# Patient Record
Sex: MALE | Race: WHITE | NOT HISPANIC OR LATINO | URBAN - METROPOLITAN AREA
[De-identification: names, ages, dates, MRNs, and addresses within clinical notes are randomized per-mention and may not be internally consistent; named-entity substitution may affect disease eponyms.]

---

## 2016-04-04 RX ORDER — TAMSULOSIN HYDROCHLORIDE 0.4 MG/1
2 CAPSULE ORAL
Qty: 0 | Refills: 0 | DISCHARGE
Start: 2016-04-04 | End: 2016-05-04

## 2016-04-04 RX ORDER — TAMSULOSIN HYDROCHLORIDE 0.4 MG/1
1 CAPSULE ORAL
Qty: 0 | Refills: 0 | DISCHARGE
Start: 2016-04-04 | End: 2016-05-04

## 2017-06-07 ENCOUNTER — EMERGENCY (EMERGENCY)
Facility: HOSPITAL | Age: 67
LOS: 1 days | Discharge: HOME | End: 2017-06-07
Attending: EMERGENCY MEDICINE | Admitting: EMERGENCY MEDICINE
Payer: MEDICARE

## 2017-06-07 VITALS
TEMPERATURE: 98 F | RESPIRATION RATE: 18 BRPM | SYSTOLIC BLOOD PRESSURE: 153 MMHG | OXYGEN SATURATION: 99 % | HEART RATE: 59 BPM | DIASTOLIC BLOOD PRESSURE: 56 MMHG

## 2017-06-07 VITALS
RESPIRATION RATE: 16 BRPM | WEIGHT: 158.95 LBS | HEART RATE: 75 BPM | DIASTOLIC BLOOD PRESSURE: 73 MMHG | TEMPERATURE: 98 F | OXYGEN SATURATION: 100 % | HEIGHT: 65 IN | SYSTOLIC BLOOD PRESSURE: 165 MMHG

## 2017-06-07 LAB
ALBUMIN SERPL ELPH-MCNC: 3.6 G/DL — SIGNIFICANT CHANGE UP (ref 3.3–5)
ALP SERPL-CCNC: 105 U/L — SIGNIFICANT CHANGE UP (ref 40–120)
ALT FLD-CCNC: 30 U/L — SIGNIFICANT CHANGE UP (ref 4–41)
APTT BLD: 38.6 SEC — HIGH (ref 27.5–37.4)
AST SERPL-CCNC: 33 U/L — SIGNIFICANT CHANGE UP (ref 4–40)
BASE EXCESS BLDV CALC-SCNC: 3.3 MMOL/L — SIGNIFICANT CHANGE UP
BASOPHILS # BLD AUTO: 0.05 K/UL — SIGNIFICANT CHANGE UP (ref 0–0.2)
BASOPHILS NFR BLD AUTO: 1.9 % — SIGNIFICANT CHANGE UP (ref 0–2)
BILIRUB SERPL-MCNC: 0.9 MG/DL — SIGNIFICANT CHANGE UP (ref 0.2–1.2)
BLOOD GAS VENOUS - CREATININE: 1.49 MG/DL — HIGH (ref 0.5–1.3)
BUN SERPL-MCNC: 38 MG/DL — HIGH (ref 7–23)
CALCIUM SERPL-MCNC: 9.1 MG/DL — SIGNIFICANT CHANGE UP (ref 8.4–10.5)
CHLORIDE BLDV-SCNC: 101 MMOL/L — SIGNIFICANT CHANGE UP (ref 96–108)
CHLORIDE SERPL-SCNC: 102 MMOL/L — SIGNIFICANT CHANGE UP (ref 98–107)
CO2 SERPL-SCNC: 26 MMOL/L — SIGNIFICANT CHANGE UP (ref 22–31)
CREAT SERPL-MCNC: 1.53 MG/DL — HIGH (ref 0.5–1.3)
EOSINOPHIL # BLD AUTO: 0.25 K/UL — SIGNIFICANT CHANGE UP (ref 0–0.5)
EOSINOPHIL NFR BLD AUTO: 9.5 % — HIGH (ref 0–6)
GAS PNL BLDV: 137 MMOL/L — SIGNIFICANT CHANGE UP (ref 136–146)
GLUCOSE BLDV-MCNC: 120 — HIGH (ref 70–99)
GLUCOSE SERPL-MCNC: 123 MG/DL — HIGH (ref 70–99)
HCO3 BLDV-SCNC: 26 MMOL/L — SIGNIFICANT CHANGE UP (ref 20–27)
HCT VFR BLD CALC: 29.6 % — LOW (ref 39–50)
HCT VFR BLDV CALC: 31.4 % — LOW (ref 39–51)
HGB BLD-MCNC: 9.7 G/DL — LOW (ref 13–17)
HGB BLDV-MCNC: 10.1 G/DL — LOW (ref 13–17)
HYPOCHROMIA BLD QL: SLIGHT — SIGNIFICANT CHANGE UP
IMM GRANULOCYTES NFR BLD AUTO: 0 % — SIGNIFICANT CHANGE UP (ref 0–1.5)
INR BLD: 1.22 — HIGH (ref 0.88–1.17)
LACTATE BLDV-MCNC: 0.4 MMOL/L — LOW (ref 0.5–2)
LYMPHOCYTES # BLD AUTO: 0.69 K/UL — LOW (ref 1–3.3)
LYMPHOCYTES # BLD AUTO: 26.3 % — SIGNIFICANT CHANGE UP (ref 13–44)
MANUAL SMEAR VERIFICATION: SIGNIFICANT CHANGE UP
MCHC RBC-ENTMCNC: 29.8 PG — SIGNIFICANT CHANGE UP (ref 27–34)
MCHC RBC-ENTMCNC: 32.8 % — SIGNIFICANT CHANGE UP (ref 32–36)
MCV RBC AUTO: 91.1 FL — SIGNIFICANT CHANGE UP (ref 80–100)
MONOCYTES # BLD AUTO: 0.32 K/UL — SIGNIFICANT CHANGE UP (ref 0–0.9)
MONOCYTES NFR BLD AUTO: 12.2 % — SIGNIFICANT CHANGE UP (ref 2–14)
NEUTROPHILS # BLD AUTO: 1.31 K/UL — LOW (ref 1.8–7.4)
NEUTROPHILS NFR BLD AUTO: 50.1 % — SIGNIFICANT CHANGE UP (ref 43–77)
PCO2 BLDV: 52 MMHG — HIGH (ref 41–51)
PH BLDV: 7.36 PH — SIGNIFICANT CHANGE UP (ref 7.32–7.43)
PLATELET # BLD AUTO: 121 K/UL — LOW (ref 150–400)
PLATELET COUNT - ESTIMATE: SIGNIFICANT CHANGE UP
PMV BLD: 9.6 FL — SIGNIFICANT CHANGE UP (ref 7–13)
PO2 BLDV: 28 MMHG — LOW (ref 35–40)
POTASSIUM BLDV-SCNC: 4 MMOL/L — SIGNIFICANT CHANGE UP (ref 3.4–4.5)
POTASSIUM SERPL-MCNC: 4.3 MMOL/L — SIGNIFICANT CHANGE UP (ref 3.5–5.3)
POTASSIUM SERPL-SCNC: 4.3 MMOL/L — SIGNIFICANT CHANGE UP (ref 3.5–5.3)
PROT SERPL-MCNC: 6 G/DL — SIGNIFICANT CHANGE UP (ref 6–8.3)
PROTHROM AB SERPL-ACNC: 13.7 SEC — HIGH (ref 9.8–13.1)
RBC # BLD: 3.25 M/UL — LOW (ref 4.2–5.8)
RBC # FLD: 13.1 % — SIGNIFICANT CHANGE UP (ref 10.3–14.5)
SAO2 % BLDV: 44.5 % — LOW (ref 60–85)
SODIUM SERPL-SCNC: 140 MMOL/L — SIGNIFICANT CHANGE UP (ref 135–145)
TROPONIN T SERPL-MCNC: < 0.06 NG/ML — SIGNIFICANT CHANGE UP (ref 0–0.06)
WBC # BLD: 2.62 K/UL — LOW (ref 3.8–10.5)
WBC # FLD AUTO: 2.62 K/UL — LOW (ref 3.8–10.5)

## 2017-06-07 PROCEDURE — 72125 CT NECK SPINE W/O DYE: CPT | Mod: 26

## 2017-06-07 PROCEDURE — 99284 EMERGENCY DEPT VISIT MOD MDM: CPT

## 2017-06-07 PROCEDURE — 70450 CT HEAD/BRAIN W/O DYE: CPT | Mod: 26

## 2017-06-07 RX ORDER — ACETAMINOPHEN 500 MG
650 TABLET ORAL ONCE
Qty: 0 | Refills: 0 | Status: COMPLETED | OUTPATIENT
Start: 2017-06-07 | End: 2017-06-07

## 2017-06-07 RX ADMIN — Medication 650 MILLIGRAM(S): at 15:06

## 2017-06-07 NOTE — ED PROVIDER NOTE - SHIFT CHANGE DETAILS
I have signed over this patient to the above attending physician. Pertinent history, physical exam findings and workup thus far in the ED have been discussed. The pending tests and plan, including CTs were signed over.  All questions from the above attending physician have been answered.
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
## 2017-06-07 NOTE — ED PROVIDER NOTE - PMH
ASHD (arteriosclerotic heart disease)    CAD (coronary artery disease)    CRI (chronic renal insufficiency)    Diabetes mellitus type II    Hyperlipidemia    Hypertension    Pacemaker

## 2017-06-07 NOTE — ED PROVIDER NOTE - OBJECTIVE STATEMENT
68 y/o male pmh htn, dm, afib on eloquis c/o fall w/ head trauma x today. Pt admits to being at his day program and finishing lunch. Pt admits to feeling dizzy and fell backwards. Pt states that he stood up after eating lunch and began walking when he felt a "spinning sensation" and fell. Pt admits to head trauma, posteriorly, denies LOC. Pt admits to feeling dizzy frequently while standing up. Denies chest pain, sob, abd pain, n/v/d, numbness, tingling, weakness, syncope, fever or chills

## 2017-06-07 NOTE — ED ADULT NURSE NOTE - OBJECTIVE STATEMENT
Patient received to room 23 awake, alert, oriented x3, able to make needs known verbally.  Patient s/p fall today, felt dizzy then lowered to floor when trying to sit in chair.  Patient has aide from  with him.  Patient says he hit his head, small area of redness noted to occipital space, no open skin.  Patient ambulatory, hemodynamically stable.  Patient complains that he has headaches intermittently, when they come they are at level 10 severity, currently they have subsided.  Patient does not complain of dizziness/nausea/vomiting/palpitations.  Cardiac monitor shows paced rhythm with occasional PVC's.  Call bell within reach, safety maintained, will continue to monitor.

## 2017-06-07 NOTE — ED ADULT TRIAGE NOTE - CHIEF COMPLAINT QUOTE
Pt on eliquis s/p fall denies LOC. Pt states he felt dizzy then fell backward hitting his head. Pt denies chest pain, SOB. Resp even and unlabored

## 2017-08-07 ENCOUNTER — EMERGENCY (EMERGENCY)
Facility: HOSPITAL | Age: 67
LOS: 1 days | Discharge: HOME | End: 2017-08-07
Admitting: EMERGENCY MEDICINE
Payer: MEDICARE

## 2017-08-07 VITALS
SYSTOLIC BLOOD PRESSURE: 135 MMHG | OXYGEN SATURATION: 98 % | RESPIRATION RATE: 18 BRPM | HEART RATE: 77 BPM | TEMPERATURE: 98 F | DIASTOLIC BLOOD PRESSURE: 81 MMHG

## 2017-08-07 PROCEDURE — 99283 EMERGENCY DEPT VISIT LOW MDM: CPT

## 2017-08-07 RX ORDER — ACETAMINOPHEN 500 MG
650 TABLET ORAL ONCE
Qty: 0 | Refills: 0 | Status: COMPLETED | OUTPATIENT
Start: 2017-08-07 | End: 2017-08-07

## 2017-08-07 RX ADMIN — Medication 650 MILLIGRAM(S): at 12:52
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
## 2017-08-07 NOTE — ED ADULT NURSE NOTE - OBJECTIVE STATEMENT
Pt seen and treated by provider, no apparent acute distress observed. Medicated per orders. Safety maintained in intake area.
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
## 2017-12-14 ENCOUNTER — INPATIENT (INPATIENT)
Facility: HOSPITAL | Age: 67
LOS: 4 days | Discharge: HOME | End: 2017-12-19
Attending: SPECIALIST | Admitting: SPECIALIST
Payer: MEDICARE

## 2017-12-14 VITALS
RESPIRATION RATE: 18 BRPM | HEART RATE: 85 BPM | OXYGEN SATURATION: 100 % | DIASTOLIC BLOOD PRESSURE: 54 MMHG | TEMPERATURE: 98 F | SYSTOLIC BLOOD PRESSURE: 132 MMHG

## 2017-12-14 DIAGNOSIS — R31.9 HEMATURIA, UNSPECIFIED: ICD-10-CM

## 2017-12-14 LAB
ALBUMIN SERPL ELPH-MCNC: 3.7 G/DL — SIGNIFICANT CHANGE UP (ref 3.3–5)
ALP SERPL-CCNC: 146 U/L — HIGH (ref 40–120)
ALT FLD-CCNC: 29 U/L — SIGNIFICANT CHANGE UP (ref 4–41)
APPEARANCE UR: SIGNIFICANT CHANGE UP
APTT BLD: 38.7 SEC — HIGH (ref 27.5–37.4)
AST SERPL-CCNC: 26 U/L — SIGNIFICANT CHANGE UP (ref 4–40)
BASOPHILS # BLD AUTO: 0.05 K/UL — SIGNIFICANT CHANGE UP (ref 0–0.2)
BASOPHILS NFR BLD AUTO: 1 % — SIGNIFICANT CHANGE UP (ref 0–2)
BILIRUB SERPL-MCNC: 1 MG/DL — SIGNIFICANT CHANGE UP (ref 0.2–1.2)
BILIRUB UR-MCNC: NEGATIVE — SIGNIFICANT CHANGE UP
BLOOD UR QL VISUAL: HIGH
BUN SERPL-MCNC: 38 MG/DL — HIGH (ref 7–23)
CALCIUM SERPL-MCNC: 8.8 MG/DL — SIGNIFICANT CHANGE UP (ref 8.4–10.5)
CHLORIDE SERPL-SCNC: 102 MMOL/L — SIGNIFICANT CHANGE UP (ref 98–107)
CO2 SERPL-SCNC: 24 MMOL/L — SIGNIFICANT CHANGE UP (ref 22–31)
COLOR SPEC: HIGH
CREAT SERPL-MCNC: 2.09 MG/DL — HIGH (ref 0.5–1.3)
EOSINOPHIL # BLD AUTO: 0.13 K/UL — SIGNIFICANT CHANGE UP (ref 0–0.5)
EOSINOPHIL NFR BLD AUTO: 2.6 % — SIGNIFICANT CHANGE UP (ref 0–6)
GLUCOSE BLDC GLUCOMTR-MCNC: 123 MG/DL — HIGH (ref 70–99)
GLUCOSE SERPL-MCNC: 154 MG/DL — HIGH (ref 70–99)
GLUCOSE UR-MCNC: 50 — HIGH
HCT VFR BLD CALC: 24.5 % — LOW (ref 39–50)
HGB BLD-MCNC: 8.2 G/DL — LOW (ref 13–17)
IMM GRANULOCYTES # BLD AUTO: 0.01 # — SIGNIFICANT CHANGE UP
IMM GRANULOCYTES NFR BLD AUTO: 0.2 % — SIGNIFICANT CHANGE UP (ref 0–1.5)
INR BLD: 1.35 — HIGH (ref 0.88–1.17)
KETONES UR-MCNC: SIGNIFICANT CHANGE UP
LEUKOCYTE ESTERASE UR-ACNC: HIGH
LYMPHOCYTES # BLD AUTO: 0.38 K/UL — LOW (ref 1–3.3)
LYMPHOCYTES # BLD AUTO: 7.6 % — LOW (ref 13–44)
MCHC RBC-ENTMCNC: 31.1 PG — SIGNIFICANT CHANGE UP (ref 27–34)
MCHC RBC-ENTMCNC: 33.5 % — SIGNIFICANT CHANGE UP (ref 32–36)
MCV RBC AUTO: 92.8 FL — SIGNIFICANT CHANGE UP (ref 80–100)
MONOCYTES # BLD AUTO: 0.33 K/UL — SIGNIFICANT CHANGE UP (ref 0–0.9)
MONOCYTES NFR BLD AUTO: 6.6 % — SIGNIFICANT CHANGE UP (ref 2–14)
NEUTROPHILS # BLD AUTO: 4.12 K/UL — SIGNIFICANT CHANGE UP (ref 1.8–7.4)
NEUTROPHILS NFR BLD AUTO: 82 % — HIGH (ref 43–77)
NITRITE UR-MCNC: NEGATIVE — SIGNIFICANT CHANGE UP
NRBC # FLD: 0 — SIGNIFICANT CHANGE UP
PH UR: 7.5 — SIGNIFICANT CHANGE UP (ref 4.6–8)
PLATELET # BLD AUTO: 184 K/UL — SIGNIFICANT CHANGE UP (ref 150–400)
PMV BLD: 10.2 FL — SIGNIFICANT CHANGE UP (ref 7–13)
POTASSIUM SERPL-MCNC: 4.5 MMOL/L — SIGNIFICANT CHANGE UP (ref 3.5–5.3)
POTASSIUM SERPL-SCNC: 4.5 MMOL/L — SIGNIFICANT CHANGE UP (ref 3.5–5.3)
PROT SERPL-MCNC: 6.3 G/DL — SIGNIFICANT CHANGE UP (ref 6–8.3)
PROT UR-MCNC: 500 MG/DL — HIGH
PROTHROM AB SERPL-ACNC: 15.1 SEC — HIGH (ref 9.8–13.1)
RBC # BLD: 2.64 M/UL — LOW (ref 4.2–5.8)
RBC # FLD: 13 % — SIGNIFICANT CHANGE UP (ref 10.3–14.5)
RBC CASTS # UR COMP ASSIST: >50 — HIGH (ref 0–?)
SODIUM SERPL-SCNC: 139 MMOL/L — SIGNIFICANT CHANGE UP (ref 135–145)
SP GR SPEC: 1.02 — SIGNIFICANT CHANGE UP (ref 1–1.04)
UROBILINOGEN FLD QL: NORMAL MG/DL — SIGNIFICANT CHANGE UP
WBC # BLD: 5.02 K/UL — SIGNIFICANT CHANGE UP (ref 3.8–10.5)
WBC # FLD AUTO: 5.02 K/UL — SIGNIFICANT CHANGE UP (ref 3.8–10.5)
WBC UR QL: HIGH (ref 0–?)

## 2017-12-14 PROCEDURE — 71020: CPT | Mod: 26

## 2017-12-14 PROCEDURE — 99223 1ST HOSP IP/OBS HIGH 75: CPT

## 2017-12-14 RX ORDER — OXYCODONE AND ACETAMINOPHEN 5; 325 MG/1; MG/1
2 TABLET ORAL EVERY 6 HOURS
Qty: 0 | Refills: 0 | Status: DISCONTINUED | OUTPATIENT
Start: 2017-12-14 | End: 2017-12-19

## 2017-12-14 RX ORDER — ACETAMINOPHEN 500 MG
650 TABLET ORAL EVERY 6 HOURS
Qty: 0 | Refills: 0 | Status: DISCONTINUED | OUTPATIENT
Start: 2017-12-14 | End: 2017-12-19

## 2017-12-14 RX ORDER — INSULIN LISPRO 100/ML
VIAL (ML) SUBCUTANEOUS AT BEDTIME
Qty: 0 | Refills: 0 | Status: DISCONTINUED | OUTPATIENT
Start: 2017-12-14 | End: 2017-12-19

## 2017-12-14 RX ORDER — CARVEDILOL PHOSPHATE 80 MG/1
3.12 CAPSULE, EXTENDED RELEASE ORAL EVERY 12 HOURS
Qty: 0 | Refills: 0 | Status: DISCONTINUED | OUTPATIENT
Start: 2017-12-14 | End: 2017-12-19

## 2017-12-14 RX ORDER — TAMSULOSIN HYDROCHLORIDE 0.4 MG/1
0.4 CAPSULE ORAL AT BEDTIME
Qty: 0 | Refills: 0 | Status: DISCONTINUED | OUTPATIENT
Start: 2017-12-14 | End: 2017-12-15

## 2017-12-14 RX ORDER — ATORVASTATIN CALCIUM 80 MG/1
20 TABLET, FILM COATED ORAL AT BEDTIME
Qty: 0 | Refills: 0 | Status: DISCONTINUED | OUTPATIENT
Start: 2017-12-14 | End: 2017-12-19

## 2017-12-14 RX ORDER — FINASTERIDE 5 MG/1
5 TABLET, FILM COATED ORAL DAILY
Qty: 0 | Refills: 0 | Status: DISCONTINUED | OUTPATIENT
Start: 2017-12-14 | End: 2017-12-19

## 2017-12-14 RX ORDER — DOCUSATE SODIUM 100 MG
100 CAPSULE ORAL THREE TIMES A DAY
Qty: 0 | Refills: 0 | Status: DISCONTINUED | OUTPATIENT
Start: 2017-12-14 | End: 2017-12-18

## 2017-12-14 RX ORDER — SODIUM BICARBONATE 1 MEQ/ML
650 SYRINGE (ML) INTRAVENOUS ONCE
Qty: 0 | Refills: 0 | Status: COMPLETED | OUTPATIENT
Start: 2017-12-14 | End: 2017-12-14

## 2017-12-14 RX ORDER — OXYCODONE AND ACETAMINOPHEN 5; 325 MG/1; MG/1
1 TABLET ORAL EVERY 4 HOURS
Qty: 0 | Refills: 0 | Status: DISCONTINUED | OUTPATIENT
Start: 2017-12-14 | End: 2017-12-19

## 2017-12-14 RX ORDER — HEPARIN SODIUM 5000 [USP'U]/ML
5000 INJECTION INTRAVENOUS; SUBCUTANEOUS EVERY 8 HOURS
Qty: 0 | Refills: 0 | Status: DISCONTINUED | OUTPATIENT
Start: 2017-12-14 | End: 2017-12-19

## 2017-12-14 RX ORDER — ASPIRIN/CALCIUM CARB/MAGNESIUM 324 MG
81 TABLET ORAL DAILY
Qty: 0 | Refills: 0 | Status: DISCONTINUED | OUTPATIENT
Start: 2017-12-14 | End: 2017-12-19

## 2017-12-14 RX ORDER — ATROPA BELLADONNA AND OPIUM 16.2; 6 MG/1; MG/1
1 SUPPOSITORY RECTAL ONCE
Qty: 0 | Refills: 0 | Status: DISCONTINUED | OUTPATIENT
Start: 2017-12-14 | End: 2017-12-14

## 2017-12-14 RX ORDER — INFLUENZA VIRUS VACCINE 15; 15; 15; 15 UG/.5ML; UG/.5ML; UG/.5ML; UG/.5ML
0.5 SUSPENSION INTRAMUSCULAR ONCE
Qty: 0 | Refills: 0 | Status: COMPLETED | OUTPATIENT
Start: 2017-12-14 | End: 2017-12-14

## 2017-12-14 RX ORDER — SENNA PLUS 8.6 MG/1
2 TABLET ORAL AT BEDTIME
Qty: 0 | Refills: 0 | Status: DISCONTINUED | OUTPATIENT
Start: 2017-12-14 | End: 2017-12-18

## 2017-12-14 RX ORDER — SODIUM CHLORIDE 9 MG/ML
1000 INJECTION, SOLUTION INTRAVENOUS
Qty: 0 | Refills: 0 | Status: DISCONTINUED | OUTPATIENT
Start: 2017-12-14 | End: 2017-12-16

## 2017-12-14 RX ORDER — FUROSEMIDE 40 MG
40 TABLET ORAL DAILY
Qty: 0 | Refills: 0 | Status: DISCONTINUED | OUTPATIENT
Start: 2017-12-14 | End: 2017-12-15

## 2017-12-14 RX ORDER — BUDESONIDE AND FORMOTEROL FUMARATE DIHYDRATE 160; 4.5 UG/1; UG/1
2 AEROSOL RESPIRATORY (INHALATION)
Qty: 0 | Refills: 0 | Status: DISCONTINUED | OUTPATIENT
Start: 2017-12-14 | End: 2017-12-19

## 2017-12-14 RX ORDER — INSULIN LISPRO 100/ML
VIAL (ML) SUBCUTANEOUS
Qty: 0 | Refills: 0 | Status: DISCONTINUED | OUTPATIENT
Start: 2017-12-14 | End: 2017-12-19

## 2017-12-14 RX ADMIN — ATROPA BELLADONNA AND OPIUM 1 SUPPOSITORY(S): 16.2; 6 SUPPOSITORY RECTAL at 21:29

## 2017-12-14 RX ADMIN — FINASTERIDE 5 MILLIGRAM(S): 5 TABLET, FILM COATED ORAL at 19:18

## 2017-12-14 RX ADMIN — CARVEDILOL PHOSPHATE 3.12 MILLIGRAM(S): 80 CAPSULE, EXTENDED RELEASE ORAL at 19:18

## 2017-12-14 RX ADMIN — SODIUM CHLORIDE 100 MILLILITER(S): 9 INJECTION, SOLUTION INTRAVENOUS at 19:19

## 2017-12-14 RX ADMIN — ATROPA BELLADONNA AND OPIUM 1 SUPPOSITORY(S): 16.2; 6 SUPPOSITORY RECTAL at 20:25

## 2017-12-14 RX ADMIN — TAMSULOSIN HYDROCHLORIDE 0.4 MILLIGRAM(S): 0.4 CAPSULE ORAL at 21:58

## 2017-12-14 RX ADMIN — BUDESONIDE AND FORMOTEROL FUMARATE DIHYDRATE 2 PUFF(S): 160; 4.5 AEROSOL RESPIRATORY (INHALATION) at 22:53

## 2017-12-14 RX ADMIN — Medication 650 MILLIGRAM(S): at 21:58

## 2017-12-14 RX ADMIN — HEPARIN SODIUM 5000 UNIT(S): 5000 INJECTION INTRAVENOUS; SUBCUTANEOUS at 21:58

## 2017-12-14 RX ADMIN — Medication 100 MILLIGRAM(S): at 21:58

## 2017-12-14 RX ADMIN — Medication 81 MILLIGRAM(S): at 19:19

## 2017-12-14 RX ADMIN — Medication 650 MILLIGRAM(S): at 19:18

## 2017-12-14 RX ADMIN — ATORVASTATIN CALCIUM 20 MILLIGRAM(S): 80 TABLET, FILM COATED ORAL at 21:58

## 2017-12-14 NOTE — ED PROVIDER NOTE - OBJECTIVE STATEMENT
68yo M pmhx MR, HTN, CAD, afib, HLD, BPH brought in by aid from day program for pelvic pain. Aid also states she noticed pt. was bleeding a significant amount from penis. Pt. explains he had TURP procedure 1 month ago at [facility], also reports he has not been able to urinate since last night. Denies fever chills cp sob n/v/d.

## 2017-12-14 NOTE — ED ADULT NURSE NOTE - OBJECTIVE STATEMENT
Note done by night time break coverage RN at 23:24. Patient Aox2, and amb w/ walker. Admitted for hematuria. Has nonblanachable redness to sacral area and gluteal crevice. Has CBI going. Report given to floor, taken w. transport to floor. patient denies pain at this time. Has 14FR 3way blevins in.

## 2017-12-14 NOTE — ED ADULT NURSE REASSESSMENT NOTE - NS ED NURSE REASSESS COMMENT FT1
Report received from TERRY Morales at change of shift. Pt A&Ox2, periods of confusion towards situation. Pt co headache: medicated as ordered. Pt remains on CBI, catheter draining well with red urine. PT cleaned turned and positioned for comfort. PT observed to have non lary able redness to sacral area and gluteal crevice. Pt awaiting bed assignment, will continue to monitor closely.

## 2017-12-14 NOTE — ED PROVIDER NOTE - PROGRESS NOTE DETAILS
brother Mr. Hobbs aware pt is here and on the way, and aware pt to get admitted as group home does not accept [facility].  Placing call to pmd Fredi Soto [phone]

## 2017-12-14 NOTE — H&P ADULT - ASSESSMENT
67M Hx MR, HTN, CAD, Afib on Eliquis p/w hematuria and clot retention  -- c/w CBI, will check color and wean CBI as tolerated  -- f/u UCx  -- manually irrigate PRN  -- 2g Ancef x1  -- encourage fluid intake  -- f/u medicine for comgmt: as per Hospitalist on call, will see pt tomorrow AM  -- AM CBC, BMP  -- d/w Dr. Amor

## 2017-12-14 NOTE — H&P ADULT - ATTENDING COMMENTS
Patient seen and examined, as above. 66 yo gentleman with HT, A. Fib, CAD on Eliquis 1 month s/p TURP presents with hematuria and clot retention. Creat 2, Hct 24. Bladder irrigated and placed on CBI. Admit for management of hematuria. Hold Eliquis. Check serial labs. IM consult for management of med co-morbidities. Will follow closely.

## 2017-12-14 NOTE — H&P ADULT - HISTORY OF PRESENT ILLNESS
67M Hx MR, HTN, CAD, Afib on Eliquis p/w hematuria and clot retention. Pt states he started bleeding yesterday night and was unable to void today. Pt was transported from his day program after they noted hematuria. Of note, pt had TURP 1 month prior w/Dr Chaves at [facility]. Denies fevers, chill, n/v.    In ED, 18 Fr blevins was placed, which drained >1L. Pt was persistently hematuric, so the 2 way blevins was exchanged for a 24 3 way blevins. Pt was irrigated w/return of minimal clot. CBI was started, blevins output was pink on fast gtt

## 2017-12-14 NOTE — ED PROVIDER NOTE - ATTENDING CONTRIBUTION TO CARE
I performed a face to face evaluation of this patient and performed a full history and physical examination on the patient.  I agree with the resident's history, physical examination, and plan of the patient. I performed a face to face evaluation of this patient and performed a full history and physical examination on the patient.  I agree with the resident's history, physical examination, and plan of the patient. Pt with hematuria, with recent TURP with outside urologist, c/o hematuria, lower abd pain, abd soft, slight distended possibly bladder, no mass, rebound, back no cvat, heart and lung wnl- labs, ua,  consult

## 2017-12-14 NOTE — H&P ADULT - NSHPPHYSICALEXAM_GEN_ALL_CORE
Vital signs  T(C): 36.9 (12-14-17 @ 10:43), Max: 36.9 (12-14-17 @ 10:43)  HR: 86 (12-14-17 @ 11:55)  BP: 135/62 (12-14-17 @ 11:55)  SpO2: 100% (12-14-17 @ 11:55)  Wt(kg): --    Output  I&O's Summary    UOP    Gen:  [X] NAD [] toxic    Pulm:  [X] no resp distress [] no substernal retractions  	  CV:  [X] no JVD  [] RRR    GI:  [X] Soft [X] ND (+) suprapubic tenderness    :  Glans Circumcised [x]Y  []N, []lesions:  Meatus Discharge []Y  [x] N,  Blood []Y [x] N

## 2017-12-14 NOTE — CONSULT NOTE ADULT - SUBJECTIVE AND OBJECTIVE BOX
HPI  67M Hx MR, HTN, CAD, Afib on Eliquis p/w hematuria and clot retention. Pt states he started bleeding yesterday night and was unable to void today. Pt was transported from his day program after they noted hematuria. Of note, pt had TURP 1 month prior w/Dr Chaves at [facility]. Denies fevers, chill, n/v.    In ED, 18 Fr blevins was placed, which drained >1L. Pt was persistently hematuric, so the 2 way blevins was exchanged for a 24 3 way blevins. Pt was irrigated w/return of minimal clot. CBI was started, blevins output was pink on fast gtt    PAST MEDICAL & SURGICAL HISTORY:  Pacemaker  Hyperlipidemia  Hypertension  Diabetes mellitus type II  CRI (chronic renal insufficiency)  CAD (coronary artery disease)  ASHD (arteriosclerotic heart disease)  Coronary stent  Hx of CABG      MEDICATIONS  (STANDING):    MEDICATIONS  (PRN):      FAMILY HISTORY:  Family history of chronic ischemic heart disease (Father): father  of MI at age 59      Allergies    Levaquin (Anaphylaxis; Flushing; Short breath)    Intolerances        SOCIAL HISTORY:    REVIEW OF SYSTEMS: Otherwise negative as stated in HPI    Physical Exam  Vital signs  T(C): 36.9 (17 @ 10:43), Max: 36.9 (17 @ 10:43)  HR: 86 (17 @ 11:55)  BP: 135/62 (17 @ 11:55)  SpO2: 100% (17 @ 11:55)  Wt(kg): --    Output  I&O's Summary    UOP    Gen:  [X] NAD [] toxic    Pulm:  [X] no resp distress [] no substernal retractions  	  CV:  [X] no JVD  [] RRR    GI:  [X] Soft [X] ND (+) suprapubic tenderness    :  Glans Circumcised [x]Y  []N, []lesions:  Meatus Discharge []Y  [x] N,  Blood []Y [x] N  Testes  Descended []Y  []N,    Tender []Y  []N,   Epididymis Tender  []Y []N,    Cremasteric []Y  []N                        	  LABS:       @ 11:40    WBC 5.02  / Hct 24.5  / SCr 2.09         139  |  102  |  38<H>  ----------------------------<  154<H>  4.5   |  24  |  2.09<H>    Ca    8.8      14 Dec 2017 11:40    TPro  6.3  /  Alb  3.7  /  TBili  1.0  /  DBili  x   /  AST  26  /  ALT  29  /  AlkPhos  146<H>  12-14    PT/INR - ( 14 Dec 2017 11:40 )   PT: 15.1 SEC;   INR: 1.35          PTT - ( 14 Dec 2017 11:40 )  PTT:38.7 SEC      Urine Cx: P

## 2017-12-14 NOTE — CONSULT NOTE ADULT - ASSESSMENT
67M Hx MR, HTN, CAD, Afib on Eliquis p/w hematuria and clot retention  -- c/w CBI, will check color and wean CBI as tolerated  -- f/u UCx  -- manually irrigate PRN  -- 2g Ancef x1  -- encourage fluid intake  -- to be d/w Dr. Amor 67M Hx MR, HTN, CAD, Afib on Eliquis p/w hematuria and clot retention  -- c/w CBI, will check color and wean CBI as tolerated  -- f/u UCx  -- manually irrigate PRN  -- 2g Ancef x1  -- encourage fluid intake  -- d/w Dr. Amor

## 2017-12-14 NOTE — ED PROVIDER NOTE - MEDICAL DECISION MAKING DETAILS
68yo m s/p Turp p/w cc urinary retention and blood at urethral meatus - will placed blevins, irrigate, send basic labs, ua and contact pts. urologist

## 2017-12-14 NOTE — H&P ADULT - FAMILY HISTORY
Father  Still living? Unknown  Family history of chronic ischemic heart disease, Age at diagnosis: Age Unknown

## 2017-12-14 NOTE — H&P ADULT - NSHPLABSRESULTS_GEN_ALL_CORE
LABS:      12-14 @ 11:40    WBC 5.02  / Hct 24.5  / SCr 2.09     12-14    139  |  102  |  38<H>  ----------------------------<  154<H>  4.5   |  24  |  2.09<H>    Ca    8.8      14 Dec 2017 11:40    TPro  6.3  /  Alb  3.7  /  TBili  1.0  /  DBili  x   /  AST  26  /  ALT  29  /  AlkPhos  146<H>  12-14    PT/INR - ( 14 Dec 2017 11:40 )   PT: 15.1 SEC;   INR: 1.35          PTT - ( 14 Dec 2017 11:40 )  PTT:38.7 SEC      Urine Cx: P

## 2017-12-15 DIAGNOSIS — E11.9 TYPE 2 DIABETES MELLITUS WITHOUT COMPLICATIONS: ICD-10-CM

## 2017-12-15 DIAGNOSIS — E78.5 HYPERLIPIDEMIA, UNSPECIFIED: ICD-10-CM

## 2017-12-15 DIAGNOSIS — N17.0 ACUTE KIDNEY FAILURE WITH TUBULAR NECROSIS: ICD-10-CM

## 2017-12-15 DIAGNOSIS — D50.0 IRON DEFICIENCY ANEMIA SECONDARY TO BLOOD LOSS (CHRONIC): ICD-10-CM

## 2017-12-15 DIAGNOSIS — I25.10 ATHEROSCLEROTIC HEART DISEASE OF NATIVE CORONARY ARTERY WITHOUT ANGINA PECTORIS: ICD-10-CM

## 2017-12-15 DIAGNOSIS — E03.9 HYPOTHYROIDISM, UNSPECIFIED: ICD-10-CM

## 2017-12-15 DIAGNOSIS — R31.9 HEMATURIA, UNSPECIFIED: ICD-10-CM

## 2017-12-15 DIAGNOSIS — I10 ESSENTIAL (PRIMARY) HYPERTENSION: ICD-10-CM

## 2017-12-15 DIAGNOSIS — J45.909 UNSPECIFIED ASTHMA, UNCOMPLICATED: ICD-10-CM

## 2017-12-15 DIAGNOSIS — Z29.9 ENCOUNTER FOR PROPHYLACTIC MEASURES, UNSPECIFIED: ICD-10-CM

## 2017-12-15 DIAGNOSIS — I48.91 UNSPECIFIED ATRIAL FIBRILLATION: ICD-10-CM

## 2017-12-15 LAB
BLD GP AB SCN SERPL QL: NEGATIVE — SIGNIFICANT CHANGE UP
BUN SERPL-MCNC: 37 MG/DL — HIGH (ref 7–23)
CALCIUM SERPL-MCNC: 8.3 MG/DL — LOW (ref 8.4–10.5)
CHLORIDE SERPL-SCNC: 104 MMOL/L — SIGNIFICANT CHANGE UP (ref 98–107)
CO2 SERPL-SCNC: 25 MMOL/L — SIGNIFICANT CHANGE UP (ref 22–31)
CREAT SERPL-MCNC: 1.96 MG/DL — HIGH (ref 0.5–1.3)
GLUCOSE BLDC GLUCOMTR-MCNC: 143 MG/DL — HIGH (ref 70–99)
GLUCOSE BLDC GLUCOMTR-MCNC: 217 MG/DL — HIGH (ref 70–99)
GLUCOSE BLDC GLUCOMTR-MCNC: 220 MG/DL — HIGH (ref 70–99)
GLUCOSE BLDC GLUCOMTR-MCNC: 86 MG/DL — SIGNIFICANT CHANGE UP (ref 70–99)
GLUCOSE SERPL-MCNC: 191 MG/DL — HIGH (ref 70–99)
HCT VFR BLD CALC: 20.2 % — CRITICAL LOW (ref 39–50)
HCT VFR BLD CALC: 20.4 % — CRITICAL LOW (ref 39–50)
HCT VFR BLD CALC: 26.1 % — LOW (ref 39–50)
HGB BLD-MCNC: 6.9 G/DL — CRITICAL LOW (ref 13–17)
HGB BLD-MCNC: 7 G/DL — CRITICAL LOW (ref 13–17)
HGB BLD-MCNC: 8.9 G/DL — LOW (ref 13–17)
MCHC RBC-ENTMCNC: 30.9 PG — SIGNIFICANT CHANGE UP (ref 27–34)
MCHC RBC-ENTMCNC: 31.7 PG — SIGNIFICANT CHANGE UP (ref 27–34)
MCHC RBC-ENTMCNC: 32.1 PG — SIGNIFICANT CHANGE UP (ref 27–34)
MCHC RBC-ENTMCNC: 34.1 % — SIGNIFICANT CHANGE UP (ref 32–36)
MCHC RBC-ENTMCNC: 34.2 % — SIGNIFICANT CHANGE UP (ref 32–36)
MCHC RBC-ENTMCNC: 34.3 % — SIGNIFICANT CHANGE UP (ref 32–36)
MCV RBC AUTO: 90.6 FL — SIGNIFICANT CHANGE UP (ref 80–100)
MCV RBC AUTO: 92.7 FL — SIGNIFICANT CHANGE UP (ref 80–100)
MCV RBC AUTO: 93.6 FL — SIGNIFICANT CHANGE UP (ref 80–100)
NRBC # FLD: 0 — SIGNIFICANT CHANGE UP
PLATELET # BLD AUTO: 134 K/UL — LOW (ref 150–400)
PLATELET # BLD AUTO: 137 K/UL — LOW (ref 150–400)
PLATELET # BLD AUTO: 157 K/UL — SIGNIFICANT CHANGE UP (ref 150–400)
PMV BLD: 10 FL — SIGNIFICANT CHANGE UP (ref 7–13)
PMV BLD: 10.2 FL — SIGNIFICANT CHANGE UP (ref 7–13)
PMV BLD: 10.5 FL — SIGNIFICANT CHANGE UP (ref 7–13)
POTASSIUM SERPL-MCNC: 4.1 MMOL/L — SIGNIFICANT CHANGE UP (ref 3.5–5.3)
POTASSIUM SERPL-SCNC: 4.1 MMOL/L — SIGNIFICANT CHANGE UP (ref 3.5–5.3)
RBC # BLD: 2.18 M/UL — LOW (ref 4.2–5.8)
RBC # BLD: 2.18 M/UL — LOW (ref 4.2–5.8)
RBC # BLD: 2.88 M/UL — LOW (ref 4.2–5.8)
RBC # FLD: 13 % — SIGNIFICANT CHANGE UP (ref 10.3–14.5)
RBC # FLD: 13.1 % — SIGNIFICANT CHANGE UP (ref 10.3–14.5)
RBC # FLD: 14.3 % — SIGNIFICANT CHANGE UP (ref 10.3–14.5)
RH IG SCN BLD-IMP: POSITIVE — SIGNIFICANT CHANGE UP
RH IG SCN BLD-IMP: POSITIVE — SIGNIFICANT CHANGE UP
SODIUM SERPL-SCNC: 140 MMOL/L — SIGNIFICANT CHANGE UP (ref 135–145)
WBC # BLD: 4.5 K/UL — SIGNIFICANT CHANGE UP (ref 3.8–10.5)
WBC # BLD: 4.7 K/UL — SIGNIFICANT CHANGE UP (ref 3.8–10.5)
WBC # BLD: 5.98 K/UL — SIGNIFICANT CHANGE UP (ref 3.8–10.5)
WBC # FLD AUTO: 4.5 K/UL — SIGNIFICANT CHANGE UP (ref 3.8–10.5)
WBC # FLD AUTO: 4.7 K/UL — SIGNIFICANT CHANGE UP (ref 3.8–10.5)
WBC # FLD AUTO: 5.98 K/UL — SIGNIFICANT CHANGE UP (ref 3.8–10.5)

## 2017-12-15 PROCEDURE — 99223 1ST HOSP IP/OBS HIGH 75: CPT

## 2017-12-15 PROCEDURE — 99232 SBSQ HOSP IP/OBS MODERATE 35: CPT

## 2017-12-15 PROCEDURE — 74176 CT ABD & PELVIS W/O CONTRAST: CPT | Mod: 26

## 2017-12-15 PROCEDURE — 93010 ELECTROCARDIOGRAM REPORT: CPT

## 2017-12-15 RX ORDER — FUROSEMIDE 40 MG
20 TABLET ORAL
Qty: 0 | Refills: 0 | Status: DISCONTINUED | OUTPATIENT
Start: 2017-12-15 | End: 2017-12-19

## 2017-12-15 RX ORDER — CHOLECALCIFEROL (VITAMIN D3) 125 MCG
1000 CAPSULE ORAL DAILY
Qty: 0 | Refills: 0 | Status: DISCONTINUED | OUTPATIENT
Start: 2017-12-15 | End: 2017-12-19

## 2017-12-15 RX ORDER — FOLIC ACID 0.8 MG
1 TABLET ORAL DAILY
Qty: 0 | Refills: 0 | Status: DISCONTINUED | OUTPATIENT
Start: 2017-12-15 | End: 2017-12-19

## 2017-12-15 RX ORDER — TAMSULOSIN HYDROCHLORIDE 0.4 MG/1
0.8 CAPSULE ORAL AT BEDTIME
Qty: 0 | Refills: 0 | Status: DISCONTINUED | OUTPATIENT
Start: 2017-12-15 | End: 2017-12-19

## 2017-12-15 RX ORDER — PANTOPRAZOLE SODIUM 20 MG/1
40 TABLET, DELAYED RELEASE ORAL
Qty: 0 | Refills: 0 | Status: DISCONTINUED | OUTPATIENT
Start: 2017-12-15 | End: 2017-12-19

## 2017-12-15 RX ORDER — SODIUM BICARBONATE 1 MEQ/ML
1300 SYRINGE (ML) INTRAVENOUS
Qty: 0 | Refills: 0 | Status: DISCONTINUED | OUTPATIENT
Start: 2017-12-15 | End: 2017-12-19

## 2017-12-15 RX ORDER — FERROUS SULFATE 325(65) MG
325 TABLET ORAL DAILY
Qty: 0 | Refills: 0 | Status: DISCONTINUED | OUTPATIENT
Start: 2017-12-15 | End: 2017-12-19

## 2017-12-15 RX ORDER — TIMOLOL 0.5 %
1 DROPS OPHTHALMIC (EYE)
Qty: 0 | Refills: 0 | Status: DISCONTINUED | OUTPATIENT
Start: 2017-12-15 | End: 2017-12-19

## 2017-12-15 RX ORDER — OXYBUTYNIN CHLORIDE 5 MG
5 TABLET ORAL THREE TIMES A DAY
Qty: 0 | Refills: 0 | Status: DISCONTINUED | OUTPATIENT
Start: 2017-12-15 | End: 2017-12-19

## 2017-12-15 RX ORDER — LEVOTHYROXINE SODIUM 125 MCG
25 TABLET ORAL DAILY
Qty: 0 | Refills: 0 | Status: DISCONTINUED | OUTPATIENT
Start: 2017-12-15 | End: 2017-12-19

## 2017-12-15 RX ORDER — DORZOLAMIDE HYDROCHLORIDE 20 MG/ML
1 SOLUTION/ DROPS OPHTHALMIC
Qty: 0 | Refills: 0 | Status: DISCONTINUED | OUTPATIENT
Start: 2017-12-15 | End: 2017-12-19

## 2017-12-15 RX ADMIN — BUDESONIDE AND FORMOTEROL FUMARATE DIHYDRATE 2 PUFF(S): 160; 4.5 AEROSOL RESPIRATORY (INHALATION) at 10:43

## 2017-12-15 RX ADMIN — OXYCODONE AND ACETAMINOPHEN 2 TABLET(S): 5; 325 TABLET ORAL at 09:54

## 2017-12-15 RX ADMIN — Medication 1 DROP(S): at 17:58

## 2017-12-15 RX ADMIN — Medication 25 MICROGRAM(S): at 14:16

## 2017-12-15 RX ADMIN — Medication 1300 MILLIGRAM(S): at 17:56

## 2017-12-15 RX ADMIN — Medication 100 MILLIGRAM(S): at 22:09

## 2017-12-15 RX ADMIN — Medication 100 MILLIGRAM(S): at 14:16

## 2017-12-15 RX ADMIN — Medication 40 MILLIGRAM(S): at 06:35

## 2017-12-15 RX ADMIN — HEPARIN SODIUM 5000 UNIT(S): 5000 INJECTION INTRAVENOUS; SUBCUTANEOUS at 06:35

## 2017-12-15 RX ADMIN — Medication 1000 UNIT(S): at 14:16

## 2017-12-15 RX ADMIN — PANTOPRAZOLE SODIUM 40 MILLIGRAM(S): 20 TABLET, DELAYED RELEASE ORAL at 14:17

## 2017-12-15 RX ADMIN — TAMSULOSIN HYDROCHLORIDE 0.8 MILLIGRAM(S): 0.4 CAPSULE ORAL at 22:10

## 2017-12-15 RX ADMIN — FINASTERIDE 5 MILLIGRAM(S): 5 TABLET, FILM COATED ORAL at 11:55

## 2017-12-15 RX ADMIN — Medication 81 MILLIGRAM(S): at 11:55

## 2017-12-15 RX ADMIN — HEPARIN SODIUM 5000 UNIT(S): 5000 INJECTION INTRAVENOUS; SUBCUTANEOUS at 22:11

## 2017-12-15 RX ADMIN — CARVEDILOL PHOSPHATE 3.12 MILLIGRAM(S): 80 CAPSULE, EXTENDED RELEASE ORAL at 06:35

## 2017-12-15 RX ADMIN — Medication 325 MILLIGRAM(S): at 11:57

## 2017-12-15 RX ADMIN — Medication 1 MILLIGRAM(S): at 11:57

## 2017-12-15 RX ADMIN — DORZOLAMIDE HYDROCHLORIDE 1 DROP(S): 20 SOLUTION/ DROPS OPHTHALMIC at 17:56

## 2017-12-15 RX ADMIN — Medication 100 MILLIGRAM(S): at 06:35

## 2017-12-15 RX ADMIN — BUDESONIDE AND FORMOTEROL FUMARATE DIHYDRATE 2 PUFF(S): 160; 4.5 AEROSOL RESPIRATORY (INHALATION) at 22:10

## 2017-12-15 RX ADMIN — ATORVASTATIN CALCIUM 20 MILLIGRAM(S): 80 TABLET, FILM COATED ORAL at 22:09

## 2017-12-15 RX ADMIN — CARVEDILOL PHOSPHATE 3.12 MILLIGRAM(S): 80 CAPSULE, EXTENDED RELEASE ORAL at 17:56

## 2017-12-15 RX ADMIN — OXYCODONE AND ACETAMINOPHEN 2 TABLET(S): 5; 325 TABLET ORAL at 10:50

## 2017-12-15 RX ADMIN — Medication 1 DROP(S): at 17:56

## 2017-12-15 RX ADMIN — Medication 4: at 08:54

## 2017-12-15 RX ADMIN — Medication 1 TABLET(S): at 14:17

## 2017-12-15 RX ADMIN — HEPARIN SODIUM 5000 UNIT(S): 5000 INJECTION INTRAVENOUS; SUBCUTANEOUS at 14:16

## 2017-12-15 RX ADMIN — Medication 20 MILLIGRAM(S): at 11:55

## 2017-12-15 NOTE — CONSULT NOTE ADULT - SUBJECTIVE AND OBJECTIVE BOX
HPI:  67M Hx MR, HTN, CAD s/p CABG x3 in 2013 and coronary stent on ASA, PPM, Afib on Eliquis, asthma, CKD3, BPH s/p recent TURP 1 month ago by Dr. Chaves at [facility], p/w hematuria and clot retention. Pt states he started bleeding yesterday night and was unable to void today. Pt was transported from his day program after they noted hematuria. Patient denies fevers, chill, nausea/vomiting, chest pain/sob/cough.    In ED, blevins was placed and bladder irrigated with return of minimal clot. CBI was started.     PAST MEDICAL & SURGICAL HISTORY:  Pacemaker  Hyperlipidemia  Hypertension  Diabetes mellitus type II  CRI (chronic renal insufficiency)  CAD (coronary artery disease)  ASHD (arteriosclerotic heart disease)  Coronary stent  Hx of CABG      Review of Systems:   CONSTITUTIONAL: No fever, weight loss, or fatigue  EYES: No eye pain, visual disturbances, or discharge  ENMT:  No difficulty hearing, tinnitus, vertigo; No sinus or throat pain  NECK: No pain or stiffness  BREASTS: No pain, masses, or nipple discharge  RESPIRATORY: No cough, wheezing, chills or hemoptysis; No shortness of breath  CARDIOVASCULAR: No chest pain, palpitations, dizziness, or leg swelling  GASTROINTESTINAL: No abdominal or epigastric pain. No nausea, vomiting, or hematemesis; No diarrhea or constipation. No melena or hematochezia.  GENITOURINARY: No dysuria, frequency, hematuria, or incontinence  NEUROLOGICAL: No headaches, memory loss, loss of strength, numbness, or tremors  SKIN: No itching, burning, rashes, or lesions   LYMPH NODES: No enlarged glands  ENDOCRINE: No heat or cold intolerance; No hair loss  MUSCULOSKELETAL: No joint pain or swelling; No muscle, back, or extremity pain  PSYCHIATRIC: No depression, anxiety, mood swings, or difficulty sleeping  HEME/LYMPH: No easy bruising, or bleeding gums  ALLERY AND IMMUNOLOGIC: No hives or eczema    Allergies    Levaquin (Anaphylaxis; Flushing; Short breath)    Intolerances    Social History: Former smoker smokes cigar/pipes, 4-5 cigars/day, denies etoh abuse    FAMILY HISTORY:  Family history of chronic ischemic heart disease (Father): father  of MI at age 59    Home Medications:  Artificial Tears ophthalmic ointment: 1 application to each affected eye once a day (at bedtime) (31 Mar 2016 19:44)  Artificial Tears ophthalmic solution: 1 drop(s) to each affected eye 2 times a day (15 Dec 2017 10:15)  aspirin 81 mg oral delayed release capsule: 1 tab(s) orally once a day (31 Mar 2016 19:44)  atorvastatin 20 mg oral tablet: 1 tab(s) orally once a day (at bedtime) (31 Mar 2016 19:44)  carvedilol 3.125 mg oral tablet: 1 tab(s) orally 2 times a day (31 Mar 2016 19:44)  [facility] oral capsule: 2 cap(s) orally once a day (31 Mar 2016 19:44)  difluprednate 0.05% ophthalmic emulsion: 1 drop(s) to left eye 2 times a day (2016 19:01)  Eliquis 2.5 mg oral tablet: 1 tab(s) orally 2 times a day (31 Mar 2016 19:44)  ferrous sulfate 325 mg (65 mg elemental iron) oral delayed release tablet: 1 tab(s) orally 3 times a day (31 Mar 2016 19:44)  finasteride 5 mg oral tablet: 1 tab(s) orally once a day (31 Mar 2016 19:44)  folic acid 1 mg oral tablet: 1 tab(s) orally once a day (31 Mar 2016 19:44)  sitaGLIPtin-metFORMIN 50 mg-1000 mg oral tablet: 1 tab(s) orally once a day (2016 19:09)  sodium bicarbonate 650 mg oral tablet: 2 tab(s) orally 2 times a day (15 Dec 2017 11:04)  sodium polystyrene sulfonate 15 g/60 mL oral and rectal suspension: 60 milliliter(s) orally every 7 days  (2016 19:08)  Symbicort 80 mcg-4.5 mcg/inh inhalation aerosol: 1 puff(s) inhaled 2 times a day (31 Mar 2016 19:44)  tamsulosin 0.4 mg oral capsule: 2 cap(s) orally once a day (at bedtime) (15 Dec 2017 11:05)  Vitamin D3 1000 intl units oral capsule: 1 cap(s) orally once a day (31 Mar 2016 19:44)      MEDICATIONS  (STANDING):  aspirin enteric coated 81 milliGRAM(s) Oral daily  atorvastatin 20 milliGRAM(s) Oral at bedtime  buDESOnide  80 MICROgram(s)/formoterol 4.5 MICROgram(s) Inhaler 2 Puff(s) Inhalation two times a day  carvedilol 3.125 milliGRAM(s) Oral every 12 hours  docusate sodium 100 milliGRAM(s) Oral three times a day  finasteride 5 milliGRAM(s) Oral daily  furosemide    Tablet 40 milliGRAM(s) Oral daily  heparin  Injectable 5000 Unit(s) SubCutaneous every 8 hours  influenza   Vaccine 0.5 milliLiter(s) IntraMuscular once  insulin lispro (HumaLOG) corrective regimen sliding scale   SubCutaneous three times a day before meals  insulin lispro (HumaLOG) corrective regimen sliding scale   SubCutaneous at bedtime  lactated ringers. 1000 milliLiter(s) (100 mL/Hr) IV Continuous <Continuous>  tamsulosin 0.4 milliGRAM(s) Oral at bedtime    MEDICATIONS  (PRN):  acetaminophen   Tablet. 650 milliGRAM(s) Oral every 6 hours PRN Mild Pain (1 - 3)  oxybutynin 5 milliGRAM(s) Oral three times a day PRN Bladder spasms  oxyCODONE    5 mG/acetaminophen 325 mG 1 Tablet(s) Oral every 4 hours PRN Mild Pain  oxyCODONE    5 mG/acetaminophen 325 mG 2 Tablet(s) Oral every 6 hours PRN Moderate Pain  senna 2 Tablet(s) Oral at bedtime PRN Constipation      Vital Signs Last 24 Hrs  T(C): 36.8 (15 Dec 2017 10:07), Max: 37.1 (15 Dec 2017 00:05)  T(F): 98.2 (15 Dec 2017 10:07), Max: 98.7 (15 Dec 2017 00:05)  HR: 78 (15 Dec 2017 10:07) (78 - 99)  BP: 118/55 (15 Dec 2017 10:07) (113/51 - 141/76)  BP(mean): --  RR: 17 (15 Dec 2017 10:07) (16 - 18)  SpO2: 98% (15 Dec 2017 10:07) (98% - 100%)  CAPILLARY BLOOD GLUCOSE      POCT Blood Glucose.: 220 mg/dL (15 Dec 2017 08:20)  POCT Blood Glucose.: 123 mg/dL (14 Dec 2017 23:51)    I&O's Summary    14 Dec 2017 07:01  -  15 Dec 2017 07:00  --------------------------------------------------------  IN: 0 mL / OUT: 69392 mL / NET: -20236 mL        PHYSICAL EXAM:  GENERAL: NAD, well-developed  HEAD:  Atraumatic, Normocephalic  EYES: EOMI, PERRLA, conjunctiva and sclera clear  NECK: Supple, No JVD  CHEST/LUNG: Clear to auscultation bilaterally; No wheeze  HEART: Regular rate and rhythm; No murmurs, rubs, or gallops  ABDOMEN: Soft, suprapubic tenderness, Nondistended; Bowel sounds present  : blevins catheter with hematuria on CBI  EXTREMITIES:  2+ Peripheral Pulses, No clubbing, cyanosis, or edema  PSYCH: AAOx3  NEUROLOGY: non-focal  SKIN: No rashes or lesions    LABS:                        7.0    4.50  )-----------( 157      ( 15 Dec 2017 08:56 )             20.4     12-15    140  |  104  |  37<H>  ----------------------------<  191<H>  4.1   |  25  |  1.96<H>    Ca    8.3<L>      15 Dec 2017 07:07    TPro  6.3  /  Alb  3.7  /  TBili  1.0  /  DBili  x   /  AST  26  /  ALT  29  /  AlkPhos  146<H>  12-14    PT/INR - ( 14 Dec 2017 11:40 )   PT: 15.1 SEC;   INR: 1.35          PTT - ( 14 Dec 2017 11:40 )  PTT:38.7 SEC      Urinalysis Basic - ( 14 Dec 2017 15:15 )    Color: RED / Appearance: TURBID / S.021 / pH: 7.5  Gluc: 50 / Ketone: TRACE  / Bili: NEGATIVE / Urobili: NORMAL mg/dL   Blood: LARGE / Protein: 500 mg/dL / Nitrite: NEGATIVE   Leuk Esterase: SMALL / RBC: >50 / WBC 5-10   Sq Epi: x / Non Sq Epi: x / Bacteria: x      Microbiology     RADIOLOGY & ADDITIONAL TESTS:    Imaging Personally Reviewed:  < from: Xray Chest 2 Views PA/Lat (12.14.17 @ 15:45) >  IMPRESSION:  Fine left basilar stranding opacities again noted compatible with   scarring. Clear remaining visualized lungs. No pleural effusions or   pneumothorax.     Stable fragmented sternal wire configuration, mediastinal surgical clips,   left chest wall dual-lead pacemaker, and cardiac and mediastinal   silhouettes.    Trachea midline.    Generalized mild osteopenia and mild spinal bilateral shoulder   degenerative changes again noted.    Consultant(s) Notes Reviewed:      Care Discussed with Consultants/Other Providers: GARRETT Deal, WALI/eliquis on hold in the setting of gross hematuria/clots HPI:  67M Hx MR, HTN, CAD s/p CABG x3 in 2013 and coronary stent on ASA, PPM, Afib on Eliquis, asthma, DM-2, CKD3, BPH s/p recent TURP 1 month ago by Dr. Chaves at [facility], p/w hematuria and clot retention. Pt states he started bleeding yesterday night and was unable to void today. Pt was transported from his day program after they noted hematuria. Patient denies fevers, chill, nausea/vomiting, chest pain/sob/cough.    In ED, blevins was placed and bladder irrigated with return of minimal clot. CBI was started.     PAST MEDICAL & SURGICAL HISTORY:  Pacemaker  Hyperlipidemia  Hypertension  Diabetes mellitus type II  CRI (chronic renal insufficiency)  CAD (coronary artery disease)  ASHD (arteriosclerotic heart disease)  Coronary stent  Hx of CABG      Review of Systems:   CONSTITUTIONAL: No fever, weight loss, or fatigue  EYES: No eye pain, visual disturbances, or discharge  ENMT:  No difficulty hearing, tinnitus, vertigo; No sinus or throat pain  NECK: No pain or stiffness  BREASTS: No pain, masses, or nipple discharge  RESPIRATORY: No cough, wheezing, chills or hemoptysis; No shortness of breath  CARDIOVASCULAR: No chest pain, palpitations, dizziness, or leg swelling  GASTROINTESTINAL: No abdominal or epigastric pain. No nausea, vomiting, or hematemesis; No diarrhea or constipation. No melena or hematochezia.  GENITOURINARY: No dysuria, frequency, hematuria, or incontinence  NEUROLOGICAL: No headaches, memory loss, loss of strength, numbness, or tremors  SKIN: No itching, burning, rashes, or lesions   LYMPH NODES: No enlarged glands  ENDOCRINE: No heat or cold intolerance; No hair loss  MUSCULOSKELETAL: No joint pain or swelling; No muscle, back, or extremity pain  PSYCHIATRIC: No depression, anxiety, mood swings, or difficulty sleeping  HEME/LYMPH: No easy bruising, or bleeding gums  ALLERY AND IMMUNOLOGIC: No hives or eczema    Allergies    Levaquin (Anaphylaxis; Flushing; Short breath)    Intolerances    Social History: Former smoker smokes cigar/pipes, 4-5 cigars/day, denies etoh abuse    FAMILY HISTORY:  Family history of chronic ischemic heart disease (Father): father  of MI at age 59    Home Medications:  Artificial Tears ophthalmic ointment: 1 application to each affected eye once a day (at bedtime) (31 Mar 2016 19:44)  Artificial Tears ophthalmic solution: 1 drop(s) to each affected eye 2 times a day (15 Dec 2017 10:15)  aspirin 81 mg oral delayed release capsule: 1 tab(s) orally once a day (31 Mar 2016 19:44)  atorvastatin 20 mg oral tablet: 1 tab(s) orally once a day (at bedtime) (31 Mar 2016 19:44)  carvedilol 3.125 mg oral tablet: 1 tab(s) orally 2 times a day (31 Mar 2016 19:44)  [facility] oral capsule: 2 cap(s) orally once a day (31 Mar 2016 19:44)  difluprednate 0.05% ophthalmic emulsion: 1 drop(s) to left eye 2 times a day (2016 19:01)  Eliquis 2.5 mg oral tablet: 1 tab(s) orally 2 times a day (31 Mar 2016 19:44)  ferrous sulfate 325 mg (65 mg elemental iron) oral delayed release tablet: 1 tab(s) orally 3 times a day (31 Mar 2016 19:44)  finasteride 5 mg oral tablet: 1 tab(s) orally once a day (31 Mar 2016 19:44)  folic acid 1 mg oral tablet: 1 tab(s) orally once a day (31 Mar 2016 19:44)  sitaGLIPtin-metFORMIN 50 mg-1000 mg oral tablet: 1 tab(s) orally once a day (2016 19:09)  sodium bicarbonate 650 mg oral tablet: 2 tab(s) orally 2 times a day (15 Dec 2017 11:04)  sodium polystyrene sulfonate 15 g/60 mL oral and rectal suspension: 60 milliliter(s) orally every 7 days  (2016 19:08)  Symbicort 80 mcg-4.5 mcg/inh inhalation aerosol: 1 puff(s) inhaled 2 times a day (31 Mar 2016 19:44)  tamsulosin 0.4 mg oral capsule: 2 cap(s) orally once a day (at bedtime) (15 Dec 2017 11:05)  Vitamin D3 1000 intl units oral capsule: 1 cap(s) orally once a day (31 Mar 2016 19:44)      MEDICATIONS  (STANDING):  aspirin enteric coated 81 milliGRAM(s) Oral daily  atorvastatin 20 milliGRAM(s) Oral at bedtime  buDESOnide  80 MICROgram(s)/formoterol 4.5 MICROgram(s) Inhaler 2 Puff(s) Inhalation two times a day  carvedilol 3.125 milliGRAM(s) Oral every 12 hours  docusate sodium 100 milliGRAM(s) Oral three times a day  finasteride 5 milliGRAM(s) Oral daily  furosemide    Tablet 40 milliGRAM(s) Oral daily  heparin  Injectable 5000 Unit(s) SubCutaneous every 8 hours  influenza   Vaccine 0.5 milliLiter(s) IntraMuscular once  insulin lispro (HumaLOG) corrective regimen sliding scale   SubCutaneous three times a day before meals  insulin lispro (HumaLOG) corrective regimen sliding scale   SubCutaneous at bedtime  lactated ringers. 1000 milliLiter(s) (100 mL/Hr) IV Continuous <Continuous>  tamsulosin 0.4 milliGRAM(s) Oral at bedtime    MEDICATIONS  (PRN):  acetaminophen   Tablet. 650 milliGRAM(s) Oral every 6 hours PRN Mild Pain (1 - 3)  oxybutynin 5 milliGRAM(s) Oral three times a day PRN Bladder spasms  oxyCODONE    5 mG/acetaminophen 325 mG 1 Tablet(s) Oral every 4 hours PRN Mild Pain  oxyCODONE    5 mG/acetaminophen 325 mG 2 Tablet(s) Oral every 6 hours PRN Moderate Pain  senna 2 Tablet(s) Oral at bedtime PRN Constipation      Vital Signs Last 24 Hrs  T(C): 36.8 (15 Dec 2017 10:07), Max: 37.1 (15 Dec 2017 00:05)  T(F): 98.2 (15 Dec 2017 10:07), Max: 98.7 (15 Dec 2017 00:05)  HR: 78 (15 Dec 2017 10:07) (78 - 99)  BP: 118/55 (15 Dec 2017 10:07) (113/51 - 141/76)  BP(mean): --  RR: 17 (15 Dec 2017 10:07) (16 - 18)  SpO2: 98% (15 Dec 2017 10:07) (98% - 100%)  CAPILLARY BLOOD GLUCOSE      POCT Blood Glucose.: 220 mg/dL (15 Dec 2017 08:20)  POCT Blood Glucose.: 123 mg/dL (14 Dec 2017 23:51)    I&O's Summary    14 Dec 2017 07:01  -  15 Dec 2017 07:00  --------------------------------------------------------  IN: 0 mL / OUT: 40433 mL / NET: -01756 mL        PHYSICAL EXAM:  GENERAL: NAD, well-developed  HEAD:  Atraumatic, Normocephalic  EYES: EOMI, PERRLA, conjunctiva and sclera clear  NECK: Supple, No JVD  CHEST/LUNG: Clear to auscultation bilaterally; No wheeze  HEART: Regular rate and rhythm; No murmurs, rubs, or gallops  ABDOMEN: Soft, suprapubic tenderness, Nondistended; Bowel sounds present  : blevins catheter with hematuria on CBI  EXTREMITIES:  2+ Peripheral Pulses, No clubbing, cyanosis, or edema  PSYCH: AAOx3  NEUROLOGY: non-focal  SKIN: No rashes or lesions    LABS:                        7.0    4.50  )-----------( 157      ( 15 Dec 2017 08:56 )             20.4     12-15    140  |  104  |  37<H>  ----------------------------<  191<H>  4.1   |  25  |  1.96<H>    Ca    8.3<L>      15 Dec 2017 07:07    TPro  6.3  /  Alb  3.7  /  TBili  1.0  /  DBili  x   /  AST  26  /  ALT  29  /  AlkPhos  146<H>  12-14    PT/INR - ( 14 Dec 2017 11:40 )   PT: 15.1 SEC;   INR: 1.35          PTT - ( 14 Dec 2017 11:40 )  PTT:38.7 SEC      Urinalysis Basic - ( 14 Dec 2017 15:15 )    Color: RED / Appearance: TURBID / S.021 / pH: 7.5  Gluc: 50 / Ketone: TRACE  / Bili: NEGATIVE / Urobili: NORMAL mg/dL   Blood: LARGE / Protein: 500 mg/dL / Nitrite: NEGATIVE   Leuk Esterase: SMALL / RBC: >50 / WBC 5-10   Sq Epi: x / Non Sq Epi: x / Bacteria: x      Microbiology     RADIOLOGY & ADDITIONAL TESTS:    Imaging Personally Reviewed:  < from: Xray Chest 2 Views PA/Lat (17 @ 15:45) >  IMPRESSION:  Fine left basilar stranding opacities again noted compatible with   scarring. Clear remaining visualized lungs. No pleural effusions or   pneumothorax.     Stable fragmented sternal wire configuration, mediastinal surgical clips,   left chest wall dual-lead pacemaker, and cardiac and mediastinal   silhouettes.    Trachea midline.    Generalized mild osteopenia and mild spinal bilateral shoulder   degenerative changes again noted.    Consultant(s) Notes Reviewed:      Care Discussed with Consultants/Other Providers: GARRETT Deal, WALI/eliquis on hold in the setting of gross hematuria/clots HPI:  67M Hx MR, HTN, CAD s/p CABG x3 in 2013 and coronary stent on ASA, PPM, Afib on Eliquis, asthma, DM-2, CKD3, BPH s/p recent TURP 1 month ago by Dr. Chaves at [facility], p/w hematuria and clot retention. Pt states he started bleeding yesterday night and was unable to void today. Pt was transported from his day program after they noted hematuria. Patient denies fevers, chill, nausea/vomiting, chest pain/sob/cough.    In ED, blevins was placed and bladder irrigated with return of minimal clot. CBI was started.     PAST MEDICAL & SURGICAL HISTORY:  Pacemaker  Hyperlipidemia  Hypertension  Diabetes mellitus type II  CRI (chronic renal insufficiency)  CAD (coronary artery disease)  ASHD (arteriosclerotic heart disease)  Coronary stent  Hx of CABG      Review of Systems:   CONSTITUTIONAL: No fever, weight loss, or fatigue  EYES: No eye pain, visual disturbances, or discharge  ENMT:  No difficulty hearing, tinnitus, vertigo; No sinus or throat pain  NECK: No pain or stiffness  BREASTS: No pain, masses, or nipple discharge  RESPIRATORY: No cough, wheezing, chills or hemoptysis; No shortness of breath  CARDIOVASCULAR: No chest pain, palpitations, dizziness, or leg swelling  GASTROINTESTINAL: No abdominal or epigastric pain. No nausea, vomiting, or hematemesis; No diarrhea or constipation. No melena or hematochezia.  GENITOURINARY: No dysuria, frequency, hematuria, or incontinence  NEUROLOGICAL: No headaches, memory loss, loss of strength, numbness, or tremors  SKIN: No itching, burning, rashes, or lesions   LYMPH NODES: No enlarged glands  ENDOCRINE: No heat or cold intolerance; No hair loss  MUSCULOSKELETAL: No joint pain or swelling; No muscle, back, or extremity pain  PSYCHIATRIC: No depression, anxiety, mood swings, or difficulty sleeping  HEME/LYMPH: No easy bruising, or bleeding gums  ALLERY AND IMMUNOLOGIC: No hives or eczema    Allergies    Levaquin (Anaphylaxis; Flushing; Short breath)    Intolerances    Social History: Former smoker smokes cigar/pipes, 4-5 cigars/day, denies etoh abuse    FAMILY HISTORY:  Family history of chronic ischemic heart disease (Father): father  of MI at age 59    Home Medications:  Artificial Tears ophthalmic ointment: 1 application to each affected eye once a day (at bedtime) (31 Mar 2016 19:44)  Artificial Tears ophthalmic solution: 1 drop(s) to each affected eye 2 times a day (15 Dec 2017 10:15)  aspirin 81 mg oral delayed release capsule: 1 tab(s) orally once a day (31 Mar 2016 19:44)  atorvastatin 20 mg oral tablet: 1 tab(s) orally once a day (at bedtime) (31 Mar 2016 19:44)  carvedilol 3.125 mg oral tablet: 1 tab(s) orally 2 times a day (31 Mar 2016 19:44)  [facility] oral capsule: 2 cap(s) orally once a day (31 Mar 2016 19:44)  difluprednate 0.05% ophthalmic emulsion: 1 drop(s) to left eye 2 times a day (2016 19:01)  Eliquis 2.5 mg oral tablet: 1 tab(s) orally 2 times a day (31 Mar 2016 19:44)  ferrous sulfate 325 mg (65 mg elemental iron) oral delayed release tablet: 1 tab(s) orally 3 times a day (31 Mar 2016 19:44)  finasteride 5 mg oral tablet: 1 tab(s) orally once a day (31 Mar 2016 19:44)  folic acid 1 mg oral tablet: 1 tab(s) orally once a day (31 Mar 2016 19:44)  sitaGLIPtin-metFORMIN 50 mg-1000 mg oral tablet: 1 tab(s) orally once a day (2016 19:09)  sodium bicarbonate 650 mg oral tablet: 2 tab(s) orally 2 times a day (15 Dec 2017 11:04)  sodium polystyrene sulfonate 15 g/60 mL oral and rectal suspension: 60 milliliter(s) orally every 7 days  (2016 19:08)  Symbicort 80 mcg-4.5 mcg/inh inhalation aerosol: 1 puff(s) inhaled 2 times a day (31 Mar 2016 19:44)  tamsulosin 0.4 mg oral capsule: 2 cap(s) orally once a day (at bedtime) (15 Dec 2017 11:05)  Vitamin D3 1000 intl units oral capsule: 1 cap(s) orally once a day (31 Mar 2016 19:44)      MEDICATIONS  (STANDING):  aspirin enteric coated 81 milliGRAM(s) Oral daily  atorvastatin 20 milliGRAM(s) Oral at bedtime  buDESOnide  80 MICROgram(s)/formoterol 4.5 MICROgram(s) Inhaler 2 Puff(s) Inhalation two times a day  carvedilol 3.125 milliGRAM(s) Oral every 12 hours  docusate sodium 100 milliGRAM(s) Oral three times a day  finasteride 5 milliGRAM(s) Oral daily  furosemide    Tablet 40 milliGRAM(s) Oral daily  heparin  Injectable 5000 Unit(s) SubCutaneous every 8 hours  influenza   Vaccine 0.5 milliLiter(s) IntraMuscular once  insulin lispro (HumaLOG) corrective regimen sliding scale   SubCutaneous three times a day before meals  insulin lispro (HumaLOG) corrective regimen sliding scale   SubCutaneous at bedtime  lactated ringers. 1000 milliLiter(s) (100 mL/Hr) IV Continuous <Continuous>  tamsulosin 0.4 milliGRAM(s) Oral at bedtime    MEDICATIONS  (PRN):  acetaminophen   Tablet. 650 milliGRAM(s) Oral every 6 hours PRN Mild Pain (1 - 3)  oxybutynin 5 milliGRAM(s) Oral three times a day PRN Bladder spasms  oxyCODONE    5 mG/acetaminophen 325 mG 1 Tablet(s) Oral every 4 hours PRN Mild Pain  oxyCODONE    5 mG/acetaminophen 325 mG 2 Tablet(s) Oral every 6 hours PRN Moderate Pain  senna 2 Tablet(s) Oral at bedtime PRN Constipation      Vital Signs Last 24 Hrs  T(C): 36.8 (15 Dec 2017 10:07), Max: 37.1 (15 Dec 2017 00:05)  T(F): 98.2 (15 Dec 2017 10:07), Max: 98.7 (15 Dec 2017 00:05)  HR: 78 (15 Dec 2017 10:07) (78 - 99)  BP: 118/55 (15 Dec 2017 10:07) (113/51 - 141/76)  BP(mean): --  RR: 17 (15 Dec 2017 10:07) (16 - 18)  SpO2: 98% (15 Dec 2017 10:07) (98% - 100%)  CAPILLARY BLOOD GLUCOSE      POCT Blood Glucose.: 220 mg/dL (15 Dec 2017 08:20)  POCT Blood Glucose.: 123 mg/dL (14 Dec 2017 23:51)    I&O's Summary    14 Dec 2017 07:01  -  15 Dec 2017 07:00  --------------------------------------------------------  IN: 0 mL / OUT: 15942 mL / NET: -00780 mL        PHYSICAL EXAM:  GENERAL: NAD, well-developed  HEAD:  Atraumatic, Normocephalic  EYES: EOMI, PERRLA, conjunctiva and sclera clear  NECK: Supple, No JVD  CHEST/LUNG: Clear to auscultation bilaterally; No wheeze  HEART: Regular rate and rhythm; No murmurs, rubs, or gallops  ABDOMEN: Soft, suprapubic tenderness, Nondistended; Bowel sounds present  : blevins catheter with hematuria on CBI  EXTREMITIES:  2+ Peripheral Pulses, No clubbing, cyanosis, or edema  PSYCH: AAOx3  NEUROLOGY: non-focal  SKIN: No rashes or lesions    LABS:                        7.0    4.50  )-----------( 157      ( 15 Dec 2017 08:56 )             20.4     12-15    140  |  104  |  37<H>  ----------------------------<  191<H>  4.1   |  25  |  1.96<H>    Ca    8.3<L>      15 Dec 2017 07:07    TPro  6.3  /  Alb  3.7  /  TBili  1.0  /  DBili  x   /  AST  26  /  ALT  29  /  AlkPhos  146<H>  12-14    PT/INR - ( 14 Dec 2017 11:40 )   PT: 15.1 SEC;   INR: 1.35          PTT - ( 14 Dec 2017 11:40 )  PTT:38.7 SEC      Urinalysis Basic - ( 14 Dec 2017 15:15 )    Color: RED / Appearance: TURBID / S.021 / pH: 7.5  Gluc: 50 / Ketone: TRACE  / Bili: NEGATIVE / Urobili: NORMAL mg/dL   Blood: LARGE / Protein: 500 mg/dL / Nitrite: NEGATIVE   Leuk Esterase: SMALL / RBC: >50 / WBC 5-10   Sq Epi: x / Non Sq Epi: x / Bacteria: x      Microbiology     RADIOLOGY & ADDITIONAL TESTS:    Imaging Personally Reviewed:  < from: Xray Chest 2 Views PA/Lat (17 @ 15:45) >  IMPRESSION:  Fine left basilar stranding opacities again noted compatible with   scarring. Clear remaining visualized lungs. No pleural effusions or   pneumothorax.     Stable fragmented sternal wire configuration, mediastinal surgical clips,   left chest wall dual-lead pacemaker, and cardiac and mediastinal   silhouettes.    Trachea midline.    Generalized mild osteopenia and mild spinal bilateral shoulder   degenerative changes again noted.    Consultant(s) Notes Reviewed:      Care Discussed with Consultants/Other Providers: GARRETT Deal, WALI/eliquis on hold in the setting of gross hematuria/clots, preop EKG

## 2017-12-15 NOTE — CONSULT NOTE ADULT - PROBLEM SELECTOR RECOMMENDATION 2
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

## 2017-12-15 NOTE — CONSULT NOTE ADULT - PROBLEM SELECTOR RECOMMENDATION 9
c/w statin -recent TURP 1 month ago for BPH  -c/w blevins catheter, wean CBI as per primary team  -c/w flomax  -eliquis on hold  -management per  stable on symbicort, pt is a former smoker (cigar, pipes) -recent TURP 1 month ago for BPH  -c/w blevins catheter, wean CBI as per primary team  -c/w flomax  -eliquis on hold  -CT abd/pelvis, management per   -possible cysto if bleeding persists

## 2017-12-15 NOTE — CONSULT NOTE ADULT - ASSESSMENT
67M Hx MR, HTN, CAD s/p CABG x3 in 2013 and coronary stent on ASA, PPM, Afib on Eliquis, CKD3, asthma, hypothyroidism, BPH s/p recent TURP 1 month ago by Dr. Chaves at [facility], p/w hematuria and clot retention, blevins placed and started on CBI.  ASA/eliquis on hold.

## 2017-12-15 NOTE — PROGRESS NOTE ADULT - SUBJECTIVE AND OBJECTIVE BOX
Overnight events:  None, CBI remained clear, did not require manual irrigation    Subjective:  Pt c/o intermittent bladder spasms, but feels better    Objective:    Vital signs  T(C): , Max: 37.1 (12-15-17 @ 00:05)  HR: 82 (12-15-17 @ 06:33)  BP: 114/50 (12-15-17 @ 06:33)  SpO2: 100% (12-15-17 @ 06:33)  Wt(kg): --    Output   CBI clear on full CBI    Gen: NAD  Abd: soft, nontender, no suprapubic tenderness or fullness  : blevins secured    Labs: p      Urine Cx: p

## 2017-12-15 NOTE — PROGRESS NOTE ADULT - PROBLEM SELECTOR PLAN 1
Wean CBI, monitor color  F/U AM labs  Hold ASA/coumadin  Hospitalist consult  DVT prophy, IS  OOB with assistance

## 2017-12-15 NOTE — CONSULT NOTE ADULT - PROBLEM SELECTOR RECOMMENDATION 8
stable on symbicort, pt is a former smoker (cigar, pipes) hold janumet while inpt  c/w humalog ISS, check A1c, monitor FS (220, 123)

## 2017-12-15 NOTE — CONSULT NOTE ADULT - PROBLEM SELECTOR RECOMMENDATION 5
-RICARDO in the setting of urinary retention due to hematuria/clots, acute blood loss anemia/hematuria, baseline creat ~1.5, admitted with creat 2.09 ml/dl  -blood transfusion to Hgb>8  -avoid nephrotoxins, bladder irrigation, mIVF, blevins cath, dose meds as per renal fxn  -c/w NaHCO3 for metabolic acidosis due to CKD

## 2017-12-16 LAB
BACTERIA UR CULT: SIGNIFICANT CHANGE UP
BUN SERPL-MCNC: 33 MG/DL — HIGH (ref 7–23)
CALCIUM SERPL-MCNC: 8 MG/DL — LOW (ref 8.4–10.5)
CHLORIDE SERPL-SCNC: 103 MMOL/L — SIGNIFICANT CHANGE UP (ref 98–107)
CO2 SERPL-SCNC: 27 MMOL/L — SIGNIFICANT CHANGE UP (ref 22–31)
CREAT SERPL-MCNC: 1.96 MG/DL — HIGH (ref 0.5–1.3)
GLUCOSE BLDC GLUCOMTR-MCNC: 107 MG/DL — HIGH (ref 70–99)
GLUCOSE BLDC GLUCOMTR-MCNC: 111 MG/DL — HIGH (ref 70–99)
GLUCOSE BLDC GLUCOMTR-MCNC: 124 MG/DL — HIGH (ref 70–99)
GLUCOSE BLDC GLUCOMTR-MCNC: 182 MG/DL — HIGH (ref 70–99)
GLUCOSE BLDC GLUCOMTR-MCNC: 78 MG/DL — SIGNIFICANT CHANGE UP (ref 70–99)
GLUCOSE SERPL-MCNC: 121 MG/DL — HIGH (ref 70–99)
HCT VFR BLD CALC: 23.4 % — LOW (ref 39–50)
HGB BLD-MCNC: 8.1 G/DL — LOW (ref 13–17)
MCHC RBC-ENTMCNC: 31.5 PG — SIGNIFICANT CHANGE UP (ref 27–34)
MCHC RBC-ENTMCNC: 34.6 % — SIGNIFICANT CHANGE UP (ref 32–36)
MCV RBC AUTO: 91.1 FL — SIGNIFICANT CHANGE UP (ref 80–100)
NRBC # FLD: 0 — SIGNIFICANT CHANGE UP
PLATELET # BLD AUTO: 125 K/UL — LOW (ref 150–400)
PMV BLD: 9.9 FL — SIGNIFICANT CHANGE UP (ref 7–13)
POTASSIUM SERPL-MCNC: 3.9 MMOL/L — SIGNIFICANT CHANGE UP (ref 3.5–5.3)
POTASSIUM SERPL-SCNC: 3.9 MMOL/L — SIGNIFICANT CHANGE UP (ref 3.5–5.3)
RBC # BLD: 2.57 M/UL — LOW (ref 4.2–5.8)
RBC # FLD: 14.3 % — SIGNIFICANT CHANGE UP (ref 10.3–14.5)
SODIUM SERPL-SCNC: 138 MMOL/L — SIGNIFICANT CHANGE UP (ref 135–145)
SPECIMEN SOURCE: SIGNIFICANT CHANGE UP
WBC # BLD: 4.45 K/UL — SIGNIFICANT CHANGE UP (ref 3.8–10.5)
WBC # FLD AUTO: 4.45 K/UL — SIGNIFICANT CHANGE UP (ref 3.8–10.5)

## 2017-12-16 PROCEDURE — 99233 SBSQ HOSP IP/OBS HIGH 50: CPT

## 2017-12-16 RX ORDER — POLYETHYLENE GLYCOL 3350 17 G/17G
17 POWDER, FOR SOLUTION ORAL DAILY
Qty: 0 | Refills: 0 | Status: DISCONTINUED | OUTPATIENT
Start: 2017-12-16 | End: 2017-12-18

## 2017-12-16 RX ADMIN — OXYCODONE AND ACETAMINOPHEN 1 TABLET(S): 5; 325 TABLET ORAL at 19:31

## 2017-12-16 RX ADMIN — Medication 1300 MILLIGRAM(S): at 05:58

## 2017-12-16 RX ADMIN — HEPARIN SODIUM 5000 UNIT(S): 5000 INJECTION INTRAVENOUS; SUBCUTANEOUS at 15:28

## 2017-12-16 RX ADMIN — DORZOLAMIDE HYDROCHLORIDE 1 DROP(S): 20 SOLUTION/ DROPS OPHTHALMIC at 06:04

## 2017-12-16 RX ADMIN — OXYCODONE AND ACETAMINOPHEN 2 TABLET(S): 5; 325 TABLET ORAL at 10:40

## 2017-12-16 RX ADMIN — Medication 100 MILLIGRAM(S): at 05:58

## 2017-12-16 RX ADMIN — Medication 1 DROP(S): at 18:30

## 2017-12-16 RX ADMIN — OXYCODONE AND ACETAMINOPHEN 1 TABLET(S): 5; 325 TABLET ORAL at 16:00

## 2017-12-16 RX ADMIN — Medication 1300 MILLIGRAM(S): at 18:30

## 2017-12-16 RX ADMIN — OXYCODONE AND ACETAMINOPHEN 1 TABLET(S): 5; 325 TABLET ORAL at 15:29

## 2017-12-16 RX ADMIN — SODIUM CHLORIDE 100 MILLILITER(S): 9 INJECTION, SOLUTION INTRAVENOUS at 10:07

## 2017-12-16 RX ADMIN — HEPARIN SODIUM 5000 UNIT(S): 5000 INJECTION INTRAVENOUS; SUBCUTANEOUS at 22:01

## 2017-12-16 RX ADMIN — Medication 1 DROP(S): at 18:29

## 2017-12-16 RX ADMIN — HEPARIN SODIUM 5000 UNIT(S): 5000 INJECTION INTRAVENOUS; SUBCUTANEOUS at 05:58

## 2017-12-16 RX ADMIN — ATORVASTATIN CALCIUM 20 MILLIGRAM(S): 80 TABLET, FILM COATED ORAL at 22:02

## 2017-12-16 RX ADMIN — BUDESONIDE AND FORMOTEROL FUMARATE DIHYDRATE 2 PUFF(S): 160; 4.5 AEROSOL RESPIRATORY (INHALATION) at 09:30

## 2017-12-16 RX ADMIN — Medication 1 DROP(S): at 06:04

## 2017-12-16 RX ADMIN — Medication 1 TABLET(S): at 13:00

## 2017-12-16 RX ADMIN — Medication 5 MILLIGRAM(S): at 10:02

## 2017-12-16 RX ADMIN — Medication: at 12:00

## 2017-12-16 RX ADMIN — Medication 1000 UNIT(S): at 11:51

## 2017-12-16 RX ADMIN — POLYETHYLENE GLYCOL 3350 17 GRAM(S): 17 POWDER, FOR SOLUTION ORAL at 15:34

## 2017-12-16 RX ADMIN — OXYCODONE AND ACETAMINOPHEN 2 TABLET(S): 5; 325 TABLET ORAL at 10:03

## 2017-12-16 RX ADMIN — Medication 325 MILLIGRAM(S): at 11:51

## 2017-12-16 RX ADMIN — PANTOPRAZOLE SODIUM 40 MILLIGRAM(S): 20 TABLET, DELAYED RELEASE ORAL at 05:59

## 2017-12-16 RX ADMIN — Medication 25 MICROGRAM(S): at 05:58

## 2017-12-16 RX ADMIN — Medication 81 MILLIGRAM(S): at 11:51

## 2017-12-16 RX ADMIN — FINASTERIDE 5 MILLIGRAM(S): 5 TABLET, FILM COATED ORAL at 22:01

## 2017-12-16 RX ADMIN — DORZOLAMIDE HYDROCHLORIDE 1 DROP(S): 20 SOLUTION/ DROPS OPHTHALMIC at 18:30

## 2017-12-16 RX ADMIN — Medication 1 MILLIGRAM(S): at 11:51

## 2017-12-16 RX ADMIN — Medication 100 MILLIGRAM(S): at 22:02

## 2017-12-16 RX ADMIN — Medication 100 MILLIGRAM(S): at 15:29

## 2017-12-16 RX ADMIN — TAMSULOSIN HYDROCHLORIDE 0.8 MILLIGRAM(S): 0.4 CAPSULE ORAL at 22:00

## 2017-12-16 RX ADMIN — CARVEDILOL PHOSPHATE 3.12 MILLIGRAM(S): 80 CAPSULE, EXTENDED RELEASE ORAL at 05:58

## 2017-12-16 NOTE — PROGRESS NOTE ADULT - PROBLEM SELECTOR PLAN 3
-rate controlled in the 80s currently  - does not want to start eliquis -rate controlled   -Eliquis on hold per   -c/w ASA daily

## 2017-12-16 NOTE — PROGRESS NOTE ADULT - SUBJECTIVE AND OBJECTIVE BOX
CHIEF COMPLAINT: f/u hematuria    SUBJECTIVE / OVERNIGHT EVENTS: Patient seen and examined. No acute events overnight. Pain well controlled and patient without any complaints. Now off CBI - clear. TOV today.    MEDICATIONS  (STANDING):  artificial  tears Solution 1 Drop(s) Both EYES two times a day  aspirin enteric coated 81 milliGRAM(s) Oral daily  atorvastatin 20 milliGRAM(s) Oral at bedtime  buDESOnide  80 MICROgram(s)/formoterol 4.5 MICROgram(s) Inhaler 2 Puff(s) Inhalation two times a day  carvedilol 3.125 milliGRAM(s) Oral every 12 hours  cholecalciferol 1000 Unit(s) Oral daily  docusate sodium 100 milliGRAM(s) Oral three times a day  dorzolamide 2% Ophthalmic Solution 1 Drop(s) Left EYE two times a day  ferrous    sulfate 325 milliGRAM(s) Oral daily  finasteride 5 milliGRAM(s) Oral daily  folic acid 1 milliGRAM(s) Oral daily  furosemide    Tablet 20 milliGRAM(s) Oral every week  heparin  Injectable 5000 Unit(s) SubCutaneous every 8 hours  influenza   Vaccine 0.5 milliLiter(s) IntraMuscular once  insulin lispro (HumaLOG) corrective regimen sliding scale   SubCutaneous three times a day before meals  insulin lispro (HumaLOG) corrective regimen sliding scale   SubCutaneous at bedtime  lactated ringers. 1000 milliLiter(s) (100 mL/Hr) IV Continuous <Continuous>  levothyroxine 25 MICROGram(s) Oral daily  pantoprazole    Tablet 40 milliGRAM(s) Oral before breakfast  sodium bicarbonate 1300 milliGRAM(s) Oral two times a day  tamsulosin 0.8 milliGRAM(s) Oral at bedtime  timolol 0.5% Solution 1 Drop(s) Left EYE two times a day  vitamin B complex with vitamin C 1 Tablet(s) Oral daily    MEDICATIONS  (PRN):  acetaminophen   Tablet. 650 milliGRAM(s) Oral every 6 hours PRN Mild Pain (1 - 3)  oxybutynin 5 milliGRAM(s) Oral three times a day PRN Bladder spasms  oxyCODONE    5 mG/acetaminophen 325 mG 1 Tablet(s) Oral every 4 hours PRN Mild Pain  oxyCODONE    5 mG/acetaminophen 325 mG 2 Tablet(s) Oral every 6 hours PRN Moderate Pain  senna 2 Tablet(s) Oral at bedtime PRN Constipation    VITALS:  T(F): 98.5 (17 @ 10:07), Max: 99.1 (12-15-17 @ 14:19)  HR: 74 (17 @ 10:07) (71 - 84)  BP: 144/66 (17 @ 10:07) (124/61 - 155/70)  RR: 16 (17 @ 10:07) (16 - 18)  SpO2: 99% (17 @ 10:07)    PHYSICAL EXAM:  GENERAL: NAD, well-developed  HEAD:  Atraumatic, Normocephalic  EYES: EOMI, PERRLA, conjunctiva and sclera clear  NECK: Supple, No JVD  CHEST/LUNG: Clear to auscultation bilaterally; No wheeze  HEART: Regular rate and rhythm; No murmurs, rubs, or gallops  ABDOMEN: Soft, suprapubic tenderness, Nondistended; Bowel sounds present  : felicity dc'ed;   EXTREMITIES:  2+ Peripheral Pulses, No clubbing, cyanosis, or edema  PSYCH: AAOx3  NEUROLOGY: non-focal  SKIN: No rashes or lesions    LABS:              8.1                  138  | 27   | 33           4.45  >-----------< 125     ------------------------< 121                   23.4                 3.9  | 103  | 1.96                                         Ca 8.0   Mg x     Ph x           TPro  6.3  /  Alb  3.7      TBili  1.0  /  DBili  x         AST  26  /  ALT  29            AlkPhos  146      INR: 1.35<H>;    PT: 15.1 SEC<H>;    PTT: 38.7 SEC<H>    Urinalysis Basic - ( 14 Dec 2017 15:15 )  Color: RED / Appearance: TURBID / S.021 / pH: 7.5  Gluc: 50 / Ketone: TRACE  / Bili: NEGATIVE / Urobili: NORMAL mg/dL   Blood: LARGE / Protein: 500 mg/dL / Nitrite: NEGATIVE   Leuk Esterase: SMALL / RBC: >50 / WBC 5-10   Sq Epi: x / Non Sq Epi: x / Bacteria: x    MICROBIOLOGY:  Urine Culture  NO GROWTH AT 24 [id number] @ 16:01    RADIOLOGY & ADDITIONAL TESTS:  Imaging Personally Reviewed: [x] Yes  Prelim report  < from: CT Abdomen and Pelvis No Cont (15.17 @ 20:31) >  INTERPRETATION:  Hemorrhagewithin the bladder.    No hydronephrosis.    < end of copied text >    [ ] Consultant(s) Notes Reviewed:  [x] Care Discussed with Consultants/Other Providers: Urology PA - discussed management of anemia

## 2017-12-16 NOTE — PHYSICAL THERAPY INITIAL EVALUATION ADULT - PERTINENT HX OF CURRENT PROBLEM, REHAB EVAL
67M Hx MR, HTN, CAD, Afib on Eliquis p/w hematuria and clot retention. Patient states he started bleeding and unable to void. Patient transported from his day program after they noted hematuria. Of note, pt had TURP 1 month prior w/Dr Chaves at [facility].

## 2017-12-16 NOTE — PROGRESS NOTE ADULT - ASSESSMENT
67M Hx MR, HTN, CAD s/p CABG x3 in 2013 and coronary stent on ASA, PPM, Afib on Eliquis, CKD3, asthma, hypothyroidism, BPH s/p recent TURP 1 month ago by Dr. Chaves at [facility], p/w hematuria and clot retention, blevins placed and started on CBI.  ASA/eliquis on hold. 67M Hx MR, HTN, CAD s/p CABG x3 in 2013 and coronary stent on ASA, PPM, Afib on Eliquis, CKD3, asthma, hypothyroidism, BPH s/p recent TURP 1 month ago by Dr. Chaves at [facility], p/w hematuria and clot retention, blevins placed and started on CBI.  Eliquis on hold.

## 2017-12-16 NOTE — PROGRESS NOTE ADULT - SUBJECTIVE AND OBJECTIVE BOX
Overnight events:      Subjective:      Objective:    Vital signs  T(C): , Max: 37.3 (12-15-17 @ 14:19)  HR: 74 (12-16-17 @ 10:07)  BP: 144/66 (12-16-17 @ 10:07)  SpO2: 99% (12-16-17 @ 10:07)  Wt(kg): --    Output     12-15 @ 07:01  -  12-16 @ 07:00  --------------------------------------------------------  IN: 0 mL / OUT: 7000 mL / NET: -7000 mL        Gen  Abd      Labs                        8.1    4.45  )-----------( 125      ( 16 Dec 2017 06:21 )             23.4     16 Dec 2017 06:21    138    |  103    |  33     ----------------------------<  121    3.9     |  27     |  1.96     Ca    8.0        16 Dec 2017 06:21        Urine Cx:   Blood Cx:     Imaging Overnight events:  None, irrigated by RN x one, pt received 2uPRBC yesterday for acute blood loss anemia    Subjective:  Pt offers no complaints    Objective:    Vital signs  T(C): , Max: 37.3 (12-15-17 @ 14:19)  HR: 74 (12-16-17 @ 10:07)  BP: 144/66 (12-16-17 @ 10:07)  SpO2: 99% (12-16-17 @ 10:07)  Wt(kg): --    Output   CBI: clear        Gen: NAD  Abd: soft, nontender, no suprapubic tenderness or fullness  : blevins secured    Labs                        8.1    4.45  )-----------( 125      ( 16 Dec 2017 06:21 )             23.4     16 Dec 2017 06:21    138    |  103    |  33     ----------------------------<  121    3.9     |  27     |  1.96     Ca    8.0        16 Dec 2017 06:21        Urine Cx:   Culture - Urine (12.14.17 @ 16:01)    Culture - Urine:   NO GROWTH AT 24 HOURS    Specimen Source: URINE MIDSTREAM    < from: CT Abdomen and Pelvis No Cont (12.15.17 @ 20:31) >  ******PRELIMINARY REPORT******            INTERPRETATION:  Hemorrhagewithin the bladder.    No hydronephrosis.        Blood Cx:     Imaging

## 2017-12-16 NOTE — PROGRESS NOTE ADULT - PROBLEM SELECTOR PLAN 1
-recent TURP 1 month ago for BPH  -CBI clear  -TOV today  -c/w flomax  -eliquis on hold as  does not want to start a/c  -possible cysto if bleeding persists; currently stopped. Will monitor Hgb closely.

## 2017-12-16 NOTE — PROGRESS NOTE ADULT - PROBLEM SELECTOR PLAN 1
Clamp CBI, monitor color  AM labs reviewed  Hold ASA/eliquis  Hospitalist consult appreciated  DVT prophy, IS  OOB with assistance/PT consult  TOV Sunday if remains urine color remains acceptable off CBI

## 2017-12-16 NOTE — PHYSICAL THERAPY INITIAL EVALUATION ADULT - PLANNED THERAPY INTERVENTIONS, PT EVAL
transfer training/bed mobility training/strengthening/gait training/Patient left sitting in chair in NAD; call marin in reach; TERRY Mccarthy aware

## 2017-12-16 NOTE — PROGRESS NOTE ADULT - ASSESSMENT
68 yo M s/p TURP 4 weeks ago at [facility], admitted with gross hematuria requiring irrigation and CBI, transfused 2uPRBC yesterday for acute blood loss anemia, CBI clear, CT with some residual clot, irrigated this am with removal of minimal clot to light pink tinged

## 2017-12-17 DIAGNOSIS — R50.9 FEVER, UNSPECIFIED: ICD-10-CM

## 2017-12-17 LAB
B PERT DNA SPEC QL NAA+PROBE: SIGNIFICANT CHANGE UP
C DIFF TOX GENS STL QL NAA+PROBE: SIGNIFICANT CHANGE UP
C PNEUM DNA SPEC QL NAA+PROBE: NOT DETECTED — SIGNIFICANT CHANGE UP
FLUAV H1 2009 PAND RNA SPEC QL NAA+PROBE: NOT DETECTED — SIGNIFICANT CHANGE UP
FLUAV H1 RNA SPEC QL NAA+PROBE: NOT DETECTED — SIGNIFICANT CHANGE UP
FLUAV H3 RNA SPEC QL NAA+PROBE: NOT DETECTED — SIGNIFICANT CHANGE UP
FLUAV SUBTYP SPEC NAA+PROBE: SIGNIFICANT CHANGE UP
FLUBV RNA SPEC QL NAA+PROBE: NOT DETECTED — SIGNIFICANT CHANGE UP
GLUCOSE BLDC GLUCOMTR-MCNC: 104 MG/DL — HIGH (ref 70–99)
GLUCOSE BLDC GLUCOMTR-MCNC: 128 MG/DL — HIGH (ref 70–99)
GLUCOSE BLDC GLUCOMTR-MCNC: 133 MG/DL — HIGH (ref 70–99)
GLUCOSE BLDC GLUCOMTR-MCNC: 156 MG/DL — HIGH (ref 70–99)
HADV DNA SPEC QL NAA+PROBE: NOT DETECTED — SIGNIFICANT CHANGE UP
HCOV 229E RNA SPEC QL NAA+PROBE: NOT DETECTED — SIGNIFICANT CHANGE UP
HCOV HKU1 RNA SPEC QL NAA+PROBE: NOT DETECTED — SIGNIFICANT CHANGE UP
HCOV NL63 RNA SPEC QL NAA+PROBE: NOT DETECTED — SIGNIFICANT CHANGE UP
HCOV OC43 RNA SPEC QL NAA+PROBE: NOT DETECTED — SIGNIFICANT CHANGE UP
HCT VFR BLD CALC: 23 % — LOW (ref 39–50)
HGB BLD-MCNC: 7.8 G/DL — LOW (ref 13–17)
HMPV RNA SPEC QL NAA+PROBE: NOT DETECTED — SIGNIFICANT CHANGE UP
HPIV1 RNA SPEC QL NAA+PROBE: NOT DETECTED — SIGNIFICANT CHANGE UP
HPIV2 RNA SPEC QL NAA+PROBE: NOT DETECTED — SIGNIFICANT CHANGE UP
HPIV3 RNA SPEC QL NAA+PROBE: NOT DETECTED — SIGNIFICANT CHANGE UP
HPIV4 RNA SPEC QL NAA+PROBE: NOT DETECTED — SIGNIFICANT CHANGE UP
M PNEUMO DNA SPEC QL NAA+PROBE: NOT DETECTED — SIGNIFICANT CHANGE UP
MCHC RBC-ENTMCNC: 30.7 PG — SIGNIFICANT CHANGE UP (ref 27–34)
MCHC RBC-ENTMCNC: 33.9 % — SIGNIFICANT CHANGE UP (ref 32–36)
MCV RBC AUTO: 90.6 FL — SIGNIFICANT CHANGE UP (ref 80–100)
NRBC # FLD: 0 — SIGNIFICANT CHANGE UP
PLATELET # BLD AUTO: 114 K/UL — LOW (ref 150–400)
PMV BLD: 10.2 FL — SIGNIFICANT CHANGE UP (ref 7–13)
RBC # BLD: 2.54 M/UL — LOW (ref 4.2–5.8)
RBC # FLD: 13.9 % — SIGNIFICANT CHANGE UP (ref 10.3–14.5)
RSV RNA SPEC QL NAA+PROBE: NOT DETECTED — SIGNIFICANT CHANGE UP
RV+EV RNA SPEC QL NAA+PROBE: NOT DETECTED — SIGNIFICANT CHANGE UP
WBC # BLD: 5.88 K/UL — SIGNIFICANT CHANGE UP (ref 3.8–10.5)
WBC # FLD AUTO: 5.88 K/UL — SIGNIFICANT CHANGE UP (ref 3.8–10.5)

## 2017-12-17 PROCEDURE — 71010: CPT | Mod: 26

## 2017-12-17 PROCEDURE — 99233 SBSQ HOSP IP/OBS HIGH 50: CPT

## 2017-12-17 RX ORDER — SODIUM CHLORIDE 9 MG/ML
1000 INJECTION INTRAMUSCULAR; INTRAVENOUS; SUBCUTANEOUS
Qty: 0 | Refills: 0 | Status: DISCONTINUED | OUTPATIENT
Start: 2017-12-17 | End: 2017-12-19

## 2017-12-17 RX ORDER — ACETAMINOPHEN 500 MG
650 TABLET ORAL ONCE
Qty: 0 | Refills: 0 | Status: COMPLETED | OUTPATIENT
Start: 2017-12-17 | End: 2017-12-17

## 2017-12-17 RX ORDER — ASCORBIC ACID 60 MG
500 TABLET,CHEWABLE ORAL DAILY
Qty: 0 | Refills: 0 | Status: DISCONTINUED | OUTPATIENT
Start: 2017-12-17 | End: 2017-12-19

## 2017-12-17 RX ORDER — CEFTRIAXONE 500 MG/1
2 INJECTION, POWDER, FOR SOLUTION INTRAMUSCULAR; INTRAVENOUS EVERY 24 HOURS
Qty: 0 | Refills: 0 | Status: DISCONTINUED | OUTPATIENT
Start: 2017-12-17 | End: 2017-12-18

## 2017-12-17 RX ORDER — LACTOBACILLUS ACIDOPHILUS 100MM CELL
1 CAPSULE ORAL DAILY
Qty: 0 | Refills: 0 | Status: DISCONTINUED | OUTPATIENT
Start: 2017-12-17 | End: 2017-12-19

## 2017-12-17 RX ORDER — LACTOBACILLUS ACIDOPH-L.BULGARICUS 1 MILLION CELL CHEWABLE TABLET 1MM CELL
1 TABLET,CHEWABLE ORAL DAILY
Qty: 0 | Refills: 0 | Status: DISCONTINUED | OUTPATIENT
Start: 2017-12-17 | End: 2017-12-17

## 2017-12-17 RX ADMIN — Medication 325 MILLIGRAM(S): at 11:44

## 2017-12-17 RX ADMIN — Medication 1 DROP(S): at 17:17

## 2017-12-17 RX ADMIN — Medication 650 MILLIGRAM(S): at 00:46

## 2017-12-17 RX ADMIN — Medication 1 TABLET(S): at 11:44

## 2017-12-17 RX ADMIN — Medication 81 MILLIGRAM(S): at 11:44

## 2017-12-17 RX ADMIN — OXYCODONE AND ACETAMINOPHEN 1 TABLET(S): 5; 325 TABLET ORAL at 08:01

## 2017-12-17 RX ADMIN — CARVEDILOL PHOSPHATE 3.12 MILLIGRAM(S): 80 CAPSULE, EXTENDED RELEASE ORAL at 06:41

## 2017-12-17 RX ADMIN — Medication 1 DROP(S): at 06:18

## 2017-12-17 RX ADMIN — HEPARIN SODIUM 5000 UNIT(S): 5000 INJECTION INTRAVENOUS; SUBCUTANEOUS at 21:09

## 2017-12-17 RX ADMIN — Medication 1000 UNIT(S): at 11:45

## 2017-12-17 RX ADMIN — FINASTERIDE 5 MILLIGRAM(S): 5 TABLET, FILM COATED ORAL at 21:09

## 2017-12-17 RX ADMIN — HEPARIN SODIUM 5000 UNIT(S): 5000 INJECTION INTRAVENOUS; SUBCUTANEOUS at 06:41

## 2017-12-17 RX ADMIN — CEFTRIAXONE 100 GRAM(S): 500 INJECTION, POWDER, FOR SOLUTION INTRAMUSCULAR; INTRAVENOUS at 06:41

## 2017-12-17 RX ADMIN — TAMSULOSIN HYDROCHLORIDE 0.8 MILLIGRAM(S): 0.4 CAPSULE ORAL at 21:09

## 2017-12-17 RX ADMIN — Medication 1 TABLET(S): at 17:17

## 2017-12-17 RX ADMIN — Medication 500 MILLIGRAM(S): at 11:44

## 2017-12-17 RX ADMIN — Medication 1300 MILLIGRAM(S): at 06:42

## 2017-12-17 RX ADMIN — Medication 1300 MILLIGRAM(S): at 18:23

## 2017-12-17 RX ADMIN — Medication 2: at 13:00

## 2017-12-17 RX ADMIN — Medication 1 MILLIGRAM(S): at 11:44

## 2017-12-17 RX ADMIN — CARVEDILOL PHOSPHATE 3.12 MILLIGRAM(S): 80 CAPSULE, EXTENDED RELEASE ORAL at 17:21

## 2017-12-17 RX ADMIN — DORZOLAMIDE HYDROCHLORIDE 1 DROP(S): 20 SOLUTION/ DROPS OPHTHALMIC at 06:18

## 2017-12-17 RX ADMIN — HEPARIN SODIUM 5000 UNIT(S): 5000 INJECTION INTRAVENOUS; SUBCUTANEOUS at 13:03

## 2017-12-17 RX ADMIN — BUDESONIDE AND FORMOTEROL FUMARATE DIHYDRATE 2 PUFF(S): 160; 4.5 AEROSOL RESPIRATORY (INHALATION) at 09:23

## 2017-12-17 RX ADMIN — BUDESONIDE AND FORMOTEROL FUMARATE DIHYDRATE 2 PUFF(S): 160; 4.5 AEROSOL RESPIRATORY (INHALATION) at 21:19

## 2017-12-17 RX ADMIN — Medication 100 MILLIGRAM(S): at 06:41

## 2017-12-17 RX ADMIN — BUDESONIDE AND FORMOTEROL FUMARATE DIHYDRATE 2 PUFF(S): 160; 4.5 AEROSOL RESPIRATORY (INHALATION) at 00:28

## 2017-12-17 RX ADMIN — PANTOPRAZOLE SODIUM 40 MILLIGRAM(S): 20 TABLET, DELAYED RELEASE ORAL at 06:41

## 2017-12-17 RX ADMIN — SODIUM CHLORIDE 75 MILLILITER(S): 9 INJECTION INTRAMUSCULAR; INTRAVENOUS; SUBCUTANEOUS at 21:09

## 2017-12-17 RX ADMIN — DORZOLAMIDE HYDROCHLORIDE 1 DROP(S): 20 SOLUTION/ DROPS OPHTHALMIC at 17:17

## 2017-12-17 RX ADMIN — ATORVASTATIN CALCIUM 20 MILLIGRAM(S): 80 TABLET, FILM COATED ORAL at 21:09

## 2017-12-17 RX ADMIN — Medication 25 MICROGRAM(S): at 06:41

## 2017-12-17 NOTE — PROGRESS NOTE ADULT - PROBLEM SELECTOR PLAN 1
TOV today, PVR  Hold ASA/eliquis  Hospitalist consult appreciated  DVT prophy, IS  OOB with assistance/PT consult  iron, senna, colace, vit C  f/u RVP

## 2017-12-17 NOTE — PROGRESS NOTE ADULT - PROBLEM SELECTOR PLAN 3
-H/o CABG x3 in 2013 and coronary stent  -c/w ASA, statin, coreg 3.125 mg bid  -pt had echo (4/1/16) normal LVEF 60%, mild concentric LVH; nuclear stress test (7/12/16) normal LVEF 57%, fixed defect in prox to mid inferior wall c/w old infarct, no clear ischemia.

## 2017-12-17 NOTE — PROGRESS NOTE ADULT - PROBLEM SELECTOR PLAN 1
-will f/u BCx, CXR and RVP panel  -continue with ceftriaxone prophylaxis for now  -will probiotic given pt c/o some diarrhea this morning  -send a Cdiff if continues to have diarrhea today

## 2017-12-17 NOTE — PROGRESS NOTE ADULT - ASSESSMENT
67M Hx MR, HTN, CAD s/p CABG x3 in 2013 and coronary stent on ASA, PPM, Afib on Eliquis, CKD3, asthma, hypothyroidism, BPH s/p recent TURP 1 month ago by Dr. Chaves at [facility], p/w hematuria and clot retention, blevins placed and started on CBI.  Eliquis on hold course c/b fever.

## 2017-12-17 NOTE — PROGRESS NOTE ADULT - ASSESSMENT
66 yo M s/p TURP 4 weeks ago at OSH, admitted with gross hematuria requiring irrigation and CBI, transfused 2uPRBC yesterday for acute blood loss anemia, CBI clear, CT with some residual clot, irrigated this am with removal of minimal clot to light pink tinged

## 2017-12-17 NOTE — PROGRESS NOTE ADULT - SUBJECTIVE AND OBJECTIVE BOX
Subjective  CBI clamped yesterday. Pt had temperature to100.9 yesterday. Pt otherwise comfortable this AM.    Objective    Vital signs  T(F): , Max: 100.9 (12-17-17 @ 00:34)  HR: 78 (12-17-17 @ 06:39)  BP: 133/65 (12-17-17 @ 06:39)  SpO2: 99% (12-17-17 @ 06:39)  Wt(kg): --    Output     12-16 @ 07:01  -  12-17 @ 07:00  --------------------------------------------------------  IN: 0 mL / OUT: 1850 mL / NET: -1850 mL    Gen: NAD  Abd: soft, NT, ND  : blevins output clear yellow    Labs      12-17 @ 01:12    WBC 5.88  / Hct 23.0  / SCr --       12-16 @ 06:21    WBC 4.45  / Hct 23.4  / SCr 1.96

## 2017-12-17 NOTE — PROGRESS NOTE ADULT - SUBJECTIVE AND OBJECTIVE BOX
CHIEF COMPLAINT: f/u hematuria    SUBJECTIVE / OVERNIGHT EVENTS: Patient seen and examined. Pain well controlled and patient without any complaints. Overnight, patient spiked a temp to 100.9 - otherwise comfortable this am.     MEDICATIONS  (STANDING):  artificial  tears Solution 1 Drop(s) Both EYES two times a day  ascorbic acid 500 milliGRAM(s) Oral daily  aspirin enteric coated 81 milliGRAM(s) Oral daily  atorvastatin 20 milliGRAM(s) Oral at bedtime  buDESOnide  80 MICROgram(s)/formoterol 4.5 MICROgram(s) Inhaler 2 Puff(s) Inhalation two times a day  carvedilol 3.125 milliGRAM(s) Oral every 12 hours  cefTRIAXone   IVPB 2 Gram(s) IV Intermittent every 24 hours  cholecalciferol 1000 Unit(s) Oral daily  docusate sodium 100 milliGRAM(s) Oral three times a day  dorzolamide 2% Ophthalmic Solution 1 Drop(s) Left EYE two times a day  ferrous    sulfate 325 milliGRAM(s) Oral daily  finasteride 5 milliGRAM(s) Oral daily  folic acid 1 milliGRAM(s) Oral daily  furosemide    Tablet 20 milliGRAM(s) Oral every week  heparin  Injectable 5000 Unit(s) SubCutaneous every 8 hours  influenza   Vaccine 0.5 milliLiter(s) IntraMuscular once  insulin lispro (HumaLOG) corrective regimen sliding scale   SubCutaneous three times a day before meals  insulin lispro (HumaLOG) corrective regimen sliding scale   SubCutaneous at bedtime  lactobacillus acidophilus and bulgaricus Chewable 1 Tablet(s) Chew daily  levothyroxine 25 MICROGram(s) Oral daily  pantoprazole    Tablet 40 milliGRAM(s) Oral before breakfast  polyethylene glycol 3350 17 Gram(s) Oral daily  sodium bicarbonate 1300 milliGRAM(s) Oral two times a day  tamsulosin 0.8 milliGRAM(s) Oral at bedtime  timolol 0.5% Solution 1 Drop(s) Left EYE two times a day  vitamin B complex with vitamin C 1 Tablet(s) Oral daily    MEDICATIONS  (PRN):  acetaminophen   Tablet. 650 milliGRAM(s) Oral every 6 hours PRN Mild Pain (1 - 3)  oxybutynin 5 milliGRAM(s) Oral three times a day PRN Bladder spasms  oxyCODONE    5 mG/acetaminophen 325 mG 1 Tablet(s) Oral every 4 hours PRN Mild Pain  oxyCODONE    5 mG/acetaminophen 325 mG 2 Tablet(s) Oral every 6 hours PRN Moderate Pain  senna 2 Tablet(s) Oral at bedtime PRN Constipation    VITALS:  T(F): 98.1 (12-17-17 @ 09:20), Max: 100.9 (12-17-17 @ 00:34)  HR: 88 (12-17-17 @ 09:20) (72 - 93)  BP: 92/62 (12-17-17 @ 09:20) (92/62 - 153/85)  RR: 16 (12-17-17 @ 09:20) (16 - 18)  SpO2: 100% (12-17-17 @ 09:20)    PHYSICAL EXAM:  GENERAL: NAD, well-developed  CHEST/LUNG: Clear to auscultation bilaterally; No wheeze  HEART: Regular rate and rhythm; No murmurs, rubs, or gallops  ABDOMEN: Soft, suprapubic tenderness, Nondistended; Bowel sounds present  : blevins output clear yellow  EXTREMITIES:  2+ Peripheral Pulses, No clubbing, cyanosis, or edema    LABS:              7.8                  x    | x    | x            5.88  >-----------< 114     ------------------------< x                     23.0                 x    | x    | x                                            Ca x     Mg x     Ph x        MICROBIOLOGY:  Urine Culture  NO GROWTH AT 24 [id number] @ 16:01    RADIOLOGY & ADDITIONAL TESTS:  Imaging Personally Reviewed: [ ] Yes    [ ] Consultant(s) Notes Reviewed:  [x] Care Discussed with Consultants/Other Providers: Urology PA - discussed management of fever

## 2017-12-18 LAB
BASOPHILS # BLD AUTO: 0.01 K/UL — SIGNIFICANT CHANGE UP (ref 0–0.2)
BASOPHILS NFR BLD AUTO: 0.3 % — SIGNIFICANT CHANGE UP (ref 0–2)
BUN SERPL-MCNC: 33 MG/DL — HIGH (ref 7–23)
CALCIUM SERPL-MCNC: 8.4 MG/DL — SIGNIFICANT CHANGE UP (ref 8.4–10.5)
CHLORIDE SERPL-SCNC: 105 MMOL/L — SIGNIFICANT CHANGE UP (ref 98–107)
CO2 SERPL-SCNC: 22 MMOL/L — SIGNIFICANT CHANGE UP (ref 22–31)
CREAT SERPL-MCNC: 2.01 MG/DL — HIGH (ref 0.5–1.3)
EOSINOPHIL # BLD AUTO: 0.13 K/UL — SIGNIFICANT CHANGE UP (ref 0–0.5)
EOSINOPHIL NFR BLD AUTO: 3.6 % — SIGNIFICANT CHANGE UP (ref 0–6)
GLUCOSE BLDC GLUCOMTR-MCNC: 119 MG/DL — HIGH (ref 70–99)
GLUCOSE BLDC GLUCOMTR-MCNC: 122 MG/DL — HIGH (ref 70–99)
GLUCOSE BLDC GLUCOMTR-MCNC: 129 MG/DL — HIGH (ref 70–99)
GLUCOSE BLDC GLUCOMTR-MCNC: 143 MG/DL — HIGH (ref 70–99)
GLUCOSE SERPL-MCNC: 120 MG/DL — HIGH (ref 70–99)
HBA1C BLD-MCNC: 6.4 % — HIGH (ref 4–5.6)
HCT VFR BLD CALC: 24.9 % — LOW (ref 39–50)
HCT VFR BLD CALC: 24.9 % — LOW (ref 39–50)
HGB BLD-MCNC: 8.4 G/DL — LOW (ref 13–17)
HGB BLD-MCNC: 8.4 G/DL — LOW (ref 13–17)
IMM GRANULOCYTES # BLD AUTO: 0.02 # — SIGNIFICANT CHANGE UP
IMM GRANULOCYTES NFR BLD AUTO: 0.6 % — SIGNIFICANT CHANGE UP (ref 0–1.5)
LYMPHOCYTES # BLD AUTO: 0.53 K/UL — LOW (ref 1–3.3)
LYMPHOCYTES # BLD AUTO: 14.6 % — SIGNIFICANT CHANGE UP (ref 13–44)
MCHC RBC-ENTMCNC: 31.5 PG — SIGNIFICANT CHANGE UP (ref 27–34)
MCHC RBC-ENTMCNC: 31.5 PG — SIGNIFICANT CHANGE UP (ref 27–34)
MCHC RBC-ENTMCNC: 33.7 % — SIGNIFICANT CHANGE UP (ref 32–36)
MCHC RBC-ENTMCNC: 33.7 % — SIGNIFICANT CHANGE UP (ref 32–36)
MCV RBC AUTO: 93.3 FL — SIGNIFICANT CHANGE UP (ref 80–100)
MCV RBC AUTO: 93.3 FL — SIGNIFICANT CHANGE UP (ref 80–100)
MONOCYTES # BLD AUTO: 0.4 K/UL — SIGNIFICANT CHANGE UP (ref 0–0.9)
MONOCYTES NFR BLD AUTO: 11 % — SIGNIFICANT CHANGE UP (ref 2–14)
NEUTROPHILS # BLD AUTO: 2.53 K/UL — SIGNIFICANT CHANGE UP (ref 1.8–7.4)
NEUTROPHILS NFR BLD AUTO: 69.9 % — SIGNIFICANT CHANGE UP (ref 43–77)
NRBC # FLD: 0 — SIGNIFICANT CHANGE UP
NRBC # FLD: 0 — SIGNIFICANT CHANGE UP
PLATELET # BLD AUTO: 145 K/UL — LOW (ref 150–400)
PLATELET # BLD AUTO: 145 K/UL — LOW (ref 150–400)
PMV BLD: 10.4 FL — SIGNIFICANT CHANGE UP (ref 7–13)
PMV BLD: 10.4 FL — SIGNIFICANT CHANGE UP (ref 7–13)
POTASSIUM SERPL-MCNC: 4.4 MMOL/L — SIGNIFICANT CHANGE UP (ref 3.5–5.3)
POTASSIUM SERPL-SCNC: 4.4 MMOL/L — SIGNIFICANT CHANGE UP (ref 3.5–5.3)
RBC # BLD: 2.67 M/UL — LOW (ref 4.2–5.8)
RBC # BLD: 2.67 M/UL — LOW (ref 4.2–5.8)
RBC # FLD: 13.5 % — SIGNIFICANT CHANGE UP (ref 10.3–14.5)
RBC # FLD: 13.5 % — SIGNIFICANT CHANGE UP (ref 10.3–14.5)
SODIUM SERPL-SCNC: 136 MMOL/L — SIGNIFICANT CHANGE UP (ref 135–145)
SPECIMEN SOURCE: SIGNIFICANT CHANGE UP
WBC # BLD: 3.65 K/UL — LOW (ref 3.8–10.5)
WBC # BLD: 3.65 K/UL — LOW (ref 3.8–10.5)
WBC # FLD AUTO: 3.65 K/UL — LOW (ref 3.8–10.5)
WBC # FLD AUTO: 3.65 K/UL — LOW (ref 3.8–10.5)

## 2017-12-18 PROCEDURE — 99233 SBSQ HOSP IP/OBS HIGH 50: CPT

## 2017-12-18 PROCEDURE — 99232 SBSQ HOSP IP/OBS MODERATE 35: CPT

## 2017-12-18 RX ORDER — LOPERAMIDE HCL 2 MG
2 TABLET ORAL
Qty: 0 | Refills: 0 | Status: DISCONTINUED | OUTPATIENT
Start: 2017-12-18 | End: 2017-12-19

## 2017-12-18 RX ADMIN — FINASTERIDE 5 MILLIGRAM(S): 5 TABLET, FILM COATED ORAL at 21:12

## 2017-12-18 RX ADMIN — Medication 1 MILLIGRAM(S): at 12:13

## 2017-12-18 RX ADMIN — Medication 2 MILLIGRAM(S): at 23:59

## 2017-12-18 RX ADMIN — Medication 1 DROP(S): at 05:10

## 2017-12-18 RX ADMIN — CARVEDILOL PHOSPHATE 3.12 MILLIGRAM(S): 80 CAPSULE, EXTENDED RELEASE ORAL at 05:10

## 2017-12-18 RX ADMIN — DORZOLAMIDE HYDROCHLORIDE 1 DROP(S): 20 SOLUTION/ DROPS OPHTHALMIC at 05:10

## 2017-12-18 RX ADMIN — Medication 1 DROP(S): at 18:31

## 2017-12-18 RX ADMIN — Medication 1 TABLET(S): at 12:13

## 2017-12-18 RX ADMIN — PANTOPRAZOLE SODIUM 40 MILLIGRAM(S): 20 TABLET, DELAYED RELEASE ORAL at 07:54

## 2017-12-18 RX ADMIN — DORZOLAMIDE HYDROCHLORIDE 1 DROP(S): 20 SOLUTION/ DROPS OPHTHALMIC at 18:31

## 2017-12-18 RX ADMIN — HEPARIN SODIUM 5000 UNIT(S): 5000 INJECTION INTRAVENOUS; SUBCUTANEOUS at 14:20

## 2017-12-18 RX ADMIN — Medication 1 TABLET(S): at 18:31

## 2017-12-18 RX ADMIN — Medication 81 MILLIGRAM(S): at 12:13

## 2017-12-18 RX ADMIN — HEPARIN SODIUM 5000 UNIT(S): 5000 INJECTION INTRAVENOUS; SUBCUTANEOUS at 05:10

## 2017-12-18 RX ADMIN — Medication 1300 MILLIGRAM(S): at 18:31

## 2017-12-18 RX ADMIN — Medication 500 MILLIGRAM(S): at 12:13

## 2017-12-18 RX ADMIN — Medication 1000 UNIT(S): at 12:13

## 2017-12-18 RX ADMIN — TAMSULOSIN HYDROCHLORIDE 0.8 MILLIGRAM(S): 0.4 CAPSULE ORAL at 21:12

## 2017-12-18 RX ADMIN — Medication 2 MILLIGRAM(S): at 12:13

## 2017-12-18 RX ADMIN — HEPARIN SODIUM 5000 UNIT(S): 5000 INJECTION INTRAVENOUS; SUBCUTANEOUS at 21:12

## 2017-12-18 RX ADMIN — CEFTRIAXONE 100 GRAM(S): 500 INJECTION, POWDER, FOR SOLUTION INTRAMUSCULAR; INTRAVENOUS at 07:54

## 2017-12-18 RX ADMIN — Medication 650 MILLIGRAM(S): at 21:13

## 2017-12-18 RX ADMIN — Medication 325 MILLIGRAM(S): at 12:13

## 2017-12-18 RX ADMIN — ATORVASTATIN CALCIUM 20 MILLIGRAM(S): 80 TABLET, FILM COATED ORAL at 21:12

## 2017-12-18 RX ADMIN — SODIUM CHLORIDE 75 MILLILITER(S): 9 INJECTION INTRAMUSCULAR; INTRAVENOUS; SUBCUTANEOUS at 21:13

## 2017-12-18 RX ADMIN — BUDESONIDE AND FORMOTEROL FUMARATE DIHYDRATE 2 PUFF(S): 160; 4.5 AEROSOL RESPIRATORY (INHALATION) at 09:11

## 2017-12-18 RX ADMIN — Medication 1300 MILLIGRAM(S): at 05:10

## 2017-12-18 RX ADMIN — Medication 2 MILLIGRAM(S): at 18:31

## 2017-12-18 RX ADMIN — Medication 650 MILLIGRAM(S): at 22:10

## 2017-12-18 RX ADMIN — Medication 25 MICROGRAM(S): at 05:10

## 2017-12-18 RX ADMIN — Medication 2 MILLIGRAM(S): at 09:11

## 2017-12-18 NOTE — PROGRESS NOTE ADULT - SUBJECTIVE AND OBJECTIVE BOX
CHIEF COMPLAINT: Patient is a 67y old  male who presents with a chief complaint of hematuria (14 Dec 2017 17:47)      SUBJECTIVE / OVERNIGHT EVENTS:  pt feels ok, c/o diarrhea 6x this morning, denies abdominal pain/fever    MEDICATIONS  (STANDING):  artificial  tears Solution 1 Drop(s) Both EYES two times a day  ascorbic acid 500 milliGRAM(s) Oral daily  aspirin enteric coated 81 milliGRAM(s) Oral daily  atorvastatin 20 milliGRAM(s) Oral at bedtime  buDESOnide  80 MICROgram(s)/formoterol 4.5 MICROgram(s) Inhaler 2 Puff(s) Inhalation two times a day  carvedilol 3.125 milliGRAM(s) Oral every 12 hours  cholecalciferol 1000 Unit(s) Oral daily  dorzolamide 2% Ophthalmic Solution 1 Drop(s) Left EYE two times a day  ferrous    sulfate 325 milliGRAM(s) Oral daily  finasteride 5 milliGRAM(s) Oral daily  folic acid 1 milliGRAM(s) Oral daily  furosemide    Tablet 20 milliGRAM(s) Oral every week  heparin  Injectable 5000 Unit(s) SubCutaneous every 8 hours  influenza   Vaccine 0.5 milliLiter(s) IntraMuscular once  insulin lispro (HumaLOG) corrective regimen sliding scale   SubCutaneous three times a day before meals  insulin lispro (HumaLOG) corrective regimen sliding scale   SubCutaneous at bedtime  lactobacillus acidophilus 1 Tablet(s) Oral daily  levothyroxine 25 MICROGram(s) Oral daily  loperamide 2 milliGRAM(s) Oral four times a day  pantoprazole    Tablet 40 milliGRAM(s) Oral before breakfast  sodium bicarbonate 1300 milliGRAM(s) Oral two times a day  sodium chloride 0.9%. 1000 milliLiter(s) (75 mL/Hr) IV Continuous <Continuous>  tamsulosin 0.8 milliGRAM(s) Oral at bedtime  timolol 0.5% Solution 1 Drop(s) Left EYE two times a day  vitamin B complex with vitamin C 1 Tablet(s) Oral daily    MEDICATIONS  (PRN):  acetaminophen   Tablet. 650 milliGRAM(s) Oral every 6 hours PRN Mild Pain (1 - 3)  oxybutynin 5 milliGRAM(s) Oral three times a day PRN Bladder spasms  oxyCODONE    5 mG/acetaminophen 325 mG 1 Tablet(s) Oral every 4 hours PRN Mild Pain  oxyCODONE    5 mG/acetaminophen 325 mG 2 Tablet(s) Oral every 6 hours PRN Moderate Pain      VITALS:  T(F): 98.3 (12-18-17 @ 10:02), Max: 99.4 (12-17-17 @ 21:04)  HR: 80 (12-18-17 @ 10:02) (65 - 83)  BP: 142/65 (12-18-17 @ 10:02) (126/48 - 160/69)  RR: 16 (12-18-17 @ 10:02) (16 - 18)  SpO2: 100% (12-18-17 @ 10:02)      CAPILLARY BLOOD GLUCOSE    Output     I&O's Summary  T(F): 98.3 (12-18-17 @ 10:02), Max: 99.4 (12-17-17 @ 21:04)  HR: 80 (12-18-17 @ 10:02) (65 - 83)  BP: 142/65 (12-18-17 @ 10:02) (126/48 - 160/69)  RR: 16 (12-18-17 @ 10:02) (16 - 18)  SpO2: 100% (12-18-17 @ 10:02)    PHYSICAL EXAM:  GENERAL: NAD, well-developed  HEAD:  Atraumatic, Normocephalic  EYES: EOMI, PERRLA, conjunctiva and sclera clear  NECK: Supple, No JVD  CHEST/LUNG: Clear to auscultation bilaterally; No wheeze  HEART: Regular rate and rhythm; No murmurs, rubs, or gallops  ABDOMEN: Soft,  nontender, Nondistended; Bowel sounds present  EXTREMITIES:  2+ Peripheral Pulses, No clubbing, cyanosis, or edema  PSYCH: AAOx3  NEUROLOGY: non-focal  SKIN: No rashes or lesions    LABS:              8.4                  136  | 22   | 33           3.65  >-----------< 145     ------------------------< 120                   24.9                 4.4  | 105  | 2.01                                         Ca 8.4   Mg x     Ph x        MICROBIOLOGY:    Culture - Urine (collected 17 Dec 2017 02:14)  Source: URINE CATHETER  Preliminary Report (18 Dec 2017 10:24):    GNR^Gram Neg Rods    COLONY COUNT: 10,000-49,000 CFU/mL    Culture - Blood (collected 17 Dec 2017 01:51)  Source: BLOOD PERIPHERAL  Preliminary Report (18 Dec 2017 01:51):    NO ORGANISMS ISOLATED    NO ORGANISMS ISOLATED AT 24 HOURS    Culture - Blood (collected 17 Dec 2017 01:51)  Source: BLOOD VENOUS  Preliminary Report (18 Dec 2017 01:51):    NO ORGANISMS ISOLATED    NO ORGANISMS ISOLATED AT 24 HOURS          RADIOLOGY & ADDITIONAL TESTS:    Imaging Personally Reviewed:  < from: Xray Chest 1 View AP- PORTABLE-Urgent (12.17.17 @ 11:03) >    IMPRESSION:   Small bilateral pleural effusions.    [ ] Consultant(s) Notes Reviewed:  [ x] Care Discussed with Consultants/Other Providers: GARRETT Oscar, pt has diarrhea, c.diff neg, d/c ceftriaxone an miralax, imodium prn

## 2017-12-18 NOTE — PROGRESS NOTE ADULT - ASSESSMENT
67M Hx MR, HTN, CAD s/p CABG x3 in 2013 and coronary stent on ASA, PPM, Afib on Eliquis, CKD3, asthma, hypothyroidism, BPH s/p recent TURP 1 month ago by Dr. Chaves at [facility], p/w hematuria and clot retention, blevins placed and started on CBI.  Eliquis on hold course c/b fever.

## 2017-12-18 NOTE — PROGRESS NOTE ADULT - ASSESSMENT
68 yo M s/p TURP 4 weeks ago at OSH, admitted with gross hematuria requiring irrigation and CBI, Smart out and voiding clear

## 2017-12-18 NOTE — PROGRESS NOTE ADULT - SUBJECTIVE AND OBJECTIVE BOX
Afeb 140/70  65  99%RA    Pt still has diarrhea overnite;  C. Diff neg    Abd- soft NT ND   Voiding clear    Recieved 2UPC over weekend

## 2017-12-18 NOTE — PROGRESS NOTE ADULT - PROBLEM SELECTOR PLAN 1
-RVP negative, CXR no infiltrate  -urine cx (12/17) 10k-49k GNR, s/p 2 days of ceftriaxone  -d/c ceftriaxone and miralax in the setting of diarrhea  -stool negative for c.diff, imodium prn  -d/c planning if diarrhea improves per primary team

## 2017-12-19 ENCOUNTER — TRANSCRIPTION ENCOUNTER (OUTPATIENT)
Age: 67
End: 2017-12-19

## 2017-12-19 VITALS
RESPIRATION RATE: 16 BRPM | OXYGEN SATURATION: 99 % | SYSTOLIC BLOOD PRESSURE: 110 MMHG | DIASTOLIC BLOOD PRESSURE: 50 MMHG | HEART RATE: 81 BPM | TEMPERATURE: 98 F

## 2017-12-19 LAB
-  AMIKACIN: SIGNIFICANT CHANGE UP
-  AMPICILLIN/SULBACTAM: SIGNIFICANT CHANGE UP
-  AMPICILLIN: SIGNIFICANT CHANGE UP
-  AZTREONAM: SIGNIFICANT CHANGE UP
-  CEFAZOLIN: SIGNIFICANT CHANGE UP
-  CEFEPIME: SIGNIFICANT CHANGE UP
-  CEFOXITIN: SIGNIFICANT CHANGE UP
-  CEFTAZIDIME: SIGNIFICANT CHANGE UP
-  CEFTRIAXONE: SIGNIFICANT CHANGE UP
-  CIPROFLOXACIN: SIGNIFICANT CHANGE UP
-  ERTAPENEM: SIGNIFICANT CHANGE UP
-  GENTAMICIN: SIGNIFICANT CHANGE UP
-  IMIPENEM: SIGNIFICANT CHANGE UP
-  LEVOFLOXACIN: SIGNIFICANT CHANGE UP
-  MEROPENEM: SIGNIFICANT CHANGE UP
-  NITROFURANTOIN: SIGNIFICANT CHANGE UP
-  PIPERACILLIN/TAZOBACTAM: SIGNIFICANT CHANGE UP
-  TIGECYCLINE: SIGNIFICANT CHANGE UP
-  TOBRAMYCIN: SIGNIFICANT CHANGE UP
-  TRIMETHOPRIM/SULFAMETHOXAZOLE: SIGNIFICANT CHANGE UP
BACTERIA UR CULT: SIGNIFICANT CHANGE UP
GLUCOSE BLDC GLUCOMTR-MCNC: 102 MG/DL — HIGH (ref 70–99)
GLUCOSE BLDC GLUCOMTR-MCNC: 97 MG/DL — SIGNIFICANT CHANGE UP (ref 70–99)
METHOD TYPE: SIGNIFICANT CHANGE UP
ORGANISM # SPEC MICROSCOPIC CNT: SIGNIFICANT CHANGE UP
ORGANISM # SPEC MICROSCOPIC CNT: SIGNIFICANT CHANGE UP

## 2017-12-19 PROCEDURE — 99232 SBSQ HOSP IP/OBS MODERATE 35: CPT

## 2017-12-19 RX ORDER — APIXABAN 2.5 MG/1
2.5 TABLET, FILM COATED ORAL EVERY 12 HOURS
Qty: 0 | Refills: 0 | Status: DISCONTINUED | OUTPATIENT
Start: 2017-12-19 | End: 2017-12-19

## 2017-12-19 RX ADMIN — Medication 1 TABLET(S): at 11:52

## 2017-12-19 RX ADMIN — Medication 2 MILLIGRAM(S): at 11:52

## 2017-12-19 RX ADMIN — Medication 1 MILLIGRAM(S): at 11:52

## 2017-12-19 RX ADMIN — PANTOPRAZOLE SODIUM 40 MILLIGRAM(S): 20 TABLET, DELAYED RELEASE ORAL at 08:04

## 2017-12-19 RX ADMIN — Medication 1000 UNIT(S): at 11:52

## 2017-12-19 RX ADMIN — HEPARIN SODIUM 5000 UNIT(S): 5000 INJECTION INTRAVENOUS; SUBCUTANEOUS at 05:22

## 2017-12-19 RX ADMIN — Medication 1 DROP(S): at 05:24

## 2017-12-19 RX ADMIN — Medication 2 MILLIGRAM(S): at 05:22

## 2017-12-19 RX ADMIN — Medication 500 MILLIGRAM(S): at 11:52

## 2017-12-19 RX ADMIN — Medication 325 MILLIGRAM(S): at 11:52

## 2017-12-19 RX ADMIN — Medication 1 TABLET(S): at 05:22

## 2017-12-19 RX ADMIN — Medication 1300 MILLIGRAM(S): at 05:22

## 2017-12-19 RX ADMIN — CARVEDILOL PHOSPHATE 3.12 MILLIGRAM(S): 80 CAPSULE, EXTENDED RELEASE ORAL at 05:22

## 2017-12-19 RX ADMIN — BUDESONIDE AND FORMOTEROL FUMARATE DIHYDRATE 2 PUFF(S): 160; 4.5 AEROSOL RESPIRATORY (INHALATION) at 10:08

## 2017-12-19 RX ADMIN — DORZOLAMIDE HYDROCHLORIDE 1 DROP(S): 20 SOLUTION/ DROPS OPHTHALMIC at 05:23

## 2017-12-19 RX ADMIN — Medication 25 MICROGRAM(S): at 05:22

## 2017-12-19 RX ADMIN — Medication 81 MILLIGRAM(S): at 11:52

## 2017-12-19 RX ADMIN — Medication 1 DROP(S): at 05:23

## 2017-12-19 NOTE — DISCHARGE NOTE ADULT - MEDICATION SUMMARY - MEDICATIONS TO TAKE
I will START or STAY ON the medications listed below when I get home from the hospital:    finasteride 5 mg oral tablet  -- 1 tab(s) by mouth once a day  -- Indication: For Home med    aspirin 81 mg oral delayed release capsule  -- 1 tab(s) by mouth once a day  -- Indication: For same    sodium bicarbonate 650 mg oral tablet  -- 2 tab(s) by mouth 2 times a day  -- Indication: For same    tamsulosin 0.4 mg oral capsule  -- 2 cap(s) by mouth once a day (at bedtime)  -- Indication: For same    Eliquis 2.5 mg oral tablet  -- 1 tab(s) by mouth 2 times a day  -- Indication: For same    sitaGLIPtin-metFORMIN 50 mg-1000 mg oral tablet  -- 1 tab(s) by mouth once a day  -- Indication: For same    atorvastatin 20 mg oral tablet  -- 1 tab(s) by mouth once a day (at bedtime)  -- Indication: For same    carvedilol 3.125 mg oral tablet  -- 1 tab(s) by mouth 2 times a day  -- Indication: For same    Symbicort 80 mcg-4.5 mcg/inh inhalation aerosol  -- 1 puff(s) inhaled 2 times a day  -- Indication: For same    sodium polystyrene sulfonate 15 g/60 mL oral and rectal suspension  -- 60 milliliter(s) by mouth every 7 days wednesdays  -- Indication: For same    furosemide 20 mg oral tablet  -- 1 tab(s) by mouth once a week, next dose 12/18  -- Indication: For same    ferrous sulfate 325 mg (65 mg elemental iron) oral delayed release tablet  -- 1 tab(s) by mouth once a day  -- Indication: For same    Timoptic Ocudose 0.5% ophthalmic solution  -- 1 drop(s) to left eye 2 times a day  -- Indication: For same    Artificial Tears ophthalmic solution  -- 1 drop(s) to each affected eye 2 times a day  -- Indication: For same    dorzolamide 2% ophthalmic solution  -- 1 drop(s) to left eye 2 times a day  -- Indication: For same    [facility]e Digestive Health oral capsule  -- 2 cap(s) by mouth once a day  -- Indication: For same    omeprazole 20 mg oral delayed release capsule  -- 1 cap(s) by mouth once a day  -- Indication: For same    sulfamethoxazole-trimethoprim 400 mg-80 mg oral tablet  -- 1 tab(s) by mouth 2 times a day  -- Indication: For Antibiotic 3 days    levothyroxine 25 mcg (0.025 mg) oral tablet  -- 1 tab(s) by mouth once a day  -- Indication: For Home med    Vitamin B-100 oral tablet  -- 1 tab(s) by mouth once a day  -- Indication: For Home med    folic acid 1 mg oral tablet  -- 1 tab(s) by mouth once a day  -- Indication: For Home med    Vitamin D3 1000 intl units oral capsule  -- 1 cap(s) by mouth once a day  -- Indication: For same

## 2017-12-19 NOTE — DISCHARGE NOTE ADULT - CARE PROVIDERS DIRECT ADDRESSES
[email or web address]

## 2017-12-19 NOTE — DISCHARGE NOTE ADULT - CARE PLAN
Principal Discharge DX:	Hematuria  Goal:	clear urine  Instructions for follow-up, activity and diet:	as above; f/u with your urologist

## 2017-12-19 NOTE — DISCHARGE NOTE ADULT - HOSPITAL COURSE
Pt had TURP at OSH 4 weeks ago and admitted here with hematuria; CBI started; urine cleared up quickly; recieved blood over weekend; had some diarrhea (not C. diff) now resolved; urine with a few GNR so bactrim started; asymptomatic; home today

## 2017-12-19 NOTE — PROGRESS NOTE ADULT - ASSESSMENT
67M Hx MR, HTN, CAD s/p CABG x3 in 2013 and coronary stent on ASA, PPM, Afib on Eliquis, CKD3, asthma, hypothyroidism, BPH s/p recent TURP 1 month ago by Dr. Chaves at [facility], p/w hematuria and clot retention, resolved s/p CBI, resumed on eliquis

## 2017-12-19 NOTE — PROGRESS NOTE ADULT - PROBLEM SELECTOR PLAN 7
controlled on coreg and once a week lasix, monitor BP, avoid hypotension.

## 2017-12-19 NOTE — PROGRESS NOTE ADULT - PROBLEM SELECTOR PLAN 8
resume janumet on discharge  c/w humalog ISS, A1c 6.4%, monitor FS (102, 143, 129)
hold janumet while inpt  c/w humalog ISS, A1c 6.4%, monitor FS (119, 122)
hold janumet while inpt  c/w humalog ISS, check A1c, monitor FS (220, 123).
hold janumet while inpt  c/w humalog ISS, check A1c, monitor FS (220, 123).

## 2017-12-19 NOTE — DISCHARGE NOTE ADULT - CONDITIONS AT DISCHARGE
+void, clear yellow urine, afebrile, tolerated po and fluids, patient and family agree and understood discharge planning

## 2017-12-19 NOTE — DISCHARGE NOTE ADULT - INSTRUCTIONS
as tolerated Call MD for any c/o difficulty urinating, bloody urine, fever, and a return appointment.  Took over the counter stool softener to prevent constipation that can be a side effect from taking pain medications

## 2017-12-19 NOTE — PROGRESS NOTE ADULT - PROBLEM SELECTOR PLAN 1
-RVP negative, CXR no infiltrate  -urine cx (12/17) 10k-49k GNR, s/p 2 days of ceftriaxone  -d/c plan back to group home on bactrim per primary team  -stool negative for c.diff, imodium prn  -d/c planning if diarrhea improves per primary team -RVP negative, CXR no infiltrate  -urine cx (12/17) 10k-49k GNR, s/p 2 days of ceftriaxone  -d/c plan back to group home on bactrim per primary team  -diarrhea resolved, stool negative for c.diff, imodium prn

## 2017-12-19 NOTE — PROGRESS NOTE ADULT - SUBJECTIVE AND OBJECTIVE BOX
CHIEF COMPLAINT: Patient is a 67y old  male who presents with a chief complaint of hematuria (19 Dec 2017 09:48)      SUBJECTIVE / OVERNIGHT EVENTS:  pt reports diarrhea resolved, denies chest pain or sob, hematuria resolved, resumed on eliquis    MEDICATIONS  (STANDING):  apixaban 2.5 milliGRAM(s) Oral every 12 hours  artificial  tears Solution 1 Drop(s) Both EYES two times a day  ascorbic acid 500 milliGRAM(s) Oral daily  aspirin enteric coated 81 milliGRAM(s) Oral daily  atorvastatin 20 milliGRAM(s) Oral at bedtime  buDESOnide  80 MICROgram(s)/formoterol 4.5 MICROgram(s) Inhaler 2 Puff(s) Inhalation two times a day  carvedilol 3.125 milliGRAM(s) Oral every 12 hours  cholecalciferol 1000 Unit(s) Oral daily  dorzolamide 2% Ophthalmic Solution 1 Drop(s) Left EYE two times a day  ferrous    sulfate 325 milliGRAM(s) Oral daily  finasteride 5 milliGRAM(s) Oral daily  folic acid 1 milliGRAM(s) Oral daily  furosemide    Tablet 20 milliGRAM(s) Oral every week  insulin lispro (HumaLOG) corrective regimen sliding scale   SubCutaneous three times a day before meals  insulin lispro (HumaLOG) corrective regimen sliding scale   SubCutaneous at bedtime  lactobacillus acidophilus 1 Tablet(s) Oral daily  levothyroxine 25 MICROGram(s) Oral daily  loperamide 2 milliGRAM(s) Oral four times a day  pantoprazole    Tablet 40 milliGRAM(s) Oral before breakfast  sodium bicarbonate 1300 milliGRAM(s) Oral two times a day  sodium chloride 0.9%. 1000 milliLiter(s) (75 mL/Hr) IV Continuous <Continuous>  tamsulosin 0.8 milliGRAM(s) Oral at bedtime  timolol 0.5% Solution 1 Drop(s) Left EYE two times a day  trimethoprim   80 mG/sulfamethoxazole 400 mG 1 Tablet(s) Oral two times a day  vitamin B complex with vitamin C 1 Tablet(s) Oral daily    MEDICATIONS  (PRN):  acetaminophen   Tablet. 650 milliGRAM(s) Oral every 6 hours PRN Mild Pain (1 - 3)  oxybutynin 5 milliGRAM(s) Oral three times a day PRN Bladder spasms  oxyCODONE    5 mG/acetaminophen 325 mG 1 Tablet(s) Oral every 4 hours PRN Mild Pain  oxyCODONE    5 mG/acetaminophen 325 mG 2 Tablet(s) Oral every 6 hours PRN Moderate Pain      VITALS:  T(F): 98.1 (12-19-17 @ 10:35), Max: 99.2 (12-18-17 @ 17:31)  HR: 81 (12-19-17 @ 10:35) (70 - 83)  BP: 110/50 (12-19-17 @ 10:35) (109/59 - 136/81)  RR: 16 (12-19-17 @ 10:35) (16 - 17)  SpO2: 99% (12-19-17 @ 10:35)      CAPILLARY BLOOD GLUCOSE    Output     I&O's Summary  T(F): 98.1 (12-19-17 @ 10:35), Max: 99.2 (12-18-17 @ 17:31)  HR: 81 (12-19-17 @ 10:35) (70 - 83)  BP: 110/50 (12-19-17 @ 10:35) (109/59 - 136/81)  RR: 16 (12-19-17 @ 10:35) (16 - 17)  SpO2: 99% (12-19-17 @ 10:35)    PHYSICAL EXAM:  GENERAL: NAD, well-developed  HEAD:  Atraumatic, Normocephalic  EYES: EOMI, PERRLA, conjunctiva and sclera clear  NECK: Supple, No JVD  CHEST/LUNG: Clear to auscultation bilaterally; No wheeze  HEART: Regular rate and rhythm; No murmurs, rubs, or gallops  ABDOMEN: Soft,  nontender, Nondistended; Bowel sounds present  EXTREMITIES:  2+ Peripheral Pulses, No clubbing, cyanosis, or edema  PSYCH: AAOx3  NEUROLOGY: non-focal  SKIN: No rashes or lesions    LABS:              8.4                  136  | 22   | 33           3.65  >-----------< 145     ------------------------< 120                   24.9                 4.4  | 105  | 2.01                                         Ca 8.4   Mg x     Ph x          MICROBIOLOGY:    Culture - Urine (collected 17 Dec 2017 02:14)  Source: URINE CATHETER  Preliminary Report (18 Dec 2017 10:24):    GNR^Gram Neg Rods    COLONY COUNT: 10,000-49,000 CFU/mL    Culture - Blood (collected 17 Dec 2017 01:51)  Source: BLOOD PERIPHERAL  Preliminary Report (19 Dec 2017 01:51):    NO ORGANISMS ISOLATED    NO ORGANISMS ISOLATED AT 48 HRS.    Culture - Blood (collected 17 Dec 2017 01:51)  Source: BLOOD VENOUS  Preliminary Report (19 Dec 2017 01:51):    NO ORGANISMS ISOLATED    NO ORGANISMS ISOLATED AT 48 HRS.          RADIOLOGY & ADDITIONAL TESTS:    Imaging Personally Reviewed:  < from: Xray Chest 1 View AP- PORTABLE-Urgent (12.17.17 @ 11:03) >  IMPRESSION:   Small bilateral pleural effusions.    [ ] Consultant(s) Notes Reviewed:  [ x] Care Discussed with Consultants/Other Providers: GARRETT Oscar, diarrhea resolved, resumed on eliquis, d/c plan back to group home

## 2017-12-19 NOTE — PROGRESS NOTE ADULT - PROBLEM SELECTOR PLAN 5
-Monitor CBC, transfuse prn, keep Hgb>8 with cardiac disease
-Monitor CBC, transfuse prn, keep Hgb>8 with cardiac disease
-RICARDO in the setting of urinary retention due to hematuria/clots, acute blood loss anemia/hematuria, baseline creat ~1.5, admitted with creat 2.09 ml/dl now at 1.96  -blood transfusion to Hgb>8  -avoid nephrotoxins, bladder irrigation, IVF, blevins cath, dose meds as per renal fxn  -c/w NaHCO3 for metabolic acidosis due to CKD.
-Monitor CBC, transfuse prn, keep Hgb>8 with cardiac disease  likely secondary to post-op changes; pt hemodynamically stable; will monitor

## 2017-12-19 NOTE — DISCHARGE NOTE ADULT - PATIENT PORTAL LINK FT
“You can access the [facility] Patient Portal, offered by [facility], by registering with the following website: [email or web address]”

## 2017-12-19 NOTE — PROGRESS NOTE ADULT - PROBLEM SELECTOR PLAN 10
heparin sc for DVT prophylaxis.  check PT consult
heparin sc for DVT prophylaxis.
heparin sc for DVT prophylaxis.
heparin sc for DVT prophylaxis.  check PT consult

## 2017-12-19 NOTE — DISCHARGE NOTE ADULT - CARE PROVIDER_API CALL
Zara Amor), Urology  [address] [place] [address]  Phone: [phone]  Fax: [phone]

## 2017-12-19 NOTE — PROGRESS NOTE ADULT - PROBLEM SELECTOR PROBLEM 5
Anemia due to blood loss
Acute renal failure with acute tubular necrosis superimposed on stage 3 chronic kidney disease
Anemia due to blood loss
Anemia due to blood loss

## 2017-12-19 NOTE — PROGRESS NOTE ADULT - PROBLEM SELECTOR PROBLEM 3
CAD (coronary artery disease)
Atrial fibrillation
CAD (coronary artery disease)
CAD (coronary artery disease)

## 2017-12-19 NOTE — PROGRESS NOTE ADULT - PROBLEM SELECTOR PROBLEM 6
Acute renal failure with acute tubular necrosis superimposed on stage 3 chronic kidney disease
Acute renal failure with acute tubular necrosis superimposed on stage 3 chronic kidney disease
Hypothyroidism
Acute renal failure with acute tubular necrosis superimposed on stage 3 chronic kidney disease

## 2017-12-19 NOTE — PROGRESS NOTE ADULT - PROBLEM SELECTOR PLAN 4
-rate controlled   -resumed on eliquis 2.5 mg bid
-Monitor CBC, transfuse prn, keep Hgb>8 with cardiac disease  likely secondary to post-op changes; pt hemodynamically stable; will monitor
-rate controlled   -Eliquis on hold, resume when cleared by   -c/w ASA daily
-rate controlled   -Eliquis on hold per   -c/w ASA daily

## 2017-12-19 NOTE — PROGRESS NOTE ADULT - PROBLEM SELECTOR PLAN 9
stable on symbicort, pt is a former smoker (cigar, pipes).

## 2017-12-19 NOTE — PROGRESS NOTE ADULT - PROBLEM SELECTOR PLAN 2
-recent TURP 1 month ago for BPH  -resolved, off CBI, passed TOV  -c/w flomax  -resumed on eliquis
-H/o CABG x3 in 2013 and coronary stent  -c/w ASA, statin, coreg 3.125 mg bid  -pt had echo (4/1/16) normal LVEF 60%, mild concentric LVH; nuclear stress test (7/12/16) normal LVEF 57%, fixed defect in prox to mid inferior wall c/w old infarct, no clear ischemia.
-recent TURP 1 month ago for BPH  -resolved, off CBI  -c/w flomax  -eliquis on hold per 
-recent TURP 1 month ago for BPH  -CBI clamped  -TOV today  -c/w flomax  -eliquis on hold per   -possible cysto if bleeding persists; currently stopped. Will monitor Hgb closely.

## 2017-12-19 NOTE — DISCHARGE NOTE ADULT - COMMUNITY RESOURCES
[facility]  [address].  YANIRA Smiley  T:

## 2017-12-20 PROBLEM — Z00.00 ENCOUNTER FOR PREVENTIVE HEALTH EXAMINATION: Status: ACTIVE | Noted: 2017-12-20

## 2017-12-22 LAB
BACTERIA BLD CULT: SIGNIFICANT CHANGE UP
BACTERIA BLD CULT: SIGNIFICANT CHANGE UP

## 2017-12-28 ENCOUNTER — INPATIENT (INPATIENT)
Facility: HOSPITAL | Age: 67
LOS: 1 days | Discharge: HOME | End: 2017-12-30
Attending: HOSPITALIST | Admitting: HOSPITALIST
Payer: MEDICARE

## 2017-12-28 VITALS
OXYGEN SATURATION: 99 % | RESPIRATION RATE: 18 BRPM | HEART RATE: 76 BPM | TEMPERATURE: 98 F | SYSTOLIC BLOOD PRESSURE: 178 MMHG | DIASTOLIC BLOOD PRESSURE: 90 MMHG

## 2017-12-28 DIAGNOSIS — R33.9 RETENTION OF URINE, UNSPECIFIED: ICD-10-CM

## 2017-12-28 LAB
ALBUMIN SERPL ELPH-MCNC: 3.8 G/DL — SIGNIFICANT CHANGE UP (ref 3.3–5)
ALP SERPL-CCNC: 151 U/L — HIGH (ref 40–120)
ALT FLD-CCNC: 26 U/L — SIGNIFICANT CHANGE UP (ref 4–41)
APPEARANCE UR: SIGNIFICANT CHANGE UP
AST SERPL-CCNC: 30 U/L — SIGNIFICANT CHANGE UP (ref 4–40)
BACTERIA # UR AUTO: SIGNIFICANT CHANGE UP
BASOPHILS # BLD AUTO: 0.05 K/UL — SIGNIFICANT CHANGE UP (ref 0–0.2)
BASOPHILS NFR BLD AUTO: 0.9 % — SIGNIFICANT CHANGE UP (ref 0–2)
BILIRUB SERPL-MCNC: 0.9 MG/DL — SIGNIFICANT CHANGE UP (ref 0.2–1.2)
BILIRUB UR-MCNC: NEGATIVE — SIGNIFICANT CHANGE UP
BLOOD UR QL VISUAL: HIGH
BUN SERPL-MCNC: 28 MG/DL — HIGH (ref 7–23)
CALCIUM SERPL-MCNC: 8.8 MG/DL — SIGNIFICANT CHANGE UP (ref 8.4–10.5)
CHLORIDE SERPL-SCNC: 102 MMOL/L — SIGNIFICANT CHANGE UP (ref 98–107)
CO2 SERPL-SCNC: 26 MMOL/L — SIGNIFICANT CHANGE UP (ref 22–31)
COLOR SPEC: HIGH
CREAT SERPL-MCNC: 1.69 MG/DL — HIGH (ref 0.5–1.3)
EOSINOPHIL # BLD AUTO: 0.25 K/UL — SIGNIFICANT CHANGE UP (ref 0–0.5)
EOSINOPHIL NFR BLD AUTO: 4.5 % — SIGNIFICANT CHANGE UP (ref 0–6)
GLUCOSE SERPL-MCNC: 94 MG/DL — SIGNIFICANT CHANGE UP (ref 70–99)
GLUCOSE UR-MCNC: NEGATIVE — SIGNIFICANT CHANGE UP
HCT VFR BLD CALC: 26.5 % — LOW (ref 39–50)
HGB BLD-MCNC: 8.7 G/DL — LOW (ref 13–17)
IMM GRANULOCYTES # BLD AUTO: 0.02 # — SIGNIFICANT CHANGE UP
IMM GRANULOCYTES NFR BLD AUTO: 0.4 % — SIGNIFICANT CHANGE UP (ref 0–1.5)
KETONES UR-MCNC: NEGATIVE — SIGNIFICANT CHANGE UP
LEUKOCYTE ESTERASE UR-ACNC: HIGH
LYMPHOCYTES # BLD AUTO: 0.53 K/UL — LOW (ref 1–3.3)
LYMPHOCYTES # BLD AUTO: 9.5 % — LOW (ref 13–44)
MCHC RBC-ENTMCNC: 30.4 PG — SIGNIFICANT CHANGE UP (ref 27–34)
MCHC RBC-ENTMCNC: 32.8 % — SIGNIFICANT CHANGE UP (ref 32–36)
MCV RBC AUTO: 92.7 FL — SIGNIFICANT CHANGE UP (ref 80–100)
MONOCYTES # BLD AUTO: 0.41 K/UL — SIGNIFICANT CHANGE UP (ref 0–0.9)
MONOCYTES NFR BLD AUTO: 7.3 % — SIGNIFICANT CHANGE UP (ref 2–14)
NEUTROPHILS # BLD AUTO: 4.34 K/UL — SIGNIFICANT CHANGE UP (ref 1.8–7.4)
NEUTROPHILS NFR BLD AUTO: 77.4 % — HIGH (ref 43–77)
NITRITE UR-MCNC: NEGATIVE — SIGNIFICANT CHANGE UP
NON-SQ EPI CELLS # UR AUTO: 1 — SIGNIFICANT CHANGE UP
NRBC # FLD: 0 — SIGNIFICANT CHANGE UP
PH UR: 6.5 — SIGNIFICANT CHANGE UP (ref 4.6–8)
PLATELET # BLD AUTO: 230 K/UL — SIGNIFICANT CHANGE UP (ref 150–400)
PMV BLD: 9.6 FL — SIGNIFICANT CHANGE UP (ref 7–13)
POTASSIUM SERPL-MCNC: 4.1 MMOL/L — SIGNIFICANT CHANGE UP (ref 3.5–5.3)
POTASSIUM SERPL-SCNC: 4.1 MMOL/L — SIGNIFICANT CHANGE UP (ref 3.5–5.3)
PROT SERPL-MCNC: 6.6 G/DL — SIGNIFICANT CHANGE UP (ref 6–8.3)
PROT UR-MCNC: 300 MG/DL — SIGNIFICANT CHANGE UP
RBC # BLD: 2.86 M/UL — LOW (ref 4.2–5.8)
RBC # FLD: 13.4 % — SIGNIFICANT CHANGE UP (ref 10.3–14.5)
RBC CASTS # UR COMP ASSIST: >50 — HIGH (ref 0–?)
SODIUM SERPL-SCNC: 140 MMOL/L — SIGNIFICANT CHANGE UP (ref 135–145)
SP GR SPEC: 1.01 — SIGNIFICANT CHANGE UP (ref 1–1.04)
SQUAMOUS # UR AUTO: SIGNIFICANT CHANGE UP
UROBILINOGEN FLD QL: NORMAL MG/DL — SIGNIFICANT CHANGE UP
WBC # BLD: 5.6 K/UL — SIGNIFICANT CHANGE UP (ref 3.8–10.5)
WBC # FLD AUTO: 5.6 K/UL — SIGNIFICANT CHANGE UP (ref 3.8–10.5)
WBC UR QL: >50 — HIGH (ref 0–?)

## 2017-12-28 RX ORDER — ALBUTEROL 90 UG/1
1 AEROSOL, METERED ORAL ONCE
Qty: 0 | Refills: 0 | Status: COMPLETED | OUTPATIENT
Start: 2017-12-28 | End: 2017-12-28

## 2017-12-28 RX ORDER — CEPHALEXIN 500 MG
500 CAPSULE ORAL ONCE
Qty: 0 | Refills: 0 | Status: COMPLETED | OUTPATIENT
Start: 2017-12-28 | End: 2017-12-28

## 2017-12-28 RX ORDER — ACETAMINOPHEN 500 MG
650 TABLET ORAL ONCE
Qty: 0 | Refills: 0 | Status: COMPLETED | OUTPATIENT
Start: 2017-12-28 | End: 2017-12-28

## 2017-12-28 RX ADMIN — Medication 650 MILLIGRAM(S): at 23:16

## 2017-12-28 RX ADMIN — Medication 500 MILLIGRAM(S): at 21:32

## 2017-12-28 RX ADMIN — ALBUTEROL 1 PUFF(S): 90 AEROSOL, METERED ORAL at 23:16

## 2017-12-28 NOTE — ED PROVIDER NOTE - MEDICAL DECISION MAKING DETAILS
68 yo male with hematuria; rule out retention vs uti; will obtain labs urine bladder scan uro consult --> re eval

## 2017-12-28 NOTE — ED ADULT TRIAGE NOTE - CHIEF COMPLAINT QUOTE
Patient brought to ER from [facility] for c/o blood in urine. Pt had prostate surgery about 3 to 4 weeks ago. Last time he was here for same symptoms and he needed a transfusion.

## 2017-12-28 NOTE — ED PROCEDURE NOTE - PROCEDURE ADDITIONAL DETAILS
Focused ED ultrasound of the bladder for volume measurement 11051    Indication: suspected urinary retention  Findings:  Bladder volume: 560 cc post  void  Impression: urinary retention    Procedure note and images placed in patient's chart

## 2017-12-28 NOTE — CONSULT NOTE ADULT - SUBJECTIVE AND OBJECTIVE BOX
HPI  68yo M Hx MR, HTN, CAD, s/p Pacemaker, Afib on Eliquis and ASA is from a group home who presents with gross hematuria x 1 day. Gross Hematuria associated with lower abdominal pain. Reports he is urinating in small amounts and not completely emptying his bladder. He was recently hospitalized from - for similar presentation, Smart catheter was placed for Clot retention, requiring CBI. He received 1 U PRBC’s at that time, gross hematuria subsequently resolved without any surgical intervention and discharged home. Of note, he had a cysto, TURP 1 ½ months ago with Dr. Chaves at [facility]. Passing flatus and last BM was earlier today. However, he also c/o lower extremity swelling x 3-4 days. Denies fever, chills, N/V, or back pain.  In ED: vitals Tm/c: 97.9, BP: 167/77, HR: 87, 02: 100%ra. 14-f Smart catheter was placed- draining approx.  500 ml of light pinkish- translucent urine. Hbg/Hct 8.7/26.5, stable from previous H/H on discharge of 8.4/24.9.      PAST MEDICAL & SURGICAL HISTORY:  Pacemaker  Hyperlipidemia  Hypertension  Diabetes mellitus type II  CRI (chronic renal insufficiency)  CAD (coronary artery disease)  ASHD (arteriosclerotic heart disease)  Coronary stent  Hx of CABG      MEDICATIONS  (STANDING):  Home Medications:  Artificial Tears ophthalmic solution: 1 drop(s) to each affected eye 2 times a day (15 Dec 2017 10:15)  aspirin 81 mg oral delayed release capsule: 1 tab(s) orally once a day (31 Mar 2016 19:44)  atorvastatin 20 mg oral tablet: 1 tab(s) orally once a day (at bedtime) (31 Mar 2016 19:44)  carvedilol 3.125 mg oral tablet: 1 tab(s) orally 2 times a day (31 Mar 2016 19:44)  [facility] oral capsule: 2 cap(s) orally once a day (31 Mar 2016 19:44)  dorzolamide 2% ophthalmic solution: 1 drop(s) to left eye 2 times a day (15 Dec 2017 11:16)  Eliquis 2.5 mg oral tablet: 1 tab(s) orally 2 times a day (31 Mar 2016 19:44)  ferrous sulfate 325 mg (65 mg elemental iron) oral delayed release tablet: 1 tab(s) orally once a day (15 Dec 2017 11:11)  finasteride 5 mg oral tablet: 1 tab(s) orally once a day (31 Mar 2016 19:44)  folic acid 1 mg oral tablet: 1 tab(s) orally once a day (31 Mar 2016 19:44)  furosemide 20 mg oral tablet: 1 tab(s) orally once a week, next dose  (15 Dec 2017 11:10)  levothyroxine 25 mcg (0.025 mg) oral tablet: 1 tab(s) orally once a day (15 Dec 2017 11:20)  omeprazole 20 mg oral delayed release capsule: 1 cap(s) orally once a day (15 Dec 2017 11:20)  sitaGLIPtin-metFORMIN 50 mg-1000 mg oral tablet: 1 tab(s) orally once a day (2016 19:09)  sodium bicarbonate 650 mg oral tablet: 2 tab(s) orally 2 times a day (15 Dec 2017 11:04)  sodium polystyrene sulfonate 15 g/60 mL oral and rectal suspension: 60 milliliter(s) orally every 7 days  (2016 19:08)  Symbicort 80 mcg-4.5 mcg/inh inhalation aerosol: 1 puff(s) inhaled 2 times a day (31 Mar 2016 19:44)  tamsulosin 0.4 mg oral capsule: 2 cap(s) orally once a day (at bedtime) (15 Dec 2017 11:05)  Timoptic Ocudose 0.5% ophthalmic solution: 1 drop(s) to left eye 2 times a day (15 Dec 2017 11:13)  Vitamin B-100 oral tablet: 1 tab(s) orally once a day (15 Dec 2017 11:14)  Vitamin D3 1000 intl units oral capsule: 1 cap(s) orally once a day (31 Mar 2016 19:44)      MEDICATIONS  (PRN):      FAMILY HISTORY:  Family history of chronic ischemic heart disease (Father): father  of MI at age 59      Allergies  Levaquin (Anaphylaxis; Flushing; Short breath)    Intolerances        SOCIAL HISTORY:    REVIEW OF SYSTEMS: Otherwise negative as stated in HPI    Physical Exam  Vital signs  T(C): 37.1 (17 @ 23:02), Max: 37.1 (17 @ 23:02)  HR: 77 (17 @ 23:02)  BP: 158/84 (17 @ 23:02)  SpO2: 100% (17 @ 23:02)  Wt(kg): --    Output  UOP: 500cc    Gen:  [x] NAD [] toxic    Pulm:  [x] no resp distress [] no substernal retractions  	  CV:  [] no JVD  [x] RRR    GI:  [x] Soft [x] ND [x] NT    : indwelling 14-f Smart catheter in place- draining light pinkish to clear urine  Glans Circumcised [x]Y  []N, []lesions:  Meatus Discharge []Y  [x] N,  Blood []Y [x] N  Testes  Descended [x]Y  []N,    Tender []Y  [x]N,   Epididymis Tender  []Y [x]N,    Cremasteric [x]Y  []N                        	  MSK:  Edema [x]Y []N    LABS:                        8.7    5.60  )-----------( 230      ( 28 Dec 2017 20:30 )             26.5         140  |  102  |  28<H>  ----------------------------<  94  4.1   |  26  |  1.69<H>    Ca    8.8      28 Dec 2017 20:30    TPro  6.6  /  Alb  3.8  /  TBili  0.9  /  DBili  x   /  AST  30  /  ALT  26  /  AlkPhos  151<H>  12-28      Urinalysis Basic - ( 28 Dec 2017 19:55 )    Color: RED / Appearance: HAZY / S.008 / pH: 6.5  Gluc: NEGATIVE / Ketone: NEGATIVE  / Bili: NEGATIVE / Urobili: NORMAL mg/dL   Blood: LARGE / Protein: 300 mg/dL / Nitrite: NEGATIVE   Leuk Esterase: LARGE / RBC: >50 / WBC >50   Sq Epi: OCC / Non Sq Epi: x / Bacteria: MOD        Urine Cx: Juni Chavarria

## 2017-12-28 NOTE — ED PROVIDER NOTE - NS ED ROS FT
Constitutional: no fever  Eyes: no conjunctivitis  Ears: no ear pain   Nose: no nasal congestion, Mouth/Throat: no throat pain, Neck: no stiffness  Cardiovascular: no chest pain  Chest: no cough  Gastrointestinal: + abdominal pain, no vomiting and diarrhea  MSK: no joint pain  : no dysuria, +hematuria  Skin: no rash  Neuro: no LOC

## 2017-12-28 NOTE — CONSULT NOTE ADULT - ASSESSMENT
68yo M Hx MR, HTN, CAD, s/p Pacemaker, Afib on Eliquis and ASA with prior hx of TURP from OSH, presents with one episode of gross hematuria, now resolved. Hemodynamically stable.

## 2017-12-28 NOTE — ED PROVIDER NOTE - ATTENDING CONTRIBUTION TO CARE
67m, cad, htn, dlp, afib on apixaban, MR from a grop home. was recently admitted for hematuria and uti. returns for hematuria and occasional dysuria and suprapubic pain. no f/c, n/v. no a/p otherwise. exam, vs wnl, nad. hs and lungs normal, abdo s nt nd, no suprapubic tenderness or flank tenderness. PVR>500 cc on bedside us. can urinate, is not lamberto hemturia but red colored. will place blevins, ua+ so will give abx, uro consult. per aid here from group home, cannot go to the group home with blevins so may need admission.

## 2017-12-28 NOTE — ED PROVIDER NOTE - PROGRESS NOTE DETAILS
patient was complaining of shortness of breath; much improved with albuterol treatment and tylenol got call from urology, still awaiting call back from attending in regards to admission -alma delia

## 2017-12-28 NOTE — ED ADULT NURSE NOTE - CHIEF COMPLAINT QUOTE
Patient brought to ER from [facility] for c/o blood in urine. Pt had prostate surgery about 3 to 4 weeks ago. Last time he was here for same symptoms and he needed a transfusion.

## 2017-12-28 NOTE — ED ADULT NURSE NOTE - OBJECTIVE STATEMENT
67y M A&OX4 c/o hematuria. pt noticed today that when he urinated that he had blood in his urine and came in. Pt denies pain and retention. pt states that this is the first time this has happened. pt had prostate surgery 4 weeks ago. pt noted with hematuria and Ua and urine culture sent. left 20g Iv inserted and labs collected and sent . pt is breathing unlabored and and no ectopic beats present . abdomen soft non distended at this time . pt denies any pain during palpation. pt skin shows a non blanchable pressure ulcer stage 1 at sacral area.

## 2017-12-28 NOTE — CONSULT NOTE ADULT - PROBLEM SELECTOR RECOMMENDATION 9
- Keep indwelling Smart catheter in place  - cont Flomax 0.4mg and Finasteride 5mg daily  - can d/c home with Smart to leg bag  - No acute  intervention (not requiring CBI)  - patient to f/u with private Urologist   - Discussed with Dr. Mars, Urology Attending - Keep indwelling Smart catheter in place  - F/u Urine Cx, treat UTI  - cont Flomax 0.4mg and Finasteride 5mg daily  - can d/c home with Smart to leg bag  - No acute  intervention (not requiring CBI)  - patient to f/u with private Urologist   - Discussed with Dr. Mars, Urology Attending

## 2017-12-28 NOTE — ED PROVIDER NOTE - OBJECTIVE STATEMENT
66 yo male with HTN HLD afib on elloquis pacemaker presenting with 1 day hx of hematuria associated with suprapubic achy abdominal pain. 5/10 in severity with no radiation.  did not take anything for his symptoms.  does not want anything for pain right now.  worse when lying down.  had turp about one month ago and had recent hospital stay for hematuria requiring cbi.

## 2017-12-29 DIAGNOSIS — I25.10 ATHEROSCLEROTIC HEART DISEASE OF NATIVE CORONARY ARTERY WITHOUT ANGINA PECTORIS: ICD-10-CM

## 2017-12-29 DIAGNOSIS — Z29.9 ENCOUNTER FOR PROPHYLACTIC MEASURES, UNSPECIFIED: ICD-10-CM

## 2017-12-29 DIAGNOSIS — Z95.0 PRESENCE OF CARDIAC PACEMAKER: Chronic | ICD-10-CM

## 2017-12-29 DIAGNOSIS — E11.65 TYPE 2 DIABETES MELLITUS WITH HYPERGLYCEMIA: ICD-10-CM

## 2017-12-29 DIAGNOSIS — E78.5 HYPERLIPIDEMIA, UNSPECIFIED: ICD-10-CM

## 2017-12-29 DIAGNOSIS — I10 ESSENTIAL (PRIMARY) HYPERTENSION: ICD-10-CM

## 2017-12-29 DIAGNOSIS — I48.91 UNSPECIFIED ATRIAL FIBRILLATION: ICD-10-CM

## 2017-12-29 DIAGNOSIS — E03.9 HYPOTHYROIDISM, UNSPECIFIED: ICD-10-CM

## 2017-12-29 DIAGNOSIS — R31.9 HEMATURIA, UNSPECIFIED: ICD-10-CM

## 2017-12-29 DIAGNOSIS — N30.01 ACUTE CYSTITIS WITH HEMATURIA: ICD-10-CM

## 2017-12-29 DIAGNOSIS — N18.3 CHRONIC KIDNEY DISEASE, STAGE 3 (MODERATE): ICD-10-CM

## 2017-12-29 DIAGNOSIS — J45.909 UNSPECIFIED ASTHMA, UNCOMPLICATED: ICD-10-CM

## 2017-12-29 DIAGNOSIS — H40.9 UNSPECIFIED GLAUCOMA: ICD-10-CM

## 2017-12-29 LAB
CK MB BLD-MCNC: 3.89 NG/ML — SIGNIFICANT CHANGE UP (ref 1–6.6)
CK SERPL-CCNC: 133 U/L — SIGNIFICANT CHANGE UP (ref 30–200)
TROPONIN T SERPL-MCNC: < 0.06 NG/ML — SIGNIFICANT CHANGE UP (ref 0–0.06)

## 2017-12-29 PROCEDURE — 99221 1ST HOSP IP/OBS SF/LOW 40: CPT

## 2017-12-29 PROCEDURE — 12345: CPT | Mod: GC,NC

## 2017-12-29 PROCEDURE — 99223 1ST HOSP IP/OBS HIGH 75: CPT

## 2017-12-29 PROCEDURE — 93010 ELECTROCARDIOGRAM REPORT: CPT

## 2017-12-29 RX ORDER — ATORVASTATIN CALCIUM 80 MG/1
20 TABLET, FILM COATED ORAL AT BEDTIME
Qty: 0 | Refills: 0 | Status: DISCONTINUED | OUTPATIENT
Start: 2017-12-29 | End: 2017-12-30

## 2017-12-29 RX ORDER — TIMOLOL 0.5 %
1 DROPS OPHTHALMIC (EYE)
Qty: 0 | Refills: 0 | Status: DISCONTINUED | OUTPATIENT
Start: 2017-12-29 | End: 2017-12-30

## 2017-12-29 RX ORDER — CHOLECALCIFEROL (VITAMIN D3) 125 MCG
1000 CAPSULE ORAL DAILY
Qty: 0 | Refills: 0 | Status: DISCONTINUED | OUTPATIENT
Start: 2017-12-29 | End: 2017-12-30

## 2017-12-29 RX ORDER — TAMSULOSIN HYDROCHLORIDE 0.4 MG/1
0.8 CAPSULE ORAL AT BEDTIME
Qty: 0 | Refills: 0 | Status: DISCONTINUED | OUTPATIENT
Start: 2017-12-29 | End: 2017-12-30

## 2017-12-29 RX ORDER — OMEPRAZOLE 10 MG/1
1 CAPSULE, DELAYED RELEASE ORAL
Qty: 0 | Refills: 0 | COMMUNITY

## 2017-12-29 RX ORDER — FUROSEMIDE 40 MG
20 TABLET ORAL ONCE
Qty: 0 | Refills: 0 | Status: COMPLETED | OUTPATIENT
Start: 2017-12-29 | End: 2017-12-29

## 2017-12-29 RX ORDER — ACETAMINOPHEN 500 MG
650 TABLET ORAL EVERY 6 HOURS
Qty: 0 | Refills: 0 | Status: DISCONTINUED | OUTPATIENT
Start: 2017-12-29 | End: 2017-12-30

## 2017-12-29 RX ORDER — CARVEDILOL PHOSPHATE 80 MG/1
3.12 CAPSULE, EXTENDED RELEASE ORAL EVERY 12 HOURS
Qty: 0 | Refills: 0 | Status: DISCONTINUED | OUTPATIENT
Start: 2017-12-29 | End: 2017-12-30

## 2017-12-29 RX ORDER — LEVOTHYROXINE SODIUM 125 MCG
25 TABLET ORAL DAILY
Qty: 0 | Refills: 0 | Status: DISCONTINUED | OUTPATIENT
Start: 2017-12-29 | End: 2017-12-30

## 2017-12-29 RX ORDER — SODIUM BICARBONATE 1 MEQ/ML
1300 SYRINGE (ML) INTRAVENOUS
Qty: 0 | Refills: 0 | Status: DISCONTINUED | OUTPATIENT
Start: 2017-12-29 | End: 2017-12-30

## 2017-12-29 RX ORDER — CEFTRIAXONE 500 MG/1
1 INJECTION, POWDER, FOR SOLUTION INTRAMUSCULAR; INTRAVENOUS ONCE
Qty: 0 | Refills: 0 | Status: COMPLETED | OUTPATIENT
Start: 2017-12-29 | End: 2017-12-29

## 2017-12-29 RX ORDER — LORATADINE 10 MG/1
10 TABLET ORAL DAILY
Qty: 0 | Refills: 0 | Status: DISCONTINUED | OUTPATIENT
Start: 2017-12-29 | End: 2017-12-30

## 2017-12-29 RX ORDER — DORZOLAMIDE HYDROCHLORIDE 20 MG/ML
1 SOLUTION/ DROPS OPHTHALMIC
Qty: 0 | Refills: 0 | Status: DISCONTINUED | OUTPATIENT
Start: 2017-12-29 | End: 2017-12-30

## 2017-12-29 RX ORDER — FOLIC ACID 0.8 MG
1 TABLET ORAL DAILY
Qty: 0 | Refills: 0 | Status: DISCONTINUED | OUTPATIENT
Start: 2017-12-29 | End: 2017-12-30

## 2017-12-29 RX ORDER — BUDESONIDE AND FORMOTEROL FUMARATE DIHYDRATE 160; 4.5 UG/1; UG/1
2 AEROSOL RESPIRATORY (INHALATION)
Qty: 0 | Refills: 0 | Status: DISCONTINUED | OUTPATIENT
Start: 2017-12-29 | End: 2017-12-30

## 2017-12-29 RX ORDER — MORPHINE SULFATE 50 MG/1
2 CAPSULE, EXTENDED RELEASE ORAL ONCE
Qty: 0 | Refills: 0 | Status: DISCONTINUED | OUTPATIENT
Start: 2017-12-29 | End: 2017-12-29

## 2017-12-29 RX ORDER — IPRATROPIUM/ALBUTEROL SULFATE 18-103MCG
3 AEROSOL WITH ADAPTER (GRAM) INHALATION EVERY 6 HOURS
Qty: 0 | Refills: 0 | Status: DISCONTINUED | OUTPATIENT
Start: 2017-12-29 | End: 2017-12-30

## 2017-12-29 RX ORDER — FERROUS SULFATE 325(65) MG
325 TABLET ORAL DAILY
Qty: 0 | Refills: 0 | Status: DISCONTINUED | OUTPATIENT
Start: 2017-12-29 | End: 2017-12-30

## 2017-12-29 RX ORDER — ERGOCALCIFEROL 1.25 MG/1
1 CAPSULE ORAL
Qty: 0 | Refills: 0 | COMMUNITY

## 2017-12-29 RX ORDER — CEFTRIAXONE 500 MG/1
1 INJECTION, POWDER, FOR SOLUTION INTRAMUSCULAR; INTRAVENOUS EVERY 24 HOURS
Qty: 0 | Refills: 0 | Status: DISCONTINUED | OUTPATIENT
Start: 2017-12-30 | End: 2017-12-30

## 2017-12-29 RX ORDER — ASPIRIN/CALCIUM CARB/MAGNESIUM 324 MG
81 TABLET ORAL DAILY
Qty: 0 | Refills: 0 | Status: DISCONTINUED | OUTPATIENT
Start: 2017-12-29 | End: 2017-12-30

## 2017-12-29 RX ORDER — CEFTRIAXONE 500 MG/1
INJECTION, POWDER, FOR SOLUTION INTRAMUSCULAR; INTRAVENOUS
Qty: 0 | Refills: 0 | Status: DISCONTINUED | OUTPATIENT
Start: 2017-12-29 | End: 2017-12-30

## 2017-12-29 RX ORDER — FUROSEMIDE 40 MG
1 TABLET ORAL
Qty: 0 | Refills: 0 | COMMUNITY

## 2017-12-29 RX ORDER — DOCUSATE SODIUM 100 MG
100 CAPSULE ORAL THREE TIMES A DAY
Qty: 0 | Refills: 0 | Status: DISCONTINUED | OUTPATIENT
Start: 2017-12-29 | End: 2017-12-30

## 2017-12-29 RX ORDER — APIXABAN 2.5 MG/1
2.5 TABLET, FILM COATED ORAL EVERY 12 HOURS
Qty: 0 | Refills: 0 | Status: DISCONTINUED | OUTPATIENT
Start: 2017-12-29 | End: 2017-12-30

## 2017-12-29 RX ORDER — FINASTERIDE 5 MG/1
5 TABLET, FILM COATED ORAL DAILY
Qty: 0 | Refills: 0 | Status: DISCONTINUED | OUTPATIENT
Start: 2017-12-29 | End: 2017-12-30

## 2017-12-29 RX ORDER — CARVEDILOL PHOSPHATE 80 MG/1
3.12 CAPSULE, EXTENDED RELEASE ORAL ONCE
Qty: 0 | Refills: 0 | Status: COMPLETED | OUTPATIENT
Start: 2017-12-29 | End: 2017-12-29

## 2017-12-29 RX ADMIN — ATORVASTATIN CALCIUM 20 MILLIGRAM(S): 80 TABLET, FILM COATED ORAL at 21:44

## 2017-12-29 RX ADMIN — CARVEDILOL PHOSPHATE 3.12 MILLIGRAM(S): 80 CAPSULE, EXTENDED RELEASE ORAL at 10:29

## 2017-12-29 RX ADMIN — Medication 100 MILLIGRAM(S): at 14:06

## 2017-12-29 RX ADMIN — Medication 650 MILLIGRAM(S): at 22:14

## 2017-12-29 RX ADMIN — Medication 25 MICROGRAM(S): at 11:31

## 2017-12-29 RX ADMIN — LORATADINE 10 MILLIGRAM(S): 10 TABLET ORAL at 11:32

## 2017-12-29 RX ADMIN — Medication 325 MILLIGRAM(S): at 11:31

## 2017-12-29 RX ADMIN — CEFTRIAXONE 100 GRAM(S): 500 INJECTION, POWDER, FOR SOLUTION INTRAMUSCULAR; INTRAVENOUS at 05:03

## 2017-12-29 RX ADMIN — BUDESONIDE AND FORMOTEROL FUMARATE DIHYDRATE 2 PUFF(S): 160; 4.5 AEROSOL RESPIRATORY (INHALATION) at 21:45

## 2017-12-29 RX ADMIN — APIXABAN 2.5 MILLIGRAM(S): 2.5 TABLET, FILM COATED ORAL at 19:07

## 2017-12-29 RX ADMIN — Medication 1300 MILLIGRAM(S): at 19:07

## 2017-12-29 RX ADMIN — Medication 650 MILLIGRAM(S): at 14:25

## 2017-12-29 RX ADMIN — FINASTERIDE 5 MILLIGRAM(S): 5 TABLET, FILM COATED ORAL at 11:32

## 2017-12-29 RX ADMIN — Medication 1 DROP(S): at 19:12

## 2017-12-29 RX ADMIN — Medication 3 MILLILITER(S): at 05:26

## 2017-12-29 RX ADMIN — CARVEDILOL PHOSPHATE 3.12 MILLIGRAM(S): 80 CAPSULE, EXTENDED RELEASE ORAL at 19:12

## 2017-12-29 RX ADMIN — Medication 650 MILLIGRAM(S): at 05:03

## 2017-12-29 RX ADMIN — Medication 650 MILLIGRAM(S): at 21:44

## 2017-12-29 RX ADMIN — Medication 100 MILLIGRAM(S): at 21:44

## 2017-12-29 RX ADMIN — Medication 81 MILLIGRAM(S): at 11:31

## 2017-12-29 RX ADMIN — Medication 20 MILLIGRAM(S): at 09:36

## 2017-12-29 RX ADMIN — TAMSULOSIN HYDROCHLORIDE 0.8 MILLIGRAM(S): 0.4 CAPSULE ORAL at 21:44

## 2017-12-29 RX ADMIN — Medication 650 MILLIGRAM(S): at 05:45

## 2017-12-29 RX ADMIN — MORPHINE SULFATE 2 MILLIGRAM(S): 50 CAPSULE, EXTENDED RELEASE ORAL at 23:39

## 2017-12-29 RX ADMIN — Medication 1 TABLET(S): at 21:45

## 2017-12-29 RX ADMIN — Medication 650 MILLIGRAM(S): at 14:55

## 2017-12-29 RX ADMIN — BUDESONIDE AND FORMOTEROL FUMARATE DIHYDRATE 2 PUFF(S): 160; 4.5 AEROSOL RESPIRATORY (INHALATION) at 10:32

## 2017-12-29 RX ADMIN — Medication 1000 UNIT(S): at 11:31

## 2017-12-29 RX ADMIN — Medication 1 MILLIGRAM(S): at 11:31

## 2017-12-29 RX ADMIN — DORZOLAMIDE HYDROCHLORIDE 1 DROP(S): 20 SOLUTION/ DROPS OPHTHALMIC at 19:06

## 2017-12-29 RX ADMIN — Medication 650 MILLIGRAM(S): at 00:00

## 2017-12-29 RX ADMIN — Medication 1 DROP(S): at 19:06

## 2017-12-29 NOTE — H&P ADULT - PROBLEM SELECTOR PLAN 9
on eliquis c/w eye drops last A1c 6.4%, hold sitagliptin/metformin while inpatient  FS QAC/HS with HISS last A1c 6.4%, not on meds at home, will place on cc diet  FS QAC/HS with HISS

## 2017-12-29 NOTE — CHART NOTE - NSCHARTNOTEFT_GEN_A_CORE
Notified by nurse @ 10pm that patient is complaining of chest pain. Assessed patient at bedside. Patient reports mid-sternal chest pain, started about 1 hour ago, described it at pressure-like and tight, 10/10, non-radiating, associated with mild SOB, but denied palpitation or diaphoresis. He reports having intermittent pain in the past 2 days, usually lasted for minutes to hours. VS T 97.8, /78, HR 68, RR 18 SpO2 99% in RA. On physical exam, patient appear in mild discomfort due to the pain. No Notified by nurse @ 10pm that patient is complaining of chest pain. Assessed patient at bedside. Patient reports mid-sternal chest pain, started about 1 hour ago, described it at pressure-like and tight, 10/10, non-radiating, associated with mild SOB, but denied palpitation or diaphoresis. He reports having intermittent pain in the past 2 days, usually lasted for minutes to hours. VS T 97.8, /78, HR 68, RR 18 SpO2 99% in RA. On physical exam, patient appear in mild discomfort due to the pain. Normal S1, S2, irregularly irregular, no murmurs. Lungs are CTABL, no crackles/wheezing. No JVD or LE edema.   Stat EKG was obtained which showed atrial fibrillation, without acute ST/T wave changes. No changed compared to previous EKG.  Given patient's extensive cardiac history (CAD s/p CABG x3 in 2013 and coronary stent on ASA, PPM, Afib on Eliquis), will get STAT cardiac enzyme. Notified by nurse @ 10pm that patient is complaining of chest pain. Assessed patient at bedside. Patient reports mid-sternal chest pain, started about 1 hour ago, described it at pressure-like and tight, 10/10, non-radiating, associated with mild SOB and exacerbated by taking deep breath, but denied palpitation or diaphoresis. He reports having intermittent pain in the past 2 days, usually lasted for minutes to hours. VS T 97.8, /78, HR 68, RR 18 SpO2 99% in RA. On physical exam, patient appear in mild discomfort due to the pain. Normal S1, S2, irregularly irregular, no murmurs. Lungs are CTABL, no crackles/wheezing. Reproducible pain on mid chest. No JVD or LE edema.   Stat EKG was obtained which showed atrial fibrillation, without acute ST/T wave changes. No changed compared to previous EKG.    Given patient's extensive cardiac history (CAD s/p CABG x3 in 2013 and coronary stent on ASA, PPM, Afib on Eliquis), will get STAT cardiac enzyme. Notified by nurse @ 10pm that patient is complaining of chest pain. Assessed patient at bedside. Patient reports mid-sternal chest pain, started about 1 hour ago, described it at pressure-like and tight, 10/10, non-radiating, associated with mild SOB and exacerbated by taking deep breath, but denied palpitation or diaphoresis. He reports having intermittent pain in the past 2 days, usually lasted for minutes to hours. VS T 97.8, /78, HR 68, RR 18 SpO2 99% in RA. On physical exam, patient appear in mild discomfort due to the pain. Normal S1, S2, irregularly irregular, no murmurs. Lungs are CTABL, no crackles/wheezing. Reproducible pain on mid chest. No JVD or LE edema.   Stat EKG was obtained which showed atrial fibrillation, without acute ST/T wave changes. No changed compared to previous EKG.    Given patient's extensive cardiac history (CAD s/p CABG x3 in 2013 and coronary stent on ASA, PPM, Afib on Eliquis), will get STAT cardiac enzyme.    Cardiac enzyme was negative. However, patient is still complaining of severe pain. Will give IV morphine 2mg for now and send repeat CE with AM lab.   Will continue to monitor. Notified by nurse @ 10pm that patient is complaining of chest pain. Assessed patient at bedside. Patient reports mid-sternal chest pain, started about 1 hour ago, described it at pressure-like and tight, 10/10, non-radiating, associated with mild SOB and exacerbated by taking deep breath, but denied palpitation or diaphoresis. He reports having intermittent pain in the past 2 days, usually lasted for minutes to hours. VS T 97.8, /78, HR 68, RR 18 SpO2 99% in RA. On physical exam, patient appear in mild discomfort due to the pain. Normal S1, S2, irregularly irregular, no murmurs. Lungs are CTABL, no crackles/wheezing. Reproducible pain on mid chest. No JVD. 2+ pitting edema bilaterally, worse on right. Mild tenderness to palpation on right LE.  Stat EKG was obtained which showed atrial fibrillation, without acute ST/T wave changes. No changed compared to previous EKG.    Given patient's extensive cardiac history (CAD s/p CABG x3 in 2013 and coronary stent on ASA, PPM, Afib on Eliquis), will get STAT cardiac enzyme.  Low suspicion for PE (Well's score of     Cardiac enzyme was negative. However, patient is still complaining of severe pain. Will give IV morphine 2mg for now and send repeat CE with AM lab.   Will continue to monitor. Notified by nurse @ 10pm that patient is complaining of chest pain. Assessed patient at bedside. Patient reports mid-sternal chest pain, started about 1 hour ago, described it at pressure-like and tight, 10/10, non-radiating, associated with mild SOB and exacerbated by taking deep breath, but denied palpitation or diaphoresis. He reports having intermittent pain in the past 2 days, usually lasted for minutes to hours. VS T 97.8, /78, HR 68, RR 18 SpO2 99% in RA. On physical exam, patient appear in mild discomfort due to the pain. Normal S1, S2, irregularly irregular, no murmurs. Lungs are CTABL, no crackles/wheezing. Reproducible pain on mid chest. No JVD. 2+ pitting edema bilaterally, worse on right. Mild tenderness to palpation on right LE.  Stat EKG was obtained which showed atrial fibrillation, without acute ST/T wave changes. No changed compared to previous EKG.    Given patient's extensive cardiac history (CAD s/p CABG x3 in 2013 and coronary stent on ASA, PPM, Afib on Eliquis), will get STAT cardiac enzyme.  Patient with Well's score of 3 (suspicion for DVT given right LE edema and tender to palpation on right side). Recommend order right lower extremity doppler.    Cardiac enzyme was negative. However, patient is still complaining of severe pain. Will give IV morphine 2mg for now and send repeat CE and D-dimer with AM lab.   Will continue to monitor. Notified by nurse @ 10pm that patient is complaining of chest pain. Assessed patient at bedside. Patient reports mid-sternal chest pain, started about 1 hour ago, described it at pressure-like and tight, 10/10, non-radiating, associated with mild SOB and exacerbated by taking deep breath, but denied palpitation or diaphoresis. He reports having intermittent pain in the past 2 days, usually lasted for minutes to hours. VS T 97.8, /78, HR 68, RR 18 SpO2 99% in RA. On physical exam, patient appear in mild discomfort due to the pain. Normal S1, S2, irregularly irregular, no murmurs. Lungs are CTABL, no crackles/wheezing. Reproducible pain on mid chest. No JVD. 2+ pitting edema bilaterally, worse on right. Mild tenderness to palpation on right LE.  Stat EKG was obtained which showed atrial fibrillation, without acute ST/T wave changes. No changed compared to previous EKG.    Given patient's extensive cardiac history (CAD s/p CABG x3 in 2013 and coronary stent on ASA, PPM, Afib on Eliquis), will get STAT cardiac enzyme.    Cardiac enzyme was negative. However, patient is still complaining of severe pain. Will give IV morphine 2mg for now and send repeat CE and D-dimer (to rule out PE) with AM lab.   Will continue to monitor. Notified by nurse @ 10pm that patient is complaining of chest pain. Assessed patient at bedside. Patient reports mid-sternal chest pain, started about 1 hour ago, described it at pressure-like and tight, 10/10, non-radiating, associated with mild SOB and exacerbated by taking deep breath, but denied palpitation or diaphoresis. He reports having intermittent pain in the past 2 days, usually lasted for minutes to hours. VS T 97.8, /78, HR 68, RR 18 SpO2 99% in RA. On physical exam, patient appear in mild discomfort due to the pain. Normal S1, S2, irregularly irregular, no murmurs. Lungs are CTABL, no crackles/wheezing. Reproducible pain on mid chest. No JVD. 2+ pitting edema bilaterally, worse on right. Mild tenderness to palpation on right LE.  Stat EKG was obtained which showed atrial fibrillation, without acute ST/T wave changes. No changed compared to previous EKG.    Given patient's extensive cardiac history (CAD s/p CABG x3 in 2013 and coronary stent on ASA, PPM, Afib on Eliquis), will get STAT cardiac enzyme.    Cardiac enzyme was negative. However, patient is still complaining of severe pain. Will give IV morphine 2mg for now and send repeat CE with AM lab.   Will continue to monitor.

## 2017-12-29 NOTE — H&P ADULT - PROBLEM SELECTOR PLAN 8
last A1c 6.4%, hold sitagliptin/metformin while inpatient  FS QAC/HS with HISS stable CKD, renally dose meds, avoid nephrotoxins   c/w sodium bicarb

## 2017-12-29 NOTE — PROGRESS NOTE ADULT - SUBJECTIVE AND OBJECTIVE BOX
Patient is a 67y old  Male who presents with a chief complaint of hematuria (29 Dec 2017 04:48)      SUBJECTIVE / OVERNIGHT EVENTS:  Patient admitted for hematuria, urinary retention early this AM.  Briefly, patient is a 67 yoM who presents from a group home ([facility]) with complaints of hematuria.  Approximately 8 weeks ago patient had a TURP via an outpatient private urologist.  Course complicated by several episodes of urinary retention and hematuria with clots discovered.  Most recent admission was  -  for hematuria, UTI, and urinary clots requiring CBI.  Of note, patient is on xarelto for atrial fibrillation.    Yesterday patient had an episode of hematuria for which he was brought to the hospital.  He did not have any other urinary symptoms, specifically denying dysuria, purulent discharge, or oligouria.  The group home closely monitors his urine output, and per aide at bedside, he had no decrease in urinary amount.  His only other symptom was bilateral ankle swelling, which has occurred in the past when he has had hematuria.    This AM patient has no complaints.  The bedside aide from the facility reports that his ankle swelling is significantly improved.  The urine draining from the blevins catheter is clear.      MEDICATIONS  (STANDING):  apixaban 2.5 milliGRAM(s) Oral every 12 hours  artificial tears (preservative free) Ophthalmic Solution 1 Drop(s) Both EYES two times a day  aspirin enteric coated 81 milliGRAM(s) Oral daily  atorvastatin 20 milliGRAM(s) Oral at bedtime  buDESOnide  80 MICROgram(s)/formoterol 4.5 MICROgram(s) Inhaler 2 Puff(s) Inhalation two times a day  carvedilol 3.125 milliGRAM(s) Oral every 12 hours  cefTRIAXone   IVPB      cholecalciferol 1000 Unit(s) Oral daily  docusate sodium 100 milliGRAM(s) Oral three times a day  dorzolamide 2% Ophthalmic Solution 1 Drop(s) Left EYE two times a day  ferrous    sulfate 325 milliGRAM(s) Oral daily  finasteride 5 milliGRAM(s) Oral daily  folic acid 1 milliGRAM(s) Oral daily  furosemide    Tablet 20 milliGRAM(s) Oral once  levothyroxine 25 MICROGram(s) Oral daily  loratadine 10 milliGRAM(s) Oral daily  sodium bicarbonate 1300 milliGRAM(s) Oral two times a day  tamsulosin 0.8 milliGRAM(s) Oral at bedtime  timolol 0.5% Solution 1 Drop(s) Left EYE two times a day  vitamin B complex with vitamin C 1 Tablet(s) Oral daily      MEDICATIONS  (PRN):  acetaminophen   Tablet. 650 milliGRAM(s) Oral every 6 hours PRN Mild Pain (1 - 3)  ALBUTerol/ipratropium for Nebulization 3 milliLiter(s) Nebulizer every 6 hours PRN Shortness of Breath and/or Wheezing      Vital Signs Last 24 Hrs  T(C): 36.7 (29 Dec 2017 05:12), Max: 37.1 (28 Dec 2017 23:02)  T(F): 98 (29 Dec 2017 05:12), Max: 98.7 (28 Dec 2017 23:02)  HR: 63 (29 Dec 2017 05:27) (63 - 87)  BP: 126/71 (29 Dec 2017 05:12) (126/71 - 178/90)  BP(mean): --  RR: 18 (29 Dec 2017 05:12) (17 - 18)  SpO2: 99% (29 Dec 2017 05:27) (97% - 100%)      I&O's Summary    28 Dec 2017 07:01  -  29 Dec 2017 07:00  --------------------------------------------------------  IN: 0 mL / OUT: 2400 mL / NET: -2400 mL      PHYSICAL EXAM:  GENERAL:  Well-appearing, sleeping comfortably  EYES:  EOMI, PERRLA  ENMT:  Adentulous, MMM  NECK:  ROM intact  CHEST/LUNG:  Clear to auscultation bilaterally  HEART:  Irregularly irregular rhythm, no appreciated murmurs  ABDOMEN:  +BS, NT/D  EXTREMITIES:  1-3 mm pitting edema bilateral LE extending to midcalf  PSYCH:  A&Ox3, answers questions appropriately  NEUROLOGY:  No focal deficits noted  SKIN:  Intact to gross observation      LABS:                        8.7    5.60  )-----------( 230      ( 28 Dec 2017 20:30 )             26.5     12-28    140  |  102  |  28<H>  ----------------------------<  94  4.1   |  26  |  1.69<H>    Ca    8.8      28 Dec 2017 20:30    TPro  6.6  /  Alb  3.8  /  TBili  0.9  /  DBili  x   /  AST  30  /  ALT  26  /  AlkPhos  151<H>  12-28    LIVER FUNCTIONS - ( 28 Dec 2017 20:30 )  Alb: 3.8 g/dL / Pro: 6.6 g/dL / ALK PHOS: 151 u/L / ALT: 26 u/L / AST: 30 u/L / GGT: x           Urinalysis Basic - ( 28 Dec 2017 19:55 )    Color: RED / Appearance: HAZY / S.008 / pH: 6.5  Gluc: NEGATIVE / Ketone: NEGATIVE  / Bili: NEGATIVE / Urobili: NORMAL mg/dL   Blood: LARGE / Protein: 300 mg/dL / Nitrite: NEGATIVE   Leuk Esterase: LARGE / RBC: >50 / WBC >50   Sq Epi: OCC / Non Sq Epi: x / Bacteria: MOD      RADIOLOGY & ADDITIONAL TESTS:  Imaging Personally Reviewed: YES    Consultant(s) Notes Reviewed: YES    Care Discussed with Consultants/Other Providers: YES      Dillon Bae MD PGY-1  Department of Medicine  [phone]

## 2017-12-29 NOTE — H&P ADULT - PROBLEM SELECTOR PLAN 2
secondary to BPH s/p TURP  blevins in place, will need SW consult in AM in regards to placement  c/w tamsulosin, finasteride change abx to ceftriaxone while inpatient (prior UCx resistant to first and second gen cephalosporin)  f/u UCx change abx to ceftriaxone while inpatient (prior UCx resistant to first and second gen cephalosporin, can change back to PO on discarge)  f/u UCx

## 2017-12-29 NOTE — H&P ADULT - ATTENDING COMMENTS
Problem #11 Asthma  -c/w symbicort, albuterol PRN Problem #11 Asthma  -c/w symbicort, albuterol PRN    Problem #12 Normocytic anemia  -H/H at baseline, likely secondary to recurrent hematuria  -monitor/trend

## 2017-12-29 NOTE — PROGRESS NOTE ADULT - PROBLEM SELECTOR PLAN 2
- In AFib on exam this AM  - Adequately rate controlled  - Continue with apixaban 2.5 q12, carvedilol 3.125 q12

## 2017-12-29 NOTE — H&P ADULT - HISTORY OF PRESENT ILLNESS
67 -year-old male with MR, HTN, CAD s/p CABG x3 in 2013 and coronary stent on ASA, PPM, Afib on Eliquis, CKD3, asthma, hypothyroidism, BPH s/p TURP, p/w hematuria     In the ED VS:  98.7  73-87  158-178/75-90  17-18  %RA, received cephalexin 500mg PO x1, acetaminophen 650mg PO x1, albuterol x1 inhalation 67 -year-old male with MR, HTN, CAD s/p CABG x3 in 2013 and coronary stent on ASA, PPM, Afib on Eliquis, CKD3, asthma, hypothyroidism, BPH s/p TURP, presenting from [facility] ([facility]) with hematuria .    In the ED VS:  98.7  73-87  158-178/75-90  17-18  %RA, received cephalexin 500mg PO x1, acetaminophen 650mg PO x1, albuterol x1 inhalation 67 -year-old male with MR, HTN, CAD s/p CABG x3 in 2013 and coronary stent on ASA, PPM, Afib on Eliquis, CKD3, asthma, hypothyroidism, BPH s/p TURP, presenting from [facility] [place] ([facility]) with hematuria.  Blevins catheter was placed in the ED, draining bloody urine, seen by urology.  Patient was not able to return to the group home with a blevins catheter in place therefore was admitted to medicine.  Patient denies fevers, chills, chest pain, palpitations, nausea, vomiting, diarrhea, constipation, dysuria, malodorous urine, or increased frequency of urination.  He reports mild lower abdominal pain, non-radiating.      In the ED VS:  98.7  73-87  158-178/75-90  17-18  %RA, received cephalexin 500mg PO x1, acetaminophen 650mg PO x1, albuterol x1 inhalation 67 -year-old male with MR, HTN, CAD s/p CABG x3 in 2013, PPM, and coronary stent on ASA, PPM, Afib on Eliquis, CKD3, asthma, hypothyroidism, BPH s/p TURP, presenting from group home ([facility]) with hematuria.  Blevins catheter was placed in the ED, draining bloody urine, seen by urology.  Patient was not able to return to the group home with a blevins catheter in place therefore was admitted to medicine.  Patient denies fevers, chills, chest pain, palpitations, nausea, vomiting, diarrhea, constipation, dysuria, malodorous urine, or increased frequency of urination.  He reports mild lower abdominal pain, non-radiating.      In the ED VS:  98.7  73-87  158-178/75-90  17-18  %RA, received cephalexin 500mg PO x1, acetaminophen 650mg PO x1, albuterol x1 inhalation 67 -year-old male with MR, HTN, CAD s/p CABG x3 in 2013, PPM, and coronary stent on ASA, PPM, Afib on Eliquis, CKD3, asthma, hypothyroidism, BPH s/p TURP, presenting from group home ([facility]) with hematuria.  Blevins catheter was placed in the ED for PVR>500, draining bloody urine, seen by urology.  Patient was not able to return to the group home with a blevins catheter in place therefore was admitted to medicine.  Patient denies fevers, chills, chest pain, palpitations, nausea, vomiting, diarrhea, constipation, dysuria, malodorous urine, or increased frequency of urination.  He reports mild lower abdominal pain, non-radiating.      In the ED VS:  98.7  73-87  158-178/75-90  17-18  %RA, received cephalexin 500mg PO x1, acetaminophen 650mg PO x1, albuterol x1 inhalation

## 2017-12-29 NOTE — H&P ADULT - PROBLEM SELECTOR PLAN 3
c/w statin secondary to BPH s/p TURP  blevins in place, will need SW consult in AM in regards to placement  c/w tamsulosin, finasteride secondary to BPH s/p TURP  PVR>500 per ED note, blevins placed in ED  blevins in place, will need SW consult in AM in regards to placement  c/w tamsulosin, finasteride

## 2017-12-29 NOTE — PROGRESS NOTE ADULT - ASSESSMENT
· Assessment		  67 -year-old male with MR, HTN, CAD s/p CABG x3 in 2013 and coronary stent on ASA, PPM, Afib on Eliquis, CKD3, asthma, hypothyroidism, BPH, who has had a recent TURP (~8 weeks ago).  Patient has had multiple episodes of hematuria, urinary retention 2/2 obstruction 2/2 clots, who was re-admitted following hematuria and found to have a UTI.

## 2017-12-29 NOTE — H&P ADULT - NSHPSOCIALHISTORY_GEN_ALL_CORE
Lives at group home [facility]  Eats regular diet  No alcohol, tobacco, illicit drug use

## 2017-12-29 NOTE — H&P ADULT - NSHPREVIEWOFSYSTEMS_GEN_ALL_CORE
REVIEW OF SYSTEMS:    CONSTITUTIONAL: No weakness, fevers or chills  EYES/ENT: No visual changes;  No dysphagia  NECK: No pain or stiffness  RESPIRATORY: No cough, wheezing, hemoptysis; No shortness of breath  CARDIOVASCULAR: No chest pain or palpitations; No lower extremity edema  GASTROINTESTINAL:  Minimal abdominal pain, no epigastric pain. No nausea, vomiting, or hematemesis; No diarrhea or constipation. No melena or hematochezia.  GENITOURINARY: No dysuria, frequency, malodor; (+) hematuria  NEUROLOGICAL: No numbness or weakness  SKIN: No itching, burning, rashes, or lesions   All other review of systems is negative unless indicated above.

## 2017-12-29 NOTE — H&P ADULT - ASSESSMENT
67 -year-old male with MR, HTN, CAD s/p CABG x3 in 2013 and coronary stent on ASA, PPM, Afib on Eliquis, CKD3, asthma, hypothyroidism, BPH s/p TURP, p/w hematuria 67 -year-old male with MR, HTN, CAD s/p CABG x3 in 2013 and coronary stent on ASA, PPM, Afib on Eliquis, CKD3, asthma, hypothyroidism, BPH s/p TURP, p/w hematuria fd to have a UTI

## 2017-12-29 NOTE — H&P ADULT - PROBLEM SELECTOR PROBLEM 9
Need for prophylactic measure Glaucoma Type 2 diabetes mellitus with hyperglycemia, without long-term current use of insulin

## 2017-12-29 NOTE — H&P ADULT - NSHPPHYSICALEXAM_GEN_ALL_CORE
PHYSICAL EXAM:  GENERAL: NAD, well-developed, well-nourished  HEAD:  Atraumatic, Normocephalic  EYES: EOMI, PERRLA, conjunctiva and sclera clear  NECK: Supple, No JVD  CHEST/LUNG: Clear to auscultation bilaterally; No wheezes, rales or rhonchi  HEART: Regular rate and rhythm; No murmurs, rubs, or gallops, (+)S1, S2  ABDOMEN: Soft, Nondistended; Normal Bowel sounds; mild TTP periumbilically to deep palpation  : blevins catheter draining clear yellow urine (per aide at bedside was pink earlier)  EXTREMITIES:  2+ Peripheral Pulses, No clubbing, cyanosis; bilateral 2+ pitting edema  PSYCH: normal mood and affect  NEUROLOGY: AAOx3, non-focal  SKIN: No rashes or lesions PHYSICAL EXAM:  GENERAL: NAD, well-developed, well-nourished  HEAD:  Atraumatic, Normocephalic  EYES: EOMI, PERRLA, conjunctiva and sclera clear  ENT: edentulous   NECK: Supple, No JVD  CHEST/LUNG: Clear to auscultation bilaterally; No wheezes, rales or rhonchi  HEART: Regular rate and rhythm; No murmurs, rubs, or gallops, (+)S1, S2  ABDOMEN: Soft, Nondistended; Normal Bowel sounds; mild TTP periumbilically to deep palpation  : blevins catheter draining clear yellow urine (per aide at bedside was pink earlier)  EXTREMITIES:  2+ Peripheral Pulses, No clubbing, cyanosis; bilateral 2+ pitting edema  PSYCH: normal mood and affect  NEUROLOGY: AAOx3, non-focal  SKIN: small amount of erythema on anterior shin RLE, linear excoriations on RLE as well

## 2017-12-29 NOTE — H&P ADULT - NSHPLABSRESULTS_GEN_ALL_CORE
140  |  102  |  28<H>  ----------------------------<  94  4.1   |  26  |  1.69<H>    Ca    8.8      28 Dec 2017 20:30    TPro  6.6  /  Alb  3.8  /  TBili  0.9  /  DBili  x   /  AST  30  /  ALT  26  /  AlkPhos  151<H>                          8.7    5.60  )-----------( 230      ( 28 Dec 2017 20:30 )             26.5     LIVER FUNCTIONS - ( 28 Dec 2017 20:30 )  Alb: 3.8 g/dL / Pro: 6.6 g/dL / ALK PHOS: 151 u/L / ALT: 26 u/L / AST: 30 u/L / GGT: x           Urinalysis Basic - ( 28 Dec 2017 19:55 )  Color: RED / Appearance: HAZY / S.008 / pH: 6.5  Gluc: NEGATIVE / Ketone: NEGATIVE  / Bili: NEGATIVE / Urobili: NORMAL mg/dL   Blood: LARGE / Protein: 300 mg/dL / Nitrite: NEGATIVE   Leuk Esterase: LARGE / RBC: >50 / WBC >50   Sq Epi: OCC / Non Sq Epi: x / Bacteria: MOD     140  |  102  |  28<H>  ----------------------------<  94  4.1   |  26  |  1.69<H>    Ca    8.8      28 Dec 2017 20:30    TPro  6.6  /  Alb  3.8  /  TBili  0.9  /  DBili  x   /  AST  30  /  ALT  26  /  AlkPhos  151<H>                          8.7    5.60  )-----------( 230      ( 28 Dec 2017 20:30 )             26.5     LIVER FUNCTIONS - ( 28 Dec 2017 20:30 )  Alb: 3.8 g/dL / Pro: 6.6 g/dL / ALK PHOS: 151 u/L / ALT: 26 u/L / AST: 30 u/L / GGT: x           Urinalysis Basic - ( 28 Dec 2017 19:55 )  Color: RED / Appearance: HAZY / S.008 / pH: 6.5  Gluc: NEGATIVE / Ketone: NEGATIVE  / Bili: NEGATIVE / Urobili: NORMAL mg/dL   Blood: LARGE / Protein: 300 mg/dL / Nitrite: NEGATIVE   Leuk Esterase: LARGE / RBC: >50 / WBC >50   Sq Epi: OCC / Non Sq Epi: x / Bacteria: MOD    EKG personally reviewed - 76bpm afib/flutter, QTc 500ms

## 2017-12-29 NOTE — H&P ADULT - PROBLEM SELECTOR PROBLEM 8
Type 2 diabetes mellitus with hyperglycemia, without long-term current use of insulin CKD (chronic kidney disease) stage 3, GFR 30-59 ml/min

## 2017-12-29 NOTE — H&P ADULT - PROBLEM SELECTOR PLAN 1
appreciate urology note  outpatient urology f/u appreciate urology note  currently resolved, clear yellow urine in blevins  consider TOV this AM given unable to return to group home with blevins, would benefit from speaking with outpatient urologist to ensure close outpatient f/u

## 2017-12-29 NOTE — H&P ADULT - PROBLEM SELECTOR PLAN 7
stable CKD, renally dose meds, avoid nephrotoxins   c/w sodium bicarb c/w aspirin, carvedilol, atorvastatin c/w aspirin, carvedilol, atorvastatin, will give lasix 20mg PO x1 now for LE edema and due for lasix on Fridays

## 2017-12-29 NOTE — PROGRESS NOTE ADULT - PROBLEM SELECTOR PLAN 8
- On apixaban for Afib, can continue  - Dispo:  Pending discussion with private urologist regarding blevins catheter need.  Per aide from group home at bedside, patient cannot return to group home with blevins catheter in place.    Dillon Bae PGY-1  Department of Medicine  [phone]

## 2017-12-29 NOTE — H&P ADULT - PROBLEM SELECTOR PLAN 5
c/w carvedilol c/w levothyroxine   check TSH with AM labs c/w levothyroxine   check TSH with next blood draw

## 2017-12-29 NOTE — PROGRESS NOTE ADULT - PROBLEM SELECTOR PLAN 1
- Hematuria likely 2/2 recent TURP  - Urine is now clear; blevins catheter in place; total drained since placement is 2.4L  - Urology saw patient in ER last night, appreciate recommendations:      - Continue finasteride, tamsulosin      - No indication for CBI at this point  - Will f/u urinary cultures to optimize ABx management; currently on ceftriaxone 1g q24  - Will discuss removing blevins with urology as patient cannot return to his group home with a blevins in place - Hematuria likely 2/2 recent TURP  - Urine is now clear; blevins catheter in place; total drained since placement is 2.4L  - Urology saw patient in ER last night, appreciate recommendations:      - Continue finasteride, tamsulosin      - No indication for CBI at this point  - Will f/u urinary cultures to optimize ABx management; currently on ceftriaxone 1g q24  - Will attempt TOV with post-void residual via bladder scan this afternoon  - Have attempted to contact Dr. Chaves (urologist who performed TURP), unsuccessful x3.  According to house urology PA, patient's HCP has contact information, so will attempt to get contact information once HCP is here this afternoon.

## 2017-12-30 ENCOUNTER — TRANSCRIPTION ENCOUNTER (OUTPATIENT)
Age: 67
End: 2017-12-30

## 2017-12-30 VITALS
DIASTOLIC BLOOD PRESSURE: 84 MMHG | HEART RATE: 85 BPM | OXYGEN SATURATION: 100 % | SYSTOLIC BLOOD PRESSURE: 172 MMHG | RESPIRATION RATE: 18 BRPM

## 2017-12-30 DIAGNOSIS — R07.9 CHEST PAIN, UNSPECIFIED: ICD-10-CM

## 2017-12-30 LAB
BACTERIA UR CULT: SIGNIFICANT CHANGE UP
BASOPHILS # BLD AUTO: 0.06 K/UL — SIGNIFICANT CHANGE UP (ref 0–0.2)
BASOPHILS NFR BLD AUTO: 1.6 % — SIGNIFICANT CHANGE UP (ref 0–2)
BUN SERPL-MCNC: 25 MG/DL — HIGH (ref 7–23)
CALCIUM SERPL-MCNC: 8.6 MG/DL — SIGNIFICANT CHANGE UP (ref 8.4–10.5)
CHLORIDE SERPL-SCNC: 104 MMOL/L — SIGNIFICANT CHANGE UP (ref 98–107)
CK MB BLD-MCNC: 3.45 NG/ML — SIGNIFICANT CHANGE UP (ref 1–6.6)
CK SERPL-CCNC: 106 U/L — SIGNIFICANT CHANGE UP (ref 30–200)
CO2 SERPL-SCNC: 27 MMOL/L — SIGNIFICANT CHANGE UP (ref 22–31)
CREAT SERPL-MCNC: 1.54 MG/DL — HIGH (ref 0.5–1.3)
EOSINOPHIL # BLD AUTO: 0.26 K/UL — SIGNIFICANT CHANGE UP (ref 0–0.5)
EOSINOPHIL NFR BLD AUTO: 6.9 % — HIGH (ref 0–6)
GLUCOSE SERPL-MCNC: 71 MG/DL — SIGNIFICANT CHANGE UP (ref 70–99)
HCT VFR BLD CALC: 26.2 % — LOW (ref 39–50)
HGB BLD-MCNC: 8.7 G/DL — LOW (ref 13–17)
IMM GRANULOCYTES # BLD AUTO: 0.01 # — SIGNIFICANT CHANGE UP
IMM GRANULOCYTES NFR BLD AUTO: 0.3 % — SIGNIFICANT CHANGE UP (ref 0–1.5)
LYMPHOCYTES # BLD AUTO: 0.52 K/UL — LOW (ref 1–3.3)
LYMPHOCYTES # BLD AUTO: 13.9 % — SIGNIFICANT CHANGE UP (ref 13–44)
MAGNESIUM SERPL-MCNC: 1.9 MG/DL — SIGNIFICANT CHANGE UP (ref 1.6–2.6)
MCHC RBC-ENTMCNC: 31.3 PG — SIGNIFICANT CHANGE UP (ref 27–34)
MCHC RBC-ENTMCNC: 33.2 % — SIGNIFICANT CHANGE UP (ref 32–36)
MCV RBC AUTO: 94.2 FL — SIGNIFICANT CHANGE UP (ref 80–100)
MONOCYTES # BLD AUTO: 0.39 K/UL — SIGNIFICANT CHANGE UP (ref 0–0.9)
MONOCYTES NFR BLD AUTO: 10.4 % — SIGNIFICANT CHANGE UP (ref 2–14)
NEUTROPHILS # BLD AUTO: 2.51 K/UL — SIGNIFICANT CHANGE UP (ref 1.8–7.4)
NEUTROPHILS NFR BLD AUTO: 66.9 % — SIGNIFICANT CHANGE UP (ref 43–77)
NRBC # FLD: 0 — SIGNIFICANT CHANGE UP
PHOSPHATE SERPL-MCNC: 3.6 MG/DL — SIGNIFICANT CHANGE UP (ref 2.5–4.5)
PLATELET # BLD AUTO: 215 K/UL — SIGNIFICANT CHANGE UP (ref 150–400)
PMV BLD: 9.9 FL — SIGNIFICANT CHANGE UP (ref 7–13)
POTASSIUM SERPL-MCNC: 4.1 MMOL/L — SIGNIFICANT CHANGE UP (ref 3.5–5.3)
POTASSIUM SERPL-SCNC: 4.1 MMOL/L — SIGNIFICANT CHANGE UP (ref 3.5–5.3)
RBC # BLD: 2.78 M/UL — LOW (ref 4.2–5.8)
RBC # FLD: 13.6 % — SIGNIFICANT CHANGE UP (ref 10.3–14.5)
SODIUM SERPL-SCNC: 141 MMOL/L — SIGNIFICANT CHANGE UP (ref 135–145)
SPECIMEN SOURCE: SIGNIFICANT CHANGE UP
TROPONIN T SERPL-MCNC: < 0.06 NG/ML — SIGNIFICANT CHANGE UP (ref 0–0.06)
WBC # BLD: 3.75 K/UL — LOW (ref 3.8–10.5)
WBC # FLD AUTO: 3.75 K/UL — LOW (ref 3.8–10.5)

## 2017-12-30 PROCEDURE — 99239 HOSP IP/OBS DSCHRG MGMT >30: CPT

## 2017-12-30 RX ORDER — ACETAMINOPHEN 500 MG
1 TABLET ORAL
Qty: 0 | Refills: 0 | COMMUNITY

## 2017-12-30 RX ORDER — PANTOPRAZOLE SODIUM 20 MG/1
1 TABLET, DELAYED RELEASE ORAL
Qty: 0 | Refills: 0 | COMMUNITY

## 2017-12-30 RX ORDER — CARVEDILOL PHOSPHATE 80 MG/1
1 CAPSULE, EXTENDED RELEASE ORAL
Qty: 60 | Refills: 0
Start: 2017-12-30 | End: 2018-01-28

## 2017-12-30 RX ORDER — CARVEDILOL PHOSPHATE 80 MG/1
1 CAPSULE, EXTENDED RELEASE ORAL
Qty: 60 | Refills: 0 | OUTPATIENT
Start: 2017-12-30 | End: 2018-01-28

## 2017-12-30 RX ORDER — CARVEDILOL PHOSPHATE 80 MG/1
6.25 CAPSULE, EXTENDED RELEASE ORAL EVERY 12 HOURS
Qty: 0 | Refills: 0 | Status: DISCONTINUED | OUTPATIENT
Start: 2017-12-30 | End: 2017-12-30

## 2017-12-30 RX ADMIN — CEFTRIAXONE 100 GRAM(S): 500 INJECTION, POWDER, FOR SOLUTION INTRAMUSCULAR; INTRAVENOUS at 08:43

## 2017-12-30 RX ADMIN — APIXABAN 2.5 MILLIGRAM(S): 2.5 TABLET, FILM COATED ORAL at 06:01

## 2017-12-30 RX ADMIN — DORZOLAMIDE HYDROCHLORIDE 1 DROP(S): 20 SOLUTION/ DROPS OPHTHALMIC at 06:06

## 2017-12-30 RX ADMIN — Medication 1300 MILLIGRAM(S): at 17:12

## 2017-12-30 RX ADMIN — CARVEDILOL PHOSPHATE 3.12 MILLIGRAM(S): 80 CAPSULE, EXTENDED RELEASE ORAL at 06:01

## 2017-12-30 RX ADMIN — Medication 81 MILLIGRAM(S): at 11:05

## 2017-12-30 RX ADMIN — Medication 1 DROP(S): at 06:04

## 2017-12-30 RX ADMIN — Medication 100 MILLIGRAM(S): at 14:14

## 2017-12-30 RX ADMIN — Medication 1 DROP(S): at 17:15

## 2017-12-30 RX ADMIN — Medication 1300 MILLIGRAM(S): at 06:01

## 2017-12-30 RX ADMIN — LORATADINE 10 MILLIGRAM(S): 10 TABLET ORAL at 11:04

## 2017-12-30 RX ADMIN — Medication 25 MICROGRAM(S): at 06:01

## 2017-12-30 RX ADMIN — Medication 1 MILLIGRAM(S): at 11:06

## 2017-12-30 RX ADMIN — BUDESONIDE AND FORMOTEROL FUMARATE DIHYDRATE 2 PUFF(S): 160; 4.5 AEROSOL RESPIRATORY (INHALATION) at 11:03

## 2017-12-30 RX ADMIN — FINASTERIDE 5 MILLIGRAM(S): 5 TABLET, FILM COATED ORAL at 11:06

## 2017-12-30 RX ADMIN — APIXABAN 2.5 MILLIGRAM(S): 2.5 TABLET, FILM COATED ORAL at 17:12

## 2017-12-30 RX ADMIN — DORZOLAMIDE HYDROCHLORIDE 1 DROP(S): 20 SOLUTION/ DROPS OPHTHALMIC at 17:13

## 2017-12-30 RX ADMIN — Medication 325 MILLIGRAM(S): at 11:04

## 2017-12-30 RX ADMIN — Medication 1000 UNIT(S): at 11:05

## 2017-12-30 RX ADMIN — Medication 100 MILLIGRAM(S): at 06:01

## 2017-12-30 RX ADMIN — CARVEDILOL PHOSPHATE 6.25 MILLIGRAM(S): 80 CAPSULE, EXTENDED RELEASE ORAL at 17:16

## 2017-12-30 RX ADMIN — Medication 1 DROP(S): at 17:14

## 2017-12-30 RX ADMIN — Medication 1 TABLET(S): at 11:05

## 2017-12-30 RX ADMIN — MORPHINE SULFATE 2 MILLIGRAM(S): 50 CAPSULE, EXTENDED RELEASE ORAL at 00:09

## 2017-12-30 RX ADMIN — Medication 1 DROP(S): at 06:06

## 2017-12-30 NOTE — DISCHARGE NOTE ADULT - MEDICATION SUMMARY - MEDICATIONS TO STOP TAKING
I will STOP taking the medications listed below when I get home from the hospital:    sulfamethoxazole-trimethoprim 400 mg-80 mg oral tablet  -- 1 tab(s) by mouth 2 times a day    Protonix 40 mg oral delayed release tablet  -- 1 tab(s) by mouth once a day

## 2017-12-30 NOTE — DISCHARGE NOTE ADULT - HOSPITAL COURSE
67 -year-old male with MR, HTN, CAD s/p CABG x3 in 2013, PPM, and coronary stent on ASA, PPM, Afib on Eliquis, CKD3, asthma, hypothyroidism, BPH s/p TURP, presenting from group home ([facility]) with hematuria.  Blevins catheter was placed in the ED for PVR>500, draining bloody urine, seen by urology.  Patient was not able to return to the group home with a blevins catheter in place therefore was admitted to medicine.  Patient denies fevers, chills, chest pain, palpitations, nausea, vomiting, diarrhea, constipation, dysuria, malodorous urine, or increased frequency of urination.  He reports mild lower abdominal pain, non-radiating.      In the ED VS:  98.7  73-87  158-178/75-90  17-18  %RA, received cephalexin 500mg PO x1, acetaminophen 650mg PO x1, albuterol x1 inhalation    Hospital Course:    Pt was admitted to the medicine service. Urology was consulted and pt had a Blevins placed in the ED. Urine culture was sent and pt was started on Ceftriaxone empirically. Urine culture returned negative. Blevins was d/konrad on 12/29 and pt passed TOV successfully without significant urinary retention on post-void bladder scan. Pt's BP was slightly elevated and his Coreg dose was increased from 3.125mg to 6.25mg. Pt will be discharged back home to his group home.

## 2017-12-30 NOTE — PROGRESS NOTE ADULT - SUBJECTIVE AND OBJECTIVE BOX
Maggy Denton  PGY-2 Internal Medicine  [facility]: [id number]  NS: [phone]    Patient is a 67y old  Male who presents with a chief complaint of hematuria (29 Dec 2017 04:48)      INTERVAL HPI/OVERNIGHT EVENTS:    MEDICATIONS (STANDING):  apixaban 2.5 milliGRAM(s) Oral every 12 hours  artificial tears (preservative free) Ophthalmic Solution 1 Drop(s) Both EYES two times a day  aspirin enteric coated 81 milliGRAM(s) Oral daily  atorvastatin 20 milliGRAM(s) Oral at bedtime  buDESOnide  80 MICROgram(s)/formoterol 4.5 MICROgram(s) Inhaler 2 Puff(s) Inhalation two times a day  carvedilol 3.125 milliGRAM(s) Oral every 12 hours  cefTRIAXone   IVPB      cefTRIAXone   IVPB 1 Gram(s) IV Intermittent every 24 hours  cholecalciferol 1000 Unit(s) Oral daily  docusate sodium 100 milliGRAM(s) Oral three times a day  dorzolamide 2% Ophthalmic Solution 1 Drop(s) Left EYE two times a day  ferrous    sulfate 325 milliGRAM(s) Oral daily  finasteride 5 milliGRAM(s) Oral daily  folic acid 1 milliGRAM(s) Oral daily  levothyroxine 25 MICROGram(s) Oral daily  loratadine 10 milliGRAM(s) Oral daily  sodium bicarbonate 1300 milliGRAM(s) Oral two times a day  tamsulosin 0.8 milliGRAM(s) Oral at bedtime  timolol 0.5% Solution 1 Drop(s) Left EYE two times a day  vitamin B complex with vitamin C 1 Tablet(s) Oral daily    MEDICATIONS  (PRN):  acetaminophen   Tablet. 650 milliGRAM(s) Oral every 6 hours PRN  ALBUTerol/ipratropium for Nebulization 3 milliLiter(s) Nebulizer every 6 hours PRN      REVIEW OF SYSTEMS:  CONSTITUTIONAL: No fever, weight loss, or fatigue  EYES: No eye pain, visual disturbances, or discharge  ENMT:  No difficulty hearing, tinnitus, vertigo; No sinus or throat pain  NECK: No pain or stiffness  RESPIRATORY: No cough, wheezing, chills or hemoptysis; No shortness of breath  CARDIOVASCULAR: No chest pain, palpitations, dizziness, or leg swelling  GASTROINTESTINAL: No abdominal or epigastric pain. No nausea, vomiting, or hematemesis; No diarrhea or constipation. No melena or hematochezia.  GENITOURINARY: No dysuria, frequency, hematuria, or incontinence  NEUROLOGICAL: No headaches, memory loss, loss of strength, numbness, or tremors  SKIN: No itching, burning, rashes, or lesions   MUSCULOSKELETAL: No joint pain or swelling; No muscle, back, or extremity pain    T(F): 98.2 (17 @ 05:57), Max: 98.5 (17 @ 21:51)  HR: 74 (17 @ 05:57) (60 - 81)  BP: 149/81 (17 @ 05:57) (134/70 - 181/78)  RR: 18 (17 @ 05:57) (17 - 18)  SpO2: 100% (17 @ 05:57) (99% - 100%)  Wt(kg): --  CAPILLARY BLOOD GLUCOSE      POCT Blood Glucose.: 103 mg/dL (29 Dec 2017 22:21)  POCT Blood Glucose.: 104 mg/dL (29 Dec 2017 18:08)  POCT Blood Glucose.: 174 mg/dL (29 Dec 2017 12:19)  POCT Blood Glucose.: 94 mg/dL (29 Dec 2017 08:51)    I&O's Summary    29 Dec 2017 07:01  -  30 Dec 2017 07:00  --------------------------------------------------------  IN: 0 mL / OUT: 1475 mL / NET: -1475 mL        PHYSICAL EXAM:  GENERAL: NAD, well-groomed, well-developed  HEAD:  Atraumatic, Normocephalic  EYES: EOMI, PERRLA, conjunctiva and sclera clear  ENMT: No tonsillar erythema, exudates, or enlargement; Moist mucous membranes  NECK: Supple, No JVD, Normal thyroid  NERVOUS SYSTEM:  Alert & Oriented X3, Good concentration; Motor Strength 5/5 B/L upper and lower extremities  CHEST/LUNG: Clear to percussion bilaterally; No rales, rhonchi, wheezing, or rubs  HEART: Regular rate and rhythm; No murmurs, rubs, or gallops  ABDOMEN: Soft, Nontender, Nondistended; Bowel sounds present  EXTREMITIES:  2+ Peripheral Pulses, No clubbing, cyanosis, or edema  LYMPH: No lymphadenopathy noted  SKIN: No rashes or lesions    LABS:                        8.7    5.60  )-----------( 230      ( 28 Dec 2017 20:30 )             26.5         140  |  102  |  28<H>  ----------------------------<  94  4.1   |  26  |  1.69<H>    Ca    8.8      28 Dec 2017 20:30    TPro  6.6  /  Alb  3.8  /  TBili  0.9  /  DBili  x   /  AST  30  /  ALT  26  /  AlkPhos  151<H>        Urinalysis Basic - ( 28 Dec 2017 19:55 )    Color: RED / Appearance: HAZY / S.008 / pH: 6.5  Gluc: NEGATIVE / Ketone: NEGATIVE  / Bili: NEGATIVE / Urobili: NORMAL mg/dL   Blood: LARGE / Protein: 300 mg/dL / Nitrite: NEGATIVE   Leuk Esterase: LARGE / RBC: >50 / WBC >50   Sq Epi: OCC / Non Sq Epi: x / Bacteria: MOD          RADIOLOGY & ADDITIONAL TESTS:    Maggy Denton  Internal Medicine PGY-2  Pager #[phone] Maggy Denton  PGY-2 Internal Medicine  [facility]: [id number]  NS: [phone]    Patient is a 67y old  Male who presents with a chief complaint of hematuria (29 Dec 2017 04:48)      INTERVAL HPI/OVERNIGHT EVENTS: Pt had episode of left-sided chest pain last night that was relieved with morphine 2mg IV x1. Pt states that he feels good this morning and that his chest pain has not recurred since. Denies SOB. He passed his TOV yesterday and states that his urine has been "clear" afterwards.    MEDICATIONS (STANDING):  apixaban 2.5 milliGRAM(s) Oral every 12 hours  artificial tears (preservative free) Ophthalmic Solution 1 Drop(s) Both EYES two times a day  aspirin enteric coated 81 milliGRAM(s) Oral daily  atorvastatin 20 milliGRAM(s) Oral at bedtime  buDESOnide  80 MICROgram(s)/formoterol 4.5 MICROgram(s) Inhaler 2 Puff(s) Inhalation two times a day  carvedilol 3.125 milliGRAM(s) Oral every 12 hours  cefTRIAXone   IVPB      cefTRIAXone   IVPB 1 Gram(s) IV Intermittent every 24 hours  cholecalciferol 1000 Unit(s) Oral daily  docusate sodium 100 milliGRAM(s) Oral three times a day  dorzolamide 2% Ophthalmic Solution 1 Drop(s) Left EYE two times a day  ferrous    sulfate 325 milliGRAM(s) Oral daily  finasteride 5 milliGRAM(s) Oral daily  folic acid 1 milliGRAM(s) Oral daily  levothyroxine 25 MICROGram(s) Oral daily  loratadine 10 milliGRAM(s) Oral daily  sodium bicarbonate 1300 milliGRAM(s) Oral two times a day  tamsulosin 0.8 milliGRAM(s) Oral at bedtime  timolol 0.5% Solution 1 Drop(s) Left EYE two times a day  vitamin B complex with vitamin C 1 Tablet(s) Oral daily    MEDICATIONS  (PRN):  acetaminophen   Tablet. 650 milliGRAM(s) Oral every 6 hours PRN  ALBUTerol/ipratropium for Nebulization 3 milliLiter(s) Nebulizer every 6 hours PRN      REVIEW OF SYSTEMS:  CONSTITUTIONAL: No fever, weight loss, or fatigue  EYES: No eye pain, visual disturbances, or discharge  ENMT:  No difficulty hearing, tinnitus, vertigo; No sinus or throat pain  NECK: No pain or stiffness  RESPIRATORY: No cough, wheezing, chills or hemoptysis; No shortness of breath  CARDIOVASCULAR: Chest pain  GASTROINTESTINAL: No abdominal or epigastric pain. No nausea, vomiting, or hematemesis; No diarrhea or constipation. No melena or hematochezia.  GENITOURINARY: No dysuria, frequency, hematuria, or incontinence  NEUROLOGICAL: No headaches, memory loss, loss of strength, numbness, or tremors  SKIN: No itching, burning, rashes, or lesions   MUSCULOSKELETAL: No joint pain or swelling; No muscle, back, or extremity pain    T(F): 98.2 (17 @ 05:57), Max: 98.5 (17 @ 21:51)  HR: 74 (17 @ 05:57) (60 - 81)  BP: 149/81 (17 @ 05:57) (134/70 - 181/78)  RR: 18 (17 @ 05:57) (17 - 18)  SpO2: 100% (17 @ 05:57) (99% - 100%)  Wt(kg): --  CAPILLARY BLOOD GLUCOSE      POCT Blood Glucose.: 103 mg/dL (29 Dec 2017 22:21)  POCT Blood Glucose.: 104 mg/dL (29 Dec 2017 18:08)  POCT Blood Glucose.: 174 mg/dL (29 Dec 2017 12:19)  POCT Blood Glucose.: 94 mg/dL (29 Dec 2017 08:51)    I&O's Summary    29 Dec 2017 07:01  -  30 Dec 2017 07:00  --------------------------------------------------------  IN: 0 mL / OUT: 1475 mL / NET: -1475 mL        PHYSICAL EXAM:  GENERAL: NAD, well-groomed, well-developed  HEAD:  Atraumatic, Normocephalic  EYES: EOMI, PERRLA, conjunctiva and sclera clear  ENMT: No tonsillar erythema, exudates, or enlargement; Moist mucous membranes  NECK: Supple, No JVD, Normal thyroid  NERVOUS SYSTEM:  Alert & Oriented X3, Good concentration; Motor Strength 5/5 B/L upper and lower extremities  CHEST/LUNG: Clear to percussion bilaterally; No rales, rhonchi, wheezing, or rubs  HEART: Irregular rate and rhythm; No murmurs, rubs, or gallops  ABDOMEN: Soft, Nontender, Nondistended; Bowel sounds present  EXTREMITIES:  2+ Peripheral Pulses, No clubbing, cyanosis, or edema  LYMPH: No lymphadenopathy noted  SKIN: No rashes or lesions    LABS:                        8.7    5.60  )-----------( 230      ( 28 Dec 2017 20:30 )             26.5         140  |  102  |  28<H>  ----------------------------<  94  4.1   |  26  |  1.69<H>    Ca    8.8      28 Dec 2017 20:30    TPro  6.6  /  Alb  3.8  /  TBili  0.9  /  DBili  x   /  AST  30  /  ALT  26  /  AlkPhos  151<H>  -28      Urinalysis Basic - ( 28 Dec 2017 19:55 )    Color: RED / Appearance: HAZY / S.008 / pH: 6.5  Gluc: NEGATIVE / Ketone: NEGATIVE  / Bili: NEGATIVE / Urobili: NORMAL mg/dL   Blood: LARGE / Protein: 300 mg/dL / Nitrite: NEGATIVE   Leuk Esterase: LARGE / RBC: >50 / WBC >50   Sq Epi: OCC / Non Sq Epi: x / Bacteria: MOD          RADIOLOGY & ADDITIONAL TESTS:    Maggy Denton  Internal Medicine PGY-2  Pager #[phone] Maggy Denton  PGY-2 Internal Medicine  [facility]: [id number]  NS: [phone]    Patient is a 67y old  Male who presents with a chief complaint of hematuria (29 Dec 2017 04:48)      INTERVAL HPI/OVERNIGHT EVENTS: Pt had episode of left-sided chest pain last night that was relieved with morphine 2mg IV x1. Pt states that he feels good this morning and that his chest pain has not recurred since. Denies SOB. He passed his TOV yesterday and states that his urine has been "clear" afterwards.    MEDICATIONS (STANDING):  apixaban 2.5 milliGRAM(s) Oral every 12 hours  artificial tears (preservative free) Ophthalmic Solution 1 Drop(s) Both EYES two times a day  aspirin enteric coated 81 milliGRAM(s) Oral daily  atorvastatin 20 milliGRAM(s) Oral at bedtime  buDESOnide  80 MICROgram(s)/formoterol 4.5 MICROgram(s) Inhaler 2 Puff(s) Inhalation two times a day  carvedilol 3.125 milliGRAM(s) Oral every 12 hours  cefTRIAXone   IVPB      cefTRIAXone   IVPB 1 Gram(s) IV Intermittent every 24 hours  cholecalciferol 1000 Unit(s) Oral daily  docusate sodium 100 milliGRAM(s) Oral three times a day  dorzolamide 2% Ophthalmic Solution 1 Drop(s) Left EYE two times a day  ferrous    sulfate 325 milliGRAM(s) Oral daily  finasteride 5 milliGRAM(s) Oral daily  folic acid 1 milliGRAM(s) Oral daily  levothyroxine 25 MICROGram(s) Oral daily  loratadine 10 milliGRAM(s) Oral daily  sodium bicarbonate 1300 milliGRAM(s) Oral two times a day  tamsulosin 0.8 milliGRAM(s) Oral at bedtime  timolol 0.5% Solution 1 Drop(s) Left EYE two times a day  vitamin B complex with vitamin C 1 Tablet(s) Oral daily    MEDICATIONS  (PRN):  acetaminophen   Tablet. 650 milliGRAM(s) Oral every 6 hours PRN  ALBUTerol/ipratropium for Nebulization 3 milliLiter(s) Nebulizer every 6 hours PRN      REVIEW OF SYSTEMS:  CONSTITUTIONAL: No fever, weight loss, or fatigue  EYES: No eye pain, visual disturbances, or discharge  ENMT:  No difficulty hearing, tinnitus, vertigo; No sinus or throat pain  NECK: No pain or stiffness  RESPIRATORY: No cough, wheezing, chills or hemoptysis; No shortness of breath  CARDIOVASCULAR: Chest pain  GASTROINTESTINAL: No abdominal or epigastric pain. No nausea, vomiting, or hematemesis; No diarrhea or constipation. No melena or hematochezia.  GENITOURINARY: No dysuria, frequency, hematuria, or incontinence  NEUROLOGICAL: No headaches, memory loss, loss of strength, numbness, or tremors  SKIN: No itching, burning, rashes, or lesions   MUSCULOSKELETAL: No joint pain or swelling; No muscle, back, or extremity pain    T(F): 98.2 (17 @ 05:57), Max: 98.5 (17 @ 21:51)  HR: 74 (17 @ 05:57) (60 - 81)  BP: 149/81 (17 @ 05:57) (134/70 - 181/78)  RR: 18 (17 @ 05:57) (17 - 18)  SpO2: 100% (17 @ 05:57) (99% - 100%)  Wt(kg): --  CAPILLARY BLOOD GLUCOSE      POCT Blood Glucose.: 103 mg/dL (29 Dec 2017 22:21)  POCT Blood Glucose.: 104 mg/dL (29 Dec 2017 18:08)  POCT Blood Glucose.: 174 mg/dL (29 Dec 2017 12:19)  POCT Blood Glucose.: 94 mg/dL (29 Dec 2017 08:51)    I&O's Summary    29 Dec 2017 07:01  -  30 Dec 2017 07:00  --------------------------------------------------------  IN: 0 mL / OUT: 1475 mL / NET: -1475 mL        PHYSICAL EXAM:  GENERAL: NAD, well-groomed, well-developed  HEAD:  Atraumatic, Normocephalic  EYES: EOMI, PERRLA, conjunctiva and sclera clear  ENMT: No tonsillar erythema, exudates, or enlargement; Moist mucous membranes  NECK: Supple, No JVD, Normal thyroid  NERVOUS SYSTEM:  Alert & Oriented X3, Good concentration; Motor Strength 5/5 B/L upper and lower extremities  CHEST/LUNG: Clear to percussion bilaterally; No rales, rhonchi, wheezing, or rubs  HEART: Irregular rate and rhythm; No murmurs, rubs, or gallops  ABDOMEN: Soft, Nontender, Nondistended; Bowel sounds present  EXTREMITIES:  2+ Peripheral Pulses, No clubbing, cyanosis, or edema  LYMPH: No lymphadenopathy noted  SKIN: Mild pitting edema of b/l lower extremities    LABS:                        8.7    5.60  )-----------( 230      ( 28 Dec 2017 20:30 )             26.5         140  |  102  |  28<H>  ----------------------------<  94  4.1   |  26  |  1.69<H>    Ca    8.8      28 Dec 2017 20:30    TPro  6.6  /  Alb  3.8  /  TBili  0.9  /  DBili  x   /  AST  30  /  ALT  26  /  AlkPhos  151<H>  -28      Urinalysis Basic - ( 28 Dec 2017 19:55 )    Color: RED / Appearance: HAZY / S.008 / pH: 6.5  Gluc: NEGATIVE / Ketone: NEGATIVE  / Bili: NEGATIVE / Urobili: NORMAL mg/dL   Blood: LARGE / Protein: 300 mg/dL / Nitrite: NEGATIVE   Leuk Esterase: LARGE / RBC: >50 / WBC >50   Sq Epi: OCC / Non Sq Epi: x / Bacteria: MOD          RADIOLOGY & ADDITIONAL TESTS:    Maggy Denton  Internal Medicine PGY-2  Pager #[phone]

## 2017-12-30 NOTE — DISCHARGE NOTE ADULT - CARE PLAN
Principal Discharge DX:	Gross hematuria  Goal:	Resolution  Instructions for follow-up, activity and diet:	You were sent to the hospital for hematuria, which means having blood in your urine. This was most likely due to the recent procedure you had done on your prostate. You were monitored in the hospital and did not have any further episodes of hematuria. Your urine was also tested for possible infection and returned negative and your antibiotics were stopped. You will need to follow up with your urologist Dr. Amor as an outpatient at your schedule appointment on January 4th at 10:40 AM at [address] [place] [address]. Your Smart catheter was taken out and you were able to void without difficulties thereafter.  Secondary Diagnosis:	Urinary retention  Instructions for follow-up, activity and diet:	You had your Smart catheter removed on 12/29 and you were able to urinate on your own without any significant urinary retention on a bladder scan that was done after you urinated. Please follow up with your urologist Dr. Amor on your appt on January 4th.  Secondary Diagnosis:	Essential hypertension  Instructions for follow-up, activity and diet:	Your blood pressure was elevated while you were in the hospital, and your Coreg medication dose was increased from 3.125mg to 6.25mg twice daily. Principal Discharge DX:	Gross hematuria  Goal:	Resolution  Assessment and plan of treatment:	You were sent to the hospital for hematuria, which means having blood in your urine. This was most likely due to the recent procedure you had done on your prostate. You were monitored in the hospital and did not have any further episodes of hematuria. Your urine was also tested for possible infection and returned negative and your antibiotics were stopped. You will need to follow up with your urologist Dr. Amor as an outpatient at your schedule appointment on January 4th at 10:40 AM at [address] [place] [address]. Your Smart catheter was taken out and you were able to void without difficulties thereafter.  Secondary Diagnosis:	Urinary retention  Assessment and plan of treatment:	You had your Smart catheter removed on 12/29 and you were able to urinate on your own without any significant urinary retention on a bladder scan that was done after you urinated. Please follow up with your urologist Dr. Amor on your appt on January 4th.  Secondary Diagnosis:	Essential hypertension  Assessment and plan of treatment:	Your blood pressure was elevated while you were in the hospital, and your Coreg medication dose was increased from 3.125mg to 6.25mg twice daily.

## 2017-12-30 NOTE — PROGRESS NOTE ADULT - PROBLEM SELECTOR PLAN 2
- Hematuria likely 2/2 recent TURP  - Urine is now clear; Smart d/konrad yesterday and pt passed TOV  - Urology following, appreciate recommendations:      - Continue finasteride, tamsulosin      - No indication for CBI at this point  - Will f/u urinary cultures to optimize ABx management; currently on ceftriaxone 1g q24  - Pt to f/u with Dr. Amor as outpatient on January 4th

## 2017-12-30 NOTE — DISCHARGE NOTE ADULT - PLAN OF CARE
Resolution You were sent to the hospital for hematuria, which means having blood in your urine. This was most likely due to the recent procedure you had done on your prostate. You were monitored in the hospital and did not have any further episodes of hematuria. Your urine was also tested for possible infection and returned negative and your antibiotics were stopped. You will need to follow up with your urologist Dr. Amor as an outpatient at your schedule appointment on January 4th at 10:40 AM at [address] [facility]. Your Smart catheter was taken out and you were able to void without difficulties thereafter. You had your Smart catheter removed on 12/29 and you were able to urinate on your own without any significant urinary retention on a bladder scan that was done after you urinated. Please follow up with your urologist Dr. Amor on your appt on January 4th. Your blood pressure was elevated while you were in the hospital, and your Coreg medication dose was increased from 3.125mg to 6.25mg twice daily.

## 2017-12-30 NOTE — PROGRESS NOTE ADULT - ASSESSMENT
· Assessment		  67 -year-old male with MR, HTN, CAD s/p CABG x3 in 2013 and coronary stent on ASA, PPM, Afib on Eliquis, CKD3, asthma, hypothyroidism, BPH, who has had a recent TURP (~8 weeks ago).  Patient has had multiple episodes of hematuria, urinary retention 2/2 obstruction 2/2 clots, who was re-admitted following hematuria and found to have a UTI now being treated with Ceftriaxone. Smart catheter d/konrad and pt successfully passed TOV.

## 2017-12-30 NOTE — DISCHARGE NOTE ADULT - ADDITIONAL INSTRUCTIONS
Please follow up with your urologist Dr. Amor at your appt on January 4th at 10:40 AM at [address] [facility].

## 2017-12-30 NOTE — DISCHARGE NOTE ADULT - MEDICATION SUMMARY - MEDICATIONS TO TAKE
I will START or STAY ON the medications listed below when I get home from the hospital:    finasteride 5 mg oral tablet  -- 1 tab(s) by mouth once a day  -- Indication: For BPH    aspirin 81 mg oral delayed release capsule  -- 1 tab(s) by mouth once a day  -- Indication: For Prophylaxis    Tylenol 325 mg oral tablet  -- 1 tab(s) by mouth every 4 hours, As Needed for pain  -- Indication: For Pain    sodium bicarbonate 650 mg oral tablet  -- 2 tab(s) by mouth 2 times a day  -- Indication: For CKD (chronic kidney disease) stage 3, GFR 30-59 ml/min    tamsulosin 0.4 mg oral capsule  -- 2 cap(s) by mouth once a day (at bedtime)  -- Indication: For BPH    Eliquis 2.5 mg oral tablet  -- 1 tab(s) by mouth 2 times a day  -- Indication: For Atrial fibrillation    Claritin 10 mg oral tablet  -- 1 tab(s) by mouth once a day  -- Indication: For Allergies    atorvastatin 20 mg oral tablet  -- 1 tab(s) by mouth once a day (at bedtime)  -- Indication: For CAD (coronary artery disease)    carvedilol 6.25 mg oral tablet  -- 1 tab(s) by mouth every 12 hours  -- Indication: For CAD (coronary artery disease)    Symbicort 80 mcg-4.5 mcg/inh inhalation aerosol  -- 1 puff(s) inhaled 2 times a day  -- Indication: For Asthma    ProAir HFA 90 mcg/inh inhalation aerosol  -- 2 puff(s) inhaled 4 times a day, As Needed  -- Indication: For Asthma    sodium polystyrene sulfonate 15 g/60 mL oral and rectal suspension  -- 60 milliliter(s) by mouth every 7 days wednesdays  -- Indication: For CKD (chronic kidney disease) stage 3, GFR 30-59 ml/min    ciclopirox 0.77% topical suspension  -- apply to affected area BID   -- Indication: For Onychomycosis    furosemide 20 mg oral tablet  -- 1 tab(s) by mouth once a week on Fridays  -- Indication: For Edema    ferrous sulfate 325 mg (65 mg elemental iron) oral delayed release tablet  -- 1 tab(s) by mouth once a day  -- Indication: For Iron deficiency    Flonase 50 mcg/inh nasal spray  -- 2 spray(s) into nose 2 times a day, As Needed  -- Indication: For Allergies    dorzolamide 2% ophthalmic solution  -- 1 drop(s) to left eye 2 times a day  -- Indication: For Eye drops    Timoptic Ocudose 0.5% ophthalmic solution  -- 1 drop(s) to left eye 2 times a day  -- Indication: For Eye drops    Artificial Tears ophthalmic solution  -- 1 drop(s) to each affected eye 2 times a day  -- Indication: For Eye drops    levothyroxine 25 mcg (0.025 mg) oral tablet  -- 1 tab(s) by mouth once a day  -- Indication: For Hypothyroid    Vitamin B-100 oral tablet  -- 1 tab(s) by mouth once a day  -- Indication: For Vitamin    folic acid 1 mg oral tablet  -- 1 tab(s) by mouth once a day  -- Indication: For Vitamin    Vitamin D3 1000 intl units oral capsule  -- 1 cap(s) by mouth once a day  -- Indication: For Vitamin

## 2017-12-30 NOTE — PROGRESS NOTE ADULT - PROBLEM SELECTOR PLAN 1
Pt with substernal chest pain 10/10 worsened upon deep inspiration overnight, with some reproducibility on palpation on exam  - EKG unchanged from admission EKG, pt remains in Afib with no ST changes  - First set of cardiac enzymes negative, will f/u second set with AM labs  - Low suspicion for ACS at this point  - Pain relieved with morphine 2mg IV

## 2017-12-30 NOTE — DISCHARGE NOTE ADULT - PATIENT PORTAL LINK FT
“You can access the Follow[facility] Patient Portal, offered by [facility], by registering with the following website: [email or web address]”

## 2017-12-30 NOTE — DISCHARGE NOTE ADULT - CONDITIONS AT DISCHARGE
Patient A&Ox3, vs wnl, no c/o pain, pt discharged to group home this afternoon, no distress noted at time of discharge.

## 2017-12-30 NOTE — PROGRESS NOTE ADULT - PROBLEM SELECTOR PLAN 9
DVT ppx: On apixaban for Afib, can continue  Diet: DASH, consistent carb  Dispo: Group home that pt came in from, Smart catheter removed 12/29

## 2017-12-30 NOTE — PROGRESS NOTE ADULT - ATTENDING COMMENTS
Bing d/c'ed, TOV.  F/u Ucx.   C/w CTX for now.  F/u Urology recs.   Monitor abdominal pain.
Passed TOV, urine now clear.   Had chest pain overnight, EKG unchanged and negative CE x 2. CP now resolved.  UCx negative.  Medically stable for d/c back to group home.

## 2018-01-22 ENCOUNTER — APPOINTMENT (OUTPATIENT)
Dept: UROLOGY | Facility: CLINIC | Age: 68
End: 2018-01-22

## 2018-02-21 NOTE — PROVIDER CONTACT NOTE (OTHER) - ASSESSMENT
HISTORY OF PRESENT ILLNESS:  [facility] with   a chief complaint of chronic neck and back pains secondary to heavy pendulous   breasts.  She has been complaining of this pain for approximately 4 years.  She   also complains of deep shoulder grooving and rashes beneath her breasts.    PHYSICAL EXAMINATION:  VITAL SIGNS:  She is 5 feet 7 inches and 288 pounds.  BREASTS:  She has 48G cup breasts.  Shoulders are grooving.  Her skin underneath   her breasts shows evidence of previous rashes.    ASSESSMENT:  1.  Chronic neck pain.  2.  Chronic back pain.  3.  Chronic macerating rash.  4.  Macromastia.    PLAN:  This patient will need to have approximately 1800 g of breast tissue   removed from each side.  I have discussed with the patient that she must get her   weight down to under 250 pounds prior to being placed on the surgical schedule.      CRB/IN  dd: 02/21/2018 17:21:44 (CST)  td: 02/22/2018 12:14:05 (CST)  Doc ID   #[id number]  Job ID #[id number]    CC:        hr - 80  b.p -127/74  temp 100.9  rr- 18

## 2018-04-25 ENCOUNTER — TRANSCRIPTION ENCOUNTER (OUTPATIENT)
Age: 68
End: 2018-04-25

## 2018-04-26 ENCOUNTER — OUTPATIENT (OUTPATIENT)
Dept: OUTPATIENT SERVICES | Facility: HOSPITAL | Age: 68
LOS: 1 days | End: 2018-04-26
Payer: MEDICARE

## 2018-04-26 ENCOUNTER — RESULT REVIEW (OUTPATIENT)
Age: 68
End: 2018-04-26

## 2018-04-26 VITALS
HEART RATE: 64 BPM | SYSTOLIC BLOOD PRESSURE: 169 MMHG | OXYGEN SATURATION: 100 % | RESPIRATION RATE: 18 BRPM | DIASTOLIC BLOOD PRESSURE: 77 MMHG

## 2018-04-26 VITALS
RESPIRATION RATE: 22 BRPM | OXYGEN SATURATION: 100 % | SYSTOLIC BLOOD PRESSURE: 158 MMHG | WEIGHT: 145.51 LBS | HEIGHT: 68 IN | DIASTOLIC BLOOD PRESSURE: 68 MMHG | TEMPERATURE: 98 F | HEART RATE: 64 BPM

## 2018-04-26 DIAGNOSIS — T85.398A OTHER MECHANICAL COMPLICATION OF OTHER OCULAR PROSTHETIC DEVICES, IMPLANTS AND GRAFTS, INITIAL ENCOUNTER: ICD-10-CM

## 2018-04-26 DIAGNOSIS — Z95.0 PRESENCE OF CARDIAC PACEMAKER: Chronic | ICD-10-CM

## 2018-04-26 LAB — GLUCOSE BLDC GLUCOMTR-MCNC: 85 MG/DL — SIGNIFICANT CHANGE UP (ref 70–99)

## 2018-04-26 PROCEDURE — 66986 EXCHANGE LENS PROSTHESIS: CPT | Mod: RT

## 2018-04-26 PROCEDURE — 82962 GLUCOSE BLOOD TEST: CPT

## 2018-04-26 PROCEDURE — V2632: CPT

## 2018-04-26 PROCEDURE — 88300 SURGICAL PATH GROSS: CPT | Mod: 26

## 2018-04-26 PROCEDURE — 67036 REMOVAL OF INNER EYE FLUID: CPT | Mod: RT

## 2018-04-26 PROCEDURE — 88300 SURGICAL PATH GROSS: CPT

## 2018-05-07 ENCOUNTER — APPOINTMENT (OUTPATIENT)
Dept: OPHTHALMOLOGY | Facility: CLINIC | Age: 68
End: 2018-05-07

## 2019-07-26 NOTE — H&P ADULT - NSHPRISKHEPCSCREEN_GEN_A_CORE
Bedside and Verbal shift change report given to Tiffani Cuello RN (oncoming nurse) by Dick Cordova (offgoing nurse). Report included the following information SBAR, Kardex, Procedure Summary, Intake/Output, MAR, Accordion and Recent Results. Unable to offer due to clinical condition

## 2019-08-14 NOTE — ED ADULT NURSE NOTE - NS ED NURSE PRESS ULCER STAGE 11
Attempt 1:  Patient was not available for their therapy session at this time.  Reason not seen: Other (comment)(awaiting ortho consult ) (08/14/19 0640).   Re-Attempt Plan: Will re-attempt later today;Will re-attempt tomorrow (08/14/19 0640).      Attempt 2:  Patient was not available for their therapy session at this time.  Reason not seen: Other health personnel with patient(dialysis) (08/14/19 1232).   Re-Attempt Plan: Will re-attempt later today;Will re-attempt tomorrow (08/14/19 1232).     stage I

## 2020-03-01 NOTE — PATIENT PROFILE ADULT. - FUNCTIONAL SCREEN CURRENT LEVEL: TRANSFERRING, MLM
Urology:    Patient undergoing voiding trial tomorrow.  Please chart all voided output, bladder scans, and straight catheterization output.  Will follow voiding through the day.    ASSESSMENT:  · Urinary retention - large volume with straight cath of 1200 cc  · History of spinal stenosis with progression weakness and chronic paraplegia  · History of urethral stricture s/p dilitation 2016 with Dr. Santoro  · PE / DVT with right heart strain    Plan:  · Voiding trial tomorrow: Please remove nice at 0500 tomorrow morning.   · Bladder scan after patient voids x 3.   · If > 350 ml or no void please straight cath x 2 and replace nice on 3rd occasion.   · If scans are < 350 x 3 you can discontinue checking scans unless patient has symptoms.   · If patient is unable to void and requires catheter to be replaced, they may have repeat voiding trials every 1-2 weeks  · Patient should have routine voiding trials every 1-2 weeks while in rehab- per facility protocol.  Patient should follow up with Urology in 3-4 weeks.  · Continue flomax  · Follow up with Abhishek Santoro MD outpatient    Call/page with questions or concerns.  We will follow peripherally tomorrow.    ANDREINA Balderas  (Available: Mon/Tues/Fri)  Pager: [phone]    Please page urology PA/NP with questions on weekdays between the hours of 7A-7P. Please page the on call urologist on nights and weekends.    ** If you have paged me and have not had paged returned in 15 minutes- Please page [phone]**    Consulting Urologist:  Abhishek Santoro -[phone]       (2) assistive person

## 2020-03-05 NOTE — PROGRESS NOTE ADULT - PROBLEM SELECTOR PLAN 6
Warfarin/Coumadin increases your risk for bleeding. Notify your doctor if you see any bleeding or any of the side effects listed in the Warfarin/Coumadin Booklet. Diet and medications can affect the PT/INR blood level. When Warfarin/Coumadin is taken with other medicines it can change the way other medicines work. Other medicines can also change the way Warfarin/Coumadin works. It is very important to tell your health care provider about all other medicines, including over-the-counter medications, herbs, diet supplements, or products containing vitamin K. Call your doctor before starting, stopping, or changing the dose of any prescription or over-the-counter medications. Any product containing aspirin lessens the blood's ability to form clots and adds to the effect of Warfarin/Coumadin. Never take aspirin without speaking with your health care provider.
-RICARDO in the setting of urinary retention due to hematuria/clots,   - baseline creat ~1.5, admitted with creat 2.09 ml/dl now at 2.01    -avoid nephrotoxins, bladder irrigation, IVF, blevins cath, dose meds as per renal fxn  -c/w NaHCO3 for metabolic acidosis due to CKD.
-RICARDO in the setting of urinary retention due to hematuria/clots,   - baseline creat ~1.5, admitted with creat 2.09 ml/dl now at 2.01    -avoid nephrotoxins, bladder irrigation, IVF, blevins cath, dose meds as per renal fxn  -c/w NaHCO3 for metabolic acidosis due to CKD.
c/w synthroid.
-RICARDO in the setting of urinary retention due to hematuria/clots, acute blood loss anemia/hematuria, baseline creat ~1.5, admitted with creat 2.09 ml/dl now at 1.96  -blood transfusion to Hgb>7.5  -avoid nephrotoxins, bladder irrigation, IVF, blevins cath, dose meds as per renal fxn  -c/w NaHCO3 for metabolic acidosis due to CKD.

## 2020-07-06 NOTE — ED PROVIDER NOTE - CROS ED NEURO ALL NEG
Reason for Call:  Other call back    Detailed comments: Pt returning Vicente phone call from last week.      Phone Number Patient can be reached at: Home number on file [phone] (home)    Best Time: any    Can we leave a detailed message on this number? YES    Call taken on 7/6/2020 at 3:13 PM by Shamika Olmstead     - - -

## 2020-08-02 NOTE — ED ADULT NURSE NOTE - CAS EDN DISCHARGE ASSESSMENT
INITIAL CASE MANAGEMENT ASSESSMENT    Reviewed chart, met with patient to assess possible discharge needs. Explained Case Management role/services. Living Situation: confirmed address, [place] and pt is unable to get up the stairs,  pt reports 6 steps up hill and then 8 steps up to the porch to enter the home    ADLs: independent      DME: walker and cane    PT/OT Recs: none     Active Services: [facility], RN 3 times a week, COA aide comes once a week     Call to confirm pt is active with [facility] - will need new orders, MAIKOL, and facesheet faxed to [phone]     Transportation: does not drive, reports he does not leave the home, will need transport to home -- [address], [place]      Medications: Rx delivered     PCP: confirmed PCP with [address]     PLAN/COMMENTS: Pt will need transport home and continue with [facility] home care services. SW/CM provided contact information for patient or family to call with any questions. SW/CM will follow and assist as needed.      Electronically signed by BILLY Payne, JUDY on 8/2/2020 at 10:50 AM Patient baseline mental status/Alert and oriented to person, place and time

## 2021-07-19 NOTE — ASU DISCHARGE PLAN (ADULT/PEDIATRIC). - CHECK WITH YOUR DOCTORS ABOUT SHOWERS, HAIR WASHING
Statement Selected [No Acute Distress] : no acute distress [Well Nourished] : well nourished [Well Developed] : well developed [Well-Appearing] : well-appearing [Normal Sclera/Conjunctiva] : normal sclera/conjunctiva [PERRL] : pupils equal round and reactive to light [EOMI] : extraocular movements intact [Normal Outer Ear/Nose] : the outer ears and nose were normal in appearance [Normal Oropharynx] : the oropharynx was normal [No JVD] : no jugular venous distention [No Lymphadenopathy] : no lymphadenopathy [Supple] : supple [Thyroid Normal, No Nodules] : the thyroid was normal and there were no nodules present [No Respiratory Distress] : no respiratory distress  [No Accessory Muscle Use] : no accessory muscle use [Clear to Auscultation] : lungs were clear to auscultation bilaterally [Normal Rate] : normal rate  [Regular Rhythm] : with a regular rhythm [Normal S1, S2] : normal S1 and S2 [No Murmur] : no murmur heard [No Carotid Bruits] : no carotid bruits [No Abdominal Bruit] : a ~M bruit was not heard ~T in the abdomen [No Varicosities] : no varicosities [Pedal Pulses Present] : the pedal pulses are present [No Edema] : there was no peripheral edema [No Palpable Aorta] : no palpable aorta [No Extremity Clubbing/Cyanosis] : no extremity clubbing/cyanosis [Soft] : abdomen soft [Non Tender] : non-tender [Non-distended] : non-distended [No Masses] : no abdominal mass palpated [No HSM] : no HSM [Normal Bowel Sounds] : normal bowel sounds [Normal Posterior Cervical Nodes] : no posterior cervical lymphadenopathy [Normal Anterior Cervical Nodes] : no anterior cervical lymphadenopathy [No CVA Tenderness] : no CVA  tenderness [No Spinal Tenderness] : no spinal tenderness [No Joint Swelling] : no joint swelling [Grossly Normal Strength/Tone] : grossly normal strength/tone [No Rash] : no rash [Coordination Grossly Intact] : coordination grossly intact [No Focal Deficits] : no focal deficits [Normal Gait] : normal gait [Deep Tendon Reflexes (DTR)] : deep tendon reflexes were 2+ and symmetric [Normal Affect] : the affect was normal [Normal Insight/Judgement] : insight and judgment were intact

## 2022-09-09 NOTE — PROGRESS NOTE ADULT - ATTENDING COMMENTS
[de-identified] : - Constitutional: This is a male in no obvious distress.\par - Psych: Patient is alert and oriented to person, place and time.  Patient has a normal mood and affect.\par - Cardiovascular: Normal pulses throughout the upper extremities.  No significant varicosities are noted in the upper extremities. \par - Neuro: Strength and sensation are intact throughout the upper extremities.  Patient has normal coordination.\par - Respiratory:  Patient exhibits no evidence of shortness of breath or difficulty breathing.\par - Skin: No rashes, lesions, or other abnormalities are noted in the upper extremities.\par \par --- \par \par Examination of his right thumb demonstrates decreased swelling and tenderness along the CMC joint.  There are some laxity with no instability to stress testing.  There is no obvious tenderness along the CMC joint.  He does have some tenderness proximally along the FCR tendon.  There is no swelling or tenderness along the first dorsal compartment and there is a negative Finkelstein sign.  He remains neurovascularly intact distally.\par \par Examination of the left wrist and hand demonstrates no further swelling or tenderness along the CMC joint.  There is some tenderness along the proximal aspect of the first dorsal compartment and he is a mildly positive Finkelstein sign.  He remains neurovascularly intact distally.
Patient seen ans examined, as above. CBI on, light-pink tinged. Hct 20, HD stable. Rec cont CBI. Awaiting IM consult. Transfuse PRBCs x 2. Follow up labs. CT A/P evaluate clot burden (creat 1.96). F/i Urine c/s. Discussed with family member via telephone.

## 2022-09-28 ENCOUNTER — INPATIENT (INPATIENT)
Facility: HOSPITAL | Age: 72
LOS: 2 days | Discharge: NURSING, REHAB OR LONG-TERM CARE | DRG: 228 | End: 2022-10-01
Attending: HOSPITALIST | Admitting: HOSPITALIST
Payer: MEDICARE

## 2022-09-28 VITALS
TEMPERATURE: 98 F | DIASTOLIC BLOOD PRESSURE: 87 MMHG | RESPIRATION RATE: 20 BRPM | SYSTOLIC BLOOD PRESSURE: 146 MMHG | HEART RATE: 62 BPM | OXYGEN SATURATION: 97 % | WEIGHT: 153 LBS | HEIGHT: 68 IN

## 2022-09-28 DIAGNOSIS — N18.6 END STAGE RENAL DISEASE: ICD-10-CM

## 2022-09-28 DIAGNOSIS — S01.81XA LACERATION WITHOUT FOREIGN BODY OF OTHER PART OF HEAD, INITIAL ENCOUNTER: ICD-10-CM

## 2022-09-28 DIAGNOSIS — Z89.429 ACQUIRED ABSENCE OF OTHER TOE(S), UNSPECIFIED SIDE: Chronic | ICD-10-CM

## 2022-09-28 DIAGNOSIS — Z87.438 PERSONAL HISTORY OF OTHER DISEASES OF MALE GENITAL ORGANS: ICD-10-CM

## 2022-09-28 DIAGNOSIS — E03.9 HYPOTHYROIDISM, UNSPECIFIED: ICD-10-CM

## 2022-09-28 DIAGNOSIS — I48.20 CHRONIC ATRIAL FIBRILLATION, UNSPECIFIED: ICD-10-CM
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
## 2022-09-28 DIAGNOSIS — I10 ESSENTIAL (PRIMARY) HYPERTENSION: ICD-10-CM

## 2022-09-28 DIAGNOSIS — H40.9 UNSPECIFIED GLAUCOMA: ICD-10-CM

## 2022-09-28 DIAGNOSIS — Z95.0 PRESENCE OF CARDIAC PACEMAKER: Chronic | ICD-10-CM

## 2022-09-28 DIAGNOSIS — Z29.9 ENCOUNTER FOR PROPHYLACTIC MEASURES, UNSPECIFIED: ICD-10-CM

## 2022-09-28 LAB
ALBUMIN SERPL ELPH-MCNC: 4.5 G/DL — SIGNIFICANT CHANGE UP (ref 3.3–5)
ALP SERPL-CCNC: 108 U/L — SIGNIFICANT CHANGE UP (ref 40–120)
ALT FLD-CCNC: 20 U/L — SIGNIFICANT CHANGE UP (ref 10–45)
ANION GAP SERPL CALC-SCNC: 11 MMOL/L — SIGNIFICANT CHANGE UP (ref 5–17)
APPEARANCE UR: ABNORMAL
APTT BLD: 34.2 SEC — SIGNIFICANT CHANGE UP (ref 27.5–35.5)
AST SERPL-CCNC: 21 U/L — SIGNIFICANT CHANGE UP (ref 10–40)
BACTERIA # UR AUTO: ABNORMAL
BASE EXCESS BLDV CALC-SCNC: 5.1 MMOL/L — HIGH (ref -2–3)
BASOPHILS # BLD AUTO: 0.09 K/UL — SIGNIFICANT CHANGE UP (ref 0–0.2)
BASOPHILS NFR BLD AUTO: 2.6 % — HIGH (ref 0–2)
BILIRUB SERPL-MCNC: 0.3 MG/DL — SIGNIFICANT CHANGE UP (ref 0.2–1.2)
BILIRUB UR-MCNC: NEGATIVE — SIGNIFICANT CHANGE UP
BLD GP AB SCN SERPL QL: NEGATIVE — SIGNIFICANT CHANGE UP
BUN SERPL-MCNC: 46 MG/DL — HIGH (ref 7–23)
CA-I SERPL-SCNC: 1.18 MMOL/L — SIGNIFICANT CHANGE UP (ref 1.15–1.33)
CALCIUM SERPL-MCNC: 9.3 MG/DL — SIGNIFICANT CHANGE UP (ref 8.4–10.5)
CHLORIDE BLDV-SCNC: 96 MMOL/L — SIGNIFICANT CHANGE UP (ref 96–108)
CHLORIDE SERPL-SCNC: 95 MMOL/L — LOW (ref 96–108)
CO2 BLDV-SCNC: 34 MMOL/L — HIGH (ref 22–26)
CO2 SERPL-SCNC: 29 MMOL/L — SIGNIFICANT CHANGE UP (ref 22–31)
COLOR SPEC: YELLOW — SIGNIFICANT CHANGE UP
CREAT SERPL-MCNC: 3.93 MG/DL — HIGH (ref 0.5–1.3)
DIFF PNL FLD: ABNORMAL
EGFR: 16 ML/MIN/1.73M2 — LOW
EOSINOPHIL # BLD AUTO: 0.06 K/UL — SIGNIFICANT CHANGE UP (ref 0–0.5)
EOSINOPHIL NFR BLD AUTO: 1.7 % — SIGNIFICANT CHANGE UP (ref 0–6)
EPI CELLS # UR: 1 /HPF — SIGNIFICANT CHANGE UP
GAS PNL BLDV: 132 MMOL/L — LOW (ref 136–145)
GAS PNL BLDV: SIGNIFICANT CHANGE UP
GIANT PLATELETS BLD QL SMEAR: PRESENT — SIGNIFICANT CHANGE UP
GLUCOSE BLDC GLUCOMTR-MCNC: 215 MG/DL — HIGH (ref 70–99)
GLUCOSE BLDV-MCNC: 153 MG/DL — HIGH (ref 70–99)
GLUCOSE SERPL-MCNC: 159 MG/DL — HIGH (ref 70–99)
GLUCOSE UR QL: NEGATIVE — SIGNIFICANT CHANGE UP
HCO3 BLDV-SCNC: 32 MMOL/L — HIGH (ref 22–29)
HCT VFR BLD CALC: 29.3 % — LOW (ref 39–50)
HCT VFR BLDA CALC: 29 % — LOW (ref 39–51)
HGB BLD CALC-MCNC: 9.7 G/DL — LOW (ref 12.6–17.4)
HGB BLD-MCNC: 9.5 G/DL — LOW (ref 13–17)
HYALINE CASTS # UR AUTO: 1 /LPF — SIGNIFICANT CHANGE UP (ref 0–2)
INR BLD: 1.58 RATIO — HIGH (ref 0.88–1.16)
KETONES UR-MCNC: NEGATIVE — SIGNIFICANT CHANGE UP
LACTATE BLDV-MCNC: 1.2 MMOL/L — SIGNIFICANT CHANGE UP (ref 0.5–2)
LEUKOCYTE ESTERASE UR-ACNC: ABNORMAL
LYMPHOCYTES # BLD AUTO: 0.25 K/UL — LOW (ref 1–3.3)
LYMPHOCYTES # BLD AUTO: 7 % — LOW (ref 13–44)
MAGNESIUM SERPL-MCNC: 2.2 MG/DL — SIGNIFICANT CHANGE UP (ref 1.6–2.6)
MANUAL SMEAR VERIFICATION: SIGNIFICANT CHANGE UP
MCHC RBC-ENTMCNC: 30.4 PG — SIGNIFICANT CHANGE UP (ref 27–34)
MCHC RBC-ENTMCNC: 32.4 GM/DL — SIGNIFICANT CHANGE UP (ref 32–36)
MCV RBC AUTO: 93.9 FL — SIGNIFICANT CHANGE UP (ref 80–100)
MONOCYTES # BLD AUTO: 0.15 K/UL — SIGNIFICANT CHANGE UP (ref 0–0.9)
MONOCYTES NFR BLD AUTO: 4.3 % — SIGNIFICANT CHANGE UP (ref 2–14)
NEUTROPHILS # BLD AUTO: 2.95 K/UL — SIGNIFICANT CHANGE UP (ref 1.8–7.4)
NEUTROPHILS NFR BLD AUTO: 84.4 % — HIGH (ref 43–77)
NITRITE UR-MCNC: NEGATIVE — SIGNIFICANT CHANGE UP
PCO2 BLDV: 61 MMHG — HIGH (ref 42–55)
PH BLDV: 7.33 — SIGNIFICANT CHANGE UP (ref 7.32–7.43)
PH UR: 7.5 — SIGNIFICANT CHANGE UP (ref 5–8)
PLAT MORPH BLD: NORMAL — SIGNIFICANT CHANGE UP
PLATELET # BLD AUTO: 149 K/UL — LOW (ref 150–400)
PO2 BLDV: 35 MMHG — SIGNIFICANT CHANGE UP (ref 25–45)
POTASSIUM BLDV-SCNC: 4.7 MMOL/L — SIGNIFICANT CHANGE UP (ref 3.5–5.1)
POTASSIUM SERPL-MCNC: 5 MMOL/L — SIGNIFICANT CHANGE UP (ref 3.5–5.3)
POTASSIUM SERPL-SCNC: 5 MMOL/L — SIGNIFICANT CHANGE UP (ref 3.5–5.3)
PROT SERPL-MCNC: 6.9 G/DL — SIGNIFICANT CHANGE UP (ref 6–8.3)
PROT UR-MCNC: ABNORMAL
PROTHROM AB SERPL-ACNC: 18.4 SEC — HIGH (ref 10.5–13.4)
RAPID RVP RESULT: SIGNIFICANT CHANGE UP
RBC # BLD: 3.12 M/UL — LOW (ref 4.2–5.8)
RBC # FLD: 13.4 % — SIGNIFICANT CHANGE UP (ref 10.3–14.5)
RBC BLD AUTO: SIGNIFICANT CHANGE UP
RBC CASTS # UR COMP ASSIST: 27 /HPF — HIGH (ref 0–4)
RH IG SCN BLD-IMP: POSITIVE — SIGNIFICANT CHANGE UP
SAO2 % BLDV: 58.5 % — LOW (ref 67–88)
SARS-COV-2 RNA SPEC QL NAA+PROBE: SIGNIFICANT CHANGE UP
SODIUM SERPL-SCNC: 135 MMOL/L — SIGNIFICANT CHANGE UP (ref 135–145)
SP GR SPEC: 1.01 — SIGNIFICANT CHANGE UP (ref 1.01–1.02)
TROPONIN T, HIGH SENSITIVITY RESULT: 103 NG/L — HIGH (ref 0–51)
TROPONIN T, HIGH SENSITIVITY RESULT: 103 NG/L — HIGH (ref 0–51)
UROBILINOGEN FLD QL: NEGATIVE — SIGNIFICANT CHANGE UP
WBC # BLD: 3.5 K/UL — LOW (ref 3.8–10.5)
WBC # FLD AUTO: 3.5 K/UL — LOW (ref 3.8–10.5)
WBC UR QL: 375 /HPF — HIGH (ref 0–5)

## 2022-09-28 PROCEDURE — 12001 RPR S/N/AX/GEN/TRNK 2.5CM/<: CPT

## 2022-09-28 PROCEDURE — 70450 CT HEAD/BRAIN W/O DYE: CPT | Mod: 26,MA

## 2022-09-28 PROCEDURE — 72131 CT LUMBAR SPINE W/O DYE: CPT | Mod: 26,MA

## 2022-09-28 PROCEDURE — 72125 CT NECK SPINE W/O DYE: CPT | Mod: 26,MA

## 2022-09-28 PROCEDURE — 74176 CT ABD & PELVIS W/O CONTRAST: CPT | Mod: 26,MA

## 2022-09-28 PROCEDURE — 93280 PM DEVICE PROGR EVAL DUAL: CPT | Mod: 26

## 2022-09-28 PROCEDURE — 99223 1ST HOSP IP/OBS HIGH 75: CPT

## 2022-09-28 PROCEDURE — 99221 1ST HOSP IP/OBS SF/LOW 40: CPT

## 2022-09-28 PROCEDURE — 99285 EMERGENCY DEPT VISIT HI MDM: CPT | Mod: 25

## 2022-09-28 PROCEDURE — 33235 REMOVAL PACEMAKER ELECTRODE: CPT

## 2022-09-28 PROCEDURE — 33233 REMOVAL OF PM GENERATOR: CPT

## 2022-09-28 PROCEDURE — 72128 CT CHEST SPINE W/O DYE: CPT | Mod: 26,MA

## 2022-09-28 PROCEDURE — 71045 X-RAY EXAM CHEST 1 VIEW: CPT | Mod: 26

## 2022-09-28 PROCEDURE — 93286 PERI-PX EVAL PM/LDLS PM IP: CPT | Mod: 26,59

## 2022-09-28 PROCEDURE — 33274 TCAT INSJ/RPL PERM LDLS PM: CPT | Mod: Q0

## 2022-09-28 PROCEDURE — 71250 CT THORAX DX C-: CPT | Mod: 26,MA

## 2022-09-28 RX ORDER — CHLORHEXIDINE GLUCONATE 213 G/1000ML
1 SOLUTION TOPICAL
Refills: 0 | Status: COMPLETED | OUTPATIENT
Start: 2022-09-28 | End: 2022-09-29

## 2022-09-28 RX ORDER — ALBUTEROL 90 UG/1
2 AEROSOL, METERED ORAL
Qty: 0 | Refills: 0 | DISCHARGE

## 2022-09-28 RX ORDER — FUROSEMIDE 40 MG
1 TABLET ORAL
Qty: 0 | Refills: 0 | DISCHARGE

## 2022-09-28 RX ORDER — CICLOPIROX OLAMINE 7.7 MG/G
0 CREAM TOPICAL
Qty: 0 | Refills: 0 | DISCHARGE

## 2022-09-28 RX ORDER — FLUTICASONE PROPIONATE 50 MCG
2 SPRAY, SUSPENSION NASAL
Qty: 0 | Refills: 0 | DISCHARGE

## 2022-09-28 RX ORDER — LEVOTHYROXINE SODIUM 125 MCG
25 TABLET ORAL DAILY
Refills: 0 | Status: DISCONTINUED | OUTPATIENT
Start: 2022-09-28 | End: 2022-10-01

## 2022-09-28 RX ORDER — DORZOLAMIDE HYDROCHLORIDE TIMOLOL MALEATE 20; 5 MG/ML; MG/ML
2 SOLUTION/ DROPS OPHTHALMIC
Refills: 0 | Status: DISCONTINUED | OUTPATIENT
Start: 2022-09-28 | End: 2022-09-28

## 2022-09-28 RX ORDER — CALCIUM ACETATE 667 MG
667 TABLET ORAL
Refills: 0 | Status: DISCONTINUED | OUTPATIENT
Start: 2022-09-28 | End: 2022-10-01

## 2022-09-28 RX ORDER — LORATADINE 10 MG/1
1 TABLET ORAL
Qty: 0 | Refills: 0 | DISCHARGE

## 2022-09-28 RX ORDER — LATANOPROST 0.05 MG/ML
1 SOLUTION/ DROPS OPHTHALMIC; TOPICAL AT BEDTIME
Refills: 0 | Status: DISCONTINUED | OUTPATIENT
Start: 2022-09-28 | End: 2022-10-01

## 2022-09-28 RX ORDER — ACETAMINOPHEN 500 MG
650 TABLET ORAL ONCE
Refills: 0 | Status: COMPLETED | OUTPATIENT
Start: 2022-09-28 | End: 2022-09-28

## 2022-09-28 RX ORDER — FINASTERIDE 5 MG/1
5 TABLET, FILM COATED ORAL DAILY
Refills: 0 | Status: DISCONTINUED | OUTPATIENT
Start: 2022-09-28 | End: 2022-10-01

## 2022-09-28 RX ORDER — ACETAMINOPHEN 500 MG
1 TABLET ORAL
Qty: 0 | Refills: 0 | DISCHARGE

## 2022-09-28 RX ORDER — LANOLIN ALCOHOL/MO/W.PET/CERES
3 CREAM (GRAM) TOPICAL AT BEDTIME
Refills: 0 | Status: DISCONTINUED | OUTPATIENT
Start: 2022-09-28 | End: 2022-10-01

## 2022-09-28 RX ORDER — TIMOLOL 0.5 %
1 DROPS OPHTHALMIC (EYE)
Qty: 0 | Refills: 0 | DISCHARGE

## 2022-09-28 RX ORDER — ACETAMINOPHEN 500 MG
650 TABLET ORAL EVERY 6 HOURS
Refills: 0 | Status: DISCONTINUED | OUTPATIENT
Start: 2022-09-28 | End: 2022-10-01

## 2022-09-28 RX ORDER — SIMVASTATIN 20 MG/1
10 TABLET, FILM COATED ORAL AT BEDTIME
Refills: 0 | Status: DISCONTINUED | OUTPATIENT
Start: 2022-09-28 | End: 2022-10-01

## 2022-09-28 RX ORDER — SERTRALINE 25 MG/1
50 TABLET, FILM COATED ORAL DAILY
Refills: 0 | Status: DISCONTINUED | OUTPATIENT
Start: 2022-09-28 | End: 2022-10-01

## 2022-09-28 RX ORDER — AMLODIPINE BESYLATE 2.5 MG/1
2.5 TABLET ORAL DAILY
Refills: 0 | Status: DISCONTINUED | OUTPATIENT
Start: 2022-09-28 | End: 2022-09-28

## 2022-09-28 RX ORDER — SODIUM BICARBONATE 1 MEQ/ML
2 SYRINGE (ML) INTRAVENOUS
Qty: 0 | Refills: 0 | DISCHARGE

## 2022-09-28 RX ORDER — BRIMONIDINE TARTRATE 2 MG/MG
1 SOLUTION/ DROPS OPHTHALMIC
Refills: 0 | Status: DISCONTINUED | OUTPATIENT
Start: 2022-09-28 | End: 2022-10-01

## 2022-09-28 RX ORDER — TAMSULOSIN HYDROCHLORIDE 0.4 MG/1
0.4 CAPSULE ORAL AT BEDTIME
Refills: 0 | Status: DISCONTINUED | OUTPATIENT
Start: 2022-09-28 | End: 2022-10-01

## 2022-09-28 RX ORDER — APIXABAN 2.5 MG/1
5 TABLET, FILM COATED ORAL
Refills: 0 | Status: DISCONTINUED | OUTPATIENT
Start: 2022-09-28 | End: 2022-09-28

## 2022-09-28 RX ORDER — DORZOLAMIDE HYDROCHLORIDE 20 MG/ML
1 SOLUTION/ DROPS OPHTHALMIC
Qty: 0 | Refills: 0 | DISCHARGE

## 2022-09-28 RX ORDER — TRAMADOL HYDROCHLORIDE 50 MG/1
25 TABLET ORAL ONCE
Refills: 0 | Status: DISCONTINUED | OUTPATIENT
Start: 2022-09-28 | End: 2022-09-29

## 2022-09-28 RX ORDER — BUDESONIDE AND FORMOTEROL FUMARATE DIHYDRATE 160; 4.5 UG/1; UG/1
2 AEROSOL RESPIRATORY (INHALATION)
Refills: 0 | Status: DISCONTINUED | OUTPATIENT
Start: 2022-09-28 | End: 2022-10-01

## 2022-09-28 RX ORDER — DORZOLAMIDE HYDROCHLORIDE TIMOLOL MALEATE 20; 5 MG/ML; MG/ML
1 SOLUTION/ DROPS OPHTHALMIC
Refills: 0 | Status: DISCONTINUED | OUTPATIENT
Start: 2022-09-28 | End: 2022-10-01

## 2022-09-28 RX ORDER — ONDANSETRON 8 MG/1
4 TABLET, FILM COATED ORAL EVERY 8 HOURS
Refills: 0 | Status: DISCONTINUED | OUTPATIENT
Start: 2022-09-28 | End: 2022-10-01

## 2022-09-28 RX ORDER — FERROUS SULFATE 325(65) MG
1 TABLET ORAL
Qty: 0 | Refills: 0 | DISCHARGE

## 2022-09-28 RX ORDER — LIDOCAINE 4 G/100G
1 CREAM TOPICAL DAILY
Refills: 0 | Status: DISCONTINUED | OUTPATIENT
Start: 2022-09-28 | End: 2022-10-01

## 2022-09-28 RX ORDER — TETANUS TOXOID, REDUCED DIPHTHERIA TOXOID AND ACELLULAR PERTUSSIS VACCINE, ADSORBED 5; 2.5; 8; 8; 2.5 [IU]/.5ML; [IU]/.5ML; UG/.5ML; UG/.5ML; UG/.5ML
0.5 SUSPENSION INTRAMUSCULAR ONCE
Refills: 0 | Status: COMPLETED | OUTPATIENT
Start: 2022-09-28 | End: 2022-09-28

## 2022-09-28 RX ADMIN — APIXABAN 5 MILLIGRAM(S): 2.5 TABLET, FILM COATED ORAL at 17:30

## 2022-09-28 RX ADMIN — Medication 650 MILLIGRAM(S): at 11:29

## 2022-09-28 RX ADMIN — FINASTERIDE 5 MILLIGRAM(S): 5 TABLET, FILM COATED ORAL at 11:30

## 2022-09-28 RX ADMIN — Medication 650 MILLIGRAM(S): at 16:46

## 2022-09-28 RX ADMIN — TAMSULOSIN HYDROCHLORIDE 0.4 MILLIGRAM(S): 0.4 CAPSULE ORAL at 22:00

## 2022-09-28 RX ADMIN — CHLORHEXIDINE GLUCONATE 1 APPLICATION(S): 213 SOLUTION TOPICAL at 23:23

## 2022-09-28 RX ADMIN — Medication 667 MILLIGRAM(S): at 17:30

## 2022-09-28 RX ADMIN — Medication 650 MILLIGRAM(S): at 05:29

## 2022-09-28 RX ADMIN — TETANUS TOXOID, REDUCED DIPHTHERIA TOXOID AND ACELLULAR PERTUSSIS VACCINE, ADSORBED 0.5 MILLILITER(S): 5; 2.5; 8; 8; 2.5 SUSPENSION INTRAMUSCULAR at 04:14

## 2022-09-28 RX ADMIN — Medication 3 MILLIGRAM(S): at 21:59

## 2022-09-28 RX ADMIN — SERTRALINE 50 MILLIGRAM(S): 25 TABLET, FILM COATED ORAL at 11:30

## 2022-09-28 RX ADMIN — Medication 650 MILLIGRAM(S): at 02:33

## 2022-09-28 RX ADMIN — LATANOPROST 1 DROP(S): 0.05 SOLUTION/ DROPS OPHTHALMIC; TOPICAL at 23:23

## 2022-09-28 RX ADMIN — Medication 667 MILLIGRAM(S): at 21:37

## 2022-09-28 RX ADMIN — Medication 650 MILLIGRAM(S): at 12:36

## 2022-09-28 RX ADMIN — SIMVASTATIN 10 MILLIGRAM(S): 20 TABLET, FILM COATED ORAL at 22:00

## 2022-09-28 RX ADMIN — BRIMONIDINE TARTRATE 1 DROP(S): 2 SOLUTION/ DROPS OPHTHALMIC at 23:22

## 2022-09-28 NOTE — H&P ADULT - PROBLEM SELECTOR PLAN 8
on eliquis   UA with + pyuria but denies any symptoms- will monitor off antibiotic for now. follow up urine culture result.  PT jaradal ordered  anticipate return back to [facility] upon discharge  copy of MOL in chart stating patient is full code

## 2022-09-28 NOTE — H&P ADULT - PROBLEM SELECTOR PLAN 1
suspect more likely a mechanical fall but patient endorsed feeling dizzy prior to fall- check orthostatic vital  will interrogate PPM once more information is available regarding the model- patient reports he was told the "battery was dying"   elevated trop noted but may be in setting of ESRD. EKG without acute ST-T changes, no complaint of chest pain   last outpatient TTE result in 7/2022 showed:  - "EF 60%, mild dilated LA, trace MR/AI, mild pulmonary insufficiency, mild TR, arotic sclerosis without valve restriction"  last outpatient carotid duplex result in 7/2022 showed: 16-49% stenosis in bilateral ICA     patient c/o neck, back, b/l thigh and knee pain- CT C/T/L spine without evidence of fracture but showed "moderate to severe central canal stenosis at C5-C6 secondary to disc osteophyte complex" and evidence of other degenerative disc disease with arthrosis throughout.   can consider MRI spine if his PPM is compatible suspect more likely a mechanical fall but patient endorsed feeling dizzy prior to fall- check orthostatic vital  will interrogate PPM once more information is available regarding the model- patient reports he was told the "battery was dying"   elevated trop noted but may be in setting of ESRD. EKG without acute ST-T changes, no complaint of chest pain   last outpatient TTE result in 7/2022 showed:  - "EF 60%, mild dilated LA, trace MR/AI, mild pulmonary insufficiency, mild TR, arotic sclerosis without valve restriction"  last outpatient carotid duplex result in 7/2022 showed: 16-49% stenosis in bilateral ICA     patient c/o neck, back, b/l thigh and knee pain- CT C/T/L spine without evidence of fracture but showed "moderate to severe central canal stenosis at C5-C6 secondary to disc osteophyte complex" and evidence of other degenerative disc disease with arthrosis throughout.   per discussion with the patient's brother ([place]), will defer further imaging (i.e., MRI) at this time as unlikely that the patient will be a candidate for any aggressive intervention. suspect more likely a mechanical fall but patient endorsed feeling dizzy prior to fall- check orthostatic vital  patient reports he was told the PPM "battery was dying" - will attempt to get PPM interrogated  elevated trop noted but may be in setting of ESRD. EKG without acute ST-T changes, no complaint of chest pain   last outpatient TTE result in 7/2022 showed:  - "EF 60%, mild dilated LA, trace MR/AI, mild pulmonary insufficiency, mild TR, arotic sclerosis without valve restriction"  last outpatient carotid duplex result in 7/2022 showed: 16-49% stenosis in bilateral ICA     patient c/o neck, back, b/l thigh and knee pain- CT C/T/L spine without evidence of fracture but showed "moderate to severe central canal stenosis at C5-C6 secondary to disc osteophyte complex" and evidence of other degenerative disc disease with arthrosis throughout.   per discussion with the patient's brother ([place]), will defer further imaging (i.e., MRI) at this time as unlikely that the patient will be a candidate for any aggressive intervention. suspect more likely a mechanical fall vs vasovagal mediated vs other. Patient endorsed feeling dizzy prior to fall- check orthostatic vital  patient reports he was told the PPM "battery was dying" - will attempt to get PPM interrogated  elevated trop noted but may be in setting of ESRD. EKG without acute ST-T changes, no complaint of chest pain   last outpatient TTE result in 7/2022 showed:  - "EF 60%, mild dilated LA, trace MR/AI, mild pulmonary insufficiency, mild TR, arotic sclerosis without valve restriction"  last outpatient carotid duplex result in 7/2022 showed: 16-49% stenosis in bilateral ICA     patient c/o neck, back, b/l thigh and knee pain- CT C/T/L spine without evidence of fracture but showed "moderate to severe central canal stenosis at C5-C6 secondary to disc osteophyte complex" and evidence of other degenerative disc disease with arthrosis throughout.   per discussion with the patient's brother ([place]), will defer further imaging (i.e., MRI) at this time as unlikely that the patient will be a candidate for any aggressive intervention.

## 2022-09-28 NOTE — ED PROVIDER NOTE - ATTENDING CONTRIBUTION TO CARE
RGUJRAL 73yo male hx afib on eliquis and listed BIB EMS s/p unwitnessed fall at NH. Pt states he was on the toilet and felt dizzy while having BM and fell onto his R face. Pt complains of pain at site and left chest wall pain.   Patient is awake, alert x 1-2.  GCS15. R forhead laceration 2cm. PERRL.   Neck: C Collar in place.  Chest is clear to auscultation. +S1S2. L chest wall ttp.   Abdomen is soft nondistended/nontender +BS. No rebound or guarding.  Pelvis is stable. Full ROM B/L hips.   T10-L1 ttp.   Pt is on dialysis last dialyzed on Monday. Check labs, CT to eval for fall and dizziness. RGUJRAL 71yo male hx afib on eliquis and listed BIB EMS s/p unwitnessed fall at NH. Pt states he was on the toilet and felt dizzy while having BM and fell onto his R face. Pt complains of pain at site and left chest wall pain.   Patient is awake, alert x 1-2.  GCS15. R forhead laceration 2cm. PERRL.   Neck: C Collar in place.  Chest is clear to auscultation. +S1S2. L chest wall ttp.   Abdomen is soft nondistended/nontender +BS. No rebound or guarding.  Pelvis is stable. Full ROM B/L hips.   T10-L1 ttp.   Pt is on dialysis last dialyzed on Friday. Check labs, CT to eval for fall and dizziness.

## 2022-09-28 NOTE — PATIENT PROFILE ADULT - FALL HARM RISK - HARM RISK INTERVENTIONS
Assistance with ambulation/Assistance OOB with selected safe patient handling equipment/Communicate Risk of Fall with Harm to all staff/Discuss with provider need for PT consult/Monitor gait and stability/Reinforce activity limits and safety measures with patient and family/Tailored Fall Risk Interventions/Visual Cue: Yellow wristband and red socks/Bed in lowest position, wheels locked, appropriate side rails in place/Call bell, personal items and telephone in reach/Instruct patient to call for assistance before getting out of bed or chair/Non-slip footwear when patient is out of bed/[place] to call system/Physically safe environment - no spills, clutter or unnecessary equipment/Purposeful Proactive Rounding/Room/bathroom lighting operational, light cord in reach Assistance with ambulation/Assistance OOB with selected safe patient handling equipment/Communicate Risk of Fall with Harm to all staff/Discuss with provider need for PT consult/Monitor gait and stability/Provide patient with walking aids - walker, cane, crutches/Reinforce activity limits and safety measures with patient and family/Tailored Fall Risk Interventions/Visual Cue: Yellow wristband and red socks/Bed in lowest position, wheels locked, appropriate side rails in place/Call bell, personal items and telephone in reach/Instruct patient to call for assistance before getting out of bed or chair/Non-slip footwear when patient is out of bed/[place] to call system/Physically safe environment - no spills, clutter or unnecessary equipment/Purposeful Proactive Rounding/Room/bathroom lighting operational, light cord in reach

## 2022-09-28 NOTE — PATIENT PROFILE ADULT - FUNCTIONAL ASSESSMENT - BASIC MOBILITY 6.
Vascular access team called, voicemail with call back number left. Awaiting call back.    2 = A lot of assistance

## 2022-09-28 NOTE — ED PROVIDER NOTE - CLINICAL SUMMARY MEDICAL DECISION MAKING FREE TEXT BOX
Elderly pt, fall from sitting into wall, on eliquis. VSS. Exam w/ cspine and l spine ttp and small lac to R frontal region. VSS. Will obtain labs, CTs, ekg, lac repair reassess. Likely dc back to nursing home.

## 2022-09-28 NOTE — H&P ADULT - PROBLEM SELECTOR PLAN 2
renal consulted for HD (on M/T/Th/Fri)  patient noted with new exophytic right lower pole lesion- will discuss with brother regarding further work up and can likely pursue as outpatient (i.e., renal ultrasound vs MRI) renal consulted for HD (on M/T/Th/Fri)  patient noted with new exophytic right lower pole lesion- discussed findings with brother([place]) regarding further work up with renal ultrasound vs MRI and he is in agreement to have this looked into as outpatient.

## 2022-09-28 NOTE — H&P ADULT - MUSCULOSKELETAL COMMENTS
neck, mid-back, b/l thigh/knee pain + tenderness to palpation in mid-back, intact ROM of b/l knees, moves all extremities, right great toe amputated

## 2022-09-28 NOTE — ED PROVIDER NOTE - OBJECTIVE STATEMENT
73yo M hx of HTN, HLD, CAD, pacemaker here for fall. Pt was on toilet stooling yest 73yo M hx of HTN, HLD, CAD, pacemaker here for fall. Pt was on toilet stooling tonight, fell forward and hit his head on the wall. Questionable LOC. Pt endorsing pain in the neck and low back. On eliquis. EMS vitals stable. Otherwise no cp, n/v, abdominal pain, sob, or issues with urine/bowel. 73yo M hx of HTN, HLD, CAD, pacemaker, ESRD on HD here for fall. Pt was on toilet stooling tonight, fell forward and hit his head on the wall. Questionable LOC. Pt endorsing pain in the neck and low back. On eliquis. EMS vitals stable. Otherwise no cp, n/v, abdominal pain, sob, or issues with urine/bowel.

## 2022-09-28 NOTE — ED PROVIDER NOTE - NSICDXPASTMEDICALHX_GEN_ALL_CORE_FT
PAST MEDICAL HISTORY:  ASHD (arteriosclerotic heart disease)     CAD (coronary artery disease)     CRI (chronic renal insufficiency)     Diabetes mellitus type II     Hyperlipidemia     Hypertension     Pacemaker

## 2022-09-28 NOTE — H&P ADULT - NSHPADDITIONALINFOADULT_GEN_ALL_CORE
attending addendum:  spoke to EP team regarding PPM interrogation findings- Battery longevity estimated at 5 months. Patient is not pacemaker dependent and episodes recorded as NSVT consistent with RV lead noise - will likely plan for leadless PPM placement prior to discharge. recommend to monitor on tele for now. He had TTE recently as outpatient in 7/2022 that showed EF of 60% (report in the chart).     Plan of care discussed with medicine ACP (Camryn)

## 2022-09-28 NOTE — H&P ADULT - NSICDXPASTSURGICALHX_GEN_ALL_CORE_FT
PAST SURGICAL HISTORY:  Cardiac pacemaker     Coronary stent     History of amputation of toe right great toe    Hx of CABG

## 2022-09-28 NOTE — ED ADULT NURSE NOTE - OBJECTIVE STATEMENT
Pt is a 73 y/o male BIBEMS s/p syncopal episode and fall. Pt coming from nursing home, was attempting to have a bowel movement when he states he became dizzy while sitting and fell forward. States he had LOC and is on Eliquis. Sustained laceration approx 1 inch above R eyebrow. Has C-collar in place on arrival. C/o neck pain and back pain between thoracic/ lumbar. Denies CP, SOB, N/V/D, numbness, tingling, cough, fever, chills, dizziness, weakness. A&Ox2 to person and place (states "hospital" does not know which). Breathing unlabored and spontaneous. Abdomen soft, nontender, nondistended. Full ROM and strength of extremities. Safety and comfort measures provided, bed in lowest position.

## 2022-09-28 NOTE — PATIENT PROFILE ADULT - ARRIVAL FROM
Per Dr. Wren, Unlikely, we can do US to follow up on this but kidney cysts do not generally cause hematuria.    Nursing home

## 2022-09-28 NOTE — PROCEDURE NOTE - ADDITIONAL PROCEDURE DETAILS
Indication for interrogation: Fall  Presenting rhythm: likely AF 60s, occ ventricular pacing  Measured data WNL  Patient is NOT pacemaker dependent  Episodes recorded as NSVT consistent with RV lead noise - likely a known issue given device last checked 7/19/22, will try to reach outside EP to discuss

## 2022-09-28 NOTE — H&P ADULT - PROBLEM SELECTOR PLAN 6
continue home proscar and flomax but if evidence of orthostatic hypotension, consider holding flomax

## 2022-09-28 NOTE — H&P ADULT - ASSESSMENT
71 y/o male with hx of CAD s/p stent/CABG, BPH, hypothyroid, chronic afib on eliquis, dementia, ESRD on HD (M/T/Th/Fri), HTN, asthma, glaucoma, prediabetes, prior COVID infection s/p vaccination who resides at [facility] presents after a fall with right periorbital hematoma/laceration s/p repair.

## 2022-09-28 NOTE — H&P ADULT - PROBLEM SELECTOR PLAN 3
continue home eliquis 5mg BID  not on any AVN blocker at home  will attempt to have PPM interrogated once more information available regarding the model

## 2022-09-28 NOTE — ED PROVIDER NOTE - PHYSICAL EXAMINATION
General: NAD  HEENT: NC, PERRL, +cspine ttp +R frontal hematoma above the eyebrow  Cardiac: RRR, no murmurs, 2+ peripheral pulses  Chest: CTA, -clavicular ttp or deformities  Abdomen: soft, non-distended, bowel sounds present, no ttp, no rebound or guarding  Back: +L spine ttp w/o deformities  Extremities: no peripheral edema or leg size discrepancies, +stable pelvis, -tenderness of the greater trochanters.   Skin: no rashes or ecchymosis  Neuro: AAOx1.5, cns intact, motor equal, sensory grossly intact, no dysmetria, stable gait  Psych: mood and affect appropriate

## 2022-09-28 NOTE — H&P ADULT - NSICDXPASTMEDICALHX_GEN_ALL_CORE_FT
PAST MEDICAL HISTORY:  ASHD (arteriosclerotic heart disease)     Asthma     CAD (coronary artery disease) s/p stent, CABG    Chronic atrial fibrillation     CRI (chronic renal insufficiency) ESRD on HD    Dementia     Diabetes mellitus type II     Enlarged prostate     H/O carotid stenosis     Hyperlipidemia     Hypertension     Hypothyroid     Pacemaker

## 2022-09-28 NOTE — CONSULT NOTE ADULT - SUBJECTIVE AND OBJECTIVE BOX
CHIEF COMPLAINT: Fall/dizzy    HISTORY OF PRESENT ILLNESS: 73 y/o man with Dementia living at a NH, MR, HTN, CAD s/p CABG x3 in 2013, BSC PPM (), AF on Eliquis, CKD3, asthma, hypothyroidism, BPH s/p TURP, presenting from group home with a fall. Per chart/patient he reports sitting on the toilet when he ?felt dizzy and fell forward, hitting his head resulting in a head lac requiring stitches. PPM was interrogated revealing RV lead noise (see procedure note), RV pacing 22% of the time at VVI 60 bpm. Per emergency contact he believes PPM is checked at his NH facility. He is alert, but confused. He currently denies CP, SOB, dizziness or palpitations.      Allergies:  Levaquin (Anaphylaxis; Flushing; Short breath)    	  MEDICATIONS:  amLODIPine   Tablet 2.5 milliGRAM(s) Oral daily  apixaban 5 milliGRAM(s) Oral two times a day  tamsulosin 0.4 milliGRAM(s) Oral at bedtime  budesonide  80 MICROgram(s)/formoterol 4.5 MICROgram(s) Inhaler 2 Puff(s) Inhalation two times a day  acetaminophen     Tablet .. 650 milliGRAM(s) Oral every 6 hours PRN  melatonin 3 milliGRAM(s) Oral at bedtime  ondansetron Injectable 4 milliGRAM(s) IV Push every 8 hours PRN  sertraline 50 milliGRAM(s) Oral daily  finasteride 5 milliGRAM(s) Oral daily  levothyroxine 25 MICROGram(s) Oral daily  simvastatin 10 milliGRAM(s) Oral at bedtime  brimonidine 0.2% Ophthalmic Solution 1 Drop(s) Both EYES two times a day  calcium acetate 667 milliGRAM(s) Oral three times a day with meals  dorzolamide 2%/timolol 0.5% Ophthalmic Solution 2 Drop(s) Both EYES two times a day  latanoprost 0.005% Ophthalmic Solution 1 Drop(s) Both EYES at bedtime      PAST MEDICAL & SURGICAL HISTORY:  ASHD (arteriosclerotic heart disease)      CAD (coronary artery disease)  s/p stent, CABG      CRI (chronic renal insufficiency)  ESRD on HD      Diabetes mellitus type II      Hypertension      Hyperlipidemia      Pacemaker      Chronic atrial fibrillation      Asthma      Hypothyroid      Dementia      H/O carotid stenosis      Enlarged prostate      Hx of CABG      Coronary stent      Cardiac pacemaker      History of amputation of toe  right great toe          FAMILY HISTORY:  Family history of chronic ischemic heart disease (Father)  father  of MI at age 59        SOCIAL HISTORY:    Unable to obtain      REVIEW OF SYSTEMS:  See HPI. Otherwise, 12 point ROS done and otherwise negative.    PHYSICAL EXAM:  T(C): 36.7 (22 @ 05:15), Max: 36.8 (22 @ 02:35)  HR: 61 (22 @ 08:17) (61 - 67)  BP: 129/69 (22 @ 08:17) (123/58 - 146/87)  RR: 16 (22 @ 08:17) (15 - 20)  SpO2: 100% (22 @ 08:17) (97% - 100%)  Wt(kg): --  I&O's Summary      Appearance: Normal	  HEENT:  PERRL, EOMI	  Cardiovascular: Normal S1 S2, No JVD, No murmurs, No edema  Respiratory: Lungs clear to auscultation	  Psychiatry: Alert, confused, Mood & affect appropriate  Gastrointestinal:  Soft, Non-tender, + BS	  Skin: No rashes, No ecchymoses, No cyanosis	  Neurologic: Non-focal  Extremities: No clubbing, cyanosis or edema  Vascular: Peripheral pulses palpable 2+ bilaterally        LABS:	 	    CBC Full  -  ( 28 Sep 2022 02:48 )  WBC Count : 3.50 K/uL  Hemoglobin : 9.5 g/dL  Hematocrit : 29.3 %  Platelet Count - Automated : 149 K/uL  Mean Cell Volume : 93.9 fl  Mean Cell Hemoglobin : 30.4 pg  Mean Cell Hemoglobin Concentration : 32.4 gm/dL  Auto Neutrophil # : 2.95 K/uL  Auto Lymphocyte # : 0.25 K/uL  Auto Monocyte # : 0.15 K/uL  Auto Eosinophil # : 0.06 K/uL  Auto Basophil # : 0.09 K/uL  Auto Neutrophil % : 84.4 %  Auto Lymphocyte % : 7.0 %  Auto Monocyte % : 4.3 %  Auto Eosinophil % : 1.7 %  Auto Basophil % : 2.6 %        135  |  95<L>  |  46<H>  ----------------------------<  159<H>  5.0   |  29  |  3.93<H>    Ca    9.3      28 Sep 2022 02:48  Mg     2.2         TPro  6.9  /  Alb  4.5  /  TBili  0.3  /  DBili  x   /  AST  21  /  ALT  20  /  AlkPhos  108          TELEMETRY: AF 60s	      < from: 12 Lead ECG (17 @ 22:16) >    Ventricular Rate 69 BPM    Atrial Rate 102 BPM    QRS Duration 96 ms    Q-T Interval 418 ms    QTC Calculation(Bezet) 447 ms    R Axis 64 degrees    T Axis 36 degrees    Diagnosis Line Atrial fibrillation    < end of copied text >    	    
NEPHROLOGY CONSULTATION    CHIEF COMPLAINT: fall    HPI:  Pt is 71 yo male with hx of CAD s/p stent/CABG, BPH, hypothyroid, chronic afib on eliquis, dementia, ESRD on HD, asthma, glaucoma, prior COVID infection s/p vaccination, who resides at [facility] presents after a fall. No chest pain, SOB, D/C/F/C/N/V. Patient was reportedly in the bathroom with an aide who couldn't stop the patient from falling. Patient usually ambulates with a walker. Unclear if the patient had LOC. Patient is unable to provide reliable hx or ROS. Due for HD today.    ROS:  as above    Allergies:  Levaquin (Anaphylaxis; Flushing; Short breath)    PAST MEDICAL & SURGICAL HISTORY:  CAD (coronary artery disease)  s/p stent, CABG  ESRD on HD  Diabetes mellitus type II  Hypertension  Hyperlipidemia  Pacemaker  Chronic atrial fibrillation  Asthma  Hypothyroid  Dementia  H/O carotid stenosis  Enlarged prostate  History of amputation of toe  right great toe    SOCIAL HISTORY:  negative    FAMILY HISTORY:  Family history of chronic ischemic heart disease (Father)  father  of MI at age 59    MEDICATIONS  (STANDING):  amLODIPine   Tablet 2.5 milliGRAM(s) Oral daily  brimonidine 0.2% Ophthalmic Solution 1 Drop(s) Both EYES two times a day  budesonide  80 MICROgram(s)/formoterol 4.5 MICROgram(s) Inhaler 2 Puff(s) Inhalation two times a day  calcium acetate 667 milliGRAM(s) Oral three times a day with meals  chlorhexidine 4% Liquid 1 Application(s) Topical <User Schedule>  dorzolamide 2%/timolol 0.5% Ophthalmic Solution 1 Drop(s) Both EYES two times a day  finasteride 5 milliGRAM(s) Oral daily  latanoprost 0.005% Ophthalmic Solution 1 Drop(s) Both EYES at bedtime  levothyroxine 25 MICROGram(s) Oral daily  lidocaine   4% Patch 1 Patch Transdermal daily  melatonin 3 milliGRAM(s) Oral at bedtime  sertraline 50 milliGRAM(s) Oral daily  simvastatin 10 milliGRAM(s) Oral at bedtime  tamsulosin 0.4 milliGRAM(s) Oral at bedtime  traMADol 25 milliGRAM(s) Oral once    Vital Signs Last 24 Hrs  T(C): 36.5 (22 @ 20:42), Max: 36.8 (22 @ 02:35)  T(F): 97.7 (22 @ 20:42), Max: 98.3 (22 @ 02:35)  HR: 76 (22 @ 20:42) (57 - 84)  BP: 121/77 (22 @ 20:42) (101/61 - 146/87)  BP(mean): 89 (22 @ 09:50) (78 - 115)  RR: 18 (22 @ 20:42) (15 - 20)  SpO2: 97% (22 @ 20:42) (97% - 100%)    s1s2  b/l air entry  soft, ND  no edema    LABS:                        9.5    3.50  )-----------( 149      ( 28 Sep 2022 02:48 )             29.3         135  |  95<L>  |  46<H>  ----------------------------<  159<H>  5.0   |  29  |  3.93<H>    Ca    9.3      28 Sep 2022 02:48  Mg     2.2         TPro  6.9  /  Alb  4.5  /  TBili  0.3  /  DBili  x   /  AST  21  /  ALT  20  /  AlkPhos  108      Urinalysis Basic - ( 28 Sep 2022 04:38 )    Color: Yellow / Appearance: Slightly Turbid / S.012 / pH: x  Gluc: x / Ketone: Negative  / Bili: Negative / Urobili: Negative   Blood: x / Protein: 100 mg/dL / Nitrite: Negative   Leuk Esterase: Large / RBC: 27 /hpf /  /HPF   Sq Epi: x / Non Sq Epi: 1 /hpf / Bacteria: Many    LIVER FUNCTIONS - ( 28 Sep 2022 02:48 )  Alb: 4.5 g/dL / Pro: 6.9 g/dL / ALK PHOS: 108 U/L / ALT: 20 U/L / AST: 21 U/L / GGT: x           PT/INR - ( 28 Sep 2022 02:48 )   PT: 18.4 sec;   INR: 1.58 ratio      PTT - ( 28 Sep 2022 02:48 )  PTT:34.2 sec      A/P:    S/p fall  ESRD on HD  HD today  Epoetin w/HD 3 x week  Renal diet  Will follow    [phone]

## 2022-09-28 NOTE — ED PROVIDER NOTE - NSICDXFAMILYHX_GEN_ALL_CORE_FT
FAMILY HISTORY:  Father  Still living? Unknown  Family history of chronic ischemic heart disease, Age at diagnosis: Age Unknown

## 2022-09-28 NOTE — ED ADULT TRIAGE NOTE - INTERNATIONAL TRAVEL
MEDICARE WELLNESS VISIT NOTE      HISTORY OF PRESENT ILLNESS:   Viri Isaac presents for her Subsequent Annual Medicare Wellness Visit.   She has no current complaints or concerns.      Patient Care Team:  Zaki Lam MD as PCP - General (Family Practice)        Patient Active Problem List    Diagnosis Date Noted   • HLD (hyperlipidemia)      Priority: Low   • Depression      Priority: Low         Past Medical History:   Diagnosis Date   • Anxiety    • Colon polyp, hyperplastic     family history colon cancer   • Depression    • Esophageal reflux    • HLD (hyperlipidemia)    • Osteopenia          Past Surgical History:   Procedure Laterality Date   • Cataract extraction, bilateral     •  section, low transverse     • Colonoscopy  2016    Dr. Zepeda   • Colonoscopy  2017   • Esophagogastroduodenoscopy  2016    Dr. Zepeda   • Hemorhoidectomy int ext single column group     • Tonsillectomy and adenoidectomy           Social History   Substance Use Topics   • Smoking status: Former Smoker     Packs/day: 0.50     Quit date: 2015   • Smokeless tobacco: Never Used   • Alcohol use No     Drug use:    Drug Use:    No              Family History - Reviewed    Current Outpatient Prescriptions   Medication Sig Dispense Refill   • CALCIUM PO      • sertraline (ZOLOFT) 100 MG tablet Take 1 and 1/2 tablets by mouth daily. 135 tablet 0   • atorvastatin (LIPITOR) 40 MG tablet Take 1 tablet by mouth daily. 90 tablet 0   • IRON PO      • Ascorbic Acid (VITAMIN C PO)      • Cholecalciferol (VITAMIN D PO)        No current facility-administered medications for this visit.         The following items on the Medicare Health Risk Assessment were found to be positive  5.) Do you do moderate to strenuous exercise (brisk walk) for about 20 minutes for 3 or more days per week?:  No, I usually do not exercise this much     6 a.) How many servings of Fruits and Vegetables do you have each day ( 1 serving =  1 piece of fruit, 1/2 cup fruits or vegetables):  1 per day     6 b.) How many servings of High Fiber / Whole Grain Foods to you have each day ( 1 serving = 1 cup cold cereal, 1/2 cup cooked cereal, 1 slice bread):  1 per day     11j.) Driven/Ridden in a car without wearing your seatbelt:  Always         Vision and hearing screens:   Advance Directive document: Yes  The patient has the following Advance Directive documents:  Power of  for Health Care  Cognitive Assessment: no evidence of cognitive dysfunction by direct observation    Recent PHQ 2/9 Score    PHQ 2:       PHQ 9:       DEPRESSION ASSESSMENT/PLAN:       Body mass index is 23.05 kg/m².    BMI ASSESSMENT/PLAN:  Patient BMI is within normal range.     Needed Screening/Treatment:   Annual mammogram   Needed follow up:  None    See orders.   See Patient Instructions section.   No Follow-up on file.   No

## 2022-09-28 NOTE — CHART NOTE - NSCHARTNOTEFT_GEN_A_CORE
Spoke to primary nephrology team, states that they will be taking over patient care, will defer management as per them

## 2022-09-28 NOTE — H&P ADULT - HISTORY OF PRESENT ILLNESS
History obtained from chart review and the patient's brother ([place]- [phone]).     71 y/o male with hx of CAD s/p stent/CABG, BPH, hypothyroid, chronic afib on eliquis, dementia, ESRD on HD (M/T/Th/Fri), HTN, asthma, glaucoma, prediabetes, prior COVID infection s/p vaccination, who resides at [facility] presents after a fall. Patient reports he was in his usual state of health until yesterday, he fell and hit his head while on the toilet trying to have a bowel movement. He endorsed feeling dizzy but denied chest pain or palpitation prior to falling. Patient was reportedly in the bathroom with an aide who couldn't stop the patient from falling. Patient usually ambulates with a walker. Unclear if the patient had LOC. Patient currently endorses headache, neck/back, knee pain but denies weakness or numbness in legs, dysuria, fevers.    History obtained from chart review and the patient's brother ([place]- [phone]).     73 y/o male with hx of CAD s/p stent/CABG, BPH, hypothyroid, chronic afib on eliquis, dementia, ESRD on HD (M/T/Th/Fri), HTN, asthma, glaucoma, prediabetes, prior COVID infection s/p vaccination, who resides at [facility] presents after a fall. Patient reports he was in his usual state of health until yesterday, he fell and hit his head while on the toilet trying to have a bowel movement. He endorsed feeling dizzy but denied chest pain or palpitation prior to falling. Patient was reportedly in the bathroom with an aide who couldn't stop the patient from falling. Patient usually ambulates with a walker. Unclear if the patient had LOC. Patient currently endorses headache, neck/back, knee pain but denies weakness or numbness in legs, dysuria, fevers.     Per brother([place]), patient ambulates with a walker and assistance but is mostly in a wheelchair.

## 2022-09-29 LAB
ANION GAP SERPL CALC-SCNC: 13 MMOL/L — SIGNIFICANT CHANGE UP (ref 5–17)
BUN SERPL-MCNC: 37 MG/DL — HIGH (ref 7–23)
CALCIUM SERPL-MCNC: 9.4 MG/DL — SIGNIFICANT CHANGE UP (ref 8.4–10.5)
CHLORIDE SERPL-SCNC: 99 MMOL/L — SIGNIFICANT CHANGE UP (ref 96–108)
CO2 SERPL-SCNC: 26 MMOL/L — SIGNIFICANT CHANGE UP (ref 22–31)
CREAT SERPL-MCNC: 3.76 MG/DL — HIGH (ref 0.5–1.3)
EGFR: 16 ML/MIN/1.73M2 — LOW
GLUCOSE BLDC GLUCOMTR-MCNC: 144 MG/DL — HIGH (ref 70–99)
GLUCOSE SERPL-MCNC: 91 MG/DL — SIGNIFICANT CHANGE UP (ref 70–99)
HBV CORE AB SER-ACNC: SIGNIFICANT CHANGE UP
HBV CORE IGM SER-ACNC: SIGNIFICANT CHANGE UP
HBV SURFACE AB SER-ACNC: <3 MIU/ML — LOW
HBV SURFACE AG SER-ACNC: SIGNIFICANT CHANGE UP
HCT VFR BLD CALC: 31.1 % — LOW (ref 39–50)
HCV AB S/CO SERPL IA: 0.3 S/CO — SIGNIFICANT CHANGE UP (ref 0–0.99)
HCV AB SERPL-IMP: SIGNIFICANT CHANGE UP
HGB BLD-MCNC: 10.1 G/DL — LOW (ref 13–17)
MCHC RBC-ENTMCNC: 30.4 PG — SIGNIFICANT CHANGE UP (ref 27–34)
MCHC RBC-ENTMCNC: 32.5 GM/DL — SIGNIFICANT CHANGE UP (ref 32–36)
MCV RBC AUTO: 93.7 FL — SIGNIFICANT CHANGE UP (ref 80–100)
NRBC # BLD: 0 /100 WBCS — SIGNIFICANT CHANGE UP (ref 0–0)
PLATELET # BLD AUTO: 149 K/UL — LOW (ref 150–400)
POTASSIUM SERPL-MCNC: 4.6 MMOL/L — SIGNIFICANT CHANGE UP (ref 3.5–5.3)
POTASSIUM SERPL-SCNC: 4.6 MMOL/L — SIGNIFICANT CHANGE UP (ref 3.5–5.3)
RBC # BLD: 3.32 M/UL — LOW (ref 4.2–5.8)
RBC # FLD: 13.5 % — SIGNIFICANT CHANGE UP (ref 10.3–14.5)
SODIUM SERPL-SCNC: 138 MMOL/L — SIGNIFICANT CHANGE UP (ref 135–145)
WBC # BLD: 3.04 K/UL — LOW (ref 3.8–10.5)
WBC # FLD AUTO: 3.04 K/UL — LOW (ref 3.8–10.5)

## 2022-09-29 PROCEDURE — 71045 X-RAY EXAM CHEST 1 VIEW: CPT | Mod: 26

## 2022-09-29 PROCEDURE — 99233 SBSQ HOSP IP/OBS HIGH 50: CPT

## 2022-09-29 DEVICE — GUIDEWIRE AMPLATZ SUPER-STIFF 3MM J .035" X 145CM: Type: IMPLANTABLE DEVICE | Status: FUNCTIONAL

## 2022-09-29 DEVICE — KIT A-LINE 1LUM 20G X 12CM SAFE KIT: Type: IMPLANTABLE DEVICE | Status: FUNCTIONAL

## 2022-09-29 DEVICE — IMPLANTABLE DEVICE: Type: IMPLANTABLE DEVICE | Status: FUNCTIONAL

## 2022-09-29 DEVICE — SURGICEL FIBRILLAR 2 X 4": Type: IMPLANTABLE DEVICE | Status: FUNCTIONAL

## 2022-09-29 DEVICE — KIT BRIDGE ACESSORY: Type: IMPLANTABLE DEVICE | Status: FUNCTIONAL

## 2022-09-29 DEVICE — INTRO CATH MICRA 23FR: Type: IMPLANTABLE DEVICE | Status: FUNCTIONAL

## 2022-09-29 DEVICE — SHEATH GLIDELIGHT LASER 14FR: Type: IMPLANTABLE DEVICE | Status: FUNCTIONAL

## 2022-09-29 DEVICE — DEVICE LOCKING LOCKING LLD EZ CLEARS: Type: IMPLANTABLE DEVICE | Status: FUNCTIONAL

## 2022-09-29 RX ORDER — ACETAMINOPHEN 500 MG
1000 TABLET ORAL ONCE
Refills: 0 | Status: COMPLETED | OUTPATIENT
Start: 2022-09-29 | End: 2022-09-29

## 2022-09-29 RX ORDER — HYDROMORPHONE HYDROCHLORIDE 2 MG/ML
0.25 INJECTION INTRAMUSCULAR; INTRAVENOUS; SUBCUTANEOUS
Refills: 0 | Status: DISCONTINUED | OUTPATIENT
Start: 2022-09-29 | End: 2022-09-29

## 2022-09-29 RX ORDER — PHENYLEPHRINE HYDROCHLORIDE 10 MG/ML
0.5 INJECTION INTRAVENOUS
Qty: 40 | Refills: 0 | Status: DISCONTINUED | OUTPATIENT
Start: 2022-09-29 | End: 2022-09-29

## 2022-09-29 RX ORDER — ONDANSETRON 8 MG/1
4 TABLET, FILM COATED ORAL ONCE
Refills: 0 | Status: DISCONTINUED | OUTPATIENT
Start: 2022-09-29 | End: 2022-09-29

## 2022-09-29 RX ORDER — HYDROMORPHONE HYDROCHLORIDE 2 MG/ML
0.5 INJECTION INTRAMUSCULAR; INTRAVENOUS; SUBCUTANEOUS ONCE
Refills: 0 | Status: DISCONTINUED | OUTPATIENT
Start: 2022-09-29 | End: 2022-09-29

## 2022-09-29 RX ADMIN — SIMVASTATIN 10 MILLIGRAM(S): 20 TABLET, FILM COATED ORAL at 22:32

## 2022-09-29 RX ADMIN — HYDROMORPHONE HYDROCHLORIDE 0.25 MILLIGRAM(S): 2 INJECTION INTRAMUSCULAR; INTRAVENOUS; SUBCUTANEOUS at 15:30

## 2022-09-29 RX ADMIN — BRIMONIDINE TARTRATE 1 DROP(S): 2 SOLUTION/ DROPS OPHTHALMIC at 06:21

## 2022-09-29 RX ADMIN — TRAMADOL HYDROCHLORIDE 25 MILLIGRAM(S): 50 TABLET ORAL at 04:42

## 2022-09-29 RX ADMIN — TAMSULOSIN HYDROCHLORIDE 0.4 MILLIGRAM(S): 0.4 CAPSULE ORAL at 22:33

## 2022-09-29 RX ADMIN — HYDROMORPHONE HYDROCHLORIDE 0.5 MILLIGRAM(S): 2 INJECTION INTRAMUSCULAR; INTRAVENOUS; SUBCUTANEOUS at 14:13

## 2022-09-29 RX ADMIN — LATANOPROST 1 DROP(S): 0.05 SOLUTION/ DROPS OPHTHALMIC; TOPICAL at 22:32

## 2022-09-29 RX ADMIN — BUDESONIDE AND FORMOTEROL FUMARATE DIHYDRATE 2 PUFF(S): 160; 4.5 AEROSOL RESPIRATORY (INHALATION) at 06:20

## 2022-09-29 RX ADMIN — Medication 25 MICROGRAM(S): at 06:21

## 2022-09-29 RX ADMIN — CHLORHEXIDINE GLUCONATE 1 APPLICATION(S): 213 SOLUTION TOPICAL at 06:21

## 2022-09-29 RX ADMIN — HYDROMORPHONE HYDROCHLORIDE 0.25 MILLIGRAM(S): 2 INJECTION INTRAMUSCULAR; INTRAVENOUS; SUBCUTANEOUS at 18:19

## 2022-09-29 RX ADMIN — HYDROMORPHONE HYDROCHLORIDE 0.25 MILLIGRAM(S): 2 INJECTION INTRAMUSCULAR; INTRAVENOUS; SUBCUTANEOUS at 13:58

## 2022-09-29 RX ADMIN — HYDROMORPHONE HYDROCHLORIDE 0.5 MILLIGRAM(S): 2 INJECTION INTRAMUSCULAR; INTRAVENOUS; SUBCUTANEOUS at 14:28

## 2022-09-29 RX ADMIN — HYDROMORPHONE HYDROCHLORIDE 0.25 MILLIGRAM(S): 2 INJECTION INTRAMUSCULAR; INTRAVENOUS; SUBCUTANEOUS at 15:45

## 2022-09-29 RX ADMIN — BUDESONIDE AND FORMOTEROL FUMARATE DIHYDRATE 2 PUFF(S): 160; 4.5 AEROSOL RESPIRATORY (INHALATION) at 18:11

## 2022-09-29 RX ADMIN — HYDROMORPHONE HYDROCHLORIDE 0.25 MILLIGRAM(S): 2 INJECTION INTRAMUSCULAR; INTRAVENOUS; SUBCUTANEOUS at 18:35

## 2022-09-29 RX ADMIN — Medication 667 MILLIGRAM(S): at 18:26

## 2022-09-29 RX ADMIN — HYDROMORPHONE HYDROCHLORIDE 0.25 MILLIGRAM(S): 2 INJECTION INTRAMUSCULAR; INTRAVENOUS; SUBCUTANEOUS at 14:03

## 2022-09-29 RX ADMIN — Medication 1000 MILLIGRAM(S): at 14:13

## 2022-09-29 RX ADMIN — PHENYLEPHRINE HYDROCHLORIDE 13 MICROGRAM(S)/KG/MIN: 10 INJECTION INTRAVENOUS at 15:30

## 2022-09-29 RX ADMIN — Medication 400 MILLIGRAM(S): at 14:03

## 2022-09-29 RX ADMIN — Medication 3 MILLIGRAM(S): at 22:32

## 2022-09-29 NOTE — PROGRESS NOTE ADULT - SUBJECTIVE AND OBJECTIVE BOX
24H hour events: No acute events    MEDICATIONS:  tamsulosin 0.4 milliGRAM(s) Oral at bedtime  budesonide  80 MICROgram(s)/formoterol 4.5 MICROgram(s) Inhaler 2 Puff(s) Inhalation two times a day  acetaminophen     Tablet .. 650 milliGRAM(s) Oral every 6 hours PRN  melatonin 3 milliGRAM(s) Oral at bedtime  ondansetron Injectable 4 milliGRAM(s) IV Push every 8 hours PRN  sertraline 50 milliGRAM(s) Oral daily  finasteride 5 milliGRAM(s) Oral daily  levothyroxine 25 MICROGram(s) Oral daily  simvastatin 10 milliGRAM(s) Oral at bedtime  brimonidine 0.2% Ophthalmic Solution 1 Drop(s) Both EYES two times a day  calcium acetate 667 milliGRAM(s) Oral three times a day with meals  dorzolamide 2%/timolol 0.5% Ophthalmic Solution 1 Drop(s) Both EYES two times a day  latanoprost 0.005% Ophthalmic Solution 1 Drop(s) Both EYES at bedtime  lidocaine   4% Patch 1 Patch Transdermal daily      REVIEW OF SYSTEMS:  Complete 12 point ROS negative.    PHYSICAL EXAM:  T(C): 36.4 (09-29-22 @ 04:58), Max: 36.7 (09-28-22 @ 16:57)  HR: 75 (09-29-22 @ 04:58) (57 - 84)  BP: 147/76 (09-29-22 @ 04:58) (101/61 - 147/76)  RR: 18 (09-29-22 @ 04:58) (18 - 18)  SpO2: 99% (09-29-22 @ 04:58) (97% - 99%)  Wt(kg): --  I&O's Summary    28 Sep 2022 07:01  -  29 Sep 2022 07:00  --------------------------------------------------------  IN: 500 mL / OUT: 0 mL / NET: 500 mL        Appearance: Normal	  HEENT:  PERRL, EOMI	  Cardiovascular: Normal S1 S2, No JVD, No murmurs, No edema  Respiratory: Lungs clear to auscultation	  Psychiatry: Alert, Mood & affect appropriate  Gastrointestinal:  Soft, Non-tender, + BS	  Skin: No rashes, No ecchymoses, No cyanosis	  Neurologic: Non-focal  Extremities: No clubbing, cyanosis or edema  Vascular: Peripheral pulses palpable 2+ bilaterally        LABS:	 	    CBC Full  -  ( 29 Sep 2022 07:07 )  WBC Count : 3.04 K/uL  Hemoglobin : 10.1 g/dL  Hematocrit : 31.1 %  Platelet Count - Automated : 149 K/uL  Mean Cell Volume : 93.7 fl  Mean Cell Hemoglobin : 30.4 pg  Mean Cell Hemoglobin Concentration : 32.5 gm/dL  Auto Neutrophil # : x  Auto Lymphocyte # : x  Auto Monocyte # : x  Auto Eosinophil # : x  Auto Basophil # : x  Auto Neutrophil % : x  Auto Lymphocyte % : x  Auto Monocyte % : x  Auto Eosinophil % : x  Auto Basophil % : x    09-29    138  |  99  |  37<H>  ----------------------------<  91  4.6   |  26  |  3.76<H>  09-28    135  |  95<L>  |  46<H>  ----------------------------<  159<H>  5.0   |  29  |  3.93<H>    Ca    9.4      29 Sep 2022 07:07  Ca    9.3      28 Sep 2022 02:48  Mg     2.2     09-28    TPro  6.9  /  Alb  4.5  /  TBili  0.3  /  DBili  x   /  AST  21  /  ALT  20  /  AlkPhos  108  09-28      TELEMETRY: AF 60-80s	      < from: Transthoracic Echocardiogram (04.01.16 @ 12:15) >  PROCEDURE: Transthoracic echocardiogram with 2-D, M-Mode  and complete spectral and color flow Doppler.  INDICATION: Shortness of breath (R06.02)  ------------------------------------------------------------------------  DIMENSIONS:  Dimensions:     Normal Values:  LA:     3.3 cm    2.0 - 4.0 cm  Ao:     3.5 cm    2.0 - 3.8 cm  SEPTUM: 1.3 cm    0.6 - 1.2 cm  PWT:    1.3 cm    0.6 - 1.1 cm  LVIDd:  4.1 cm    3.0 - 5.6 cm  LVIDs:  2.8 cm    1.8 - 4.0 cm  Derived Variables:  LVMI: 116 g/m2  RWT: 0.63  Fractional short: 32 %  Ejection Fraction (Teicholtz): 60 %  ------------------------------------------------------------------------  OBSERVATIONS:  Mitral Valve: Mitral annular calcification, otherwise  normal mitral valve. Minimal mitral regurgitation.  Aortic Root: Normal aortic root.  Aortic Valve: Calcified trileaflet aortic valve with normal  opening. Minimal aortic regurgitation.  Left Atrium: Normal left atrium.  Left Ventricle: Normal left ventricular systolic function.  No segmental wall motion abnormalities.  Paradoxical septal  wall motion, consistent with ventricular pacing. Mild  concentricleft ventricular hypertrophy.  Right Heart: Normal right atrium. Normal right ventricular  size and function.   A device wire is noted in the right  heart. Normal tricuspid valve.  Mild tricuspid  regurgitation. Normal pulmonic valve.  Minimal pulmonic  regurgitation.  Pericardium/PleuraNormal pericardium with no pericardial  effusion.  ------------------------------------------------------------------------  CONCLUSIONS:  1. Mitral annular calcification, otherwise normal mitral  valve. Minimal mitral regurgitation.  2. Mild concentric left ventricular hypertrophy.  3. Normal left ventricular systolic function. No segmental  wall motion abnormalities.  Paradoxical septal wall motion,  consistent with ventricular pacing.  4. Normal right ventricular size and function.   A device  wire is noted in the right heart.    < end of copied text >

## 2022-09-29 NOTE — PROGRESS NOTE ADULT - ASSESSMENT
73 y/o male with hx of CAD s/p stent/CABG, BPH, hypothyroid, chronic afib on eliquis, dementia, ESRD on HD (M/T/Th/Fri), HTN, asthma, glaucoma, prediabetes, prior COVID infection s/p vaccination who resides at [facility] presents after a fall with right periorbital hematoma/laceration s/p repair.

## 2022-09-29 NOTE — PHYSICAL THERAPY INITIAL EVALUATION ADULT - ACTIVE RANGE OF MOTION EXAMINATION, REHAB EVAL
L shld only to ~90* AAROM (recent PPM placement), elbow WFL, hand 3/4 digit flexion; RUE hand flexion, elbow WFL, shld limited; BLE WFL

## 2022-09-29 NOTE — PROGRESS NOTE ADULT - ASSESSMENT
71 y/o man with Dementia living at a NH, MR, HTN, CAD s/p CABG x3 in 2013, BSC PPM (2013), AF on Eliquis, ESRD on HD, asthma, hypothyroidism, BPH s/p TURP, presenting from group home with a fall. Per chart/patient he reports sitting on the toilet when he ?felt dizzy and fell forward, hitting his head resulting in a head lac requiring stitches. PPM was interrogated revealing RV lead noise.    1. PPM RV lead noise  2. AF    - Currently AF 60-80s  - RV lead noise noted on interrogation, unable to be reproduced  - Given RV pacing of 22%, fall/syncope and RV lead noise will plan for PPM extraction/leadless pacemaker implant today  - Keep NPO  - Continue to hold Eliquis, Lovenox and Heparin pre and post extraction  - Maintain active type and screen, active COVID PCR   - Consent obtained from brother and placed in chart  - Continue telemetry monitoring  - Maintain K >4 and Mg >2     71 y/o man with Dementia living at a NH, MR, HTN, CAD s/p CABG x3 in 2013, BSC PPM (2013), AF on Eliquis, ESRD on HD, asthma, hypothyroidism, BPH s/p TURP, presenting from group home with a fall. Per chart/patient he reports sitting on the toilet when he ?felt dizzy and fell forward, hitting his head resulting in a head lac requiring stitches. PPM was interrogated revealing RV lead noise.    1. PPM RV lead noise  2. AF    - Currently AF 60-80s  - RV lead noise noted on interrogation, unable to be reproduced  - Given RV pacing of 22%, fall/syncope and RV lead noise will plan for PPM extraction/leadless pacemaker implant today  - Keep NPO  - Continue to hold Eliquis, Lovenox and Heparin pre and post extraction  - Maintain active type and screen, active COVID PCR   - Consent obtained from brother and placed in chart  - Continue telemetry monitoring  - Maintain K >4 and Mg >2    Addendum:   - s/p PPM extraction/Micra leadless PPM implant  - Port CXR  - EKG  - Remove right groin suture at 6pm today  - Resume Eliquis 9/30 PM  - Discussed with team

## 2022-09-29 NOTE — PROGRESS NOTE ADULT - SUBJECTIVE AND OBJECTIVE BOX
Sal Silva MD  Division of Hospital Medicine  Available on MS teams until 7pm  If no response or off-hours, page [phone]  -------------------------------------    Patient is a 72y old  Male who presents with a chief complaint of fall (29 Sep 2022 15:00)      SUBJECTIVE / OVERNIGHT EVENTS: none acute  ADDITIONAL REVIEW OF SYSTEMS: pt seen in PACU s/p micra ppm placement, tolerated well. no acute complaints.     MEDICATIONS  (STANDING):  brimonidine 0.2% Ophthalmic Solution 1 Drop(s) Both EYES two times a day  budesonide  80 MICROgram(s)/formoterol 4.5 MICROgram(s) Inhaler 2 Puff(s) Inhalation two times a day  calcium acetate 667 milliGRAM(s) Oral three times a day with meals  dorzolamide 2%/timolol 0.5% Ophthalmic Solution 1 Drop(s) Both EYES two times a day  finasteride 5 milliGRAM(s) Oral daily  latanoprost 0.005% Ophthalmic Solution 1 Drop(s) Both EYES at bedtime  levothyroxine 25 MICROGram(s) Oral daily  lidocaine   4% Patch 1 Patch Transdermal daily  melatonin 3 milliGRAM(s) Oral at bedtime  phenylephrine    Infusion 0.5 MICROgram(s)/kG/Min (13 mL/Hr) IV Continuous <Continuous>  sertraline 50 milliGRAM(s) Oral daily  simvastatin 10 milliGRAM(s) Oral at bedtime  tamsulosin 0.4 milliGRAM(s) Oral at bedtime    MEDICATIONS  (PRN):  acetaminophen     Tablet .. 650 milliGRAM(s) Oral every 6 hours PRN Temp greater or equal to 38C (100.4F), Mild Pain (1 - 3)  HYDROmorphone  Injectable 0.25 milliGRAM(s) IV Push every 10 minutes PRN Moderate Pain (4 - 6)  ondansetron Injectable 4 milliGRAM(s) IV Push every 8 hours PRN Nausea and/or Vomiting  ondansetron Injectable 4 milliGRAM(s) IV Push once PRN Nausea and/or Vomiting      CAPILLARY BLOOD GLUCOSE      POCT Blood Glucose.: 215 mg/dL (28 Sep 2022 21:38)    I&O's Summary    28 Sep 2022 07:01  -  29 Sep 2022 07:00  --------------------------------------------------------  IN: 500 mL / OUT: 0 mL / NET: 500 mL        PHYSICAL EXAM:  Vital Signs Last 24 Hrs  T(C): 36.2 (29 Sep 2022 16:00), Max: 37 (29 Sep 2022 13:48)  T(F): 97.2 (29 Sep 2022 16:00), Max: 98.6 (29 Sep 2022 13:48)  HR: 87 (29 Sep 2022 16:15) (72 - 89)  BP: 101/50 (29 Sep 2022 16:15) (78/39 - 147/76)  BP(mean): 67 (29 Sep 2022 16:00) (51 - 72)  RR: 16 (29 Sep 2022 16:15) (16 - 18)  SpO2: 100% (29 Sep 2022 16:15) (95% - 100%)    Parameters below as of 29 Sep 2022 13:48  Patient On (Oxygen Delivery Method): nasal cannula  O2 Flow (L/min): 2    CONSTITUTIONAL: NAD  EYES: PERRLA; conjunctiva and sclera clear  ENMT: MMM  NECK: Supple  RESPIRATORY: Normal respiratory effort; CTAB  CARDIOVASCULAR: RRR, no JVD, no peripheral edema   ABDOMEN: Nontender to palpation, normoactive BS, no guarding/rigidity  MUSCLOSKELETAL:  no clubbing/cyanosis, no joint swelling or tenderness to palpation  PSYCH: A+O x 0-1, affect normal  NEUROLOGY: CN 2-12 are intact and symmetric; no gross sensory or motor deficits  SKIN: No rashes; no palpable lesions    LABS:                        10.1   3.04  )-----------( 149      ( 29 Sep 2022 07:07 )             31.1         138  |  99  |  37<H>  ----------------------------<  91  4.6   |  26  |  3.76<H>    Ca    9.4      29 Sep 2022 07:07  Mg     2.2         TPro  6.9  /  Alb  4.5  /  TBili  0.3  /  DBili  x   /  AST  21  /  ALT  20  /  AlkPhos  108      PT/INR - ( 28 Sep 2022 02:48 )   PT: 18.4 sec;   INR: 1.58 ratio         PTT - ( 28 Sep 2022 02:48 )  PTT:34.2 sec      Urinalysis Basic - ( 28 Sep 2022 04:38 )    Color: Yellow / Appearance: Slightly Turbid / S.012 / pH: x  Gluc: x / Ketone: Negative  / Bili: Negative / Urobili: Negative   Blood: x / Protein: 100 mg/dL / Nitrite: Negative   Leuk Esterase: Large / RBC: 27 /hpf /  /HPF   Sq Epi: x / Non Sq Epi: 1 /hpf / Bacteria: Many        Culture - Urine (collected 28 Sep 2022 04:38)  Source: Clean Catch Clean Catch (Midstream)  Preliminary Report (29 Sep 2022 14:40):    >100,000 CFU/ml Proteus mirabilis        RADIOLOGY & ADDITIONAL TESTS:  Results Reviewed:   Imaging Personally Reviewed:  Electrocardiogram Personally Reviewed:    COORDINATION OF CARE:  Care Discussed with Consultants/Other Providers [Y/N]:  Prior or Outpatient Records Reviewed [Y/N]:   Sal Silva MD  Division of Hospital Medicine  Available on MS teams until 7pm  If no response or off-hours, page [phone]  -------------------------------------    Patient is a 72y old  Male who presents with a chief complaint of fall (29 Sep 2022 15:00)      SUBJECTIVE / OVERNIGHT EVENTS: none acute  ADDITIONAL REVIEW OF SYSTEMS: pt seen s/p micra ppm placement, tolerated well. complains of room feeling too hot, otherwise no complaints.     MEDICATIONS  (STANDING):  brimonidine 0.2% Ophthalmic Solution 1 Drop(s) Both EYES two times a day  budesonide  80 MICROgram(s)/formoterol 4.5 MICROgram(s) Inhaler 2 Puff(s) Inhalation two times a day  calcium acetate 667 milliGRAM(s) Oral three times a day with meals  dorzolamide 2%/timolol 0.5% Ophthalmic Solution 1 Drop(s) Both EYES two times a day  finasteride 5 milliGRAM(s) Oral daily  latanoprost 0.005% Ophthalmic Solution 1 Drop(s) Both EYES at bedtime  levothyroxine 25 MICROGram(s) Oral daily  lidocaine   4% Patch 1 Patch Transdermal daily  melatonin 3 milliGRAM(s) Oral at bedtime  phenylephrine    Infusion 0.5 MICROgram(s)/kG/Min (13 mL/Hr) IV Continuous <Continuous>  sertraline 50 milliGRAM(s) Oral daily  simvastatin 10 milliGRAM(s) Oral at bedtime  tamsulosin 0.4 milliGRAM(s) Oral at bedtime    MEDICATIONS  (PRN):  acetaminophen     Tablet .. 650 milliGRAM(s) Oral every 6 hours PRN Temp greater or equal to 38C (100.4F), Mild Pain (1 - 3)  HYDROmorphone  Injectable 0.25 milliGRAM(s) IV Push every 10 minutes PRN Moderate Pain (4 - 6)  ondansetron Injectable 4 milliGRAM(s) IV Push every 8 hours PRN Nausea and/or Vomiting  ondansetron Injectable 4 milliGRAM(s) IV Push once PRN Nausea and/or Vomiting      CAPILLARY BLOOD GLUCOSE      POCT Blood Glucose.: 215 mg/dL (28 Sep 2022 21:38)    I&O's Summary    28 Sep 2022 07:01  -  29 Sep 2022 07:00  --------------------------------------------------------  IN: 500 mL / OUT: 0 mL / NET: 500 mL        PHYSICAL EXAM:  Vital Signs Last 24 Hrs  T(C): 36.2 (29 Sep 2022 16:00), Max: 37 (29 Sep 2022 13:48)  T(F): 97.2 (29 Sep 2022 16:00), Max: 98.6 (29 Sep 2022 13:48)  HR: 87 (29 Sep 2022 16:15) (72 - 89)  BP: 101/50 (29 Sep 2022 16:15) (78/39 - 147/76)  BP(mean): 67 (29 Sep 2022 16:00) (51 - 72)  RR: 16 (29 Sep 2022 16:15) (16 - 18)  SpO2: 100% (29 Sep 2022 16:15) (95% - 100%)    Parameters below as of 29 Sep 2022 13:48  Patient On (Oxygen Delivery Method): nasal cannula  O2 Flow (L/min): 2    CONSTITUTIONAL: NAD  EYES: PERRLA; conjunctiva and sclera clear  ENMT: MMM  NECK: Supple  RESPIRATORY: Normal respiratory effort; CTAB  CARDIOVASCULAR: RRR, no JVD, no peripheral edema, micra site slight oozing, otherwise clean  ABDOMEN: Nontender to palpation, normoactive BS, no guarding/rigidity  MUSCLOSKELETAL:  no clubbing/cyanosis, no joint swelling or tenderness to palpation  PSYCH: A+O x 0-1, affect normal  NEUROLOGY: CN 2-12 are intact and symmetric; no gross sensory or motor deficits  SKIN: No rashes; no palpable lesions    LABS:                        10.1   3.04  )-----------( 149      ( 29 Sep 2022 07:07 )             31.1         138  |  99  |  37<H>  ----------------------------<  91  4.6   |  26  |  3.76<H>    Ca    9.4      29 Sep 2022 07:07  Mg     2.2         TPro  6.9  /  Alb  4.5  /  TBili  0.3  /  DBili  x   /  AST  21  /  ALT  20  /  AlkPhos  108      PT/INR - ( 28 Sep 2022 02:48 )   PT: 18.4 sec;   INR: 1.58 ratio         PTT - ( 28 Sep 2022 02:48 )  PTT:34.2 sec      Urinalysis Basic - ( 28 Sep 2022 04:38 )    Color: Yellow / Appearance: Slightly Turbid / S.012 / pH: x  Gluc: x / Ketone: Negative  / Bili: Negative / Urobili: Negative   Blood: x / Protein: 100 mg/dL / Nitrite: Negative   Leuk Esterase: Large / RBC: 27 /hpf /  /HPF   Sq Epi: x / Non Sq Epi: 1 /hpf / Bacteria: Many        Culture - Urine (collected 28 Sep 2022 04:38)  Source: Clean Catch Clean Catch (Midstream)  Preliminary Report (29 Sep 2022 14:40):    >100,000 CFU/ml Proteus mirabilis        RADIOLOGY & ADDITIONAL TESTS:  Results Reviewed:   Imaging Personally Reviewed:  Electrocardiogram Personally Reviewed:    COORDINATION OF CARE:  Care Discussed with Consultants/Other Providers [Y/N]:  Prior or Outpatient Records Reviewed [Y/N]:

## 2022-09-29 NOTE — PHYSICAL THERAPY INITIAL EVALUATION ADULT - PERTINENT HX OF CURRENT PROBLEM, REHAB EVAL
Pt is a 73 y/o male admitted to [facility] on 9/28/22 with hx of CAD s/p stent/CABG, BPH, hypothyroid, chronic afib on eliquis, dementia, ESRD on HD (M/T/Th/Fri), HTN, asthma, glaucoma, prediabetes, prior COVID infection s/p vaccination, who resides at [facility] presents after a fall. Pt reports he was in his usual state of health until yesterday, he fell and hit his head while on the toilet trying to have a bowel movement. He endorsed feeling dizzy but denied chest pain or palpitation prior to falling. Pt was reportedly in the bathroom with an aide who couldn't stop the patient from falling. Pt usually ambulates with a walker. Unclear if the pt had LOC. Pt currently endorses headache, neck/back, knee pain but denies weakness or numbness in legs, dysuria, fevers. Per brother([place]), patient ambulates with a walker and assistance but is mostly in a wheelchair. Hospital course: Pt hitting his head resulting in a head lac requiring stitches. PPM was interrogated revealing RV lead noise (see procedure note), RV pacing 22% of the time at VVI 60 bpm. Per emergency contact he believes PPM is checked at his NH facility. Per EP, Currently AF 60s with occasional ventricular pacing, RV lead noise noted on interrogation, unable to be reproduced, Given RV pacing of 22%, fall/syncope and RV lead noise will likely plan for leadless pacemaker placement tomorrow (planning for micra PPM on 9/29), s/p scalp laceration repair (6 sutures to frontal scalp) in ED on 9/28. -CTH/cervical spine: No intracranial hemorrhage or mass effect. Right periorbital hematoma. CT Cspine: No fracture or listhesis identified. Moderate to severe central canal stenosis at C5-C6 secondary to disc osteophyte complex. CT thoracic spine/lumbar spine/chest/A&P- No evidence of acute trauma involving the chest, abdomen, pelvis, or thoracic or lumbar spine. New exophytic right lower pole renal lesion. This can be further evaluated with contrast-enhanced MRI if clinically warranted. Circumferential bladder wall thickening which can be seen in the setting of cystitis, correlate with urinalysis. EKG: Pt reports he was told the PPM "battery was dying" - will attempt to get PPM interrogated elevated trop noted but may be in setting of ESRD. EKG without acute ST-T changes, no complaint of chest pain Pt is a 71 y/o male admitted to [facility] on 9/28/22 with hx of CAD s/p stent/CABG, BPH, hypothyroid, chronic afib on eliquis, dementia, ESRD on HD (M/T/Th/Fri), HTN, asthma, glaucoma, prediabetes, prior COVID infection s/p vaccination, who resides at [facility] presents after a fall. Pt reports he was in his usual state of health until yesterday, he fell and hit his head while on the toilet trying to have a bowel movement. He endorsed feeling dizzy but denied chest pain or palpitation prior to falling. Pt was reportedly in the bathroom with an aide who couldn't stop the patient from falling. Pt usually ambulates with a walker. Unclear if the pt had LOC. Pt currently endorses headache, neck/back, knee pain but denies weakness or numbness in legs, dysuria, fevers. Per brother([place]), patient ambulates with a walker and assistance but is mostly in a wheelchair. Hospital course: Pt hitting his head resulting in a head lac requiring stitches. PPM was interrogated revealing RV lead noise (see procedure note), RV pacing 22% of the time at VVI 60 bpm. Per emergency contact he believes PPM is checked at his NH facility. Per EP, Currently AF 60s with occasional ventricular pacing, RV lead noise noted on interrogation, unable to be reproduced, Given RV pacing of 22%, fall/syncope and RV lead noise will likely plan for leadless pacemaker placement tomorrow (planning for micra PPM on 9/29), s/p scalp laceration repair (6 sutures to frontal scalp) in ED on 9/28. -CTH/cervical spine: No intracranial hemorrhage or mass effect. Right periorbital hematoma. CT Cspine: No fracture or listhesis identified. Moderate to severe central canal stenosis at C5-C6 secondary to disc osteophyte complex. CT thoracic spine/lumbar spine/chest/A&P- No evidence of acute trauma involving the chest, abdomen, pelvis, or thoracic or lumbar spine. New exophytic right lower pole renal lesion. This can be further evaluated with contrast-enhanced MRI if clinically warranted. Circumferential bladder wall thickening which can be seen in the setting of cystitis, correlate with urinalysis. EKG: Pt reports he was told the PPM "battery was dying" - will attempt to get PPM interrogated elevated trop noted but may be in setting of ESRD. EKG without acute ST-T changes, no complaint of chest pain. s/p micra PPM placement on 9/29/22

## 2022-09-29 NOTE — PHYSICAL THERAPY INITIAL EVALUATION ADULT - NSPTDISCHREC_GEN_A_CORE
. DC return to NH/LTC with PT services, assist for ALL mobility/ADLs, recommend use of WC for long distances, CM June aware./Long Term Care/SNF

## 2022-09-29 NOTE — PROGRESS NOTE ADULT - SUBJECTIVE AND OBJECTIVE BOX
No distress    Vital Signs Last 24 Hrs  T(C): 37 (22 @ 13:48), Max: 37 (22 @ 13:48)  T(F): 98.6 (22 @ 13:48), Max: 98.6 (22 @ 13:48)  HR: 75 (22 @ 14:15) (58 - 84)  BP: 97/53 (22 @ 14:15) (97/53 - 147/76)  BP(mean): 72 (22 @ 14:15) (72 - 72)  RR: 18 (22 @ 13:48) (18 - 18)  SpO2: 84% (22 @ 14:15) (84% - 99%)    s1s2  b/l air entry  soft, ND  no edema                        10.1   3.04  )-----------( 149      ( 29 Sep 2022 07:07 )             31.1     29 Sep 2022 07:07    138    |  99     |  37     ----------------------------<  91     4.6     |  26     |  3.76     Ca    9.4        29 Sep 2022 07:07  Mg     2.2       28 Sep 2022 02:48    TPro  6.9    /  Alb  4.5    /  TBili  0.3    /  DBili  x      /  AST  21     /  ALT  20     /  AlkPhos  108    28 Sep 2022 02:48    LIVER FUNCTIONS - ( 28 Sep 2022 02:48 )  Alb: 4.5 g/dL / Pro: 6.9 g/dL / ALK PHOS: 108 U/L / ALT: 20 U/L / AST: 21 U/L / GGT: x           PT/INR - ( 28 Sep 2022 02:48 )   PT: 18.4 sec;   INR: 1.58 ratio      Urinalysis Basic - ( 28 Sep 2022 04:38 )    Color: Yellow / Appearance: Slightly Turbid / S.012 / pH: x  Gluc: x / Ketone: Negative  / Bili: Negative / Urobili: Negative   Blood: x / Protein: 100 mg/dL / Nitrite: Negative   Leuk Esterase: Large / RBC: 27 /hpf /  /HPF   Sq Epi: x / Non Sq Epi: 1 /hpf / Bacteria: Many    acetaminophen     Tablet .. 650 milliGRAM(s) Oral every 6 hours PRN  brimonidine 0.2% Ophthalmic Solution 1 Drop(s) Both EYES two times a day  budesonide  80 MICROgram(s)/formoterol 4.5 MICROgram(s) Inhaler 2 Puff(s) Inhalation two times a day  calcium acetate 667 milliGRAM(s) Oral three times a day with meals  dorzolamide 2%/timolol 0.5% Ophthalmic Solution 1 Drop(s) Both EYES two times a day  finasteride 5 milliGRAM(s) Oral daily  HYDROmorphone  Injectable 0.25 milliGRAM(s) IV Push every 10 minutes PRN  latanoprost 0.005% Ophthalmic Solution 1 Drop(s) Both EYES at bedtime  levothyroxine 25 MICROGram(s) Oral daily  lidocaine   4% Patch 1 Patch Transdermal daily  melatonin 3 milliGRAM(s) Oral at bedtime  ondansetron Injectable 4 milliGRAM(s) IV Push every 8 hours PRN  ondansetron Injectable 4 milliGRAM(s) IV Push once PRN  phenylephrine    Infusion 0.5 MICROgram(s)/kG/Min IV Continuous <Continuous>  sertraline 50 milliGRAM(s) Oral daily  simvastatin 10 milliGRAM(s) Oral at bedtime  tamsulosin 0.4 milliGRAM(s) Oral at bedtime    A/P:    Adm s/p fall  PPM extraction/leadless pacemaker implant today  ESRD on HD  Next HD tomorrow  TMP as able  Renal diet  Will follow    [phone]

## 2022-09-29 NOTE — PHYSICAL THERAPY INITIAL EVALUATION ADULT - GENERAL OBSERVATIONS, REHAB EVAL
Pt is s/p fall at NH, +laceration above R eye (6 sutures in ED s/p lac repair), now s/p PPM removal/micra PPM placement on 9/29/22. Pt received in bed, leaning to R side, +O2 via NC, +IVL, +RUE AVF, +tele with cont pulse ox, A&Ox2-3, follows simple commands. Pt with b/l hand/finger in flexion, unable to extend R fingers, L 3rd/4th digits.

## 2022-09-29 NOTE — PHYSICAL THERAPY INITIAL EVALUATION ADULT - ADDITIONAL COMMENTS
per chart review from CM and per pt, pt lives at NH, assist for mobility and ADLs, WC for long distance s

## 2022-09-30 ENCOUNTER — TRANSCRIPTION ENCOUNTER (OUTPATIENT)
Age: 72
End: 2022-09-30

## 2022-09-30 PROBLEM — E03.9 HYPOTHYROIDISM, UNSPECIFIED: Chronic | Status: ACTIVE | Noted: 2022-09-28

## 2022-09-30 PROBLEM — Z86.79 PERSONAL HISTORY OF OTHER DISEASES OF THE CIRCULATORY SYSTEM: Chronic | Status: ACTIVE | Noted: 2022-09-28

## 2022-09-30 PROBLEM — J45.909 UNSPECIFIED ASTHMA, UNCOMPLICATED: Chronic | Status: ACTIVE | Noted: 2022-09-28

## 2022-09-30 PROBLEM — I48.20 CHRONIC ATRIAL FIBRILLATION, UNSPECIFIED: Chronic | Status: ACTIVE | Noted: 2022-09-28

## 2022-09-30 PROBLEM — F03.90 UNSPECIFIED DEMENTIA WITHOUT BEHAVIORAL DISTURBANCE: Chronic | Status: ACTIVE | Noted: 2022-09-28

## 2022-09-30 PROBLEM — N40.0 BENIGN PROSTATIC HYPERPLASIA WITHOUT LOWER URINARY TRACT SYMPTOMS: Chronic | Status: ACTIVE | Noted: 2022-09-28

## 2022-09-30 LAB
-  AMIKACIN: SIGNIFICANT CHANGE UP
-  AMOXICILLIN/CLAVULANIC ACID: SIGNIFICANT CHANGE UP
-  AMPICILLIN/SULBACTAM: SIGNIFICANT CHANGE UP
-  AMPICILLIN: SIGNIFICANT CHANGE UP
-  AZTREONAM: SIGNIFICANT CHANGE UP
-  CEFAZOLIN: SIGNIFICANT CHANGE UP
-  CEFEPIME: SIGNIFICANT CHANGE UP
-  CEFOXITIN: SIGNIFICANT CHANGE UP
-  CEFTRIAXONE: SIGNIFICANT CHANGE UP
-  CIPROFLOXACIN: SIGNIFICANT CHANGE UP
-  ERTAPENEM: SIGNIFICANT CHANGE UP
-  GENTAMICIN: SIGNIFICANT CHANGE UP
-  LEVOFLOXACIN: SIGNIFICANT CHANGE UP
-  MEROPENEM: SIGNIFICANT CHANGE UP
-  NITROFURANTOIN: SIGNIFICANT CHANGE UP
-  PIPERACILLIN/TAZOBACTAM: SIGNIFICANT CHANGE UP
-  TOBRAMYCIN: SIGNIFICANT CHANGE UP
-  TRIMETHOPRIM/SULFAMETHOXAZOLE: SIGNIFICANT CHANGE UP
ANION GAP SERPL CALC-SCNC: 13 MMOL/L — SIGNIFICANT CHANGE UP (ref 5–17)
BUN SERPL-MCNC: 49 MG/DL — HIGH (ref 7–23)
CALCIUM SERPL-MCNC: 8.9 MG/DL — SIGNIFICANT CHANGE UP (ref 8.4–10.5)
CHLORIDE SERPL-SCNC: 95 MMOL/L — LOW (ref 96–108)
CO2 SERPL-SCNC: 24 MMOL/L — SIGNIFICANT CHANGE UP (ref 22–31)
CREAT SERPL-MCNC: 4.88 MG/DL — HIGH (ref 0.5–1.3)
EGFR: 12 ML/MIN/1.73M2 — LOW
GLUCOSE SERPL-MCNC: 114 MG/DL — HIGH (ref 70–99)
HCT VFR BLD CALC: 24.6 % — LOW (ref 39–50)
HGB BLD-MCNC: 8 G/DL — LOW (ref 13–17)
MCHC RBC-ENTMCNC: 31 PG — SIGNIFICANT CHANGE UP (ref 27–34)
MCHC RBC-ENTMCNC: 32.5 GM/DL — SIGNIFICANT CHANGE UP (ref 32–36)
MCV RBC AUTO: 95.3 FL — SIGNIFICANT CHANGE UP (ref 80–100)
METHOD TYPE: SIGNIFICANT CHANGE UP
NRBC # BLD: 0 /100 WBCS — SIGNIFICANT CHANGE UP (ref 0–0)
PLATELET # BLD AUTO: 146 K/UL — LOW (ref 150–400)
POTASSIUM SERPL-MCNC: 5 MMOL/L — SIGNIFICANT CHANGE UP (ref 3.5–5.3)
POTASSIUM SERPL-SCNC: 5 MMOL/L — SIGNIFICANT CHANGE UP (ref 3.5–5.3)
RBC # BLD: 2.58 M/UL — LOW (ref 4.2–5.8)
RBC # FLD: 13.5 % — SIGNIFICANT CHANGE UP (ref 10.3–14.5)
SODIUM SERPL-SCNC: 132 MMOL/L — LOW (ref 135–145)
WBC # BLD: 5.43 K/UL — SIGNIFICANT CHANGE UP (ref 3.8–10.5)
WBC # FLD AUTO: 5.43 K/UL — SIGNIFICANT CHANGE UP (ref 3.8–10.5)

## 2022-09-30 PROCEDURE — 99233 SBSQ HOSP IP/OBS HIGH 50: CPT | Mod: 24

## 2022-09-30 PROCEDURE — 99232 SBSQ HOSP IP/OBS MODERATE 35: CPT

## 2022-09-30 RX ORDER — APIXABAN 2.5 MG/1
5 TABLET, FILM COATED ORAL EVERY 12 HOURS
Refills: 0 | Status: DISCONTINUED | OUTPATIENT
Start: 2022-09-30 | End: 2022-10-01

## 2022-09-30 RX ADMIN — LIDOCAINE 1 PATCH: 4 CREAM TOPICAL at 17:19

## 2022-09-30 RX ADMIN — Medication 667 MILLIGRAM(S): at 17:42

## 2022-09-30 RX ADMIN — LIDOCAINE 1 PATCH: 4 CREAM TOPICAL at 13:00

## 2022-09-30 RX ADMIN — DORZOLAMIDE HYDROCHLORIDE TIMOLOL MALEATE 1 DROP(S): 20; 5 SOLUTION/ DROPS OPHTHALMIC at 05:04

## 2022-09-30 RX ADMIN — Medication 3 MILLIGRAM(S): at 22:30

## 2022-09-30 RX ADMIN — SIMVASTATIN 10 MILLIGRAM(S): 20 TABLET, FILM COATED ORAL at 22:30

## 2022-09-30 RX ADMIN — SERTRALINE 50 MILLIGRAM(S): 25 TABLET, FILM COATED ORAL at 13:02

## 2022-09-30 RX ADMIN — Medication 650 MILLIGRAM(S): at 05:28

## 2022-09-30 RX ADMIN — DORZOLAMIDE HYDROCHLORIDE TIMOLOL MALEATE 1 DROP(S): 20; 5 SOLUTION/ DROPS OPHTHALMIC at 17:43

## 2022-09-30 RX ADMIN — Medication 25 MICROGRAM(S): at 05:04

## 2022-09-30 RX ADMIN — Medication 650 MILLIGRAM(S): at 04:58

## 2022-09-30 RX ADMIN — BUDESONIDE AND FORMOTEROL FUMARATE DIHYDRATE 2 PUFF(S): 160; 4.5 AEROSOL RESPIRATORY (INHALATION) at 17:43

## 2022-09-30 RX ADMIN — APIXABAN 5 MILLIGRAM(S): 2.5 TABLET, FILM COATED ORAL at 17:42

## 2022-09-30 RX ADMIN — FINASTERIDE 5 MILLIGRAM(S): 5 TABLET, FILM COATED ORAL at 13:00

## 2022-09-30 RX ADMIN — BRIMONIDINE TARTRATE 1 DROP(S): 2 SOLUTION/ DROPS OPHTHALMIC at 17:43

## 2022-09-30 RX ADMIN — BUDESONIDE AND FORMOTEROL FUMARATE DIHYDRATE 2 PUFF(S): 160; 4.5 AEROSOL RESPIRATORY (INHALATION) at 05:04

## 2022-09-30 RX ADMIN — BRIMONIDINE TARTRATE 1 DROP(S): 2 SOLUTION/ DROPS OPHTHALMIC at 05:04

## 2022-09-30 RX ADMIN — Medication 667 MILLIGRAM(S): at 12:59

## 2022-09-30 RX ADMIN — TAMSULOSIN HYDROCHLORIDE 0.4 MILLIGRAM(S): 0.4 CAPSULE ORAL at 22:30

## 2022-09-30 NOTE — DISCHARGE NOTE PROVIDER - NSDCFUADDAPPT_GEN_ALL_CORE_FT
Follow up in EP clinic for wound check as scheduled on 10/13/22 at 3:40 pm    APPTS ARE READY TO BE MADE: [X] YES    Best Family or Patient Contact (if needed):    Additional Information about above appointments (if needed):    1: Primary care provider  2: Cardiology  3: EP as above    Other comments or requests:    Follow up in EP clinic for wound check as scheduled on 10/13/22 at 3:40 pm    APPTS ARE READY TO BE MADE: [X] YES    Best Family or Patient Contact (if needed):    Additional Information about above appointments (if needed):    1: Primary care provider  2: Cardiology  3: EP as above    Other comments or requests:   Patient discharged SNF.

## 2022-09-30 NOTE — PROGRESS NOTE ADULT - SUBJECTIVE AND OBJECTIVE BOX
24H hour events: No acute events    MEDICATIONS:  tamsulosin 0.4 milliGRAM(s) Oral at bedtime  budesonide  80 MICROgram(s)/formoterol 4.5 MICROgram(s) Inhaler 2 Puff(s) Inhalation two times a day  acetaminophen     Tablet .. 650 milliGRAM(s) Oral every 6 hours PRN  melatonin 3 milliGRAM(s) Oral at bedtime  ondansetron Injectable 4 milliGRAM(s) IV Push every 8 hours PRN  sertraline 50 milliGRAM(s) Oral daily  finasteride 5 milliGRAM(s) Oral daily  levothyroxine 25 MICROGram(s) Oral daily  simvastatin 10 milliGRAM(s) Oral at bedtime  brimonidine 0.2% Ophthalmic Solution 1 Drop(s) Both EYES two times a day  calcium acetate 667 milliGRAM(s) Oral three times a day with meals  dorzolamide 2%/timolol 0.5% Ophthalmic Solution 1 Drop(s) Both EYES two times a day  latanoprost 0.005% Ophthalmic Solution 1 Drop(s) Both EYES at bedtime  lidocaine   4% Patch 1 Patch Transdermal daily      REVIEW OF SYSTEMS:  Complete 12 point ROS negative.    PHYSICAL EXAM:  T(C): 36.4 (09-30-22 @ 09:00), Max: 37 (09-29-22 @ 13:48)  HR: 65 (09-30-22 @ 09:00) (65 - 97)  BP: 115/53 (09-30-22 @ 09:00) (78/39 - 123/65)  RR: 18 (09-30-22 @ 09:00) (16 - 18)  SpO2: 100% (09-30-22 @ 09:00) (95% - 100%)  Wt(kg): --  I&O's Summary    29 Sep 2022 07:01  -  30 Sep 2022 07:00  --------------------------------------------------------  IN: 480 mL / OUT: 0 mL / NET: 480 mL        Appearance: Normal	  HEENT:  PERRL, EOMI	  Cardiovascular: Normal S1 S2, No JVD, No murmurs, No edema  Respiratory: Lungs clear to auscultation	  Psychiatry: Alert, confused  Gastrointestinal:  Soft, Non-tender, + BS	  Skin: Left chest steri strips C.D.I, right groin C/D/I - no hematoma, bleeding	  Neurologic: Non-focal  Extremities: No clubbing, cyanosis or edema  Vascular: Peripheral pulses palpable 2+ bilaterally        LABS:	 	    CBC Full  -  ( 29 Sep 2022 07:07 )  WBC Count : 3.04 K/uL  Hemoglobin : 10.1 g/dL  Hematocrit : 31.1 %  Platelet Count - Automated : 149 K/uL  Mean Cell Volume : 93.7 fl  Mean Cell Hemoglobin : 30.4 pg  Mean Cell Hemoglobin Concentration : 32.5 gm/dL  Auto Neutrophil # : x  Auto Lymphocyte # : x  Auto Monocyte # : x  Auto Eosinophil # : x  Auto Basophil # : x  Auto Neutrophil % : x  Auto Lymphocyte % : x  Auto Monocyte % : x  Auto Eosinophil % : x  Auto Basophil % : x    09-29    138  |  99  |  37<H>  ----------------------------<  91  4.6   |  26  |  3.76<H>    Ca    9.4      29 Sep 2022 07:07      TELEMETRY: AF occ Vpacing 60s	      < from: Intra-Operative Transesophageal Echo (09.29.22 @ 12:47) >  PROCEDURE: Intra operative transeophageal echocardiogram  with 2D, M mode and complete  Doppler examination.  The  transesophageal probe was placed in the esophagus after  induction of anesthesia.  INDICATION: Breakdown (mechanical) of cardiac electrode,  initial encounter (T82.110A)  ------------------------------------------------------------------------  Dimensions:    Normal Values:  LA:            2.0 - 4.0 cm  Ao:            2.0 - 3.8 cm  SEPTUM:        0.6 - 1.2 cm  PWT:           0.6 - 1.1 cm  LVIDd:         3.0 - 5.6 cm  LVIDs:         1.8 - 4.0 cm  EF (Visual Estimate): 60 %  ------------------------------------------------------------------------  Pre-Bypass Observations:  Mitral Valve: Normal mitral valve. Minimal mitral  regurgitation. No significant mitral stenosis.  Aortic Valve/Aorta: Normal trileaflet aortic valve.  Normal aortic root.  Left Atrium: Normal left atrium.  Left Ventricle: Normal left ventricular systolic function.  No segmental wall motion abnormalities.  Right Heart: Normal right atrium. Normal right ventricular  size and function. Normal tricuspid valve. Moderate  tricuspid regurgitation. Normal pulmonic valve.  Pericardium/Pleura: Normal pericardium with no pericardial  effusion.  ------------------------------------------------------------------------  Post-Bypass Observations:    ------------------------------------------------------------------------  Conclusions:  Limited ROS exam for monitoring during lead extraction.  1. Normal mitral valve. Minimal mitral regurgitation. No  significant mitral stenosis.  2. Normal trileaflet aortic valve.  3. Normal left ventricular systolic function. No segmental  wall motion abnormalities.  4. Normal right atrium.  5. Normal right ventricular size and function.  6. Normal tricuspid valve. Moderate tricuspid  regurgitation.  7. Normal pulmonic valve.  No new effusion after removal of leads. TR unchanged  following lead extraction.    < end of copied text >

## 2022-09-30 NOTE — DISCHARGE NOTE PROVIDER - NSFOLLOWUPCLINICS_GEN_ALL_ED_FT
[facility] - Primary Care  Primary Care  [address] [place] [address]  Phone: [phone]  Fax:   Follow Up Time: 1 week    [facility]  Cardiology  [address] [place] [id number]  Phone: [phone]  Fax:   Follow Up Time: 1 week

## 2022-09-30 NOTE — PROGRESS NOTE ADULT - SUBJECTIVE AND OBJECTIVE BOX
NEPHROLOGY PROGRESS NOTE    CHIEF COMPLAINT:  ESRD    HPI:  Tolerated HD well.    ROS:  no SOB    EXAM:  T(F): 97.3 (09-30-22 @ 12:28)  HR: 69 (09-30-22 @ 12:28)  BP: 114/65 (09-30-22 @ 12:28)  RR: 18 (09-30-22 @ 12:28)  SpO2: 99% (09-30-22 @ 12:28)    Conversant, in no apparent distress  Normal respiratory effort, lungs clear bilaterally  Heart RRR with no murmur, no peripheral edema         LABS                             8.0    5.43  )-----------( 146      ( 30 Sep 2022 10:06 )             24.6          09-30    132<L>  |  95<L>  |  49<H>  ----------------------------<  114<H>  5.0   |  24  |  4.88<H>    Ca    8.9      30 Sep 2022 10:06             A/P:  Adm s/p fall  PPM extraction/leadless pacemaker implant   ESRD on HD  Dialysis completed today, resume on Monday

## 2022-09-30 NOTE — PROGRESS NOTE ADULT - ASSESSMENT
73 y/o man with Dementia living at a NH, MR, HTN, CAD s/p CABG x3 in 2013, BSC PPM (2013), AF on Eliquis, ESRD on HD, asthma, hypothyroidism, BPH s/p TURP, presenting from group home with a fall. Per chart/patient he reports sitting on the toilet when he ?felt dizzy and fell forward, hitting his head resulting in a head lac requiring stitches. PPM was interrogated revealing RV lead noise.    1. PPM RV lead noise  2. AF    - s/p PPM extraction/Micra leadless pacemaker on 9/30/22  - Interrogation pending  - CXR performed  - May resume Eliquis tonight  - Post PPM teaching enforced with patient's brother via phone  - Fact sheet and follow up appointment card placed in chart  - Patient to follow up in EP clinic for wound check as scheduled on 10/13/22 at 3:40 pm  - Can discharge home today from EP perspective pending interrogation     73 y/o man with Dementia living at a NH, MR, HTN, CAD s/p CABG x3 in 2013, BSC PPM (2013), AF on Eliquis, ESRD on HD, asthma, hypothyroidism, BPH s/p TURP, presenting from group home with a fall. Per chart/patient he reports sitting on the toilet when he ?felt dizzy and fell forward, hitting his head resulting in a head lac requiring stitches. PPM was interrogated revealing RV lead noise.    1. PPM RV lead noise  2. AF    - s/p PPM extraction/Micra leadless pacemaker on 9/30/22  - Interrogation with normal device function  - CXR performed  - May resume Eliquis tonight  - Post PPM teaching enforced with patient's brother via phone  - Fact sheet and follow up appointment card placed in chart  - Patient to follow up in EP clinic for wound check as scheduled on 10/13/22 at 3:40 pm  - Can discharge home today from EP perspective  - EP to sign off, reconsult as needed

## 2022-09-30 NOTE — DISCHARGE NOTE PROVIDER - NSDCMRMEDTOKEN_GEN_ALL_CORE_FT
Bengay Ultra Strength (Menthol) 5% topical patch: Apply topically to affected area once a day, As Needed  brimonidine 0.2% ophthalmic solution: 1 drop(s) to each affected eye 2 times a day  calcium acetate 667 mg oral capsule: 1 cap(s) orally 3 times a day  Cosopt 2.23%-0.68% ophthalmic solution: 2 drop(s) to each affected eye 2 times a day  Eliquis 5 mg oral tablet: 1 tab(s) orally 2 times a day  finasteride 5 mg oral tablet: 1 tab(s) orally once a day  levothyroxine 25 mcg (0.025 mg) oral tablet: 1 tab(s) orally once a day  Melatonin 5 mg oral tablet: 1 tab(s) orally once a day (at bedtime)  nepro Carb Steady 0.08 gram-1.8kcal/mL: 240 milliliter(s)  2 times a day  Norvasc 2.5 mg oral tablet: 1 tab(s) orally once a day  ondansetron 4 mg oral tablet, disintegratin tab(s) orally every 8 hours  simvastatin 10 mg oral tablet: 1 tab(s) orally once a day (at bedtime)  Symbicort 80 mcg-4.5 mcg/inh inhalation aerosol: 2 puff(s) inhaled 2 times a day  tamsulosin 0.4 mg oral capsule: 1 cap(s) orally once a day (at bedtime)  Xalatan 0.005% ophthalmic solution: 1 drop(s) to each affected eye once a day (at bedtime)  Zoloft 50 mg oral tablet: 1 tab(s) orally once a day (at bedtime)   Bengay Ultra Strength (Menthol) 5% topical patch: Apply topically to affected area once a day, As Needed  brimonidine 0.2% ophthalmic solution: 1 drop(s) to each affected eye 2 times a day  calcium acetate 667 mg oral capsule: 1 cap(s) orally 3 times a day  Cosopt 2.23%-0.68% ophthalmic solution: 2 drop(s) to each affected eye 2 times a day  Eliquis 5 mg oral tablet: 1 tab(s) orally 2 times a day  finasteride 5 mg oral tablet: 1 tab(s) orally once a day  levothyroxine 25 mcg (0.025 mg) oral tablet: 1 tab(s) orally once a day  Melatonin 5 mg oral tablet: 1 tab(s) orally once a day (at bedtime)  nepro Carb Steady 0.08 gram-1.8kcal/mL: 240 milliliter(s)  2 times a day  ondansetron 4 mg oral tablet, disintegratin tab(s) orally every 8 hours  simvastatin 10 mg oral tablet: 1 tab(s) orally once a day (at bedtime)  Symbicort 80 mcg-4.5 mcg/inh inhalation aerosol: 2 puff(s) inhaled 2 times a day  tamsulosin 0.4 mg oral capsule: 1 cap(s) orally once a day (at bedtime)  Xalatan 0.005% ophthalmic solution: 1 drop(s) to each affected eye once a day (at bedtime)  Zoloft 50 mg oral tablet: 1 tab(s) orally once a day (at bedtime)

## 2022-09-30 NOTE — DISCHARGE NOTE PROVIDER - CARE PROVIDERS DIRECT ADDRESSES
[email or web address]

## 2022-09-30 NOTE — DISCHARGE NOTE PROVIDER - NSDCFUSCHEDAPPT_GEN_ALL_CORE_FT
[facility]  ELECTROPH [address]  Scheduled Appointment: 10/13/2022

## 2022-09-30 NOTE — DISCHARGE NOTE PROVIDER - NSDCCPCAREPLAN_GEN_ALL_CORE_FT
PRINCIPAL DISCHARGE DIAGNOSIS  Diagnosis: Face lacerations  Assessment and Plan of Treatment: 6 sutures placed in emergency room for scalp laceration on 9/28  To be removed in 7-10 days from when sutures were placed      SECONDARY DISCHARGE DIAGNOSES  Diagnosis: Fall  Assessment and Plan of Treatment:     Diagnosis: ESRD on dialysis  Assessment and Plan of Treatment: Continue   Hemodialysis is the most common method used to treat advanced and permanent kidney failure. But even with better procedures and equipment, hemodialysis is still a complicated and inconvenient therapy that requires a coordinated effort from your whole health care team, including your nephrologist, dialysis nurse, dialysis technician, dietitian, and . The most important members of your health care team are you and your family.   Healthy kidneys clean your blood by removing excess fluid, minerals, and wastes. When your kidneys fail, harmful wastes build up in your body, your blood pressure may rise, and your body may retain excess fluid and not make enough red blood cells. When this happens, you need treatment to replace the work of your failed kidneys. In hemodialysis, your blood is allowed to flow, a few ounces at a time, through a special filter that removes wastes and extra fluids. The clean blood is then returned to your body. One of the biggest adjustments you must make when you start hemodialysis treatments is following a strict schedule. Most patients go to a dialysis center three times a week for 3 to 5 or more hours each visit.   Some of the more common conditions caused by kidney failure are extreme tiredness, bone problems, joint problems, itching, and "restless legs”.  Many people treated with hemodialysis complain of itchy skin, which is often worse during or just after treatment.  Patients on dialysis often have insomnia, and some people have a specific problem called the sleep apnea syndrome.  Over time, these sleep disturbances can lead to "day-night reversal" (insomnia at night, sleepiness during the day), headache, depression, and decreased alertness.   Sleep disorders may seem unimportant, but they can impair your quality of life. Don't hesitate to raise these problems with your nurse, doctor, or .    Diagnosis: Chronic atrial fibrillation  Assessment and Plan of Treatment: Continue with Eliquis at this time.  Atrial fibrillation is the most common heart rhythm problem.  The condition puts you at risk for has stroke and heart attack  It helps if you control your blood pressure, not drink more than 1-2 alcohol drinks per day, cut down on caffeine, getting treatment for over active thyroid gland, and get regular exercise  Call your doctor if you feel your heart racing or beating unusually, chest tightness or pain, lightheaded, faint, shortness of breath especially with exercise  It is important to take your heart medication as prescribed  You may be on anticoagulation which is very important to take as directed - you may need blood work to monitor drug levels    Diagnosis: Hypothyroid  Assessment and Plan of Treatment:     Diagnosis: History of BPH  Assessment and Plan of Treatment:     Diagnosis: Hypertension  Assessment and Plan of Treatment:     Diagnosis: Glaucoma  Assessment and Plan of Treatment:      PRINCIPAL DISCHARGE DIAGNOSIS  Diagnosis: Face lacerations  Assessment and Plan of Treatment: 6 sutures placed in emergency room for scalp laceration on 9/28  To be removed in 7-10 days from when sutures were placed at primary care provider office      SECONDARY DISCHARGE DIAGNOSES  Diagnosis: Fall  Assessment and Plan of Treatment: You had a fall and had concern that your PPM battery was dying. You had a PPM extraction/Micra leadless pacemaker on 9/30/22 with EP. EP cleared you for discharge. You are to follow up in EP clinic for wound check as scheduled on 10/13/22 at 3:40 pm.  Follow up with PCP / cardiology / and EP as well.  Your risk of falling increases as you grow older. That's because getting older can make it harder to walk steadily and keep your balance. Also, the effects of falls are more serious in older people.                         Several things can increase your risk of a fall such as illness, change in the medicines you take, unsafe or unfamiliar setting (for example, a room with rugs or furniture that might trip you, or an area you don't know well).                            stronger.                                                                                                                                       Use a cane, walker, and other safety devices as these devices help you avoid falling, too. These include grab bars or a sturdy seat for the shower, non-slip bath mats, and hand rails or treads for the stairs (to prevent slipping).                                                                                                                                                                            If you worry that you could fall, there are also alarm buttons that let you call for help if you fall and can't get up.    Diagnosis: ESRD on dialysis  Assessment and Plan of Treatment: Continue HD schedule as directed by your nephrologist.  Hemodialysis is the most common method used to treat advanced and permanent kidney failure. But even with better procedures and equipment, hemodialysis is still a complicated and inconvenient therapy that requires a coordinated effort from your whole health care team, including your nephrologist, dialysis nurse, dialysis technician, dietitian, and . The most important members of your health care team are you and your family.   Healthy kidneys clean your blood by removing excess fluid, minerals, and wastes. When your kidneys fail, harmful wastes build up in your body, your blood pressure may rise, and your body may retain excess fluid and not make enough red blood cells. When this happens, you need treatment to replace the work of your failed kidneys. In hemodialysis, your blood is allowed to flow, a few ounces at a time, through a special filter that removes wastes and extra fluids. The clean blood is then returned to your body. One of the biggest adjustments you must make when you start hemodialysis treatments is following a strict schedule. Most patients go to a dialysis center three times a week for 3 to 5 or more hours each visit.   Some of the more common conditions caused by kidney failure are extreme tiredness, bone problems, joint problems, itching, and "restless legs”.  Many people treated with hemodialysis complain of itchy skin, which is often worse during or just after treatment.  Patients on dialysis often have insomnia, and some people have a specific problem called the sleep apnea syndrome.  Over time, these sleep disturbances can lead to "day-night reversal" (insomnia at night, sleepiness during the day), headache, depression, and decreased alertness.   Sleep disorders may seem unimportant, but they can impair your quality of life. Don't hesitate to raise these problems with your nurse, doctor, or .    Diagnosis: Chronic atrial fibrillation  Assessment and Plan of Treatment: Continue with Eliquis at this time.  Atrial fibrillation is the most common heart rhythm problem.  The condition puts you at risk for has stroke and heart attack  It helps if you control your blood pressure, not drink more than 1-2 alcohol drinks per day, cut down on caffeine, getting treatment for over active thyroid gland, and get regular exercise  Call your doctor if you feel your heart racing or beating unusually, chest tightness or pain, lightheaded, faint, shortness of breath especially with exercise  It is important to take your heart medication as prescribed  You may be on anticoagulation which is very important to take as directed - you may need blood work to monitor drug levels    Diagnosis: Hypothyroid  Assessment and Plan of Treatment: Continue with Levothyroxine    Diagnosis: History of BPH  Assessment and Plan of Treatment: Continue with Finasteride    Diagnosis: Hypertension  Assessment and Plan of Treatment: Eat a heart healthy diet that is low in saturated fats and salt, and includes whole grains, fruits, vegetables and lean protein   Continue to follow with your primary physician or cardiologist.   Seek medical help for dizziness, Lightheadedness, Blurry vision, Headache, Chest pain, Shortness of breath  Follow up with your medical doctor to establish long term blood pressure treatment goals.    Diagnosis: Glaucoma  Assessment and Plan of Treatment: Continue your home eye drops

## 2022-09-30 NOTE — DISCHARGE NOTE PROVIDER - HOSPITAL COURSE
71 y/o male with hx of CAD s/p stent/CABG, BPH, hypothyroid, chronic afib on eliquis, dementia, ESRD on HD (M/T/Th/Fri), HTN, asthma, glaucoma, prediabetes, prior COVID infection s/p vaccination who resides at [facility] presents after a fall with right periorbital hematoma/laceration s/p repair.     Fall.   Patient endorsed feeling dizzy prior to fall  pt reported that PPM "battery was dying" - PPM interrogated inpatient  elevated trop noted but may be in setting of ESRD. EKG without acute ST-T changes, no complaint of chest pain   last outpatient TTE result in 7/2022 showed:  - "EF 60%, mild dilated LA, trace MR/AI, mild pulmonary insufficiency, mild TR, arotic sclerosis without valve restriction"  last outpatient carotid duplex result in 7/2022 showed: 16-49% stenosis in bilateral ICA   patient c/o neck, back, b/l thigh and knee pain- CT C/T/L spine without evidence of fracture but showed "moderate to severe central canal stenosis at C5-C6 secondary to disc osteophyte complex" and evidence of other degenerative disc disease with arthrosis throughout.   per discussion with the patient's brother ([place]), will defer further imaging (i.e., MRI) at this time as unlikely that the patient will be a candidate for any aggressive intervention.  - s/p PPM extraction/Micra leadless pacemaker on 9/30/22 with EP. EP cleared pt for discharge.    ESRD on dialysis.   renal consulted for HD (on M/T/Th/Fri)  patient noted with new exophytic right lower pole lesion- discussed findings with brother([place]) regarding further work up with renal ultrasound vs MRI and he is in agreement to have this looked into as outpatient.    Chronic atrial fibrillation.   not on any AVN blocker at [facility]  PPM interrogation reveals interfering 'noise' pt now s/p micra ppm placement  Eliquis restart 9/30 PM.    Hypertension.   continue home amlodipine 2.5mg with hold parameter.    Hypothyroid.   continue home levothyroxine 25mcg.    History of BPH.   continue home proscar and flomax but if evidence of orthostatic hypotension, consider holding flomax.    Glaucoma.   continue home eye drops.    UA with + pyuria but denies any symptoms- monitor off antibiotics    Medically cleared for discharge back to [facility]. Discussed with medical attending. 71 y/o male with hx of CAD s/p stent/CABG, BPH, hypothyroid, chronic afib on eliquis, dementia, ESRD on HD (M/T/Th/Fri), HTN, asthma, glaucoma, prediabetes, prior COVID infection s/p vaccination who resides at [facility] presents after a fall with right periorbital hematoma/laceration s/p repair.     Fall.   Patient endorsed feeling dizzy prior to fall  pt reported that PPM "battery was dying" - PPM interrogated inpatient  elevated trop noted but may be in setting of ESRD. EKG without acute ST-T changes, no complaint of chest pain   last outpatient TTE result in 7/2022 showed:  - "EF 60%, mild dilated LA, trace MR/AI, mild pulmonary insufficiency, mild TR, arotic sclerosis without valve restriction"  last outpatient carotid duplex result in 7/2022 showed: 16-49% stenosis in bilateral ICA   patient c/o neck, back, b/l thigh and knee pain- CT C/T/L spine without evidence of fracture but showed "moderate to severe central canal stenosis at C5-C6 secondary to disc osteophyte complex" and evidence of other degenerative disc disease with arthrosis throughout.   per discussion with the patient's brother ([place]), will defer further imaging (i.e., MRI) at this time as unlikely that the patient will be a candidate for any aggressive intervention.  - s/p PPM extraction/Micra leadless pacemaker on 9/30/22 with EP. EP cleared pt for discharge.  - follow up in EP clinic for wound check as scheduled on 10/13/22 at 3:40 pm    ESRD on dialysis.   renal consulted for HD (on M/T/Th/Fri)  patient noted with new exophytic right lower pole lesion- discussed findings with brother([place]) regarding further work up with renal ultrasound vs MRI and he is in agreement to have this looked into as outpatient.    Chronic atrial fibrillation.   not on any AVN blocker at [facility]  PPM interrogation reveals interfering 'noise' pt now s/p micra ppm placement  Eliquis restart 9/30 PM.    Hypertension.   continue home amlodipine 2.5mg with hold parameter.    Hypothyroid.   continue home levothyroxine 25mcg.    History of BPH.   continue home proscar and flomax but if evidence of orthostatic hypotension, consider holding flomax.    Glaucoma.   continue home eye drops.    UA with + pyuria but denies any symptoms- monitor off antibiotics    Medically cleared for discharge back to [facility]. Discussed with medical attending.

## 2022-09-30 NOTE — DISCHARGE NOTE PROVIDER - CARE PROVIDER_API CALL
Deya Ziegler (MD)  Cardiac Electrophysiology; Cardiovascular Disease; Internal Medicine  [address] [place] [address]  Phone: [phone]  Fax: [phone]  Scheduled Appointment: 10/13/2022

## 2022-09-30 NOTE — PROGRESS NOTE ADULT - SUBJECTIVE AND OBJECTIVE BOX
Sal Silva MD  Division of Hospital Medicine  Available on MS teams until 7pm  If no response or off-hours, page [phone]  -------------------------------------    Patient is a 72y old  Male who presents with a chief complaint of fall (30 Sep 2022 14:57)    SUBJECTIVE / OVERNIGHT EVENTS: none acute  ADDITIONAL REVIEW OF SYSTEMS: tolerating HD well, notes some pain at hold PPM side, no new complaints, no fevers/chills.     MEDICATIONS  (STANDING):  apixaban 5 milliGRAM(s) Oral every 12 hours  brimonidine 0.2% Ophthalmic Solution 1 Drop(s) Both EYES two times a day  budesonide  80 MICROgram(s)/formoterol 4.5 MICROgram(s) Inhaler 2 Puff(s) Inhalation two times a day  calcium acetate 667 milliGRAM(s) Oral three times a day with meals  dorzolamide 2%/timolol 0.5% Ophthalmic Solution 1 Drop(s) Both EYES two times a day  finasteride 5 milliGRAM(s) Oral daily  latanoprost 0.005% Ophthalmic Solution 1 Drop(s) Both EYES at bedtime  levothyroxine 25 MICROGram(s) Oral daily  lidocaine   4% Patch 1 Patch Transdermal daily  melatonin 3 milliGRAM(s) Oral at bedtime  sertraline 50 milliGRAM(s) Oral daily  simvastatin 10 milliGRAM(s) Oral at bedtime  tamsulosin 0.4 milliGRAM(s) Oral at bedtime    MEDICATIONS  (PRN):  acetaminophen     Tablet .. 650 milliGRAM(s) Oral every 6 hours PRN Temp greater or equal to 38C (100.4F), Mild Pain (1 - 3)  ondansetron Injectable 4 milliGRAM(s) IV Push every 8 hours PRN Nausea and/or Vomiting      CAPILLARY BLOOD GLUCOSE      POCT Blood Glucose.: 144 mg/dL (29 Sep 2022 18:41)    I&O's Summary    29 Sep 2022 07:01  -  30 Sep 2022 07:00  --------------------------------------------------------  IN: 480 mL / OUT: 0 mL / NET: 480 mL    30 Sep 2022 07:01  -  30 Sep 2022 15:22  --------------------------------------------------------  IN: 0 mL / OUT: 500 mL / NET: -500 mL        PHYSICAL EXAM:  Vital Signs Last 24 Hrs  T(C): 36.3 (30 Sep 2022 13:15), Max: 36.7 (29 Sep 2022 20:21)  T(F): 97.4 (30 Sep 2022 13:15), Max: 98.1 (29 Sep 2022 20:21)  HR: 70 (30 Sep 2022 13:15) (65 - 97)  BP: 114/64 (30 Sep 2022 13:15) (90/53 - 118/57)  BP(mean): 67 (29 Sep 2022 16:00) (67 - 69)  RR: 18 (30 Sep 2022 13:15) (16 - 18)  SpO2: 99% (30 Sep 2022 13:15) (98% - 100%)    Parameters below as of 30 Sep 2022 13:15  Patient On (Oxygen Delivery Method): nasal cannula  O2 Flow (L/min): 2    CONSTITUTIONAL: NAD  EYES: PERRLA; conjunctiva and sclera clear  ENMT: MMM  NECK: Supple  RESPIRATORY: Normal respiratory effort; CTAB  CARDIOVASCULAR: RRR, no JVD, no peripheral edema, +old PPM site mild oozing, otherwise clean  ABDOMEN: Nontender to palpation, normoactive BS, no guarding/rigidity  MUSCLOSKELETAL:  no clubbing/cyanosis, no joint swelling or tenderness to palpation  PSYCH: A+O x 1, affect normal  NEUROLOGY: CN 2-12 are intact and symmetric; no gross sensory or motor deficits  SKIN: No rashes; no palpable lesions    LABS:                        8.0    5.43  )-----------( 146      ( 30 Sep 2022 10:06 )             24.6     09-30    132<L>  |  95<L>  |  49<H>  ----------------------------<  114<H>  5.0   |  24  |  4.88<H>    Ca    8.9      30 Sep 2022 10:06                Culture - Urine (collected 28 Sep 2022 04:38)  Source: Clean Catch Clean Catch (Midstream)  Preliminary Report (29 Sep 2022 14:40):    >100,000 CFU/ml Proteus mirabilis  Organism: Proteus mirabilis (30 Sep 2022 07:56)  Organism: Proteus mirabilis (30 Sep 2022 07:56)        RADIOLOGY & ADDITIONAL TESTS:  Results Reviewed:   Imaging Personally Reviewed:  Electrocardiogram Personally Reviewed:    COORDINATION OF CARE:  Care Discussed with Consultants/Other Providers [Y/N]:  Prior or Outpatient Records Reviewed [Y/N]:

## 2022-09-30 NOTE — PROGRESS NOTE ADULT - ASSESSMENT
73 y/o male with hx of CAD s/p stent/CABG, BPH, hypothyroid, chronic afib on eliquis, dementia, ESRD on HD (M/T/Th/Fri), HTN, asthma, glaucoma, prediabetes, prior COVID infection s/p vaccination who resides at [facility] presents after a fall with right periorbital hematoma/laceration s/p repair.

## 2022-10-01 ENCOUNTER — TRANSCRIPTION ENCOUNTER (OUTPATIENT)
Age: 72
End: 2022-10-01

## 2022-10-01 VITALS
TEMPERATURE: 98 F | RESPIRATION RATE: 16 BRPM | DIASTOLIC BLOOD PRESSURE: 60 MMHG | SYSTOLIC BLOOD PRESSURE: 124 MMHG | OXYGEN SATURATION: 99 % | HEART RATE: 74 BPM

## 2022-10-01 LAB
-  AMPICILLIN: SIGNIFICANT CHANGE UP
-  CIPROFLOXACIN: SIGNIFICANT CHANGE UP
-  LEVOFLOXACIN: SIGNIFICANT CHANGE UP
-  NITROFURANTOIN: SIGNIFICANT CHANGE UP
-  TETRACYCLINE: SIGNIFICANT CHANGE UP
-  VANCOMYCIN: SIGNIFICANT CHANGE UP
CULTURE RESULTS: SIGNIFICANT CHANGE UP
METHOD TYPE: SIGNIFICANT CHANGE UP
ORGANISM # SPEC MICROSCOPIC CNT: SIGNIFICANT CHANGE UP
SARS-COV-2 RNA SPEC QL NAA+PROBE: SIGNIFICANT CHANGE UP
SPECIMEN SOURCE: SIGNIFICANT CHANGE UP

## 2022-10-01 PROCEDURE — 93010 ELECTROCARDIOGRAM REPORT: CPT

## 2022-10-01 PROCEDURE — 99239 HOSP IP/OBS DSCHRG MGMT >30: CPT

## 2022-10-01 RX ORDER — AMLODIPINE BESYLATE 2.5 MG/1
1 TABLET ORAL
Qty: 0 | Refills: 0 | DISCHARGE

## 2022-10-01 RX ADMIN — LIDOCAINE 1 PATCH: 4 CREAM TOPICAL at 01:00

## 2022-10-01 RX ADMIN — FINASTERIDE 5 MILLIGRAM(S): 5 TABLET, FILM COATED ORAL at 12:28

## 2022-10-01 RX ADMIN — LIDOCAINE 1 PATCH: 4 CREAM TOPICAL at 12:28

## 2022-10-01 RX ADMIN — DORZOLAMIDE HYDROCHLORIDE TIMOLOL MALEATE 1 DROP(S): 20; 5 SOLUTION/ DROPS OPHTHALMIC at 05:04

## 2022-10-01 RX ADMIN — SERTRALINE 50 MILLIGRAM(S): 25 TABLET, FILM COATED ORAL at 12:28

## 2022-10-01 RX ADMIN — Medication 667 MILLIGRAM(S): at 12:28

## 2022-10-01 RX ADMIN — Medication 25 MICROGRAM(S): at 05:07

## 2022-10-01 RX ADMIN — Medication 667 MILLIGRAM(S): at 09:27

## 2022-10-01 RX ADMIN — BUDESONIDE AND FORMOTEROL FUMARATE DIHYDRATE 2 PUFF(S): 160; 4.5 AEROSOL RESPIRATORY (INHALATION) at 05:08

## 2022-10-01 RX ADMIN — APIXABAN 5 MILLIGRAM(S): 2.5 TABLET, FILM COATED ORAL at 05:07

## 2022-10-01 NOTE — DISCHARGE NOTE NURSING/CASE MANAGEMENT/SOCIAL WORK - NSDCFUADDAPPT_GEN_ALL_CORE_FT
Follow up in EP clinic for wound check as scheduled on 10/13/22 at 3:40 pm    APPTS ARE READY TO BE MADE: [X] YES    Best Family or Patient Contact (if needed):    Additional Information about above appointments (if needed):    1: Primary care provider  2: Cardiology  3: EP as above    Other comments or requests:

## 2022-10-01 NOTE — DISCHARGE NOTE NURSING/CASE MANAGEMENT/SOCIAL WORK - NSDCPEFALRISK_GEN_ALL_CORE
For information on Fall & Injury Prevention, visit: [email or web address] OR  [email or web address] OR  https://www.cdc.gov/steadi/patient.html

## 2022-10-01 NOTE — PROGRESS NOTE ADULT - PROBLEM SELECTOR PLAN 3
not on any AVN blocker at hoME  PPM interrogation reveals interfering 'noise' pt now s/p micra ppm placement  - f/u EP recs on when to resume AP/AC

## 2022-10-01 NOTE — PROGRESS NOTE ADULT - ASSESSMENT
73 y/o male with hx of CAD s/p stent/CABG, BPH, hypothyroid, chronic afib on eliquis, dementia, ESRD on HD (M/T/Th/Fri), HTN, asthma, glaucoma, prediabetes, prior COVID infection s/p vaccination who resides at [facility] presents after a fall with right periorbital hematoma/laceration s/p repair.

## 2022-10-01 NOTE — DISCHARGE NOTE NURSING/CASE MANAGEMENT/SOCIAL WORK - NSDCVIVACCINE_GEN_ALL_CORE_FT
Tdap; 28-Sep-2022 04:14; Leyda Thomson (RN); [employer]; [id number] (Exp. Date: 18-Jan-2024); IntraMuscular; Deltoid Right.; 0.5 milliLiter(s); VIS (VIS Published: 09-May-2013, VIS Presented: 28-Sep-2022);

## 2022-10-01 NOTE — PROGRESS NOTE ADULT - SUBJECTIVE AND OBJECTIVE BOX
Sal Silva MD  Division of Hospital Medicine  Available on MS teams until 7pm  If no response or off-hours, page [phone]  -------------------------------------    Patient is a 72y old  Male who presents with a chief complaint of fall (30 Sep 2022 15:21)      SUBJECTIVE / OVERNIGHT EVENTS: none acute  ADDITIONAL REVIEW OF SYSTEMS: pt feels well, no tele events, eager to be with his friends at NH. ros otherwise negative.     MEDICATIONS  (STANDING):  apixaban 5 milliGRAM(s) Oral every 12 hours  brimonidine 0.2% Ophthalmic Solution 1 Drop(s) Both EYES two times a day  budesonide  80 MICROgram(s)/formoterol 4.5 MICROgram(s) Inhaler 2 Puff(s) Inhalation two times a day  calcium acetate 667 milliGRAM(s) Oral three times a day with meals  dorzolamide 2%/timolol 0.5% Ophthalmic Solution 1 Drop(s) Both EYES two times a day  finasteride 5 milliGRAM(s) Oral daily  latanoprost 0.005% Ophthalmic Solution 1 Drop(s) Both EYES at bedtime  levothyroxine 25 MICROGram(s) Oral daily  lidocaine   4% Patch 1 Patch Transdermal daily  melatonin 3 milliGRAM(s) Oral at bedtime  sertraline 50 milliGRAM(s) Oral daily  simvastatin 10 milliGRAM(s) Oral at bedtime  tamsulosin 0.4 milliGRAM(s) Oral at bedtime    MEDICATIONS  (PRN):  acetaminophen     Tablet .. 650 milliGRAM(s) Oral every 6 hours PRN Temp greater or equal to 38C (100.4F), Mild Pain (1 - 3)  ondansetron Injectable 4 milliGRAM(s) IV Push every 8 hours PRN Nausea and/or Vomiting      CAPILLARY BLOOD GLUCOSE        I&O's Summary    30 Sep 2022 07:01  -  01 Oct 2022 07:00  --------------------------------------------------------  IN: 598 mL / OUT: 500 mL / NET: 98 mL    01 Oct 2022 07:01  -  01 Oct 2022 14:10  --------------------------------------------------------  IN: 358 mL / OUT: 0 mL / NET: 358 mL        PHYSICAL EXAM:  Vital Signs Last 24 Hrs  T(C): 36.8 (01 Oct 2022 12:18), Max: 37 (30 Sep 2022 22:02)  T(F): 98.2 (01 Oct 2022 12:18), Max: 98.6 (30 Sep 2022 22:02)  HR: 74 (01 Oct 2022 12:18) (65 - 74)  BP: 124/60 (01 Oct 2022 12:18) (100/57 - 125/64)  BP(mean): --  RR: 16 (01 Oct 2022 12:18) (16 - 18)  SpO2: 99% (01 Oct 2022 12:18) (97% - 100%)    Parameters below as of 01 Oct 2022 12:18  Patient On (Oxygen Delivery Method): nasal cannula  O2 Flow (L/min): 2    CONSTITUTIONAL: NAD  EYES: PERRLA; conjunctiva and sclera clear  ENMT: MMM  NECK: Supple  RESPIRATORY: Normal respiratory effort; CTAB  CARDIOVASCULAR: RRR, no JVD, no peripheral edema   ABDOMEN: Nontender to palpation, normoactive BS, no guarding/rigidity  MUSCLOSKELETAL:  no clubbing/cyanosis, no joint swelling or tenderness to palpation  PSYCH: A+O x 2-3, affect pleasant/excited  NEUROLOGY: CN 2-12 are intact and symmetric; no gross sensory or motor deficits  SKIN: No rashes; no palpable lesions    LABS:                        8.0    5.43  )-----------( 146      ( 30 Sep 2022 10:06 )             24.6     09-30    132<L>  |  95<L>  |  49<H>  ----------------------------<  114<H>  5.0   |  24  |  4.88<H>    Ca    8.9      30 Sep 2022 10:06                  RADIOLOGY & ADDITIONAL TESTS:  Results Reviewed:   Imaging Personally Reviewed:  Electrocardiogram Personally Reviewed:    COORDINATION OF CARE:  Care Discussed with Consultants/Other Providers [Y/N]: floor np  Prior or Outpatient Records Reviewed [Y/N]:

## 2022-10-01 NOTE — DISCHARGE NOTE NURSING/CASE MANAGEMENT/SOCIAL WORK - PATIENT PORTAL LINK FT
You can access the FollowMyHealth Patient Portal offered by [facility] by registering at the following website: [email or web address]. By joining [facility]’s FollowMyHealth portal, you will also be able to view your health information using other applications (apps) compatible with our system.

## 2022-10-01 NOTE — PROGRESS NOTE ADULT - PROBLEM SELECTOR PLAN 1
Patient endorsed feeling dizzy prior to fall- check orthostatic vital  patient reports he was told the PPM "battery was dying" - will attempt to get PPM interrogated  elevated trop noted but may be in setting of ESRD. EKG without acute ST-T changes, no complaint of chest pain   last outpatient TTE result in 7/2022 showed:  - "EF 60%, mild dilated LA, trace MR/AI, mild pulmonary insufficiency, mild TR, arotic sclerosis without valve restriction"  last outpatient carotid duplex result in 7/2022 showed: 16-49% stenosis in bilateral ICA     patient c/o neck, back, b/l thigh and knee pain- CT C/T/L spine without evidence of fracture but showed "moderate to severe central canal stenosis at C5-C6 secondary to disc osteophyte complex" and evidence of other degenerative disc disease with arthrosis throughout.   per discussion with the patient's brother ([place]), will defer further imaging (i.e., MRI) at this time as unlikely that the patient will be a candidate for any aggressive intervention.
1.2

## 2022-10-01 NOTE — PROGRESS NOTE ADULT - PROBLEM SELECTOR PLAN 5
continue home levothyroxine 25mcg

## 2022-10-01 NOTE — PROGRESS NOTE ADULT - PROBLEM SELECTOR PLAN 2
renal consulted for HD (on M/T/Th/Fri)  patient noted with new exophytic right lower pole lesion- discussed findings with brother([place]) regarding further work up with renal ultrasound vs MRI and he is in agreement to have this looked into as outpatient.

## 2022-10-01 NOTE — PROGRESS NOTE ADULT - PROBLEM SELECTOR PLAN 4
continue home amlodipine 2.5mg with hold parameter

## 2022-10-13 ENCOUNTER — APPOINTMENT (OUTPATIENT)
Dept: ELECTROPHYSIOLOGY | Facility: CLINIC | Age: 72
End: 2022-10-13

## 2022-10-20 PROCEDURE — 70450 CT HEAD/BRAIN W/O DYE: CPT | Mod: MA

## 2022-10-20 PROCEDURE — 82803 BLOOD GASES ANY COMBINATION: CPT

## 2022-10-20 PROCEDURE — 85730 THROMBOPLASTIN TIME PARTIAL: CPT

## 2022-10-20 PROCEDURE — 83735 ASSAY OF MAGNESIUM: CPT

## 2022-10-20 PROCEDURE — 94640 AIRWAY INHALATION TREATMENT: CPT

## 2022-10-20 PROCEDURE — C1894: CPT

## 2022-10-20 PROCEDURE — 84295 ASSAY OF SERUM SODIUM: CPT

## 2022-10-20 PROCEDURE — 72125 CT NECK SPINE W/O DYE: CPT | Mod: MA

## 2022-10-20 PROCEDURE — 85027 COMPLETE CBC AUTOMATED: CPT

## 2022-10-20 PROCEDURE — 90471 IMMUNIZATION ADMIN: CPT

## 2022-10-20 PROCEDURE — C2629: CPT

## 2022-10-20 PROCEDURE — 86704 HEP B CORE ANTIBODY TOTAL: CPT

## 2022-10-20 PROCEDURE — 97162 PT EVAL MOD COMPLEX 30 MIN: CPT

## 2022-10-20 PROCEDURE — 86923 COMPATIBILITY TEST ELECTRIC: CPT

## 2022-10-20 PROCEDURE — 85018 HEMOGLOBIN: CPT

## 2022-10-20 PROCEDURE — C1773: CPT

## 2022-10-20 PROCEDURE — 83605 ASSAY OF LACTIC ACID: CPT

## 2022-10-20 PROCEDURE — 85025 COMPLETE CBC W/AUTO DIFF WBC: CPT

## 2022-10-20 PROCEDURE — 71250 CT THORAX DX C-: CPT | Mod: MA

## 2022-10-20 PROCEDURE — 36415 COLL VENOUS BLD VENIPUNCTURE: CPT

## 2022-10-20 PROCEDURE — C9399: CPT

## 2022-10-20 PROCEDURE — 76000 FLUOROSCOPY <1 HR PHYS/QHP: CPT

## 2022-10-20 PROCEDURE — 82947 ASSAY GLUCOSE BLOOD QUANT: CPT

## 2022-10-20 PROCEDURE — 87086 URINE CULTURE/COLONY COUNT: CPT

## 2022-10-20 PROCEDURE — 85014 HEMATOCRIT: CPT

## 2022-10-20 PROCEDURE — 86705 HEP B CORE ANTIBODY IGM: CPT

## 2022-10-20 PROCEDURE — 96374 THER/PROPH/DIAG INJ IV PUSH: CPT

## 2022-10-20 PROCEDURE — 71045 X-RAY EXAM CHEST 1 VIEW: CPT

## 2022-10-20 PROCEDURE — 86706 HEP B SURFACE ANTIBODY: CPT

## 2022-10-20 PROCEDURE — C1889: CPT

## 2022-10-20 PROCEDURE — 90715 TDAP VACCINE 7 YRS/> IM: CPT

## 2022-10-20 PROCEDURE — 86900 BLOOD TYPING SEROLOGIC ABO: CPT

## 2022-10-20 PROCEDURE — 82330 ASSAY OF CALCIUM: CPT

## 2022-10-20 PROCEDURE — U0003: CPT

## 2022-10-20 PROCEDURE — C1786: CPT

## 2022-10-20 PROCEDURE — 81001 URINALYSIS AUTO W/SCOPE: CPT

## 2022-10-20 PROCEDURE — C1769: CPT

## 2022-10-20 PROCEDURE — 86901 BLOOD TYPING SEROLOGIC RH(D): CPT

## 2022-10-20 PROCEDURE — 86803 HEPATITIS C AB TEST: CPT

## 2022-10-20 PROCEDURE — 85610 PROTHROMBIN TIME: CPT

## 2022-10-20 PROCEDURE — 87077 CULTURE AEROBIC IDENTIFY: CPT

## 2022-10-20 PROCEDURE — 87186 SC STD MICRODIL/AGAR DIL: CPT

## 2022-10-20 PROCEDURE — 82962 GLUCOSE BLOOD TEST: CPT

## 2022-10-20 PROCEDURE — 0225U NFCT DS DNA&RNA 21 SARSCOV2: CPT

## 2022-10-20 PROCEDURE — U0005: CPT

## 2022-10-20 PROCEDURE — 82435 ASSAY OF BLOOD CHLORIDE: CPT

## 2022-10-20 PROCEDURE — 93005 ELECTROCARDIOGRAM TRACING: CPT

## 2022-10-20 PROCEDURE — 74176 CT ABD & PELVIS W/O CONTRAST: CPT | Mod: MA

## 2022-10-20 PROCEDURE — 99261: CPT

## 2022-10-20 PROCEDURE — 99285 EMERGENCY DEPT VISIT HI MDM: CPT

## 2022-10-20 PROCEDURE — 80053 COMPREHEN METABOLIC PANEL: CPT

## 2022-10-20 PROCEDURE — 80048 BASIC METABOLIC PNL TOTAL CA: CPT

## 2022-10-20 PROCEDURE — 86850 RBC ANTIBODY SCREEN: CPT

## 2022-10-20 PROCEDURE — 84484 ASSAY OF TROPONIN QUANT: CPT

## 2022-10-20 PROCEDURE — 84132 ASSAY OF SERUM POTASSIUM: CPT

## 2022-10-20 PROCEDURE — 87340 HEPATITIS B SURFACE AG IA: CPT

## 2023-02-23 NOTE — PATIENT PROFILE ADULT. - FUNCTIONAL SCREEN CURRENT LEVEL: SWALLOWING (IF SCORE 2 OR MORE FOR ANY ITEM, CONSULT REHAB SERVICES), MLM)
Impression: Dermatochalasis of right upper eyelid: H02.831. Plan: Discussed risks/benefits/alternatives of surgical correction. Patient expressed understanding and would like to proceed with surgical correction. Refer to oculoplastics for blepharoplasty BUL. (0) swallows foods/liquids without difficulty

## 2023-03-09 NOTE — PROGRESS NOTE ADULT - PROBLEM SELECTOR PLAN 8
Thank you for allowing us to care for you today. You may receive a survey about the care we provided. Your feedback is valuable and helps us provide excellent care throughout the community.     Home Care Instructions for Pain Management:    1. DIET:   You may resume your normal diet today.   2. BATHING:   You may shower with luke warm water. No tub baths or anything that will soak injection sites under water for the next 24 hours.  3. DRESSING:   You may remove your bandage today.   4. ACTIVITY LEVEL:   You may resume your normal activities 24 hrs after your procedure. Nothing strenuous today.  5. MEDICATIONS:   You may resume your normal medications today. To restart blood thinners, ask your doctor.  6. DRIVING    If you have received any sedatives by mouth today, you may not drive for 12 hours.    If you have received any sedation through your IV, you may not drive for 24 hrs.   7. SPECIAL INSTRUCTIONS:   No heat to the injection site for 24 hrs including, hot bath or shower, heating pad, moist heat, or hot tubs.    Use ice pack to injection site for any pain or discomfort.  Apply ice packs for 20 minute intervals as needed.    IF you have diabetes, be sure to monitor your blood sugar more closely. IF your injection contained steroids your blood sugar levels may become higher than normal.    If you are still having pain upon discharge:  Your pain may improve over the next 48 hours. The anesthetic (numbing medication) works immediately to 48 hours. IF your injection contained a steroid (anti-inflammatory medication), it takes approximately 3 days to start feeling relief and 7-10 days to see your greatest results from the medication. It is possible you may need subsequent injections. This would be discussed at your follow up appointment with pain management or your referring doctor.    Please call the PAIN MANAGEMENT office at [phone] or ON CALL pager at [phone] if you experienced any:   -Weakness or 
loss of sensation  -Fever > 101.5  -Pain uncontrolled with oral medications   -Persistent nausea, vomiting, or diarrhea  -Redness or drainage from the injection sites, or any other worrisome concerns.   If physician on call was not reached or could not communicate with our office for any reason please go to the nearest emergency department.   
UA with + pyuria but denies any symptoms- will monitor off antibiotic for now. follow up urine culture result.  PT serafin ordered  anticipate return back to [facility] upon discharge  copy of MOLST in chart stating patient is full code
UA with + pyuria but denies any symptoms- will monitor off antibiotic for now. follow up urine culture result.  PT serafin ordered  anticipate return back to [facility] upon discharge  copy of MOLST in chart stating patient is full code
UA with + pyuria but denies any symptoms- will monitor off antibiotic for now. follow up urine culture result.  PT eval ordered  anticipate return back to [facility] upon discharge- likely today  copy of MOL in chart stating patient is full code    total discharge time: 40 minutes.

## 2023-12-02 ENCOUNTER — EMERGENCY (EMERGENCY)
Facility: HOSPITAL | Age: 73
LOS: 1 days | Discharge: NURSING, REHAB OR LONG-TERM CARE | End: 2023-12-02
Attending: EMERGENCY MEDICINE | Admitting: EMERGENCY MEDICINE
Payer: MEDICARE

## 2023-12-02 VITALS
WEIGHT: 119.93 LBS | SYSTOLIC BLOOD PRESSURE: 117 MMHG | RESPIRATION RATE: 20 BRPM | HEIGHT: 69 IN | TEMPERATURE: 98 F | OXYGEN SATURATION: 96 % | HEART RATE: 82 BPM | DIASTOLIC BLOOD PRESSURE: 63 MMHG

## 2023-12-02 VITALS
HEART RATE: 63 BPM | RESPIRATION RATE: 18 BRPM | TEMPERATURE: 98 F | OXYGEN SATURATION: 100 % | DIASTOLIC BLOOD PRESSURE: 65 MMHG | SYSTOLIC BLOOD PRESSURE: 127 MMHG

## 2023-12-02 DIAGNOSIS — Z95.0 PRESENCE OF CARDIAC PACEMAKER: Chronic | ICD-10-CM

## 2023-12-02 DIAGNOSIS — Z89.429 ACQUIRED ABSENCE OF OTHER TOE(S), UNSPECIFIED SIDE: Chronic | ICD-10-CM

## 2023-12-02 PROCEDURE — 72125 CT NECK SPINE W/O DYE: CPT | Mod: 26,MA

## 2023-12-02 PROCEDURE — 70450 CT HEAD/BRAIN W/O DYE: CPT | Mod: MA

## 2023-12-02 PROCEDURE — 99284 EMERGENCY DEPT VISIT MOD MDM: CPT | Mod: 25

## 2023-12-02 PROCEDURE — 70450 CT HEAD/BRAIN W/O DYE: CPT | Mod: 26,MA

## 2023-12-02 PROCEDURE — 72125 CT NECK SPINE W/O DYE: CPT | Mod: MA

## 2023-12-02 PROCEDURE — 71250 CT THORAX DX C-: CPT | Mod: 26,MA

## 2023-12-02 PROCEDURE — 71250 CT THORAX DX C-: CPT | Mod: MA

## 2023-12-02 PROCEDURE — 99284 EMERGENCY DEPT VISIT MOD MDM: CPT | Mod: FS

## 2023-12-02 NOTE — ED ADULT NURSE NOTE - OBJECTIVE STATEMENT
Patient is a 73-year-old male  brought in by EMS from [facility] status post fall.  Per EMS patient fell hitting the back of his head and causing left elbow abrasion.  Patient unable to give further information due to dementia.

## 2023-12-02 NOTE — ED PROVIDER NOTE - OBJECTIVE STATEMENT
Patient is a 73-year-old male with past medical history of dementia, COPD, end-stage renal disease, A-fib on anticoagulation, glaucoma, asthma, depression, hypertension, hypocalcemia, hypoparathyroidism brought in by EMS from [facility] status post fall.  Per EMS patient fell hitting the back of his head and causing left elbow abrasion.  Patient unable to give further information due to dementia.

## 2023-12-02 NOTE — ED PROVIDER NOTE - CONDITION AT DISCHARGE:
should due to her recent separation. Is willing to do colonoscopy--has insurance now. Brother diagnosed with colon cancer at 28 and  at 40. Patient understands that she is at an increase in risk. Anxiety  Presents for follow-up visit. Symptoms include excessive worry, nervous/anxious behavior and restlessness. Patient reports no chest pain, decreased concentration, depressed mood, dizziness, palpitations or shortness of breath. Symptoms occur occasionally. The severity of symptoms is mild. The quality of sleep is good. Nighttime awakenings: occasional.     Compliance with medications is %. No Known Allergies  Current Outpatient Medications   Medication Sig    desogestrel-ethinyl estradiol (ISIBLOOM) 0.15-30 MG-MCG per tablet Take 1 tablet by mouth daily TAKE ONE TABLET BY MOUTH ONE TIME DAILY    phentermine (ADIPEX-P) 37.5 MG tablet Take 1 tablet by mouth every morning (before breakfast) for 60 days. Max Daily Amount: 37.5 mg    escitalopram (LEXAPRO) 20 MG tablet TAKE ONE TABLET BY MOUTH ONE TIME DAILY    valACYclovir (VALTREX) 1 g tablet TAKE ONE TABLET BY MOUTH ONE TIME DAILY     No current facility-administered medications for this visit.      Active Ambulatory Problems     Diagnosis Date Noted    Severe obesity ([address]) 2019     Resolved Ambulatory Problems     Diagnosis Date Noted    No Resolved Ambulatory Problems     Past Medical History:   Diagnosis Date    Abnormal Pap smear of cervix      Social History     Socioeconomic History    Marital status: Single     Spouse name: Not on file    Number of children: Not on file    Years of education: Not on file    Highest education level: Not on file   Occupational History    Not on file   Tobacco Use    Smoking status: Former     Packs/day: .1     Types: Cigarettes     Start date: 2007    Smokeless tobacco: Never   Vaping Use    Vaping Use: Some days    Substances: Nicotine, Flavoring    Devices: Disposable, Pre-filled or refillable
Improved

## 2023-12-02 NOTE — ED PROVIDER NOTE - CARE PROVIDER_API CALL
Ivory Tobar  Internal Medicine  [address], [address]  [place] [address]  Phone: [phone]  Fax: [phone]  Follow Up Time: 4-6 Days

## 2023-12-02 NOTE — ED PROVIDER NOTE - PATIENT PORTAL LINK FT
You can access the FollowMyHealth Patient Portal offered by [facility] by registering at the following website: [email or web address]. By joining [facility]’s FollowMyHealth portal, you will also be able to view your health information using other applications (apps) compatible with our system.

## 2023-12-02 NOTE — ED PROVIDER NOTE - NSFOLLOWUPINSTRUCTIONS_ED_ALL_ED_FT
Head Injury    WHAT YOU NEED TO KNOW:    A head injury can include your scalp, face, skull, or brain and range from mild to severe. Effects can appear immediately after the injury or develop later. The effects may last a short time or be permanent. Healthcare providers may want to check your recovery over time. Treatment may change as you recover or develop new health problems from the head injury.    DISCHARGE INSTRUCTIONS:    Call your local emergency number (911 in the [place]), or have someone else call if:    You cannot be woken.    You have a seizure.    You stop responding to others or you faint.    You have blurry or double vision.    Your speech becomes slurred or confused.    You have arm or leg weakness, loss of feeling, or new problems with coordination.    Your pupils are larger than usual, or one pupil is a different size than the other.    You have blood or clear fluid coming out of your ears or nose.  Seek care immediately if:    You have repeated or forceful vomiting.    You feel confused.    Your headache gets worse or becomes severe.    You or someone caring for you notices that you are harder to wake than usual.  Call your doctor if:    Your symptoms last longer than 6 weeks after the injury.    You have questions or concerns about your condition or care.  Medicines:    Acetaminophen decreases pain and fever. It is available without a doctor's order. Ask how much to take and how often to take it. Follow directions. Read the labels of all other medicines you are using to see if they also contain acetaminophen, or ask your doctor or pharmacist. Acetaminophen can cause liver damage if not taken correctly.    Take your medicine as directed. Contact your healthcare provider if you think your medicine is not helping or if you have side effects. Tell your provider if you are allergic to any medicine. Keep a list of the medicines, vitamins, and herbs you take. Include the amounts, and when and why you take them. Bring the list or the pill bottles to follow-up visits. Carry your medicine list with you in case of an emergency.  Self-care:    Rest or do quiet activities. Limit your time watching TV, using the computer, or doing tasks that require a lot of thinking. Slowly return to your normal activities as directed. Do not play sports or do activities that may cause you to get hit in the head. Ask your healthcare provider when you can return to sports.    Apply ice on your head for 15 to 20 minutes every hour or as directed. Use an ice pack, or put crushed ice in a plastic bag. Cover it with a towel before you apply it to your skin. Ice helps prevent tissue damage and decreases swelling and pain.    Have someone stay with you for 24 hours , or as directed. This person can monitor you for problems and call for help if needed. When you are awake, the person should ask you a few questions every few hours to see if you are thinking clearly. An example is to ask your name or address.  Prevent another head injury:    Wear a helmet that fits properly. Do this when you play sports, or ride a bike, scooter, or skateboard. Helmets help decrease your risk for a serious head injury. Talk to your healthcare provider about other ways you can protect yourself if you play sports.    Wear your seatbelt every time you are in a car. This helps lower your risk for a head injury if you are in a car accident.  Follow up with your doctor as directed: Write down your questions so you remember to ask them during your visits.    © [employer] 1973, 2023    	  back to top            © [employer] 1973, 2023

## 2023-12-02 NOTE — ED PROVIDER NOTE - NS ED ATTENDING STATEMENT MOD
This was a shared visit with the [facility]. I reviewed and verified the documentation and independently performed the documented:

## 2023-12-02 NOTE — ED PROVIDER NOTE - PHYSICAL EXAMINATION
SPINE: c-spine: supple, no spinal tenderness. no midline tenderness. no paraspinal tenderness. no body step off. no deformity. no muscle spasm. FROM  Thoracic spine: no spinal tenderness. no midline tenderness. no paraspinal tenderness. no body step off. no deformity. no muscle spasm.FROM   LS-spine:no spinal tenderness. no midline tenderness. no paraspinal tenderness. no body step off. no deformity.no muscle spasm. FROM neg nexus from x 4. SENSATION GROSSLY INTACT X4.  from x 4. extremities nontender

## 2023-12-02 NOTE — ED PROVIDER NOTE - PROGRESS NOTE DETAILS
pt sent back to facility for out pt follow up with dr chandler for abnormal findings. will dc with results

## 2023-12-02 NOTE — ED ADULT NURSE NOTE - NSFALLUNIVINTERV_ED_ALL_ED
Bed/Stretcher in lowest position, wheels locked, appropriate side rails in place/Call bell, personal items and telephone in reach/Instruct patient to call for assistance before getting out of bed/chair/stretcher/Non-slip footwear applied when patient is off stretcher/[place] to call system/Physically safe environment - no spills, clutter or unnecessary equipment/Purposeful proactive rounding/Room/bathroom lighting operational, light cord in reach

## 2023-12-26 ENCOUNTER — INPATIENT (INPATIENT)
Facility: HOSPITAL | Age: 73
LOS: 3 days | Discharge: NURSING, REHAB OR LONG-TERM CARE | DRG: 252 | End: 2023-12-30
Attending: INTERNAL MEDICINE | Admitting: INTERNAL MEDICINE
Payer: MEDICARE

## 2023-12-26 ENCOUNTER — TRANSCRIPTION ENCOUNTER (OUTPATIENT)
Age: 73
End: 2023-12-26

## 2023-12-26 VITALS
TEMPERATURE: 98 F | HEART RATE: 65 BPM | WEIGHT: 147.05 LBS | DIASTOLIC BLOOD PRESSURE: 85 MMHG | HEIGHT: 69 IN | RESPIRATION RATE: 18 BRPM | OXYGEN SATURATION: 99 % | SYSTOLIC BLOOD PRESSURE: 132 MMHG

## 2023-12-26 DIAGNOSIS — Z95.0 PRESENCE OF CARDIAC PACEMAKER: Chronic | ICD-10-CM

## 2023-12-26 DIAGNOSIS — Z89.429 ACQUIRED ABSENCE OF OTHER TOE(S), UNSPECIFIED SIDE: Chronic | ICD-10-CM

## 2023-12-26 DIAGNOSIS — I77.0 ARTERIOVENOUS FISTULA, ACQUIRED: ICD-10-CM

## 2023-12-26 LAB
ALBUMIN SERPL ELPH-MCNC: 3.6 G/DL — SIGNIFICANT CHANGE UP (ref 3.3–5)
ALBUMIN SERPL ELPH-MCNC: 3.6 G/DL — SIGNIFICANT CHANGE UP (ref 3.3–5)
ALP SERPL-CCNC: 195 U/L — HIGH (ref 40–120)
ALP SERPL-CCNC: 195 U/L — HIGH (ref 40–120)
ALT FLD-CCNC: 48 U/L — SIGNIFICANT CHANGE UP (ref 12–78)
ALT FLD-CCNC: 48 U/L — SIGNIFICANT CHANGE UP (ref 12–78)
ANION GAP SERPL CALC-SCNC: 9 MMOL/L — SIGNIFICANT CHANGE UP (ref 5–17)
ANION GAP SERPL CALC-SCNC: 9 MMOL/L — SIGNIFICANT CHANGE UP (ref 5–17)
APTT BLD: 37.9 SEC — HIGH (ref 24.5–35.6)
APTT BLD: 37.9 SEC — HIGH (ref 24.5–35.6)
AST SERPL-CCNC: 30 U/L — SIGNIFICANT CHANGE UP (ref 15–37)
AST SERPL-CCNC: 30 U/L — SIGNIFICANT CHANGE UP (ref 15–37)
BASOPHILS # BLD AUTO: 0.07 K/UL — SIGNIFICANT CHANGE UP (ref 0–0.2)
BASOPHILS # BLD AUTO: 0.07 K/UL — SIGNIFICANT CHANGE UP (ref 0–0.2)
BASOPHILS NFR BLD AUTO: 1.6 % — SIGNIFICANT CHANGE UP (ref 0–2)
BASOPHILS NFR BLD AUTO: 1.6 % — SIGNIFICANT CHANGE UP (ref 0–2)
BILIRUB SERPL-MCNC: 0.5 MG/DL — SIGNIFICANT CHANGE UP (ref 0.2–1.2)
BILIRUB SERPL-MCNC: 0.5 MG/DL — SIGNIFICANT CHANGE UP (ref 0.2–1.2)
BUN SERPL-MCNC: 86 MG/DL — HIGH (ref 7–23)
BUN SERPL-MCNC: 86 MG/DL — HIGH (ref 7–23)
CALCIUM SERPL-MCNC: 8.8 MG/DL — SIGNIFICANT CHANGE UP (ref 8.5–10.1)
CALCIUM SERPL-MCNC: 8.8 MG/DL — SIGNIFICANT CHANGE UP (ref 8.5–10.1)
CHLORIDE SERPL-SCNC: 104 MMOL/L — SIGNIFICANT CHANGE UP (ref 96–108)
CHLORIDE SERPL-SCNC: 104 MMOL/L — SIGNIFICANT CHANGE UP (ref 96–108)
CO2 SERPL-SCNC: 24 MMOL/L — SIGNIFICANT CHANGE UP (ref 22–31)
CO2 SERPL-SCNC: 24 MMOL/L — SIGNIFICANT CHANGE UP (ref 22–31)
CREAT SERPL-MCNC: 7.4 MG/DL — HIGH (ref 0.5–1.3)
CREAT SERPL-MCNC: 7.4 MG/DL — HIGH (ref 0.5–1.3)
EGFR: 7 ML/MIN/1.73M2 — LOW
EGFR: 7 ML/MIN/1.73M2 — LOW
EOSINOPHIL # BLD AUTO: 0.05 K/UL — SIGNIFICANT CHANGE UP (ref 0–0.5)
EOSINOPHIL # BLD AUTO: 0.05 K/UL — SIGNIFICANT CHANGE UP (ref 0–0.5)
EOSINOPHIL NFR BLD AUTO: 1.1 % — SIGNIFICANT CHANGE UP (ref 0–6)
EOSINOPHIL NFR BLD AUTO: 1.1 % — SIGNIFICANT CHANGE UP (ref 0–6)
GLUCOSE SERPL-MCNC: 225 MG/DL — HIGH (ref 70–99)
GLUCOSE SERPL-MCNC: 225 MG/DL — HIGH (ref 70–99)
HCT VFR BLD CALC: 31.4 % — LOW (ref 39–50)
HCT VFR BLD CALC: 31.4 % — LOW (ref 39–50)
HGB BLD-MCNC: 10.1 G/DL — LOW (ref 13–17)
HGB BLD-MCNC: 10.1 G/DL — LOW (ref 13–17)
IMM GRANULOCYTES NFR BLD AUTO: 0.2 % — SIGNIFICANT CHANGE UP (ref 0–0.9)
IMM GRANULOCYTES NFR BLD AUTO: 0.2 % — SIGNIFICANT CHANGE UP (ref 0–0.9)
INR BLD: 1.34 RATIO — HIGH (ref 0.85–1.18)
INR BLD: 1.34 RATIO — HIGH (ref 0.85–1.18)
LYMPHOCYTES # BLD AUTO: 0.49 K/UL — LOW (ref 1–3.3)
LYMPHOCYTES # BLD AUTO: 0.49 K/UL — LOW (ref 1–3.3)
LYMPHOCYTES # BLD AUTO: 11 % — LOW (ref 13–44)
LYMPHOCYTES # BLD AUTO: 11 % — LOW (ref 13–44)
MCHC RBC-ENTMCNC: 32.2 GM/DL — SIGNIFICANT CHANGE UP (ref 32–36)
MCHC RBC-ENTMCNC: 32.2 GM/DL — SIGNIFICANT CHANGE UP (ref 32–36)
MCHC RBC-ENTMCNC: 33.8 PG — SIGNIFICANT CHANGE UP (ref 27–34)
MCHC RBC-ENTMCNC: 33.8 PG — SIGNIFICANT CHANGE UP (ref 27–34)
MCV RBC AUTO: 105 FL — HIGH (ref 80–100)
MCV RBC AUTO: 105 FL — HIGH (ref 80–100)
MONOCYTES # BLD AUTO: 0.31 K/UL — SIGNIFICANT CHANGE UP (ref 0–0.9)
MONOCYTES # BLD AUTO: 0.31 K/UL — SIGNIFICANT CHANGE UP (ref 0–0.9)
MONOCYTES NFR BLD AUTO: 6.9 % — SIGNIFICANT CHANGE UP (ref 2–14)
MONOCYTES NFR BLD AUTO: 6.9 % — SIGNIFICANT CHANGE UP (ref 2–14)
NEUTROPHILS # BLD AUTO: 3.54 K/UL — SIGNIFICANT CHANGE UP (ref 1.8–7.4)
NEUTROPHILS # BLD AUTO: 3.54 K/UL — SIGNIFICANT CHANGE UP (ref 1.8–7.4)
NEUTROPHILS NFR BLD AUTO: 79.2 % — HIGH (ref 43–77)
NEUTROPHILS NFR BLD AUTO: 79.2 % — HIGH (ref 43–77)
NRBC # BLD: 0 /100 WBCS — SIGNIFICANT CHANGE UP (ref 0–0)
NRBC # BLD: 0 /100 WBCS — SIGNIFICANT CHANGE UP (ref 0–0)
PLATELET # BLD AUTO: 173 K/UL — SIGNIFICANT CHANGE UP (ref 150–400)
PLATELET # BLD AUTO: 173 K/UL — SIGNIFICANT CHANGE UP (ref 150–400)
POTASSIUM SERPL-MCNC: 5.5 MMOL/L — HIGH (ref 3.5–5.3)
POTASSIUM SERPL-MCNC: 5.5 MMOL/L — HIGH (ref 3.5–5.3)
POTASSIUM SERPL-SCNC: 5.5 MMOL/L — HIGH (ref 3.5–5.3)
POTASSIUM SERPL-SCNC: 5.5 MMOL/L — HIGH (ref 3.5–5.3)
PROT SERPL-MCNC: 6.9 G/DL — SIGNIFICANT CHANGE UP (ref 6–8.3)
PROT SERPL-MCNC: 6.9 G/DL — SIGNIFICANT CHANGE UP (ref 6–8.3)
PROTHROM AB SERPL-ACNC: 15.5 SEC — HIGH (ref 9.5–13)
PROTHROM AB SERPL-ACNC: 15.5 SEC — HIGH (ref 9.5–13)
RBC # BLD: 2.99 M/UL — LOW (ref 4.2–5.8)
RBC # BLD: 2.99 M/UL — LOW (ref 4.2–5.8)
RBC # FLD: 13.3 % — SIGNIFICANT CHANGE UP (ref 10.3–14.5)
RBC # FLD: 13.3 % — SIGNIFICANT CHANGE UP (ref 10.3–14.5)
SODIUM SERPL-SCNC: 137 MMOL/L — SIGNIFICANT CHANGE UP (ref 135–145)
SODIUM SERPL-SCNC: 137 MMOL/L — SIGNIFICANT CHANGE UP (ref 135–145)
WBC # BLD: 4.47 K/UL — SIGNIFICANT CHANGE UP (ref 3.8–10.5)
WBC # BLD: 4.47 K/UL — SIGNIFICANT CHANGE UP (ref 3.8–10.5)
WBC # FLD AUTO: 4.47 K/UL — SIGNIFICANT CHANGE UP (ref 3.8–10.5)
WBC # FLD AUTO: 4.47 K/UL — SIGNIFICANT CHANGE UP (ref 3.8–10.5)

## 2023-12-26 PROCEDURE — 36556 INSERT NON-TUNNEL CV CATH: CPT

## 2023-12-26 PROCEDURE — 93990 DOPPLER FLOW TESTING: CPT | Mod: 26

## 2023-12-26 PROCEDURE — 99285 EMERGENCY DEPT VISIT HI MDM: CPT

## 2023-12-26 PROCEDURE — 72100 X-RAY EXAM L-S SPINE 2/3 VWS: CPT | Mod: 26

## 2023-12-26 PROCEDURE — 77001 FLUOROGUIDE FOR VEIN DEVICE: CPT | Mod: 26

## 2023-12-26 PROCEDURE — 76937 US GUIDE VASCULAR ACCESS: CPT | Mod: 26

## 2023-12-26 RX ORDER — SERTRALINE 25 MG/1
50 TABLET, FILM COATED ORAL AT BEDTIME
Refills: 0 | Status: DISCONTINUED | OUTPATIENT
Start: 2023-12-26 | End: 2023-12-27

## 2023-12-26 RX ORDER — ACETAMINOPHEN 500 MG
650 TABLET ORAL EVERY 6 HOURS
Refills: 0 | Status: DISCONTINUED | OUTPATIENT
Start: 2023-12-26 | End: 2023-12-27

## 2023-12-26 RX ORDER — GLUCAGON INJECTION, SOLUTION 0.5 MG/.1ML
1 INJECTION, SOLUTION SUBCUTANEOUS ONCE
Refills: 0 | Status: DISCONTINUED | OUTPATIENT
Start: 2023-12-26 | End: 2023-12-27

## 2023-12-26 RX ORDER — SODIUM CHLORIDE 9 MG/ML
1000 INJECTION, SOLUTION INTRAVENOUS
Refills: 0 | Status: DISCONTINUED | OUTPATIENT
Start: 2023-12-26 | End: 2023-12-27

## 2023-12-26 RX ORDER — ONDANSETRON 8 MG/1
4 TABLET, FILM COATED ORAL EVERY 8 HOURS
Refills: 0 | Status: DISCONTINUED | OUTPATIENT
Start: 2023-12-26 | End: 2023-12-27

## 2023-12-26 RX ORDER — LEVOTHYROXINE SODIUM 125 MCG
50 TABLET ORAL DAILY
Refills: 0 | Status: DISCONTINUED | OUTPATIENT
Start: 2023-12-26 | End: 2023-12-27

## 2023-12-26 RX ORDER — LATANOPROST 0.05 MG/ML
1 SOLUTION/ DROPS OPHTHALMIC; TOPICAL AT BEDTIME
Refills: 0 | Status: DISCONTINUED | OUTPATIENT
Start: 2023-12-26 | End: 2023-12-27

## 2023-12-26 RX ORDER — DEXTROSE 50 % IN WATER 50 %
25 SYRINGE (ML) INTRAVENOUS ONCE
Refills: 0 | Status: DISCONTINUED | OUTPATIENT
Start: 2023-12-26 | End: 2023-12-27

## 2023-12-26 RX ORDER — CHLORHEXIDINE GLUCONATE 213 G/1000ML
1 SOLUTION TOPICAL
Refills: 0 | Status: DISCONTINUED | OUTPATIENT
Start: 2023-12-26 | End: 2023-12-27

## 2023-12-26 RX ORDER — DORZOLAMIDE HYDROCHLORIDE TIMOLOL MALEATE 20; 5 MG/ML; MG/ML
1 SOLUTION/ DROPS OPHTHALMIC
Refills: 0 | Status: DISCONTINUED | OUTPATIENT
Start: 2023-12-26 | End: 2023-12-27

## 2023-12-26 RX ORDER — INSULIN LISPRO 100/ML
VIAL (ML) SUBCUTANEOUS
Refills: 0 | Status: DISCONTINUED | OUTPATIENT
Start: 2023-12-26 | End: 2023-12-27

## 2023-12-26 RX ORDER — LEVOTHYROXINE SODIUM 125 MCG
25 TABLET ORAL DAILY
Refills: 0 | Status: DISCONTINUED | OUTPATIENT
Start: 2023-12-26 | End: 2023-12-26

## 2023-12-26 RX ORDER — SERTRALINE 25 MG/1
25 TABLET, FILM COATED ORAL AT BEDTIME
Refills: 0 | Status: DISCONTINUED | OUTPATIENT
Start: 2023-12-26 | End: 2023-12-27

## 2023-12-26 RX ORDER — CALCITRIOL 0.5 UG/1
0.25 CAPSULE ORAL
Refills: 0 | Status: DISCONTINUED | OUTPATIENT
Start: 2023-12-26 | End: 2023-12-27

## 2023-12-26 RX ORDER — DEXTROSE 50 % IN WATER 50 %
12.5 SYRINGE (ML) INTRAVENOUS ONCE
Refills: 0 | Status: DISCONTINUED | OUTPATIENT
Start: 2023-12-26 | End: 2023-12-27

## 2023-12-26 RX ORDER — CALCIUM ACETATE 667 MG
667 TABLET ORAL
Refills: 0 | Status: DISCONTINUED | OUTPATIENT
Start: 2023-12-26 | End: 2023-12-27

## 2023-12-26 RX ORDER — SERTRALINE 25 MG/1
50 TABLET, FILM COATED ORAL DAILY
Refills: 0 | Status: DISCONTINUED | OUTPATIENT
Start: 2023-12-26 | End: 2023-12-26

## 2023-12-26 RX ORDER — SIMVASTATIN 20 MG/1
10 TABLET, FILM COATED ORAL AT BEDTIME
Refills: 0 | Status: DISCONTINUED | OUTPATIENT
Start: 2023-12-26 | End: 2023-12-27

## 2023-12-26 RX ORDER — LANOLIN ALCOHOL/MO/W.PET/CERES
3 CREAM (GRAM) TOPICAL AT BEDTIME
Refills: 0 | Status: DISCONTINUED | OUTPATIENT
Start: 2023-12-26 | End: 2023-12-27

## 2023-12-26 RX ORDER — SODIUM CHLORIDE 9 MG/ML
10 INJECTION INTRAMUSCULAR; INTRAVENOUS; SUBCUTANEOUS
Refills: 0 | Status: DISCONTINUED | OUTPATIENT
Start: 2023-12-26 | End: 2023-12-27

## 2023-12-26 RX ORDER — BRIMONIDINE TARTRATE 2 MG/MG
1 SOLUTION/ DROPS OPHTHALMIC
Refills: 0 | Status: DISCONTINUED | OUTPATIENT
Start: 2023-12-26 | End: 2023-12-27

## 2023-12-26 RX ORDER — FINASTERIDE 5 MG/1
5 TABLET, FILM COATED ORAL DAILY
Refills: 0 | Status: DISCONTINUED | OUTPATIENT
Start: 2023-12-26 | End: 2023-12-27

## 2023-12-26 RX ORDER — TAMSULOSIN HYDROCHLORIDE 0.4 MG/1
0.4 CAPSULE ORAL AT BEDTIME
Refills: 0 | Status: DISCONTINUED | OUTPATIENT
Start: 2023-12-26 | End: 2023-12-27

## 2023-12-26 RX ORDER — DEXTROSE 50 % IN WATER 50 %
15 SYRINGE (ML) INTRAVENOUS ONCE
Refills: 0 | Status: DISCONTINUED | OUTPATIENT
Start: 2023-12-26 | End: 2023-12-27

## 2023-12-26 RX ORDER — BUDESONIDE AND FORMOTEROL FUMARATE DIHYDRATE 160; 4.5 UG/1; UG/1
2 AEROSOL RESPIRATORY (INHALATION)
Refills: 0 | Status: DISCONTINUED | OUTPATIENT
Start: 2023-12-26 | End: 2023-12-27

## 2023-12-26 RX ADMIN — Medication 667 MILLIGRAM(S): at 21:35

## 2023-12-26 RX ADMIN — SIMVASTATIN 10 MILLIGRAM(S): 20 TABLET, FILM COATED ORAL at 21:35

## 2023-12-26 RX ADMIN — BUDESONIDE AND FORMOTEROL FUMARATE DIHYDRATE 2 PUFF(S): 160; 4.5 AEROSOL RESPIRATORY (INHALATION) at 21:34

## 2023-12-26 RX ADMIN — Medication 650 MILLIGRAM(S): at 17:50

## 2023-12-26 RX ADMIN — LATANOPROST 1 DROP(S): 0.05 SOLUTION/ DROPS OPHTHALMIC; TOPICAL at 21:34

## 2023-12-26 RX ADMIN — SERTRALINE 50 MILLIGRAM(S): 25 TABLET, FILM COATED ORAL at 21:35

## 2023-12-26 RX ADMIN — TAMSULOSIN HYDROCHLORIDE 0.4 MILLIGRAM(S): 0.4 CAPSULE ORAL at 21:35

## 2023-12-26 RX ADMIN — Medication 650 MILLIGRAM(S): at 20:29

## 2023-12-26 RX ADMIN — SERTRALINE 25 MILLIGRAM(S): 25 TABLET, FILM COATED ORAL at 21:34

## 2023-12-26 NOTE — ED ADULT TRIAGE NOTE - CHIEF COMPLAINT QUOTE
Pt brought in by [facility] from [place] for clogged AVF (right side). Pt supposed to have dialysis today, unable to perform dialysis.

## 2023-12-26 NOTE — INPATIENT CERTIFICATION FOR MEDICARE PATIENTS - RISKS OF ADVERSE EVENTS
requires IR procedure/Concern for delay in diagnosis and treatment/Concern for renal deterioration/Other:

## 2023-12-26 NOTE — CARE COORDINATION ASSESSMENT. - NSPASTMEDSURGHISTORY_GEN_ALL_CORE_FT
PAST MEDICAL & SURGICAL HISTORY:  Hyperlipidemia      Hypertension      Diabetes mellitus type II      CRI (chronic renal insufficiency)  ESRD on HD      CAD (coronary artery disease)  s/p stent, CABG      ASHD (arteriosclerotic heart disease)      Coronary stent      Hx of CABG      Pacemaker      Cardiac pacemaker      Enlarged prostate      H/O carotid stenosis      Dementia      Hypothyroid      Asthma      Chronic atrial fibrillation      History of amputation of toe  right great toe

## 2023-12-26 NOTE — PROCEDURE NOTE - NSINFORMCONSENT_GEN_A_CORE
brother called via telephone consent/Benefits, risks, and possible complications of procedure explained to patient/caregiver who verbalized understanding and gave written consent.

## 2023-12-26 NOTE — ED ADULT NURSE NOTE - NSFALLHARMRISKINTERV_ED_ALL_ED
Assistance OOB with selected safe patient handling equipment if applicable/Assistance with ambulation/Communicate risk of Fall with Harm to all staff, patient, and family/Monitor gait and stability/Provide visual cue: red socks, yellow wristband, yellow gown, etc/Reinforce activity limits and safety measures with patient and family/Bed in lowest position, wheels locked, appropriate side rails in place/Call bell, personal items and telephone in reach/Instruct patient to call for assistance before getting out of bed/chair/stretcher/Non-slip footwear applied when patient is off stretcher/[place] to call system/Physically safe environment - no spills, clutter or unnecessary equipment/Purposeful Proactive Rounding/Room/bathroom lighting operational, light cord in reach Assistance OOB with selected safe patient handling equipment if applicable/Assistance with ambulation/Communicate risk of Fall with Harm to all staff, patient, and family/Monitor gait and stability/Provide visual cue: red socks, yellow wristband, yellow gown, etc/Reinforce activity limits and safety measures with patient and family/Bed in lowest position, wheels locked, appropriate side rails in place/Call bell, personal items and telephone in reach/Instruct patient to call for assistance before getting out of bed/chair/stretcher/Non-slip footwear applied when patient is off stretcher/[place] to call system/Physically safe environment - no spills, clutter or unnecessary equipment/Purposeful Proactive Rounding/Room/bathroom lighting operational, light cord in reach

## 2023-12-26 NOTE — CONSULT NOTE ADULT - SUBJECTIVE AND OBJECTIVE BOX
Patient is a 73y old  Male who presents with a chief complaint of clotted AV access.     HPI:  73-year-old male with past medical history of dementia, COPD, end-stage renal disease, A-fib on anticoagulation, glaucoma, asthma, depression, hypertension, hypocalcemia, hypoparathyroidism was BIB EMS from  for the clogged AVF fistula, could not receive HD today.  Last HD was 23. Pt has had a fall 3 weeks ago which he was seen in the ED for, had CT head, neck and chest-- all unremarkable. Pt c/o mild lower back pain since the fall.    Renal consult called for ESRD on hemodialysis. History obtained from chart and patient.     PAST MEDICAL HISTORY:  ASHD (arteriosclerotic heart disease)    CAD (coronary artery disease)    CRI (chronic renal insufficiency)    Diabetes mellitus type II    Hypertension    Hyperlipidemia    Pacemaker    Chronic atrial fibrillation    Asthma    Hypothyroid    Dementia    H/O carotid stenosis    Enlarged prostate        PAST SURGICAL HISTORY:  Hx of CABG    Coronary stent    Hernia    Cardiac pacemaker    History of amputation of toe        FAMILY HISTORY:  Family history of chronic ischemic heart disease (Father)  father  of MI at age 59        SOCIAL HISTORY: No smoking or alcohol use     Allergies    Levaquin (Anaphylaxis; Flushing; Short breath)    Intolerances      Home Medications:  Bengay Ultra Strength (Menthol) 5% topical patch: Apply topically to affected area once a day, As Needed (28 Sep 2022 09:48)  brimonidine 0.2% ophthalmic solution: 1 drop(s) to each affected eye 2 times a day (28 Sep 2022 09:48)  calcium acetate 667 mg oral capsule: 1 cap(s) orally 3 times a day (28 Sep 2022 09:48)  Cosopt 2.23%-0.68% ophthalmic solution: 2 drop(s) to each affected eye 2 times a day (28 Sep 2022 09:48)  Eliquis 5 mg oral tablet: 1 tab(s) orally 2 times a day (28 Sep 2022 09:48)  finasteride 5 mg oral tablet: 1 tab(s) orally once a day (28 Sep 2022 09:48)  levothyroxine 25 mcg (0.025 mg) oral tablet: 1 tab(s) orally once a day (28 Sep 2022 09:48)  Melatonin 5 mg oral tablet: 1 tab(s) orally once a day (at bedtime) (28 Sep 2022 09:48)  nepro Carb Steady 0.08 gram-1.8kcal/mL: 240 milliliter(s)  2 times a day (28 Sep 2022 09:48)  ondansetron 4 mg oral tablet, disintegratin tab(s) orally every 8 hours (28 Sep 2022 09:48)  simvastatin 10 mg oral tablet: 1 tab(s) orally once a day (at bedtime) (28 Sep 2022 09:48)  Symbicort 80 mcg-4.5 mcg/inh inhalation aerosol: 2 puff(s) inhaled 2 times a day (28 Sep 2022 09:48)  tamsulosin 0.4 mg oral capsule: 1 cap(s) orally once a day (at bedtime) (28 Sep 2022 09:48)  Xalatan 0.005% ophthalmic solution: 1 drop(s) to each affected eye once a day (at bedtime) (28 Sep 2022 09:48)  Zoloft 50 mg oral tablet: 1 tab(s) orally once a day (at bedtime) (28 Sep 2022 09:48)    MEDICATIONS  (STANDING):    MEDICATIONS  (PRN):      REVIEW OF SYSTEMS:  General: no distress  Respiratory: No cough, SOB  Cardiovascular: No CP or Palpitations	  Gastrointestinal: No nausea, Vomiting. No diarrhea  Genitourinary: No urinary complaints	  Musculoskeletal: No new rash or lesions		  all other systems negative    T(F): 97.7 (23 @ 14:48), Max: 97.7 (23 @ 11:36)  HR: 65 (23 @ 14:48) (65 - 65)  BP: 148/66 (23 @ 14:48) (132/85 - 148/66)  RR: 16 (23 @ 14:48) (16 - 18)  SpO2: 100% (23 @ 14:48) (99% - 100%)  Wt(kg): --    PHYSICAL EXAM:  General: NAD  Respiratory: b/l air entry  Cardiovascular: S1 S2  Gastrointestinal: soft  Extremities: no edema, Rt AVF, no bruit            137  |  104  |  86<H>  ----------------------------<  225<H>  5.5<H>   |  24  |  7.40<H>    Ca    8.8      26 Dec 2023 13:00    TPro  6.9  /  Alb  3.6  /  TBili  0.5  /  DBili  x   /  AST  30  /  ALT  48  /  AlkPhos  195<H>                            10.1   4.47  )-----------( 173      ( 26 Dec 2023 13:00 )             31.4       Potassium: 5.5 mmol/L ( @ 13:00)  Blood Urea Nitrogen: 86 mg/dL ( @ 13:00)  Calcium: 8.8 mg/dL ( @ 13:00)  Hemoglobin: 10.1 g/dL ( @ 13:00)      Creatinine, Serum: 7.40 ( @ 13:00)      Urinalysis Basic - ( 26 Dec 2023 13:00 )    Color: x / Appearance: x / SG: x / pH: x  Gluc: 225 mg/dL / Ketone: x  / Bili: x / Urobili: x   Blood: x / Protein: x / Nitrite: x   Leuk Esterase: x / RBC: x / WBC x   Sq Epi: x / Non Sq Epi: x / Bacteria: x      LIVER FUNCTIONS - ( 26 Dec 2023 13:00 )  Alb: 3.6 g/dL / Pro: 6.9 g/dL / ALK PHOS: 195 U/L / ALT: 48 U/L / AST: 30 U/L / GGT: x

## 2023-12-26 NOTE — H&P ADULT - GASTROINTESTINAL
**EVALUATED BY ADVANCED PRACTICE PROVIDER**        [facility] ENCOUNTER      Pt Name: Vivienne Saucedo  [facility]:[id number]  [place] 2005  Date of evaluation: 6/2/2020  Provider: Lauren Boston PA-C      Chief Complaint:    Chief Complaint   Patient presents with    Toe Injury     pt stated she hit her foot on a table, pt has bruise on right foot at the fourth digit       Nursing Notes, Past Medical Hx, Past Surgical Hx, Social Hx, Allergies, and Family Hx were all reviewed and agreed with or any disagreements were addressed in the HPI.    HPI:  (Location, Duration, Timing, Severity, Quality, Assoc Sx, Context, Modifying factors)  This is a  16 y.o. female complaint of right foot pain. Patient states her brother had a place her see her back that she was sitting and her foot went up and she hit the table with her foot. She denies any numbness or tingling. Just more pain and slight pain with weightbearing. She rates her pain as a 1 when she lying in the bed but 4-6 when she putting pressure on it. No ankle pain no knee pain. No other injuries. No other complaints. She has been icing it. She has not taken anything for pain. Does not want anything for pain right now. PastMedical/Surgical History:  History reviewed. No pertinent past medical history. History reviewed. No pertinent surgical history. Medications:  Previous Medications    No medications on file         Review of Systems:  Review of Systems   Constitutional: Negative for chills and fever. HENT: Negative for congestion, facial swelling and sore throat. Eyes: Negative for discharge and redness. Respiratory: Negative for apnea, choking and shortness of breath. Cardiovascular: Negative for chest pain. Gastrointestinal: Negative for abdominal pain, nausea and vomiting. Genitourinary: Negative for dysuria.    Musculoskeletal: Negative for back pain, neck pain and neck Radiologist below, if available at the time of this note:    XR FOOT RIGHT (MIN 3 VIEWS)   Final Result   No acute osseous injury of the right foot. No results found. MEDICAL DECISION MAKING / ED COURSE:      PROCEDURES:   Procedures    None    Patient was given:  Medications - No data to display    Emergency room course: Patient on exam the right lower extremity hip was nontender, knee nontender, ankle nontender. Patient does have mild tenderness over the dorsal foot with bruising noted over the third and fourth metatarsal that extend into the third toe. She has mild tenderness at the proximal phalanx of the third toe. Capillary refill less than 2 seconds all digits. Nailbed and nail plates are intact. Full range of motion of all digits at the MTP PIP and DIP joints. Pedal pulse good at 2+. X-ray of the right foot shows no acute osseous abnormalities. Patient will be placed in a cast shoe. Ice area 3-4 times a day. She is to take OTC  Motrin or Tylenol as needed for pain. .  Return as needed. Otherwise follow-up with her primary care physician. The patient tolerated their visit well. I evaluated the patient. The physician was available for consultation as needed. The patient and / or the family were informed of the results of any tests, a time was given to answer questions, a plan was proposed and they agreed with plan. CLINICAL IMPRESSION:  1.  Contusion of right foot, initial encounter        DISPOSITION  DISPOSITION Decision To Discharge 06/02/2020 12:04:55 PM          PATIENT REFERRED TO:  Sai Perdue MD  [address] [place] [address]  [address] [place] [address]  [phone]    Call   As needed      DISCHARGE MEDICATIONS:  New Prescriptions    No medications on file       DISCONTINUED MEDICATIONS:  Discontinued Medications    No medications on file              (Please note the MDM and HPI sections of this note were completed with a voice recognition program. normal/soft/nontender/nondistended/normal active bowel sounds not applicable

## 2023-12-26 NOTE — CONSULT NOTE ADULT - SUBJECTIVE AND OBJECTIVE BOX
Vascular Attending:  Dr Germain      HPI: 73-year-old male with past medical history of dementia, COPD, end-stage renal disease, A-fib on anticoagulation, glaucoma, asthma, depression, hypertension, hypocalcemia, hypoparathyroidism was BIB EMS from [place] for thrombosed AVF fistula, could not receive HD today.  Last HD was 12/22/23. Pt has had a fall 3 weeks ago which he was seen in the ED for, had CT head, neck and chest-- all unremarkable. Pt c/o mild lower back       PAST MEDICAL & SURGICAL HISTORY:  ASHD (arteriosclerotic heart disease)  CAD (coronary artery disease)  s/p stent, CABG  ESRD on HD  Diabetes mellitus type II  Hypertension  Hyperlipidemia  Pacemaker  Chronic atrial fibrillation  Asthma  Hypothyroid  Dementia  H/O carotid stenosis  Enlarged prostate  Hx of CABG  Coronary stent  Cardiac pacemaker  History of amputation of toe  right great toe          REVIEW OF SYSTEMS  General: back and R posterior arm pain  Skin/Breast: denies  Ophthalmologic: legally blind	  ENMT:	denies  Respiratory and Thorax: denies SOB, PND, orthopnea, cough	  Cardiovascular:	denies CP, palps  Gastrointestinal:	denies abd pain, N/V  Genitourinary:	denies  Musculoskeletal:	back and arm pain; does not walk sec to generalized LE weakness  Neurological: h/o dementia  Psychiatric: h/o depression  Hematology/Lymphatics:	 anemia  Endocrine: DM  Allergic/Immunologic:	denies    MEDICATIONS  (STANDING):    MEDICATIONS  (PRN):      Allergies  Levaquin (Anaphylaxis; Flushing; Short breath)    SOCIAL HISTORY: Resides at SNF. Minimal ambulation. Recent fall. HD M-W-F      Vital Signs Last 24 Hrs  T(C): 36.5 (26 Dec 2023 11:36), Max: 36.5 (26 Dec 2023 11:36)  T(F): 97.7 (26 Dec 2023 11:36), Max: 97.7 (26 Dec 2023 11:36)  HR: 65 (26 Dec 2023 11:36) (65 - 65)  BP: 132/85 (26 Dec 2023 11:36) (132/85 - 132/85)  BP(mean): --  RR: 18 (26 Dec 2023 11:36) (18 - 18)  SpO2: 99% (26 Dec 2023 11:36) (99% - 99%)    Parameters below as of 26 Dec 2023 11:36  Patient On (Oxygen Delivery Method): room air        PHYSICAL EXAM:  Constitutional: elderly M in NAD  Neck: no JVD  Respiratory: diminished at bases  Cardiovascular: normal S1, S2  Gastrointestinal: soft, ND,NT  Extremities: R brachiocephalic AVF without bruit, pulsatile anastamosis. R hand warm, motor and sensory intact. 2+ radial pulse  Neurological: A&O x 3      LABS:                        10.1   4.47  )-----------( 173      ( 26 Dec 2023 13:00 )             31.4           PT/INR - ( 26 Dec 2023 13:00 )   PT: 15.5 sec;   INR: 1.34 ratio         PTT - ( 26 Dec 2023 13:00 )  PTT:37.9 sec      RADIOLOGY & ADDITIONAL STUDIES    Impression and Plan: Vascular Attending:  Dr Germain    ********poor historian*******  HPI: 73-year-old male with past medical history of dementia, COPD, end-stage renal disease, A-fib on anticoagulation, glaucoma, asthma, depression, hypertension, hypocalcemia, hypoparathyroidism was BIB EMS from [facility] for thrombosed AVF fistula, could not receive HD today.  Last HD was 12/22/23. Pt has had a fall 3 weeks ago which he was seen in the ED for, had CT head, neck and chest-- all unremarkable. Pt c/o mild lower back     Vascular HX; pt cannot recall details but reports RUE AVF creation "a long time ago". Has R axillary stent noted on CXR 9/29/2022. L SC PPM incision without palp generator and reports it was "hurting him so they took it out"  PAST MEDICAL & SURGICAL HISTORY:  ASHD (arteriosclerotic heart disease)  CAD (coronary artery disease)  s/p stent, CABG  ESRD on HD  Diabetes mellitus type II  Hypertension  Hyperlipidemia  Pacemaker  Chronic atrial fibrillation  Asthma  Hypothyroid  Dementia  H/O carotid stenosis  Enlarged prostate  Hx of CABG  Coronary stent  Cardiac pacemaker  History of amputation of toe  right great toe          REVIEW OF SYSTEMS  General: back and R posterior arm pain  Skin/Breast: denies  Ophthalmologic: legally blind	  ENMT:	denies  Respiratory and Thorax: denies SOB, PND, orthopnea, cough	  Cardiovascular:	denies CP, palps  Gastrointestinal:	denies abd pain, N/V  Genitourinary:	denies  Musculoskeletal:	back and arm pain; does not walk sec to generalized LE weakness  Neurological: h/o dementia  Psychiatric: h/o depression  Hematology/Lymphatics:	 anemia  Endocrine: DM  Allergic/Immunologic:	denies    MEDICATIONS  (STANDING):    MEDICATIONS  (PRN):      Allergies  Levaquin (Anaphylaxis; Flushing; Short breath)    SOCIAL HISTORY: Resides at SNF. Minimal ambulation. Recent fall. HD M-W-F      Vital Signs Last 24 Hrs  T(C): 36.5 (26 Dec 2023 11:36), Max: 36.5 (26 Dec 2023 11:36)  T(F): 97.7 (26 Dec 2023 11:36), Max: 97.7 (26 Dec 2023 11:36)  HR: 65 (26 Dec 2023 11:36) (65 - 65)  BP: 132/85 (26 Dec 2023 11:36) (132/85 - 132/85)  BP(mean): --  RR: 18 (26 Dec 2023 11:36) (18 - 18)  SpO2: 99% (26 Dec 2023 11:36) (99% - 99%)    Parameters below as of 26 Dec 2023 11:36  Patient On (Oxygen Delivery Method): room air        PHYSICAL EXAM:  Constitutional: elderly M in NAD  Neck: no JVD  Respiratory: diminished at bases  Cardiovascular: normal S1, S2  Gastrointestinal: soft, ND,NT  Extremities: R brachiocephalic AVF without bruit, pulsatile anastamosis. R hand warm, motor and sensory intact. 2+ radial pulse  Neurological: A&O x 3      LABS:                        10.1   4.47  )-----------( 173      ( 26 Dec 2023 13:00 )             31.4           PT/INR - ( 26 Dec 2023 13:00 )   PT: 15.5 sec;   INR: 1.34 ratio         PTT - ( 26 Dec 2023 13:00 )  PTT:37.9 sec      RADIOLOGY & ADDITIONAL STUDIES    Impression and Plan: Vascular Attending:  Dr Germian    ********poor historian*******  HPI: 73-year-old male with past medical history of dementia, COPD, end-stage renal disease, A-fib on anticoagulation, glaucoma, asthma, depression, hypertension, hypocalcemia, hypoparathyroidism was BIB EMS from [place] for thrombosed AVF fistula, could not receive HD today.  Last HD was 12/22/23. Pt has had a fall 3 weeks ago which he was seen in the ED for, had CT head, neck and chest-- all unremarkable. Pt c/o mild lower back     Vascular HX; pt cannot recall details but reports RUE AVF creation "a long time ago". Has R axillary stent noted on CXR 9/29/2022. L SC PPM incision without palp generator and reports it was "hurting him so they took it out"  PAST MEDICAL & SURGICAL HISTORY:  ASHD (arteriosclerotic heart disease)  CAD (coronary artery disease)  s/p stent, CABG  ESRD on HD  Diabetes mellitus type II  Hypertension  Hyperlipidemia  Pacemaker  Chronic atrial fibrillation  Asthma  Hypothyroid  Dementia  H/O carotid stenosis  Enlarged prostate  Hx of CABG  Coronary stent  Cardiac pacemaker  History of amputation of toe  right great toe          REVIEW OF SYSTEMS  General: back and R posterior arm pain  Skin/Breast: denies  Ophthalmologic: legally blind	  ENMT:	denies  Respiratory and Thorax: denies SOB, PND, orthopnea, cough	  Cardiovascular:	denies CP, palps  Gastrointestinal:	denies abd pain, N/V  Genitourinary:	denies  Musculoskeletal:	back and arm pain; does not walk sec to generalized LE weakness  Neurological: h/o dementia  Psychiatric: h/o depression  Hematology/Lymphatics:	 anemia  Endocrine: DM  Allergic/Immunologic:	denies    MEDICATIONS  (STANDING):    MEDICATIONS  (PRN):      Allergies  Levaquin (Anaphylaxis; Flushing; Short breath)    SOCIAL HISTORY: Resides at SNF. Minimal ambulation. Recent fall. HD M-W-F      Vital Signs Last 24 Hrs  T(C): 36.5 (26 Dec 2023 11:36), Max: 36.5 (26 Dec 2023 11:36)  T(F): 97.7 (26 Dec 2023 11:36), Max: 97.7 (26 Dec 2023 11:36)  HR: 65 (26 Dec 2023 11:36) (65 - 65)  BP: 132/85 (26 Dec 2023 11:36) (132/85 - 132/85)  BP(mean): --  RR: 18 (26 Dec 2023 11:36) (18 - 18)  SpO2: 99% (26 Dec 2023 11:36) (99% - 99%)    Parameters below as of 26 Dec 2023 11:36  Patient On (Oxygen Delivery Method): room air        PHYSICAL EXAM:  Constitutional: elderly M in NAD  Neck: no JVD  Respiratory: diminished at bases  Cardiovascular: normal S1, S2  Gastrointestinal: soft, ND,NT  Extremities: R brachiocephalic AVF without bruit, pulsatile anastamosis. R hand warm, motor and sensory intact. 2+ radial pulse  Neurological: A&O x 3      LABS:                        10.1   4.47  )-----------( 173      ( 26 Dec 2023 13:00 )             31.4           PT/INR - ( 26 Dec 2023 13:00 )   PT: 15.5 sec;   INR: 1.34 ratio         PTT - ( 26 Dec 2023 13:00 )  PTT:37.9 sec      RADIOLOGY & ADDITIONAL STUDIES    Impression and Plan:

## 2023-12-26 NOTE — ED PROVIDER NOTE - CLINICAL SUMMARY MEDICAL DECISION MAKING FREE TEXT BOX
Patient's 73-year-old white male from local skilled nursing facility last dialyzed 12/22/2023 here for evaluation of clogged obstructed right AV fistula upper extremity.  No fever no chills no nausea no vomiting no diarrhea no shortness of breath no chest pain.  This case requires thorough evaluation preprocedural labs followed by placement of dialysis catheter and then consultation with vascular surgery for thrombectomy.

## 2023-12-26 NOTE — PATIENT CHOICE NOTE. - NSPTCHOICENOTES_GEN_ALL_CORE
return to [facility], pt is a LT HD resident  return to [facility], pt is a LT HD resident

## 2023-12-26 NOTE — H&P ADULT - NSHPLABSRESULTS_GEN_ALL_CORE
< from: [facility] Duplex Hemodialysis Access, Right (12.26.23 @ 16:44) >    Right upper extremity brachial artery to cephalic vein surgically created   AV fistula. Stent is noted along the outflow cephalic vein.    High resistance antegrade flow of the inflow brachial artery (121 cm/s)   and at the anastomosis (152 cm/s), consistent with thrombosis of the   fistula. Subsequently, there is long segment thrombosis/absent flow   within the body of the right fistula as well as outflow vein stent.    IMPRESSION:    Right upper extremity brachiocephalic fistula with long segment   thrombosis/absent flow within the body of the fistula as well as outflow   vein stent.    < end of copied text >

## 2023-12-26 NOTE — H&P ADULT - ASSESSMENT
73-year-old male with past medical history of dementia, COPD, end-stage renal disease, A-fib on anticoagulation, glaucoma, asthma, depression, hypertension, hypocalcemia, hypoparathyroidism was BIB EMS from [place] [facility] for the clogged AVF fistula, could not receive HD today.  Last HD was 12/22/23. Pt has had a fall 3 weeks ago which he was seen in the ED for, had CT head, neck and chest-- all unremarkable. Pt c/o mild lower back pain since the fall.1.ESRD on HD  2.clotted AVF  3.Hypertension  IR to place dialysis catheter. Vascular surgery evaluation. Monitor BP trend. Titrate BP meds as needed. To continue current meds.   Dietary and PO fluid restriciton.

## 2023-12-26 NOTE — ED ADULT NURSE NOTE - ED STAT RN HANDOFF DETAILS
report taken from dialysis. took off 0.7L, could not tolerate. BG performed, 103. treated for pain w tylenol. VSS currently

## 2023-12-26 NOTE — PATIENT CHOICE NOTE. - NSPTCHOICESTATE_GEN_ALL_CORE
I have met with the patient and/or caregiver to discuss discharge goals and treatment plan. Patient and/or caregiver also provided with instructions on accessing the CMS Compare websites for additional information related to Post Acute Provider quality and resource use measures to assist them in evaluation of the providers and in selecting their post-acute provider of choice. Patient and caregiver were informed of the facilities that are owned and/or operated by [facility]. I have discussed with the patient the availability of in-network facilities and providers. Patient and caregiver provided with a list of post-acute providers whose services are appropriate to the discharge plans and patient needs.     For patient requiring durable medical equipment, patient and/or caregiver were informed that they have the right to request who provides the required equipment. I have met with the patient and/or caregiver to discuss discharge goals and treatment plan. Patient and/or caregiver also provided with instructions on accessing the CMS Compare websites for additional information related to Post Acute Provider quality and resource use measures to assist them in evaluation of the providers and in selecting their post-acute provider of choice. Patient and caregiver were informed of the facilities that are owned and/or operated by [facility]. I have discussed with the patient the availability of in-network facilities and providers. Patient and caregiver provided with a list of post-acute providers whose services are appropriate to the discharge plans and patient needs.     For patient requiring durable medical equipment, patient and/or caregiver were informed that they have the right to request who provides the required equipment.

## 2023-12-26 NOTE — H&P ADULT - HISTORY OF PRESENT ILLNESS
73-year-old male with past medical history of dementia, COPD, end-stage renal disease, A-fib on anticoagulation, glaucoma, asthma, depression, hypertension, hypocalcemia, hypoparathyroidism was BIB EMS from [place] for the clogged AVF fistula, could not receive HD today.  Last HD was 12/22/23. Pt has had a fall 3 weeks ago which he was seen in the ED for, had CT head, neck and chest-- all unremarkable. Pt c/o mild lower back pain since the fall. 73-year-old male with past medical history of dementia, COPD, end-stage renal disease, A-fib on anticoagulation, glaucoma, asthma, depression, hypertension, T2DM, hypocalcemia, hypoparathyroidism was BIB EMS from [place] for the clogged AVF fistula, could not receive HD today.  Last HD was 12/22/23. Pt has had a fall 3 weeks ago which he was seen in the ED for, had CT head, neck and chest-- all unremarkable. Pt c/o mild lower back pain since the fall.

## 2023-12-26 NOTE — ED PROVIDER NOTE - CONSULTANT FREE TEXT FOR MDM DISCUSSED CASE WITH QUESTION
spoke with nephro Dr. Crystal, he spoke with IR about placing a temp cath, and spoke with Vascular surgeon for eval

## 2023-12-26 NOTE — H&P ADULT - MUSCULOSKELETAL
not applicable no joint swelling/no joint erythema/no joint warmth/no calf tenderness/no chest wall tenderness

## 2023-12-26 NOTE — PATIENT PROFILE ADULT - FALL HARM RISK - HARM RISK INTERVENTIONS
Assistance with ambulation/Assistance OOB with selected safe patient handling equipment/Communicate Risk of Fall with Harm to all staff/Discuss with provider need for PT consult/Monitor gait and stability/Provide patient with walking aids - walker, cane, crutches/Reinforce activity limits and safety measures with patient and family/Tailored Fall Risk Interventions/Visual Cue: Yellow wristband and red socks/Bed in lowest position, wheels locked, appropriate side rails in place/Call bell, personal items and telephone in reach/Instruct patient to call for assistance before getting out of bed or chair/Non-slip footwear when patient is out of bed/[place] to call system/Physically safe environment - no spills, clutter or unnecessary equipment/Purposeful Proactive Rounding/Room/bathroom lighting operational, light cord in reach Assistance with ambulation/Assistance OOB with selected safe patient handling equipment/Communicate Risk of Fall with Harm to all staff/Discuss with provider need for PT consult/Monitor gait and stability/Provide patient with walking aids - walker, cane, crutches/Reinforce activity limits and safety measures with patient and family/Tailored Fall Risk Interventions/Visual Cue: Yellow wristband and red socks/Bed in lowest position, wheels locked, appropriate side rails in place/Call bell, personal items and telephone in reach/Instruct patient to call for assistance before getting out of bed or chair/Non-slip footwear when patient is out of bed/[place] to call system/Physically safe environment - no spills, clutter or unnecessary equipment/Purposeful Proactive Rounding/Room/bathroom lighting operational, light cord in reach

## 2023-12-26 NOTE — ED PROVIDER NOTE - PROGRESS NOTE DETAILS
BARBARA Merrill: Labs reviewed, spoke with vascular surgery PA and nephrology Dr. Crystal–will admit patient for thrombosed right aVF. Spoke with Dr. Tobar for admission BARBARA Merrill: Labs reviewed, spoke with vascular surgery PA and nephrology Dr. Crystal–will admit patient for thrombosed right aVF. Spoke with Dr. oTbar for admission

## 2023-12-26 NOTE — H&P ADULT - NSCORESITESY/N_GEN_A_CORE_RD
Yes V-Y Plasty Text: The defect edges were debeveled with a #15 scalpel blade. Given the location of the defect, shape of the defect and the proximity to free margins an V-Y advancement flap was deemed most appropriate. Using a sterile surgical marker, an appropriate advancement flap was drawn incorporating the defect and placing the expected incisions within the relaxed skin tension lines where possible. The area thus outlined was incised deep to adipose tissue with a #15 scalpel blade. The skin margins were undermined to an appropriate distance in all directions utilizing iris scissors. Following this, the designed flap was advanced and carried over into the primary defect and sutured into place.

## 2023-12-26 NOTE — CARE COORDINATION ASSESSMENT. - OTHER PERTINENT DISCHARGE PLANNING INFORMATION:
pt admitted from [facility] - LT HD resident, clogged AVF Fistual. pt with h/o dementia, ESRD, legally blind. sw spoke with pts brother Israel [phone], workers role discussed. confirmed plans for pt to return to [facility] when stable pt admitted from [facility] - LT HD resident, clogged AVF Fistual. pt with h/o dementia, ESRD, legally blind. sw spoke with pts brother Israel [phone], workers role discussed. confirmed plans for pt to return to [facility] when stable

## 2023-12-26 NOTE — CONSULT NOTE ADULT - ASSESSMENT
74 y/o M S/P recent fall sent from SNF with thrombosed R AVF. Last HD 12/22/23.  Admit to medicine  Consult ICU for temp cath if HD required ASAP  Will obtain Duplex of R AVF to confirm thrombosed per examination   Will need DOAC held for possible thrombectomy  Further recommendations pending imaging  Discussed with Dr Germain
ESRD on HD  clotted AVF  Hypertension    IR to place dialysis catheter. Vascular surgery evaluation. Monitor BP trend. Titrate BP meds as needed. To continue current meds.   Dietary and PO fluid restriciton.     Further recommendations pending clinical course. Thank you for the courtesy of this referral.

## 2023-12-26 NOTE — CARE COORDINATION ASSESSMENT. - NSCAREPROVIDERS_GEN_ALL_CORE_FT
CARE PROVIDERS:  Accepting Physician: Ivory Tobar  Access Services: Dean Omer  Administration: Edyta Brenner  Admitting: Ivory Toabr  Attending: Ivory Tobar  Consultant: Fortino Crystal  Consultant: Yaneth Zuniga  Consultant: Isaias Germain  Consultant: Clotilde Mendes  ED ACP: Caryn Merrill  ED Attending: Romeo Tran  ED Attending2: Geraldo Duffy  ED Nurse: Sue Quijano  Nurse: Jessa Warren  Nurse: Nolberto Maria  Nurse: Koki Jimenez  Nurse: Gerry Hutchinson  Nurse: Bae, Hyeonjeong  Ordered: ADM, User  Ordered: Doctor, Unknown  Outpatient Provider: Mara Matias  Outpatient Provider: Fortino Crystal  Outpatient Provider: Karel Thompson  Override: Erick Treviño  Override: Gerry Hutchinson  Primary Team: Ozzy Enamorado  Primary Team: Caryn Merrill  Primary Team: Ivory Tobar  : Sheila Redman// Supp. Assoc.: Jahaira Vines  UR// Supp. Assoc.: Randi Navarro   CARE PROVIDERS:  Accepting Physician: Ivory Tobar  Access Services: Dean Omer  Administration: Edyta Brenner  Admitting: Ivory Tobar  Attending: Ivory Tobar  Consultant: Fortino Crystal  Consultant: Yaneth Zuniga  Consultant: Isaias Germain  Consultant: Clotilde Mendes  ED ACP: Caryn Merrill  ED Attending: Romeo Tran  ED Attending2: Geraldo Duffy  ED Nurse: Sue Quijano  Nurse: Jessa Warren  Nurse: Nolberto Maria  Nurse: Koki Jimenez  Nurse: Gerry Hutchinson  Nurse: Bae, Hyeonjeong  Ordered: ADM, User  Ordered: Doctor, Unknown  Outpatient Provider: Mara Matias  Outpatient Provider: Fortino Crystal  Outpatient Provider: Karel Thompson  Override: Erick Treviño  Override: Gerry Hutcihnson  Primary Team: Ozzy Enamorado  Primary Team: Caryn Merrill  Primary Team: Ivory Tobar  : Sheila Redman// Supp. Assoc.: Jahaira Vines  UR// Supp. Assoc.: Randi Navarro

## 2023-12-26 NOTE — ED PROVIDER NOTE - OBJECTIVE STATEMENT
73-year-old male with past medical history of dementia, COPD, end-stage renal disease, A-fib on anticoagulation, glaucoma, asthma, depression, hypertension, hypocalcemia, hypoparathyroidism was BIB EMS from [place] for the clogged AVF fistula, could not receive HD today.  Last HD was 12/22/23. Pt has had a fall 3 weeks ago which he was seen in the ED for, had CT head, neck and chest-- all unremarkable. Pt c/o mild lower back pain since the fall.

## 2023-12-26 NOTE — ED PROVIDER NOTE - PHYSICAL EXAMINATION
Gen: Well appearing in NAD.   Head: atraumatic  Heart: s1/s2, RRR  Lung: CTA b/l  Abd: soft, NT/ND,  Msk: Right AVF noted with palpable thrill.  + mild lumbar TTP.  no bruising or obvious deformity   Neuro: AAO x3  Skin: Normal for race.  Psych: Alert and oriented

## 2023-12-26 NOTE — ED PROVIDER NOTE - ATTENDING APP SHARED VISIT CONTRIBUTION OF CARE
Examination reveals a well-developed well-nourished slightly lethargic elderly white male in no acute distress with a AV fistula right upper extremity without any palpable thrill no bruit.  I agree with plan and management outlined by PA.

## 2023-12-26 NOTE — ED ADULT NURSE NOTE - OBJECTIVE STATEMENT
pt BIB ems for failed AVF. pt is A&Ox3. denies chest pain, sob, distress. Right AVF intact. palpable thrill. strong peripheral pulses BL present. no difficulty breathing. no edema noted. pt safety maintained. pt states he fell in bathroom 3 days ago + head strike with tenderness to area. [facility] facility was aware of fall. pt reports taking blood thinners. c/o HA starting yesterday. BL pupils equal and reactive. follows commands all extremities. denies dizziness. pt BIB ems for failed AVF. pt is A&Ox3. denies chest pain, sob, distress. Right AVF intact. palpable thrill. strong peripheral pulses BL present. no difficulty breathing. no edema noted. pt safety maintained. pt states he fell in bathroom 3 days ago + head strike with tenderness to area. [facility] facility was aware of fall. pt reports taking blood thinners. c/o HA starting yesterday. BL pupils equal and reactive. follows commands all extremities. denies dizziness.

## 2023-12-27 LAB
A1C WITH ESTIMATED AVERAGE GLUCOSE RESULT: 8 % — HIGH (ref 4–5.6)
A1C WITH ESTIMATED AVERAGE GLUCOSE RESULT: 8 % — HIGH (ref 4–5.6)
ALBUMIN SERPL ELPH-MCNC: 3.2 G/DL — LOW (ref 3.3–5)
ALBUMIN SERPL ELPH-MCNC: 3.2 G/DL — LOW (ref 3.3–5)
ALP SERPL-CCNC: 188 U/L — HIGH (ref 40–120)
ALP SERPL-CCNC: 188 U/L — HIGH (ref 40–120)
ALT FLD-CCNC: 37 U/L — SIGNIFICANT CHANGE UP (ref 12–78)
ALT FLD-CCNC: 37 U/L — SIGNIFICANT CHANGE UP (ref 12–78)
ANION GAP SERPL CALC-SCNC: 7 MMOL/L — SIGNIFICANT CHANGE UP (ref 5–17)
ANION GAP SERPL CALC-SCNC: 7 MMOL/L — SIGNIFICANT CHANGE UP (ref 5–17)
AST SERPL-CCNC: 18 U/L — SIGNIFICANT CHANGE UP (ref 15–37)
AST SERPL-CCNC: 18 U/L — SIGNIFICANT CHANGE UP (ref 15–37)
BASOPHILS # BLD AUTO: 0.06 K/UL — SIGNIFICANT CHANGE UP (ref 0–0.2)
BASOPHILS # BLD AUTO: 0.06 K/UL — SIGNIFICANT CHANGE UP (ref 0–0.2)
BASOPHILS NFR BLD AUTO: 1.5 % — SIGNIFICANT CHANGE UP (ref 0–2)
BASOPHILS NFR BLD AUTO: 1.5 % — SIGNIFICANT CHANGE UP (ref 0–2)
BILIRUB SERPL-MCNC: 0.8 MG/DL — SIGNIFICANT CHANGE UP (ref 0.2–1.2)
BILIRUB SERPL-MCNC: 0.8 MG/DL — SIGNIFICANT CHANGE UP (ref 0.2–1.2)
BUN SERPL-MCNC: 36 MG/DL — HIGH (ref 7–23)
BUN SERPL-MCNC: 36 MG/DL — HIGH (ref 7–23)
CALCIUM SERPL-MCNC: 8.5 MG/DL — SIGNIFICANT CHANGE UP (ref 8.5–10.1)
CALCIUM SERPL-MCNC: 8.5 MG/DL — SIGNIFICANT CHANGE UP (ref 8.5–10.1)
CHLORIDE SERPL-SCNC: 100 MMOL/L — SIGNIFICANT CHANGE UP (ref 96–108)
CHLORIDE SERPL-SCNC: 100 MMOL/L — SIGNIFICANT CHANGE UP (ref 96–108)
CO2 SERPL-SCNC: 29 MMOL/L — SIGNIFICANT CHANGE UP (ref 22–31)
CO2 SERPL-SCNC: 29 MMOL/L — SIGNIFICANT CHANGE UP (ref 22–31)
CREAT SERPL-MCNC: 4.2 MG/DL — HIGH (ref 0.5–1.3)
CREAT SERPL-MCNC: 4.2 MG/DL — HIGH (ref 0.5–1.3)
EGFR: 14 ML/MIN/1.73M2 — LOW
EGFR: 14 ML/MIN/1.73M2 — LOW
EOSINOPHIL # BLD AUTO: 0.03 K/UL — SIGNIFICANT CHANGE UP (ref 0–0.5)
EOSINOPHIL # BLD AUTO: 0.03 K/UL — SIGNIFICANT CHANGE UP (ref 0–0.5)
EOSINOPHIL NFR BLD AUTO: 0.7 % — SIGNIFICANT CHANGE UP (ref 0–6)
EOSINOPHIL NFR BLD AUTO: 0.7 % — SIGNIFICANT CHANGE UP (ref 0–6)
ESTIMATED AVERAGE GLUCOSE: 183 MG/DL — HIGH (ref 68–114)
ESTIMATED AVERAGE GLUCOSE: 183 MG/DL — HIGH (ref 68–114)
GLUCOSE SERPL-MCNC: 86 MG/DL — SIGNIFICANT CHANGE UP (ref 70–99)
GLUCOSE SERPL-MCNC: 86 MG/DL — SIGNIFICANT CHANGE UP (ref 70–99)
HCT VFR BLD CALC: 28.6 % — LOW (ref 39–50)
HCT VFR BLD CALC: 28.6 % — LOW (ref 39–50)
HCT VFR BLD CALC: 30.3 % — LOW (ref 39–50)
HCT VFR BLD CALC: 30.3 % — LOW (ref 39–50)
HGB BLD-MCNC: 9.3 G/DL — LOW (ref 13–17)
HGB BLD-MCNC: 9.3 G/DL — LOW (ref 13–17)
HGB BLD-MCNC: 9.9 G/DL — LOW (ref 13–17)
HGB BLD-MCNC: 9.9 G/DL — LOW (ref 13–17)
IMM GRANULOCYTES NFR BLD AUTO: 0.5 % — SIGNIFICANT CHANGE UP (ref 0–0.9)
IMM GRANULOCYTES NFR BLD AUTO: 0.5 % — SIGNIFICANT CHANGE UP (ref 0–0.9)
INR BLD: 1.21 RATIO — HIGH (ref 0.85–1.18)
INR BLD: 1.21 RATIO — HIGH (ref 0.85–1.18)
LYMPHOCYTES # BLD AUTO: 0.4 K/UL — LOW (ref 1–3.3)
LYMPHOCYTES # BLD AUTO: 0.4 K/UL — LOW (ref 1–3.3)
LYMPHOCYTES # BLD AUTO: 10 % — LOW (ref 13–44)
LYMPHOCYTES # BLD AUTO: 10 % — LOW (ref 13–44)
MCHC RBC-ENTMCNC: 32.5 GM/DL — SIGNIFICANT CHANGE UP (ref 32–36)
MCHC RBC-ENTMCNC: 32.5 GM/DL — SIGNIFICANT CHANGE UP (ref 32–36)
MCHC RBC-ENTMCNC: 32.7 GM/DL — SIGNIFICANT CHANGE UP (ref 32–36)
MCHC RBC-ENTMCNC: 32.7 GM/DL — SIGNIFICANT CHANGE UP (ref 32–36)
MCHC RBC-ENTMCNC: 33.6 PG — SIGNIFICANT CHANGE UP (ref 27–34)
MCHC RBC-ENTMCNC: 33.6 PG — SIGNIFICANT CHANGE UP (ref 27–34)
MCHC RBC-ENTMCNC: 34.3 PG — HIGH (ref 27–34)
MCHC RBC-ENTMCNC: 34.3 PG — HIGH (ref 27–34)
MCV RBC AUTO: 103.2 FL — HIGH (ref 80–100)
MCV RBC AUTO: 103.2 FL — HIGH (ref 80–100)
MCV RBC AUTO: 104.8 FL — HIGH (ref 80–100)
MCV RBC AUTO: 104.8 FL — HIGH (ref 80–100)
MONOCYTES # BLD AUTO: 0.33 K/UL — SIGNIFICANT CHANGE UP (ref 0–0.9)
MONOCYTES # BLD AUTO: 0.33 K/UL — SIGNIFICANT CHANGE UP (ref 0–0.9)
MONOCYTES NFR BLD AUTO: 8.2 % — SIGNIFICANT CHANGE UP (ref 2–14)
MONOCYTES NFR BLD AUTO: 8.2 % — SIGNIFICANT CHANGE UP (ref 2–14)
NEUTROPHILS # BLD AUTO: 3.18 K/UL — SIGNIFICANT CHANGE UP (ref 1.8–7.4)
NEUTROPHILS # BLD AUTO: 3.18 K/UL — SIGNIFICANT CHANGE UP (ref 1.8–7.4)
NEUTROPHILS NFR BLD AUTO: 79.1 % — HIGH (ref 43–77)
NEUTROPHILS NFR BLD AUTO: 79.1 % — HIGH (ref 43–77)
NRBC # BLD: 0 /100 WBCS — SIGNIFICANT CHANGE UP (ref 0–0)
PLATELET # BLD AUTO: 145 K/UL — LOW (ref 150–400)
PLATELET # BLD AUTO: 145 K/UL — LOW (ref 150–400)
PLATELET # BLD AUTO: 181 K/UL — SIGNIFICANT CHANGE UP (ref 150–400)
PLATELET # BLD AUTO: 181 K/UL — SIGNIFICANT CHANGE UP (ref 150–400)
POTASSIUM SERPL-MCNC: 4.8 MMOL/L — SIGNIFICANT CHANGE UP (ref 3.5–5.3)
POTASSIUM SERPL-MCNC: 4.8 MMOL/L — SIGNIFICANT CHANGE UP (ref 3.5–5.3)
POTASSIUM SERPL-SCNC: 4.8 MMOL/L — SIGNIFICANT CHANGE UP (ref 3.5–5.3)
POTASSIUM SERPL-SCNC: 4.8 MMOL/L — SIGNIFICANT CHANGE UP (ref 3.5–5.3)
PROT SERPL-MCNC: 6.5 G/DL — SIGNIFICANT CHANGE UP (ref 6–8.3)
PROT SERPL-MCNC: 6.5 G/DL — SIGNIFICANT CHANGE UP (ref 6–8.3)
PROTHROM AB SERPL-ACNC: 14.1 SEC — HIGH (ref 9.5–13)
PROTHROM AB SERPL-ACNC: 14.1 SEC — HIGH (ref 9.5–13)
RBC # BLD: 2.77 M/UL — LOW (ref 4.2–5.8)
RBC # BLD: 2.77 M/UL — LOW (ref 4.2–5.8)
RBC # BLD: 2.89 M/UL — LOW (ref 4.2–5.8)
RBC # BLD: 2.89 M/UL — LOW (ref 4.2–5.8)
RBC # FLD: 13.2 % — SIGNIFICANT CHANGE UP (ref 10.3–14.5)
RBC # FLD: 13.2 % — SIGNIFICANT CHANGE UP (ref 10.3–14.5)
RBC # FLD: 15.2 % — HIGH (ref 10.3–14.5)
RBC # FLD: 15.2 % — HIGH (ref 10.3–14.5)
SODIUM SERPL-SCNC: 136 MMOL/L — SIGNIFICANT CHANGE UP (ref 135–145)
SODIUM SERPL-SCNC: 136 MMOL/L — SIGNIFICANT CHANGE UP (ref 135–145)
WBC # BLD: 4.02 K/UL — SIGNIFICANT CHANGE UP (ref 3.8–10.5)
WBC # BLD: 4.02 K/UL — SIGNIFICANT CHANGE UP (ref 3.8–10.5)
WBC # BLD: 8.9 K/UL — SIGNIFICANT CHANGE UP (ref 3.8–10.5)
WBC # BLD: 8.9 K/UL — SIGNIFICANT CHANGE UP (ref 3.8–10.5)
WBC # FLD AUTO: 4.02 K/UL — SIGNIFICANT CHANGE UP (ref 3.8–10.5)
WBC # FLD AUTO: 4.02 K/UL — SIGNIFICANT CHANGE UP (ref 3.8–10.5)
WBC # FLD AUTO: 8.9 K/UL — SIGNIFICANT CHANGE UP (ref 3.8–10.5)
WBC # FLD AUTO: 8.9 K/UL — SIGNIFICANT CHANGE UP (ref 3.8–10.5)

## 2023-12-27 PROCEDURE — 36905 THRMBC/NFS DIALYSIS CIRCUIT: CPT

## 2023-12-27 PROCEDURE — 99222 1ST HOSP IP/OBS MODERATE 55: CPT | Mod: 25

## 2023-12-27 PROCEDURE — 73060 X-RAY EXAM OF HUMERUS: CPT | Mod: 26,RT

## 2023-12-27 PROCEDURE — 73030 X-RAY EXAM OF SHOULDER: CPT | Mod: 26,RT

## 2023-12-27 DEVICE — SHEATH INTRODUCER TERUMO PINNACLE RADIOPAQUE 5FR X 10CM: Type: IMPLANTABLE DEVICE | Site: RIGHT | Status: FUNCTIONAL

## 2023-12-27 DEVICE — GWIRE BENT STRT 0.035INX150CM: Type: IMPLANTABLE DEVICE | Site: RIGHT | Status: FUNCTIONAL

## 2023-12-27 DEVICE — IMPLANTABLE DEVICE: Type: IMPLANTABLE DEVICE | Site: RIGHT | Status: FUNCTIONAL

## 2023-12-27 DEVICE — SHEATH INTRODUCER TERUMO PINNACLE RADIOPAQUE 7FR X 10CM: Type: IMPLANTABLE DEVICE | Site: RIGHT | Status: FUNCTIONAL

## 2023-12-27 DEVICE — SHEATH INTRODUCER TERUMO PINNACLE PERIPHERAL 11FR X 10CM X 0.035" MINI WIRE: Type: IMPLANTABLE DEVICE | Site: RIGHT | Status: FUNCTIONAL

## 2023-12-27 DEVICE — BLLN MUSTANG 10X40MMX75CM: Type: IMPLANTABLE DEVICE | Site: RIGHT | Status: FUNCTIONAL

## 2023-12-27 DEVICE — CATH INDIGO LGTNING BOLT 7FRX130CM: Type: IMPLANTABLE DEVICE | Site: RIGHT | Status: FUNCTIONAL

## 2023-12-27 DEVICE — GUIDEWIRE V-14 CONTROLWIRE ANGLED .014" X 300CM: Type: IMPLANTABLE DEVICE | Site: RIGHT | Status: FUNCTIONAL

## 2023-12-27 DEVICE — GUIDEWIRE RADIFOCUS GLIDEWIRE STANDARD ANGLED TIP 0.035" X 260CM: Type: IMPLANTABLE DEVICE | Site: RIGHT | Status: FUNCTIONAL

## 2023-12-27 DEVICE — KIT MICROPUNCTURE 5FR 10CM: Type: IMPLANTABLE DEVICE | Site: RIGHT | Status: FUNCTIONAL

## 2023-12-27 RX ORDER — LEVOTHYROXINE SODIUM 125 MCG
50 TABLET ORAL DAILY
Refills: 0 | Status: DISCONTINUED | OUTPATIENT
Start: 2023-12-27 | End: 2023-12-30

## 2023-12-27 RX ORDER — DEXTROSE 50 % IN WATER 50 %
25 SYRINGE (ML) INTRAVENOUS ONCE
Refills: 0 | Status: DISCONTINUED | OUTPATIENT
Start: 2023-12-27 | End: 2023-12-30

## 2023-12-27 RX ORDER — ALTEPLASE 100 MG
6 KIT INTRAVENOUS ONCE
Refills: 0 | Status: DISCONTINUED | OUTPATIENT
Start: 2023-12-27 | End: 2023-12-27

## 2023-12-27 RX ORDER — CALCIUM ACETATE 667 MG
667 TABLET ORAL
Refills: 0 | Status: DISCONTINUED | OUTPATIENT
Start: 2023-12-27 | End: 2023-12-30

## 2023-12-27 RX ORDER — INSULIN LISPRO 100/ML
VIAL (ML) SUBCUTANEOUS
Refills: 0 | Status: DISCONTINUED | OUTPATIENT
Start: 2023-12-27 | End: 2023-12-30

## 2023-12-27 RX ORDER — HYDROMORPHONE HYDROCHLORIDE 2 MG/ML
0.25 INJECTION INTRAMUSCULAR; INTRAVENOUS; SUBCUTANEOUS
Refills: 0 | Status: DISCONTINUED | OUTPATIENT
Start: 2023-12-27 | End: 2023-12-27

## 2023-12-27 RX ORDER — ACETAMINOPHEN 500 MG
650 TABLET ORAL EVERY 6 HOURS
Refills: 0 | Status: DISCONTINUED | OUTPATIENT
Start: 2023-12-27 | End: 2023-12-30

## 2023-12-27 RX ORDER — APIXABAN 2.5 MG/1
5 TABLET, FILM COATED ORAL
Refills: 0 | Status: DISCONTINUED | OUTPATIENT
Start: 2023-12-28 | End: 2023-12-30

## 2023-12-27 RX ORDER — INSULIN LISPRO 100/ML
VIAL (ML) SUBCUTANEOUS AT BEDTIME
Refills: 0 | Status: DISCONTINUED | OUTPATIENT
Start: 2023-12-27 | End: 2023-12-30

## 2023-12-27 RX ORDER — SODIUM CHLORIDE 9 MG/ML
1000 INJECTION INTRAMUSCULAR; INTRAVENOUS; SUBCUTANEOUS
Refills: 0 | Status: DISCONTINUED | OUTPATIENT
Start: 2023-12-27 | End: 2023-12-27

## 2023-12-27 RX ORDER — BRIMONIDINE TARTRATE 2 MG/MG
1 SOLUTION/ DROPS OPHTHALMIC
Refills: 0 | Status: DISCONTINUED | OUTPATIENT
Start: 2023-12-27 | End: 2023-12-30

## 2023-12-27 RX ORDER — DEXTROSE 50 % IN WATER 50 %
15 SYRINGE (ML) INTRAVENOUS ONCE
Refills: 0 | Status: DISCONTINUED | OUTPATIENT
Start: 2023-12-27 | End: 2023-12-30

## 2023-12-27 RX ORDER — SERTRALINE 25 MG/1
25 TABLET, FILM COATED ORAL AT BEDTIME
Refills: 0 | Status: DISCONTINUED | OUTPATIENT
Start: 2023-12-27 | End: 2023-12-30

## 2023-12-27 RX ORDER — SODIUM CHLORIDE 9 MG/ML
10 INJECTION INTRAMUSCULAR; INTRAVENOUS; SUBCUTANEOUS
Refills: 0 | Status: DISCONTINUED | OUTPATIENT
Start: 2023-12-27 | End: 2023-12-30

## 2023-12-27 RX ORDER — LANOLIN ALCOHOL/MO/W.PET/CERES
3 CREAM (GRAM) TOPICAL AT BEDTIME
Refills: 0 | Status: DISCONTINUED | OUTPATIENT
Start: 2023-12-27 | End: 2023-12-30

## 2023-12-27 RX ORDER — SODIUM CHLORIDE 9 MG/ML
1000 INJECTION, SOLUTION INTRAVENOUS
Refills: 0 | Status: DISCONTINUED | OUTPATIENT
Start: 2023-12-27 | End: 2023-12-30

## 2023-12-27 RX ORDER — CEFAZOLIN SODIUM 1 G
2000 VIAL (EA) INJECTION ONCE
Refills: 0 | Status: DISCONTINUED | OUTPATIENT
Start: 2023-12-27 | End: 2023-12-27

## 2023-12-27 RX ORDER — DEXTROSE 50 % IN WATER 50 %
12.5 SYRINGE (ML) INTRAVENOUS ONCE
Refills: 0 | Status: DISCONTINUED | OUTPATIENT
Start: 2023-12-27 | End: 2023-12-30

## 2023-12-27 RX ORDER — TAMSULOSIN HYDROCHLORIDE 0.4 MG/1
0.4 CAPSULE ORAL AT BEDTIME
Refills: 0 | Status: DISCONTINUED | OUTPATIENT
Start: 2023-12-27 | End: 2023-12-28

## 2023-12-27 RX ORDER — ONDANSETRON 8 MG/1
4 TABLET, FILM COATED ORAL ONCE
Refills: 0 | Status: DISCONTINUED | OUTPATIENT
Start: 2023-12-27 | End: 2023-12-27

## 2023-12-27 RX ORDER — BUDESONIDE AND FORMOTEROL FUMARATE DIHYDRATE 160; 4.5 UG/1; UG/1
2 AEROSOL RESPIRATORY (INHALATION)
Refills: 0 | Status: DISCONTINUED | OUTPATIENT
Start: 2023-12-27 | End: 2023-12-30

## 2023-12-27 RX ORDER — GLUCAGON INJECTION, SOLUTION 0.5 MG/.1ML
1 INJECTION, SOLUTION SUBCUTANEOUS ONCE
Refills: 0 | Status: DISCONTINUED | OUTPATIENT
Start: 2023-12-27 | End: 2023-12-30

## 2023-12-27 RX ORDER — HYDROMORPHONE HYDROCHLORIDE 2 MG/ML
0.5 INJECTION INTRAMUSCULAR; INTRAVENOUS; SUBCUTANEOUS
Refills: 0 | Status: DISCONTINUED | OUTPATIENT
Start: 2023-12-27 | End: 2023-12-27

## 2023-12-27 RX ORDER — SIMVASTATIN 20 MG/1
10 TABLET, FILM COATED ORAL AT BEDTIME
Refills: 0 | Status: DISCONTINUED | OUTPATIENT
Start: 2023-12-27 | End: 2023-12-30

## 2023-12-27 RX ORDER — LATANOPROST 0.05 MG/ML
1 SOLUTION/ DROPS OPHTHALMIC; TOPICAL AT BEDTIME
Refills: 0 | Status: DISCONTINUED | OUTPATIENT
Start: 2023-12-27 | End: 2023-12-30

## 2023-12-27 RX ORDER — FINASTERIDE 5 MG/1
5 TABLET, FILM COATED ORAL DAILY
Refills: 0 | Status: DISCONTINUED | OUTPATIENT
Start: 2023-12-27 | End: 2023-12-30

## 2023-12-27 RX ORDER — CALCITRIOL 0.5 UG/1
0.25 CAPSULE ORAL
Refills: 0 | Status: DISCONTINUED | OUTPATIENT
Start: 2023-12-27 | End: 2023-12-30

## 2023-12-27 RX ORDER — DORZOLAMIDE HYDROCHLORIDE TIMOLOL MALEATE 20; 5 MG/ML; MG/ML
1 SOLUTION/ DROPS OPHTHALMIC
Refills: 0 | Status: DISCONTINUED | OUTPATIENT
Start: 2023-12-27 | End: 2023-12-30

## 2023-12-27 RX ORDER — ONDANSETRON 8 MG/1
4 TABLET, FILM COATED ORAL EVERY 8 HOURS
Refills: 0 | Status: DISCONTINUED | OUTPATIENT
Start: 2023-12-27 | End: 2023-12-30

## 2023-12-27 RX ORDER — SERTRALINE 25 MG/1
50 TABLET, FILM COATED ORAL AT BEDTIME
Refills: 0 | Status: DISCONTINUED | OUTPATIENT
Start: 2023-12-27 | End: 2023-12-30

## 2023-12-27 RX ADMIN — Medication 650 MILLIGRAM(S): at 12:13

## 2023-12-27 RX ADMIN — Medication 50 MICROGRAM(S): at 05:34

## 2023-12-27 RX ADMIN — DORZOLAMIDE HYDROCHLORIDE TIMOLOL MALEATE 1 DROP(S): 20; 5 SOLUTION/ DROPS OPHTHALMIC at 05:36

## 2023-12-27 RX ADMIN — Medication 667 MILLIGRAM(S): at 09:13

## 2023-12-27 RX ADMIN — Medication 667 MILLIGRAM(S): at 11:43

## 2023-12-27 RX ADMIN — SERTRALINE 50 MILLIGRAM(S): 25 TABLET, FILM COATED ORAL at 22:52

## 2023-12-27 RX ADMIN — SIMVASTATIN 10 MILLIGRAM(S): 20 TABLET, FILM COATED ORAL at 22:53

## 2023-12-27 RX ADMIN — Medication 650 MILLIGRAM(S): at 00:44

## 2023-12-27 RX ADMIN — SERTRALINE 25 MILLIGRAM(S): 25 TABLET, FILM COATED ORAL at 22:53

## 2023-12-27 RX ADMIN — Medication 3 MILLIGRAM(S): at 00:46

## 2023-12-27 RX ADMIN — FINASTERIDE 5 MILLIGRAM(S): 5 TABLET, FILM COATED ORAL at 11:43

## 2023-12-27 RX ADMIN — Medication 650 MILLIGRAM(S): at 01:40

## 2023-12-27 RX ADMIN — CHLORHEXIDINE GLUCONATE 1 APPLICATION(S): 213 SOLUTION TOPICAL at 05:34

## 2023-12-27 RX ADMIN — SODIUM CHLORIDE 25 MILLILITER(S): 9 INJECTION INTRAMUSCULAR; INTRAVENOUS; SUBCUTANEOUS at 22:20

## 2023-12-27 RX ADMIN — TAMSULOSIN HYDROCHLORIDE 0.4 MILLIGRAM(S): 0.4 CAPSULE ORAL at 22:53

## 2023-12-27 RX ADMIN — CALCITRIOL 0.25 MICROGRAM(S): 0.5 CAPSULE ORAL at 09:13

## 2023-12-27 RX ADMIN — Medication 650 MILLIGRAM(S): at 11:43

## 2023-12-27 RX ADMIN — BUDESONIDE AND FORMOTEROL FUMARATE DIHYDRATE 2 PUFF(S): 160; 4.5 AEROSOL RESPIRATORY (INHALATION) at 07:43

## 2023-12-27 RX ADMIN — BRIMONIDINE TARTRATE 1 DROP(S): 2 SOLUTION/ DROPS OPHTHALMIC at 05:34

## 2023-12-27 NOTE — PROGRESS NOTE ADULT - ASSESSMENT
ESRD on HD  clotted AVF  Hypertension    12/26/23: IR to place dialysis catheter. Vascular surgery evaluation. Monitor BP trend. Titrate BP meds as needed. To continue current meds.   Dietary and PO fluid restriction.   12/27/23: s/p HD yesterday. Vascular surgery follow up. To continue current meds. Labile BP.

## 2023-12-27 NOTE — CASE MANAGEMENT PROGRESS NOTE - NSCMPROGRESSNOTE_GEN_ALL_CORE
Discussed on rounds this am, Pt noted to be from [facility] receiving HD.  CM consult noted for pt admitted with CVC/PICC line, pt with AV fistula, now with Right IJ.  SW following for discharge planning to return to SNF.  A CM will remain available through hospitalization. Discussed on rounds this am, Pt noted to be from [facility] receiving HD.  CM consult noted for pt admitted with CVC/PICC line, pt with AV fistula, now with Right IJ.  SW following for discharge planning to return to SNF.  A CM will remain available through hospitalization.

## 2023-12-27 NOTE — PROGRESS NOTE ADULT - SUBJECTIVE AND OBJECTIVE BOX
PROGRESS NOTE  Patient is a 73y old  Male who presents with a chief complaint of SOB (27 Dec 2023 04:46)    Chart and available morning labs /imaging are reviewed electronically , urgent issues addressed . More information  is being added upon completion of rounds , when more information is collected and management discussed with consultants , medical staff and social service/case management on the floor   OVERNIGHT  No new issues reported by medical staff . All above noted Patient is resting in a bed comfortably .Confused ,poor mentation .No distress noted     HPI:  73-year-old male with past medical history of dementia, COPD, end-stage renal disease, A-fib on anticoagulation, glaucoma, asthma, depression, hypertension, hypocalcemia, hypoparathyroidism was BIB EMS from [place] for the clogged AVF fistula, could not receive HD today.  Last HD was 12/22/23. Pt has had a fall 3 weeks ago which he was seen in the ED for, had CT head, neck and chest-- all unremarkable. Pt c/o mild lower back pain since the fall. (26 Dec 2023 18:31)    PAST MEDICAL & SURGICAL HISTORY:  ASHD (arteriosclerotic heart disease)      CAD (coronary artery disease)  s/p stent, CABG      CRI (chronic renal insufficiency)  ESRD on HD      Diabetes mellitus type II      Hypertension      Hyperlipidemia      Pacemaker      Chronic atrial fibrillation      Asthma      Hypothyroid      Dementia      H/O carotid stenosis      Enlarged prostate      Hx of CABG      Coronary stent      Cardiac pacemaker      History of amputation of toe  right great toe          MEDICATIONS  (STANDING):    MEDICATIONS  (PRN):      OBJECTIVE    T(C): 36.5 (12-27-23 @ 17:36), Max: 36.8 (12-26-23 @ 20:34)  HR: 68 (12-27-23 @ 17:36) (68 - 89)  BP: 112/40 (12-27-23 @ 17:36) (97/64 - 146/71)  RR: 18 (12-27-23 @ 17:36) (16 - 18)  SpO2: 98% (12-27-23 @ 17:36) (95% - 98%)  Wt(kg): --  I&O's Summary    26 Dec 2023 07:01  -  27 Dec 2023 07:00  --------------------------------------------------------  IN: 1020 mL / OUT: 1600 mL / NET: -580 mL          REVIEW OF SYSTEMS:  CONSTITUTIONAL: No fever, weight loss, or fatigue  EYES: No eye pain, visual disturbances, or discharge  ENMT:   No sinus or throat pain  NECK: No pain or stiffness  RESPIRATORY: No cough, wheezing, chills or hemoptysis; No shortness of breath  CARDIOVASCULAR: No chest pain, palpitations, dizziness, or leg swelling  GASTROINTESTINAL: No abdominal pain. No nausea, vomiting; No diarrhea or constipation. No melena or hematochezia.  GENITOURINARY: No dysuria, frequency, hematuria, or incontinence  NEUROLOGICAL: No headaches, memory loss, loss of strength, numbness, or tremors  SKIN: No itching, burning, rashes, or lesions   MUSCULOSKELETAL: No joint pain or swelling; No muscle, back, or extremity pain    PHYSICAL EXAM:  Appearance: NAD. VS past 24 hrs -as above   HEENT:   Moist oral mucosa. Conjunctiva clear b/l.   Neck : supple  Respiratory: Lungs CTAB.  Gastrointestinal:  Soft, nontender. No rebound. No rigidity. BS present	  Cardiovascular: RRR ,S1S2 present  Neurologic: Non-focal. Moving all extremities.  Extremities: No edema. No erythema. No calf tenderness.  Skin: No rashes, No ecchymoses, No cyanosis.	  wounds ,skin lesions-See skin assesment flow sheet   LABS:                        9.9    4.02  )-----------( 181      ( 27 Dec 2023 08:12 )             30.3     12-27    136  |  100  |  36<H>  ----------------------------<  86  4.8   |  29  |  4.20<H>    Ca    8.5      27 Dec 2023 08:12    TPro  6.5  /  Alb  3.2<L>  /  TBili  0.8  /  DBili  x   /  AST  18  /  ALT  37  /  AlkPhos  188<H>  12-27    CAPILLARY BLOOD GLUCOSE      POCT Blood Glucose.: 137 mg/dL (27 Dec 2023 11:58)  POCT Blood Glucose.: 98 mg/dL (27 Dec 2023 08:18)  POCT Blood Glucose.: 110 mg/dL (26 Dec 2023 22:54)    PT/INR - ( 27 Dec 2023 08:12 )   PT: 14.1 sec;   INR: 1.21 ratio         PTT - ( 26 Dec 2023 13:00 )  PTT:37.9 sec  Urinalysis Basic - ( 27 Dec 2023 08:12 )    Color: x / Appearance: x / SG: x / pH: x  Gluc: 86 mg/dL / Ketone: x  / Bili: x / Urobili: x   Blood: x / Protein: x / Nitrite: x   Leuk Esterase: x / RBC: x / WBC x   Sq Epi: x / Non Sq Epi: x / Bacteria: x        RADIOLOGY & ADDITIONAL TESTS:   reviewed elctronically  ASSESSMENT/PLAN: 	    25 minutes aggregate time was spent on this visit, 50% visit time spent in care co-ordination with other attendings and counselling patient .I have discussed care plan with patient / HCP/family member ,who expressed understanding of problems treatment and their effect and side effects, alternatives in details. I have asked if they have any questions and concerns and appropriately addressed them to best of my ability.

## 2023-12-27 NOTE — CONSULT NOTE ADULT - SUBJECTIVE AND OBJECTIVE BOX
[facility] [place]     Patient is a 73y old  Male who presents with a chief complaint of     HPI:  73-year-old male with past medical history of dementia, COPD, end-stage renal disease, A-fib on anticoagulation, glaucoma, asthma, depression, hypertension, hypocalcemia, hypoparathyroidism was BIB EMS from  for the clogged AVF fistula, could not receive HD today.  Last HD was 23. Pt has had a fall 3 weeks ago which he was seen in the ED for, had CT head, neck and chest-- all unremarkable. Pt c/o mild lower back pain since the fall. (26 Dec 2023 18:31)      HPI:    73y Male for Cardiology Consult    PAST MEDICAL & SURGICAL HISTORY:  ASHD (arteriosclerotic heart disease)      CAD (coronary artery disease)  s/p stent, CABG      CRI (chronic renal insufficiency)  ESRD on HD      Diabetes mellitus type II      Hypertension      Hyperlipidemia      Pacemaker      Chronic atrial fibrillation      Asthma      Hypothyroid      Dementia      H/O carotid stenosis      Enlarged prostate      Hx of CABG      Coronary stent      Cardiac pacemaker      History of amputation of toe  right great toe          FAMILY HISTORY:  Family history of chronic ischemic heart disease (Father)  father  of MI at age 59        SOCIAL HISTORY:   Alcohol:  Smoking:    Allergies    Levaquin (Anaphylaxis; Flushing; Short breath)    Intolerances        MEDICATIONS  (STANDING):  brimonidine 0.2% Ophthalmic Solution 1 Drop(s) Both EYES two times a day  budesonide  80 MICROgram(s)/formoterol 4.5 MICROgram(s) Inhaler 2 Puff(s) Inhalation two times a day  calcitriol   Capsule 0.25 MICROGram(s) Oral <User Schedule>  calcium acetate 667 milliGRAM(s) Oral three times a day with meals  chlorhexidine 4% Liquid 1 Application(s) Topical <User Schedule>  dextrose 5%. 1000 milliLiter(s) (100 mL/Hr) IV Continuous <Continuous>  dextrose 5%. 1000 milliLiter(s) (50 mL/Hr) IV Continuous <Continuous>  dextrose 50% Injectable 12.5 Gram(s) IV Push once  dextrose 50% Injectable 25 Gram(s) IV Push once  dextrose 50% Injectable 25 Gram(s) IV Push once  dorzolamide 2%/timolol 0.5% Ophthalmic Solution 1 Drop(s) Both EYES two times a day  finasteride 5 milliGRAM(s) Oral daily  glucagon  Injectable 1 milliGRAM(s) IntraMuscular once  insulin lispro (ADMELOG) corrective regimen sliding scale   SubCutaneous three times a day before meals  latanoprost 0.005% Ophthalmic Solution 1 Drop(s) Both EYES at bedtime  levothyroxine 50 MICROGram(s) Oral daily  sertraline 25 milliGRAM(s) Oral at bedtime  sertraline 50 milliGRAM(s) Oral at bedtime  simvastatin 10 milliGRAM(s) Oral at bedtime  tamsulosin 0.4 milliGRAM(s) Oral at bedtime    MEDICATIONS  (PRN):  acetaminophen     Tablet .. 650 milliGRAM(s) Oral every 6 hours PRN Temp greater or equal to 38C (100.4F), Mild Pain (1 - 3)  aluminum hydroxide/magnesium hydroxide/simethicone Suspension 30 milliLiter(s) Oral every 4 hours PRN Dyspepsia  dextrose Oral Gel 15 Gram(s) Oral once PRN Blood Glucose LESS THAN 70 milliGRAM(s)/deciliter  melatonin 3 milliGRAM(s) Oral at bedtime PRN Insomnia  ondansetron Injectable 4 milliGRAM(s) IV Push every 8 hours PRN Nausea and/or Vomiting  sodium chloride 0.9% lock flush 10 milliLiter(s) IV Push every 1 hour PRN Pre/post blood products, medications, blood draw, and to maintain line patency      REVIEW OF SYSTEMS:  CONSTITUTIONAL: No fever, weight loss, chills, shakes, or fat  RESPIRATORY: No cough, wheezing, hemoptysis, or shortness of breath  CARDIOVASCULAR: No chest pain, dyspnea, palpitations, dizziness, syncope, paroxysmal nocturnal dyspnea, orthopnea, or arm or leg swelling  GASTROINTESTINAL: No abdominal  or epigastric pain, nausea, vomiting, hematemesis, diarrhea, constipation, melena or bright red bloo  NEUROLOGICAL: No headaches, memory loss, slurred speech, limb weakness, loss of strength, numbness, or tremors  SKIN: No itching, burning, rashes, or lesions  ENDOCRINE: No heat or cold intolerance, or hair loss  MUSCULOSKELETAL: No joint pain or swelling, muscle, back, or extremity pain  HEME/LYMPH: No easy bruising or bleeding gums  ALLERY AND IMMUNOLOGIC: No hives or rash.    Vital Signs Last 24 Hrs  T(C): 36.8 (26 Dec 2023 22:19), Max: 36.8 (26 Dec 2023 20:34)  T(F): 98.3 (26 Dec 2023 22:19), Max: 98.3 (26 Dec 2023 20:34)  HR: 77 (26 Dec 2023 22:19) (59 - 89)  BP: 126/71 (26 Dec 2023 22:19) (118/63 - 164/92)  BP(mean): --  RR: 18 (26 Dec 2023 22:19) (16 - 21)  SpO2: 96% (26 Dec 2023 22:19) (95% - 100%)    Parameters below as of 26 Dec 2023 22:19  Patient On (Oxygen Delivery Method): room air        PHYSICAL EXAM:  HEAD:  Atraumatic, Normocephalic  EYES: EOMI, PERRLA, conjunctiva and sclera clear  NECK: Supple and normal thyroid.  No JVD or carotid bruit.   HEART: S1, S2 regular , 1/6 soft ejection systolic murmur in mitral area , no thrill and no gallops .  PULMONARY: Bilateral vesicular breathing , few scattered ronchi and few scattered rales are present .  ABDOMEN: Soft nontender and positive bowl sounds   EXTREMITIES:  No clubbing, cyanosis, or pedal  edema  NEUROLOGICAL: AAOX3 , no focal deficit .    LABS:                        10.1   4.47  )-----------( 173      ( 26 Dec 2023 13:00 )             31.4     12-    137  |  104  |  86<H>  ----------------------------<  225<H>  5.5<H>   |  24  |  7.40<H>    Ca    8.8      26 Dec 2023 13:00    TPro  6.9  /  Alb  3.6  /  TBili  0.5  /  DBili  x   /  AST  30  /  ALT  48  /  AlkPhos  195<H>  12-26        PT/INR - ( 26 Dec 2023 13:00 )   PT: 15.5 sec;   INR: 1.34 ratio         PTT - ( 26 Dec 2023 13:00 )  PTT:37.9 sec  Urinalysis Basic - ( 26 Dec 2023 13:00 )    Color: x / Appearance: x / SG: x / pH: x  Gluc: 225 mg/dL / Ketone: x  / Bili: x / Urobili: x   Blood: x / Protein: x / Nitrite: x   Leuk Esterase: x / RBC: x / WBC x   Sq Epi: x / Non Sq Epi: x / Bacteria: x      BNP      EKG:  ECHO:  IMAGING:    Assessment and Plan :     Will continue to follow during hospital course and give further recommendations as needed . Thanks for your referral . if any questions please contact me at office ([phone])cell [phone])  [facility] [place]     Patient is a 73y old  Male who presents with a chief complaint of     HPI:  73-year-old male with past medical history of dementia, COPD, end-stage renal disease, A-fib on anticoagulation, glaucoma, asthma, depression, hypertension, hypocalcemia, hypoparathyroidism was BIB EMS from  for the clogged AVF fistula, could not receive HD today.  Last HD was 23. Pt has had a fall 3 weeks ago which he was seen in the ED for, had CT head, neck and chest-- all unremarkable. Pt c/o mild lower back pain since the fall. (26 Dec 2023 18:31)      HPI:    73y Male for Cardiology Consult    PAST MEDICAL & SURGICAL HISTORY:  ASHD (arteriosclerotic heart disease)      CAD (coronary artery disease)  s/p stent, CABG      CRI (chronic renal insufficiency)  ESRD on HD      Diabetes mellitus type II      Hypertension      Hyperlipidemia      Pacemaker      Chronic atrial fibrillation      Asthma      Hypothyroid      Dementia      H/O carotid stenosis      Enlarged prostate      Hx of CABG      Coronary stent      Cardiac pacemaker      History of amputation of toe  right great toe          FAMILY HISTORY:  Family history of chronic ischemic heart disease (Father)  father  of MI at age 59        SOCIAL HISTORY:   Alcohol:  Smoking:    Allergies    Levaquin (Anaphylaxis; Flushing; Short breath)    Intolerances        MEDICATIONS  (STANDING):  brimonidine 0.2% Ophthalmic Solution 1 Drop(s) Both EYES two times a day  budesonide  80 MICROgram(s)/formoterol 4.5 MICROgram(s) Inhaler 2 Puff(s) Inhalation two times a day  calcitriol   Capsule 0.25 MICROGram(s) Oral <User Schedule>  calcium acetate 667 milliGRAM(s) Oral three times a day with meals  chlorhexidine 4% Liquid 1 Application(s) Topical <User Schedule>  dextrose 5%. 1000 milliLiter(s) (100 mL/Hr) IV Continuous <Continuous>  dextrose 5%. 1000 milliLiter(s) (50 mL/Hr) IV Continuous <Continuous>  dextrose 50% Injectable 12.5 Gram(s) IV Push once  dextrose 50% Injectable 25 Gram(s) IV Push once  dextrose 50% Injectable 25 Gram(s) IV Push once  dorzolamide 2%/timolol 0.5% Ophthalmic Solution 1 Drop(s) Both EYES two times a day  finasteride 5 milliGRAM(s) Oral daily  glucagon  Injectable 1 milliGRAM(s) IntraMuscular once  insulin lispro (ADMELOG) corrective regimen sliding scale   SubCutaneous three times a day before meals  latanoprost 0.005% Ophthalmic Solution 1 Drop(s) Both EYES at bedtime  levothyroxine 50 MICROGram(s) Oral daily  sertraline 25 milliGRAM(s) Oral at bedtime  sertraline 50 milliGRAM(s) Oral at bedtime  simvastatin 10 milliGRAM(s) Oral at bedtime  tamsulosin 0.4 milliGRAM(s) Oral at bedtime    MEDICATIONS  (PRN):  acetaminophen     Tablet .. 650 milliGRAM(s) Oral every 6 hours PRN Temp greater or equal to 38C (100.4F), Mild Pain (1 - 3)  aluminum hydroxide/magnesium hydroxide/simethicone Suspension 30 milliLiter(s) Oral every 4 hours PRN Dyspepsia  dextrose Oral Gel 15 Gram(s) Oral once PRN Blood Glucose LESS THAN 70 milliGRAM(s)/deciliter  melatonin 3 milliGRAM(s) Oral at bedtime PRN Insomnia  ondansetron Injectable 4 milliGRAM(s) IV Push every 8 hours PRN Nausea and/or Vomiting  sodium chloride 0.9% lock flush 10 milliLiter(s) IV Push every 1 hour PRN Pre/post blood products, medications, blood draw, and to maintain line patency      REVIEW OF SYSTEMS:  CONSTITUTIONAL: No fever, weight loss, chills, shakes, or fat  RESPIRATORY: No cough, wheezing, hemoptysis, or shortness of breath  CARDIOVASCULAR: No chest pain, dyspnea, palpitations, dizziness, syncope, paroxysmal nocturnal dyspnea, orthopnea, or arm or leg swelling  GASTROINTESTINAL: No abdominal  or epigastric pain, nausea, vomiting, hematemesis, diarrhea, constipation, melena or bright red bloo  NEUROLOGICAL: No headaches, memory loss, slurred speech, limb weakness, loss of strength, numbness, or tremors  SKIN: No itching, burning, rashes, or lesions  ENDOCRINE: No heat or cold intolerance, or hair loss  MUSCULOSKELETAL: No joint pain or swelling, muscle, back, or extremity pain  HEME/LYMPH: No easy bruising or bleeding gums  ALLERY AND IMMUNOLOGIC: No hives or rash.    Vital Signs Last 24 Hrs  T(C): 36.8 (26 Dec 2023 22:19), Max: 36.8 (26 Dec 2023 20:34)  T(F): 98.3 (26 Dec 2023 22:19), Max: 98.3 (26 Dec 2023 20:34)  HR: 77 (26 Dec 2023 22:19) (59 - 89)  BP: 126/71 (26 Dec 2023 22:19) (118/63 - 164/92)  BP(mean): --  RR: 18 (26 Dec 2023 22:19) (16 - 21)  SpO2: 96% (26 Dec 2023 22:19) (95% - 100%)    Parameters below as of 26 Dec 2023 22:19  Patient On (Oxygen Delivery Method): room air        PHYSICAL EXAM:  HEAD:  Atraumatic, Normocephalic  EYES: EOMI, PERRLA, conjunctiva and sclera clear  NECK: Supple and normal thyroid.  No JVD or carotid bruit.   HEART: S1, S2 regular , 1/6 soft ejection systolic murmur in mitral area , no thrill and no gallops .  PULMONARY: Bilateral vesicular breathing , few scattered ronchi and few scattered rales are present .  ABDOMEN: Soft nontender and positive bowl sounds   EXTREMITIES:  No clubbing, cyanosis, or pedal  edema  NEUROLOGICAL: AAOX3 , no focal deficit .    LABS:                        10.1   4.47  )-----------( 173      ( 26 Dec 2023 13:00 )             31.4     12-    137  |  104  |  86<H>  ----------------------------<  225<H>  5.5<H>   |  24  |  7.40<H>    Ca    8.8      26 Dec 2023 13:00    TPro  6.9  /  Alb  3.6  /  TBili  0.5  /  DBili  x   /  AST  30  /  ALT  48  /  AlkPhos  195<H>  12-26        PT/INR - ( 26 Dec 2023 13:00 )   PT: 15.5 sec;   INR: 1.34 ratio         PTT - ( 26 Dec 2023 13:00 )  PTT:37.9 sec  Urinalysis Basic - ( 26 Dec 2023 13:00 )    Color: x / Appearance: x / SG: x / pH: x  Gluc: 225 mg/dL / Ketone: x  / Bili: x / Urobili: x   Blood: x / Protein: x / Nitrite: x   Leuk Esterase: x / RBC: x / WBC x   Sq Epi: x / Non Sq Epi: x / Bacteria: x      BNP      EKG:  ECHO:  IMAGING:    Assessment and Plan :     Will continue to follow during hospital course and give further recommendations as needed . Thanks for your referral . if any questions please contact me at office ([phone] [phone])  MI SANDOVAL MD [place] [id number]  [address]  OFFICE : [phone]  CELL : [phone]  CARDIOLOGY CONSULT :   f     HPI:· Chief Complaint: The patient is a 73y Male complaining of see chief complaint WITH CLOGGED av FISTULA   · HPI Objective Statement: 73-year-old male with past medical history of dementia, COPD, end-stage renal disease, A-fib on anticoagulation, glaucoma, asthma, depression, hypertension, hypocalcemia, hypoparathyroidism was BIB EMS from [place] for the clogged AVF fistula, could not receive HD today.  Last HD was 23. Pt has had a fall 3 weeks ago which he was seen in the ED for, had CT head, neck and chest-- all unremarkable. Pt c/o mild lower back pain since the fall.    73-year-old male with past medical history of dementia, COPD, end-stage renal disease, A-fib on anticoagulation, glaucoma, asthma, depression, hypertension, hypocalcemia, hypoparathyroidism was BIB EMS from  for the clogged AVF fistula, could not receive HD today.  Last HD was 23. Pt has had a fall 3 weeks ago which he was seen in the ED for, had CT head, neck and chest-- all unremarkable. Pt c/o mild lower back pain since the fall. (26 Dec 2023 18:31)    PAST MEDICAL & SURGICAL HISTORY:  ASHD (arteriosclerotic heart disease)      CAD (coronary artery disease)  s/p stent, CABG      CRI (chronic renal insufficiency)  ESRD on HD      Diabetes mellitus type II      Hypertension      Hyperlipidemia      Pacemaker      Chronic atrial fibrillation      Asthma      Hypothyroid      Dementia      H/O carotid stenosis      Enlarged prostate      Hx of CABG      Coronary stent      Cardiac pacemaker      History of amputation of toe  right great toe          FAMILY HISTORY:  Family history of chronic ischemic heart disease (Father)  father  of MI at age 59        SOCIAL HISTORY:   Alcohol:  Smoking:    Allergies    Levaquin (Anaphylaxis; Flushing; Short breath)    Intolerances        MEDICATIONS  (STANDING):  brimonidine 0.2% Ophthalmic Solution 1 Drop(s) Both EYES two times a day  budesonide  80 MICROgram(s)/formoterol 4.5 MICROgram(s) Inhaler 2 Puff(s) Inhalation two times a day  calcitriol   Capsule 0.25 MICROGram(s) Oral <User Schedule>  calcium acetate 667 milliGRAM(s) Oral three times a day with meals  chlorhexidine 4% Liquid 1 Application(s) Topical <User Schedule>  dextrose 5%. 1000 milliLiter(s) (100 mL/Hr) IV Continuous <Continuous>  dextrose 5%. 1000 milliLiter(s) (50 mL/Hr) IV Continuous <Continuous>  dextrose 50% Injectable 12.5 Gram(s) IV Push once  dextrose 50% Injectable 25 Gram(s) IV Push once  dextrose 50% Injectable 25 Gram(s) IV Push once  dorzolamide 2%/timolol 0.5% Ophthalmic Solution 1 Drop(s) Both EYES two times a day  finasteride 5 milliGRAM(s) Oral daily  glucagon  Injectable 1 milliGRAM(s) IntraMuscular once  insulin lispro (ADMELOG) corrective regimen sliding scale   SubCutaneous three times a day before meals  latanoprost 0.005% Ophthalmic Solution 1 Drop(s) Both EYES at bedtime  levothyroxine 50 MICROGram(s) Oral daily  sertraline 25 milliGRAM(s) Oral at bedtime  sertraline 50 milliGRAM(s) Oral at bedtime  simvastatin 10 milliGRAM(s) Oral at bedtime  tamsulosin 0.4 milliGRAM(s) Oral at bedtime    MEDICATIONS  (PRN):  acetaminophen     Tablet .. 650 milliGRAM(s) Oral every 6 hours PRN Temp greater or equal to 38C (100.4F), Mild Pain (1 - 3)  aluminum hydroxide/magnesium hydroxide/simethicone Suspension 30 milliLiter(s) Oral every 4 hours PRN Dyspepsia  dextrose Oral Gel 15 Gram(s) Oral once PRN Blood Glucose LESS THAN 70 milliGRAM(s)/deciliter  melatonin 3 milliGRAM(s) Oral at bedtime PRN Insomnia  ondansetron Injectable 4 milliGRAM(s) IV Push every 8 hours PRN Nausea and/or Vomiting  sodium chloride 0.9% lock flush 10 milliLiter(s) IV Push every 1 hour PRN Pre/post blood products, medications, blood draw, and to maintain line patency      Vital Signs Last 24 Hrs  T(C): 36.8 (26 Dec 2023 22:19), Max: 36.8 (26 Dec 2023 20:34)  T(F): 98.3 (26 Dec 2023 22:19), Max: 98.3 (26 Dec 2023 20:34)  HR: 77 (26 Dec 2023 22:19) (59 - 89)  BP: 126/71 (26 Dec 2023 22:19) (118/63 - 164/92)  BP(mean): --  RR: 18 (26 Dec 2023 22:19) (16 - 21)  SpO2: 96% (26 Dec 2023 22:19) (95% - 100%)    Parameters below as of 26 Dec 2023 22:19  Patient On (Oxygen Delivery Method): room air      LABS:                        10.1   4.47  )-----------( 173      ( 26 Dec 2023 13:00 )             31.4         137  |  104  |  86<H>  ----------------------------<  225<H>  5.5<H>   |  24  |  7.40<H>    Ca    8.8      26 Dec 2023 13:00    TPro  6.9  /  Alb  3.6  /  TBili  0.5  /  DBili  x   /  AST  30  /  ALT  48  /  AlkPhos  195<H>  12        PT/INR - ( 26 Dec 2023 13:00 )   PT: 15.5 sec;   INR: 1.34 ratio         PTT - ( 26 Dec 2023 13:00 )  PTT:37.9 sec  Urinalysis Basic - ( 26 Dec 2023 13:00 )    Color: x / Appearance: x / SG: x / pH: x  Gluc: 225 mg/dL / Ketone: x  / Bili: x / Urobili: x   Blood: x / Protein: x / Nitrite: x   Leuk Esterase: x / RBC: x / WBC x   Sq Epi: x / Non Sq Epi: x / Bacteria: x      Assessment and Plan :   FULL CONSULT DICTATED .  73 years old male with H/O CAD , CABG , Hypertension , ESRD and on hemodialysis has clogged AV fistula and patient atrial fibrillation stable and also patient has mild chronic diastolic heart failure which is stable . Patient cardiac wise stable and cleared for insertion for new AV fistula . Patient also cleared for Perma cath if needed for immediate hemodialysis . Eliquis held for proposed surgery and resume post operatively .  Will continue to follow during hospital course and give further recommendations as needed . Thanks for your referral . if any questions please contact me at office ([phone] cell [phone])  MI SANDOVAL MD [place] [id number]  [address]  OFFICE : [phone]  CELL : [phone]  CARDIOLOGY CONSULT :   f     HPI:· Chief Complaint: The patient is a 73y Male complaining of see chief complaint WITH CLOGGED av FISTULA   · HPI Objective Statement: 73-year-old male with past medical history of dementia, COPD, end-stage renal disease, A-fib on anticoagulation, glaucoma, asthma, depression, hypertension, hypocalcemia, hypoparathyroidism was BIB EMS from [place] for the clogged AVF fistula, could not receive HD today.  Last HD was 23. Pt has had a fall 3 weeks ago which he was seen in the ED for, had CT head, neck and chest-- all unremarkable. Pt c/o mild lower back pain since the fall.    73-year-old male with past medical history of dementia, COPD, end-stage renal disease, A-fib on anticoagulation, glaucoma, asthma, depression, hypertension, hypocalcemia, hypoparathyroidism was BIB EMS from  for the clogged AVF fistula, could not receive HD today.  Last HD was 23. Pt has had a fall 3 weeks ago which he was seen in the ED for, had CT head, neck and chest-- all unremarkable. Pt c/o mild lower back pain since the fall. (26 Dec 2023 18:31)    PAST MEDICAL & SURGICAL HISTORY:  ASHD (arteriosclerotic heart disease)      CAD (coronary artery disease)  s/p stent, CABG      CRI (chronic renal insufficiency)  ESRD on HD      Diabetes mellitus type II      Hypertension      Hyperlipidemia      Pacemaker      Chronic atrial fibrillation      Asthma      Hypothyroid      Dementia      H/O carotid stenosis      Enlarged prostate      Hx of CABG      Coronary stent      Cardiac pacemaker      History of amputation of toe  right great toe          FAMILY HISTORY:  Family history of chronic ischemic heart disease (Father)  father  of MI at age 59        SOCIAL HISTORY:   Alcohol:  Smoking:    Allergies    Levaquin (Anaphylaxis; Flushing; Short breath)    Intolerances        MEDICATIONS  (STANDING):  brimonidine 0.2% Ophthalmic Solution 1 Drop(s) Both EYES two times a day  budesonide  80 MICROgram(s)/formoterol 4.5 MICROgram(s) Inhaler 2 Puff(s) Inhalation two times a day  calcitriol   Capsule 0.25 MICROGram(s) Oral <User Schedule>  calcium acetate 667 milliGRAM(s) Oral three times a day with meals  chlorhexidine 4% Liquid 1 Application(s) Topical <User Schedule>  dextrose 5%. 1000 milliLiter(s) (100 mL/Hr) IV Continuous <Continuous>  dextrose 5%. 1000 milliLiter(s) (50 mL/Hr) IV Continuous <Continuous>  dextrose 50% Injectable 12.5 Gram(s) IV Push once  dextrose 50% Injectable 25 Gram(s) IV Push once  dextrose 50% Injectable 25 Gram(s) IV Push once  dorzolamide 2%/timolol 0.5% Ophthalmic Solution 1 Drop(s) Both EYES two times a day  finasteride 5 milliGRAM(s) Oral daily  glucagon  Injectable 1 milliGRAM(s) IntraMuscular once  insulin lispro (ADMELOG) corrective regimen sliding scale   SubCutaneous three times a day before meals  latanoprost 0.005% Ophthalmic Solution 1 Drop(s) Both EYES at bedtime  levothyroxine 50 MICROGram(s) Oral daily  sertraline 25 milliGRAM(s) Oral at bedtime  sertraline 50 milliGRAM(s) Oral at bedtime  simvastatin 10 milliGRAM(s) Oral at bedtime  tamsulosin 0.4 milliGRAM(s) Oral at bedtime    MEDICATIONS  (PRN):  acetaminophen     Tablet .. 650 milliGRAM(s) Oral every 6 hours PRN Temp greater or equal to 38C (100.4F), Mild Pain (1 - 3)  aluminum hydroxide/magnesium hydroxide/simethicone Suspension 30 milliLiter(s) Oral every 4 hours PRN Dyspepsia  dextrose Oral Gel 15 Gram(s) Oral once PRN Blood Glucose LESS THAN 70 milliGRAM(s)/deciliter  melatonin 3 milliGRAM(s) Oral at bedtime PRN Insomnia  ondansetron Injectable 4 milliGRAM(s) IV Push every 8 hours PRN Nausea and/or Vomiting  sodium chloride 0.9% lock flush 10 milliLiter(s) IV Push every 1 hour PRN Pre/post blood products, medications, blood draw, and to maintain line patency      Vital Signs Last 24 Hrs  T(C): 36.8 (26 Dec 2023 22:19), Max: 36.8 (26 Dec 2023 20:34)  T(F): 98.3 (26 Dec 2023 22:19), Max: 98.3 (26 Dec 2023 20:34)  HR: 77 (26 Dec 2023 22:19) (59 - 89)  BP: 126/71 (26 Dec 2023 22:19) (118/63 - 164/92)  BP(mean): --  RR: 18 (26 Dec 2023 22:19) (16 - 21)  SpO2: 96% (26 Dec 2023 22:19) (95% - 100%)    Parameters below as of 26 Dec 2023 22:19  Patient On (Oxygen Delivery Method): room air      LABS:                        10.1   4.47  )-----------( 173      ( 26 Dec 2023 13:00 )             31.4         137  |  104  |  86<H>  ----------------------------<  225<H>  5.5<H>   |  24  |  7.40<H>    Ca    8.8      26 Dec 2023 13:00    TPro  6.9  /  Alb  3.6  /  TBili  0.5  /  DBili  x   /  AST  30  /  ALT  48  /  AlkPhos  195<H>  12        PT/INR - ( 26 Dec 2023 13:00 )   PT: 15.5 sec;   INR: 1.34 ratio         PTT - ( 26 Dec 2023 13:00 )  PTT:37.9 sec  Urinalysis Basic - ( 26 Dec 2023 13:00 )    Color: x / Appearance: x / SG: x / pH: x  Gluc: 225 mg/dL / Ketone: x  / Bili: x / Urobili: x   Blood: x / Protein: x / Nitrite: x   Leuk Esterase: x / RBC: x / WBC x   Sq Epi: x / Non Sq Epi: x / Bacteria: x      Assessment and Plan :   FULL CONSULT DICTATED .  73 years old male with H/O CAD , CABG , Hypertension , ESRD and on hemodialysis has clogged AV fistula and patient atrial fibrillation stable and also patient has mild chronic diastolic heart failure which is stable . Patient cardiac wise stable and cleared for insertion for new AV fistula . Patient also cleared for Perma cath if needed for immediate hemodialysis . Eliquis held for proposed surgery and resume post operatively .  Will continue to follow during hospital course and give further recommendations as needed . Thanks for your referral . if any questions please contact me at office ([phone] cell [phone])

## 2023-12-27 NOTE — PROGRESS NOTE ADULT - ASSESSMENT
73-year-old male with past medical history of dementia, COPD, end-stage renal disease, A-fib on anticoagulation, glaucoma, asthma, depression, hypertension, hypocalcemia, hypoparathyroidism was BIB EMS from [place] for the clogged AVF fistula, could not receive HD today.  Last HD was 12/22/23. Pt has had a fall 3 weeks ago which he was seen in the ED for, had CT head, neck and chest-- all unremarkable. Pt c/o mild lower back pain since the fall.1.ESRD on HD  2.clotted AVF  3.Hypertension  IR to place dialysis catheter. Vascular surgery evaluation. Monitor BP trend. Titrate BP meds as needed. To continue current meds.   Dietary and PO fluid restriciton. Patient  w/ thrombosed AV fistula now s/p temp cath by IR 12/26.   - catheter placed by IR 12/26 planned for OR 12/27

## 2023-12-27 NOTE — PROGRESS NOTE ADULT - ASSESSMENT
ASSESSMENT & PLAN   73y Male with past medical history of dementia, COPD, end-stage renal disease, A-fib on anticoagulation, glaucoma, asthma, depression, hypertension, hypocalcemia, hypoparathyroidism w/ thrombosed AV fistula now s/p temp cath by IR 12/26.     - catheter placed by IR 12/26  - U/S results noted  - Plans to be discussed with Dr. Germain ASSESSMENT & PLAN   73y Male with past medical history of dementia, COPD, end-stage renal disease, A-fib on anticoagulation, glaucoma, asthma, depression, hypertension, hypocalcemia, hypoparathyroidism w/ thrombosed AV fistula now s/p temp cath by IR 12/26.     - catheter placed by IR 12/26  - U/S results noted  - Plans to be discussed with attending contact guard

## 2023-12-27 NOTE — PROGRESS NOTE ADULT - SUBJECTIVE AND OBJECTIVE BOX
SUBJECTIVE:  Patient seen and examined at bedside.  No overnight events.  Patient denies any new complaints at this time.      VITALS  Vital Signs Last 24 Hrs  T(C): 36.7 (27 Dec 2023 05:48), Max: 36.8 (26 Dec 2023 20:34)  T(F): 98 (27 Dec 2023 05:48), Max: 98.3 (26 Dec 2023 20:34)  HR: 72 (27 Dec 2023 05:48) (59 - 89)  BP: 118/66 (27 Dec 2023 05:48) (118/63 - 164/92)  RR: 18 (27 Dec 2023 05:48) (16 - 21)  SpO2: 98% (27 Dec 2023 05:48) (95% - 100%)    Parameters below as of 27 Dec 2023 05:48  Patient On (Oxygen Delivery Method): room air      PHYSICAL EXAM  GENERAL:  Elderly Male lying comfortably in bed in NAD with shiley in place  HEENT:  NC/AT.   RESPIRATORY:  Nonlabored breathing, no accessory muscle use.  EXTREMITIES: R thrombosed AV fistula, no palpable thrills.    INTAKE & OUTPUT  I&O's Summary    26 Dec 2023 07:01  -  27 Dec 2023 07:00  --------------------------------------------------------  IN: 1020 mL / OUT: 1600 mL / NET: -580 mL      I&O's Detail    26 Dec 2023 07:01  -  27 Dec 2023 07:00  --------------------------------------------------------  IN:    Oral Fluid: 120 mL    Other (mL): 900 mL  Total IN: 1020 mL    OUT:    Other (mL): 1600 mL  Total OUT: 1600 mL    Total NET: -580 mL          MEDICATIONS  MEDICATIONS  (STANDING):  brimonidine 0.2% Ophthalmic Solution 1 Drop(s) Both EYES two times a day  budesonide  80 MICROgram(s)/formoterol 4.5 MICROgram(s) Inhaler 2 Puff(s) Inhalation two times a day  calcitriol   Capsule 0.25 MICROGram(s) Oral <User Schedule>  calcium acetate 667 milliGRAM(s) Oral three times a day with meals  chlorhexidine 4% Liquid 1 Application(s) Topical <User Schedule>  dextrose 5%. 1000 milliLiter(s) (100 mL/Hr) IV Continuous <Continuous>  dextrose 5%. 1000 milliLiter(s) (50 mL/Hr) IV Continuous <Continuous>  dextrose 50% Injectable 12.5 Gram(s) IV Push once  dextrose 50% Injectable 25 Gram(s) IV Push once  dextrose 50% Injectable 25 Gram(s) IV Push once  dorzolamide 2%/timolol 0.5% Ophthalmic Solution 1 Drop(s) Both EYES two times a day  finasteride 5 milliGRAM(s) Oral daily  glucagon  Injectable 1 milliGRAM(s) IntraMuscular once  insulin lispro (ADMELOG) corrective regimen sliding scale   SubCutaneous three times a day before meals  latanoprost 0.005% Ophthalmic Solution 1 Drop(s) Both EYES at bedtime  levothyroxine 50 MICROGram(s) Oral daily  sertraline 25 milliGRAM(s) Oral at bedtime  sertraline 50 milliGRAM(s) Oral at bedtime  simvastatin 10 milliGRAM(s) Oral at bedtime  tamsulosin 0.4 milliGRAM(s) Oral at bedtime    MEDICATIONS  (PRN):  acetaminophen     Tablet .. 650 milliGRAM(s) Oral every 6 hours PRN Temp greater or equal to 38C (100.4F), Mild Pain (1 - 3)  aluminum hydroxide/magnesium hydroxide/simethicone Suspension 30 milliLiter(s) Oral every 4 hours PRN Dyspepsia  dextrose Oral Gel 15 Gram(s) Oral once PRN Blood Glucose LESS THAN 70 milliGRAM(s)/deciliter  melatonin 3 milliGRAM(s) Oral at bedtime PRN Insomnia  ondansetron Injectable 4 milliGRAM(s) IV Push every 8 hours PRN Nausea and/or Vomiting  sodium chloride 0.9% lock flush 10 milliLiter(s) IV Push every 1 hour PRN Pre/post blood products, medications, blood draw, and to maintain line patency      LABS:                        10.1   4.47  )-----------( 173      ( 26 Dec 2023 13:00 )             31.4     12-26    137  |  104  |  86<H>  ----------------------------<  225<H>  5.5<H>   |  24  |  7.40<H>    Ca    8.8      26 Dec 2023 13:00    TPro  6.9  /  Alb  3.6  /  TBili  0.5  /  DBili  x   /  AST  30  /  ALT  48  /  AlkPhos  195<H>  12-26    PT/INR - ( 26 Dec 2023 13:00 )   PT: 15.5 sec;   INR: 1.34 ratio         PTT - ( 26 Dec 2023 13:00 )  PTT:37.9 sec      RADIOLOGY & ADDITIONAL STUDIES:    < from: [facility] Duplex Hemodialysis Access, Right (12.26.23 @ 16:44) >  Right upper extremity brachiocephalic fistula with long segment   thrombosis/absent flow within the body of the fistula as well as outflow   vein stent.    < end of copied text >

## 2023-12-27 NOTE — PROGRESS NOTE ADULT - SUBJECTIVE AND OBJECTIVE BOX
Patient is a 73y old  Male who presents with a chief complaint of SOB (27 Dec 2023 04:46)    Patient seen in follow up for ESRD on HD.        PAST MEDICAL HISTORY:  ASHD (arteriosclerotic heart disease)    CAD (coronary artery disease)    CRI (chronic renal insufficiency)    Diabetes mellitus type II    Hypertension    Hyperlipidemia    Pacemaker    Chronic atrial fibrillation    Asthma    Hypothyroid    Dementia    H/O carotid stenosis    Enlarged prostate      MEDICATIONS  (STANDING):  brimonidine 0.2% Ophthalmic Solution 1 Drop(s) Both EYES two times a day  budesonide  80 MICROgram(s)/formoterol 4.5 MICROgram(s) Inhaler 2 Puff(s) Inhalation two times a day  calcitriol   Capsule 0.25 MICROGram(s) Oral <User Schedule>  calcium acetate 667 milliGRAM(s) Oral three times a day with meals  chlorhexidine 4% Liquid 1 Application(s) Topical <User Schedule>  dextrose 5%. 1000 milliLiter(s) (50 mL/Hr) IV Continuous <Continuous>  dextrose 5%. 1000 milliLiter(s) (100 mL/Hr) IV Continuous <Continuous>  dextrose 50% Injectable 25 Gram(s) IV Push once  dextrose 50% Injectable 25 Gram(s) IV Push once  dextrose 50% Injectable 12.5 Gram(s) IV Push once  dorzolamide 2%/timolol 0.5% Ophthalmic Solution 1 Drop(s) Both EYES two times a day  finasteride 5 milliGRAM(s) Oral daily  glucagon  Injectable 1 milliGRAM(s) IntraMuscular once  insulin lispro (ADMELOG) corrective regimen sliding scale   SubCutaneous three times a day before meals  latanoprost 0.005% Ophthalmic Solution 1 Drop(s) Both EYES at bedtime  levothyroxine 50 MICROGram(s) Oral daily  sertraline 50 milliGRAM(s) Oral at bedtime  sertraline 25 milliGRAM(s) Oral at bedtime  simvastatin 10 milliGRAM(s) Oral at bedtime  tamsulosin 0.4 milliGRAM(s) Oral at bedtime    MEDICATIONS  (PRN):  acetaminophen     Tablet .. 650 milliGRAM(s) Oral every 6 hours PRN Temp greater or equal to 38C (100.4F), Mild Pain (1 - 3)  aluminum hydroxide/magnesium hydroxide/simethicone Suspension 30 milliLiter(s) Oral every 4 hours PRN Dyspepsia  dextrose Oral Gel 15 Gram(s) Oral once PRN Blood Glucose LESS THAN 70 milliGRAM(s)/deciliter  melatonin 3 milliGRAM(s) Oral at bedtime PRN Insomnia  ondansetron Injectable 4 milliGRAM(s) IV Push every 8 hours PRN Nausea and/or Vomiting  sodium chloride 0.9% lock flush 10 milliLiter(s) IV Push every 1 hour PRN Pre/post blood products, medications, blood draw, and to maintain line patency    T(C): 36.5 (12-27-23 @ 11:39), Max: 36.8 (12-26-23 @ 20:34)  HR: 71 (12-27-23 @ 11:39) (59 - 89)  BP: 97/64 (12-27-23 @ 11:39) (97/64 - 164/92)  RR: 16 (12-27-23 @ 11:39) (16 - 21)  SpO2: 95% (12-27-23 @ 11:39) (95% - 100%)  Wt(kg): --  I&O's Detail    26 Dec 2023 07:01  -  27 Dec 2023 07:00  --------------------------------------------------------  IN:    Oral Fluid: 120 mL    Other (mL): 900 mL  Total IN: 1020 mL    OUT:    Other (mL): 1600 mL  Total OUT: 1600 mL    Total NET: -580 mL          PHYSICAL EXAM:  General: No distress, + dialysis catheter  Respiratory: b/l air entry  Cardiovascular: S1 S2  Gastrointestinal: soft  Extremities:  no edema, AVF, no bruit                              9.9    4.02  )-----------( 181      ( 27 Dec 2023 08:12 )             30.3     12-27    136  |  100  |  36<H>  ----------------------------<  86  4.8   |  29  |  4.20<H>    Ca    8.5      27 Dec 2023 08:12    TPro  6.5  /  Alb  3.2<L>  /  TBili  0.8  /  DBili  x   /  AST  18  /  ALT  37  /  AlkPhos  188<H>  12-27        LIVER FUNCTIONS - ( 27 Dec 2023 08:12 )  Alb: 3.2 g/dL / Pro: 6.5 g/dL / ALK PHOS: 188 U/L / ALT: 37 U/L / AST: 18 U/L / GGT: x           Urinalysis Basic - ( 27 Dec 2023 08:12 )    Color: x / Appearance: x / SG: x / pH: x  Gluc: 86 mg/dL / Ketone: x  / Bili: x / Urobili: x   Blood: x / Protein: x / Nitrite: x   Leuk Esterase: x / RBC: x / WBC x   Sq Epi: x / Non Sq Epi: x / Bacteria: x        Sodium, Serum: 136 (12-27 @ 08:12)  Sodium, Serum: 137 (12-26 @ 13:00)    Creatinine, Serum: 4.20 (12-27 @ 08:12)  Creatinine, Serum: 7.40 (12-26 @ 13:00)    Potassium, Serum: 4.8 (12-27 @ 08:12)  Potassium, Serum: 5.5 (12-26 @ 13:00)    Hemoglobin: 9.9 (12-27 @ 08:12)  Hemoglobin: 10.1 (12-26 @ 13:00)    .      < from: [facility] Duplex Hemodialysis Access, Right (12.26.23 @ 16:44) >    ACC: [id number] EXAM:  DUPLEX HEMODIALYSIS ACCESS RT   ORDERED BY: PAULA LUNA     PROCEDURE DATE:  12/26/2023          INTERPRETATION:  CLINICAL INDICATION: End-stage renal disease on   hemodialysis, thrombosed AV fistula.    EXAMINATION: Hemodialysis access duplex ultrasound, right    COMPARISON: Noncontrast chest CT 12/2/2023    FINDINGS:    Right upper extremity brachial artery to cephalic vein surgically created   AV fistula. Stent is noted along the outflow cephalic vein.    High resistance antegrade flow of the inflow brachial artery (121 cm/s)   and at the anastomosis (152 cm/s), consistent with thrombosis of the   fistula. Subsequently, there is long segment thrombosis/absent flow   within the body of the right fistula as well as outflow vein stent.    IMPRESSION:    Right upper extremity brachiocephalic fistula with long segment   thrombosis/absent flow within the body of the fistula as well as outflow   vein stent.    --- End of Report ---            CONNIE SUAZO M.D., ATTENDING RADIOLOGIST  This document has been electronically signed. Dec 26 2023  5:01PM    < end of copied text >     Patient is a 73y old  Male who presents with a chief complaint of SOB (27 Dec 2023 04:46)    Patient seen in follow up for ESRD on HD.        PAST MEDICAL HISTORY:  ASHD (arteriosclerotic heart disease)    CAD (coronary artery disease)    CRI (chronic renal insufficiency)    Diabetes mellitus type II    Hypertension    Hyperlipidemia    Pacemaker    Chronic atrial fibrillation    Asthma    Hypothyroid    Dementia    H/O carotid stenosis    Enlarged prostate      MEDICATIONS  (STANDING):  brimonidine 0.2% Ophthalmic Solution 1 Drop(s) Both EYES two times a day  budesonide  80 MICROgram(s)/formoterol 4.5 MICROgram(s) Inhaler 2 Puff(s) Inhalation two times a day  calcitriol   Capsule 0.25 MICROGram(s) Oral <User Schedule>  calcium acetate 667 milliGRAM(s) Oral three times a day with meals  chlorhexidine 4% Liquid 1 Application(s) Topical <User Schedule>  dextrose 5%. 1000 milliLiter(s) (50 mL/Hr) IV Continuous <Continuous>  dextrose 5%. 1000 milliLiter(s) (100 mL/Hr) IV Continuous <Continuous>  dextrose 50% Injectable 25 Gram(s) IV Push once  dextrose 50% Injectable 25 Gram(s) IV Push once  dextrose 50% Injectable 12.5 Gram(s) IV Push once  dorzolamide 2%/timolol 0.5% Ophthalmic Solution 1 Drop(s) Both EYES two times a day  finasteride 5 milliGRAM(s) Oral daily  glucagon  Injectable 1 milliGRAM(s) IntraMuscular once  insulin lispro (ADMELOG) corrective regimen sliding scale   SubCutaneous three times a day before meals  latanoprost 0.005% Ophthalmic Solution 1 Drop(s) Both EYES at bedtime  levothyroxine 50 MICROGram(s) Oral daily  sertraline 50 milliGRAM(s) Oral at bedtime  sertraline 25 milliGRAM(s) Oral at bedtime  simvastatin 10 milliGRAM(s) Oral at bedtime  tamsulosin 0.4 milliGRAM(s) Oral at bedtime    MEDICATIONS  (PRN):  acetaminophen     Tablet .. 650 milliGRAM(s) Oral every 6 hours PRN Temp greater or equal to 38C (100.4F), Mild Pain (1 - 3)  aluminum hydroxide/magnesium hydroxide/simethicone Suspension 30 milliLiter(s) Oral every 4 hours PRN Dyspepsia  dextrose Oral Gel 15 Gram(s) Oral once PRN Blood Glucose LESS THAN 70 milliGRAM(s)/deciliter  melatonin 3 milliGRAM(s) Oral at bedtime PRN Insomnia  ondansetron Injectable 4 milliGRAM(s) IV Push every 8 hours PRN Nausea and/or Vomiting  sodium chloride 0.9% lock flush 10 milliLiter(s) IV Push every 1 hour PRN Pre/post blood products, medications, blood draw, and to maintain line patency    T(C): 36.5 (12-27-23 @ 11:39), Max: 36.8 (12-26-23 @ 20:34)  HR: 71 (12-27-23 @ 11:39) (59 - 89)  BP: 97/64 (12-27-23 @ 11:39) (97/64 - 164/92)  RR: 16 (12-27-23 @ 11:39) (16 - 21)  SpO2: 95% (12-27-23 @ 11:39) (95% - 100%)  Wt(kg): --  I&O's Detail    26 Dec 2023 07:01  -  27 Dec 2023 07:00  --------------------------------------------------------  IN:    Oral Fluid: 120 mL    Other (mL): 900 mL  Total IN: 1020 mL    OUT:    Other (mL): 1600 mL  Total OUT: 1600 mL    Total NET: -580 mL          PHYSICAL EXAM:  General: No distress, + dialysis catheter  Respiratory: b/l air entry  Cardiovascular: S1 S2  Gastrointestinal: soft  Extremities:  no edema, AVF, no bruit                              9.9    4.02  )-----------( 181      ( 27 Dec 2023 08:12 )             30.3     12-27    136  |  100  |  36<H>  ----------------------------<  86  4.8   |  29  |  4.20<H>    Ca    8.5      27 Dec 2023 08:12    TPro  6.5  /  Alb  3.2<L>  /  TBili  0.8  /  DBili  x   /  AST  18  /  ALT  37  /  AlkPhos  188<H>  12-27        LIVER FUNCTIONS - ( 27 Dec 2023 08:12 )  Alb: 3.2 g/dL / Pro: 6.5 g/dL / ALK PHOS: 188 U/L / ALT: 37 U/L / AST: 18 U/L / GGT: x           Urinalysis Basic - ( 27 Dec 2023 08:12 )    Color: x / Appearance: x / SG: x / pH: x  Gluc: 86 mg/dL / Ketone: x  / Bili: x / Urobili: x   Blood: x / Protein: x / Nitrite: x   Leuk Esterase: x / RBC: x / WBC x   Sq Epi: x / Non Sq Epi: x / Bacteria: x        Sodium, Serum: 136 (12-27 @ 08:12)  Sodium, Serum: 137 (12-26 @ 13:00)    Creatinine, Serum: 4.20 (12-27 @ 08:12)  Creatinine, Serum: 7.40 (12-26 @ 13:00)    Potassium, Serum: 4.8 (12-27 @ 08:12)  Potassium, Serum: 5.5 (12-26 @ 13:00)    Hemoglobin: 9.9 (12-27 @ 08:12)  Hemoglobin: 10.1 (12-26 @ 13:00)    .      < from: [facility] Duplex Hemodialysis Access, Right (12.26.23 @ 16:44) >    ACC: [id number] EXAM:  DUPLEX HEMODIALYSIS ACCESS RT   ORDERED BY: PAULA LUNA     PROCEDURE DATE:  12/26/2023          INTERPRETATION:  CLINICAL INDICATION: End-stage renal disease on   hemodialysis, thrombosed AV fistula.    EXAMINATION: Hemodialysis access duplex ultrasound, right    COMPARISON: Noncontrast chest CT 12/2/2023    FINDINGS:    Right upper extremity brachial artery to cephalic vein surgically created   AV fistula. Stent is noted along the outflow cephalic vein.    High resistance antegrade flow of the inflow brachial artery (121 cm/s)   and at the anastomosis (152 cm/s), consistent with thrombosis of the   fistula. Subsequently, there is long segment thrombosis/absent flow   within the body of the right fistula as well as outflow vein stent.    IMPRESSION:    Right upper extremity brachiocephalic fistula with long segment   thrombosis/absent flow within the body of the fistula as well as outflow   vein stent.    --- End of Report ---            CONNIE SUAZO M.D., ATTENDING RADIOLOGIST  This document has been electronically signed. Dec 26 2023  5:01PM    < end of copied text >

## 2023-12-28 DIAGNOSIS — Z29.9 ENCOUNTER FOR PROPHYLACTIC MEASURES, UNSPECIFIED: ICD-10-CM

## 2023-12-28 DIAGNOSIS — F03.90 UNSPECIFIED DEMENTIA WITHOUT BEHAVIORAL DISTURBANCE: ICD-10-CM

## 2023-12-28 DIAGNOSIS — E78.5 HYPERLIPIDEMIA, UNSPECIFIED: ICD-10-CM

## 2023-12-28 DIAGNOSIS — E11.9 TYPE 2 DIABETES MELLITUS WITHOUT COMPLICATIONS: ICD-10-CM

## 2023-12-28 DIAGNOSIS — I48.91 UNSPECIFIED ATRIAL FIBRILLATION: ICD-10-CM

## 2023-12-28 DIAGNOSIS — E03.9 HYPOTHYROIDISM, UNSPECIFIED: ICD-10-CM

## 2023-12-28 DIAGNOSIS — N18.6 END STAGE RENAL DISEASE: ICD-10-CM

## 2023-12-28 DIAGNOSIS — T82.868A THROMBOSIS DUE TO VASCULAR PROSTHETIC DEVICES, IMPLANTS AND GRAFTS, INITIAL ENCOUNTER: ICD-10-CM

## 2023-12-28 DIAGNOSIS — J45.909 UNSPECIFIED ASTHMA, UNCOMPLICATED: ICD-10-CM

## 2023-12-28 DIAGNOSIS — I10 ESSENTIAL (PRIMARY) HYPERTENSION: ICD-10-CM

## 2023-12-28 LAB
ALBUMIN SERPL ELPH-MCNC: 3 G/DL — LOW (ref 3.3–5)
ALBUMIN SERPL ELPH-MCNC: 3 G/DL — LOW (ref 3.3–5)
ALP SERPL-CCNC: 159 U/L — HIGH (ref 40–120)
ALP SERPL-CCNC: 159 U/L — HIGH (ref 40–120)
ALT FLD-CCNC: 20 U/L — SIGNIFICANT CHANGE UP (ref 12–78)
ALT FLD-CCNC: 20 U/L — SIGNIFICANT CHANGE UP (ref 12–78)
ANION GAP SERPL CALC-SCNC: 10 MMOL/L — SIGNIFICANT CHANGE UP (ref 5–17)
ANION GAP SERPL CALC-SCNC: 10 MMOL/L — SIGNIFICANT CHANGE UP (ref 5–17)
AST SERPL-CCNC: 18 U/L — SIGNIFICANT CHANGE UP (ref 15–37)
AST SERPL-CCNC: 18 U/L — SIGNIFICANT CHANGE UP (ref 15–37)
BILIRUB SERPL-MCNC: 0.5 MG/DL — SIGNIFICANT CHANGE UP (ref 0.2–1.2)
BILIRUB SERPL-MCNC: 0.5 MG/DL — SIGNIFICANT CHANGE UP (ref 0.2–1.2)
BUN SERPL-MCNC: 44 MG/DL — HIGH (ref 7–23)
BUN SERPL-MCNC: 44 MG/DL — HIGH (ref 7–23)
CALCIUM SERPL-MCNC: 7.5 MG/DL — LOW (ref 8.5–10.1)
CALCIUM SERPL-MCNC: 7.5 MG/DL — LOW (ref 8.5–10.1)
CHLORIDE SERPL-SCNC: 102 MMOL/L — SIGNIFICANT CHANGE UP (ref 96–108)
CHLORIDE SERPL-SCNC: 102 MMOL/L — SIGNIFICANT CHANGE UP (ref 96–108)
CHOLEST SERPL-MCNC: 106 MG/DL — SIGNIFICANT CHANGE UP
CHOLEST SERPL-MCNC: 106 MG/DL — SIGNIFICANT CHANGE UP
CO2 SERPL-SCNC: 24 MMOL/L — SIGNIFICANT CHANGE UP (ref 22–31)
CO2 SERPL-SCNC: 24 MMOL/L — SIGNIFICANT CHANGE UP (ref 22–31)
CREAT SERPL-MCNC: 5.2 MG/DL — HIGH (ref 0.5–1.3)
CREAT SERPL-MCNC: 5.2 MG/DL — HIGH (ref 0.5–1.3)
EGFR: 11 ML/MIN/1.73M2 — LOW
EGFR: 11 ML/MIN/1.73M2 — LOW
GLUCOSE SERPL-MCNC: 73 MG/DL — SIGNIFICANT CHANGE UP (ref 70–99)
GLUCOSE SERPL-MCNC: 73 MG/DL — SIGNIFICANT CHANGE UP (ref 70–99)
HCT VFR BLD CALC: 26.3 % — LOW (ref 39–50)
HCT VFR BLD CALC: 26.3 % — LOW (ref 39–50)
HDLC SERPL-MCNC: 41 MG/DL — SIGNIFICANT CHANGE UP
HDLC SERPL-MCNC: 41 MG/DL — SIGNIFICANT CHANGE UP
HGB BLD-MCNC: 8.5 G/DL — LOW (ref 13–17)
HGB BLD-MCNC: 8.5 G/DL — LOW (ref 13–17)
LIPID PNL WITH DIRECT LDL SERPL: 48 MG/DL — SIGNIFICANT CHANGE UP
LIPID PNL WITH DIRECT LDL SERPL: 48 MG/DL — SIGNIFICANT CHANGE UP
MAGNESIUM SERPL-MCNC: 2.2 MG/DL — SIGNIFICANT CHANGE UP (ref 1.6–2.6)
MAGNESIUM SERPL-MCNC: 2.2 MG/DL — SIGNIFICANT CHANGE UP (ref 1.6–2.6)
MCHC RBC-ENTMCNC: 32.3 GM/DL — SIGNIFICANT CHANGE UP (ref 32–36)
MCHC RBC-ENTMCNC: 32.3 GM/DL — SIGNIFICANT CHANGE UP (ref 32–36)
MCHC RBC-ENTMCNC: 33.2 PG — SIGNIFICANT CHANGE UP (ref 27–34)
MCHC RBC-ENTMCNC: 33.2 PG — SIGNIFICANT CHANGE UP (ref 27–34)
MCV RBC AUTO: 102.7 FL — HIGH (ref 80–100)
MCV RBC AUTO: 102.7 FL — HIGH (ref 80–100)
NON HDL CHOLESTEROL: 65 MG/DL — SIGNIFICANT CHANGE UP
NON HDL CHOLESTEROL: 65 MG/DL — SIGNIFICANT CHANGE UP
NRBC # BLD: 0 /100 WBCS — SIGNIFICANT CHANGE UP (ref 0–0)
NRBC # BLD: 0 /100 WBCS — SIGNIFICANT CHANGE UP (ref 0–0)
PLATELET # BLD AUTO: 155 K/UL — SIGNIFICANT CHANGE UP (ref 150–400)
PLATELET # BLD AUTO: 155 K/UL — SIGNIFICANT CHANGE UP (ref 150–400)
POTASSIUM SERPL-MCNC: 4.8 MMOL/L — SIGNIFICANT CHANGE UP (ref 3.5–5.3)
POTASSIUM SERPL-MCNC: 4.8 MMOL/L — SIGNIFICANT CHANGE UP (ref 3.5–5.3)
POTASSIUM SERPL-SCNC: 4.8 MMOL/L — SIGNIFICANT CHANGE UP (ref 3.5–5.3)
POTASSIUM SERPL-SCNC: 4.8 MMOL/L — SIGNIFICANT CHANGE UP (ref 3.5–5.3)
PROT SERPL-MCNC: 5.7 G/DL — LOW (ref 6–8.3)
PROT SERPL-MCNC: 5.7 G/DL — LOW (ref 6–8.3)
RBC # BLD: 2.56 M/UL — LOW (ref 4.2–5.8)
RBC # BLD: 2.56 M/UL — LOW (ref 4.2–5.8)
RBC # FLD: 15.6 % — HIGH (ref 10.3–14.5)
RBC # FLD: 15.6 % — HIGH (ref 10.3–14.5)
SODIUM SERPL-SCNC: 136 MMOL/L — SIGNIFICANT CHANGE UP (ref 135–145)
SODIUM SERPL-SCNC: 136 MMOL/L — SIGNIFICANT CHANGE UP (ref 135–145)
TRIGL SERPL-MCNC: 86 MG/DL — SIGNIFICANT CHANGE UP
TRIGL SERPL-MCNC: 86 MG/DL — SIGNIFICANT CHANGE UP
TSH SERPL-MCNC: 2.63 UIU/ML — SIGNIFICANT CHANGE UP (ref 0.36–3.74)
TSH SERPL-MCNC: 2.63 UIU/ML — SIGNIFICANT CHANGE UP (ref 0.36–3.74)
WBC # BLD: 4.4 K/UL — SIGNIFICANT CHANGE UP (ref 3.8–10.5)
WBC # BLD: 4.4 K/UL — SIGNIFICANT CHANGE UP (ref 3.8–10.5)
WBC # FLD AUTO: 4.4 K/UL — SIGNIFICANT CHANGE UP (ref 3.8–10.5)
WBC # FLD AUTO: 4.4 K/UL — SIGNIFICANT CHANGE UP (ref 3.8–10.5)

## 2023-12-28 PROCEDURE — 99232 SBSQ HOSP IP/OBS MODERATE 35: CPT

## 2023-12-28 RX ORDER — MIDODRINE HYDROCHLORIDE 2.5 MG/1
10 TABLET ORAL THREE TIMES A DAY
Refills: 0 | Status: DISCONTINUED | OUTPATIENT
Start: 2023-12-28 | End: 2023-12-30

## 2023-12-28 RX ORDER — APIXABAN 2.5 MG/1
1 TABLET, FILM COATED ORAL
Qty: 0 | Refills: 0 | DISCHARGE

## 2023-12-28 RX ORDER — DORZOLAMIDE HYDROCHLORIDE TIMOLOL MALEATE 20; 5 MG/ML; MG/ML
2 SOLUTION/ DROPS OPHTHALMIC
Qty: 0 | Refills: 0 | DISCHARGE

## 2023-12-28 RX ORDER — SERTRALINE 25 MG/1
1 TABLET, FILM COATED ORAL
Qty: 0 | Refills: 0 | DISCHARGE

## 2023-12-28 RX ORDER — BUDESONIDE AND FORMOTEROL FUMARATE DIHYDRATE 160; 4.5 UG/1; UG/1
2 AEROSOL RESPIRATORY (INHALATION)
Qty: 0 | Refills: 0 | DISCHARGE

## 2023-12-28 RX ORDER — SODIUM CHLORIDE 9 MG/ML
300 INJECTION INTRAMUSCULAR; INTRAVENOUS; SUBCUTANEOUS ONCE
Refills: 0 | Status: COMPLETED | OUTPATIENT
Start: 2023-12-28 | End: 2023-12-28

## 2023-12-28 RX ORDER — BRIMONIDINE TARTRATE 2 MG/MG
1 SOLUTION/ DROPS OPHTHALMIC
Qty: 0 | Refills: 0 | DISCHARGE

## 2023-12-28 RX ORDER — LIDOCAINE 4 G/100G
1 CREAM TOPICAL EVERY 24 HOURS
Refills: 0 | Status: DISCONTINUED | OUTPATIENT
Start: 2023-12-28 | End: 2023-12-30

## 2023-12-28 RX ORDER — ALBUMIN HUMAN 25 %
50 VIAL (ML) INTRAVENOUS ONCE
Refills: 0 | Status: COMPLETED | OUTPATIENT
Start: 2023-12-28 | End: 2023-12-28

## 2023-12-28 RX ORDER — ONDANSETRON 8 MG/1
1 TABLET, FILM COATED ORAL
Qty: 0 | Refills: 0 | DISCHARGE

## 2023-12-28 RX ORDER — CALCIUM ACETATE 667 MG
1 TABLET ORAL
Qty: 0 | Refills: 0 | DISCHARGE

## 2023-12-28 RX ORDER — SIMVASTATIN 20 MG/1
1 TABLET, FILM COATED ORAL
Qty: 0 | Refills: 0 | DISCHARGE

## 2023-12-28 RX ORDER — ACETAMINOPHEN 500 MG
1000 TABLET ORAL ONCE
Refills: 0 | Status: COMPLETED | OUTPATIENT
Start: 2023-12-28 | End: 2023-12-28

## 2023-12-28 RX ORDER — ERYTHROPOIETIN 10000 [IU]/ML
10000 INJECTION, SOLUTION INTRAVENOUS; SUBCUTANEOUS
Refills: 0 | Status: DISCONTINUED | OUTPATIENT
Start: 2023-12-28 | End: 2023-12-28

## 2023-12-28 RX ORDER — HYDROMORPHONE HYDROCHLORIDE 2 MG/ML
0.25 INJECTION INTRAMUSCULAR; INTRAVENOUS; SUBCUTANEOUS EVERY 6 HOURS
Refills: 0 | Status: DISCONTINUED | OUTPATIENT
Start: 2023-12-28 | End: 2023-12-30

## 2023-12-28 RX ORDER — SODIUM CHLORIDE 9 MG/ML
500 INJECTION INTRAMUSCULAR; INTRAVENOUS; SUBCUTANEOUS ONCE
Refills: 0 | Status: COMPLETED | OUTPATIENT
Start: 2023-12-28 | End: 2023-12-28

## 2023-12-28 RX ORDER — LEVOTHYROXINE SODIUM 125 MCG
1 TABLET ORAL
Qty: 0 | Refills: 0 | DISCHARGE

## 2023-12-28 RX ORDER — ERYTHROPOIETIN 10000 [IU]/ML
10000 INJECTION, SOLUTION INTRAVENOUS; SUBCUTANEOUS
Refills: 0 | Status: DISCONTINUED | OUTPATIENT
Start: 2023-12-28 | End: 2023-12-30

## 2023-12-28 RX ORDER — LATANOPROST 0.05 MG/ML
1 SOLUTION/ DROPS OPHTHALMIC; TOPICAL
Qty: 0 | Refills: 0 | DISCHARGE

## 2023-12-28 RX ORDER — LANOLIN ALCOHOL/MO/W.PET/CERES
1 CREAM (GRAM) TOPICAL
Qty: 0 | Refills: 0 | DISCHARGE

## 2023-12-28 RX ADMIN — BUDESONIDE AND FORMOTEROL FUMARATE DIHYDRATE 2 PUFF(S): 160; 4.5 AEROSOL RESPIRATORY (INHALATION) at 09:03

## 2023-12-28 RX ADMIN — FINASTERIDE 5 MILLIGRAM(S): 5 TABLET, FILM COATED ORAL at 16:01

## 2023-12-28 RX ADMIN — Medication 650 MILLIGRAM(S): at 11:00

## 2023-12-28 RX ADMIN — SIMVASTATIN 10 MILLIGRAM(S): 20 TABLET, FILM COATED ORAL at 21:31

## 2023-12-28 RX ADMIN — HYDROMORPHONE HYDROCHLORIDE 0.25 MILLIGRAM(S): 2 INJECTION INTRAMUSCULAR; INTRAVENOUS; SUBCUTANEOUS at 14:58

## 2023-12-28 RX ADMIN — DORZOLAMIDE HYDROCHLORIDE TIMOLOL MALEATE 1 DROP(S): 20; 5 SOLUTION/ DROPS OPHTHALMIC at 16:06

## 2023-12-28 RX ADMIN — BRIMONIDINE TARTRATE 1 DROP(S): 2 SOLUTION/ DROPS OPHTHALMIC at 16:02

## 2023-12-28 RX ADMIN — Medication 667 MILLIGRAM(S): at 16:03

## 2023-12-28 RX ADMIN — BRIMONIDINE TARTRATE 1 DROP(S): 2 SOLUTION/ DROPS OPHTHALMIC at 06:31

## 2023-12-28 RX ADMIN — BUDESONIDE AND FORMOTEROL FUMARATE DIHYDRATE 2 PUFF(S): 160; 4.5 AEROSOL RESPIRATORY (INHALATION) at 16:03

## 2023-12-28 RX ADMIN — DORZOLAMIDE HYDROCHLORIDE TIMOLOL MALEATE 1 DROP(S): 20; 5 SOLUTION/ DROPS OPHTHALMIC at 06:33

## 2023-12-28 RX ADMIN — SERTRALINE 25 MILLIGRAM(S): 25 TABLET, FILM COATED ORAL at 21:32

## 2023-12-28 RX ADMIN — Medication 400 MILLIGRAM(S): at 11:00

## 2023-12-28 RX ADMIN — LIDOCAINE 1 PATCH: 4 CREAM TOPICAL at 14:29

## 2023-12-28 RX ADMIN — LATANOPROST 1 DROP(S): 0.05 SOLUTION/ DROPS OPHTHALMIC; TOPICAL at 21:33

## 2023-12-28 RX ADMIN — APIXABAN 5 MILLIGRAM(S): 2.5 TABLET, FILM COATED ORAL at 05:18

## 2023-12-28 RX ADMIN — Medication 3 MILLIGRAM(S): at 21:31

## 2023-12-28 RX ADMIN — HYDROMORPHONE HYDROCHLORIDE 0.25 MILLIGRAM(S): 2 INJECTION INTRAMUSCULAR; INTRAVENOUS; SUBCUTANEOUS at 21:33

## 2023-12-28 RX ADMIN — HYDROMORPHONE HYDROCHLORIDE 0.25 MILLIGRAM(S): 2 INJECTION INTRAMUSCULAR; INTRAVENOUS; SUBCUTANEOUS at 14:28

## 2023-12-28 RX ADMIN — ERYTHROPOIETIN 10000 UNIT(S): 10000 INJECTION, SOLUTION INTRAVENOUS; SUBCUTANEOUS at 12:04

## 2023-12-28 RX ADMIN — Medication 1000 MILLIGRAM(S): at 11:30

## 2023-12-28 RX ADMIN — Medication 50 MILLILITER(S): at 11:37

## 2023-12-28 RX ADMIN — SODIUM CHLORIDE 666.67 MILLILITER(S): 9 INJECTION INTRAMUSCULAR; INTRAVENOUS; SUBCUTANEOUS at 04:51

## 2023-12-28 RX ADMIN — Medication 650 MILLIGRAM(S): at 10:22

## 2023-12-28 RX ADMIN — MIDODRINE HYDROCHLORIDE 10 MILLIGRAM(S): 2.5 TABLET ORAL at 09:46

## 2023-12-28 RX ADMIN — Medication 50 MICROGRAM(S): at 05:18

## 2023-12-28 RX ADMIN — HYDROMORPHONE HYDROCHLORIDE 0.25 MILLIGRAM(S): 2 INJECTION INTRAMUSCULAR; INTRAVENOUS; SUBCUTANEOUS at 21:46

## 2023-12-28 RX ADMIN — SERTRALINE 50 MILLIGRAM(S): 25 TABLET, FILM COATED ORAL at 21:31

## 2023-12-28 RX ADMIN — LIDOCAINE 1 PATCH: 4 CREAM TOPICAL at 19:09

## 2023-12-28 RX ADMIN — SODIUM CHLORIDE 75 MILLILITER(S): 9 INJECTION INTRAMUSCULAR; INTRAVENOUS; SUBCUTANEOUS at 18:00

## 2023-12-28 RX ADMIN — APIXABAN 5 MILLIGRAM(S): 2.5 TABLET, FILM COATED ORAL at 16:01

## 2023-12-28 RX ADMIN — Medication 667 MILLIGRAM(S): at 09:03

## 2023-12-28 NOTE — DIETITIAN INITIAL EVALUATION ADULT - PERTINENT LABORATORY DATA
12-28    136  |  102  |  44<H>  ----------------------------<  73  4.8   |  24  |  5.20<H>    Ca    7.5<L>      28 Dec 2023 08:09  Mg     2.2     12-28    TPro  5.7<L>  /  Alb  3.0<L>  /  TBili  0.5  /  DBili  x   /  AST  18  /  ALT  20  /  AlkPhos  159<H>  12-28  POCT Blood Glucose.: 108 mg/dL (12-28-23 @ 12:05)  A1C with Estimated Average Glucose Result: 8.0 % (12-27-23 @ 08:12)

## 2023-12-28 NOTE — DIETITIAN INITIAL EVALUATION ADULT - NAME AND PHONE
Diana Wilkins, MS, RD, CDN, [facility]  Diana Wilkins, MS, RD, CDN, [facility]

## 2023-12-28 NOTE — CHART NOTE - NSCHARTNOTEFT_GEN_A_CORE
Called by RN for BP of 88/55 manually. Patient is currently asymptomatic and is awake playing card games. No hx of CHF and is on no anti-hypertensive medications. Will give 500cc bolus IV x1. Rn to call with further changes.

## 2023-12-28 NOTE — CHART NOTE - NSCHARTNOTEFT_GEN_A_CORE
Pt was seen at bedside. Pt was resting comfortably. The dressing over the right IJ HD cathteter was removed, the anchoring suture was cut, the HD catheter was gently pulled out and hemostasis was achieved with direct pressure over the access site for 15 min. A new dry sterile dressing was placed. Patient complained of dizziness, SBP 80 to low 90's, post HD today 126/44. Will give IVF bolus  ml over 4 hours. Patient's blood pressure to be checked post bolus. Pt was seen at bedside. Pt was resting comfortably. The dressing over the right IJ HD cathteter was removed, the anchoring suture was cut, the HD catheter was gently pulled out and hemostasis was achieved with direct pressure over the access site for 15 min. A new dry sterile dressing was placed. Patient complained of dizziness, SBP 80 to low 90's, BP 99/46. Post HD today 126/44. Will give IVF bolus  ml over 4 hours. Patient's blood pressure to be checked post bolus.

## 2023-12-28 NOTE — DIETITIAN INITIAL EVALUATION ADULT - NSFNSPHYEXAMSKINFT_GEN_A_CORE
Pressure Injury 1: Right:, sacrum, Stage I  Pressure Injury 2: none, none  Pressure Injury 3: none, none  Pressure Injury 4: none, none  Pressure Injury 5: none, none  Pressure Injury 6: none, none  Pressure Injury 7: none, none  Pressure Injury 8: none, none  Pressure Injury 9: none, none  Pressure Injury 10: none, none  Pressure Injury 11: none, none

## 2023-12-28 NOTE — PROGRESS NOTE ADULT - ASSESSMENT
ESRD on HD  clotted AVF  Hypertension    12/26/23: IR to place dialysis catheter. Vascular surgery evaluation. Monitor BP trend. Titrate BP meds as needed. To continue current meds.   Dietary and PO fluid restriction.   12/27/23: s/p HD yesterday. Vascular surgery follow up. To continue current meds. Labile BP.   12/28/23: HD today. Low BP. On midodrine. Albumin x 1 dose. No fluid removal. Follow BP trend.

## 2023-12-28 NOTE — PROGRESS NOTE ADULT - SUBJECTIVE AND OBJECTIVE BOX
[facility] [place]       HPI:  73-year-old male with past medical history of dementia, COPD, end-stage renal disease, A-fib on anticoagulation, glaucoma, asthma, depression, hypertension, hypocalcemia, hypoparathyroidism was BIB EMS from [place] for the clogged AVF fistula, could not receive HD today.  Last HD was 12/22/23. Pt has had a fall 3 weeks ago which he was seen in the ED for, had CT head, neck and chest-- all unremarkable. Pt c/o mild lower back pain since the fall. (26 Dec 2023 18:31)        SUBJECTIVE:      ALLERGIES:  Allergies    Levaquin (Anaphylaxis; Flushing; Short breath)    Intolerances          MEDICATIONS  (STANDING):  apixaban 5 milliGRAM(s) Oral two times a day  brimonidine 0.2% Ophthalmic Solution 1 Drop(s) Both EYES two times a day  budesonide  80 MICROgram(s)/formoterol 4.5 MICROgram(s) Inhaler 2 Puff(s) Inhalation two times a day  calcitriol   Capsule 0.25 MICROGram(s) Oral <User Schedule>  calcium acetate 667 milliGRAM(s) Oral three times a day with meals  dextrose 5%. 1000 milliLiter(s) (50 mL/Hr) IV Continuous <Continuous>  dextrose 5%. 1000 milliLiter(s) (100 mL/Hr) IV Continuous <Continuous>  dextrose 50% Injectable 12.5 Gram(s) IV Push once  dextrose 50% Injectable 25 Gram(s) IV Push once  dextrose 50% Injectable 25 Gram(s) IV Push once  dorzolamide 2%/timolol 0.5% Ophthalmic Solution 1 Drop(s) Both EYES two times a day  epoetin kayla-epbx (RETACRIT) Injectable 77593 Unit(s) IV Push <User Schedule>  finasteride 5 milliGRAM(s) Oral daily  glucagon  Injectable 1 milliGRAM(s) IntraMuscular once  glucagon  Injectable 1 milliGRAM(s) IntraMuscular once  insulin lispro (ADMELOG) corrective regimen sliding scale   SubCutaneous at bedtime  insulin lispro (ADMELOG) corrective regimen sliding scale   SubCutaneous three times a day before meals  latanoprost 0.005% Ophthalmic Solution 1 Drop(s) Both EYES at bedtime  levothyroxine 50 MICROGram(s) Oral daily  lidocaine   4% Patch 1 Patch Transdermal every 24 hours  sertraline 50 milliGRAM(s) Oral at bedtime  sertraline 25 milliGRAM(s) Oral at bedtime  simvastatin 10 milliGRAM(s) Oral at bedtime    MEDICATIONS  (PRN):  acetaminophen     Tablet .. 650 milliGRAM(s) Oral every 6 hours PRN Temp greater or equal to 38C (100.4F), Mild Pain (1 - 3)  aluminum hydroxide/magnesium hydroxide/simethicone Suspension 30 milliLiter(s) Oral every 4 hours PRN Dyspepsia  dextrose Oral Gel 15 Gram(s) Oral once PRN Blood Glucose LESS THAN 70 milliGRAM(s)/deciliter  HYDROmorphone  Injectable 0.25 milliGRAM(s) IV Push every 6 hours PRN Severe Pain (7 - 10)  melatonin 3 milliGRAM(s) Oral at bedtime PRN Insomnia  midodrine. 10 milliGRAM(s) Oral three times a day PRN on HD days for Hypotension  ondansetron Injectable 4 milliGRAM(s) IV Push every 8 hours PRN Nausea and/or Vomiting  sodium chloride 0.9% lock flush 10 milliLiter(s) IV Push every 1 hour PRN Pre/post blood products, medications, blood draw, and to maintain line patency      REVIEW OF SYSTEMS:  CONSTITUTIONAL: No fever,  RESPIRATORY: No cough, wheezing, shortness of breath  CARDIOVASCULAR: No chest pain, dyspnea, palpitations, dizziness, syncope, paroxysmal nocturnal dyspnea, orthopnea, or arm or leg swelling  GASTROINTESTINAL: No abdominal  or epigastric pain, nausea, vomiting,  diarrhea  NEUROLOGICAL: No headaches,  loss of strength, numbness, or tremors    Vital Signs Last 24 Hrs  T(C): 36.2 (28 Dec 2023 21:12), Max: 37.4 (28 Dec 2023 09:55)  T(F): 97.1 (28 Dec 2023 21:12), Max: 99.3 (28 Dec 2023 09:55)  HR: 72 (28 Dec 2023 21:12) (66 - 101)  BP: 93/53 (28 Dec 2023 21:12) (88/53 - 131/74)  BP(mean): --  RR: 17 (28 Dec 2023 21:12) (17 - 18)  SpO2: 96% (28 Dec 2023 21:12) (91% - 98%)    Parameters below as of 28 Dec 2023 21:12  Patient On (Oxygen Delivery Method): room air        PHYSICAL EXAM:  HEAD:  Atraumatic, Normocephalic  NECK: Supple and normal thyroid.  No JVD or carotid bruit.   HEART: S1, S2 regular , 1/6 soft ejection systolic murmur in mitral area , no thrill and no gallops .  PULMONARY: Bilateral vesicular breathing , few scattered ronchi and few scattered rales are present .  ABDOMEN: Soft nontender and positive bowl sounds   EXTREMITIES:  No clubbing, cyanosis, or pedal  edema  NEUROLOGICAL: AAOX3 , no focal deficit .    LABS:                        8.5    4.40  )-----------( 155      ( 28 Dec 2023 08:09 )             26.3     12-28    136  |  102  |  44<H>  ----------------------------<  73  4.8   |  24  |  5.20<H>    Ca    7.5<L>      28 Dec 2023 08:09  Mg     2.2     12-28    TPro  5.7<L>  /  Alb  3.0<L>  /  TBili  0.5  /  DBili  x   /  AST  18  /  ALT  20  /  AlkPhos  159<H>  12-28        PT/INR - ( 27 Dec 2023 08:12 )   PT: 14.1 sec;   INR: 1.21 ratio           Urinalysis Basic - ( 28 Dec 2023 08:09 )    Color: x / Appearance: x / SG: x / pH: x  Gluc: 73 mg/dL / Ketone: x  / Bili: x / Urobili: x   Blood: x / Protein: x / Nitrite: x   Leuk Esterase: x / RBC: x / WBC x   Sq Epi: x / Non Sq Epi: x / Bacteria: x      BNP      EKG:  ECHO:  IMAGING:    Assessment/Plan    Will continue to follow during hospital course and give further recommendations as needed . Thanks for your referral . if any questions please contact me at office [phone] [phone])  [facility] [place]       HPI:  73-year-old male with past medical history of dementia, COPD, end-stage renal disease, A-fib on anticoagulation, glaucoma, asthma, depression, hypertension, hypocalcemia, hypoparathyroidism was BIB EMS from [place] for the clogged AVF fistula, could not receive HD today.  Last HD was 12/22/23. Pt has had a fall 3 weeks ago which he was seen in the ED for, had CT head, neck and chest-- all unremarkable. Pt c/o mild lower back pain since the fall. (26 Dec 2023 18:31)        SUBJECTIVE:      ALLERGIES:  Allergies    Levaquin (Anaphylaxis; Flushing; Short breath)    Intolerances          MEDICATIONS  (STANDING):  apixaban 5 milliGRAM(s) Oral two times a day  brimonidine 0.2% Ophthalmic Solution 1 Drop(s) Both EYES two times a day  budesonide  80 MICROgram(s)/formoterol 4.5 MICROgram(s) Inhaler 2 Puff(s) Inhalation two times a day  calcitriol   Capsule 0.25 MICROGram(s) Oral <User Schedule>  calcium acetate 667 milliGRAM(s) Oral three times a day with meals  dextrose 5%. 1000 milliLiter(s) (50 mL/Hr) IV Continuous <Continuous>  dextrose 5%. 1000 milliLiter(s) (100 mL/Hr) IV Continuous <Continuous>  dextrose 50% Injectable 12.5 Gram(s) IV Push once  dextrose 50% Injectable 25 Gram(s) IV Push once  dextrose 50% Injectable 25 Gram(s) IV Push once  dorzolamide 2%/timolol 0.5% Ophthalmic Solution 1 Drop(s) Both EYES two times a day  epoetin kayla-epbx (RETACRIT) Injectable 09692 Unit(s) IV Push <User Schedule>  finasteride 5 milliGRAM(s) Oral daily  glucagon  Injectable 1 milliGRAM(s) IntraMuscular once  glucagon  Injectable 1 milliGRAM(s) IntraMuscular once  insulin lispro (ADMELOG) corrective regimen sliding scale   SubCutaneous at bedtime  insulin lispro (ADMELOG) corrective regimen sliding scale   SubCutaneous three times a day before meals  latanoprost 0.005% Ophthalmic Solution 1 Drop(s) Both EYES at bedtime  levothyroxine 50 MICROGram(s) Oral daily  lidocaine   4% Patch 1 Patch Transdermal every 24 hours  sertraline 50 milliGRAM(s) Oral at bedtime  sertraline 25 milliGRAM(s) Oral at bedtime  simvastatin 10 milliGRAM(s) Oral at bedtime    MEDICATIONS  (PRN):  acetaminophen     Tablet .. 650 milliGRAM(s) Oral every 6 hours PRN Temp greater or equal to 38C (100.4F), Mild Pain (1 - 3)  aluminum hydroxide/magnesium hydroxide/simethicone Suspension 30 milliLiter(s) Oral every 4 hours PRN Dyspepsia  dextrose Oral Gel 15 Gram(s) Oral once PRN Blood Glucose LESS THAN 70 milliGRAM(s)/deciliter  HYDROmorphone  Injectable 0.25 milliGRAM(s) IV Push every 6 hours PRN Severe Pain (7 - 10)  melatonin 3 milliGRAM(s) Oral at bedtime PRN Insomnia  midodrine. 10 milliGRAM(s) Oral three times a day PRN on HD days for Hypotension  ondansetron Injectable 4 milliGRAM(s) IV Push every 8 hours PRN Nausea and/or Vomiting  sodium chloride 0.9% lock flush 10 milliLiter(s) IV Push every 1 hour PRN Pre/post blood products, medications, blood draw, and to maintain line patency      REVIEW OF SYSTEMS:  CONSTITUTIONAL: No fever,  RESPIRATORY: No cough, wheezing, shortness of breath  CARDIOVASCULAR: No chest pain, dyspnea, palpitations, dizziness, syncope, paroxysmal nocturnal dyspnea, orthopnea, or arm or leg swelling  GASTROINTESTINAL: No abdominal  or epigastric pain, nausea, vomiting,  diarrhea  NEUROLOGICAL: No headaches,  loss of strength, numbness, or tremors    Vital Signs Last 24 Hrs  T(C): 36.2 (28 Dec 2023 21:12), Max: 37.4 (28 Dec 2023 09:55)  T(F): 97.1 (28 Dec 2023 21:12), Max: 99.3 (28 Dec 2023 09:55)  HR: 72 (28 Dec 2023 21:12) (66 - 101)  BP: 93/53 (28 Dec 2023 21:12) (88/53 - 131/74)  BP(mean): --  RR: 17 (28 Dec 2023 21:12) (17 - 18)  SpO2: 96% (28 Dec 2023 21:12) (91% - 98%)    Parameters below as of 28 Dec 2023 21:12  Patient On (Oxygen Delivery Method): room air        PHYSICAL EXAM:  HEAD:  Atraumatic, Normocephalic  NECK: Supple and normal thyroid.  No JVD or carotid bruit.   HEART: S1, S2 regular , 1/6 soft ejection systolic murmur in mitral area , no thrill and no gallops .  PULMONARY: Bilateral vesicular breathing , few scattered ronchi and few scattered rales are present .  ABDOMEN: Soft nontender and positive bowl sounds   EXTREMITIES:  No clubbing, cyanosis, or pedal  edema  NEUROLOGICAL: AAOX3 , no focal deficit .    LABS:                        8.5    4.40  )-----------( 155      ( 28 Dec 2023 08:09 )             26.3     12-28    136  |  102  |  44<H>  ----------------------------<  73  4.8   |  24  |  5.20<H>    Ca    7.5<L>      28 Dec 2023 08:09  Mg     2.2     12-28    TPro  5.7<L>  /  Alb  3.0<L>  /  TBili  0.5  /  DBili  x   /  AST  18  /  ALT  20  /  AlkPhos  159<H>  12-28        PT/INR - ( 27 Dec 2023 08:12 )   PT: 14.1 sec;   INR: 1.21 ratio           Urinalysis Basic - ( 28 Dec 2023 08:09 )    Color: x / Appearance: x / SG: x / pH: x  Gluc: 73 mg/dL / Ketone: x  / Bili: x / Urobili: x   Blood: x / Protein: x / Nitrite: x   Leuk Esterase: x / RBC: x / WBC x   Sq Epi: x / Non Sq Epi: x / Bacteria: x      BNP      EKG:  ECHO:  IMAGING:    Assessment/Plan    Will continue to follow during hospital course and give further recommendations as needed . Thanks for your referral . if any questions please contact me at office [phone] [phone])    MI SANDOVAL MD [address] [address]  [address]  OFFICE : [phone]  CELL : [phone]  CARDIOLOGY CONSULT :       HPI:  73-year-old male with past medical history of dementia, COPD, end-stage renal disease, A-fib on anticoagulation, glaucoma, asthma, depression, hypertension, hypocalcemia, hypoparathyroidism was BIB EMS from [place] for the clogged AVF fistula, could not receive HD today.  Last HD was 12/22/23. Pt has had a fall 3 weeks ago which he was seen in the ED for, had CT head, neck and chest-- all unremarkable. Pt c/o mild lower back pain since the fall. (26 Dec 2023 18:31)        SUBJECTIVE: Patient feeling better .      ALLERGIES:  Allergies    Levaquin (Anaphylaxis; Flushing; Short breath)    Intolerances          MEDICATIONS  (STANDING):  apixaban 5 milliGRAM(s) Oral two times a day  brimonidine 0.2% Ophthalmic Solution 1 Drop(s) Both EYES two times a day  budesonide  80 MICROgram(s)/formoterol 4.5 MICROgram(s) Inhaler 2 Puff(s) Inhalation two times a day  calcitriol   Capsule 0.25 MICROGram(s) Oral <User Schedule>  calcium acetate 667 milliGRAM(s) Oral three times a day with meals  dextrose 5%. 1000 milliLiter(s) (50 mL/Hr) IV Continuous <Continuous>  dextrose 5%. 1000 milliLiter(s) (100 mL/Hr) IV Continuous <Continuous>  dextrose 50% Injectable 12.5 Gram(s) IV Push once  dextrose 50% Injectable 25 Gram(s) IV Push once  dextrose 50% Injectable 25 Gram(s) IV Push once  dorzolamide 2%/timolol 0.5% Ophthalmic Solution 1 Drop(s) Both EYES two times a day  epoetin kayla-epbx (RETACRIT) Injectable 12691 Unit(s) IV Push <User Schedule>  finasteride 5 milliGRAM(s) Oral daily  glucagon  Injectable 1 milliGRAM(s) IntraMuscular once  glucagon  Injectable 1 milliGRAM(s) IntraMuscular once  insulin lispro (ADMELOG) corrective regimen sliding scale   SubCutaneous at bedtime  insulin lispro (ADMELOG) corrective regimen sliding scale   SubCutaneous three times a day before meals  latanoprost 0.005% Ophthalmic Solution 1 Drop(s) Both EYES at bedtime  levothyroxine 50 MICROGram(s) Oral daily  lidocaine   4% Patch 1 Patch Transdermal every 24 hours  sertraline 50 milliGRAM(s) Oral at bedtime  sertraline 25 milliGRAM(s) Oral at bedtime  simvastatin 10 milliGRAM(s) Oral at bedtime    MEDICATIONS  (PRN):  acetaminophen     Tablet .. 650 milliGRAM(s) Oral every 6 hours PRN Temp greater or equal to 38C (100.4F), Mild Pain (1 - 3)  aluminum hydroxide/magnesium hydroxide/simethicone Suspension 30 milliLiter(s) Oral every 4 hours PRN Dyspepsia  dextrose Oral Gel 15 Gram(s) Oral once PRN Blood Glucose LESS THAN 70 milliGRAM(s)/deciliter  HYDROmorphone  Injectable 0.25 milliGRAM(s) IV Push every 6 hours PRN Severe Pain (7 - 10)  melatonin 3 milliGRAM(s) Oral at bedtime PRN Insomnia  midodrine. 10 milliGRAM(s) Oral three times a day PRN on HD days for Hypotension  ondansetron Injectable 4 milliGRAM(s) IV Push every 8 hours PRN Nausea and/or Vomiting  sodium chloride 0.9% lock flush 10 milliLiter(s) IV Push every 1 hour PRN Pre/post blood products, medications, blood draw, and to maintain line patency      REVIEW OF SYSTEMS:  CONSTITUTIONAL: No fever,  RESPIRATORY: No cough, wheezing, shortness of breath  CARDIOVASCULAR: No chest pain, dyspnea, palpitations, dizziness, syncope, paroxysmal nocturnal dyspnea, orthopnea, or arm or leg swelling  GASTROINTESTINAL: No abdominal  or epigastric pain, nausea, vomiting,  diarrhea  NEUROLOGICAL: No headaches,  numbness, or tremors  Skin : No itching   Hematology : No active bleeding .  Endocrinology : No heat and cold intolerance   Psychiatry : Patient is calm .  Genitourinary : No dysuria .  Musculoskeletal : Patient has mild arthritis .    Vital Signs Last 24 Hrs  T(C): 36.2 (28 Dec 2023 21:12), Max: 37.4 (28 Dec 2023 09:55)  T(F): 97.1 (28 Dec 2023 21:12), Max: 99.3 (28 Dec 2023 09:55)  HR: 72 (28 Dec 2023 21:12) (66 - 101)  BP: 93/53 (28 Dec 2023 21:12) (88/53 - 131/74)  BP(mean): --  RR: 17 (28 Dec 2023 21:12) (17 - 18)  SpO2: 96% (28 Dec 2023 21:12) (91% - 98%)    Parameters below as of 28 Dec 2023 21:12  Patient On (Oxygen Delivery Method): room air        PHYSICAL EXAM:  HEAD:  Atraumatic, Normocephalic  NECK: Supple and normal thyroid.  No JVD or carotid bruit.   HEART: S1, S2 irregular , 1/6 soft ejection systolic murmur in mitral area , no thrill and no gallops .  PULMONARY: Bilateral vesicular breathing , few scattered rhonchi and few scattered rales are present .  ABDOMEN: Soft nontender and positive bowl sounds   EXTREMITIES:  No clubbing, cyanosis, or pedal  edema  NEUROLOGICAL: Awake and confused .  Skin : No rashes .  Musculoskeletal : No joint swellings .    LABS:                        8.5    4.40  )-----------( 155      ( 28 Dec 2023 08:09 )             26.3     12-28    136  |  102  |  44<H>  ----------------------------<  73  4.8   |  24  |  5.20<H>    Ca    7.5<L>      28 Dec 2023 08:09  Mg     2.2     12-28    TPro  5.7<L>  /  Alb  3.0<L>  /  TBili  0.5  /  DBili  x   /  AST  18  /  ALT  20  /  AlkPhos  159<H>  12-28        PT/INR - ( 27 Dec 2023 08:12 )   PT: 14.1 sec;   INR: 1.21 ratio           Urinalysis Basic - ( 28 Dec 2023 08:09 )    Color: x / Appearance: x / SG: x / pH: x  Gluc: 73 mg/dL / Ketone: x  / Bili: x / Urobili: x   Blood: x / Protein: x / Nitrite: x   Leuk Esterase: x / RBC: x / WBC x   Sq Epi: x / Non Sq Epi: x / Bacteria: x      Assessment/Plan  Patient has :  1) S/P Right AV graft thrombectomy .  2) ESRD and on hemodialysis .  3) Atrial fibrillation with intermittently fast ventricular rate .  4) Anemia   5) Hypothyroidism   6) Hyperlipidemia   Plan : 1) Add amiodarone 200 mg daily for better control of atrial fibrillation 2) Monitor hemoglobin and electrolytes 3) Rest of medications as such 4) Prognosis guarded .  Will continue to follow during hospital course and give further recommendations as needed . Thanks for your referral . if any questions please contact me at office ([phone] cell [phone])    MI SANDOVAL MD [address] [address]  [address]  OFFICE : [phone]  CELL : [phone]  CARDIOLOGY CONSULT :       HPI:  73-year-old male with past medical history of dementia, COPD, end-stage renal disease, A-fib on anticoagulation, glaucoma, asthma, depression, hypertension, hypocalcemia, hypoparathyroidism was BIB EMS from [place] for the clogged AVF fistula, could not receive HD today.  Last HD was 12/22/23. Pt has had a fall 3 weeks ago which he was seen in the ED for, had CT head, neck and chest-- all unremarkable. Pt c/o mild lower back pain since the fall. (26 Dec 2023 18:31)        SUBJECTIVE: Patient feeling better .      ALLERGIES:  Allergies    Levaquin (Anaphylaxis; Flushing; Short breath)    Intolerances          MEDICATIONS  (STANDING):  apixaban 5 milliGRAM(s) Oral two times a day  brimonidine 0.2% Ophthalmic Solution 1 Drop(s) Both EYES two times a day  budesonide  80 MICROgram(s)/formoterol 4.5 MICROgram(s) Inhaler 2 Puff(s) Inhalation two times a day  calcitriol   Capsule 0.25 MICROGram(s) Oral <User Schedule>  calcium acetate 667 milliGRAM(s) Oral three times a day with meals  dextrose 5%. 1000 milliLiter(s) (50 mL/Hr) IV Continuous <Continuous>  dextrose 5%. 1000 milliLiter(s) (100 mL/Hr) IV Continuous <Continuous>  dextrose 50% Injectable 12.5 Gram(s) IV Push once  dextrose 50% Injectable 25 Gram(s) IV Push once  dextrose 50% Injectable 25 Gram(s) IV Push once  dorzolamide 2%/timolol 0.5% Ophthalmic Solution 1 Drop(s) Both EYES two times a day  epoetin kayla-epbx (RETACRIT) Injectable 43628 Unit(s) IV Push <User Schedule>  finasteride 5 milliGRAM(s) Oral daily  glucagon  Injectable 1 milliGRAM(s) IntraMuscular once  glucagon  Injectable 1 milliGRAM(s) IntraMuscular once  insulin lispro (ADMELOG) corrective regimen sliding scale   SubCutaneous at bedtime  insulin lispro (ADMELOG) corrective regimen sliding scale   SubCutaneous three times a day before meals  latanoprost 0.005% Ophthalmic Solution 1 Drop(s) Both EYES at bedtime  levothyroxine 50 MICROGram(s) Oral daily  lidocaine   4% Patch 1 Patch Transdermal every 24 hours  sertraline 50 milliGRAM(s) Oral at bedtime  sertraline 25 milliGRAM(s) Oral at bedtime  simvastatin 10 milliGRAM(s) Oral at bedtime    MEDICATIONS  (PRN):  acetaminophen     Tablet .. 650 milliGRAM(s) Oral every 6 hours PRN Temp greater or equal to 38C (100.4F), Mild Pain (1 - 3)  aluminum hydroxide/magnesium hydroxide/simethicone Suspension 30 milliLiter(s) Oral every 4 hours PRN Dyspepsia  dextrose Oral Gel 15 Gram(s) Oral once PRN Blood Glucose LESS THAN 70 milliGRAM(s)/deciliter  HYDROmorphone  Injectable 0.25 milliGRAM(s) IV Push every 6 hours PRN Severe Pain (7 - 10)  melatonin 3 milliGRAM(s) Oral at bedtime PRN Insomnia  midodrine. 10 milliGRAM(s) Oral three times a day PRN on HD days for Hypotension  ondansetron Injectable 4 milliGRAM(s) IV Push every 8 hours PRN Nausea and/or Vomiting  sodium chloride 0.9% lock flush 10 milliLiter(s) IV Push every 1 hour PRN Pre/post blood products, medications, blood draw, and to maintain line patency      REVIEW OF SYSTEMS:  CONSTITUTIONAL: No fever,  RESPIRATORY: No cough, wheezing, shortness of breath  CARDIOVASCULAR: No chest pain, dyspnea, palpitations, dizziness, syncope, paroxysmal nocturnal dyspnea, orthopnea, or arm or leg swelling  GASTROINTESTINAL: No abdominal  or epigastric pain, nausea, vomiting,  diarrhea  NEUROLOGICAL: No headaches,  numbness, or tremors  Skin : No itching   Hematology : No active bleeding .  Endocrinology : No heat and cold intolerance   Psychiatry : Patient is calm .  Genitourinary : No dysuria .  Musculoskeletal : Patient has mild arthritis .    Vital Signs Last 24 Hrs  T(C): 36.2 (28 Dec 2023 21:12), Max: 37.4 (28 Dec 2023 09:55)  T(F): 97.1 (28 Dec 2023 21:12), Max: 99.3 (28 Dec 2023 09:55)  HR: 72 (28 Dec 2023 21:12) (66 - 101)  BP: 93/53 (28 Dec 2023 21:12) (88/53 - 131/74)  BP(mean): --  RR: 17 (28 Dec 2023 21:12) (17 - 18)  SpO2: 96% (28 Dec 2023 21:12) (91% - 98%)    Parameters below as of 28 Dec 2023 21:12  Patient On (Oxygen Delivery Method): room air        PHYSICAL EXAM:  HEAD:  Atraumatic, Normocephalic  NECK: Supple and normal thyroid.  No JVD or carotid bruit.   HEART: S1, S2 irregular , 1/6 soft ejection systolic murmur in mitral area , no thrill and no gallops .  PULMONARY: Bilateral vesicular breathing , few scattered rhonchi and few scattered rales are present .  ABDOMEN: Soft nontender and positive bowl sounds   EXTREMITIES:  No clubbing, cyanosis, or pedal  edema  NEUROLOGICAL: Awake and confused .  Skin : No rashes .  Musculoskeletal : No joint swellings .    LABS:                        8.5    4.40  )-----------( 155      ( 28 Dec 2023 08:09 )             26.3     12-28    136  |  102  |  44<H>  ----------------------------<  73  4.8   |  24  |  5.20<H>    Ca    7.5<L>      28 Dec 2023 08:09  Mg     2.2     12-28    TPro  5.7<L>  /  Alb  3.0<L>  /  TBili  0.5  /  DBili  x   /  AST  18  /  ALT  20  /  AlkPhos  159<H>  12-28        PT/INR - ( 27 Dec 2023 08:12 )   PT: 14.1 sec;   INR: 1.21 ratio           Urinalysis Basic - ( 28 Dec 2023 08:09 )    Color: x / Appearance: x / SG: x / pH: x  Gluc: 73 mg/dL / Ketone: x  / Bili: x / Urobili: x   Blood: x / Protein: x / Nitrite: x   Leuk Esterase: x / RBC: x / WBC x   Sq Epi: x / Non Sq Epi: x / Bacteria: x      Assessment/Plan  Patient has :  1) S/P Right AV graft thrombectomy .  2) ESRD and on hemodialysis .  3) Atrial fibrillation with intermittently fast ventricular rate .  4) Anemia   5) Hypothyroidism   6) Hyperlipidemia   Plan : 1) Add amiodarone 200 mg daily for better control of atrial fibrillation 2) Monitor hemoglobin and electrolytes 3) Rest of medications as such 4) Prognosis guarded .  Will continue to follow during hospital course and give further recommendations as needed . Thanks for your referral . if any questions please contact me at office ([phone] cell [phone])

## 2023-12-28 NOTE — PROGRESS NOTE ADULT - SUBJECTIVE AND OBJECTIVE BOX
Patient is a 73y old  Male who presents with a chief complaint of SOB (27 Dec 2023 04:46)    Patient seen in follow up for ESRD on HD.        PAST MEDICAL HISTORY:  ASHD (arteriosclerotic heart disease)    CAD (coronary artery disease)    CRI (chronic renal insufficiency)    Diabetes mellitus type II    Hypertension    Hyperlipidemia    Pacemaker    Chronic atrial fibrillation    Asthma    Hypothyroid    Dementia    H/O carotid stenosis    Enlarged prostate      MEDICATIONS  (STANDING):  apixaban 5 milliGRAM(s) Oral two times a day  brimonidine 0.2% Ophthalmic Solution 1 Drop(s) Both EYES two times a day  budesonide  80 MICROgram(s)/formoterol 4.5 MICROgram(s) Inhaler 2 Puff(s) Inhalation two times a day  calcitriol   Capsule 0.25 MICROGram(s) Oral <User Schedule>  calcium acetate 667 milliGRAM(s) Oral three times a day with meals  dextrose 5%. 1000 milliLiter(s) (100 mL/Hr) IV Continuous <Continuous>  dextrose 5%. 1000 milliLiter(s) (50 mL/Hr) IV Continuous <Continuous>  dextrose 50% Injectable 12.5 Gram(s) IV Push once  dextrose 50% Injectable 25 Gram(s) IV Push once  dextrose 50% Injectable 25 Gram(s) IV Push once  dorzolamide 2%/timolol 0.5% Ophthalmic Solution 1 Drop(s) Both EYES two times a day  epoetin kayla-epbx (RETACRIT) Injectable 60004 Unit(s) IV Push <User Schedule>  finasteride 5 milliGRAM(s) Oral daily  glucagon  Injectable 1 milliGRAM(s) IntraMuscular once  glucagon  Injectable 1 milliGRAM(s) IntraMuscular once  insulin lispro (ADMELOG) corrective regimen sliding scale   SubCutaneous three times a day before meals  insulin lispro (ADMELOG) corrective regimen sliding scale   SubCutaneous at bedtime  latanoprost 0.005% Ophthalmic Solution 1 Drop(s) Both EYES at bedtime  levothyroxine 50 MICROGram(s) Oral daily  lidocaine   4% Patch 1 Patch Transdermal every 24 hours  sertraline 25 milliGRAM(s) Oral at bedtime  sertraline 50 milliGRAM(s) Oral at bedtime  simvastatin 10 milliGRAM(s) Oral at bedtime    MEDICATIONS  (PRN):  acetaminophen     Tablet .. 650 milliGRAM(s) Oral every 6 hours PRN Temp greater or equal to 38C (100.4F), Mild Pain (1 - 3)  aluminum hydroxide/magnesium hydroxide/simethicone Suspension 30 milliLiter(s) Oral every 4 hours PRN Dyspepsia  dextrose Oral Gel 15 Gram(s) Oral once PRN Blood Glucose LESS THAN 70 milliGRAM(s)/deciliter  HYDROmorphone  Injectable 0.25 milliGRAM(s) IV Push every 6 hours PRN Severe Pain (7 - 10)  melatonin 3 milliGRAM(s) Oral at bedtime PRN Insomnia  midodrine. 10 milliGRAM(s) Oral three times a day PRN on HD days for Hypotension  ondansetron Injectable 4 milliGRAM(s) IV Push every 8 hours PRN Nausea and/or Vomiting  sodium chloride 0.9% lock flush 10 milliLiter(s) IV Push every 1 hour PRN Pre/post blood products, medications, blood draw, and to maintain line patency    T(C): 36.6 (12-28-23 @ 13:05), Max: 37.4 (12-28-23 @ 09:55)  HR: 66 (12-28-23 @ 13:05) (59 - 101)  BP: 126/44 (12-28-23 @ 13:05) (88/53 - 164/92)  RR: 18 (12-28-23 @ 13:05)  SpO2: 98% (12-28-23 @ 13:05)  Wt(kg): --  I&O's Detail    28 Dec 2023 07:01  -  28 Dec 2023 14:27  --------------------------------------------------------  IN:    Other (mL): 700 mL  Total IN: 700 mL    OUT:  Total OUT: 0 mL    Total NET: 700 mL                PHYSICAL EXAM:  General: No distress, + dialysis catheter  Respiratory: b/l air entry  Cardiovascular: S1 S2  Gastrointestinal: soft  Extremities:  no edema, AVF, no bruit                 LABORATORY:                        8.5    4.40  )-----------( 155      ( 28 Dec 2023 08:09 )             26.3     12-28    136  |  102  |  44<H>  ----------------------------<  73  4.8   |  24  |  5.20<H>    Ca    7.5<L>      28 Dec 2023 08:09  Mg     2.2     12-28    TPro  5.7<L>  /  Alb  3.0<L>  /  TBili  0.5  /  DBili  x   /  AST  18  /  ALT  20  /  AlkPhos  159<H>  12-28    Sodium: 136 mmol/L (12-28 @ 08:09)  Sodium: 136 mmol/L (12-27 @ 08:12)    Potassium: 4.8 mmol/L (12-28 @ 08:09)  Potassium: 4.8 mmol/L (12-27 @ 08:12)    Hemoglobin: 8.5 g/dL (12-28 @ 08:09)  Hemoglobin: 9.3 g/dL (12-27 @ 21:53)  Hemoglobin: 9.9 g/dL (12-27 @ 08:12)  Hemoglobin: 10.1 g/dL (12-26 @ 13:00)    Creatinine, Serum 5.20 (12-28 @ 08:09)  Creatinine, Serum 4.20 (12-27 @ 08:12)  Creatinine, Serum 7.40 (12-26 @ 13:00)        LIVER FUNCTIONS - ( 28 Dec 2023 08:09 )  Alb: 3.0 g/dL / Pro: 5.7 g/dL / ALK PHOS: 159 U/L / ALT: 20 U/L / AST: 18 U/L / GGT: x           Urinalysis Basic - ( 28 Dec 2023 08:09 )    Color: x / Appearance: x / SG: x / pH: x  Gluc: 73 mg/dL / Ketone: x  / Bili: x / Urobili: x   Blood: x / Protein: x / Nitrite: x   Leuk Esterase: x / RBC: x / WBC x   Sq Epi: x / Non Sq Epi: x / Bacteria: x       Patient is a 73y old  Male who presents with a chief complaint of SOB (27 Dec 2023 04:46)    Patient seen in follow up for ESRD on HD.        PAST MEDICAL HISTORY:  ASHD (arteriosclerotic heart disease)    CAD (coronary artery disease)    CRI (chronic renal insufficiency)    Diabetes mellitus type II    Hypertension    Hyperlipidemia    Pacemaker    Chronic atrial fibrillation    Asthma    Hypothyroid    Dementia    H/O carotid stenosis    Enlarged prostate      MEDICATIONS  (STANDING):  apixaban 5 milliGRAM(s) Oral two times a day  brimonidine 0.2% Ophthalmic Solution 1 Drop(s) Both EYES two times a day  budesonide  80 MICROgram(s)/formoterol 4.5 MICROgram(s) Inhaler 2 Puff(s) Inhalation two times a day  calcitriol   Capsule 0.25 MICROGram(s) Oral <User Schedule>  calcium acetate 667 milliGRAM(s) Oral three times a day with meals  dextrose 5%. 1000 milliLiter(s) (100 mL/Hr) IV Continuous <Continuous>  dextrose 5%. 1000 milliLiter(s) (50 mL/Hr) IV Continuous <Continuous>  dextrose 50% Injectable 12.5 Gram(s) IV Push once  dextrose 50% Injectable 25 Gram(s) IV Push once  dextrose 50% Injectable 25 Gram(s) IV Push once  dorzolamide 2%/timolol 0.5% Ophthalmic Solution 1 Drop(s) Both EYES two times a day  epoetin kayla-epbx (RETACRIT) Injectable 36914 Unit(s) IV Push <User Schedule>  finasteride 5 milliGRAM(s) Oral daily  glucagon  Injectable 1 milliGRAM(s) IntraMuscular once  glucagon  Injectable 1 milliGRAM(s) IntraMuscular once  insulin lispro (ADMELOG) corrective regimen sliding scale   SubCutaneous three times a day before meals  insulin lispro (ADMELOG) corrective regimen sliding scale   SubCutaneous at bedtime  latanoprost 0.005% Ophthalmic Solution 1 Drop(s) Both EYES at bedtime  levothyroxine 50 MICROGram(s) Oral daily  lidocaine   4% Patch 1 Patch Transdermal every 24 hours  sertraline 25 milliGRAM(s) Oral at bedtime  sertraline 50 milliGRAM(s) Oral at bedtime  simvastatin 10 milliGRAM(s) Oral at bedtime    MEDICATIONS  (PRN):  acetaminophen     Tablet .. 650 milliGRAM(s) Oral every 6 hours PRN Temp greater or equal to 38C (100.4F), Mild Pain (1 - 3)  aluminum hydroxide/magnesium hydroxide/simethicone Suspension 30 milliLiter(s) Oral every 4 hours PRN Dyspepsia  dextrose Oral Gel 15 Gram(s) Oral once PRN Blood Glucose LESS THAN 70 milliGRAM(s)/deciliter  HYDROmorphone  Injectable 0.25 milliGRAM(s) IV Push every 6 hours PRN Severe Pain (7 - 10)  melatonin 3 milliGRAM(s) Oral at bedtime PRN Insomnia  midodrine. 10 milliGRAM(s) Oral three times a day PRN on HD days for Hypotension  ondansetron Injectable 4 milliGRAM(s) IV Push every 8 hours PRN Nausea and/or Vomiting  sodium chloride 0.9% lock flush 10 milliLiter(s) IV Push every 1 hour PRN Pre/post blood products, medications, blood draw, and to maintain line patency    T(C): 36.6 (12-28-23 @ 13:05), Max: 37.4 (12-28-23 @ 09:55)  HR: 66 (12-28-23 @ 13:05) (59 - 101)  BP: 126/44 (12-28-23 @ 13:05) (88/53 - 164/92)  RR: 18 (12-28-23 @ 13:05)  SpO2: 98% (12-28-23 @ 13:05)  Wt(kg): --  I&O's Detail    28 Dec 2023 07:01  -  28 Dec 2023 14:27  --------------------------------------------------------  IN:    Other (mL): 700 mL  Total IN: 700 mL    OUT:  Total OUT: 0 mL    Total NET: 700 mL                PHYSICAL EXAM:  General: No distress, + dialysis catheter  Respiratory: b/l air entry  Cardiovascular: S1 S2  Gastrointestinal: soft  Extremities:  no edema, AVF, no bruit                 LABORATORY:                        8.5    4.40  )-----------( 155      ( 28 Dec 2023 08:09 )             26.3     12-28    136  |  102  |  44<H>  ----------------------------<  73  4.8   |  24  |  5.20<H>    Ca    7.5<L>      28 Dec 2023 08:09  Mg     2.2     12-28    TPro  5.7<L>  /  Alb  3.0<L>  /  TBili  0.5  /  DBili  x   /  AST  18  /  ALT  20  /  AlkPhos  159<H>  12-28    Sodium: 136 mmol/L (12-28 @ 08:09)  Sodium: 136 mmol/L (12-27 @ 08:12)    Potassium: 4.8 mmol/L (12-28 @ 08:09)  Potassium: 4.8 mmol/L (12-27 @ 08:12)    Hemoglobin: 8.5 g/dL (12-28 @ 08:09)  Hemoglobin: 9.3 g/dL (12-27 @ 21:53)  Hemoglobin: 9.9 g/dL (12-27 @ 08:12)  Hemoglobin: 10.1 g/dL (12-26 @ 13:00)    Creatinine, Serum 5.20 (12-28 @ 08:09)  Creatinine, Serum 4.20 (12-27 @ 08:12)  Creatinine, Serum 7.40 (12-26 @ 13:00)        LIVER FUNCTIONS - ( 28 Dec 2023 08:09 )  Alb: 3.0 g/dL / Pro: 5.7 g/dL / ALK PHOS: 159 U/L / ALT: 20 U/L / AST: 18 U/L / GGT: x           Urinalysis Basic - ( 28 Dec 2023 08:09 )    Color: x / Appearance: x / SG: x / pH: x  Gluc: 73 mg/dL / Ketone: x  / Bili: x / Urobili: x   Blood: x / Protein: x / Nitrite: x   Leuk Esterase: x / RBC: x / WBC x   Sq Epi: x / Non Sq Epi: x / Bacteria: x

## 2023-12-28 NOTE — PROGRESS NOTE ADULT - SUBJECTIVE AND OBJECTIVE BOX
PROGRESS NOTE  Patient is a 73y old  Male who presents with a chief complaint of Acquired arteriovenous fistula     (28 Dec 2023 15:10)    Chart and available morning labs /imaging are reviewed electronically , urgent issues addressed . More information  is being added upon completion of rounds , when more information is collected and management discussed with consultants , medical staff and social service/case management on the floor   OVERNIGHT  HYPOTENSION DURING HD sessionreported by medical staff . All above noted Patient is resting in a bed comfortably .Confused ,poor mentation .No distress noted     HPI:  73-year-old male with past medical history of dementia, COPD, end-stage renal disease, A-fib on anticoagulation, glaucoma, asthma, depression, hypertension, hypocalcemia, hypoparathyroidism was BIB EMS from [place] for the clogged AVF fistula, could not receive HD today.  Last HD was 12/22/23. Pt has had a fall 3 weeks ago which he was seen in the ED for, had CT head, neck and chest-- all unremarkable. Pt c/o mild lower back pain since the fall. (26 Dec 2023 18:31)    PAST MEDICAL & SURGICAL HISTORY:  ASHD (arteriosclerotic heart disease)      CAD (coronary artery disease)  s/p stent, CABG      CRI (chronic renal insufficiency)  ESRD on HD      Diabetes mellitus type II      Hypertension      Hyperlipidemia      Pacemaker      Chronic atrial fibrillation      Asthma      Hypothyroid      Dementia      H/O carotid stenosis      Enlarged prostate      Hx of CABG      Coronary stent      Cardiac pacemaker      History of amputation of toe  right great toe          MEDICATIONS  (STANDING):  apixaban 5 milliGRAM(s) Oral two times a day  brimonidine 0.2% Ophthalmic Solution 1 Drop(s) Both EYES two times a day  budesonide  80 MICROgram(s)/formoterol 4.5 MICROgram(s) Inhaler 2 Puff(s) Inhalation two times a day  calcitriol   Capsule 0.25 MICROGram(s) Oral <User Schedule>  calcium acetate 667 milliGRAM(s) Oral three times a day with meals  dextrose 5%. 1000 milliLiter(s) (100 mL/Hr) IV Continuous <Continuous>  dextrose 5%. 1000 milliLiter(s) (50 mL/Hr) IV Continuous <Continuous>  dextrose 50% Injectable 12.5 Gram(s) IV Push once  dextrose 50% Injectable 25 Gram(s) IV Push once  dextrose 50% Injectable 25 Gram(s) IV Push once  dorzolamide 2%/timolol 0.5% Ophthalmic Solution 1 Drop(s) Both EYES two times a day  epoetin kayla-epbx (RETACRIT) Injectable 63477 Unit(s) IV Push <User Schedule>  finasteride 5 milliGRAM(s) Oral daily  glucagon  Injectable 1 milliGRAM(s) IntraMuscular once  glucagon  Injectable 1 milliGRAM(s) IntraMuscular once  insulin lispro (ADMELOG) corrective regimen sliding scale   SubCutaneous three times a day before meals  insulin lispro (ADMELOG) corrective regimen sliding scale   SubCutaneous at bedtime  latanoprost 0.005% Ophthalmic Solution 1 Drop(s) Both EYES at bedtime  levothyroxine 50 MICROGram(s) Oral daily  lidocaine   4% Patch 1 Patch Transdermal every 24 hours  sertraline 50 milliGRAM(s) Oral at bedtime  sertraline 25 milliGRAM(s) Oral at bedtime  simvastatin 10 milliGRAM(s) Oral at bedtime    MEDICATIONS  (PRN):  acetaminophen     Tablet .. 650 milliGRAM(s) Oral every 6 hours PRN Temp greater or equal to 38C (100.4F), Mild Pain (1 - 3)  aluminum hydroxide/magnesium hydroxide/simethicone Suspension 30 milliLiter(s) Oral every 4 hours PRN Dyspepsia  dextrose Oral Gel 15 Gram(s) Oral once PRN Blood Glucose LESS THAN 70 milliGRAM(s)/deciliter  HYDROmorphone  Injectable 0.25 milliGRAM(s) IV Push every 6 hours PRN Severe Pain (7 - 10)  melatonin 3 milliGRAM(s) Oral at bedtime PRN Insomnia  midodrine. 10 milliGRAM(s) Oral three times a day PRN on HD days for Hypotension  ondansetron Injectable 4 milliGRAM(s) IV Push every 8 hours PRN Nausea and/or Vomiting  sodium chloride 0.9% lock flush 10 milliLiter(s) IV Push every 1 hour PRN Pre/post blood products, medications, blood draw, and to maintain line patency      OBJECTIVE    T(C): 36.6 (12-28-23 @ 13:05), Max: 37.4 (12-28-23 @ 09:55)  HR: 66 (12-28-23 @ 13:05) (66 - 101)  BP: 126/44 (12-28-23 @ 13:05) (88/53 - 131/74)  RR: 18 (12-28-23 @ 13:05) (12 - 18)  SpO2: 98% (12-28-23 @ 13:05) (91% - 100%)  Wt(kg): --  I&O's Summary    28 Dec 2023 07:01  -  28 Dec 2023 17:29  --------------------------------------------------------  IN: 700 mL / OUT: 0 mL / NET: 700 mL          REVIEW OF SYSTEMS:  CONSTITUTIONAL: No fever, weight loss, or fatigue  EYES: No eye pain, visual disturbances, or discharge  ENMT:   No sinus or throat pain  NECK: No pain or stiffness  RESPIRATORY: No cough, wheezing, chills or hemoptysis; No shortness of breath  CARDIOVASCULAR: No chest pain, palpitations, dizziness, or leg swelling  GASTROINTESTINAL: No abdominal pain. No nausea, vomiting; No diarrhea or constipation. No melena or hematochezia.  GENITOURINARY: No dysuria, frequency, hematuria, or incontinence  NEUROLOGICAL: No headaches, memory loss, loss of strength, numbness, or tremors  SKIN: No itching, burning, rashes, or lesions   MUSCULOSKELETAL: No joint pain or swelling; No muscle, back, or extremity pain    PHYSICAL EXAM:  Appearance: NAD. VS past 24 hrs -as above   HEENT:   Moist oral mucosa. Conjunctiva clear b/l.   Neck : supple  Respiratory: Lungs CTAB.  Gastrointestinal:  Soft, nontender. No rebound. No rigidity. BS present	  Cardiovascular: RRR ,S1S2 present  Neurologic: Non-focal. Moving all extremities.  Extremities: No edema. No erythema. No calf tenderness.  Skin: No rashes, No ecchymoses, No cyanosis.	  wounds ,skin lesions-See skin assesment flow sheet   LABS:                        8.5    4.40  )-----------( 155      ( 28 Dec 2023 08:09 )             26.3     12-28    136  |  102  |  44<H>  ----------------------------<  73  4.8   |  24  |  5.20<H>    Ca    7.5<L>      28 Dec 2023 08:09  Mg     2.2     12-28    TPro  5.7<L>  /  Alb  3.0<L>  /  TBili  0.5  /  DBili  x   /  AST  18  /  ALT  20  /  AlkPhos  159<H>  12-28    CAPILLARY BLOOD GLUCOSE      POCT Blood Glucose.: 115 mg/dL (28 Dec 2023 17:15)  POCT Blood Glucose.: 108 mg/dL (28 Dec 2023 12:05)  POCT Blood Glucose.: 91 mg/dL (28 Dec 2023 08:10)  POCT Blood Glucose.: 115 mg/dL (27 Dec 2023 21:09)    PT/INR - ( 27 Dec 2023 08:12 )   PT: 14.1 sec;   INR: 1.21 ratio           Urinalysis Basic - ( 28 Dec 2023 08:09 )    Color: x / Appearance: x / SG: x / pH: x  Gluc: 73 mg/dL / Ketone: x  / Bili: x / Urobili: x   Blood: x / Protein: x / Nitrite: x   Leuk Esterase: x / RBC: x / WBC x   Sq Epi: x / Non Sq Epi: x / Bacteria: x        RADIOLOGY & ADDITIONAL TESTS:   reviewed elctronically  ASSESSMENT/PLAN: 	  25 minutes aggregate time was spent on this visit, 50% visit time spent in care co-ordination with other attendings and counselling patient .I have discussed care plan with patient / HCP/family member ,who expressed understanding of problems treatment and their effect and side effects, alternatives in details. I have asked if they have any questions and concerns and appropriately addressed them to best of my ability.    PROGRESS NOTE  Patient is a 73y old  Male who presents with a chief complaint of Acquired arteriovenous fistula     (28 Dec 2023 15:10)    Chart and available morning labs /imaging are reviewed electronically , urgent issues addressed . More information  is being added upon completion of rounds , when more information is collected and management discussed with consultants , medical staff and social service/case management on the floor   OVERNIGHT  HYPOTENSION DURING HD sessionreported by medical staff . All above noted Patient is resting in a bed comfortably .Confused ,poor mentation .No distress noted     HPI:  73-year-old male with past medical history of dementia, COPD, end-stage renal disease, A-fib on anticoagulation, glaucoma, asthma, depression, hypertension, hypocalcemia, hypoparathyroidism was BIB EMS from [place] for the clogged AVF fistula, could not receive HD today.  Last HD was 12/22/23. Pt has had a fall 3 weeks ago which he was seen in the ED for, had CT head, neck and chest-- all unremarkable. Pt c/o mild lower back pain since the fall. (26 Dec 2023 18:31)    PAST MEDICAL & SURGICAL HISTORY:  ASHD (arteriosclerotic heart disease)      CAD (coronary artery disease)  s/p stent, CABG      CRI (chronic renal insufficiency)  ESRD on HD      Diabetes mellitus type II      Hypertension      Hyperlipidemia      Pacemaker      Chronic atrial fibrillation      Asthma      Hypothyroid      Dementia      H/O carotid stenosis      Enlarged prostate      Hx of CABG      Coronary stent      Cardiac pacemaker      History of amputation of toe  right great toe          MEDICATIONS  (STANDING):  apixaban 5 milliGRAM(s) Oral two times a day  brimonidine 0.2% Ophthalmic Solution 1 Drop(s) Both EYES two times a day  budesonide  80 MICROgram(s)/formoterol 4.5 MICROgram(s) Inhaler 2 Puff(s) Inhalation two times a day  calcitriol   Capsule 0.25 MICROGram(s) Oral <User Schedule>  calcium acetate 667 milliGRAM(s) Oral three times a day with meals  dextrose 5%. 1000 milliLiter(s) (100 mL/Hr) IV Continuous <Continuous>  dextrose 5%. 1000 milliLiter(s) (50 mL/Hr) IV Continuous <Continuous>  dextrose 50% Injectable 12.5 Gram(s) IV Push once  dextrose 50% Injectable 25 Gram(s) IV Push once  dextrose 50% Injectable 25 Gram(s) IV Push once  dorzolamide 2%/timolol 0.5% Ophthalmic Solution 1 Drop(s) Both EYES two times a day  epoetin kayla-epbx (RETACRIT) Injectable 98147 Unit(s) IV Push <User Schedule>  finasteride 5 milliGRAM(s) Oral daily  glucagon  Injectable 1 milliGRAM(s) IntraMuscular once  glucagon  Injectable 1 milliGRAM(s) IntraMuscular once  insulin lispro (ADMELOG) corrective regimen sliding scale   SubCutaneous three times a day before meals  insulin lispro (ADMELOG) corrective regimen sliding scale   SubCutaneous at bedtime  latanoprost 0.005% Ophthalmic Solution 1 Drop(s) Both EYES at bedtime  levothyroxine 50 MICROGram(s) Oral daily  lidocaine   4% Patch 1 Patch Transdermal every 24 hours  sertraline 50 milliGRAM(s) Oral at bedtime  sertraline 25 milliGRAM(s) Oral at bedtime  simvastatin 10 milliGRAM(s) Oral at bedtime    MEDICATIONS  (PRN):  acetaminophen     Tablet .. 650 milliGRAM(s) Oral every 6 hours PRN Temp greater or equal to 38C (100.4F), Mild Pain (1 - 3)  aluminum hydroxide/magnesium hydroxide/simethicone Suspension 30 milliLiter(s) Oral every 4 hours PRN Dyspepsia  dextrose Oral Gel 15 Gram(s) Oral once PRN Blood Glucose LESS THAN 70 milliGRAM(s)/deciliter  HYDROmorphone  Injectable 0.25 milliGRAM(s) IV Push every 6 hours PRN Severe Pain (7 - 10)  melatonin 3 milliGRAM(s) Oral at bedtime PRN Insomnia  midodrine. 10 milliGRAM(s) Oral three times a day PRN on HD days for Hypotension  ondansetron Injectable 4 milliGRAM(s) IV Push every 8 hours PRN Nausea and/or Vomiting  sodium chloride 0.9% lock flush 10 milliLiter(s) IV Push every 1 hour PRN Pre/post blood products, medications, blood draw, and to maintain line patency      OBJECTIVE    T(C): 36.6 (12-28-23 @ 13:05), Max: 37.4 (12-28-23 @ 09:55)  HR: 66 (12-28-23 @ 13:05) (66 - 101)  BP: 126/44 (12-28-23 @ 13:05) (88/53 - 131/74)  RR: 18 (12-28-23 @ 13:05) (12 - 18)  SpO2: 98% (12-28-23 @ 13:05) (91% - 100%)  Wt(kg): --  I&O's Summary    28 Dec 2023 07:01  -  28 Dec 2023 17:29  --------------------------------------------------------  IN: 700 mL / OUT: 0 mL / NET: 700 mL          REVIEW OF SYSTEMS:  CONSTITUTIONAL: No fever, weight loss, or fatigue  EYES: No eye pain, visual disturbances, or discharge  ENMT:   No sinus or throat pain  NECK: No pain or stiffness  RESPIRATORY: No cough, wheezing, chills or hemoptysis; No shortness of breath  CARDIOVASCULAR: No chest pain, palpitations, dizziness, or leg swelling  GASTROINTESTINAL: No abdominal pain. No nausea, vomiting; No diarrhea or constipation. No melena or hematochezia.  GENITOURINARY: No dysuria, frequency, hematuria, or incontinence  NEUROLOGICAL: No headaches, memory loss, loss of strength, numbness, or tremors  SKIN: No itching, burning, rashes, or lesions   MUSCULOSKELETAL: No joint pain or swelling; No muscle, back, or extremity pain    PHYSICAL EXAM:  Appearance: NAD. VS past 24 hrs -as above   HEENT:   Moist oral mucosa. Conjunctiva clear b/l.   Neck : supple  Respiratory: Lungs CTAB.  Gastrointestinal:  Soft, nontender. No rebound. No rigidity. BS present	  Cardiovascular: RRR ,S1S2 present  Neurologic: Non-focal. Moving all extremities.  Extremities: No edema. No erythema. No calf tenderness.  Skin: No rashes, No ecchymoses, No cyanosis.	  wounds ,skin lesions-See skin assesment flow sheet   LABS:                        8.5    4.40  )-----------( 155      ( 28 Dec 2023 08:09 )             26.3     12-28    136  |  102  |  44<H>  ----------------------------<  73  4.8   |  24  |  5.20<H>    Ca    7.5<L>      28 Dec 2023 08:09  Mg     2.2     12-28    TPro  5.7<L>  /  Alb  3.0<L>  /  TBili  0.5  /  DBili  x   /  AST  18  /  ALT  20  /  AlkPhos  159<H>  12-28    CAPILLARY BLOOD GLUCOSE      POCT Blood Glucose.: 115 mg/dL (28 Dec 2023 17:15)  POCT Blood Glucose.: 108 mg/dL (28 Dec 2023 12:05)  POCT Blood Glucose.: 91 mg/dL (28 Dec 2023 08:10)  POCT Blood Glucose.: 115 mg/dL (27 Dec 2023 21:09)    PT/INR - ( 27 Dec 2023 08:12 )   PT: 14.1 sec;   INR: 1.21 ratio           Urinalysis Basic - ( 28 Dec 2023 08:09 )    Color: x / Appearance: x / SG: x / pH: x  Gluc: 73 mg/dL / Ketone: x  / Bili: x / Urobili: x   Blood: x / Protein: x / Nitrite: x   Leuk Esterase: x / RBC: x / WBC x   Sq Epi: x / Non Sq Epi: x / Bacteria: x        RADIOLOGY & ADDITIONAL TESTS:   reviewed elctronically  ASSESSMENT/PLAN: 	  25 minutes aggregate time was spent on this visit, 50% visit time spent in care co-ordination with other attendings and counselling patient .I have discussed care plan with patient / HCP/family member ,who expressed understanding of problems treatment and their effect and side effects, alternatives in details. I have asked if they have any questions and concerns and appropriately addressed them to best of my ability.

## 2023-12-28 NOTE — PROGRESS NOTE ADULT - SUBJECTIVE AND OBJECTIVE BOX
Patient is a 73y old  Male who presents with a chief complaint of SOB (27 Dec 2023 04:46)    Pt reports RUE pain is improved  C/O sore throat and cough this am    S/P RUE AVF thrombectomy and fistulagram  POD#1    MEDICATIONS  (STANDING):  apixaban 5 milliGRAM(s) Oral two times a day  brimonidine 0.2% Ophthalmic Solution 1 Drop(s) Both EYES two times a day  budesonide  80 MICROgram(s)/formoterol 4.5 MICROgram(s) Inhaler 2 Puff(s) Inhalation two times a day  calcitriol   Capsule 0.25 MICROGram(s) Oral <User Schedule>  calcium acetate 667 milliGRAM(s) Oral three times a day with meals  dextrose 5%. 1000 milliLiter(s) (100 mL/Hr) IV Continuous <Continuous>  dextrose 5%. 1000 milliLiter(s) (50 mL/Hr) IV Continuous <Continuous>  dextrose 50% Injectable 12.5 Gram(s) IV Push once  dextrose 50% Injectable 25 Gram(s) IV Push once  dextrose 50% Injectable 25 Gram(s) IV Push once  dorzolamide 2%/timolol 0.5% Ophthalmic Solution 1 Drop(s) Both EYES two times a day  finasteride 5 milliGRAM(s) Oral daily  glucagon  Injectable 1 milliGRAM(s) IntraMuscular once  glucagon  Injectable 1 milliGRAM(s) IntraMuscular once  insulin lispro (ADMELOG) corrective regimen sliding scale   SubCutaneous three times a day before meals  insulin lispro (ADMELOG) corrective regimen sliding scale   SubCutaneous at bedtime  latanoprost 0.005% Ophthalmic Solution 1 Drop(s) Both EYES at bedtime  levothyroxine 50 MICROGram(s) Oral daily  sertraline 25 milliGRAM(s) Oral at bedtime  sertraline 50 milliGRAM(s) Oral at bedtime  simvastatin 10 milliGRAM(s) Oral at bedtime  tamsulosin 0.4 milliGRAM(s) Oral at bedtime    MEDICATIONS  (PRN):  acetaminophen     Tablet .. 650 milliGRAM(s) Oral every 6 hours PRN Temp greater or equal to 38C (100.4F), Mild Pain (1 - 3)  aluminum hydroxide/magnesium hydroxide/simethicone Suspension 30 milliLiter(s) Oral every 4 hours PRN Dyspepsia  dextrose Oral Gel 15 Gram(s) Oral once PRN Blood Glucose LESS THAN 70 milliGRAM(s)/deciliter  melatonin 3 milliGRAM(s) Oral at bedtime PRN Insomnia  midodrine. 10 milliGRAM(s) Oral three times a day PRN on HD days for Hypotension  ondansetron Injectable 4 milliGRAM(s) IV Push every 8 hours PRN Nausea and/or Vomiting  sodium chloride 0.9% lock flush 10 milliLiter(s) IV Push every 1 hour PRN Pre/post blood products, medications, blood draw, and to maintain line patency    Allergies  Levaquin (Anaphylaxis; Flushing; Short breath)      Vital Signs Last 24 Hrs  T(C): 37.4 (28 Dec 2023 09:55), Max: 37.4 (28 Dec 2023 09:55)  T(F): 99.3 (28 Dec 2023 09:55), Max: 99.3 (28 Dec 2023 09:55)  HR: 88 (28 Dec 2023 09:55) (68 - 101)  BP: 115/90 (28 Dec 2023 09:55) (88/53 - 131/74)  BP(mean): --  RR: 18 (28 Dec 2023 09:55) (12 - 18)  SpO2: 95% (28 Dec 2023 09:55) (91% - 100%)    Parameters below as of 28 Dec 2023 09:55  Patient On (Oxygen Delivery Method): room air      I&O's Detail      Physical Exam:  General: NAD, resting comfortably in bed  Pulmonary: Nonlabored breathing, no respiratory distress, essentially CTA  Cardiovascular: Normal S1, S2  Extremities: R AVF + bruit, + thrill. UE WWP, R hand motor and sensory intact. R radial 2+  Neck: R IJ non tunneled HD catheter in place without oozing, hematoma, or ecchymosis    LABS:                        8.5    4.40  )-----------( 155      ( 28 Dec 2023 08:09 )             26.3     12-27    136  |  100  |  36<H>  ----------------------------<  86  4.8   |  29  |  4.20<H>    Ca    8.5      27 Dec 2023 08:12    TPro  6.5  /  Alb  3.2<L>  /  TBili  0.8  /  DBili  x   /  AST  18  /  ALT  37  /  AlkPhos  188<H>  12-27    PT/INR - ( 27 Dec 2023 08:12 )   PT: 14.1 sec;   INR: 1.21 ratio         PTT - ( 26 Dec 2023 13:00 )  PTT:37.9 sec  CAPILLARY BLOOD GLUCOSE  POCT Blood Glucose.: 91 mg/dL (28 Dec 2023 08:10)  POCT Blood Glucose.: 115 mg/dL (27 Dec 2023 21:09)  POCT Blood Glucose.: 137 mg/dL (27 Dec 2023 11:58)    Radiology and Additional Studies:    < from: [facility] Duplex Hemodialysis Access, Right (12.26.23 @ 16:44) >    ACC: [id number] EXAM:  DUPLEX HEMODIALYSIS ACCESS RT   ORDERED BY: PAULA LUNA     PROCEDURE DATE:  12/26/2023          INTERPRETATION:  CLINICAL INDICATION: End-stage renal disease on   hemodialysis, thrombosed AV fistula.    EXAMINATION: Hemodialysis access duplex ultrasound, right    COMPARISON: Noncontrast chest CT 12/2/2023    FINDINGS:    Right upper extremity brachial artery to cephalic vein surgically created   AV fistula. Stent is noted along the outflow cephalic vein.    High resistance antegrade flow of the inflow brachial artery (121 cm/s)   and at the anastomosis (152 cm/s), consistent with thrombosis of the   fistula. Subsequently, there is long segment thrombosis/absent flow   within the body of the right fistula as well as outflow vein stent.    IMPRESSION:    Right upper extremity brachiocephalic fistula with long segment   thrombosis/absent flow within the body of the fistula as well as outflow   vein stent.      < end of copied text >   Patient is a 73y old  Male who presents with a chief complaint of SOB (27 Dec 2023 04:46)    Pt reports RUE pain is improved  C/O sore throat and cough this am    S/P RUE AVF thrombectomy and fistulagram  POD#1    MEDICATIONS  (STANDING):  apixaban 5 milliGRAM(s) Oral two times a day  brimonidine 0.2% Ophthalmic Solution 1 Drop(s) Both EYES two times a day  budesonide  80 MICROgram(s)/formoterol 4.5 MICROgram(s) Inhaler 2 Puff(s) Inhalation two times a day  calcitriol   Capsule 0.25 MICROGram(s) Oral <User Schedule>  calcium acetate 667 milliGRAM(s) Oral three times a day with meals  dextrose 5%. 1000 milliLiter(s) (100 mL/Hr) IV Continuous <Continuous>  dextrose 5%. 1000 milliLiter(s) (50 mL/Hr) IV Continuous <Continuous>  dextrose 50% Injectable 12.5 Gram(s) IV Push once  dextrose 50% Injectable 25 Gram(s) IV Push once  dextrose 50% Injectable 25 Gram(s) IV Push once  dorzolamide 2%/timolol 0.5% Ophthalmic Solution 1 Drop(s) Both EYES two times a day  finasteride 5 milliGRAM(s) Oral daily  glucagon  Injectable 1 milliGRAM(s) IntraMuscular once  glucagon  Injectable 1 milliGRAM(s) IntraMuscular once  insulin lispro (ADMELOG) corrective regimen sliding scale   SubCutaneous three times a day before meals  insulin lispro (ADMELOG) corrective regimen sliding scale   SubCutaneous at bedtime  latanoprost 0.005% Ophthalmic Solution 1 Drop(s) Both EYES at bedtime  levothyroxine 50 MICROGram(s) Oral daily  sertraline 25 milliGRAM(s) Oral at bedtime  sertraline 50 milliGRAM(s) Oral at bedtime  simvastatin 10 milliGRAM(s) Oral at bedtime  tamsulosin 0.4 milliGRAM(s) Oral at bedtime    MEDICATIONS  (PRN):  acetaminophen     Tablet .. 650 milliGRAM(s) Oral every 6 hours PRN Temp greater or equal to 38C (100.4F), Mild Pain (1 - 3)  aluminum hydroxide/magnesium hydroxide/simethicone Suspension 30 milliLiter(s) Oral every 4 hours PRN Dyspepsia  dextrose Oral Gel 15 Gram(s) Oral once PRN Blood Glucose LESS THAN 70 milliGRAM(s)/deciliter  melatonin 3 milliGRAM(s) Oral at bedtime PRN Insomnia  midodrine. 10 milliGRAM(s) Oral three times a day PRN on HD days for Hypotension  ondansetron Injectable 4 milliGRAM(s) IV Push every 8 hours PRN Nausea and/or Vomiting  sodium chloride 0.9% lock flush 10 milliLiter(s) IV Push every 1 hour PRN Pre/post blood products, medications, blood draw, and to maintain line patency    Allergies  Levaquin (Anaphylaxis; Flushing; Short breath)      Vital Signs Last 24 Hrs  T(C): 37.4 (28 Dec 2023 09:55), Max: 37.4 (28 Dec 2023 09:55)  T(F): 99.3 (28 Dec 2023 09:55), Max: 99.3 (28 Dec 2023 09:55)  HR: 88 (28 Dec 2023 09:55) (68 - 101)  BP: 115/90 (28 Dec 2023 09:55) (88/53 - 131/74)  BP(mean): --  RR: 18 (28 Dec 2023 09:55) (12 - 18)  SpO2: 95% (28 Dec 2023 09:55) (91% - 100%)    Parameters below as of 28 Dec 2023 09:55  Patient On (Oxygen Delivery Method): room air      I&O's Detail      Physical Exam:  General: NAD, resting comfortably in bed  Pulmonary: Nonlabored breathing, no respiratory distress, essentially CTA  Cardiovascular: Normal S1, S2  Extremities: R AVF + bruit, + thrill. UE WWP, R hand motor and sensory intact. R radial 2+  Neck: R IJ non tunneled HD catheter in place without oozing, hematoma, or ecchymosis    LABS:                        8.5    4.40  )-----------( 155      ( 28 Dec 2023 08:09 )             26.3     12-27    136  |  100  |  36<H>  ----------------------------<  86  4.8   |  29  |  4.20<H>    Ca    8.5      27 Dec 2023 08:12    TPro  6.5  /  Alb  3.2<L>  /  TBili  0.8  /  DBili  x   /  AST  18  /  ALT  37  /  AlkPhos  188<H>  12-27    PT/INR - ( 27 Dec 2023 08:12 )   PT: 14.1 sec;   INR: 1.21 ratio         PTT - ( 26 Dec 2023 13:00 )  PTT:37.9 sec  CAPILLARY BLOOD GLUCOSE  POCT Blood Glucose.: 91 mg/dL (28 Dec 2023 08:10)  POCT Blood Glucose.: 115 mg/dL (27 Dec 2023 21:09)  POCT Blood Glucose.: 137 mg/dL (27 Dec 2023 11:58)    Radiology and Additional Studies:    < from: [facility] Duplex Hemodialysis Access, Right (12.26.23 @ 16:44) >    ACC: [id number] EXAM:  DUPLEX HEMODIALYSIS ACCESS RT   ORDERED BY: PAULA LUNA     PROCEDURE DATE:  12/26/2023          INTERPRETATION:  CLINICAL INDICATION: End-stage renal disease on   hemodialysis, thrombosed AV fistula.    EXAMINATION: Hemodialysis access duplex ultrasound, right    COMPARISON: Noncontrast chest CT 12/2/2023    FINDINGS:    Right upper extremity brachial artery to cephalic vein surgically created   AV fistula. Stent is noted along the outflow cephalic vein.    High resistance antegrade flow of the inflow brachial artery (121 cm/s)   and at the anastomosis (152 cm/s), consistent with thrombosis of the   fistula. Subsequently, there is long segment thrombosis/absent flow   within the body of the right fistula as well as outflow vein stent.    IMPRESSION:    Right upper extremity brachiocephalic fistula with long segment   thrombosis/absent flow within the body of the fistula as well as outflow   vein stent.      < end of copied text >

## 2023-12-28 NOTE — CHART NOTE - NSCHARTNOTEFT_GEN_A_CORE
Pt seen in HD  RUE AVF cannulated without difficulty  Receiving HD without flow restrictions or alarms    Can dc R IJ Shiley and continue HD via R AVF  Will sign off

## 2023-12-28 NOTE — DIETITIAN INITIAL EVALUATION ADULT - NS FNS DIET ORDER
Diet, Consistent Carbohydrate Renal w/Evening Snack:   Easy to Chew (EASYTOCHEW) (12-27-23 @ 21:33)

## 2023-12-28 NOTE — PROGRESS NOTE ADULT - ASSESSMENT
73y Male S/p fistulogram w/ thrombectomy.    Plan:  - Pain control as needed  - Serial exams  - Pt received Ancef pre-op, spoke with pharmacy, as pt is ESRD, that one dose covers for 24 hours  - Resume Eliquis in AM per attending  - Nephro following for HD plans  - Care per primary team, vascular team to follow up in AM    Surgical Team  Spectralink: Ext: [phone] 73y Male S/p fistulogram w/ thrombectomy.    Plan:  - Pain control as needed  - Serial exams  - Pt received Ancef pre-op, spoke with pharmacy, as pt is ESRD, that one dose covers for 24 hours  - Resume Eliquis in AM per attending  - Nephro following for HD plans  - Care per primary team, vascular team to follow up in AM    Surgical Team  Spectralink: Ext: [phone] 73y Male S/p fistulogram w/ thrombectomy.    Plan:  - Pain control as needed  - Serial exams  - Pt received Ancef pre-op, spoke with pharmacy, as pt is ESRD, that one dose covers for 24 hours  - Resume Eliquis in AM per attending  - Nephro following for HD plans  - Trend H&H  - Care per primary team, vascular team to follow up in AM    Surgical Team  Spectralink: Ext: [phone] 73y Male S/p fistulogram w/ thrombectomy.    Plan:  - Pain control as needed  - Serial exams  - Pt received Ancef pre-op, spoke with pharmacy, as pt is ESRD, that one dose covers for 24 hours  - Resume Eliquis in AM per attending  - Nephro following for HD plans  - Trend H&H  - Care per primary team, vascular team to follow up in AM    Surgical Team  Spectralink: Ext: [phone]

## 2023-12-28 NOTE — PROGRESS NOTE ADULT - ASSESSMENT
74 y/o M admitted with thrombosed R AVF and HD needs  S/P R IJ temp cath placement  Now S/P R AVF thrombectomy        POD #1    HD today via R AVF  Notify vascular surgery team if any issues with HD  R IJ Shiley can be removed if HD successful via R AVF  Cont care as per primary team

## 2023-12-28 NOTE — PROGRESS NOTE ADULT - SUBJECTIVE AND OBJECTIVE BOX
Post Operative Check  Patient: LISA PEARSON 73y (1950) Male   MRN: [id number]    Procedure: S/P fistulogram w/ thrombectomy    Subjective:   Patient seen and examined at bedside.  No events post-operatively. Admits to phlegm/mucus that he is unable to cough up. Patient denies any fevers, chills, chest pain, nausea, vomiting or diarrhea; no pain in either upper extremity, denies paresthesias.    Objective:  Vitals: T(F): 97.7 (12-27-23 @ 22:10), Max: 98 (12-27-23 @ 05:48)  HR: 85 (12-27-23 @ 22:10)  BP: 102/40 (12-27-23 @ 22:10) (96/60 - 119/53)  RR: 14 (12-27-23 @ 22:10)  SpO2: 100% (12-27-23 @ 22:10)  Vent Settings:     In:   12-26-23 @ 07:01  -  12-27-23 @ 07:00  --------------------------------------------------------  IN: 1020 mL      IV Fluids: calcitriol   Capsule 0.25 MICROGram(s) Oral <User Schedule>  calcium acetate 667 milliGRAM(s) Oral three times a day with meals  dextrose 5%. 1000 milliLiter(s) (100 mL/Hr) IV Continuous <Continuous>  dextrose 5%. 1000 milliLiter(s) (50 mL/Hr) IV Continuous <Continuous>    PHYSICAL EXAM:  GENERAL: No acute distress, well-developed  HEAD:  Atraumatic, Normocephalic  CHEST: R temp cath in place, no surrounding erythema  R UPPER EXT: generalized +1 edema in proximal aspect of arm, surgical dressing clean and intact, (+) thrill, radial pulse present; skin warm and dry  NEURO: Grossly intact    Medications: [Standing]  acetaminophen     Tablet .. 650 milliGRAM(s) Oral every 6 hours PRN  aluminum hydroxide/magnesium hydroxide/simethicone Suspension 30 milliLiter(s) Oral every 4 hours PRN  apixaban 5 milliGRAM(s) Oral two times a day  brimonidine 0.2% Ophthalmic Solution 1 Drop(s) Both EYES two times a day  budesonide  80 MICROgram(s)/formoterol 4.5 MICROgram(s) Inhaler 2 Puff(s) Inhalation two times a day  calcitriol   Capsule 0.25 MICROGram(s) Oral <User Schedule>  calcium acetate 667 milliGRAM(s) Oral three times a day with meals  dextrose 5%. 1000 milliLiter(s) IV Continuous <Continuous>  dextrose 5%. 1000 milliLiter(s) IV Continuous <Continuous>  dextrose 50% Injectable 12.5 Gram(s) IV Push once  dextrose 50% Injectable 25 Gram(s) IV Push once  dextrose 50% Injectable 25 Gram(s) IV Push once  dextrose Oral Gel 15 Gram(s) Oral once PRN  dorzolamide 2%/timolol 0.5% Ophthalmic Solution 1 Drop(s) Both EYES two times a day  finasteride 5 milliGRAM(s) Oral daily  glucagon  Injectable 1 milliGRAM(s) IntraMuscular once  glucagon  Injectable 1 milliGRAM(s) IntraMuscular once  insulin lispro (ADMELOG) corrective regimen sliding scale   SubCutaneous three times a day before meals  insulin lispro (ADMELOG) corrective regimen sliding scale   SubCutaneous at bedtime  latanoprost 0.005% Ophthalmic Solution 1 Drop(s) Both EYES at bedtime  levothyroxine 50 MICROGram(s) Oral daily  melatonin 3 milliGRAM(s) Oral at bedtime PRN  ondansetron Injectable 4 milliGRAM(s) IV Push every 8 hours PRN  sertraline 25 milliGRAM(s) Oral at bedtime  sertraline 50 milliGRAM(s) Oral at bedtime  simvastatin 10 milliGRAM(s) Oral at bedtime  sodium chloride 0.9% lock flush 10 milliLiter(s) IV Push every 1 hour PRN  tamsulosin 0.4 milliGRAM(s) Oral at bedtime    Medications: [PRN]  acetaminophen     Tablet .. 650 milliGRAM(s) Oral every 6 hours PRN  aluminum hydroxide/magnesium hydroxide/simethicone Suspension 30 milliLiter(s) Oral every 4 hours PRN  apixaban 5 milliGRAM(s) Oral two times a day  brimonidine 0.2% Ophthalmic Solution 1 Drop(s) Both EYES two times a day  budesonide  80 MICROgram(s)/formoterol 4.5 MICROgram(s) Inhaler 2 Puff(s) Inhalation two times a day  calcitriol   Capsule 0.25 MICROGram(s) Oral <User Schedule>  calcium acetate 667 milliGRAM(s) Oral three times a day with meals  dextrose 5%. 1000 milliLiter(s) IV Continuous <Continuous>  dextrose 5%. 1000 milliLiter(s) IV Continuous <Continuous>  dextrose 50% Injectable 12.5 Gram(s) IV Push once  dextrose 50% Injectable 25 Gram(s) IV Push once  dextrose 50% Injectable 25 Gram(s) IV Push once  dextrose Oral Gel 15 Gram(s) Oral once PRN  dorzolamide 2%/timolol 0.5% Ophthalmic Solution 1 Drop(s) Both EYES two times a day  finasteride 5 milliGRAM(s) Oral daily  glucagon  Injectable 1 milliGRAM(s) IntraMuscular once  glucagon  Injectable 1 milliGRAM(s) IntraMuscular once  insulin lispro (ADMELOG) corrective regimen sliding scale   SubCutaneous three times a day before meals  insulin lispro (ADMELOG) corrective regimen sliding scale   SubCutaneous at bedtime  latanoprost 0.005% Ophthalmic Solution 1 Drop(s) Both EYES at bedtime  levothyroxine 50 MICROGram(s) Oral daily  melatonin 3 milliGRAM(s) Oral at bedtime PRN  ondansetron Injectable 4 milliGRAM(s) IV Push every 8 hours PRN  sertraline 50 milliGRAM(s) Oral at bedtime  sertraline 25 milliGRAM(s) Oral at bedtime  simvastatin 10 milliGRAM(s) Oral at bedtime  sodium chloride 0.9% lock flush 10 milliLiter(s) IV Push every 1 hour PRN  tamsulosin 0.4 milliGRAM(s) Oral at bedtime    Labs:                        9.3    8.90  )-----------( 145      ( 27 Dec 2023 21:53 )             28.6     12-27    136  |  100  |  36<H>  ----------------------------<  86  4.8   |  29  |  4.20<H>    Ca    8.5      27 Dec 2023 08:12    TPro  6.5  /  Alb  3.2<L>  /  TBili  0.8  /  DBili  x   /  AST  18  /  ALT  37  /  AlkPhos  188<H>  12-27    PT/INR - ( 27 Dec 2023 08:12 )   PT: 14.1 sec;   INR: 1.21 ratio    PTT - ( 26 Dec 2023 13:00 )  PTT:37.9 sec    Imaging:  No post-op imaging studies   Post Operative Check  Patient: LISA PEARSON 73y (1950) Male   MRN: [id number]    Procedure: S/P fistulogram w/ thrombectomy    Subjective:   Patient seen and examined at bedside.  No events post-operatively. Admits to phlegm/mucus that he is unable to cough up. Patient denies any fevers, chills, chest pain, nausea, vomiting or diarrhea; no pain in either upper extremity, denies paresthesias.    Objective:  Vitals: T(F): 97.7 (12-27-23 @ 22:10), Max: 98 (12-27-23 @ 05:48)  HR: 85 (12-27-23 @ 22:10)  BP: 102/40 (12-27-23 @ 22:10) (96/60 - 119/53)  RR: 14 (12-27-23 @ 22:10)  SpO2: 100% (12-27-23 @ 22:10)  Vent Settings:     In:   12-26-23 @ 07:01  -  12-27-23 @ 07:00  --------------------------------------------------------  IN: 1020 mL      IV Fluids: calcitriol   Capsule 0.25 MICROGram(s) Oral <User Schedule>  calcium acetate 667 milliGRAM(s) Oral three times a day with meals  dextrose 5%. 1000 milliLiter(s) (100 mL/Hr) IV Continuous <Continuous>  dextrose 5%. 1000 milliLiter(s) (50 mL/Hr) IV Continuous <Continuous>    PHYSICAL EXAM:  GENERAL: No acute distress, well-developed  HEAD:  Atraumatic, Normocephalic  CHEST: R temp cath in place, no surrounding erythema  R UPPER EXT: generalized +1 edema in proximal aspect of arm, surgical dressing clean and intact, (+) thrill, radial pulse present; skin warm and dry  NEURO: Grossly intact    Medications: [Standing]  acetaminophen     Tablet .. 650 milliGRAM(s) Oral every 6 hours PRN  aluminum hydroxide/magnesium hydroxide/simethicone Suspension 30 milliLiter(s) Oral every 4 hours PRN  apixaban 5 milliGRAM(s) Oral two times a day  brimonidine 0.2% Ophthalmic Solution 1 Drop(s) Both EYES two times a day  budesonide  80 MICROgram(s)/formoterol 4.5 MICROgram(s) Inhaler 2 Puff(s) Inhalation two times a day  calcitriol   Capsule 0.25 MICROGram(s) Oral <User Schedule>  calcium acetate 667 milliGRAM(s) Oral three times a day with meals  dextrose 5%. 1000 milliLiter(s) IV Continuous <Continuous>  dextrose 5%. 1000 milliLiter(s) IV Continuous <Continuous>  dextrose 50% Injectable 12.5 Gram(s) IV Push once  dextrose 50% Injectable 25 Gram(s) IV Push once  dextrose 50% Injectable 25 Gram(s) IV Push once  dextrose Oral Gel 15 Gram(s) Oral once PRN  dorzolamide 2%/timolol 0.5% Ophthalmic Solution 1 Drop(s) Both EYES two times a day  finasteride 5 milliGRAM(s) Oral daily  glucagon  Injectable 1 milliGRAM(s) IntraMuscular once  glucagon  Injectable 1 milliGRAM(s) IntraMuscular once  insulin lispro (ADMELOG) corrective regimen sliding scale   SubCutaneous three times a day before meals  insulin lispro (ADMELOG) corrective regimen sliding scale   SubCutaneous at bedtime  latanoprost 0.005% Ophthalmic Solution 1 Drop(s) Both EYES at bedtime  levothyroxine 50 MICROGram(s) Oral daily  melatonin 3 milliGRAM(s) Oral at bedtime PRN  ondansetron Injectable 4 milliGRAM(s) IV Push every 8 hours PRN  sertraline 25 milliGRAM(s) Oral at bedtime  sertraline 50 milliGRAM(s) Oral at bedtime  simvastatin 10 milliGRAM(s) Oral at bedtime  sodium chloride 0.9% lock flush 10 milliLiter(s) IV Push every 1 hour PRN  tamsulosin 0.4 milliGRAM(s) Oral at bedtime    Medications: [PRN]  acetaminophen     Tablet .. 650 milliGRAM(s) Oral every 6 hours PRN  aluminum hydroxide/magnesium hydroxide/simethicone Suspension 30 milliLiter(s) Oral every 4 hours PRN  apixaban 5 milliGRAM(s) Oral two times a day  brimonidine 0.2% Ophthalmic Solution 1 Drop(s) Both EYES two times a day  budesonide  80 MICROgram(s)/formoterol 4.5 MICROgram(s) Inhaler 2 Puff(s) Inhalation two times a day  calcitriol   Capsule 0.25 MICROGram(s) Oral <User Schedule>  calcium acetate 667 milliGRAM(s) Oral three times a day with meals  dextrose 5%. 1000 milliLiter(s) IV Continuous <Continuous>  dextrose 5%. 1000 milliLiter(s) IV Continuous <Continuous>  dextrose 50% Injectable 12.5 Gram(s) IV Push once  dextrose 50% Injectable 25 Gram(s) IV Push once  dextrose 50% Injectable 25 Gram(s) IV Push once  dextrose Oral Gel 15 Gram(s) Oral once PRN  dorzolamide 2%/timolol 0.5% Ophthalmic Solution 1 Drop(s) Both EYES two times a day  finasteride 5 milliGRAM(s) Oral daily  glucagon  Injectable 1 milliGRAM(s) IntraMuscular once  glucagon  Injectable 1 milliGRAM(s) IntraMuscular once  insulin lispro (ADMELOG) corrective regimen sliding scale   SubCutaneous three times a day before meals  insulin lispro (ADMELOG) corrective regimen sliding scale   SubCutaneous at bedtime  latanoprost 0.005% Ophthalmic Solution 1 Drop(s) Both EYES at bedtime  levothyroxine 50 MICROGram(s) Oral daily  melatonin 3 milliGRAM(s) Oral at bedtime PRN  ondansetron Injectable 4 milliGRAM(s) IV Push every 8 hours PRN  sertraline 50 milliGRAM(s) Oral at bedtime  sertraline 25 milliGRAM(s) Oral at bedtime  simvastatin 10 milliGRAM(s) Oral at bedtime  sodium chloride 0.9% lock flush 10 milliLiter(s) IV Push every 1 hour PRN  tamsulosin 0.4 milliGRAM(s) Oral at bedtime    Labs:                        9.3    8.90  )-----------( 145      ( 27 Dec 2023 21:53 )             28.6     12-27    136  |  100  |  36<H>  ----------------------------<  86  4.8   |  29  |  4.20<H>    Ca    8.5      27 Dec 2023 08:12    TPro  6.5  /  Alb  3.2<L>  /  TBili  0.8  /  DBili  x   /  AST  18  /  ALT  37  /  AlkPhos  188<H>  12-27    PT/INR - ( 27 Dec 2023 08:12 )   PT: 14.1 sec;   INR: 1.21 ratio    PTT - ( 26 Dec 2023 13:00 )  PTT:37.9 sec    Imaging:  No post-op imaging studies

## 2023-12-28 NOTE — DIETITIAN INITIAL EVALUATION ADULT - OTHER INFO
72 y/o male adm from [facility] with clogged AVF. PMH enlarged prostate, carotid stenosis, dementia, hypothyroidism, asthma, chronic afib, CRI, CAD, PPM, HLD, HTN, DM type 2, ASHD, ESRD on HD. Pt visited at bedside this morning. Pt reports, appetite is good, tolerating diet well, no complaints. Pt able to tolerate easy to chew diet well. Pt reports that he is missing teeth. Pt s/p RUE fistulogram with thrombectomy on 12/28. Pt s/p HD today. As per HD note, unable to remove fluid. Wt prior to HD was 164.6#, after HD was 164.6#. ? dry wt is 152#. Low H/H and elevated MCV c/w ESRD. Pt wants to go home.  74 y/o male adm from [facility] with clogged AVF. PMH enlarged prostate, carotid stenosis, dementia, hypothyroidism, asthma, chronic afib, CRI, CAD, PPM, HLD, HTN, DM type 2, ASHD, ESRD on HD. Pt visited at bedside this morning. Pt reports, appetite is good, tolerating diet well, no complaints. Pt able to tolerate easy to chew diet well. Pt reports that he is missing teeth. Pt s/p RUE fistulogram with thrombectomy on 12/28. Pt s/p HD today. As per HD note, unable to remove fluid. Wt prior to HD was 164.6#, after HD was 164.6#. ? dry wt is 152#. Low H/H and elevated MCV c/w ESRD. Pt wants to go home.

## 2023-12-28 NOTE — DIETITIAN INITIAL EVALUATION ADULT - PERTINENT MEDS FT
MEDICATIONS  (STANDING):  apixaban 5 milliGRAM(s) Oral two times a day  brimonidine 0.2% Ophthalmic Solution 1 Drop(s) Both EYES two times a day  budesonide  80 MICROgram(s)/formoterol 4.5 MICROgram(s) Inhaler 2 Puff(s) Inhalation two times a day  calcitriol   Capsule 0.25 MICROGram(s) Oral <User Schedule>  calcium acetate 667 milliGRAM(s) Oral three times a day with meals  dextrose 5%. 1000 milliLiter(s) (100 mL/Hr) IV Continuous <Continuous>  dextrose 5%. 1000 milliLiter(s) (50 mL/Hr) IV Continuous <Continuous>  dextrose 50% Injectable 25 Gram(s) IV Push once  dextrose 50% Injectable 12.5 Gram(s) IV Push once  dextrose 50% Injectable 25 Gram(s) IV Push once  dorzolamide 2%/timolol 0.5% Ophthalmic Solution 1 Drop(s) Both EYES two times a day  epoetin kayla-epbx (RETACRIT) Injectable 83471 Unit(s) IV Push <User Schedule>  finasteride 5 milliGRAM(s) Oral daily  glucagon  Injectable 1 milliGRAM(s) IntraMuscular once  glucagon  Injectable 1 milliGRAM(s) IntraMuscular once  insulin lispro (ADMELOG) corrective regimen sliding scale   SubCutaneous at bedtime  insulin lispro (ADMELOG) corrective regimen sliding scale   SubCutaneous three times a day before meals  latanoprost 0.005% Ophthalmic Solution 1 Drop(s) Both EYES at bedtime  levothyroxine 50 MICROGram(s) Oral daily  lidocaine   4% Patch 1 Patch Transdermal every 24 hours  sertraline 25 milliGRAM(s) Oral at bedtime  sertraline 50 milliGRAM(s) Oral at bedtime  simvastatin 10 milliGRAM(s) Oral at bedtime    MEDICATIONS  (PRN):  acetaminophen     Tablet .. 650 milliGRAM(s) Oral every 6 hours PRN Temp greater or equal to 38C (100.4F), Mild Pain (1 - 3)  aluminum hydroxide/magnesium hydroxide/simethicone Suspension 30 milliLiter(s) Oral every 4 hours PRN Dyspepsia  dextrose Oral Gel 15 Gram(s) Oral once PRN Blood Glucose LESS THAN 70 milliGRAM(s)/deciliter  HYDROmorphone  Injectable 0.25 milliGRAM(s) IV Push every 6 hours PRN Severe Pain (7 - 10)  melatonin 3 milliGRAM(s) Oral at bedtime PRN Insomnia  midodrine. 10 milliGRAM(s) Oral three times a day PRN on HD days for Hypotension  ondansetron Injectable 4 milliGRAM(s) IV Push every 8 hours PRN Nausea and/or Vomiting  sodium chloride 0.9% lock flush 10 milliLiter(s) IV Push every 1 hour PRN Pre/post blood products, medications, blood draw, and to maintain line patency   MEDICATIONS  (STANDING):  apixaban 5 milliGRAM(s) Oral two times a day  brimonidine 0.2% Ophthalmic Solution 1 Drop(s) Both EYES two times a day  budesonide  80 MICROgram(s)/formoterol 4.5 MICROgram(s) Inhaler 2 Puff(s) Inhalation two times a day  calcitriol   Capsule 0.25 MICROGram(s) Oral <User Schedule>  calcium acetate 667 milliGRAM(s) Oral three times a day with meals  dextrose 5%. 1000 milliLiter(s) (100 mL/Hr) IV Continuous <Continuous>  dextrose 5%. 1000 milliLiter(s) (50 mL/Hr) IV Continuous <Continuous>  dextrose 50% Injectable 25 Gram(s) IV Push once  dextrose 50% Injectable 12.5 Gram(s) IV Push once  dextrose 50% Injectable 25 Gram(s) IV Push once  dorzolamide 2%/timolol 0.5% Ophthalmic Solution 1 Drop(s) Both EYES two times a day  epoetin kayla-epbx (RETACRIT) Injectable 39099 Unit(s) IV Push <User Schedule>  finasteride 5 milliGRAM(s) Oral daily  glucagon  Injectable 1 milliGRAM(s) IntraMuscular once  glucagon  Injectable 1 milliGRAM(s) IntraMuscular once  insulin lispro (ADMELOG) corrective regimen sliding scale   SubCutaneous at bedtime  insulin lispro (ADMELOG) corrective regimen sliding scale   SubCutaneous three times a day before meals  latanoprost 0.005% Ophthalmic Solution 1 Drop(s) Both EYES at bedtime  levothyroxine 50 MICROGram(s) Oral daily  lidocaine   4% Patch 1 Patch Transdermal every 24 hours  sertraline 25 milliGRAM(s) Oral at bedtime  sertraline 50 milliGRAM(s) Oral at bedtime  simvastatin 10 milliGRAM(s) Oral at bedtime    MEDICATIONS  (PRN):  acetaminophen     Tablet .. 650 milliGRAM(s) Oral every 6 hours PRN Temp greater or equal to 38C (100.4F), Mild Pain (1 - 3)  aluminum hydroxide/magnesium hydroxide/simethicone Suspension 30 milliLiter(s) Oral every 4 hours PRN Dyspepsia  dextrose Oral Gel 15 Gram(s) Oral once PRN Blood Glucose LESS THAN 70 milliGRAM(s)/deciliter  HYDROmorphone  Injectable 0.25 milliGRAM(s) IV Push every 6 hours PRN Severe Pain (7 - 10)  melatonin 3 milliGRAM(s) Oral at bedtime PRN Insomnia  midodrine. 10 milliGRAM(s) Oral three times a day PRN on HD days for Hypotension  ondansetron Injectable 4 milliGRAM(s) IV Push every 8 hours PRN Nausea and/or Vomiting  sodium chloride 0.9% lock flush 10 milliLiter(s) IV Push every 1 hour PRN Pre/post blood products, medications, blood draw, and to maintain line patency

## 2023-12-29 ENCOUNTER — TRANSCRIPTION ENCOUNTER (OUTPATIENT)
Age: 73
End: 2023-12-29

## 2023-12-29 DIAGNOSIS — M25.512 PAIN IN LEFT SHOULDER: ICD-10-CM

## 2023-12-29 RX ORDER — AMIODARONE HYDROCHLORIDE 400 MG/1
200 TABLET ORAL DAILY
Refills: 0 | Status: DISCONTINUED | OUTPATIENT
Start: 2023-12-29 | End: 2023-12-30

## 2023-12-29 RX ORDER — TRAMADOL HYDROCHLORIDE 50 MG/1
50 TABLET ORAL THREE TIMES A DAY
Refills: 0 | Status: DISCONTINUED | OUTPATIENT
Start: 2023-12-29 | End: 2023-12-30

## 2023-12-29 RX ORDER — ACETAMINOPHEN 500 MG
1000 TABLET ORAL ONCE
Refills: 0 | Status: COMPLETED | OUTPATIENT
Start: 2023-12-29 | End: 2023-12-29

## 2023-12-29 RX ADMIN — AMIODARONE HYDROCHLORIDE 200 MILLIGRAM(S): 400 TABLET ORAL at 05:24

## 2023-12-29 RX ADMIN — Medication 1000 MILLIGRAM(S): at 16:14

## 2023-12-29 RX ADMIN — APIXABAN 5 MILLIGRAM(S): 2.5 TABLET, FILM COATED ORAL at 18:26

## 2023-12-29 RX ADMIN — TRAMADOL HYDROCHLORIDE 50 MILLIGRAM(S): 50 TABLET ORAL at 14:07

## 2023-12-29 RX ADMIN — BRIMONIDINE TARTRATE 1 DROP(S): 2 SOLUTION/ DROPS OPHTHALMIC at 18:31

## 2023-12-29 RX ADMIN — LIDOCAINE 1 PATCH: 4 CREAM TOPICAL at 19:22

## 2023-12-29 RX ADMIN — DORZOLAMIDE HYDROCHLORIDE TIMOLOL MALEATE 1 DROP(S): 20; 5 SOLUTION/ DROPS OPHTHALMIC at 05:26

## 2023-12-29 RX ADMIN — LATANOPROST 1 DROP(S): 0.05 SOLUTION/ DROPS OPHTHALMIC; TOPICAL at 21:48

## 2023-12-29 RX ADMIN — HYDROMORPHONE HYDROCHLORIDE 0.25 MILLIGRAM(S): 2 INJECTION INTRAMUSCULAR; INTRAVENOUS; SUBCUTANEOUS at 12:23

## 2023-12-29 RX ADMIN — LIDOCAINE 1 PATCH: 4 CREAM TOPICAL at 02:00

## 2023-12-29 RX ADMIN — LIDOCAINE 1 PATCH: 4 CREAM TOPICAL at 23:48

## 2023-12-29 RX ADMIN — TRAMADOL HYDROCHLORIDE 50 MILLIGRAM(S): 50 TABLET ORAL at 15:31

## 2023-12-29 RX ADMIN — Medication 50 MICROGRAM(S): at 05:24

## 2023-12-29 RX ADMIN — FINASTERIDE 5 MILLIGRAM(S): 5 TABLET, FILM COATED ORAL at 11:43

## 2023-12-29 RX ADMIN — SIMVASTATIN 10 MILLIGRAM(S): 20 TABLET, FILM COATED ORAL at 21:48

## 2023-12-29 RX ADMIN — Medication 667 MILLIGRAM(S): at 18:26

## 2023-12-29 RX ADMIN — APIXABAN 5 MILLIGRAM(S): 2.5 TABLET, FILM COATED ORAL at 05:24

## 2023-12-29 RX ADMIN — Medication 400 MILLIGRAM(S): at 16:07

## 2023-12-29 RX ADMIN — HYDROMORPHONE HYDROCHLORIDE 0.25 MILLIGRAM(S): 2 INJECTION INTRAMUSCULAR; INTRAVENOUS; SUBCUTANEOUS at 11:41

## 2023-12-29 RX ADMIN — BRIMONIDINE TARTRATE 1 DROP(S): 2 SOLUTION/ DROPS OPHTHALMIC at 05:25

## 2023-12-29 RX ADMIN — LIDOCAINE 1 PATCH: 4 CREAM TOPICAL at 11:51

## 2023-12-29 RX ADMIN — Medication 667 MILLIGRAM(S): at 08:20

## 2023-12-29 RX ADMIN — SERTRALINE 25 MILLIGRAM(S): 25 TABLET, FILM COATED ORAL at 21:47

## 2023-12-29 RX ADMIN — DORZOLAMIDE HYDROCHLORIDE TIMOLOL MALEATE 1 DROP(S): 20; 5 SOLUTION/ DROPS OPHTHALMIC at 18:32

## 2023-12-29 RX ADMIN — CALCITRIOL 0.25 MICROGRAM(S): 0.5 CAPSULE ORAL at 08:23

## 2023-12-29 RX ADMIN — Medication 3 MILLIGRAM(S): at 22:13

## 2023-12-29 RX ADMIN — BUDESONIDE AND FORMOTEROL FUMARATE DIHYDRATE 2 PUFF(S): 160; 4.5 AEROSOL RESPIRATORY (INHALATION) at 05:26

## 2023-12-29 RX ADMIN — BUDESONIDE AND FORMOTEROL FUMARATE DIHYDRATE 2 PUFF(S): 160; 4.5 AEROSOL RESPIRATORY (INHALATION) at 18:24

## 2023-12-29 RX ADMIN — ONDANSETRON 4 MILLIGRAM(S): 8 TABLET, FILM COATED ORAL at 22:13

## 2023-12-29 RX ADMIN — HYDROMORPHONE HYDROCHLORIDE 0.25 MILLIGRAM(S): 2 INJECTION INTRAMUSCULAR; INTRAVENOUS; SUBCUTANEOUS at 05:58

## 2023-12-29 RX ADMIN — SERTRALINE 50 MILLIGRAM(S): 25 TABLET, FILM COATED ORAL at 21:47

## 2023-12-29 RX ADMIN — Medication 667 MILLIGRAM(S): at 11:43

## 2023-12-29 RX ADMIN — HYDROMORPHONE HYDROCHLORIDE 0.25 MILLIGRAM(S): 2 INJECTION INTRAMUSCULAR; INTRAVENOUS; SUBCUTANEOUS at 06:10

## 2023-12-29 NOTE — PROGRESS NOTE ADULT - SUBJECTIVE AND OBJECTIVE BOX
Patient is a 73y old  Male who presents with a chief complaint of SOB (27 Dec 2023 04:46)    Patient seen in follow up for ESRD on HD.        PAST MEDICAL HISTORY:  ASHD (arteriosclerotic heart disease)    CAD (coronary artery disease)    CRI (chronic renal insufficiency)    Diabetes mellitus type II    Hypertension    Hyperlipidemia    Pacemaker    Chronic atrial fibrillation    Asthma    Hypothyroid    Dementia    H/O carotid stenosis    Enlarged prostate      MEDICATIONS  (STANDING):  aMIOdarone    Tablet 200 milliGRAM(s) Oral daily  apixaban 5 milliGRAM(s) Oral two times a day  brimonidine 0.2% Ophthalmic Solution 1 Drop(s) Both EYES two times a day  budesonide  80 MICROgram(s)/formoterol 4.5 MICROgram(s) Inhaler 2 Puff(s) Inhalation two times a day  calcitriol   Capsule 0.25 MICROGram(s) Oral <User Schedule>  calcium acetate 667 milliGRAM(s) Oral three times a day with meals  dextrose 5%. 1000 milliLiter(s) (100 mL/Hr) IV Continuous <Continuous>  dextrose 5%. 1000 milliLiter(s) (50 mL/Hr) IV Continuous <Continuous>  dextrose 50% Injectable 12.5 Gram(s) IV Push once  dextrose 50% Injectable 25 Gram(s) IV Push once  dextrose 50% Injectable 25 Gram(s) IV Push once  dorzolamide 2%/timolol 0.5% Ophthalmic Solution 1 Drop(s) Both EYES two times a day  epoetin kayla-epbx (RETACRIT) Injectable 85284 Unit(s) IV Push <User Schedule>  finasteride 5 milliGRAM(s) Oral daily  glucagon  Injectable 1 milliGRAM(s) IntraMuscular once  glucagon  Injectable 1 milliGRAM(s) IntraMuscular once  insulin lispro (ADMELOG) corrective regimen sliding scale   SubCutaneous three times a day before meals  insulin lispro (ADMELOG) corrective regimen sliding scale   SubCutaneous at bedtime  latanoprost 0.005% Ophthalmic Solution 1 Drop(s) Both EYES at bedtime  levothyroxine 50 MICROGram(s) Oral daily  lidocaine   4% Patch 1 Patch Transdermal every 24 hours  sertraline 50 milliGRAM(s) Oral at bedtime  sertraline 25 milliGRAM(s) Oral at bedtime  simvastatin 10 milliGRAM(s) Oral at bedtime    MEDICATIONS  (PRN):  acetaminophen     Tablet .. 650 milliGRAM(s) Oral every 6 hours PRN Temp greater or equal to 38C (100.4F), Mild Pain (1 - 3)  aluminum hydroxide/magnesium hydroxide/simethicone Suspension 30 milliLiter(s) Oral every 4 hours PRN Dyspepsia  dextrose Oral Gel 15 Gram(s) Oral once PRN Blood Glucose LESS THAN 70 milliGRAM(s)/deciliter  HYDROmorphone  Injectable 0.25 milliGRAM(s) IV Push every 6 hours PRN Severe Pain (7 - 10)  melatonin 3 milliGRAM(s) Oral at bedtime PRN Insomnia  midodrine. 10 milliGRAM(s) Oral three times a day PRN on HD days for Hypotension  ondansetron Injectable 4 milliGRAM(s) IV Push every 8 hours PRN Nausea and/or Vomiting  sodium chloride 0.9% lock flush 10 milliLiter(s) IV Push every 1 hour PRN Pre/post blood products, medications, blood draw, and to maintain line patency  traMADol 50 milliGRAM(s) Oral three times a day PRN Moderate Pain (4 - 6)    T(C): 37.1 (12-29-23 @ 12:10), Max: 37.4 (12-28-23 @ 09:55)  HR: 80 (12-29-23 @ 12:10) (66 - 101)  BP: 87/59 (12-29-23 @ 12:10) (87/59 - 131/74)  RR: 17 (12-29-23 @ 12:10)  SpO2: 94% (12-29-23 @ 12:10)  Wt(kg): --  I&O's Detail    28 Dec 2023 07:01  -  29 Dec 2023 07:00  --------------------------------------------------------  IN:    Other (mL): 700 mL  Total IN: 700 mL    OUT:  Total OUT: 0 mL    Total NET: 700 mL              PHYSICAL EXAM:  General: No distress, + dialysis catheter  Respiratory: b/l air entry  Cardiovascular: S1 S2  Gastrointestinal: soft  Extremities:  no edema, AVF, no bruit              LABORATORY:                        8.5    4.40  )-----------( 155      ( 28 Dec 2023 08:09 )             26.3     12-28    136  |  102  |  44<H>  ----------------------------<  73  4.8   |  24  |  5.20<H>    Ca    7.5<L>      28 Dec 2023 08:09  Mg     2.2     12-28    TPro  5.7<L>  /  Alb  3.0<L>  /  TBili  0.5  /  DBili  x   /  AST  18  /  ALT  20  /  AlkPhos  159<H>  12-28    Sodium: 136 mmol/L (12-28 @ 08:09)    Potassium: 4.8 mmol/L (12-28 @ 08:09)    Hemoglobin: 8.5 g/dL (12-28 @ 08:09)  Hemoglobin: 9.3 g/dL (12-27 @ 21:53)  Hemoglobin: 9.9 g/dL (12-27 @ 08:12)    Creatinine, Serum 5.20 (12-28 @ 08:09)  Creatinine, Serum 4.20 (12-27 @ 08:12)        LIVER FUNCTIONS - ( 28 Dec 2023 08:09 )  Alb: 3.0 g/dL / Pro: 5.7 g/dL / ALK PHOS: 159 U/L / ALT: 20 U/L / AST: 18 U/L / GGT: x           Urinalysis Basic - ( 28 Dec 2023 08:09 )    Color: x / Appearance: x / SG: x / pH: x  Gluc: 73 mg/dL / Ketone: x  / Bili: x / Urobili: x   Blood: x / Protein: x / Nitrite: x   Leuk Esterase: x / RBC: x / WBC x   Sq Epi: x / Non Sq Epi: x / Bacteria: x       Patient is a 73y old  Male who presents with a chief complaint of SOB (27 Dec 2023 04:46)    Patient seen in follow up for ESRD on HD.        PAST MEDICAL HISTORY:  ASHD (arteriosclerotic heart disease)    CAD (coronary artery disease)    CRI (chronic renal insufficiency)    Diabetes mellitus type II    Hypertension    Hyperlipidemia    Pacemaker    Chronic atrial fibrillation    Asthma    Hypothyroid    Dementia    H/O carotid stenosis    Enlarged prostate      MEDICATIONS  (STANDING):  aMIOdarone    Tablet 200 milliGRAM(s) Oral daily  apixaban 5 milliGRAM(s) Oral two times a day  brimonidine 0.2% Ophthalmic Solution 1 Drop(s) Both EYES two times a day  budesonide  80 MICROgram(s)/formoterol 4.5 MICROgram(s) Inhaler 2 Puff(s) Inhalation two times a day  calcitriol   Capsule 0.25 MICROGram(s) Oral <User Schedule>  calcium acetate 667 milliGRAM(s) Oral three times a day with meals  dextrose 5%. 1000 milliLiter(s) (100 mL/Hr) IV Continuous <Continuous>  dextrose 5%. 1000 milliLiter(s) (50 mL/Hr) IV Continuous <Continuous>  dextrose 50% Injectable 12.5 Gram(s) IV Push once  dextrose 50% Injectable 25 Gram(s) IV Push once  dextrose 50% Injectable 25 Gram(s) IV Push once  dorzolamide 2%/timolol 0.5% Ophthalmic Solution 1 Drop(s) Both EYES two times a day  epoetin kayla-epbx (RETACRIT) Injectable 92137 Unit(s) IV Push <User Schedule>  finasteride 5 milliGRAM(s) Oral daily  glucagon  Injectable 1 milliGRAM(s) IntraMuscular once  glucagon  Injectable 1 milliGRAM(s) IntraMuscular once  insulin lispro (ADMELOG) corrective regimen sliding scale   SubCutaneous three times a day before meals  insulin lispro (ADMELOG) corrective regimen sliding scale   SubCutaneous at bedtime  latanoprost 0.005% Ophthalmic Solution 1 Drop(s) Both EYES at bedtime  levothyroxine 50 MICROGram(s) Oral daily  lidocaine   4% Patch 1 Patch Transdermal every 24 hours  sertraline 50 milliGRAM(s) Oral at bedtime  sertraline 25 milliGRAM(s) Oral at bedtime  simvastatin 10 milliGRAM(s) Oral at bedtime    MEDICATIONS  (PRN):  acetaminophen     Tablet .. 650 milliGRAM(s) Oral every 6 hours PRN Temp greater or equal to 38C (100.4F), Mild Pain (1 - 3)  aluminum hydroxide/magnesium hydroxide/simethicone Suspension 30 milliLiter(s) Oral every 4 hours PRN Dyspepsia  dextrose Oral Gel 15 Gram(s) Oral once PRN Blood Glucose LESS THAN 70 milliGRAM(s)/deciliter  HYDROmorphone  Injectable 0.25 milliGRAM(s) IV Push every 6 hours PRN Severe Pain (7 - 10)  melatonin 3 milliGRAM(s) Oral at bedtime PRN Insomnia  midodrine. 10 milliGRAM(s) Oral three times a day PRN on HD days for Hypotension  ondansetron Injectable 4 milliGRAM(s) IV Push every 8 hours PRN Nausea and/or Vomiting  sodium chloride 0.9% lock flush 10 milliLiter(s) IV Push every 1 hour PRN Pre/post blood products, medications, blood draw, and to maintain line patency  traMADol 50 milliGRAM(s) Oral three times a day PRN Moderate Pain (4 - 6)    T(C): 37.1 (12-29-23 @ 12:10), Max: 37.4 (12-28-23 @ 09:55)  HR: 80 (12-29-23 @ 12:10) (66 - 101)  BP: 87/59 (12-29-23 @ 12:10) (87/59 - 131/74)  RR: 17 (12-29-23 @ 12:10)  SpO2: 94% (12-29-23 @ 12:10)  Wt(kg): --  I&O's Detail    28 Dec 2023 07:01  -  29 Dec 2023 07:00  --------------------------------------------------------  IN:    Other (mL): 700 mL  Total IN: 700 mL    OUT:  Total OUT: 0 mL    Total NET: 700 mL              PHYSICAL EXAM:  General: No distress, + dialysis catheter  Respiratory: b/l air entry  Cardiovascular: S1 S2  Gastrointestinal: soft  Extremities:  no edema, AVF, no bruit              LABORATORY:                        8.5    4.40  )-----------( 155      ( 28 Dec 2023 08:09 )             26.3     12-28    136  |  102  |  44<H>  ----------------------------<  73  4.8   |  24  |  5.20<H>    Ca    7.5<L>      28 Dec 2023 08:09  Mg     2.2     12-28    TPro  5.7<L>  /  Alb  3.0<L>  /  TBili  0.5  /  DBili  x   /  AST  18  /  ALT  20  /  AlkPhos  159<H>  12-28    Sodium: 136 mmol/L (12-28 @ 08:09)    Potassium: 4.8 mmol/L (12-28 @ 08:09)    Hemoglobin: 8.5 g/dL (12-28 @ 08:09)  Hemoglobin: 9.3 g/dL (12-27 @ 21:53)  Hemoglobin: 9.9 g/dL (12-27 @ 08:12)    Creatinine, Serum 5.20 (12-28 @ 08:09)  Creatinine, Serum 4.20 (12-27 @ 08:12)        LIVER FUNCTIONS - ( 28 Dec 2023 08:09 )  Alb: 3.0 g/dL / Pro: 5.7 g/dL / ALK PHOS: 159 U/L / ALT: 20 U/L / AST: 18 U/L / GGT: x           Urinalysis Basic - ( 28 Dec 2023 08:09 )    Color: x / Appearance: x / SG: x / pH: x  Gluc: 73 mg/dL / Ketone: x  / Bili: x / Urobili: x   Blood: x / Protein: x / Nitrite: x   Leuk Esterase: x / RBC: x / WBC x   Sq Epi: x / Non Sq Epi: x / Bacteria: x

## 2023-12-29 NOTE — DISCHARGE NOTE NURSING/CASE MANAGEMENT/SOCIAL WORK - PATIENT PORTAL LINK FT
You can access the FollowMyHealth Patient Portal offered by [facility] by registering at the following website: [email or web address] By joining [employer]’s FollowMyHealth portal, you will also be able to view your health information using other applications (apps) compatible with our system. You can access the FollowMyHealth Patient Portal offered by [facility] by registering at the following website: [email or web address]. By joining [employer]’s FollowMyHealth portal, you will also be able to view your health information using other applications (apps) compatible with our system.

## 2023-12-29 NOTE — DISCHARGE NOTE NURSING/CASE MANAGEMENT/SOCIAL WORK - NSDCPEFALRISK_GEN_ALL_CORE
For information on Fall & Injury Prevention, visit: [email or web address] OR  [email or web address] OR  https://www.cdc.gov/steadi/patient.html For information on Fall & Injury Prevention, visit: [email or web address] OR  [email or web address] OR  https://www.cdc.gov/steadi/patient.html

## 2023-12-29 NOTE — DISCHARGE NOTE NURSING/CASE MANAGEMENT/SOCIAL WORK - NSDCVIVACCINE_GEN_ALL_CORE_FT
Tdap; 28-Sep-2022 04:14; Leyda Thomson (RN); [employer]; [id number] (Exp. Date: 18-Jan-2024); IntraMuscular; Deltoid Right.; 0.5 milliLiter(s); VIS (VIS Published: 09-May-2013, VIS Presented: 28-Sep-2022);    Tdap; 28-Sep-2022 04:14; Leyda Thomson (RN); [employer]; [id number] (Exp. Date: 18-Jan-2024); IntraMuscular; Deltoid Right.; 0.5 milliLiter(s); VIS (VIS Published: 09-May-2013, VIS Presented: 28-Sep-2022);

## 2023-12-29 NOTE — SOCIAL WORK PROGRESS NOTE - NSSWPROGRESSNOTE_GEN_ALL_CORE
SW spoke with this pt at bedside and spoke with pts brother - plan to return back to [facility] on Saturday 12/30 at 4pm via [facility] after dialysis. PT, family, team in agreement with dc. SW remains available.  SW spoke with this pt at bedside and spoke with pts brother - plan to return back to [facility] on Saturday 12/30 at 4pm via [facility] after dialysis. PT, family, team in agreement with dc. SW remains available.

## 2023-12-29 NOTE — PROGRESS NOTE ADULT - ASSESSMENT
ESRD on HD  clotted AVF  Hypertension    12/26/23: IR to place dialysis catheter. Vascular surgery evaluation. Monitor BP trend. Titrate BP meds as needed. To continue current meds.   Dietary and PO fluid restriction.   12/27/23: s/p HD yesterday. Vascular surgery follow up. To continue current meds. Labile BP.   12/28/23: HD today. Low BP. On midodrine. Albumin x 1 dose. No fluid removal. Follow BP trend.   12/29/23: Next HD tomorrow. Ok for d/c post HD if stable.

## 2023-12-29 NOTE — PROGRESS NOTE ADULT - SUBJECTIVE AND OBJECTIVE BOX
PROGRESS NOTE  Patient is a 73y old  Male who presents with a chief complaint of SOB (28 Dec 2023 20:30)    Chart and available morning labs /imaging are reviewed electronically , urgent issues addressed . More information  is being added upon completion of rounds , when more information is collected and management discussed with consultants , medical staff and social service/case management on the floor   OVERNIGHT    No new issues reported by medical staff . All above noted   HPI:  73-year-old male with past medical history of dementia, COPD, end-stage renal disease, A-fib on anticoagulation, glaucoma, asthma, depression, hypertension, T2DM, hypocalcemia, hypoparathyroidism was BIB EMS from [place] for the clogged AVF fistula, could not receive HD today.  Last HD was 12/22/23. Pt has had a fall 3 weeks ago which he was seen in the ED for, had CT head, neck and chest-- all unremarkable. Pt c/o mild lower back pain since the fall. (26 Dec 2023 18:31)    PAST MEDICAL & SURGICAL HISTORY:  ASHD (arteriosclerotic heart disease)      CAD (coronary artery disease)  s/p stent, CABG      CRI (chronic renal insufficiency)  ESRD on HD      Diabetes mellitus type II      Hypertension      Hyperlipidemia      Pacemaker      Chronic atrial fibrillation      Asthma      Hypothyroid      Dementia      H/O carotid stenosis      Enlarged prostate      Hx of CABG      Coronary stent      Cardiac pacemaker      History of amputation of toe  right great toe          MEDICATIONS  (STANDING):  aMIOdarone    Tablet 200 milliGRAM(s) Oral daily  apixaban 5 milliGRAM(s) Oral two times a day  brimonidine 0.2% Ophthalmic Solution 1 Drop(s) Both EYES two times a day  budesonide  80 MICROgram(s)/formoterol 4.5 MICROgram(s) Inhaler 2 Puff(s) Inhalation two times a day  calcitriol   Capsule 0.25 MICROGram(s) Oral <User Schedule>  calcium acetate 667 milliGRAM(s) Oral three times a day with meals  dextrose 5%. 1000 milliLiter(s) (50 mL/Hr) IV Continuous <Continuous>  dextrose 5%. 1000 milliLiter(s) (100 mL/Hr) IV Continuous <Continuous>  dextrose 50% Injectable 25 Gram(s) IV Push once  dextrose 50% Injectable 12.5 Gram(s) IV Push once  dextrose 50% Injectable 25 Gram(s) IV Push once  dorzolamide 2%/timolol 0.5% Ophthalmic Solution 1 Drop(s) Both EYES two times a day  epoetin kayla-epbx (RETACRIT) Injectable 53389 Unit(s) IV Push <User Schedule>  finasteride 5 milliGRAM(s) Oral daily  glucagon  Injectable 1 milliGRAM(s) IntraMuscular once  glucagon  Injectable 1 milliGRAM(s) IntraMuscular once  insulin lispro (ADMELOG) corrective regimen sliding scale   SubCutaneous three times a day before meals  insulin lispro (ADMELOG) corrective regimen sliding scale   SubCutaneous at bedtime  latanoprost 0.005% Ophthalmic Solution 1 Drop(s) Both EYES at bedtime  levothyroxine 50 MICROGram(s) Oral daily  lidocaine   4% Patch 1 Patch Transdermal every 24 hours  sertraline 50 milliGRAM(s) Oral at bedtime  sertraline 25 milliGRAM(s) Oral at bedtime  simvastatin 10 milliGRAM(s) Oral at bedtime    MEDICATIONS  (PRN):  acetaminophen     Tablet .. 650 milliGRAM(s) Oral every 6 hours PRN Temp greater or equal to 38C (100.4F), Mild Pain (1 - 3)  aluminum hydroxide/magnesium hydroxide/simethicone Suspension 30 milliLiter(s) Oral every 4 hours PRN Dyspepsia  dextrose Oral Gel 15 Gram(s) Oral once PRN Blood Glucose LESS THAN 70 milliGRAM(s)/deciliter  HYDROmorphone  Injectable 0.25 milliGRAM(s) IV Push every 6 hours PRN Severe Pain (7 - 10)  melatonin 3 milliGRAM(s) Oral at bedtime PRN Insomnia  midodrine. 10 milliGRAM(s) Oral three times a day PRN on HD days for Hypotension  ondansetron Injectable 4 milliGRAM(s) IV Push every 8 hours PRN Nausea and/or Vomiting  sodium chloride 0.9% lock flush 10 milliLiter(s) IV Push every 1 hour PRN Pre/post blood products, medications, blood draw, and to maintain line patency  traMADol 50 milliGRAM(s) Oral three times a day PRN Moderate Pain (4 - 6)      OBJECTIVE    T(C): 37.1 (12-29-23 @ 12:10), Max: 37.1 (12-29-23 @ 12:10)  HR: 80 (12-29-23 @ 12:10) (72 - 88)  BP: 87/59 (12-29-23 @ 12:10) (87/59 - 99/54)  RR: 17 (12-29-23 @ 12:10) (17 - 17)  SpO2: 94% (12-29-23 @ 12:10) (92% - 96%)  Wt(kg): --  I&O's Summary    28 Dec 2023 07:01  -  29 Dec 2023 07:00  --------------------------------------------------------  IN: 700 mL / OUT: 0 mL / NET: 700 mL          REVIEW OF SYSTEMS:  CONSTITUTIONAL: No fever, weight loss, or fatigue  EYES: No eye pain, visual disturbances, or discharge  ENMT:   No sinus or throat pain  NECK: No pain or stiffness  RESPIRATORY: No cough, wheezing, chills or hemoptysis; No shortness of breath  CARDIOVASCULAR: No chest pain, palpitations, dizziness, or leg swelling  GASTROINTESTINAL: No abdominal pain. No nausea, vomiting; No diarrhea or constipation. No melena or hematochezia.  GENITOURINARY: No dysuria, frequency, hematuria, or incontinence  NEUROLOGICAL: No headaches, memory loss, loss of strength, numbness, or tremors  SKIN: No itching, burning, rashes, or lesions   MUSCULOSKELETAL: No joint pain or swelling; No muscle, back, or extremity pain    PHYSICAL EXAM:  Appearance: NAD. VS past 24 hrs -as above   HEENT:   Moist oral mucosa. Conjunctiva clear b/l.   Neck : supple  Respiratory: Lungs CTAB.  Gastrointestinal:  Soft, nontender. No rebound. No rigidity. BS present	  Cardiovascular: RRR ,S1S2 present  Neurologic: Non-focal. Moving all extremities.  Extremities: No edema. No erythema. No calf tenderness.  Skin: No rashes, No ecchymoses, No cyanosis.	  wounds ,skin lesions-See skin assesment flow sheet   LABS:                        8.5    4.40  )-----------( 155      ( 28 Dec 2023 08:09 )             26.3     12-28    136  |  102  |  44<H>  ----------------------------<  73  4.8   |  24  |  5.20<H>    Ca    7.5<L>      28 Dec 2023 08:09  Mg     2.2     12-28    TPro  5.7<L>  /  Alb  3.0<L>  /  TBili  0.5  /  DBili  x   /  AST  18  /  ALT  20  /  AlkPhos  159<H>  12-28    CAPILLARY BLOOD GLUCOSE      POCT Blood Glucose.: 101 mg/dL (29 Dec 2023 11:19)  POCT Blood Glucose.: 103 mg/dL (29 Dec 2023 08:11)  POCT Blood Glucose.: 105 mg/dL (28 Dec 2023 21:24)  POCT Blood Glucose.: 115 mg/dL (28 Dec 2023 17:15)      Urinalysis Basic - ( 28 Dec 2023 08:09 )    Color: x / Appearance: x / SG: x / pH: x  Gluc: 73 mg/dL / Ketone: x  / Bili: x / Urobili: x   Blood: x / Protein: x / Nitrite: x   Leuk Esterase: x / RBC: x / WBC x   Sq Epi: x / Non Sq Epi: x / Bacteria: x        RADIOLOGY & ADDITIONAL TESTS:   reviewed elctronically  ASSESSMENT/PLAN: 	    25 minutes aggregate time was spent on this visit, 50% visit time spent in care co-ordination with other attendings and counselling patient .I have discussed care plan with patient / HCP/family member ,who expressed understanding of problems treatment and their effect and side effects, alternatives in details. I have asked if they have any questions and concerns and appropriately addressed them to best of my ability.  PROGRESS NOTE  Patient is a 73y old  Male who presents with a chief complaint of SOB (28 Dec 2023 20:30)    Chart and available morning labs /imaging are reviewed electronically , urgent issues addressed . More information  is being added upon completion of rounds , when more information is collected and management discussed with consultants , medical staff and social service/case management on the floor   OVERNIGHT    No new issues reported by medical staff . All above noted   HPI:  73-year-old male with past medical history of dementia, COPD, end-stage renal disease, A-fib on anticoagulation, glaucoma, asthma, depression, hypertension, T2DM, hypocalcemia, hypoparathyroidism was BIB EMS from [place] for the clogged AVF fistula, could not receive HD today.  Last HD was 12/22/23. Pt has had a fall 3 weeks ago which he was seen in the ED for, had CT head, neck and chest-- all unremarkable. Pt c/o mild lower back pain since the fall. (26 Dec 2023 18:31)    PAST MEDICAL & SURGICAL HISTORY:  ASHD (arteriosclerotic heart disease)      CAD (coronary artery disease)  s/p stent, CABG      CRI (chronic renal insufficiency)  ESRD on HD      Diabetes mellitus type II      Hypertension      Hyperlipidemia      Pacemaker      Chronic atrial fibrillation      Asthma      Hypothyroid      Dementia      H/O carotid stenosis      Enlarged prostate      Hx of CABG      Coronary stent      Cardiac pacemaker      History of amputation of toe  right great toe          MEDICATIONS  (STANDING):  aMIOdarone    Tablet 200 milliGRAM(s) Oral daily  apixaban 5 milliGRAM(s) Oral two times a day  brimonidine 0.2% Ophthalmic Solution 1 Drop(s) Both EYES two times a day  budesonide  80 MICROgram(s)/formoterol 4.5 MICROgram(s) Inhaler 2 Puff(s) Inhalation two times a day  calcitriol   Capsule 0.25 MICROGram(s) Oral <User Schedule>  calcium acetate 667 milliGRAM(s) Oral three times a day with meals  dextrose 5%. 1000 milliLiter(s) (50 mL/Hr) IV Continuous <Continuous>  dextrose 5%. 1000 milliLiter(s) (100 mL/Hr) IV Continuous <Continuous>  dextrose 50% Injectable 25 Gram(s) IV Push once  dextrose 50% Injectable 12.5 Gram(s) IV Push once  dextrose 50% Injectable 25 Gram(s) IV Push once  dorzolamide 2%/timolol 0.5% Ophthalmic Solution 1 Drop(s) Both EYES two times a day  epoetin kayla-epbx (RETACRIT) Injectable 18292 Unit(s) IV Push <User Schedule>  finasteride 5 milliGRAM(s) Oral daily  glucagon  Injectable 1 milliGRAM(s) IntraMuscular once  glucagon  Injectable 1 milliGRAM(s) IntraMuscular once  insulin lispro (ADMELOG) corrective regimen sliding scale   SubCutaneous three times a day before meals  insulin lispro (ADMELOG) corrective regimen sliding scale   SubCutaneous at bedtime  latanoprost 0.005% Ophthalmic Solution 1 Drop(s) Both EYES at bedtime  levothyroxine 50 MICROGram(s) Oral daily  lidocaine   4% Patch 1 Patch Transdermal every 24 hours  sertraline 50 milliGRAM(s) Oral at bedtime  sertraline 25 milliGRAM(s) Oral at bedtime  simvastatin 10 milliGRAM(s) Oral at bedtime    MEDICATIONS  (PRN):  acetaminophen     Tablet .. 650 milliGRAM(s) Oral every 6 hours PRN Temp greater or equal to 38C (100.4F), Mild Pain (1 - 3)  aluminum hydroxide/magnesium hydroxide/simethicone Suspension 30 milliLiter(s) Oral every 4 hours PRN Dyspepsia  dextrose Oral Gel 15 Gram(s) Oral once PRN Blood Glucose LESS THAN 70 milliGRAM(s)/deciliter  HYDROmorphone  Injectable 0.25 milliGRAM(s) IV Push every 6 hours PRN Severe Pain (7 - 10)  melatonin 3 milliGRAM(s) Oral at bedtime PRN Insomnia  midodrine. 10 milliGRAM(s) Oral three times a day PRN on HD days for Hypotension  ondansetron Injectable 4 milliGRAM(s) IV Push every 8 hours PRN Nausea and/or Vomiting  sodium chloride 0.9% lock flush 10 milliLiter(s) IV Push every 1 hour PRN Pre/post blood products, medications, blood draw, and to maintain line patency  traMADol 50 milliGRAM(s) Oral three times a day PRN Moderate Pain (4 - 6)      OBJECTIVE    T(C): 37.1 (12-29-23 @ 12:10), Max: 37.1 (12-29-23 @ 12:10)  HR: 80 (12-29-23 @ 12:10) (72 - 88)  BP: 87/59 (12-29-23 @ 12:10) (87/59 - 99/54)  RR: 17 (12-29-23 @ 12:10) (17 - 17)  SpO2: 94% (12-29-23 @ 12:10) (92% - 96%)  Wt(kg): --  I&O's Summary    28 Dec 2023 07:01  -  29 Dec 2023 07:00  --------------------------------------------------------  IN: 700 mL / OUT: 0 mL / NET: 700 mL          REVIEW OF SYSTEMS:  CONSTITUTIONAL: No fever, weight loss, or fatigue  EYES: No eye pain, visual disturbances, or discharge  ENMT:   No sinus or throat pain  NECK: No pain or stiffness  RESPIRATORY: No cough, wheezing, chills or hemoptysis; No shortness of breath  CARDIOVASCULAR: No chest pain, palpitations, dizziness, or leg swelling  GASTROINTESTINAL: No abdominal pain. No nausea, vomiting; No diarrhea or constipation. No melena or hematochezia.  GENITOURINARY: No dysuria, frequency, hematuria, or incontinence  NEUROLOGICAL: No headaches, memory loss, loss of strength, numbness, or tremors  SKIN: No itching, burning, rashes, or lesions   MUSCULOSKELETAL: No joint pain or swelling; No muscle, back, or extremity pain    PHYSICAL EXAM:  Appearance: NAD. VS past 24 hrs -as above   HEENT:   Moist oral mucosa. Conjunctiva clear b/l.   Neck : supple  Respiratory: Lungs CTAB.  Gastrointestinal:  Soft, nontender. No rebound. No rigidity. BS present	  Cardiovascular: RRR ,S1S2 present  Neurologic: Non-focal. Moving all extremities.  Extremities: No edema. No erythema. No calf tenderness.  Skin: No rashes, No ecchymoses, No cyanosis.	  wounds ,skin lesions-See skin assesment flow sheet   LABS:                        8.5    4.40  )-----------( 155      ( 28 Dec 2023 08:09 )             26.3     12-28    136  |  102  |  44<H>  ----------------------------<  73  4.8   |  24  |  5.20<H>    Ca    7.5<L>      28 Dec 2023 08:09  Mg     2.2     12-28    TPro  5.7<L>  /  Alb  3.0<L>  /  TBili  0.5  /  DBili  x   /  AST  18  /  ALT  20  /  AlkPhos  159<H>  12-28    CAPILLARY BLOOD GLUCOSE      POCT Blood Glucose.: 101 mg/dL (29 Dec 2023 11:19)  POCT Blood Glucose.: 103 mg/dL (29 Dec 2023 08:11)  POCT Blood Glucose.: 105 mg/dL (28 Dec 2023 21:24)  POCT Blood Glucose.: 115 mg/dL (28 Dec 2023 17:15)      Urinalysis Basic - ( 28 Dec 2023 08:09 )    Color: x / Appearance: x / SG: x / pH: x  Gluc: 73 mg/dL / Ketone: x  / Bili: x / Urobili: x   Blood: x / Protein: x / Nitrite: x   Leuk Esterase: x / RBC: x / WBC x   Sq Epi: x / Non Sq Epi: x / Bacteria: x        RADIOLOGY & ADDITIONAL TESTS:   reviewed elctronically  ASSESSMENT/PLAN: 	    25 minutes aggregate time was spent on this visit, 50% visit time spent in care co-ordination with other attendings and counselling patient .I have discussed care plan with patient / HCP/family member ,who expressed understanding of problems treatment and their effect and side effects, alternatives in details. I have asked if they have any questions and concerns and appropriately addressed them to best of my ability.

## 2023-12-30 ENCOUNTER — INPATIENT (INPATIENT)
Facility: HOSPITAL | Age: 73
LOS: 5 days | Discharge: NURSING, REHAB OR LONG-TERM CARE | DRG: 280 | End: 2024-01-05
Attending: INTERNAL MEDICINE | Admitting: INTERNAL MEDICINE
Payer: MEDICARE

## 2023-12-30 ENCOUNTER — TRANSCRIPTION ENCOUNTER (OUTPATIENT)
Age: 73
End: 2023-12-30

## 2023-12-30 VITALS
WEIGHT: 149.91 LBS | OXYGEN SATURATION: 100 % | HEART RATE: 102 BPM | HEIGHT: 69 IN | SYSTOLIC BLOOD PRESSURE: 95 MMHG | TEMPERATURE: 94 F | DIASTOLIC BLOOD PRESSURE: 55 MMHG | RESPIRATION RATE: 18 BRPM

## 2023-12-30 VITALS
RESPIRATION RATE: 18 BRPM | TEMPERATURE: 98 F | OXYGEN SATURATION: 95 % | DIASTOLIC BLOOD PRESSURE: 55 MMHG | WEIGHT: 166.01 LBS | SYSTOLIC BLOOD PRESSURE: 116 MMHG | HEART RATE: 92 BPM

## 2023-12-30 DIAGNOSIS — Z95.0 PRESENCE OF CARDIAC PACEMAKER: Chronic | ICD-10-CM

## 2023-12-30 DIAGNOSIS — Z89.429 ACQUIRED ABSENCE OF OTHER TOE(S), UNSPECIFIED SIDE: Chronic | ICD-10-CM

## 2023-12-30 LAB
ALBUMIN SERPL ELPH-MCNC: 3 G/DL — LOW (ref 3.3–5)
ALBUMIN SERPL ELPH-MCNC: 3 G/DL — LOW (ref 3.3–5)
ALP SERPL-CCNC: 178 U/L — HIGH (ref 40–120)
ALP SERPL-CCNC: 178 U/L — HIGH (ref 40–120)
ALT FLD-CCNC: 129 U/L — HIGH (ref 12–78)
ALT FLD-CCNC: 129 U/L — HIGH (ref 12–78)
ANION GAP SERPL CALC-SCNC: 11 MMOL/L — SIGNIFICANT CHANGE UP (ref 5–17)
ANION GAP SERPL CALC-SCNC: 11 MMOL/L — SIGNIFICANT CHANGE UP (ref 5–17)
APTT BLD: 34.7 SEC — SIGNIFICANT CHANGE UP (ref 24.5–35.6)
APTT BLD: 34.7 SEC — SIGNIFICANT CHANGE UP (ref 24.5–35.6)
AST SERPL-CCNC: 288 U/L — HIGH (ref 15–37)
AST SERPL-CCNC: 288 U/L — HIGH (ref 15–37)
BASOPHILS # BLD AUTO: 0 K/UL — SIGNIFICANT CHANGE UP (ref 0–0.2)
BASOPHILS # BLD AUTO: 0 K/UL — SIGNIFICANT CHANGE UP (ref 0–0.2)
BASOPHILS NFR BLD AUTO: 0 % — SIGNIFICANT CHANGE UP (ref 0–2)
BASOPHILS NFR BLD AUTO: 0 % — SIGNIFICANT CHANGE UP (ref 0–2)
BILIRUB SERPL-MCNC: 0.7 MG/DL — SIGNIFICANT CHANGE UP (ref 0.2–1.2)
BILIRUB SERPL-MCNC: 0.7 MG/DL — SIGNIFICANT CHANGE UP (ref 0.2–1.2)
BUN SERPL-MCNC: 17 MG/DL — SIGNIFICANT CHANGE UP (ref 7–23)
BUN SERPL-MCNC: 17 MG/DL — SIGNIFICANT CHANGE UP (ref 7–23)
CALCIUM SERPL-MCNC: 8.1 MG/DL — LOW (ref 8.5–10.1)
CALCIUM SERPL-MCNC: 8.1 MG/DL — LOW (ref 8.5–10.1)
CHLORIDE SERPL-SCNC: 98 MMOL/L — SIGNIFICANT CHANGE UP (ref 96–108)
CHLORIDE SERPL-SCNC: 98 MMOL/L — SIGNIFICANT CHANGE UP (ref 96–108)
CO2 SERPL-SCNC: 28 MMOL/L — SIGNIFICANT CHANGE UP (ref 22–31)
CO2 SERPL-SCNC: 28 MMOL/L — SIGNIFICANT CHANGE UP (ref 22–31)
CREAT SERPL-MCNC: 3.2 MG/DL — HIGH (ref 0.5–1.3)
CREAT SERPL-MCNC: 3.2 MG/DL — HIGH (ref 0.5–1.3)
EGFR: 20 ML/MIN/1.73M2 — LOW
EGFR: 20 ML/MIN/1.73M2 — LOW
EOSINOPHIL # BLD AUTO: 0 K/UL — SIGNIFICANT CHANGE UP (ref 0–0.5)
EOSINOPHIL # BLD AUTO: 0 K/UL — SIGNIFICANT CHANGE UP (ref 0–0.5)
EOSINOPHIL NFR BLD AUTO: 0 % — SIGNIFICANT CHANGE UP (ref 0–6)
EOSINOPHIL NFR BLD AUTO: 0 % — SIGNIFICANT CHANGE UP (ref 0–6)
FLUAV AG NPH QL: DETECTED
FLUAV AG NPH QL: DETECTED
FLUBV AG NPH QL: SIGNIFICANT CHANGE UP
FLUBV AG NPH QL: SIGNIFICANT CHANGE UP
GLUCOSE SERPL-MCNC: 88 MG/DL — SIGNIFICANT CHANGE UP (ref 70–99)
GLUCOSE SERPL-MCNC: 88 MG/DL — SIGNIFICANT CHANGE UP (ref 70–99)
HCT VFR BLD CALC: 26.6 % — LOW (ref 39–50)
HCT VFR BLD CALC: 26.6 % — LOW (ref 39–50)
HGB BLD-MCNC: 8.5 G/DL — LOW (ref 13–17)
HGB BLD-MCNC: 8.5 G/DL — LOW (ref 13–17)
INR BLD: 2.39 RATIO — HIGH (ref 0.85–1.18)
INR BLD: 2.39 RATIO — HIGH (ref 0.85–1.18)
LACTATE SERPL-SCNC: 2.1 MMOL/L — HIGH (ref 0.7–2)
LACTATE SERPL-SCNC: 2.1 MMOL/L — HIGH (ref 0.7–2)
LYMPHOCYTES # BLD AUTO: 0.29 K/UL — LOW (ref 1–3.3)
LYMPHOCYTES # BLD AUTO: 0.29 K/UL — LOW (ref 1–3.3)
LYMPHOCYTES # BLD AUTO: 6 % — LOW (ref 13–44)
LYMPHOCYTES # BLD AUTO: 6 % — LOW (ref 13–44)
MCHC RBC-ENTMCNC: 32 GM/DL — SIGNIFICANT CHANGE UP (ref 32–36)
MCHC RBC-ENTMCNC: 32 GM/DL — SIGNIFICANT CHANGE UP (ref 32–36)
MCHC RBC-ENTMCNC: 33.9 PG — SIGNIFICANT CHANGE UP (ref 27–34)
MCHC RBC-ENTMCNC: 33.9 PG — SIGNIFICANT CHANGE UP (ref 27–34)
MCV RBC AUTO: 106 FL — HIGH (ref 80–100)
MCV RBC AUTO: 106 FL — HIGH (ref 80–100)
MONOCYTES # BLD AUTO: 0.29 K/UL — SIGNIFICANT CHANGE UP (ref 0–0.9)
MONOCYTES # BLD AUTO: 0.29 K/UL — SIGNIFICANT CHANGE UP (ref 0–0.9)
MONOCYTES NFR BLD AUTO: 6 % — SIGNIFICANT CHANGE UP (ref 2–14)
MONOCYTES NFR BLD AUTO: 6 % — SIGNIFICANT CHANGE UP (ref 2–14)
NEUTROPHILS # BLD AUTO: 4.22 K/UL — SIGNIFICANT CHANGE UP (ref 1.8–7.4)
NEUTROPHILS # BLD AUTO: 4.22 K/UL — SIGNIFICANT CHANGE UP (ref 1.8–7.4)
NEUTROPHILS NFR BLD AUTO: 84 % — HIGH (ref 43–77)
NEUTROPHILS NFR BLD AUTO: 84 % — HIGH (ref 43–77)
NRBC # BLD: SIGNIFICANT CHANGE UP /100 WBCS (ref 0–0)
NRBC # BLD: SIGNIFICANT CHANGE UP /100 WBCS (ref 0–0)
PLATELET # BLD AUTO: 155 K/UL — SIGNIFICANT CHANGE UP (ref 150–400)
PLATELET # BLD AUTO: 155 K/UL — SIGNIFICANT CHANGE UP (ref 150–400)
POTASSIUM SERPL-MCNC: 3.6 MMOL/L — SIGNIFICANT CHANGE UP (ref 3.5–5.3)
POTASSIUM SERPL-MCNC: 3.6 MMOL/L — SIGNIFICANT CHANGE UP (ref 3.5–5.3)
POTASSIUM SERPL-SCNC: 3.6 MMOL/L — SIGNIFICANT CHANGE UP (ref 3.5–5.3)
POTASSIUM SERPL-SCNC: 3.6 MMOL/L — SIGNIFICANT CHANGE UP (ref 3.5–5.3)
PROT SERPL-MCNC: 6 G/DL — SIGNIFICANT CHANGE UP (ref 6–8.3)
PROT SERPL-MCNC: 6 G/DL — SIGNIFICANT CHANGE UP (ref 6–8.3)
PROTHROM AB SERPL-ACNC: 27.3 SEC — HIGH (ref 9.5–13)
PROTHROM AB SERPL-ACNC: 27.3 SEC — HIGH (ref 9.5–13)
RBC # BLD: 2.51 M/UL — LOW (ref 4.2–5.8)
RBC # BLD: 2.51 M/UL — LOW (ref 4.2–5.8)
RBC # FLD: 14.7 % — HIGH (ref 10.3–14.5)
RBC # FLD: 14.7 % — HIGH (ref 10.3–14.5)
RSV RNA NPH QL NAA+NON-PROBE: SIGNIFICANT CHANGE UP
RSV RNA NPH QL NAA+NON-PROBE: SIGNIFICANT CHANGE UP
SARS-COV-2 RNA SPEC QL NAA+PROBE: SIGNIFICANT CHANGE UP
SARS-COV-2 RNA SPEC QL NAA+PROBE: SIGNIFICANT CHANGE UP
SODIUM SERPL-SCNC: 137 MMOL/L — SIGNIFICANT CHANGE UP (ref 135–145)
SODIUM SERPL-SCNC: 137 MMOL/L — SIGNIFICANT CHANGE UP (ref 135–145)
TROPONIN I, HIGH SENSITIVITY RESULT: 1089.8 NG/L — HIGH
TROPONIN I, HIGH SENSITIVITY RESULT: 1089.8 NG/L — HIGH
WBC # BLD: 4.8 K/UL — SIGNIFICANT CHANGE UP (ref 3.8–10.5)
WBC # BLD: 4.8 K/UL — SIGNIFICANT CHANGE UP (ref 3.8–10.5)
WBC # FLD AUTO: 4.8 K/UL — SIGNIFICANT CHANGE UP (ref 3.8–10.5)
WBC # FLD AUTO: 4.8 K/UL — SIGNIFICANT CHANGE UP (ref 3.8–10.5)

## 2023-12-30 PROCEDURE — 86850 RBC ANTIBODY SCREEN: CPT

## 2023-12-30 PROCEDURE — 76000 FLUOROSCOPY <1 HR PHYS/QHP: CPT

## 2023-12-30 PROCEDURE — 84443 ASSAY THYROID STIM HORMONE: CPT

## 2023-12-30 PROCEDURE — 99285 EMERGENCY DEPT VISIT HI MDM: CPT

## 2023-12-30 PROCEDURE — 85025 COMPLETE CBC W/AUTO DIFF WBC: CPT

## 2023-12-30 PROCEDURE — 99261: CPT

## 2023-12-30 PROCEDURE — 36556 INSERT NON-TUNNEL CV CATH: CPT

## 2023-12-30 PROCEDURE — C1769: CPT

## 2023-12-30 PROCEDURE — 93990 DOPPLER FLOW TESTING: CPT

## 2023-12-30 PROCEDURE — 93010 ELECTROCARDIOGRAM REPORT: CPT

## 2023-12-30 PROCEDURE — 72100 X-RAY EXAM L-S SPINE 2/3 VWS: CPT

## 2023-12-30 PROCEDURE — 36415 COLL VENOUS BLD VENIPUNCTURE: CPT

## 2023-12-30 PROCEDURE — 83735 ASSAY OF MAGNESIUM: CPT

## 2023-12-30 PROCEDURE — 76937 US GUIDE VASCULAR ACCESS: CPT

## 2023-12-30 PROCEDURE — P9047: CPT

## 2023-12-30 PROCEDURE — C1894: CPT

## 2023-12-30 PROCEDURE — C1757: CPT

## 2023-12-30 PROCEDURE — 86923 COMPATIBILITY TEST ELECTRIC: CPT

## 2023-12-30 PROCEDURE — 80053 COMPREHEN METABOLIC PANEL: CPT

## 2023-12-30 PROCEDURE — C1725: CPT

## 2023-12-30 PROCEDURE — 36430 TRANSFUSION BLD/BLD COMPNT: CPT

## 2023-12-30 PROCEDURE — 86901 BLOOD TYPING SEROLOGIC RH(D): CPT

## 2023-12-30 PROCEDURE — 83036 HEMOGLOBIN GLYCOSYLATED A1C: CPT

## 2023-12-30 PROCEDURE — 80061 LIPID PANEL: CPT

## 2023-12-30 PROCEDURE — 71045 X-RAY EXAM CHEST 1 VIEW: CPT | Mod: 26

## 2023-12-30 PROCEDURE — 85730 THROMBOPLASTIN TIME PARTIAL: CPT

## 2023-12-30 PROCEDURE — C1752: CPT

## 2023-12-30 PROCEDURE — 82962 GLUCOSE BLOOD TEST: CPT

## 2023-12-30 PROCEDURE — 73060 X-RAY EXAM OF HUMERUS: CPT

## 2023-12-30 PROCEDURE — 77001 FLUOROGUIDE FOR VEIN DEVICE: CPT

## 2023-12-30 PROCEDURE — C1887: CPT

## 2023-12-30 PROCEDURE — 85610 PROTHROMBIN TIME: CPT

## 2023-12-30 PROCEDURE — 94640 AIRWAY INHALATION TREATMENT: CPT

## 2023-12-30 PROCEDURE — P9016: CPT

## 2023-12-30 PROCEDURE — 86900 BLOOD TYPING SEROLOGIC ABO: CPT

## 2023-12-30 PROCEDURE — 85027 COMPLETE CBC AUTOMATED: CPT

## 2023-12-30 PROCEDURE — 73030 X-RAY EXAM OF SHOULDER: CPT

## 2023-12-30 RX ORDER — LANOLIN ALCOHOL/MO/W.PET/CERES
5 CREAM (GRAM) TOPICAL AT BEDTIME
Refills: 0 | Status: DISCONTINUED | OUTPATIENT
Start: 2023-12-30 | End: 2023-12-30

## 2023-12-30 RX ORDER — ACETAMINOPHEN 500 MG
650 TABLET ORAL ONCE
Refills: 0 | Status: COMPLETED | OUTPATIENT
Start: 2023-12-30 | End: 2023-12-30

## 2023-12-30 RX ORDER — MIDODRINE HYDROCHLORIDE 2.5 MG/1
10 TABLET ORAL ONCE
Refills: 0 | Status: COMPLETED | OUTPATIENT
Start: 2023-12-30 | End: 2023-12-30

## 2023-12-30 RX ORDER — ACETAMINOPHEN 500 MG
1000 TABLET ORAL ONCE
Refills: 0 | Status: COMPLETED | OUTPATIENT
Start: 2023-12-30 | End: 2023-12-30

## 2023-12-30 RX ORDER — SODIUM CHLORIDE 9 MG/ML
500 INJECTION INTRAMUSCULAR; INTRAVENOUS; SUBCUTANEOUS ONCE
Refills: 0 | Status: COMPLETED | OUTPATIENT
Start: 2023-12-30 | End: 2023-12-30

## 2023-12-30 RX ORDER — MIDODRINE HYDROCHLORIDE 2.5 MG/1
10 TABLET ORAL EVERY 8 HOURS
Refills: 0 | Status: DISCONTINUED | OUTPATIENT
Start: 2023-12-30 | End: 2023-12-30

## 2023-12-30 RX ORDER — AMIODARONE HYDROCHLORIDE 400 MG/1
1 TABLET ORAL
Qty: 0 | Refills: 0 | DISCHARGE
Start: 2023-12-30

## 2023-12-30 RX ORDER — APIXABAN 2.5 MG/1
1 TABLET, FILM COATED ORAL
Qty: 0 | Refills: 0 | DISCHARGE
Start: 2023-12-30

## 2023-12-30 RX ORDER — ASCORBIC ACID 60 MG
500 TABLET,CHEWABLE ORAL DAILY
Refills: 0 | Status: DISCONTINUED | OUTPATIENT
Start: 2023-12-30 | End: 2023-12-30

## 2023-12-30 RX ORDER — MIDODRINE HYDROCHLORIDE 2.5 MG/1
5 TABLET ORAL ONCE
Refills: 0 | Status: COMPLETED | OUTPATIENT
Start: 2023-12-30 | End: 2023-12-30

## 2023-12-30 RX ORDER — TAMSULOSIN HYDROCHLORIDE 0.4 MG/1
0.4 CAPSULE ORAL AT BEDTIME
Refills: 0 | Status: DISCONTINUED | OUTPATIENT
Start: 2023-12-30 | End: 2023-12-30

## 2023-12-30 RX ORDER — TRAMADOL HYDROCHLORIDE 50 MG/1
1 TABLET ORAL
Qty: 0 | Refills: 0 | DISCHARGE
Start: 2023-12-30

## 2023-12-30 RX ORDER — APIXABAN 2.5 MG/1
1 TABLET, FILM COATED ORAL
Refills: 0 | DISCHARGE

## 2023-12-30 RX ORDER — ACETAMINOPHEN 500 MG
650 TABLET ORAL EVERY 8 HOURS
Refills: 0 | Status: DISCONTINUED | OUTPATIENT
Start: 2023-12-30 | End: 2023-12-30

## 2023-12-30 RX ORDER — LIDOCAINE 4 G/100G
1 CREAM TOPICAL
Qty: 0 | Refills: 0 | DISCHARGE
Start: 2023-12-30

## 2023-12-30 RX ORDER — LACTOBACILLUS ACIDOPHILUS 100MM CELL
1 CAPSULE ORAL DAILY
Refills: 0 | Status: DISCONTINUED | OUTPATIENT
Start: 2023-12-30 | End: 2023-12-30

## 2023-12-30 RX ADMIN — Medication 1 TABLET(S): at 14:26

## 2023-12-30 RX ADMIN — LIDOCAINE 1 PATCH: 4 CREAM TOPICAL at 14:28

## 2023-12-30 RX ADMIN — Medication 500 MILLIGRAM(S): at 14:27

## 2023-12-30 RX ADMIN — Medication 1000 MILLIGRAM(S): at 13:57

## 2023-12-30 RX ADMIN — ERYTHROPOIETIN 10000 UNIT(S): 10000 INJECTION, SOLUTION INTRAVENOUS; SUBCUTANEOUS at 10:17

## 2023-12-30 RX ADMIN — Medication 667 MILLIGRAM(S): at 14:28

## 2023-12-30 RX ADMIN — TRAMADOL HYDROCHLORIDE 50 MILLIGRAM(S): 50 TABLET ORAL at 11:55

## 2023-12-30 RX ADMIN — APIXABAN 5 MILLIGRAM(S): 2.5 TABLET, FILM COATED ORAL at 05:51

## 2023-12-30 RX ADMIN — MIDODRINE HYDROCHLORIDE 10 MILLIGRAM(S): 2.5 TABLET ORAL at 14:26

## 2023-12-30 RX ADMIN — Medication 650 MILLIGRAM(S): at 08:36

## 2023-12-30 RX ADMIN — Medication 50 MICROGRAM(S): at 05:51

## 2023-12-30 RX ADMIN — MIDODRINE HYDROCHLORIDE 5 MILLIGRAM(S): 2.5 TABLET ORAL at 15:52

## 2023-12-30 RX ADMIN — MIDODRINE HYDROCHLORIDE 10 MILLIGRAM(S): 2.5 TABLET ORAL at 22:42

## 2023-12-30 RX ADMIN — Medication 650 MILLIGRAM(S): at 14:27

## 2023-12-30 RX ADMIN — MIDODRINE HYDROCHLORIDE 5 MILLIGRAM(S): 2.5 TABLET ORAL at 08:36

## 2023-12-30 RX ADMIN — Medication 667 MILLIGRAM(S): at 08:36

## 2023-12-30 RX ADMIN — HYDROMORPHONE HYDROCHLORIDE 0.25 MILLIGRAM(S): 2 INJECTION INTRAMUSCULAR; INTRAVENOUS; SUBCUTANEOUS at 05:52

## 2023-12-30 RX ADMIN — TRAMADOL HYDROCHLORIDE 50 MILLIGRAM(S): 50 TABLET ORAL at 10:55

## 2023-12-30 RX ADMIN — BUDESONIDE AND FORMOTEROL FUMARATE DIHYDRATE 2 PUFF(S): 160; 4.5 AEROSOL RESPIRATORY (INHALATION) at 06:10

## 2023-12-30 RX ADMIN — DORZOLAMIDE HYDROCHLORIDE TIMOLOL MALEATE 1 DROP(S): 20; 5 SOLUTION/ DROPS OPHTHALMIC at 06:11

## 2023-12-30 RX ADMIN — Medication 400 MILLIGRAM(S): at 13:27

## 2023-12-30 RX ADMIN — SODIUM CHLORIDE 1000 MILLILITER(S): 9 INJECTION INTRAMUSCULAR; INTRAVENOUS; SUBCUTANEOUS at 21:48

## 2023-12-30 RX ADMIN — BRIMONIDINE TARTRATE 1 DROP(S): 2 SOLUTION/ DROPS OPHTHALMIC at 05:51

## 2023-12-30 RX ADMIN — FINASTERIDE 5 MILLIGRAM(S): 5 TABLET, FILM COATED ORAL at 14:27

## 2023-12-30 RX ADMIN — Medication 650 MILLIGRAM(S): at 09:15

## 2023-12-30 NOTE — ED PROVIDER NOTE - PROGRESS NOTE DETAILS
Patient found to be flu a positive.  Elevated troponin, likely in the setting of demand ischemia as well as end-stage renal disease.  Will get repeat, plan for admission.  Segun Alcocer MD. Spoke with Dr. Garcia for admission.  Segun Alcocer MD.

## 2023-12-30 NOTE — ED ADULT NURSE NOTE - CHIEF COMPLAINT QUOTE
patient discharged from hospital today- on arrival to [facility]-patient found to be hypotensive- 70/40mmhg, and oxygen saturation - 85% on room air- as per ems- bp- 85/55mmhg, aptient on 2 litres nasal cannula - saturation at 97% patient discharged from hospital today- on arrival to [facility]-patient found to be hypotensive- 70/40mmhg, and oxygen saturation - 85% on room air- as per ems- bp- 85/55mmhg, aptient on 2 litres nasal cannula - saturation at 97%

## 2023-12-30 NOTE — PROGRESS NOTE ADULT - SUBJECTIVE AND OBJECTIVE BOX
Patient is a 73y old  Male who presents with a chief complaint of SOB (27 Dec 2023 04:46)    Patient seen in follow up for ESRD on HD.        PAST MEDICAL HISTORY:  ASHD (arteriosclerotic heart disease)    CAD (coronary artery disease)    CRI (chronic renal insufficiency)    Diabetes mellitus type II    Hypertension    Hyperlipidemia    Pacemaker    Chronic atrial fibrillation    Asthma    Hypothyroid    Dementia    H/O carotid stenosis    Enlarged prostate      MEDICATIONS  (STANDING):  acetaminophen     Tablet .. 650 milliGRAM(s) Oral every 8 hours  acetaminophen   IVPB .. 1000 milliGRAM(s) IV Intermittent once  aMIOdarone    Tablet 200 milliGRAM(s) Oral daily  apixaban 5 milliGRAM(s) Oral two times a day  ascorbic acid 500 milliGRAM(s) Oral daily  brimonidine 0.2% Ophthalmic Solution 1 Drop(s) Both EYES two times a day  budesonide  80 MICROgram(s)/formoterol 4.5 MICROgram(s) Inhaler 2 Puff(s) Inhalation two times a day  calcitriol   Capsule 0.25 MICROGram(s) Oral <User Schedule>  calcium acetate 667 milliGRAM(s) Oral three times a day with meals  dextrose 5%. 1000 milliLiter(s) (50 mL/Hr) IV Continuous <Continuous>  dextrose 5%. 1000 milliLiter(s) (100 mL/Hr) IV Continuous <Continuous>  dextrose 50% Injectable 25 Gram(s) IV Push once  dextrose 50% Injectable 12.5 Gram(s) IV Push once  dextrose 50% Injectable 25 Gram(s) IV Push once  dorzolamide 2%/timolol 0.5% Ophthalmic Solution 1 Drop(s) Both EYES two times a day  epoetin kayla-epbx (RETACRIT) Injectable 33313 Unit(s) IV Push <User Schedule>  finasteride 5 milliGRAM(s) Oral daily  glucagon  Injectable 1 milliGRAM(s) IntraMuscular once  glucagon  Injectable 1 milliGRAM(s) IntraMuscular once  insulin lispro (ADMELOG) corrective regimen sliding scale   SubCutaneous three times a day before meals  insulin lispro (ADMELOG) corrective regimen sliding scale   SubCutaneous at bedtime  lactobacillus acidophilus 1 Tablet(s) Oral daily  latanoprost 0.005% Ophthalmic Solution 1 Drop(s) Both EYES at bedtime  levothyroxine 50 MICROGram(s) Oral daily  lidocaine   4% Patch 1 Patch Transdermal every 24 hours  melatonin 5 milliGRAM(s) Oral at bedtime  midodrine. 10 milliGRAM(s) Oral every 8 hours  sertraline 50 milliGRAM(s) Oral at bedtime  sertraline 25 milliGRAM(s) Oral at bedtime  simvastatin 10 milliGRAM(s) Oral at bedtime  tamsulosin 0.4 milliGRAM(s) Oral at bedtime    MEDICATIONS  (PRN):  aluminum hydroxide/magnesium hydroxide/simethicone Suspension 30 milliLiter(s) Oral every 4 hours PRN Dyspepsia  dextrose Oral Gel 15 Gram(s) Oral once PRN Blood Glucose LESS THAN 70 milliGRAM(s)/deciliter  HYDROmorphone  Injectable 0.25 milliGRAM(s) IV Push every 6 hours PRN Severe Pain (7 - 10)  ondansetron Injectable 4 milliGRAM(s) IV Push every 8 hours PRN Nausea and/or Vomiting  sodium chloride 0.9% lock flush 10 milliLiter(s) IV Push every 1 hour PRN Pre/post blood products, medications, blood draw, and to maintain line patency  traMADol 50 milliGRAM(s) Oral three times a day PRN Moderate Pain (4 - 6)    T(C): 37.7 (12-30-23 @ 10:10), Max: 37.7 (12-30-23 @ 10:10)  HR: 84 (12-30-23 @ 10:10) (66 - 100)  BP: 90/46 (12-30-23 @ 10:10) (87/59 - 126/44)  RR: 18 (12-30-23 @ 10:10)  SpO2: 97% (12-30-23 @ 10:10)  Wt(kg): --  I&O's Detail            PHYSICAL EXAM:  General: No distress, + dialysis catheter  Respiratory: b/l air entry  Cardiovascular: S1 S2  Gastrointestinal: soft  Extremities:  no edema, AVF, no bruit                LABORATORY:              Hemoglobin: 8.5 g/dL (12-28 @ 08:09)  Hemoglobin: 9.3 g/dL (12-27 @ 21:53)    Creatinine, Serum 5.20 (12-28 @ 08:09)             Patient is a 73y old  Male who presents with a chief complaint of SOB (27 Dec 2023 04:46)    Patient seen in follow up for ESRD on HD.        PAST MEDICAL HISTORY:  ASHD (arteriosclerotic heart disease)    CAD (coronary artery disease)    CRI (chronic renal insufficiency)    Diabetes mellitus type II    Hypertension    Hyperlipidemia    Pacemaker    Chronic atrial fibrillation    Asthma    Hypothyroid    Dementia    H/O carotid stenosis    Enlarged prostate      MEDICATIONS  (STANDING):  acetaminophen     Tablet .. 650 milliGRAM(s) Oral every 8 hours  acetaminophen   IVPB .. 1000 milliGRAM(s) IV Intermittent once  aMIOdarone    Tablet 200 milliGRAM(s) Oral daily  apixaban 5 milliGRAM(s) Oral two times a day  ascorbic acid 500 milliGRAM(s) Oral daily  brimonidine 0.2% Ophthalmic Solution 1 Drop(s) Both EYES two times a day  budesonide  80 MICROgram(s)/formoterol 4.5 MICROgram(s) Inhaler 2 Puff(s) Inhalation two times a day  calcitriol   Capsule 0.25 MICROGram(s) Oral <User Schedule>  calcium acetate 667 milliGRAM(s) Oral three times a day with meals  dextrose 5%. 1000 milliLiter(s) (50 mL/Hr) IV Continuous <Continuous>  dextrose 5%. 1000 milliLiter(s) (100 mL/Hr) IV Continuous <Continuous>  dextrose 50% Injectable 25 Gram(s) IV Push once  dextrose 50% Injectable 12.5 Gram(s) IV Push once  dextrose 50% Injectable 25 Gram(s) IV Push once  dorzolamide 2%/timolol 0.5% Ophthalmic Solution 1 Drop(s) Both EYES two times a day  epoetin kayla-epbx (RETACRIT) Injectable 20459 Unit(s) IV Push <User Schedule>  finasteride 5 milliGRAM(s) Oral daily  glucagon  Injectable 1 milliGRAM(s) IntraMuscular once  glucagon  Injectable 1 milliGRAM(s) IntraMuscular once  insulin lispro (ADMELOG) corrective regimen sliding scale   SubCutaneous three times a day before meals  insulin lispro (ADMELOG) corrective regimen sliding scale   SubCutaneous at bedtime  lactobacillus acidophilus 1 Tablet(s) Oral daily  latanoprost 0.005% Ophthalmic Solution 1 Drop(s) Both EYES at bedtime  levothyroxine 50 MICROGram(s) Oral daily  lidocaine   4% Patch 1 Patch Transdermal every 24 hours  melatonin 5 milliGRAM(s) Oral at bedtime  midodrine. 10 milliGRAM(s) Oral every 8 hours  sertraline 50 milliGRAM(s) Oral at bedtime  sertraline 25 milliGRAM(s) Oral at bedtime  simvastatin 10 milliGRAM(s) Oral at bedtime  tamsulosin 0.4 milliGRAM(s) Oral at bedtime    MEDICATIONS  (PRN):  aluminum hydroxide/magnesium hydroxide/simethicone Suspension 30 milliLiter(s) Oral every 4 hours PRN Dyspepsia  dextrose Oral Gel 15 Gram(s) Oral once PRN Blood Glucose LESS THAN 70 milliGRAM(s)/deciliter  HYDROmorphone  Injectable 0.25 milliGRAM(s) IV Push every 6 hours PRN Severe Pain (7 - 10)  ondansetron Injectable 4 milliGRAM(s) IV Push every 8 hours PRN Nausea and/or Vomiting  sodium chloride 0.9% lock flush 10 milliLiter(s) IV Push every 1 hour PRN Pre/post blood products, medications, blood draw, and to maintain line patency  traMADol 50 milliGRAM(s) Oral three times a day PRN Moderate Pain (4 - 6)    T(C): 37.7 (12-30-23 @ 10:10), Max: 37.7 (12-30-23 @ 10:10)  HR: 84 (12-30-23 @ 10:10) (66 - 100)  BP: 90/46 (12-30-23 @ 10:10) (87/59 - 126/44)  RR: 18 (12-30-23 @ 10:10)  SpO2: 97% (12-30-23 @ 10:10)  Wt(kg): --  I&O's Detail            PHYSICAL EXAM:  General: No distress, + dialysis catheter  Respiratory: b/l air entry  Cardiovascular: S1 S2  Gastrointestinal: soft  Extremities:  no edema, AVF, no bruit                LABORATORY:              Hemoglobin: 8.5 g/dL (12-28 @ 08:09)  Hemoglobin: 9.3 g/dL (12-27 @ 21:53)    Creatinine, Serum 5.20 (12-28 @ 08:09)

## 2023-12-30 NOTE — ED PROVIDER NOTE - CLINICAL SUMMARY MEDICAL DECISION MAKING FREE TEXT BOX
Patient found to be hypotensive and hypoxic here, currently improving.  Per chart review, patient is due for midodrine so home dose given.  Will evaluate for infectious etiology such as pneumonia versus flu versus COVID given recent hospitalization.  Plan on lab work, imaging, anticipate admission.

## 2023-12-30 NOTE — PROGRESS NOTE ADULT - PROVIDER SPECIALTY LIST ADULT
Cardiology
Nephrology
Nephrology
Vascular Surgery
Hospitalist
Nephrology
Vascular Surgery
Hospitalist
Nephrology
Vascular Surgery
Hospitalist
Hospitalist

## 2023-12-30 NOTE — PROGRESS NOTE ADULT - PROBLEM SELECTOR PLAN 10
Gastrointestinal stress ulcer prophylaxis and DVT prophylaxis administered
continue home medications
continue home medications

## 2023-12-30 NOTE — ED ADULT NURSE NOTE - NSFALLRISKINTERV_ED_ALL_ED
Assistance OOB with selected safe patient handling equipment if applicable/Assistance with ambulation/Communicate fall risk and risk factors to all staff, patient, and family/Monitor gait and stability/Provide visual cue: yellow wristband, yellow gown, etc/Reinforce activity limits and safety measures with patient and family/Call bell, personal items and telephone in reach/Instruct patient to call for assistance before getting out of bed/chair/stretcher/Non-slip footwear applied when patient is off stretcher/[place] to call system/Physically safe environment - no spills, clutter or unnecessary equipment/Purposeful Proactive Rounding/Room/bathroom lighting operational, light cord in reach Assistance OOB with selected safe patient handling equipment if applicable/Assistance with ambulation/Communicate fall risk and risk factors to all staff, patient, and family/Monitor gait and stability/Provide visual cue: yellow wristband, yellow gown, etc/Reinforce activity limits and safety measures with patient and family/Call bell, personal items and telephone in reach/Instruct patient to call for assistance before getting out of bed/chair/stretcher/Non-slip footwear applied when patient is off stretcher/[place] to call system/Physically safe environment - no spills, clutter or unnecessary equipment/Purposeful Proactive Rounding/Room/bathroom lighting operational, light cord in reach

## 2023-12-30 NOTE — DISCHARGE NOTE PROVIDER - NSDCCPCAREPLAN_GEN_ALL_CORE_FT
PRINCIPAL DISCHARGE DIAGNOSIS  Diagnosis: ESRD on hemodialysis  Assessment and Plan of Treatment: Excess fluids and waste products will be removed from your blood; your electrolytes will be balanced; your blood pressure will be controlled.      SECONDARY DISCHARGE DIAGNOSES  Diagnosis: Clotted renal dialysis AV graft  Assessment and Plan of Treatment: You were admitted with a clotted AV fistula. You were seen by the vascular surgeon who was able to successfully remove the clots. The right arm AV fistula is working properly. Please continue dialysis at [facility].    Diagnosis: HTN (hypertension)  Assessment and Plan of Treatment: Continue current medical management.    Diagnosis: DM II (diabetes mellitus, type II), controlled  Assessment and Plan of Treatment: Continue current medical management.    Diagnosis: Afib  Assessment and Plan of Treatment: Continue current medical management.    Diagnosis: HLD (hyperlipidemia)  Assessment and Plan of Treatment: Continue current medical management.     PRINCIPAL DISCHARGE DIAGNOSIS  Diagnosis: ESRD on hemodialysis  Assessment and Plan of Treatment: Excess fluids and waste products will be removed from your blood; your electrolytes will be balanced; your blood pressure will be controlled.      SECONDARY DISCHARGE DIAGNOSES  Diagnosis: Clotted renal dialysis AV graft  Assessment and Plan of Treatment: You were admitted with a clotted AV fistula. You were seen by the vascular surgeon who was able to successfully remove the clots. The right arm AV fistula is working properly. Please continue dialysis at [facility].    Diagnosis: HTN (hypertension)  Assessment and Plan of Treatment: Continue current medical management.    Diagnosis: DM II (diabetes mellitus, type II), controlled  Assessment and Plan of Treatment: Continue current medical management.    Diagnosis: Afib  Assessment and Plan of Treatment: Continue current medical management.    Diagnosis: HLD (hyperlipidemia)  Assessment and Plan of Treatment: Continue current medical management.

## 2023-12-30 NOTE — DISCHARGE NOTE PROVIDER - NSDCCAREPROVSEEN_GEN_ALL_CORE_FT
Luna, Soledad Zuniga, Yaneth Crystal, Fortino Tobar, Ivory Germain, Sarrina Weil, Audrey Garcia, Gordon

## 2023-12-30 NOTE — CONSULT NOTE ADULT - SUBJECTIVE AND OBJECTIVE BOX
[facility]     Patient is a 73y old  Male who presents with a chief complaint of     HPI:      HPI:    73y Male for Cardiology Consult    PAST MEDICAL & SURGICAL HISTORY:  ASHD (arteriosclerotic heart disease)      CAD (coronary artery disease)  s/p stent, CABG      CRI (chronic renal insufficiency)  ESRD on HD      Diabetes mellitus type II      Hypertension      Hyperlipidemia      Pacemaker      Chronic atrial fibrillation      Asthma      Hypothyroid      Dementia      H/O carotid stenosis      Enlarged prostate      Hx of CABG      Coronary stent      Cardiac pacemaker      History of amputation of toe  right great toe          FAMILY HISTORY:  Family history of chronic ischemic heart disease (Father)  father  of MI at age 59        SOCIAL HISTORY:   Alcohol:  Smoking:    Allergies    Levaquin (Anaphylaxis; Flushing; Short breath)    Intolerances        MEDICATIONS  (STANDING):  aMIOdarone    Tablet 200 milliGRAM(s) Oral daily  apixaban 5 milliGRAM(s) Oral two times a day  aspirin enteric coated 81 milliGRAM(s) Oral daily  benzonatate 100 milliGRAM(s) Oral once  budesonide 160 MICROgram(s)/formoterol 4.5 MICROgram(s) Inhaler 2 Puff(s) Inhalation two times a day  lidocaine   4% Patch 1 Patch Transdermal once  pantoprazole    Tablet 40 milliGRAM(s) Oral daily    MEDICATIONS  (PRN):  acetaminophen     Tablet .. 650 milliGRAM(s) Oral every 6 hours PRN Temp greater or equal to 38C (100.4F), Mild Pain (1 - 3)  midodrine. 10 milliGRAM(s) Oral three times a day PRN for systolic BP<100      REVIEW OF SYSTEMS:  CONSTITUTIONAL: No fever, weight loss, chills, shakes, or fat  RESPIRATORY: No cough, wheezing, hemoptysis, or shortness of breath  CARDIOVASCULAR: No chest pain, dyspnea, palpitations, dizziness, syncope, paroxysmal nocturnal dyspnea, orthopnea, or arm or leg swelling  GASTROINTESTINAL: No abdominal  or epigastric pain, nausea, vomiting, hematemesis, diarrhea, constipation, melena or bright red bloo  NEUROLOGICAL: No headaches, memory loss, slurred speech, limb weakness, loss of strength, numbness, or tremors  SKIN: No itching, burning, rashes, or lesions  ENDOCRINE: No heat or cold intolerance, or hair loss  MUSCULOSKELETAL: No joint pain or swelling, muscle, back, or extremity pain  HEME/LYMPH: No easy bruising or bleeding gums  ALLERY AND IMMUNOLOGIC: No hives or rash.    Vital Signs Last 24 Hrs  T(C): 37.2 (30 Dec 2023 22:51), Max: 37.7 (30 Dec 2023 10:10)  T(F): 98.9 (30 Dec 2023 22:51), Max: 99.8 (30 Dec 2023 10:10)  HR: 80 (31 Dec 2023 00:57) (80 - 102)  BP: 125/70 (31 Dec 2023 00:57) (89/60 - 125/70)  BP(mean): --  RR: 16 (31 Dec 2023 00:57) (16 - 18)  SpO2: 97% (31 Dec 2023 00:57) (93% - 100%)    Parameters below as of 31 Dec 2023 00:57  Patient On (Oxygen Delivery Method): nasal cannula  O2 Flow (L/min): 3      PHYSICAL EXAM:  HEAD:  Atraumatic, Normocephalic  EYES: EOMI, PERRLA, conjunctiva and sclera clear  NECK: Supple and normal thyroid.  No JVD or carotid bruit.   HEART: S1, S2 regular , 1/6 soft ejection systolic murmur in mitral area , no thrill and no gallops .  PULMONARY: Bilateral vesicular breathing , few scattered ronchi and few scattered rales are present .  ABDOMEN: Soft nontender and positive bowl sounds   EXTREMITIES:  No clubbing, cyanosis, or pedal  edema  NEUROLOGICAL: AAOX3 , no focal deficit .    LABS:                        8.5    4.80  )-----------( 155      ( 30 Dec 2023 21:40 )             26.6     12-    137  |  98  |  17  ----------------------------<  88  3.6   |  28  |  3.20<H>    Ca    8.1<L>      30 Dec 2023 21:40    TPro  6.0  /  Alb  3.0<L>  /  TBili  0.7  /  DBili  x   /  AST  288<H>  /  ALT  129<H>  /  AlkPhos  178<H>  1230    CARDIAC MARKERS ( 31 Dec 2023 02:06 )  x     / x     / 563 U/L / x     / 11.6 ng/mL  CARDIAC MARKERS ( 31 Dec 2023 01:11 )  x     / x     / 71 U/L / x     / <1.0 ng/mL      PT/INR - ( 30 Dec 2023 21:40 )   PT: 27.3 sec;   INR: 2.39 ratio         PTT - ( 30 Dec 2023 21:40 )  PTT:34.7 sec  Urinalysis Basic - ( 30 Dec 2023 21:40 )    Color: x / Appearance: x / SG: x / pH: x  Gluc: 88 mg/dL / Ketone: x  / Bili: x / Urobili: x   Blood: x / Protein: x / Nitrite: x   Leuk Esterase: x / RBC: x / WBC x   Sq Epi: x / Non Sq Epi: x / Bacteria: x      BNP      EKG:  ECHO:  IMAGING:    Assessment and Plan :     Will continue to follow during hospital course and give further recommendations as needed . Thanks for your referral . if any questions please contact me at office ([phone])cell [phone])  [facility] [place]     Patient is a 73y old  Male who presents with a chief complaint of     HPI:      HPI:    73y Male for Cardiology Consult    PAST MEDICAL & SURGICAL HISTORY:  ASHD (arteriosclerotic heart disease)      CAD (coronary artery disease)  s/p stent, CABG      CRI (chronic renal insufficiency)  ESRD on HD      Diabetes mellitus type II      Hypertension      Hyperlipidemia      Pacemaker      Chronic atrial fibrillation      Asthma      Hypothyroid      Dementia      H/O carotid stenosis      Enlarged prostate      Hx of CABG      Coronary stent      Cardiac pacemaker      History of amputation of toe  right great toe          FAMILY HISTORY:  Family history of chronic ischemic heart disease (Father)  father  of MI at age 59        SOCIAL HISTORY:   Alcohol:  Smoking:    Allergies    Levaquin (Anaphylaxis; Flushing; Short breath)    Intolerances        MEDICATIONS  (STANDING):  aMIOdarone    Tablet 200 milliGRAM(s) Oral daily  apixaban 5 milliGRAM(s) Oral two times a day  aspirin enteric coated 81 milliGRAM(s) Oral daily  benzonatate 100 milliGRAM(s) Oral once  budesonide 160 MICROgram(s)/formoterol 4.5 MICROgram(s) Inhaler 2 Puff(s) Inhalation two times a day  lidocaine   4% Patch 1 Patch Transdermal once  pantoprazole    Tablet 40 milliGRAM(s) Oral daily    MEDICATIONS  (PRN):  acetaminophen     Tablet .. 650 milliGRAM(s) Oral every 6 hours PRN Temp greater or equal to 38C (100.4F), Mild Pain (1 - 3)  midodrine. 10 milliGRAM(s) Oral three times a day PRN for systolic BP<100      REVIEW OF SYSTEMS:  CONSTITUTIONAL: No fever, weight loss, chills, shakes, or fat  RESPIRATORY: No cough, wheezing, hemoptysis, or shortness of breath  CARDIOVASCULAR: No chest pain, dyspnea, palpitations, dizziness, syncope, paroxysmal nocturnal dyspnea, orthopnea, or arm or leg swelling  GASTROINTESTINAL: No abdominal  or epigastric pain, nausea, vomiting, hematemesis, diarrhea, constipation, melena or bright red bloo  NEUROLOGICAL: No headaches, memory loss, slurred speech, limb weakness, loss of strength, numbness, or tremors  SKIN: No itching, burning, rashes, or lesions  ENDOCRINE: No heat or cold intolerance, or hair loss  MUSCULOSKELETAL: No joint pain or swelling, muscle, back, or extremity pain  HEME/LYMPH: No easy bruising or bleeding gums  ALLERY AND IMMUNOLOGIC: No hives or rash.    Vital Signs Last 24 Hrs  T(C): 37.2 (30 Dec 2023 22:51), Max: 37.7 (30 Dec 2023 10:10)  T(F): 98.9 (30 Dec 2023 22:51), Max: 99.8 (30 Dec 2023 10:10)  HR: 80 (31 Dec 2023 00:57) (80 - 102)  BP: 125/70 (31 Dec 2023 00:57) (89/60 - 125/70)  BP(mean): --  RR: 16 (31 Dec 2023 00:57) (16 - 18)  SpO2: 97% (31 Dec 2023 00:57) (93% - 100%)    Parameters below as of 31 Dec 2023 00:57  Patient On (Oxygen Delivery Method): nasal cannula  O2 Flow (L/min): 3      PHYSICAL EXAM:  HEAD:  Atraumatic, Normocephalic  EYES: EOMI, PERRLA, conjunctiva and sclera clear  NECK: Supple and normal thyroid.  No JVD or carotid bruit.   HEART: S1, S2 regular , 1/6 soft ejection systolic murmur in mitral area , no thrill and no gallops .  PULMONARY: Bilateral vesicular breathing , few scattered ronchi and few scattered rales are present .  ABDOMEN: Soft nontender and positive bowl sounds   EXTREMITIES:  No clubbing, cyanosis, or pedal  edema  NEUROLOGICAL: AAOX3 , no focal deficit .    LABS:                        8.5    4.80  )-----------( 155      ( 30 Dec 2023 21:40 )             26.6     12-    137  |  98  |  17  ----------------------------<  88  3.6   |  28  |  3.20<H>    Ca    8.1<L>      30 Dec 2023 21:40    TPro  6.0  /  Alb  3.0<L>  /  TBili  0.7  /  DBili  x   /  AST  288<H>  /  ALT  129<H>  /  AlkPhos  178<H>  1230    CARDIAC MARKERS ( 31 Dec 2023 02:06 )  x     / x     / 563 U/L / x     / 11.6 ng/mL  CARDIAC MARKERS ( 31 Dec 2023 01:11 )  x     / x     / 71 U/L / x     / <1.0 ng/mL      PT/INR - ( 30 Dec 2023 21:40 )   PT: 27.3 sec;   INR: 2.39 ratio         PTT - ( 30 Dec 2023 21:40 )  PTT:34.7 sec  Urinalysis Basic - ( 30 Dec 2023 21:40 )    Color: x / Appearance: x / SG: x / pH: x  Gluc: 88 mg/dL / Ketone: x  / Bili: x / Urobili: x   Blood: x / Protein: x / Nitrite: x   Leuk Esterase: x / RBC: x / WBC x   Sq Epi: x / Non Sq Epi: x / Bacteria: x      BNP      EKG:  ECHO:  IMAGING:    Assessment and Plan :     Will continue to follow during hospital course and give further recommendations as needed . Thanks for your referral . if any questions please contact me at office ([phone])cell [phone])  MI SANDOVAL MD [place] [id number]  [address]  OFFICE : [phone]   CELL : [phone]  CARDIOLOGY CONSULT :     Patient is a 73y old  Male who presents with a chief complaint of cough and wheezing and also low BP     HPI:  · Chief Complaint: The patient is a 73y Male complaining of hypotension.  · HPI Objective Statement: Patient with past medical history of end-stage renal disease on dialysis, chronic hypotension, afib, recent admission for clogged AV fistula is presenting for hypotension and hypoxia.  Patient was discharged back to [facility] today but sent to ED due to hypotension.  Otherwise denies any new complaints.  Family member at bedside able to provide history for patient.        HPI:    73y Male for Cardiology Consult    PAST MEDICAL & SURGICAL HISTORY:  ASHD (arteriosclerotic heart disease)      CAD (coronary artery disease)  s/p stent, CABG      CRI (chronic renal insufficiency)  ESRD on HD      Diabetes mellitus type II      Hypertension      Hyperlipidemia      Pacemaker      Chronic atrial fibrillation      Asthma      Hypothyroid      Dementia      H/O carotid stenosis      Enlarged prostate      Hx of CABG      Coronary stent      Cardiac pacemaker      History of amputation of toe  right great toe          FAMILY HISTORY:  Family history of chronic ischemic heart disease (Father)  father  of MI at age 59        SOCIAL HISTORY:   Alcohol:  Smoking:    Allergies    Levaquin (Anaphylaxis; Flushing; Short breath)    Intolerances        MEDICATIONS  (STANDING):  aMIOdarone    Tablet 200 milliGRAM(s) Oral daily  apixaban 5 milliGRAM(s) Oral two times a day  aspirin enteric coated 81 milliGRAM(s) Oral daily  benzonatate 100 milliGRAM(s) Oral once  budesonide 160 MICROgram(s)/formoterol 4.5 MICROgram(s) Inhaler 2 Puff(s) Inhalation two times a day  lidocaine   4% Patch 1 Patch Transdermal once  pantoprazole    Tablet 40 milliGRAM(s) Oral daily    MEDICATIONS  (PRN):  acetaminophen     Tablet .. 650 milliGRAM(s) Oral every 6 hours PRN Temp greater or equal to 38C (100.4F), Mild Pain (1 - 3)  midodrine. 10 milliGRAM(s) Oral three times a day PRN for systolic BP<100      Vital Signs Last 24 Hrs  T(C): 37.2 (30 Dec 2023 22:51), Max: 37.7 (30 Dec 2023 10:10)  T(F): 98.9 (30 Dec 2023 22:51), Max: 99.8 (30 Dec 2023 10:10)  HR: 80 (31 Dec 2023 00:57) (80 - 102)  BP: 125/70 (31 Dec 2023 00:57) (89/60 - 125/70)  BP(mean): --  RR: 16 (31 Dec 2023 00:57) (16 - 18)  SpO2: 97% (31 Dec 2023 00:57) (93% - 100%)    Parameters below as of 31 Dec 2023 00:57  Patient On (Oxygen Delivery Method): nasal cannula  O2 Flow (L/min): 3  LABS:                        8.5    4.80  )-----------( 155      ( 30 Dec 2023 21:40 )             26.6     12-30    137  |  98  |  17  ----------------------------<  88  3.6   |  28  |  3.20<H>    Ca    8.1<L>      30 Dec 2023 21:40    TPro  6.0  /  Alb  3.0<L>  /  TBili  0.7  /  DBili  x   /  AST  288<H>  /  ALT  129<H>  /  AlkPhos  178<H>  12-30    CARDIAC MARKERS ( 31 Dec 2023 02:06 )  x     / x     / 563 U/L / x     / 11.6 ng/mL  CARDIAC MARKERS ( 31 Dec 2023 01:11 )  x     / x     / 71 U/L / x     / <1.0 ng/mL      PT/INR - ( 30 Dec 2023 21:40 )   PT: 27.3 sec;   INR: 2.39 ratio         PTT - ( 30 Dec 2023 21:40 )  PTT:34.7 sec  Urinalysis Basic - ( 30 Dec 2023 21:40 )    Color: x / Appearance: x / SG: x / pH: x  Gluc: 88 mg/dL / Ketone: x  / Bili: x / Urobili: x   Blood: x / Protein: x / Nitrite: x   Leuk Esterase: x / RBC: x / WBC x   Sq Epi: x / Non Sq Epi: x / Bacteria: x      Assessment and Plan :   FULL CONSULT DICTATED .  73 years old male with H/O atrial fibrillation , ESRD and hemodialysis has cough and wheezing and has hypotension and has Influenza . Patinet has elevated Troponin I levels which can be demand ischemia , However considering signficant elevation and trending up , possibility of NON-ST JUNE can not be ruled out . Will treat as NON-ST JUNE . Will get echocardiogram done . Patient high risk for cardiac cath due to over all comorbidities . However will talk with patient health care proxy . Prognosis guarded .  Will continue to follow during hospital course and give further recommendations as needed . Thanks for your referral . if any questions please contact me at office ([phone] cell [phone])  MI SANDOVAL MD [place] [id number]  [address]  OFFICE : [phone]   CELL : [phone]  CARDIOLOGY CONSULT :     Patient is a 73y old  Male who presents with a chief complaint of cough and wheezing and also low BP     HPI:  · Chief Complaint: The patient is a 73y Male complaining of hypotension.  · HPI Objective Statement: Patient with past medical history of end-stage renal disease on dialysis, chronic hypotension, afib, recent admission for clogged AV fistula is presenting for hypotension and hypoxia.  Patient was discharged back to [facility] today but sent to ED due to hypotension.  Otherwise denies any new complaints.  Family member at bedside able to provide history for patient.        HPI:    73y Male for Cardiology Consult    PAST MEDICAL & SURGICAL HISTORY:  ASHD (arteriosclerotic heart disease)      CAD (coronary artery disease)  s/p stent, CABG      CRI (chronic renal insufficiency)  ESRD on HD      Diabetes mellitus type II      Hypertension      Hyperlipidemia      Pacemaker      Chronic atrial fibrillation      Asthma      Hypothyroid      Dementia      H/O carotid stenosis      Enlarged prostate      Hx of CABG      Coronary stent      Cardiac pacemaker      History of amputation of toe  right great toe          FAMILY HISTORY:  Family history of chronic ischemic heart disease (Father)  father  of MI at age 59        SOCIAL HISTORY:   Alcohol:  Smoking:    Allergies    Levaquin (Anaphylaxis; Flushing; Short breath)    Intolerances        MEDICATIONS  (STANDING):  aMIOdarone    Tablet 200 milliGRAM(s) Oral daily  apixaban 5 milliGRAM(s) Oral two times a day  aspirin enteric coated 81 milliGRAM(s) Oral daily  benzonatate 100 milliGRAM(s) Oral once  budesonide 160 MICROgram(s)/formoterol 4.5 MICROgram(s) Inhaler 2 Puff(s) Inhalation two times a day  lidocaine   4% Patch 1 Patch Transdermal once  pantoprazole    Tablet 40 milliGRAM(s) Oral daily    MEDICATIONS  (PRN):  acetaminophen     Tablet .. 650 milliGRAM(s) Oral every 6 hours PRN Temp greater or equal to 38C (100.4F), Mild Pain (1 - 3)  midodrine. 10 milliGRAM(s) Oral three times a day PRN for systolic BP<100      Vital Signs Last 24 Hrs  T(C): 37.2 (30 Dec 2023 22:51), Max: 37.7 (30 Dec 2023 10:10)  T(F): 98.9 (30 Dec 2023 22:51), Max: 99.8 (30 Dec 2023 10:10)  HR: 80 (31 Dec 2023 00:57) (80 - 102)  BP: 125/70 (31 Dec 2023 00:57) (89/60 - 125/70)  BP(mean): --  RR: 16 (31 Dec 2023 00:57) (16 - 18)  SpO2: 97% (31 Dec 2023 00:57) (93% - 100%)    Parameters below as of 31 Dec 2023 00:57  Patient On (Oxygen Delivery Method): nasal cannula  O2 Flow (L/min): 3  LABS:                        8.5    4.80  )-----------( 155      ( 30 Dec 2023 21:40 )             26.6     12-30    137  |  98  |  17  ----------------------------<  88  3.6   |  28  |  3.20<H>    Ca    8.1<L>      30 Dec 2023 21:40    TPro  6.0  /  Alb  3.0<L>  /  TBili  0.7  /  DBili  x   /  AST  288<H>  /  ALT  129<H>  /  AlkPhos  178<H>  12-30    CARDIAC MARKERS ( 31 Dec 2023 02:06 )  x     / x     / 563 U/L / x     / 11.6 ng/mL  CARDIAC MARKERS ( 31 Dec 2023 01:11 )  x     / x     / 71 U/L / x     / <1.0 ng/mL      PT/INR - ( 30 Dec 2023 21:40 )   PT: 27.3 sec;   INR: 2.39 ratio         PTT - ( 30 Dec 2023 21:40 )  PTT:34.7 sec  Urinalysis Basic - ( 30 Dec 2023 21:40 )    Color: x / Appearance: x / SG: x / pH: x  Gluc: 88 mg/dL / Ketone: x  / Bili: x / Urobili: x   Blood: x / Protein: x / Nitrite: x   Leuk Esterase: x / RBC: x / WBC x   Sq Epi: x / Non Sq Epi: x / Bacteria: x      Assessment and Plan :   FULL CONSULT DICTATED .  73 years old male with H/O atrial fibrillation , ESRD and hemodialysis has cough and wheezing and has hypotension and has Influenza . Patinet has elevated Troponin I levels which can be demand ischemia , However considering signficant elevation and trending up , possibility of NON-ST JUNE can not be ruled out . Will treat as NON-ST JUNE . Will get echocardiogram done . Patient high risk for cardiac cath due to over all comorbidities . However will talk with patient health care proxy . Prognosis guarded .  Will continue to follow during hospital course and give further recommendations as needed . Thanks for your referral . if any questions please contact me at office ([phone] cell [phone])

## 2023-12-30 NOTE — DISCHARGE NOTE PROVIDER - NSDCMRMEDTOKEN_GEN_ALL_CORE_FT
acetaminophen 325 mg oral tablet: 2 tab(s) orally 3 times a day prn pain  Acidophilus oral capsule: 1 cap(s) orally once a day  brimonidine 0.2% ophthalmic solution: 1 drop(s) in each affected eye 2 times a day both eyes  calcitriol 0.25 mcg oral capsule: 1 cap(s) orally 3 times a week Monday, Wednesday, Friday  calcium acetate 667 mg oral capsule: 1 cap(s) orally 3 times a day  dorzolamide-timolol 2.23%-0.68% (2%-0.5% base) ophthalmic solution: 1 drop(s) in each eye 2 times a day  Eliquis 5 mg oral tablet: 1 tab(s) orally 2 times a day  finasteride 5 mg oral tablet: 1 tab(s) orally once a day (at bedtime)  latanoprost 0.005% ophthalmic solution: 1 drop(s) in each eye once a day (at bedtime) Both eyes  Levothroid 50 mcg (0.05 mg) oral tablet: 1 tab(s) orally once a day  melatonin 5 mg oral tablet: 1 tab(s) orally once a day (at bedtime)  menthol 5% topical pad: Apply topically to affected area once a day from 7am - 3pm  midodrine 10 mg oral tablet: 1 tab(s) orally 2 times a day hold for SBP &gt;130  sertraline 25 mg oral tablet: 1 tab(s) orally once a day total daily dose- 75mg  sertraline 50 mg oral tablet: 1 tab(s) orally once a day (at bedtime) total dose- 75mg daily  simvastatin 10 mg oral tablet: 1 tab(s) orally once a day (at bedtime)  Symbicort 80 mcg-4.5 mcg/inh inhalation aerosol: 2 puff(s) inhaled 2 times a day  tamsulosin 0.4 mg oral capsule: 1 cap(s) orally once a day (at bedtime)  Vitamin C 500 mg oral tablet, chewable: 1 tab(s) chewed once a day   acetaminophen 325 mg oral tablet: 2 tab(s) orally 3 times a day as needed for  mild pain  Acidophilus oral capsule: 1 cap(s) orally once a day  amiodarone 200 mg oral tablet: 1 tab(s) orally once a day  apixaban 5 mg oral tablet: 1 tab(s) orally 2 times a day  Aspercreme Max Strength Lidocaine XL Patch 4% topical film: Apply topically to affected area once a day LEFT SHOULDER  10 AM -10 PM  brimonidine 0.2% ophthalmic solution: 1 drop(s) in each affected eye 2 times a day both eyes  calcitriol 0.25 mcg oral capsule: 1 cap(s) orally 3 times a week Monday, Wednesday, Friday  calcium acetate 667 mg oral capsule: 1 cap(s) orally 3 times a day  dorzolamide-timolol 2.23%-0.68% (2%-0.5% base) ophthalmic solution: 1 drop(s) in each eye 2 times a day  finasteride 5 mg oral tablet: 1 tab(s) orally once a day (at bedtime)  latanoprost 0.005% ophthalmic solution: 1 drop(s) in each eye once a day (at bedtime) Both eyes  Levothroid 50 mcg (0.05 mg) oral tablet: 1 tab(s) orally once a day  melatonin 5 mg oral tablet: 1 tab(s) orally once a day (at bedtime)  menthol 5% topical pad: Apply topically to affected area once a day from 7am - 3pm  midodrine 10 mg oral tablet: 1 tab(s) orally 2 times a day hold for SBP &gt;130  sertraline 25 mg oral tablet: 1 tab(s) orally once a day total daily dose- 75mg  sertraline 50 mg oral tablet: 1 tab(s) orally once a day (at bedtime) total dose- 75mg daily  simvastatin 10 mg oral tablet: 1 tab(s) orally once a day (at bedtime)  Symbicort 80 mcg-4.5 mcg/inh inhalation aerosol: 2 puff(s) inhaled 2 times a day  tamsulosin 0.4 mg oral capsule: 1 cap(s) orally once a day (at bedtime)  traMADol 50 mg oral tablet: 1 tab(s) orally 3 times a day As needed Moderate Pain (4 - 6)  Vitamin C 500 mg oral tablet, chewable: 1 tab(s) chewed once a day

## 2023-12-30 NOTE — PROGRESS NOTE ADULT - PROBLEM SELECTOR PLAN 2
Seen by vascular team -PAULIE AVF cannulated without difficulty  Receiving HD without flow restrictions or alarms  Can dc R IJ [facility] and continue HD via R AVF

## 2023-12-30 NOTE — PROGRESS NOTE ADULT - SUBJECTIVE AND OBJECTIVE BOX
PROGRESS NOTE  Patient is a 73y old  Male who presents with a chief complaint of SOB (28 Dec 2023 20:30)    Chart and available morning labs /imaging are reviewed electronically , urgent issues addressed . More information  is being added upon completion of rounds , when more information is collected and management discussed with consultants , medical staff and social service/case management on the floor   OVERNIGHT    No new issues reported by medical staff . All above noted Patient is resting in a bed comfortably .Confused ,poor mentation .No distress noted   HPI:  73-year-old male with past medical history of dementia, COPD, end-stage renal disease, A-fib on anticoagulation, glaucoma, asthma, depression, hypertension, T2DM, hypocalcemia, hypoparathyroidism was BIB EMS from [place] for the clogged AVF fistula, could not receive HD today.  Last HD was 12/22/23. Pt has had a fall 3 weeks ago which he was seen in the ED for, had CT head, neck and chest-- all unremarkable. Pt c/o mild lower back pain since the fall. (26 Dec 2023 18:31)    PAST MEDICAL & SURGICAL HISTORY:  ASHD (arteriosclerotic heart disease)      CAD (coronary artery disease)  s/p stent, CABG      CRI (chronic renal insufficiency)  ESRD on HD      Diabetes mellitus type II      Hypertension      Hyperlipidemia      Pacemaker      Chronic atrial fibrillation      Asthma      Hypothyroid      Dementia      H/O carotid stenosis      Enlarged prostate      Hx of CABG      Coronary stent      Cardiac pacemaker      History of amputation of toe  right great toe          MEDICATIONS  (STANDING):  acetaminophen     Tablet .. 650 milliGRAM(s) Oral every 8 hours  aMIOdarone    Tablet 200 milliGRAM(s) Oral daily  apixaban 5 milliGRAM(s) Oral two times a day  ascorbic acid 500 milliGRAM(s) Oral daily  brimonidine 0.2% Ophthalmic Solution 1 Drop(s) Both EYES two times a day  budesonide  80 MICROgram(s)/formoterol 4.5 MICROgram(s) Inhaler 2 Puff(s) Inhalation two times a day  calcitriol   Capsule 0.25 MICROGram(s) Oral <User Schedule>  calcium acetate 667 milliGRAM(s) Oral three times a day with meals  dextrose 5%. 1000 milliLiter(s) (50 mL/Hr) IV Continuous <Continuous>  dextrose 5%. 1000 milliLiter(s) (100 mL/Hr) IV Continuous <Continuous>  dextrose 50% Injectable 12.5 Gram(s) IV Push once  dextrose 50% Injectable 25 Gram(s) IV Push once  dextrose 50% Injectable 25 Gram(s) IV Push once  dorzolamide 2%/timolol 0.5% Ophthalmic Solution 1 Drop(s) Both EYES two times a day  epoetin kayla-epbx (RETACRIT) Injectable 21271 Unit(s) IV Push <User Schedule>  finasteride 5 milliGRAM(s) Oral daily  glucagon  Injectable 1 milliGRAM(s) IntraMuscular once  glucagon  Injectable 1 milliGRAM(s) IntraMuscular once  insulin lispro (ADMELOG) corrective regimen sliding scale   SubCutaneous at bedtime  insulin lispro (ADMELOG) corrective regimen sliding scale   SubCutaneous three times a day before meals  lactobacillus acidophilus 1 Tablet(s) Oral daily  latanoprost 0.005% Ophthalmic Solution 1 Drop(s) Both EYES at bedtime  levothyroxine 50 MICROGram(s) Oral daily  lidocaine   4% Patch 1 Patch Transdermal every 24 hours  melatonin 5 milliGRAM(s) Oral at bedtime  midodrine. 10 milliGRAM(s) Oral every 8 hours  sertraline 50 milliGRAM(s) Oral at bedtime  sertraline 25 milliGRAM(s) Oral at bedtime  simvastatin 10 milliGRAM(s) Oral at bedtime  tamsulosin 0.4 milliGRAM(s) Oral at bedtime    MEDICATIONS  (PRN):  aluminum hydroxide/magnesium hydroxide/simethicone Suspension 30 milliLiter(s) Oral every 4 hours PRN Dyspepsia  dextrose Oral Gel 15 Gram(s) Oral once PRN Blood Glucose LESS THAN 70 milliGRAM(s)/deciliter  HYDROmorphone  Injectable 0.25 milliGRAM(s) IV Push every 6 hours PRN Severe Pain (7 - 10)  ondansetron Injectable 4 milliGRAM(s) IV Push every 8 hours PRN Nausea and/or Vomiting  sodium chloride 0.9% lock flush 10 milliLiter(s) IV Push every 1 hour PRN Pre/post blood products, medications, blood draw, and to maintain line patency  traMADol 50 milliGRAM(s) Oral three times a day PRN Moderate Pain (4 - 6)      OBJECTIVE    T(C): 36.4 (12-30-23 @ 13:50), Max: 37.7 (12-30-23 @ 10:10)  HR: 92 (12-30-23 @ 13:50) (84 - 100)  BP: 116/55 (12-30-23 @ 13:50) (89/60 - 116/55)  RR: 18 (12-30-23 @ 13:50) (17 - 18)  SpO2: 95% (12-30-23 @ 13:50) (91% - 97%)  Wt(kg): --  I&O's Summary        REVIEW OF SYSTEMS:  CONSTITUTIONAL: No fever, weight loss, or fatigue  EYES: No eye pain, visual disturbances, or discharge  ENMT:   No sinus or throat pain  NECK: No pain or stiffness  RESPIRATORY: No cough, wheezing, chills or hemoptysis; No shortness of breath  CARDIOVASCULAR: No chest pain, palpitations, dizziness, or leg swelling  GASTROINTESTINAL: No abdominal pain. No nausea, vomiting; No diarrhea or constipation. No melena or hematochezia.  GENITOURINARY: No dysuria, frequency, hematuria, or incontinence  NEUROLOGICAL: No headaches, memory loss, loss of strength, numbness, or tremors  SKIN: No itching, burning, rashes, or lesions   MUSCULOSKELETAL: No joint pain or swelling; No muscle, back, or extremity pain    PHYSICAL EXAM:  Appearance: NAD. VS past 24 hrs -as above   HEENT:   Moist oral mucosa. Conjunctiva clear b/l.   Neck : supple  Respiratory: Lungs CTAB.  Gastrointestinal:  Soft, nontender. No rebound. No rigidity. BS present	  Cardiovascular: RRR ,S1S2 present  Neurologic: Non-focal. Moving all extremities.  Extremities: No edema. No erythema. No calf tenderness.  Skin: No rashes, No ecchymoses, No cyanosis.	  wounds ,skin lesions-See skin assesment flow sheet   LABS:          CAPILLARY BLOOD GLUCOSE      POCT Blood Glucose.: 134 mg/dL (30 Dec 2023 11:44)  POCT Blood Glucose.: 146 mg/dL (30 Dec 2023 08:05)  POCT Blood Glucose.: 119 mg/dL (29 Dec 2023 21:47)  POCT Blood Glucose.: 89 mg/dL (29 Dec 2023 17:09)          RADIOLOGY & ADDITIONAL TESTS:   reviewed elctronically  ASSESSMENT/PLAN: 	  25 minutes aggregate time was spent on this visit, 50% visit time spent in care co-ordination with other attendings and counselling patient .I have discussed care plan with patient / HCP/family member ,who expressed understanding of problems treatment and their effect and side effects, alternatives in details. I have asked if they have any questions and concerns and appropriately addressed them to best of my ability.    PROGRESS NOTE  Patient is a 73y old  Male who presents with a chief complaint of SOB (28 Dec 2023 20:30)    Chart and available morning labs /imaging are reviewed electronically , urgent issues addressed . More information  is being added upon completion of rounds , when more information is collected and management discussed with consultants , medical staff and social service/case management on the floor   OVERNIGHT    No new issues reported by medical staff . All above noted Patient is resting in a bed comfortably .Confused ,poor mentation .No distress noted   HPI:  73-year-old male with past medical history of dementia, COPD, end-stage renal disease, A-fib on anticoagulation, glaucoma, asthma, depression, hypertension, T2DM, hypocalcemia, hypoparathyroidism was BIB EMS from [place] for the clogged AVF fistula, could not receive HD today.  Last HD was 12/22/23. Pt has had a fall 3 weeks ago which he was seen in the ED for, had CT head, neck and chest-- all unremarkable. Pt c/o mild lower back pain since the fall. (26 Dec 2023 18:31)    PAST MEDICAL & SURGICAL HISTORY:  ASHD (arteriosclerotic heart disease)      CAD (coronary artery disease)  s/p stent, CABG      CRI (chronic renal insufficiency)  ESRD on HD      Diabetes mellitus type II      Hypertension      Hyperlipidemia      Pacemaker      Chronic atrial fibrillation      Asthma      Hypothyroid      Dementia      H/O carotid stenosis      Enlarged prostate      Hx of CABG      Coronary stent      Cardiac pacemaker      History of amputation of toe  right great toe          MEDICATIONS  (STANDING):  acetaminophen     Tablet .. 650 milliGRAM(s) Oral every 8 hours  aMIOdarone    Tablet 200 milliGRAM(s) Oral daily  apixaban 5 milliGRAM(s) Oral two times a day  ascorbic acid 500 milliGRAM(s) Oral daily  brimonidine 0.2% Ophthalmic Solution 1 Drop(s) Both EYES two times a day  budesonide  80 MICROgram(s)/formoterol 4.5 MICROgram(s) Inhaler 2 Puff(s) Inhalation two times a day  calcitriol   Capsule 0.25 MICROGram(s) Oral <User Schedule>  calcium acetate 667 milliGRAM(s) Oral three times a day with meals  dextrose 5%. 1000 milliLiter(s) (50 mL/Hr) IV Continuous <Continuous>  dextrose 5%. 1000 milliLiter(s) (100 mL/Hr) IV Continuous <Continuous>  dextrose 50% Injectable 12.5 Gram(s) IV Push once  dextrose 50% Injectable 25 Gram(s) IV Push once  dextrose 50% Injectable 25 Gram(s) IV Push once  dorzolamide 2%/timolol 0.5% Ophthalmic Solution 1 Drop(s) Both EYES two times a day  epoetin kayla-epbx (RETACRIT) Injectable 01336 Unit(s) IV Push <User Schedule>  finasteride 5 milliGRAM(s) Oral daily  glucagon  Injectable 1 milliGRAM(s) IntraMuscular once  glucagon  Injectable 1 milliGRAM(s) IntraMuscular once  insulin lispro (ADMELOG) corrective regimen sliding scale   SubCutaneous at bedtime  insulin lispro (ADMELOG) corrective regimen sliding scale   SubCutaneous three times a day before meals  lactobacillus acidophilus 1 Tablet(s) Oral daily  latanoprost 0.005% Ophthalmic Solution 1 Drop(s) Both EYES at bedtime  levothyroxine 50 MICROGram(s) Oral daily  lidocaine   4% Patch 1 Patch Transdermal every 24 hours  melatonin 5 milliGRAM(s) Oral at bedtime  midodrine. 10 milliGRAM(s) Oral every 8 hours  sertraline 50 milliGRAM(s) Oral at bedtime  sertraline 25 milliGRAM(s) Oral at bedtime  simvastatin 10 milliGRAM(s) Oral at bedtime  tamsulosin 0.4 milliGRAM(s) Oral at bedtime    MEDICATIONS  (PRN):  aluminum hydroxide/magnesium hydroxide/simethicone Suspension 30 milliLiter(s) Oral every 4 hours PRN Dyspepsia  dextrose Oral Gel 15 Gram(s) Oral once PRN Blood Glucose LESS THAN 70 milliGRAM(s)/deciliter  HYDROmorphone  Injectable 0.25 milliGRAM(s) IV Push every 6 hours PRN Severe Pain (7 - 10)  ondansetron Injectable 4 milliGRAM(s) IV Push every 8 hours PRN Nausea and/or Vomiting  sodium chloride 0.9% lock flush 10 milliLiter(s) IV Push every 1 hour PRN Pre/post blood products, medications, blood draw, and to maintain line patency  traMADol 50 milliGRAM(s) Oral three times a day PRN Moderate Pain (4 - 6)      OBJECTIVE    T(C): 36.4 (12-30-23 @ 13:50), Max: 37.7 (12-30-23 @ 10:10)  HR: 92 (12-30-23 @ 13:50) (84 - 100)  BP: 116/55 (12-30-23 @ 13:50) (89/60 - 116/55)  RR: 18 (12-30-23 @ 13:50) (17 - 18)  SpO2: 95% (12-30-23 @ 13:50) (91% - 97%)  Wt(kg): --  I&O's Summary        REVIEW OF SYSTEMS:  CONSTITUTIONAL: No fever, weight loss, or fatigue  EYES: No eye pain, visual disturbances, or discharge  ENMT:   No sinus or throat pain  NECK: No pain or stiffness  RESPIRATORY: No cough, wheezing, chills or hemoptysis; No shortness of breath  CARDIOVASCULAR: No chest pain, palpitations, dizziness, or leg swelling  GASTROINTESTINAL: No abdominal pain. No nausea, vomiting; No diarrhea or constipation. No melena or hematochezia.  GENITOURINARY: No dysuria, frequency, hematuria, or incontinence  NEUROLOGICAL: No headaches, memory loss, loss of strength, numbness, or tremors  SKIN: No itching, burning, rashes, or lesions   MUSCULOSKELETAL: No joint pain or swelling; No muscle, back, or extremity pain    PHYSICAL EXAM:  Appearance: NAD. VS past 24 hrs -as above   HEENT:   Moist oral mucosa. Conjunctiva clear b/l.   Neck : supple  Respiratory: Lungs CTAB.  Gastrointestinal:  Soft, nontender. No rebound. No rigidity. BS present	  Cardiovascular: RRR ,S1S2 present  Neurologic: Non-focal. Moving all extremities.  Extremities: No edema. No erythema. No calf tenderness.  Skin: No rashes, No ecchymoses, No cyanosis.	  wounds ,skin lesions-See skin assesment flow sheet   LABS:          CAPILLARY BLOOD GLUCOSE      POCT Blood Glucose.: 134 mg/dL (30 Dec 2023 11:44)  POCT Blood Glucose.: 146 mg/dL (30 Dec 2023 08:05)  POCT Blood Glucose.: 119 mg/dL (29 Dec 2023 21:47)  POCT Blood Glucose.: 89 mg/dL (29 Dec 2023 17:09)          RADIOLOGY & ADDITIONAL TESTS:   reviewed elctronically  ASSESSMENT/PLAN: 	  25 minutes aggregate time was spent on this visit, 50% visit time spent in care co-ordination with other attendings and counselling patient .I have discussed care plan with patient / HCP/family member ,who expressed understanding of problems treatment and their effect and side effects, alternatives in details. I have asked if they have any questions and concerns and appropriately addressed them to best of my ability.

## 2023-12-30 NOTE — DISCHARGE NOTE PROVIDER - NSDCQMAMI_CARD_ALL_CORE
Patient not reached. Preop instructions left on voice mail.  [facility]__[phone]_____________  0530  -Date_5/2/19______time_0700______arrival_0530___________  -Nothing to eat or drink after midnight  -Responsible adult 25 or older to stay on site while you are here and drive you home and stay with you after  -Follow any instructions your doctors office has given you  -Bring a complete list of all your medications and supplements  -If you normally take the following medications in the morning please do so with a small    sip of water-heart,blood pressure,seizure,breathing or thyroid-avoid water pillls and any blood pressure medications ending in \"garland\" or \"pril\"  -You may use your inhalers  -Take half of your normal dose of any long acting insulins the night before-do not take    any diabetic medications in the morning  -Follow your doctors instructions regarding blood thinners  -Any questions call your surgeons office  Anesthesia attempts to review all Endo charts prior to surgery and will place any PAT orders,Surgery patients will have orders placed based on history in chart which may not be complete  ENDOSCOPY PATIENTS ONLY-FOLLOW YOUR DOCTORS BOWEL PREP INSTRUCTIONS,THIS MAY INCLUDE TAKING A SECOND PORTION OF YOUR PREP AFTER MIDNIGHT Post-procedure education reviewed with patient and visitor, and they verbalized understanding. Discharge instructions reviewed with patient/responsible adult. All home medications have been reviewed, questions answered and patient verbalized understanding. Script for Viann Creed given to patient per Dr. Macy Huang. Discharge instructions signed and copies given. Patient discharged via wheelchair per patient's request to home with belongings. Reviewed patient's medical and surgical history in electronic record and with patient at the bedside. All questions regarding procedure answered. Scope number and equipment verified using a two person system. Family in waiting room.     Electronically signed by Isa Begum RN on 5/2/2019 at 6:57 AM Cigarettes     Last attempt to quit: 3/1/2010     Years since quittin.1    Smokeless tobacco: Never Used    Tobacco comment: H.O.smoking 1 p.p.d x 30+ yrs / Quit 3/1/2010   Substance and Sexual Activity    Alcohol use: Yes     Comment: 1 beer/mo.     Drug use: Never    Sexual activity: Not on file   Lifestyle    Physical activity:     Days per week: Not on file     Minutes per session: Not on file    Stress: Not on file   Relationships    Social connections:     Talks on phone: Not on file     Gets together: Not on file     Attends Jewish service: Not on file     Active member of club or organization: Not on file     Attends meetings of clubs or organizations: Not on file     Relationship status: Not on file    Intimate partner violence:     Fear of current or ex partner: Not on file     Emotionally abused: Not on file     Physically abused: Not on file     Forced sexual activity: Not on file   Other Topics Concern    Not on file   Social History Narrative    Not on file       Family History:      Problem Relation Age of Onset    Heart Disease Mother        Vital Signs:  Vitals:    19 0546   BP: 112/82   Pulse: 83   Resp: 16   Temp: 96.9 °F (36.1 °C)   SpO2: 100% No

## 2023-12-30 NOTE — ED PROVIDER NOTE - PHYSICAL EXAMINATION
Instructed patient/caregiver regarding signs and symptoms of infection./Instructed patient/caregiver to follow-up with primary care physician.
Constitutional: Awake, Alert, non-toxic. No acute distress.  HEAD: Normocephalic, atraumatic.   EYES: PERRL, EOM intact, conjunctiva and sclera are clear bilaterally.  ENT: External ears normal. No rhinorrhea, no tracheal deviation   NECK: Supple, non-tender  CARDIOVASCULAR: irregular rate and rhythm.  RESPIRATORY: Normal respiratory effort; breath sounds are diminished b/l. No accessory muscle use.   ABDOMEN: Soft; non-tender, non-distended. No rebound or guarding.   MSK:  no lower extremity edema, no deformities  SKIN: Warm, dry  NEURO: A&O x1. speaks short sentences, minimally conversant with brother at bedside

## 2023-12-30 NOTE — ED ADULT TRIAGE NOTE - CHIEF COMPLAINT QUOTE
patient discharged from hospital today- on arrival to [facility]-patient found to be hypotensive- 70/40mmhg, and oxygen saturation - 85% on room air- as per ems- bp- 85/55mmhg, aptient on 2 litres nasal cannula - saturation at 97% patient discharged from hospital today- on arrival to [facility]-patient found to be hypotensive- 70/40mmhg, and oxygen saturation - 85% on room air- as per ems- bp- 85/55mmhg, aptient on 2 litres nasal cannula - saturation at 97%

## 2023-12-30 NOTE — ED PROVIDER NOTE - CARE PLAN
1 Principal Discharge DX:	Hypotension  Secondary Diagnosis:	Influenza A  Secondary Diagnosis:	Elevated troponin

## 2023-12-30 NOTE — CONSULT NOTE ADULT - SUBJECTIVE AND OBJECTIVE BOX
CHIEF COMPLAINT/ REASON FOR VISIT  .. Patient was seen to address the  issue listed under PROBLEM LIST which is located toward bottom of this note     LISA PEARSON    [facility]    Allergies    Levaquin (Anaphylaxis; Flushing; Short breath)    Intolerances        PAST MEDICAL & SURGICAL HISTORY:  ASHD (arteriosclerotic heart disease)      CAD (coronary artery disease)  s/p stent, CABG      CRI (chronic renal insufficiency)  ESRD on HD      Diabetes mellitus type II      Hypertension      Hyperlipidemia      Pacemaker      Chronic atrial fibrillation      Asthma      Hypothyroid      Dementia      H/O carotid stenosis      Enlarged prostate      Hx of CABG      Coronary stent      Cardiac pacemaker      History of amputation of toe  right great toe          FAMILY HISTORY:  Family history of chronic ischemic heart disease (Father)  father  of MI at age 59        Home Medications:  acetaminophen 325 mg oral tablet: 2 tab(s) orally 3 times a day as needed for  mild pain (30 Dec 2023 13:03)  Acidophilus oral capsule: 1 cap(s) orally once a day (28 Dec 2023 19:43)  amiodarone 200 mg oral tablet: 1 tab(s) orally once a day (30 Dec 2023 13:03)  apixaban 5 mg oral tablet: 1 tab(s) orally 2 times a day (30 Dec 2023 13:03)  Aspercreme Max Strength Lidocaine XL Patch 4% topical film: Apply topically to affected area once a day LEFT SHOULDER  10 AM -10 PM (30 Dec 2023 13:03)  brimonidine 0.2% ophthalmic solution: 1 drop(s) in each affected eye 2 times a day both eyes (28 Dec 2023 19:43)  calcitriol 0.25 mcg oral capsule: 1 cap(s) orally 3 times a week Monday, Wednesday, Friday (28 Dec 2023 19:43)  calcium acetate 667 mg oral capsule: 1 cap(s) orally 3 times a day (28 Dec 2023 19:43)  dorzolamide-timolol 2.23%-0.68% (2%-0.5% base) ophthalmic solution: 1 drop(s) in each eye 2 times a day (28 Dec 2023 19:44)  finasteride 5 mg oral tablet: 1 tab(s) orally once a day (at bedtime) (28 Dec 2023 19:44)  latanoprost 0.005% ophthalmic solution: 1 drop(s) in each eye once a day (at bedtime) Both eyes (28 Dec 2023 19:44)  Levothroid 50 mcg (0.05 mg) oral tablet: 1 tab(s) orally once a day (28 Dec 2023 19:44)  melatonin 5 mg oral tablet: 1 tab(s) orally once a day (at bedtime) (28 Dec 2023 19:44)  menthol 5% topical pad: Apply topically to affected area once a day from 7am - 3pm (28 Dec 2023 19:44)  midodrine 10 mg oral tablet: 1 tab(s) orally 2 times a day hold for SBP &gt;130 (28 Dec 2023 19:44)  sertraline 25 mg oral tablet: 1 tab(s) orally once a day total daily dose- 75mg (28 Dec 2023 19:44)  sertraline 50 mg oral tablet: 1 tab(s) orally once a day (at bedtime) total dose- 75mg daily (28 Dec 2023 19:44)  simvastatin 10 mg oral tablet: 1 tab(s) orally once a day (at bedtime) (28 Dec 2023 19:44)  Symbicort 80 mcg-4.5 mcg/inh inhalation aerosol: 2 puff(s) inhaled 2 times a day (28 Dec 2023 19:44)  tamsulosin 0.4 mg oral capsule: 1 cap(s) orally once a day (at bedtime) (28 Dec 2023 19:44)  traMADol 50 mg oral tablet: 1 tab(s) orally 3 times a day As needed Moderate Pain (4 - 6) (30 Dec 2023 13:03)  Vitamin C 500 mg oral tablet, chewable: 1 tab(s) chewed once a day (28 Dec 2023 19:44)      MEDICATIONS  (STANDING):    MEDICATIONS  (PRN):              Vital Signs Last 24 Hrs  T(C): 37.2 (30 Dec 2023 22:51), Max: 37.7 (30 Dec 2023 10:10)  T(F): 98.9 (30 Dec 2023 22:51), Max: 99.8 (30 Dec 2023 10:10)  HR: 83 (30 Dec 2023 22:51) (83 - 102)  BP: 112/58 (30 Dec 2023 22:51) (89/60 - 116/55)  BP(mean): --  RR: 17 (30 Dec 2023 22:51) (17 - 18)  SpO2: 98% (30 Dec 2023 22:51) (93% - 100%)    Parameters below as of 30 Dec 2023 22:51  Patient On (Oxygen Delivery Method): nasal cannula  O2 Flow (L/min): 2                LABS:                        8.5    4.80  )-----------( 155      ( 30 Dec 2023 21:40 )             26.6         137  |  98  |  17  ----------------------------<  88  3.6   |  28  |  3.20<H>    Ca    8.1<L>      30 Dec 2023 21:40    TPro  6.0  /  Alb  3.0<L>  /  TBili  0.7  /  DBili  x   /  AST  288<H>  /  ALT  129<H>  /  AlkPhos  178<H>      PT/INR - ( 30 Dec 2023 21:40 )   PT: 27.3 sec;   INR: 2.39 ratio         PTT - ( 30 Dec 2023 21:40 )  PTT:34.7 sec  Urinalysis Basic - ( 30 Dec 2023 21:40 )    Color: x / Appearance: x / SG: x / pH: x  Gluc: 88 mg/dL / Ketone: x  / Bili: x / Urobili: x   Blood: x / Protein: x / Nitrite: x   Leuk Esterase: x / RBC: x / WBC x   Sq Epi: x / Non Sq Epi: x / Bacteria: x            WBC:  WBC Count: 4.80 K/uL ( @ 21:40)  WBC Count: 4.40 K/uL ( @ 08:09)  WBC Count: 8.90 K/uL ( @ 21:53)  WBC Count: 4.02 K/uL ( @ 08:12)      MICROBIOLOGY:  RECENT CULTURES:              PT/INR - ( 30 Dec 2023 21:40 )   PT: 27.3 sec;   INR: 2.39 ratio         PTT - ( 30 Dec 2023 21:40 )  PTT:34.7 sec    Sodium:  Sodium: 137 mmol/L ( @ 21:40)  Sodium: 136 mmol/L ( @ 08:09)  Sodium: 136 mmol/L ( @ 08:12)      3.20 mg/dL  @ 21:40  5.20 mg/dL  @ 08:09  4.20 mg/dL  @ 08:12      Hemoglobin:  Hemoglobin: 8.5 g/dL ( @ 21:40)  Hemoglobin: 8.5 g/dL ( @ 08:09)  Hemoglobin: 9.3 g/dL ( @ 21:53)  Hemoglobin: 9.9 g/dL ( @ 08:12)      Platelets: Platelet Count - Automated: 155 K/uL ( @ 21:40)  Platelet Count - Automated: 155 K/uL ( @ 08:09)  Platelet Count - Automated: 145 K/uL ( @ 21:53)  Platelet Count - Automated: 181 K/uL ( @ 08:12)      LIVER FUNCTIONS - ( 30 Dec 2023 21:40 )  Alb: 3.0 g/dL / Pro: 6.0 g/dL / ALK PHOS: 178 U/L / ALT: 129 U/L / AST: 288 U/L / GGT: x             Urinalysis Basic - ( 30 Dec 2023 21:40 )    Color: x / Appearance: x / SG: x / pH: x  Gluc: 88 mg/dL / Ketone: x  / Bili: x / Urobili: x   Blood: x / Protein: x / Nitrite: x   Leuk Esterase: x / RBC: x / WBC x   Sq Epi: x / Non Sq Epi: x / Bacteria: x        RADIOLOGY & ADDITIONAL STUDIES:      MICROBIOLOGY:  RECENT CULTURES:

## 2023-12-30 NOTE — DISCHARGE NOTE PROVIDER - CARE PROVIDER_API CALL
----- Message from DAVID Guerra sent at 4/10/2023  6:46 AM CDT -----  Please call patient and inform them that their Gonorrhea/Chlamydia (and wet mount if they were not informed of this previously) is negative/normal. Thank you     Keny Cannon  Pulmonary Disease  [facility] [place] [address]  Phone: [phone]  Fax: [phone]  Follow Up Time: 1 week    Gordon Garcia  Cardiology  [address] [place] [address] [address]  [place] [address]  Phone: [phone]  Fax: [phone]  Follow Up Time: 2 weeks   Keny Cannon  Pulmonary Disease  [facility] [place] [address]  Phone: [phone]  Fax: [phone]  Follow Up Time: 1 week    Gordon Garcia  Cardiology  [address] [place] [address] 1  [place] [address]  Phone: [phone]  Fax: [phone]  Follow Up Time: 2 weeks   Keny Cannon  Pulmonary Disease  [address] [place] [address]  Phone: [phone]  Fax: [phone]  Follow Up Time: 1 week    Gordon Garcia  Cardiology  [address], [address]  [place] [address]  Phone: [phone]  Fax: [phone]  Follow Up Time: 2 weeks    Fortino Crystal  Nephrology  [address], [address]  [place] [address]  Phone: [phone]  Fax: [phone]  Follow Up Time: 1 week   Keny Cannon  Pulmonary Disease  [address] [place] [address]  Phone: [phone]  Fax: [phone]  Follow Up Time: 1 week    Gordon Garcia  Cardiology  [address], [address]  [place] [address]  Phone: [phone]  Fax: [phone]  Follow Up Time: 2 weeks    Fortino Crystal  Nephrology  [address], [address]  [place] [address]  Phone: [phone]  Fax: [phone]  Follow Up Time: 1 week

## 2023-12-30 NOTE — ED PROVIDER NOTE - OBJECTIVE STATEMENT
Patient with past medical history of end-stage renal disease on dialysis, chronic hypotension, afib, recent admission for clogged AV fistula is presenting for hypotension and hypoxia.  Patient was discharged back to [facility] today but sent to ED due to hypotension.  Otherwise denies any new complaints.  Family member at bedside able to provide history for patient. Patient with past medical history of end-stage renal disease on dialysis, chronic hypotension, afib, recent admission for clogged AV fistula is presenting for hypotension and hypoxia.  Patient was discharged back to [facility] today but sent to ED due to hypotension.  Otherwise denies any new complaints.  Family member at bedside able to provide history for patient.

## 2023-12-30 NOTE — ED ADULT NURSE NOTE - OBJECTIVE STATEMENT
pt discharged today from floor, hypoxic and hypotensive on arrival back to facility. sent back to ED. Pt A&Ox3. VSS currently. pt is experiencing wet cough. denies chest pain, sob, distress. complains of chronic back pain. brother at bedside as proxy. uknown onset of cough

## 2023-12-30 NOTE — PROGRESS NOTE ADULT - PROBLEM SELECTOR PROBLEM 4
DM II (diabetes mellitus, type II), controlled

## 2023-12-30 NOTE — PROGRESS NOTE ADULT - ASSESSMENT
ESRD on HD  clotted AVF  Hypertension    12/26/23: IR to place dialysis catheter. Vascular surgery evaluation. Monitor BP trend. Titrate BP meds as needed. To continue current meds.   Dietary and PO fluid restriction.   12/27/23: s/p HD yesterday. Vascular surgery follow up. To continue current meds. Labile BP.   12/28/23: HD today. Low BP. On midodrine. Albumin x 1 dose. No fluid removal. Follow BP trend.   12/29/23: Next HD tomorrow. Ok for d/c post HD if stable.   12/30/23: HD today. Fluid removal as tolerated. Labile BP. D/c planning.  ESRD on HD  clotted AVF, s/p thrombectomy, and venoplasty  Hypertension    12/26/23: IR to place dialysis catheter. Vascular surgery evaluation. Monitor BP trend. Titrate BP meds as needed. To continue current meds.   Dietary and PO fluid restriction.   12/27/23: s/p HD yesterday. Vascular surgery follow up. To continue current meds. Labile BP.   12/28/23: HD today. Low BP. On midodrine. Albumin x 1 dose. No fluid removal. Follow BP trend.   12/29/23: Next HD tomorrow. Ok for d/c post HD if stable.   12/30/23: HD today. Fluid removal as tolerated. Labile BP. D/c planning.

## 2023-12-30 NOTE — ED ADULT TRIAGE NOTE - NURSING HOMES
[facility]

## 2023-12-30 NOTE — DISCHARGE NOTE PROVIDER - PROVIDER TOKENS
PROVIDER:[TOKEN:[[id number]],FOLLOWUP:[1 week]],PROVIDER:[TOKEN:[[id number]],FOLLOWUP:[2 weeks]] PROVIDER:[TOKEN:[[id number]],FOLLOWUP:[1 week]],PROVIDER:[TOKEN:[[id number]],FOLLOWUP:[2 weeks]],PROVIDER:[TOKEN:[[id number]],FOLLOWUP:[1 week]] PROVIDER:[TOKEN:[[id number]],FOLLOWUP:[1 week]],PROVIDER:[TOKEN:[[id number]],FOLLOWUP:[2 weeks]],PROVIDER:[TOKEN:[[id number]],FOLLOWUP:[1 week]]

## 2023-12-30 NOTE — PROGRESS NOTE ADULT - PROBLEM SELECTOR PLAN 4
Accuchecks monitoring and insulin corrective regimen  sliding scale coverage with short acting inslulin, add longacting insulin as needed ,no concentrated sweets diet, serial labs ,HbA1C,education

## 2023-12-30 NOTE — DISCHARGE NOTE PROVIDER - HOSPITAL COURSE
73-year-old male with past medical history of dementia, COPD, end-stage renal disease, A-fib on anticoagulation, glaucoma, asthma, depression, hypertension, hypocalcemia, hypoparathyroidism was BIB EMS from [place] for the clogged AVF fistula, could not receive HD today.  Last HD was 12/22/23. Pt has had a fall 3 weeks ago which he was seen in the ED for, had CT head, neck and chest-- all unremarkable. Pt c/o mild lower back pain since the fall.1.ESRD on HD  2.clotted AVF  3.Hypertension  IR to place dialysis catheter. Vascular surgery evaluation. Monitor BP trend. Titrate BP meds as needed. To continue current meds.   Dietary and PO fluid restriciton. Patient  w/ thrombosed AV fistula now s/p temp cath by IR 12/26.   - catheter placed by IR 12/26 planned for OR 12/27      Problem/Plan - 1:  ·  Problem: ESRD on hemodialysis.   ·  Plan: 12/26/23: IR to place dialysis catheter. Vascular surgery evaluation. Monitor BP trend. Titrate BP meds as needed. To continue current meds.   Dietary and PO fluid restriction.   12/27/23: s/p HD yesterday. Vascular surgery follow up. To continue current meds. Labile BP.   12/28/23: HD today. Low BP. On midodrine. Albumin x 1 dose. No fluid removal. Follow BP trend.    Problem/Plan - 2:  ·  Problem: Clotted renal dialysis AV graft.   ·  Plan: Seen by vascular team -RUE AVF cannulated without difficulty  Receiving HD without flow restrictions or alarms  Can dc R IJ [facility] and continue HD via R AVF.    Problem/Plan - 3:  ·  Problem: HTN (hypertension).   ·  Plan: continue home medications.    Problem/Plan - 4:  ·  Problem: DM II (diabetes mellitus, type II), controlled.   ·  Plan: Accuchecks monitoring and insulin corrective regimen  sliding scale coverage with short acting inslulin, add longacting insulin as needed ,no concentrated sweets diet, serial labs ,HbA1C,education.    Problem/Plan - 5:  ·  Problem: Afib.   ·  Plan: continue home medications.    Problem/Plan - 6:  ·  Problem: Asthma.   ·  Plan: continue home medications.    Problem/Plan - 7:  ·  Problem: Dementia.   ·  Plan: Supportive care,frequent redirection,continue home medications,management of agitation as needed.    Problem/Plan - 8:  ·  Problem: Hypothyroidism.   ·  Plan: continue home medications.    Problem/Plan - 9:  ·  Problem: Left shoulder pain.   ·  Plan: chronic.    Problem/Plan - 10:  ·  Problem: HLD (hyperlipidemia).   ·  Plan; continue home medications.    Problem/Plan - 11:  ·  Problem: Prophylactic measure.   ·  Plan: Gastrointestinal stress ulcer prophylaxis and DVT prophylaxis administered.

## 2023-12-30 NOTE — DISCHARGE NOTE PROVIDER - NSDCCPTREATMENT_GEN_ALL_CORE_FT
PRINCIPAL PROCEDURE  Procedure: Placement, catheter, venous, hemodialysis  Findings and Treatment:

## 2023-12-30 NOTE — DISCHARGE NOTE PROVIDER - NPI NUMBER (FOR SYSADMIN USE ONLY) :
[[id number]],[[id number] [[id number]],[[id number]] [[id number]],[[id number]],[[id number]] [[id number]],[[id number]],[[id number]]

## 2023-12-30 NOTE — CONSULT NOTE ADULT - ASSESSMENT
Initial evaluation/Pulmonary Critical Care consultation requested by  DR KHAN  on  12/30/2023 from Dr Keny Cannon    Patient examined chart reviewed    HOSPITAL ADMISSION   PATIENT CAME  FROM (if information available)      ABGS.   .  VS/ PO/IO/ VENT/ DRIPS.   12/30/2023 afeb 83 110/58   12/30/2023 2l 98%      PRESENTATION.  . CC 12/30/2023. Pt DCed 12/30/2023 On arrival to [facility] SBP 70 RA rest po 85%  . HPI 12/30/2023.73 m with past medical history of end-stage renal disease on dialysis, chronic hypotension, afib, recent admission for clogged AV fistula is presenting for hypotension and hypoxia.  Patient was discharged back to [facility] today but sent to ED due to hypotension.  Otherwise denies any new complaints  . PMHx . ESRD A fib Recent clogged av fistula asthma   . HOME MEDS . levoxyl 50 amiodarone 200 apixaba 5.2 finasteride 5 sertraline 25 simvastatin 10 midodrine 10.2 symbicort 80   . PROSTHETICS POA.   .. 1) RUE AV fistula    .. 2) l sc PPM incision without palp generator )saus was taken out because it was hurting him)  .. 3) RIJ Temp HD cath was inserted 12/26  . DECUB POA.   COURSE.   PROBLEM DATA.  INFECTION  .. w 12/30/2023 w 4.8   .. cxr 12/30/2023 sl incr markings elevated l diaphragm   .. Doubt infection     . SHORTNESS OF BREATH   .. Likely sec fluid overload   .. suggest dialysis     . HYPOXIA  .. VTE unlikely as pt is on apixaba  .. Likely sec asthma fluid overload  .. target po 90-95%     . ASTHMA   .. 12/30/2023 symbicort  .. 12/30/2023 spiriva     . ANEMIA   .. Hb 12/30/2023 Hb 8.5   .. Monitor   . .  . SYNOPSIS.   . 73 m with past medical history of end-stage renal disease on dialysis, chronic hypotension, afib, recent admission for clogged AV fistula 12/26-12/30 admitted 12/30/2023  for hypotension and hypoxia.      . OVERALL PLAN.   . ESRD HD  . ASTHMA Symbicort  . FLUID OVERLOAD   . ANEMIA Target Hb 7 (+)     TIME SPENT.  . Over 55  minutes aggregate care time spent on encounter; activities included   direct patient care, counseling and/or coordinating care reviewing notes, lab data/ imaging , discussion with multidisciplinary team/ patient  /family and explaining in detail risks, benefits, alternatives  of the recommendations     PATIENT.  . MICHEL GONZALEZ 73 m 12/30/2023 1951 DR KRISS KHAN       Initial evaluation/Pulmonary Critical Care consultation requested by  DR KHAN  on  12/30/2023 from Dr Keny Cannon    Patient examined chart reviewed    HOSPITAL ADMISSION   PATIENT CAME  FROM (if information available)      ABGS.   .  VS/ PO/IO/ VENT/ DRIPS.   12/30/2023 afeb 83 110/58   12/30/2023 2l 98%      PRESENTATION.  . CC 12/30/2023. Pt DCed 12/30/2023 On arrival to [facility] SBP 70 RA rest po 85%  . HPI 12/30/2023.73 m with past medical history of end-stage renal disease on dialysis, chronic hypotension, afib, recent admission for clogged AV fistula is presenting for hypotension and hypoxia.  Patient was discharged back to [facility] today but sent to ED due to hypotension.  Otherwise denies any new complaints  . PMHx . ESRD A fib Recent clogged av fistula asthma   . HOME MEDS . levoxyl 50 amiodarone 200 apixaba 5.2 finasteride 5 sertraline 25 simvastatin 10 midodrine 10.2 symbicort 80   . PROSTHETICS POA.   .. 1) RUE AV fistula    .. 2) l sc PPM incision without palp generator )saus was taken out because it was hurting him)  .. 3) RIJ Temp HD cath was inserted 12/26  . DECUB POA.   COURSE.   PROBLEM DATA.  INFECTION  .. w 12/30/2023 w 4.8   .. cxr 12/30/2023 sl incr markings elevated l diaphragm   .. Doubt infection     . SHORTNESS OF BREATH   .. Likely sec fluid overload   .. suggest dialysis     . HYPOXIA  .. VTE unlikely as pt is on apixaba  .. Likely sec asthma fluid overload  .. target po 90-95%     . ASTHMA   .. 12/30/2023 symbicort  .. 12/30/2023 spiriva     . ANEMIA   .. Hb 12/30/2023 Hb 8.5   .. Monitor   . .  . SYNOPSIS.   . 73 m with past medical history of end-stage renal disease on dialysis, chronic hypotension, afib, recent admission for clogged AV fistula 12/26-12/30 admitted 12/30/2023  for hypotension and hypoxia.      . OVERALL PLAN.   . ESRD HD  . ASTHMA Symbicort  . FLUID OVERLOAD   . ANEMIA Target Hb 7 (+)     TIME SPENT.  . Over 55  minutes aggregate care time spent on encounter; activities included   direct patient care, counseling and/or coordinating care reviewing notes, lab data/ imaging , discussion with multidisciplinary team/ patient  /family and explaining in detail risks, benefits, alternatives  of the recommendations     PATIENT.  . MICHEL GONZALEZ 73 m 12/30/2023 1951 DR KRISS KHAN

## 2023-12-30 NOTE — PROGRESS NOTE ADULT - PROBLEM SELECTOR PLAN 1
12/26/23: IR to place dialysis catheter. Vascular surgery evaluation. Monitor BP trend. Titrate BP meds as needed. To continue current meds.   Dietary and PO fluid restriction.   12/27/23: s/p HD yesterday. Vascular surgery follow up. To continue current meds. Labile BP.   12/28/23: HD today. Low BP. On midodrine. Albumin x 1 dose. No fluid removal. Follow BP trend.

## 2023-12-30 NOTE — DISCHARGE NOTE PROVIDER - CARE PROVIDERS DIRECT ADDRESSES
,[email or web address],DirectAddress_[email or web address] ,[email or web address],[email or web address] ,[email or web address],DirectAddress_Unknown,DirectAddress_Unknown ,[email or web address],DirectAddress_Unknown,DirectAddress_Unknown

## 2023-12-30 NOTE — ED ADULT TRIAGE NOTE - NS ED NURSE AMBULANCES
[facility]

## 2023-12-31 DIAGNOSIS — Z29.9 ENCOUNTER FOR PROPHYLACTIC MEASURES, UNSPECIFIED: ICD-10-CM

## 2023-12-31 DIAGNOSIS — J96.01 ACUTE RESPIRATORY FAILURE WITH HYPOXIA: ICD-10-CM

## 2023-12-31 DIAGNOSIS — I95.9 HYPOTENSION, UNSPECIFIED: ICD-10-CM

## 2023-12-31 DIAGNOSIS — I48.91 UNSPECIFIED ATRIAL FIBRILLATION: ICD-10-CM

## 2023-12-31 DIAGNOSIS — E03.9 HYPOTHYROIDISM, UNSPECIFIED: ICD-10-CM

## 2023-12-31 DIAGNOSIS — E11.9 TYPE 2 DIABETES MELLITUS WITHOUT COMPLICATIONS: ICD-10-CM

## 2023-12-31 DIAGNOSIS — N18.6 END STAGE RENAL DISEASE: ICD-10-CM

## 2023-12-31 DIAGNOSIS — R79.89 OTHER SPECIFIED ABNORMAL FINDINGS OF BLOOD CHEMISTRY: ICD-10-CM

## 2023-12-31 DIAGNOSIS — R74.01 ELEVATION OF LEVELS OF LIVER TRANSAMINASE LEVELS: ICD-10-CM

## 2023-12-31 DIAGNOSIS — J10.1 INFLUENZA DUE TO OTHER IDENTIFIED INFLUENZA VIRUS WITH OTHER RESPIRATORY MANIFESTATIONS: ICD-10-CM

## 2023-12-31 DIAGNOSIS — I25.10 ATHEROSCLEROTIC HEART DISEASE OF NATIVE CORONARY ARTERY WITHOUT ANGINA PECTORIS: ICD-10-CM

## 2023-12-31 LAB
ALBUMIN SERPL ELPH-MCNC: 2.7 G/DL — LOW (ref 3.3–5)
ALBUMIN SERPL ELPH-MCNC: 2.7 G/DL — LOW (ref 3.3–5)
ALP SERPL-CCNC: 161 U/L — HIGH (ref 40–120)
ALP SERPL-CCNC: 161 U/L — HIGH (ref 40–120)
ALT FLD-CCNC: 140 U/L — HIGH (ref 12–78)
ALT FLD-CCNC: 140 U/L — HIGH (ref 12–78)
ANION GAP SERPL CALC-SCNC: 9 MMOL/L — SIGNIFICANT CHANGE UP (ref 5–17)
ANION GAP SERPL CALC-SCNC: 9 MMOL/L — SIGNIFICANT CHANGE UP (ref 5–17)
AST SERPL-CCNC: 335 U/L — HIGH (ref 15–37)
AST SERPL-CCNC: 335 U/L — HIGH (ref 15–37)
BILIRUB SERPL-MCNC: 0.7 MG/DL — SIGNIFICANT CHANGE UP (ref 0.2–1.2)
BILIRUB SERPL-MCNC: 0.7 MG/DL — SIGNIFICANT CHANGE UP (ref 0.2–1.2)
BUN SERPL-MCNC: 25 MG/DL — HIGH (ref 7–23)
BUN SERPL-MCNC: 25 MG/DL — HIGH (ref 7–23)
CALCIUM SERPL-MCNC: 7.7 MG/DL — LOW (ref 8.5–10.1)
CALCIUM SERPL-MCNC: 7.7 MG/DL — LOW (ref 8.5–10.1)
CHLORIDE SERPL-SCNC: 99 MMOL/L — SIGNIFICANT CHANGE UP (ref 96–108)
CHLORIDE SERPL-SCNC: 99 MMOL/L — SIGNIFICANT CHANGE UP (ref 96–108)
CK MB BLD-MCNC: 2.1 % — SIGNIFICANT CHANGE UP (ref 0–3.5)
CK MB BLD-MCNC: 2.1 % — SIGNIFICANT CHANGE UP (ref 0–3.5)
CK MB BLD-MCNC: <1.4 % — SIGNIFICANT CHANGE UP (ref 0–3.5)
CK MB BLD-MCNC: <1.4 % — SIGNIFICANT CHANGE UP (ref 0–3.5)
CK MB CFR SERPL CALC: 11.6 NG/ML — HIGH (ref 0–3.6)
CK MB CFR SERPL CALC: 11.6 NG/ML — HIGH (ref 0–3.6)
CK MB CFR SERPL CALC: <1 NG/ML — SIGNIFICANT CHANGE UP (ref 0–3.6)
CK MB CFR SERPL CALC: <1 NG/ML — SIGNIFICANT CHANGE UP (ref 0–3.6)
CK SERPL-CCNC: 563 U/L — HIGH (ref 26–308)
CK SERPL-CCNC: 563 U/L — HIGH (ref 26–308)
CK SERPL-CCNC: 71 U/L — SIGNIFICANT CHANGE UP (ref 26–308)
CK SERPL-CCNC: 71 U/L — SIGNIFICANT CHANGE UP (ref 26–308)
CO2 SERPL-SCNC: 28 MMOL/L — SIGNIFICANT CHANGE UP (ref 22–31)
CO2 SERPL-SCNC: 28 MMOL/L — SIGNIFICANT CHANGE UP (ref 22–31)
CREAT SERPL-MCNC: 3.8 MG/DL — HIGH (ref 0.5–1.3)
CREAT SERPL-MCNC: 3.8 MG/DL — HIGH (ref 0.5–1.3)
EGFR: 16 ML/MIN/1.73M2 — LOW
EGFR: 16 ML/MIN/1.73M2 — LOW
GLUCOSE SERPL-MCNC: 70 MG/DL — SIGNIFICANT CHANGE UP (ref 70–99)
GLUCOSE SERPL-MCNC: 70 MG/DL — SIGNIFICANT CHANGE UP (ref 70–99)
HCT VFR BLD CALC: 26.1 % — LOW (ref 39–50)
HCT VFR BLD CALC: 26.1 % — LOW (ref 39–50)
HGB BLD-MCNC: 8.1 G/DL — LOW (ref 13–17)
HGB BLD-MCNC: 8.1 G/DL — LOW (ref 13–17)
LACTATE SERPL-SCNC: 1.5 MMOL/L — SIGNIFICANT CHANGE UP (ref 0.7–2)
LACTATE SERPL-SCNC: 1.5 MMOL/L — SIGNIFICANT CHANGE UP (ref 0.7–2)
MAGNESIUM SERPL-MCNC: 2 MG/DL — SIGNIFICANT CHANGE UP (ref 1.6–2.6)
MAGNESIUM SERPL-MCNC: 2 MG/DL — SIGNIFICANT CHANGE UP (ref 1.6–2.6)
MCHC RBC-ENTMCNC: 31 GM/DL — LOW (ref 32–36)
MCHC RBC-ENTMCNC: 31 GM/DL — LOW (ref 32–36)
MCHC RBC-ENTMCNC: 33.3 PG — SIGNIFICANT CHANGE UP (ref 27–34)
MCHC RBC-ENTMCNC: 33.3 PG — SIGNIFICANT CHANGE UP (ref 27–34)
MCV RBC AUTO: 107.4 FL — HIGH (ref 80–100)
MCV RBC AUTO: 107.4 FL — HIGH (ref 80–100)
NRBC # BLD: 0 /100 WBCS — SIGNIFICANT CHANGE UP (ref 0–0)
NRBC # BLD: 0 /100 WBCS — SIGNIFICANT CHANGE UP (ref 0–0)
PLATELET # BLD AUTO: 159 K/UL — SIGNIFICANT CHANGE UP (ref 150–400)
PLATELET # BLD AUTO: 159 K/UL — SIGNIFICANT CHANGE UP (ref 150–400)
POTASSIUM SERPL-MCNC: 3.6 MMOL/L — SIGNIFICANT CHANGE UP (ref 3.5–5.3)
POTASSIUM SERPL-MCNC: 3.6 MMOL/L — SIGNIFICANT CHANGE UP (ref 3.5–5.3)
POTASSIUM SERPL-SCNC: 3.6 MMOL/L — SIGNIFICANT CHANGE UP (ref 3.5–5.3)
POTASSIUM SERPL-SCNC: 3.6 MMOL/L — SIGNIFICANT CHANGE UP (ref 3.5–5.3)
PROT SERPL-MCNC: 5.5 G/DL — LOW (ref 6–8.3)
PROT SERPL-MCNC: 5.5 G/DL — LOW (ref 6–8.3)
RBC # BLD: 2.43 M/UL — LOW (ref 4.2–5.8)
RBC # BLD: 2.43 M/UL — LOW (ref 4.2–5.8)
RBC # FLD: 14.5 % — SIGNIFICANT CHANGE UP (ref 10.3–14.5)
RBC # FLD: 14.5 % — SIGNIFICANT CHANGE UP (ref 10.3–14.5)
SODIUM SERPL-SCNC: 136 MMOL/L — SIGNIFICANT CHANGE UP (ref 135–145)
SODIUM SERPL-SCNC: 136 MMOL/L — SIGNIFICANT CHANGE UP (ref 135–145)
TROPONIN I, HIGH SENSITIVITY RESULT: 2160.8 NG/L — HIGH
TROPONIN I, HIGH SENSITIVITY RESULT: 2160.8 NG/L — HIGH
TROPONIN I, HIGH SENSITIVITY RESULT: 2974.4 NG/L — HIGH
TROPONIN I, HIGH SENSITIVITY RESULT: 2974.4 NG/L — HIGH
TROPONIN I, HIGH SENSITIVITY RESULT: 3027.4 NG/L — HIGH
TROPONIN I, HIGH SENSITIVITY RESULT: 3027.4 NG/L — HIGH
TROPONIN I, HIGH SENSITIVITY RESULT: 6.6 NG/L — SIGNIFICANT CHANGE UP
TROPONIN I, HIGH SENSITIVITY RESULT: 6.6 NG/L — SIGNIFICANT CHANGE UP
WBC # BLD: 5.77 K/UL — SIGNIFICANT CHANGE UP (ref 3.8–10.5)
WBC # BLD: 5.77 K/UL — SIGNIFICANT CHANGE UP (ref 3.8–10.5)
WBC # FLD AUTO: 5.77 K/UL — SIGNIFICANT CHANGE UP (ref 3.8–10.5)
WBC # FLD AUTO: 5.77 K/UL — SIGNIFICANT CHANGE UP (ref 3.8–10.5)

## 2023-12-31 PROCEDURE — 76700 US EXAM ABDOM COMPLETE: CPT | Mod: 26

## 2023-12-31 PROCEDURE — 93010 ELECTROCARDIOGRAM REPORT: CPT

## 2023-12-31 RX ORDER — DEXTROSE 50 % IN WATER 50 %
25 SYRINGE (ML) INTRAVENOUS ONCE
Refills: 0 | Status: DISCONTINUED | OUTPATIENT
Start: 2023-12-31 | End: 2024-01-05

## 2023-12-31 RX ORDER — MIDODRINE HYDROCHLORIDE 2.5 MG/1
10 TABLET ORAL THREE TIMES A DAY
Refills: 0 | Status: DISCONTINUED | OUTPATIENT
Start: 2023-12-31 | End: 2024-01-01

## 2023-12-31 RX ORDER — ONDANSETRON 8 MG/1
4 TABLET, FILM COATED ORAL EVERY 8 HOURS
Refills: 0 | Status: DISCONTINUED | OUTPATIENT
Start: 2023-12-31 | End: 2024-01-05

## 2023-12-31 RX ORDER — CALCIUM ACETATE 667 MG
667 TABLET ORAL
Refills: 0 | Status: DISCONTINUED | OUTPATIENT
Start: 2023-12-31 | End: 2024-01-05

## 2023-12-31 RX ORDER — BRIMONIDINE TARTRATE 2 MG/MG
1 SOLUTION/ DROPS OPHTHALMIC
Refills: 0 | Status: DISCONTINUED | OUTPATIENT
Start: 2023-12-31 | End: 2024-01-05

## 2023-12-31 RX ORDER — ACETAMINOPHEN 500 MG
650 TABLET ORAL EVERY 6 HOURS
Refills: 0 | Status: DISCONTINUED | OUTPATIENT
Start: 2023-12-31 | End: 2024-01-05

## 2023-12-31 RX ORDER — SODIUM CHLORIDE 9 MG/ML
1000 INJECTION, SOLUTION INTRAVENOUS
Refills: 0 | Status: DISCONTINUED | OUTPATIENT
Start: 2023-12-31 | End: 2024-01-05

## 2023-12-31 RX ORDER — SERTRALINE 25 MG/1
50 TABLET, FILM COATED ORAL DAILY
Refills: 0 | Status: DISCONTINUED | OUTPATIENT
Start: 2023-12-31 | End: 2024-01-05

## 2023-12-31 RX ORDER — CALCITRIOL 0.5 UG/1
0.25 CAPSULE ORAL
Refills: 0 | Status: DISCONTINUED | OUTPATIENT
Start: 2023-12-31 | End: 2024-01-05

## 2023-12-31 RX ORDER — TAMSULOSIN HYDROCHLORIDE 0.4 MG/1
0.4 CAPSULE ORAL AT BEDTIME
Refills: 0 | Status: DISCONTINUED | OUTPATIENT
Start: 2023-12-31 | End: 2024-01-05

## 2023-12-31 RX ORDER — ASPIRIN/CALCIUM CARB/MAGNESIUM 324 MG
81 TABLET ORAL DAILY
Refills: 0 | Status: DISCONTINUED | OUTPATIENT
Start: 2023-12-31 | End: 2024-01-05

## 2023-12-31 RX ORDER — DEXTROSE 50 % IN WATER 50 %
15 SYRINGE (ML) INTRAVENOUS ONCE
Refills: 0 | Status: DISCONTINUED | OUTPATIENT
Start: 2023-12-31 | End: 2024-01-05

## 2023-12-31 RX ORDER — SIMVASTATIN 20 MG/1
10 TABLET, FILM COATED ORAL AT BEDTIME
Refills: 0 | Status: DISCONTINUED | OUTPATIENT
Start: 2023-12-31 | End: 2024-01-05

## 2023-12-31 RX ORDER — SENNA PLUS 8.6 MG/1
2 TABLET ORAL AT BEDTIME
Refills: 0 | Status: DISCONTINUED | OUTPATIENT
Start: 2023-12-31 | End: 2024-01-05

## 2023-12-31 RX ORDER — INSULIN LISPRO 100/ML
VIAL (ML) SUBCUTANEOUS AT BEDTIME
Refills: 0 | Status: DISCONTINUED | OUTPATIENT
Start: 2023-12-31 | End: 2024-01-05

## 2023-12-31 RX ORDER — FINASTERIDE 5 MG/1
5 TABLET, FILM COATED ORAL DAILY
Refills: 0 | Status: DISCONTINUED | OUTPATIENT
Start: 2023-12-31 | End: 2024-01-05

## 2023-12-31 RX ORDER — MORPHINE SULFATE 50 MG/1
2 CAPSULE, EXTENDED RELEASE ORAL EVERY 6 HOURS
Refills: 0 | Status: DISCONTINUED | OUTPATIENT
Start: 2023-12-31 | End: 2024-01-05

## 2023-12-31 RX ORDER — LACTOBACILLUS ACIDOPHILUS 100MM CELL
1 CAPSULE ORAL DAILY
Refills: 0 | Status: DISCONTINUED | OUTPATIENT
Start: 2023-12-31 | End: 2024-01-05

## 2023-12-31 RX ORDER — PANTOPRAZOLE SODIUM 20 MG/1
40 TABLET, DELAYED RELEASE ORAL DAILY
Refills: 0 | Status: DISCONTINUED | OUTPATIENT
Start: 2023-12-31 | End: 2024-01-05

## 2023-12-31 RX ORDER — LEVOTHYROXINE SODIUM 125 MCG
50 TABLET ORAL DAILY
Refills: 0 | Status: DISCONTINUED | OUTPATIENT
Start: 2023-12-31 | End: 2024-01-05

## 2023-12-31 RX ORDER — METOPROLOL TARTRATE 50 MG
12.5 TABLET ORAL
Refills: 0 | Status: DISCONTINUED | OUTPATIENT
Start: 2023-12-31 | End: 2024-01-02

## 2023-12-31 RX ORDER — APIXABAN 2.5 MG/1
5 TABLET, FILM COATED ORAL
Refills: 0 | Status: DISCONTINUED | OUTPATIENT
Start: 2023-12-31 | End: 2024-01-05

## 2023-12-31 RX ORDER — DEXTROSE 50 % IN WATER 50 %
12.5 SYRINGE (ML) INTRAVENOUS ONCE
Refills: 0 | Status: DISCONTINUED | OUTPATIENT
Start: 2023-12-31 | End: 2024-01-05

## 2023-12-31 RX ORDER — GLUCAGON INJECTION, SOLUTION 0.5 MG/.1ML
1 INJECTION, SOLUTION SUBCUTANEOUS ONCE
Refills: 0 | Status: DISCONTINUED | OUTPATIENT
Start: 2023-12-31 | End: 2024-01-05

## 2023-12-31 RX ORDER — DORZOLAMIDE HYDROCHLORIDE TIMOLOL MALEATE 20; 5 MG/ML; MG/ML
1 SOLUTION/ DROPS OPHTHALMIC
Refills: 0 | Status: DISCONTINUED | OUTPATIENT
Start: 2023-12-31 | End: 2024-01-05

## 2023-12-31 RX ORDER — AMIODARONE HYDROCHLORIDE 400 MG/1
200 TABLET ORAL DAILY
Refills: 0 | Status: DISCONTINUED | OUTPATIENT
Start: 2023-12-31 | End: 2024-01-05

## 2023-12-31 RX ORDER — LIDOCAINE 4 G/100G
1 CREAM TOPICAL ONCE
Refills: 0 | Status: COMPLETED | OUTPATIENT
Start: 2023-12-31 | End: 2023-12-31

## 2023-12-31 RX ORDER — IPRATROPIUM/ALBUTEROL SULFATE 18-103MCG
3 AEROSOL WITH ADAPTER (GRAM) INHALATION EVERY 6 HOURS
Refills: 0 | Status: DISCONTINUED | OUTPATIENT
Start: 2023-12-31 | End: 2024-01-05

## 2023-12-31 RX ORDER — SERTRALINE 25 MG/1
25 TABLET, FILM COATED ORAL DAILY
Refills: 0 | Status: DISCONTINUED | OUTPATIENT
Start: 2023-12-31 | End: 2024-01-05

## 2023-12-31 RX ORDER — TRAMADOL HYDROCHLORIDE 50 MG/1
50 TABLET ORAL THREE TIMES A DAY
Refills: 0 | Status: DISCONTINUED | OUTPATIENT
Start: 2023-12-31 | End: 2024-01-05

## 2023-12-31 RX ORDER — INSULIN LISPRO 100/ML
VIAL (ML) SUBCUTANEOUS
Refills: 0 | Status: DISCONTINUED | OUTPATIENT
Start: 2023-12-31 | End: 2024-01-05

## 2023-12-31 RX ORDER — ASCORBIC ACID 60 MG
500 TABLET,CHEWABLE ORAL DAILY
Refills: 0 | Status: DISCONTINUED | OUTPATIENT
Start: 2023-12-31 | End: 2024-01-05

## 2023-12-31 RX ORDER — LANOLIN ALCOHOL/MO/W.PET/CERES
5 CREAM (GRAM) TOPICAL AT BEDTIME
Refills: 0 | Status: DISCONTINUED | OUTPATIENT
Start: 2023-12-31 | End: 2024-01-05

## 2023-12-31 RX ORDER — LATANOPROST 0.05 MG/ML
1 SOLUTION/ DROPS OPHTHALMIC; TOPICAL AT BEDTIME
Refills: 0 | Status: DISCONTINUED | OUTPATIENT
Start: 2023-12-31 | End: 2024-01-05

## 2023-12-31 RX ORDER — BUDESONIDE AND FORMOTEROL FUMARATE DIHYDRATE 160; 4.5 UG/1; UG/1
2 AEROSOL RESPIRATORY (INHALATION)
Refills: 0 | Status: DISCONTINUED | OUTPATIENT
Start: 2023-12-31 | End: 2024-01-05

## 2023-12-31 RX ADMIN — BUDESONIDE AND FORMOTEROL FUMARATE DIHYDRATE 2 PUFF(S): 160; 4.5 AEROSOL RESPIRATORY (INHALATION) at 08:50

## 2023-12-31 RX ADMIN — LIDOCAINE 1 PATCH: 4 CREAM TOPICAL at 03:03

## 2023-12-31 RX ADMIN — TRAMADOL HYDROCHLORIDE 50 MILLIGRAM(S): 50 TABLET ORAL at 18:00

## 2023-12-31 RX ADMIN — Medication 100 MILLIGRAM(S): at 02:41

## 2023-12-31 RX ADMIN — Medication 30 MILLIGRAM(S): at 11:53

## 2023-12-31 RX ADMIN — PANTOPRAZOLE SODIUM 40 MILLIGRAM(S): 20 TABLET, DELAYED RELEASE ORAL at 11:53

## 2023-12-31 RX ADMIN — TRAMADOL HYDROCHLORIDE 50 MILLIGRAM(S): 50 TABLET ORAL at 17:33

## 2023-12-31 RX ADMIN — Medication 81 MILLIGRAM(S): at 11:53

## 2023-12-31 RX ADMIN — AMIODARONE HYDROCHLORIDE 200 MILLIGRAM(S): 400 TABLET ORAL at 06:19

## 2023-12-31 RX ADMIN — Medication 650 MILLIGRAM(S): at 15:03

## 2023-12-31 RX ADMIN — Medication 1 TABLET(S): at 17:33

## 2023-12-31 RX ADMIN — BRIMONIDINE TARTRATE 1 DROP(S): 2 SOLUTION/ DROPS OPHTHALMIC at 17:33

## 2023-12-31 RX ADMIN — Medication 667 MILLIGRAM(S): at 18:22

## 2023-12-31 RX ADMIN — LIDOCAINE 1 PATCH: 4 CREAM TOPICAL at 14:15

## 2023-12-31 RX ADMIN — APIXABAN 5 MILLIGRAM(S): 2.5 TABLET, FILM COATED ORAL at 17:33

## 2023-12-31 RX ADMIN — Medication 12.5 MILLIGRAM(S): at 06:19

## 2023-12-31 RX ADMIN — Medication 50 MICROGRAM(S): at 06:19

## 2023-12-31 RX ADMIN — DORZOLAMIDE HYDROCHLORIDE TIMOLOL MALEATE 1 DROP(S): 20; 5 SOLUTION/ DROPS OPHTHALMIC at 17:34

## 2023-12-31 RX ADMIN — LIDOCAINE 1 PATCH: 4 CREAM TOPICAL at 02:41

## 2023-12-31 RX ADMIN — Medication 650 MILLIGRAM(S): at 15:34

## 2023-12-31 RX ADMIN — Medication 650 MILLIGRAM(S): at 07:06

## 2023-12-31 RX ADMIN — Medication 650 MILLIGRAM(S): at 06:36

## 2023-12-31 RX ADMIN — BUDESONIDE AND FORMOTEROL FUMARATE DIHYDRATE 2 PUFF(S): 160; 4.5 AEROSOL RESPIRATORY (INHALATION) at 18:23

## 2023-12-31 RX ADMIN — APIXABAN 5 MILLIGRAM(S): 2.5 TABLET, FILM COATED ORAL at 06:19

## 2023-12-31 NOTE — H&P ADULT - ASSESSMENT
Patient  is 72 yo  LTC resident with past medical history of end-stage renal disease on dialysis, chronic hypotension, afib, recent admission for clogged AV fistula is presenting for hypotension and hypoxia.  Patient was discharged back to [facility] 12/30  but sent to ED due to hypotension and hypoxia on arrival to [facility] .His vitals were stable upon discharge and he was monitored 2 hrs after HD session by house staff .  Otherwise denies any new complaints.  Patient was diagnosed with Influenza , also found to have JAZMINE elevation .Seen by Dr Garcia cardiologist Admitted  to telemetry unit for monitoring , send 3 sets of cardiac enzymes to rule out acute coronary event, obtain ECHO to evaluate LVEF, cardiology consult  ,continue current management, O2 supply, anticoagulation plan as per cardiology consult Palliative care consult requested ,to discuss advance directives and complete MOLST  Patient  is 72 yo  LTC resident with past medical history of end-stage renal disease on dialysis, chronic hypotension, afib, recent admission for clogged AV fistula is presenting for hypotension and hypoxia.  Patient was discharged back to [facility] 12/30  but sent to ED due to hypotension and hypoxia on [facility] .His vitals were stable upon discharge and he was monitored 2 hrs after HD session by house staff .  Otherwise denies any new complaints.  Patient was diagnosed with Influenza , also found to have JAZMINE elevation .Seen by Dr Garcia cardiologist Admitted  to telemetry unit for monitoring , send 3 sets of cardiac enzymes to rule out acute coronary event, obtain ECHO to evaluate LVEF, cardiology consult  ,continue current management, O2 supply, anticoagulation plan as per cardiology consult Palliative care consult requested ,to discuss advance directives and complete MOLST

## 2023-12-31 NOTE — H&P ADULT - NSHPLABSRESULTS_GEN_ALL_CORE
< from: Xray Shoulder 2 Views, Right (12.27.23 @ 12:03) >    There are no prior studies for comparison.    There is no acute fracture or dislocation. Chronic degenerative narrowing   of the glenohumeral joint is present and there is mild arthrosis of the   AC joint as well. Slight developmental downsloping of the acromion is   alsoseen. There is no bone erosion or destruction and no lytic or   blastic bone lesions are seen. There are subcortical cyst of the humeral   head. There is a vascular graft/stent near the right axilla.    IMPRESSION:  1. 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  2. Vascular stent/graft in the right axilla.    < end of copied text >    < from: Xray Humerus, Right (12.27.23 @ 12:03) >    1. Chronic degenerative changes of the right shoulder and right elbow.  2. No evidence of fracture, dislocation or osteomyelitis.  3. Vascular graft/stent near the right axilla.    < end of copied text >    < from: Intra-Operative Transesophageal Echo (09.29.22 @ 12:47) >    ------  Conclusions:  Limited ROS exam for monitoring during lead extraction.  1. Normal mitral valve. Minimal mitral regurgitation. No  significant mitral stenosis.  2. Normal trileaflet aortic valve.  3. Normal left ventricular systolic function. No segmental  wall motion abnormalities.  4. Normal right atrium.  5. Normal right ventricular size and function.  6. Normal tricuspid valve. Moderate tricuspid  regurgitation.  7. Normal pulmonic valve.  No new effusion after removal of leads. TR unchanged    < end of copied text >    < from: Intra-Operative Transesophageal Echo (09.29.22 @ 12:47) >    LA:            2.0 - 4.0 cm  Ao:            2.0 - 3.8 cm  SEPTUM:        0.6 - 1.2 cm  PWT:           0.6 - 1.1 cm  LVIDd:         3.0 - 5.6 cm  LVIDs:         1.8 - 4.0 cm  EF (Visual Estimate): 60 %    < end of copied text >

## 2023-12-31 NOTE — H&P ADULT - TIME BILLING
55minutes spent on this visit, 50% visit time spent in care co-ordination with other attendings and counselling patient ,writing admission orders ( see complete and current orders and order section) ,requesting necessary consults ,informing family about status & plan of care .I have discussed care plan with [facility] wellness/admitting /nursing   department ,outpatient PCP , hospital consultants , ER physician & med staff . 55minutes spent on this visit, 50% visit time spent in care co-ordination with other attendings and counselling patient ,writing admission orders ( see complete and current orders and order section) ,requesting necessary consults ,informing family about status & plan of care .I have discussed care plan with [facility] wellness/admitting /nursing   department ,outpatient PCP , hospital consultants , ER physician & med staff .

## 2023-12-31 NOTE — CONSULT NOTE ADULT - ASSESSMENT
inc lfts multifactorial  plan    Avoid all non-essential hepatotoxic drugs	  Obtain Abdominal Ultrasound  Check viral hepatitis panel  Diet and weight control discussed    Monitor daily liver function test

## 2023-12-31 NOTE — PROGRESS NOTE ADULT - ASSESSMENT
REASON FOR VISIT  .. Management of problems listed below        REVIEW OF SYMPTOMS   Able to give ROS  Yes     RELIABILITY +/-   CONSTITUTIONAL Weakness Yes    ENDOCRINE  No heat or cold intolerance    ALLERGY No hives  Sore throat No stridor  RESP Shortness of breath YES   NEURO New weakness No   CARDIAC   Palpitations No         PHYSICAL EXAM    HEENT Unremarkable  atraumatic   RESP Fair air entry  Harsh breath sound   CARDIAC S1 S2 No S3     NO JVD    ABDOMEN No hepatosplenomegaly   PEDAL EDEMA present No calf tenderness  NO rash     GENERAL DATA .   GOC.  .. 12/30/2023 full code    ICU STAY.  .. no  COVID. .. scv2   BEST PRACTICE ISSUES.    HOB ELEVATN.  .. Yes  DVT PPLX.  ..   12/31/2023 apixaba 5.2 (af)         DIET.   ..  12/31/2023 soft bite   STRESS ULCER PPLX.   .  ..   12/31/2023 protonix 40   ALLGY. ..    levaquin   WT. ..  12/30/2023 68  BMI. ..  12/30/2023 22  PROCEDURES.   .. 12/26/2023 Right upper extremity brachiocephalic fistula with long segment   thrombosis/absent flow within the body of the fistula as well as outflow   vein stent.   .. 12/26/2023 Had R AV fistula thrombectomy    ABGS.   .  VS/ PO/IO/ VENT/ DRIPS.   12/31/2023 99 70 100/60   12/31/2023 2l 97%     PRESENTATION.  . CC 12/30/2023. Pt DCed 12/30/2023 On arrival to [facility] SBP 70 RA rest po 85%  . HPI 12/30/2023.73 m with past medical history of end-stage renal disease on dialysis, chronic hypotension, afib, recent admission for clogged AV fistula is presenting for hypotension and hypoxia.  Patient was discharged back to [facility] today but sent to ED due to hypotension.  Otherwise denies any new complaints  . PMHx . ESRD A fib Recent clogged av fistula asthma   . HOME MEDS . levoxyl 50 amiodarone 200 apixaba 5.2 finasteride 5 sertraline 25 simvastatin 10 midodrine 10.2 symbicort 80   . PROSTHETICS POA.   .. 1) RUE AV fistula    .. 2) l sc PPM incision without palp generator )saus was taken out because it was hurting him)  .. 3) RIJ Temp HD cath was inserted 12/26    PROBLEM DATA.    . INFLUENZA   .. Flu a 12/30 (+)   .. 12/31/2023 oseltamivir 30.5d     . CO INFECTION  .. w 12/30-12/31/2023 w 4.8 - 5.7   .. cxr 12/30/2023 sl incr markings elevated l diaphragm   .. Doubt coinfection     . SHORTNESS OF BREATH   .. Likely sec  flu     . HYPOXIA  .. cxr 12/30 wire sternotomy np cpmsolidtn nc cw 9/2022   .. VTE unlikely as pt is on apixaba  .. Likely sec flu asthma fluid overload  .. target po 90-95%     . ASTHMA   .. 12/31/2023 duoneb.4p   .. 12/30/2023 symbicort  .. 12/30/2023 spiriva     . ORTHOSTATIC   .. 12/31/2023 midodrine 10.3 p     . A fib  .. 12/31/2023 apixaba 5.2    .. 12/31/2023 metoprolol 12.5 x 2     . ANEMIA   .. Hb 12/30-12/31/2023 Hb 8.5 - 8.1    .. Monitor     . ELEVATED LFTS   .. LFTS 12/30-12/31/2023   .. -161  .. -335  .. -140     . SUMMARY.   . 73 m with past medical history of end-stage renal disease on dialysis, chronic hypotension, afib, recent admission for clogged AV fistula 12/26-12/30 admitted 12/30/2023  for hypotension and hypoxia.   .t tested (+) for flu on 12/30     . OVERALL PLAN.   . FLU 12/31/2023 Tamiflu started   . ESRD HD  . ASTHMA Symbicort  . CHR A fib on Apixa poa   . FLUID OVERLOAD   . ELEVATED LFTS   . ANEMIA Target Hb 7 (+)   . DISPO  .... Monitor LFTS   .... Check us livr   .... check hepatitis   .... complete tamiflu     TIME SPENT.  . Over 36 minutes aggregate care time spent on encounter; activities included   direct patient care, counseling and/or coordinating care reviewing notes, lab data/ imaging , discussion with multidisciplinary team/ patient  /family and explaining in detail risks, benefits, alternatives  of the recommendations     CHARLIE.  . MICHEL GONZALEZ     REASON FOR VISIT  .. Management of problems listed below        REVIEW OF SYMPTOMS   Able to give ROS  Yes     RELIABILITY +/-   CONSTITUTIONAL Weakness Yes    ENDOCRINE  No heat or cold intolerance    ALLERGY No hives  Sore throat No stridor  RESP Shortness of breath YES   NEURO New weakness No   CARDIAC   Palpitations No         PHYSICAL EXAM    HEENT Unremarkable  atraumatic   RESP Fair air entry  Harsh breath sound   CARDIAC S1 S2 No S3     NO JVD    ABDOMEN No hepatosplenomegaly   PEDAL EDEMA present No calf tenderness  NO rash     GENERAL DATA .   GOC.  .. 12/30/2023 full code    ICU STAY.  .. no  COVID. .. scv2   BEST PRACTICE ISSUES.    HOB ELEVATN.  .. Yes  DVT PPLX.  ..   12/31/2023 apixaba 5.2 (af)         DIET.   ..  12/31/2023 soft bite   STRESS ULCER PPLX.   .  ..   12/31/2023 protonix 40   ALLGY. ..    levaquin   WT. ..  12/30/2023 68  BMI. ..  12/30/2023 22  PROCEDURES.   .. 12/26/2023 Right upper extremity brachiocephalic fistula with long segment   thrombosis/absent flow within the body of the fistula as well as outflow   vein stent.   .. 12/26/2023 Had R AV fistula thrombectomy    ABGS.   .  VS/ PO/IO/ VENT/ DRIPS.   12/31/2023 99 70 100/60   12/31/2023 2l 97%     PRESENTATION.  . CC 12/30/2023. Pt DCed 12/30/2023 On arrival to [facility] SBP 70 RA rest po 85%  . HPI 12/30/2023.73 m with past medical history of end-stage renal disease on dialysis, chronic hypotension, afib, recent admission for clogged AV fistula is presenting for hypotension and hypoxia.  Patient was discharged back to [facility] today but sent to ED due to hypotension.  Otherwise denies any new complaints  . PMHx . ESRD A fib Recent clogged av fistula asthma   . HOME MEDS . levoxyl 50 amiodarone 200 apixaba 5.2 finasteride 5 sertraline 25 simvastatin 10 midodrine 10.2 symbicort 80   . PROSTHETICS POA.   .. 1) RUE AV fistula    .. 2) l sc PPM incision without palp generator )saus was taken out because it was hurting him)  .. 3) RIJ Temp HD cath was inserted 12/26    PROBLEM DATA.    . INFLUENZA   .. Flu a 12/30 (+)   .. 12/31/2023 oseltamivir 30.5d     . CO INFECTION  .. w 12/30-12/31/2023 w 4.8 - 5.7   .. cxr 12/30/2023 sl incr markings elevated l diaphragm   .. Doubt coinfection     . SHORTNESS OF BREATH   .. Likely sec  flu     . HYPOXIA  .. cxr 12/30 wire sternotomy np cpmsolidtn nc cw 9/2022   .. VTE unlikely as pt is on apixaba  .. Likely sec flu asthma fluid overload  .. target po 90-95%     . ASTHMA   .. 12/31/2023 duoneb.4p   .. 12/30/2023 symbicort  .. 12/30/2023 spiriva     . ORTHOSTATIC   .. 12/31/2023 midodrine 10.3 p     . A fib  .. 12/31/2023 apixaba 5.2    .. 12/31/2023 metoprolol 12.5 x 2     . ANEMIA   .. Hb 12/30-12/31/2023 Hb 8.5 - 8.1    .. Monitor     . ELEVATED LFTS   .. LFTS 12/30-12/31/2023   .. -161  .. -335  .. -140     . SUMMARY.   . 73 m with past medical history of end-stage renal disease on dialysis, chronic hypotension, afib, recent admission for clogged AV fistula 12/26-12/30 admitted 12/30/2023  for hypotension and hypoxia.   .t tested (+) for flu on 12/30     . OVERALL PLAN.   . FLU 12/31/2023 Tamiflu started   . ESRD HD  . ASTHMA Symbicort  . CHR A fib on Apixa poa   . FLUID OVERLOAD   . ELEVATED LFTS   . ANEMIA Target Hb 7 (+)   . DISPO  .... Monitor LFTS   .... Check us livr   .... check hepatitis   .... complete tamiflu     TIME SPENT.  . Over 36 minutes aggregate care time spent on encounter; activities included   direct patient care, counseling and/or coordinating care reviewing notes, lab data/ imaging , discussion with multidisciplinary team/ patient  /family and explaining in detail risks, benefits, alternatives  of the recommendations     CHARLIE.  . MICHEL GONZALEZ

## 2023-12-31 NOTE — H&P ADULT - HISTORY OF PRESENT ILLNESS
Patient  is 72 yo , LTC resident with past medical history of end-stage renal disease on dialysis, chronic hypotension, afib, recent admission for clogged AV fistula is presenting for hypotension and hypoxia.  Patient was discharged back to [facility] 12/30  but sent to ED due to hypotension and hypoxia on arrival to [facility] .His vitals were stable upon discharge and he was monitored 2 hrs after HD session by house staff .  Otherwise denies any new complaints.  Patient was diagnosed with Influenza , also found to have JAZMINE elevation .Seen by Dr Garcia cardiologist Admitted  to telemetry unit for monitoring , send 3 sets of cardiac enzymes to rule out acute coronary event, obtain ECHO to evaluate LVEF, cardiology consult  ,continue current management, O2 supply, anticoagulation plan as per cardiology consult Palliative care consult requested ,to discuss advance directives and complete MOLST  Patient  is 74 yo , LTC resident with past medical history of end-stage renal disease on dialysis, chronic hypotension, afib, recent admission for clogged AV fistula is presenting for hypotension and hypoxia.  Patient was discharged back to [facility] 12/30  but sent to ED due to hypotension and hypoxia on arrival to [facility] .His vitals were stable upon discharge and he was monitored 2 hrs after HD session by house staff .  Otherwise denies any new complaints.  Patient was diagnosed with Influenza , also found to have JAZMINE elevation .Seen by Dr Garcia cardiologist Admitted  to telemetry unit for monitoring , send 3 sets of cardiac enzymes to rule out acute coronary event, obtain ECHO to evaluate LVEF, cardiology consult  ,continue current management, O2 supply, anticoagulation plan as per cardiology consult Palliative care consult requested ,to discuss advance directives and complete MOLST  Patient  is 74 yo , LTC resident with past medical history of end-stage renal disease on dialysis, chronic hypotension, afib, T2DM, recent admission for clogged AV fistula is presenting for hypotension and hypoxia.  Patient was discharged back to [facility] 12/30  but sent to ED due to hypotension and hypoxia on arrival to [facility] .His vitals were stable upon discharge and he was monitored 2 hrs after HD session by house staff .  Otherwise denies any new complaints.  Patient was diagnosed with Influenza , also found to have JAZMINE elevation .Seen by Dr Garcia cardiologist Admitted  to telemetry unit for monitoring , send 3 sets of cardiac enzymes to rule out acute coronary event, obtain ECHO to evaluate LVEF, cardiology consult  ,continue current management, O2 supply, anticoagulation plan as per cardiology consult Palliative care consult requested ,to discuss advance directives and complete MOLST  Patient  is 72 yo , LTC resident with past medical history of end-stage renal disease on dialysis, chronic hypotension, afib, T2DM, recent admission for clogged AV fistula is presenting for hypotension and hypoxia.  Patient was discharged back to [facility] 12/30  but sent to ED due to hypotension and hypoxia on arrival to [facility] .His vitals were stable upon discharge and he was monitored 2 hrs after HD session by house staff .  Otherwise denies any new complaints.  Patient was diagnosed with Influenza , also found to have JAZMINE elevation .Seen by Dr Garcia cardiologist Admitted  to telemetry unit for monitoring , send 3 sets of cardiac enzymes to rule out acute coronary event, obtain ECHO to evaluate LVEF, cardiology consult  ,continue current management, O2 supply, anticoagulation plan as per cardiology consult Palliative care consult requested ,to discuss advance directives and complete MOLST

## 2023-12-31 NOTE — PROGRESS NOTE ADULT - SUBJECTIVE AND OBJECTIVE BOX
CHIEF COMPLAINT/ REASON FOR VISIT  .. Patient was seen to address the  issue listed under PROBLEM LIST which is located toward bottom of this note     LISA NAVARRETE APER 09    Allergies    Levaquin (Anaphylaxis; Flushing; Short breath)    Intolerances        PAST MEDICAL & SURGICAL HISTORY:  ASHD (arteriosclerotic heart disease)      CAD (coronary artery disease)  s/p stent, CABG      CRI (chronic renal insufficiency)  ESRD on HD      Diabetes mellitus type II      Hypertension      Hyperlipidemia      Pacemaker      Chronic atrial fibrillation      Asthma      Hypothyroid      Dementia      H/O carotid stenosis      Enlarged prostate      Hx of CABG      Coronary stent      Cardiac pacemaker      History of amputation of toe  right great toe          FAMILY HISTORY:  Family history of chronic ischemic heart disease (Father)  father  of MI at age 59        Home Medications:  acetaminophen 325 mg oral tablet: 2 tab(s) orally 3 times a day as needed for  mild pain (30 Dec 2023 13:03)  Acidophilus oral capsule: 1 cap(s) orally once a day (28 Dec 2023 19:43)  amiodarone 200 mg oral tablet: 1 tab(s) orally once a day (30 Dec 2023 13:03)  apixaban 5 mg oral tablet: 1 tab(s) orally 2 times a day (30 Dec 2023 13:03)  Aspercreme Max Strength Lidocaine XL Patch 4% topical film: Apply topically to affected area once a day LEFT SHOULDER  10 AM -10 PM (30 Dec 2023 13:03)  brimonidine 0.2% ophthalmic solution: 1 drop(s) in each affected eye 2 times a day both eyes (28 Dec 2023 19:43)  calcitriol 0.25 mcg oral capsule: 1 cap(s) orally 3 times a week Monday, Wednesday, Friday (28 Dec 2023 19:43)  calcium acetate 667 mg oral capsule: 1 cap(s) orally 3 times a day (28 Dec 2023 19:43)  dorzolamide-timolol 2.23%-0.68% (2%-0.5% base) ophthalmic solution: 1 drop(s) in each eye 2 times a day (28 Dec 2023 19:44)  finasteride 5 mg oral tablet: 1 tab(s) orally once a day (at bedtime) (28 Dec 2023 19:44)  latanoprost 0.005% ophthalmic solution: 1 drop(s) in each eye once a day (at bedtime) Both eyes (28 Dec 2023 19:44)  Levothroid 50 mcg (0.05 mg) oral tablet: 1 tab(s) orally once a day (28 Dec 2023 19:44)  melatonin 5 mg oral tablet: 1 tab(s) orally once a day (at bedtime) (28 Dec 2023 19:44)  menthol 5% topical pad: Apply topically to affected area once a day from 7am - 3pm (28 Dec 2023 19:44)  midodrine 10 mg oral tablet: 1 tab(s) orally 2 times a day hold for SBP &gt;130 (28 Dec 2023 19:44)  sertraline 25 mg oral tablet: 1 tab(s) orally once a day total daily dose- 75mg (28 Dec 2023 19:44)  sertraline 50 mg oral tablet: 1 tab(s) orally once a day (at bedtime) total dose- 75mg daily (28 Dec 2023 19:44)  simvastatin 10 mg oral tablet: 1 tab(s) orally once a day (at bedtime) (28 Dec 2023 19:44)  Symbicort 80 mcg-4.5 mcg/inh inhalation aerosol: 2 puff(s) inhaled 2 times a day (28 Dec 2023 19:44)  tamsulosin 0.4 mg oral capsule: 1 cap(s) orally once a day (at bedtime) (28 Dec 2023 19:44)  traMADol 50 mg oral tablet: 1 tab(s) orally 3 times a day As needed Moderate Pain (4 - 6) (30 Dec 2023 13:03)  Vitamin C 500 mg oral tablet, chewable: 1 tab(s) chewed once a day (28 Dec 2023 19:44)      MEDICATIONS  (STANDING):  aMIOdarone    Tablet 200 milliGRAM(s) Oral daily  apixaban 5 milliGRAM(s) Oral two times a day  aspirin enteric coated 81 milliGRAM(s) Oral daily  budesonide 160 MICROgram(s)/formoterol 4.5 MICROgram(s) Inhaler 2 Puff(s) Inhalation two times a day  levothyroxine 50 MICROGram(s) Oral daily  metoprolol tartrate 12.5 milliGRAM(s) Oral two times a day  oseltamivir 30 milliGRAM(s) Oral daily  pantoprazole    Tablet 40 milliGRAM(s) Oral daily    MEDICATIONS  (PRN):  acetaminophen     Tablet .. 650 milliGRAM(s) Oral every 6 hours PRN Temp greater or equal to 38C (100.4F), Mild Pain (1 - 3)  midodrine. 10 milliGRAM(s) Oral three times a day PRN for systolic BP<100              Vital Signs Last 24 Hrs  T(C): 37.4 (31 Dec 2023 08:31), Max: 37.8 (31 Dec 2023 06:27)  T(F): 99.3 (31 Dec 2023 08:31), Max: 100 (31 Dec 2023 06:27)  HR: 70 (31 Dec 2023 08:31) (70 - 102)  BP: 104/63 (31 Dec 2023 08:31) (95/55 - 125/70)  BP(mean): --  RR: 19 (31 Dec 2023 08:31) (16 - 20)  SpO2: 97% (31 Dec 2023 08:31) (95% - 100%)    Parameters below as of 31 Dec 2023 08:31  Patient On (Oxygen Delivery Method): nasal cannula  O2 Flow (L/min): 2                LABS:                        8.1    5.77  )-----------( 159      ( 31 Dec 2023 10:28 )             26.1         136  |  99  |  25<H>  ----------------------------<  70  3.6   |  28  |  3.80<H>    Ca    7.7<L>      31 Dec 2023 10:28  Mg     2.0         TPro  5.5<L>  /  Alb  2.7<L>  /  TBili  0.7  /  DBili  x   /  AST  335<H>  /  ALT  140<H>  /  AlkPhos  161<H>      PT/INR - ( 30 Dec 2023 21:40 )   PT: 27.3 sec;   INR: 2.39 ratio         PTT - ( 30 Dec 2023 21:40 )  PTT:34.7 sec  Urinalysis Basic - ( 31 Dec 2023 10:28 )    Color: x / Appearance: x / SG: x / pH: x  Gluc: 70 mg/dL / Ketone: x  / Bili: x / Urobili: x   Blood: x / Protein: x / Nitrite: x   Leuk Esterase: x / RBC: x / WBC x   Sq Epi: x / Non Sq Epi: x / Bacteria: x            WBC:  WBC Count: 5.77 K/uL ( @ 10:28)  WBC Count: 4.80 K/uL ( @ 21:40)  WBC Count: 4.40 K/uL ( @ 08:09)  WBC Count: 8.90 K/uL ( @ 21:53)      MICROBIOLOGY:  RECENT CULTURES:        CARDIAC MARKERS ( 31 Dec 2023 02:06 )  x     / x     / 563 U/L / x     / 11.6 ng/mL  CARDIAC MARKERS ( 31 Dec 2023 01:11 )  x     / x     / 71 U/L / x     / <1.0 ng/mL        PT/INR - ( 30 Dec 2023 21:40 )   PT: 27.3 sec;   INR: 2.39 ratio         PTT - ( 30 Dec 2023 21:40 )  PTT:34.7 sec    Sodium:  Sodium: 136 mmol/L ( @ 10:28)  Sodium: 137 mmol/L ( @ 21:40)  Sodium: 136 mmol/L ( @ 08:09)      3.80 mg/dL 12-31 @ 10:28  3.20 mg/dL  @ 21:40  5.20 mg/dL  @ 08:09      Hemoglobin:  Hemoglobin: 8.1 g/dL ( @ 10:28)  Hemoglobin: 8.5 g/dL ( @ 21:40)  Hemoglobin: 8.5 g/dL ( @ 08:09)  Hemoglobin: 9.3 g/dL ( @ 21:53)      Platelets: Platelet Count - Automated: 159 K/uL ( @ 10:28)  Platelet Count - Automated: 155 K/uL ( @ 21:40)  Platelet Count - Automated: 155 K/uL ( @ 08:09)  Platelet Count - Automated: 145 K/uL ( @ 21:53)      LIVER FUNCTIONS - ( 31 Dec 2023 10:28 )  Alb: 2.7 g/dL / Pro: 5.5 g/dL / ALK PHOS: 161 U/L / ALT: 140 U/L / AST: 335 U/L / GGT: x             Urinalysis Basic - ( 31 Dec 2023 10:28 )    Color: x / Appearance: x / SG: x / pH: x  Gluc: 70 mg/dL / Ketone: x  / Bili: x / Urobili: x   Blood: x / Protein: x / Nitrite: x   Leuk Esterase: x / RBC: x / WBC x   Sq Epi: x / Non Sq Epi: x / Bacteria: x        RADIOLOGY & ADDITIONAL STUDIES:      MICROBIOLOGY:  RECENT CULTURES:

## 2023-12-31 NOTE — CONSULT NOTE ADULT - SUBJECTIVE AND OBJECTIVE BOX
Chief Complaint:  Patient is a 73y old  Male who presents with a chief complaint of hypoxia and hypotension   · Chief Complaint: The patient is a 73y Male complaining of hypotension.  · HPI Objective Statement: Patient with past medical history of end-stage renal disease on dialysis, chronic hypotension, afib, recent admission for clogged AV fistula is presenting for hypotension and hypoxia.  Patient was discharged back to [facility] today but sent to ED due to hypotension.  Otherwise denies any new complaints.  Family member at bedside able to provide history for patient.    Allergies:  Levaquin (Anaphylaxis; Flushing; Short breath)      Medications:  acetaminophen     Tablet .. 650 milliGRAM(s) Oral every 6 hours PRN  albuterol/ipratropium for Nebulization 3 milliLiter(s) Nebulizer every 6 hours PRN  aluminum hydroxide/magnesium hydroxide/simethicone Suspension 30 milliLiter(s) Oral every 4 hours PRN  aMIOdarone    Tablet 200 milliGRAM(s) Oral daily  apixaban 5 milliGRAM(s) Oral two times a day  ascorbic acid 500 milliGRAM(s) Oral daily  aspirin enteric coated 81 milliGRAM(s) Oral daily  bisacodyl Suppository 10 milliGRAM(s) Rectal daily PRN  brimonidine 0.2% Ophthalmic Solution 1 Drop(s) Both EYES two times a day  budesonide 160 MICROgram(s)/formoterol 4.5 MICROgram(s) Inhaler 2 Puff(s) Inhalation two times a day  calcitriol   Capsule 0.25 MICROGram(s) Oral <User Schedule>  calcium acetate 667 milliGRAM(s) Oral three times a day with meals  dorzolamide 2%/timolol 0.5% Ophthalmic Solution 1 Drop(s) Both EYES two times a day  finasteride 5 milliGRAM(s) Oral daily  guaiFENesin Oral Liquid (Sugar-Free) 200 milliGRAM(s) Oral every 6 hours PRN  lactobacillus acidophilus 1 Tablet(s) Oral daily  latanoprost 0.005% Ophthalmic Solution 1 Drop(s) Both EYES at bedtime  levothyroxine 50 MICROGram(s) Oral daily  melatonin 5 milliGRAM(s) Oral at bedtime  metoprolol tartrate 12.5 milliGRAM(s) Oral two times a day  midodrine. 10 milliGRAM(s) Oral three times a day PRN  morphine  - Injectable 2 milliGRAM(s) IV Push every 6 hours PRN  ondansetron Injectable 4 milliGRAM(s) IV Push every 8 hours PRN  oseltamivir 30 milliGRAM(s) Oral daily  pantoprazole    Tablet 40 milliGRAM(s) Oral daily  senna 2 Tablet(s) Oral at bedtime  sertraline 50 milliGRAM(s) Oral daily  sertraline 25 milliGRAM(s) Oral daily  simvastatin 10 milliGRAM(s) Oral at bedtime  tamsulosin 0.4 milliGRAM(s) Oral at bedtime  traMADol 50 milliGRAM(s) Oral three times a day PRN      PMHX/PSHX:  ASHD (arteriosclerotic heart disease)    CAD (coronary artery disease)    CRI (chronic renal insufficiency)    Diabetes mellitus type II    Hypertension    Hyperlipidemia    Pacemaker    Chronic atrial fibrillation    Asthma    Hypothyroid    Dementia    H/O carotid stenosis    Enlarged prostate    Hx of CABG    Coronary stent    Hernia    Cardiac pacemaker    History of amputation of toe        Family history:  Family history of chronic ischemic heart disease (Father)        Social History:     ROS:     General:  No wt loss, fevers, chills, night sweats, fatigue,   Eyes:  Good vision, no reported pain  ENT:  No sore throat, pain, runny nose, dysphagia  CV:  No pain, palpitations, hypo/hypertension  Resp:  No dyspnea, cough, tachypnea, wheezing  GI:  No pain, No nausea, No vomiting, No diarrhea, No constipation, No weight loss, No fever, No pruritis, No rectal bleeding, No tarry stools, No dysphagia,  :  No pain, bleeding, incontinence, nocturia  Muscle:  No pain, weakness  Neuro:  No weakness, tingling, memory problems  Psych:  No fatigue, insomnia, mood problems, depression  Endocrine:  No polyuria, polydipsia, cold/heat intolerance  Heme:  No petechiae, ecchymosis, easy bruisability  Skin:  No rash, tattoos, scars, edema      PHYSICAL EXAM:   Vital Signs:  Vital Signs Last 24 Hrs  T(C): 37.4 (31 Dec 2023 08:31), Max: 37.8 (31 Dec 2023 06:27)  T(F): 99.3 (31 Dec 2023 08:31), Max: 100 (31 Dec 2023 06:27)  HR: 70 (31 Dec 2023 08:31) (70 - 102)  BP: 104/63 (31 Dec 2023 08:31) (95/55 - 125/70)  BP(mean): --  RR: 19 (31 Dec 2023 08:31) (16 - 20)  SpO2: 97% (31 Dec 2023 08:31) (97% - 100%)    Parameters below as of 31 Dec 2023 08:31  Patient On (Oxygen Delivery Method): nasal cannula  O2 Flow (L/min): 2    Daily Height in cm: 175.26 (30 Dec 2023 20:12)    Daily     GENERAL:  Appears stated age, well-groomed, well-nourished, no distress  HEENT:  NC/AT,  conjunctivae clear and pink, no thyromegaly, nodules, adenopathy, no JVD, sclera -anicteric  CHEST:  Full & symmetric excursion, no increased effort, breath sounds clear  HEART:  Regular rhythm, S1, S2, no murmur/rub/S3/S4, no abdominal bruit, no edema  ABDOMEN:  Soft, non-tender, non-distended, normoactive bowel sounds,  no masses ,no hepato-splenomegaly, no signs of chronic liver disease  EXTEREMITIES:  no cyanosis,clubbing or edema  SKIN:  No rash/erythema/ecchymoses/petechiae/wounds/abscess/warm/dry  NEURO:  Alert, oriented, no asterixis, no tremor, no encephalopathy    LABS:                        8.1    5.77  )-----------( 159      ( 31 Dec 2023 10:28 )             26.1     12-31    136  |  99  |  25<H>  ----------------------------<  70  3.6   |  28  |  3.80<H>    Ca    7.7<L>      31 Dec 2023 10:28  Mg     2.0     12-31    TPro  5.5<L>  /  Alb  2.7<L>  /  TBili  0.7  /  DBili  x   /  AST  335<H>  /  ALT  140<H>  /  AlkPhos  161<H>  12-31    LIVER FUNCTIONS - ( 31 Dec 2023 10:28 )  Alb: 2.7 g/dL / Pro: 5.5 g/dL / ALK PHOS: 161 U/L / ALT: 140 U/L / AST: 335 U/L / GGT: x           PT/INR - ( 30 Dec 2023 21:40 )   PT: 27.3 sec;   INR: 2.39 ratio         PTT - ( 30 Dec 2023 21:40 )  PTT:34.7 sec  Urinalysis Basic - ( 31 Dec 2023 10:28 )    Color: x / Appearance: x / SG: x / pH: x  Gluc: 70 mg/dL / Ketone: x  / Bili: x / Urobili: x   Blood: x / Protein: x / Nitrite: x   Leuk Esterase: x / RBC: x / WBC x   Sq Epi: x / Non Sq Epi: x / Bacteria: x          Imaging:             Chief Complaint:  Patient is a 73y old  Male who presents with a chief complaint of hypoxia and hypotension   · Chief Complaint: The patient is a 73y Male complaining of hypotension.  · HPI Objective Statement: Patient with past medical history of end-stage renal disease on dialysis, chronic hypotension, afib, recent admission for clogged AV fistula is presenting for hypotension and hypoxia.  Patient was discharged back to [facility] today but sent to ED due to hypotension.  Otherwise denies any new complaints.  Family member at bedside able to provide history for patient.    Allergies:  Levaquin (Anaphylaxis; Flushing; Short breath)      Medications:  acetaminophen     Tablet .. 650 milliGRAM(s) Oral every 6 hours PRN  albuterol/ipratropium for Nebulization 3 milliLiter(s) Nebulizer every 6 hours PRN  aluminum hydroxide/magnesium hydroxide/simethicone Suspension 30 milliLiter(s) Oral every 4 hours PRN  aMIOdarone    Tablet 200 milliGRAM(s) Oral daily  apixaban 5 milliGRAM(s) Oral two times a day  ascorbic acid 500 milliGRAM(s) Oral daily  aspirin enteric coated 81 milliGRAM(s) Oral daily  bisacodyl Suppository 10 milliGRAM(s) Rectal daily PRN  brimonidine 0.2% Ophthalmic Solution 1 Drop(s) Both EYES two times a day  budesonide 160 MICROgram(s)/formoterol 4.5 MICROgram(s) Inhaler 2 Puff(s) Inhalation two times a day  calcitriol   Capsule 0.25 MICROGram(s) Oral <User Schedule>  calcium acetate 667 milliGRAM(s) Oral three times a day with meals  dorzolamide 2%/timolol 0.5% Ophthalmic Solution 1 Drop(s) Both EYES two times a day  finasteride 5 milliGRAM(s) Oral daily  guaiFENesin Oral Liquid (Sugar-Free) 200 milliGRAM(s) Oral every 6 hours PRN  lactobacillus acidophilus 1 Tablet(s) Oral daily  latanoprost 0.005% Ophthalmic Solution 1 Drop(s) Both EYES at bedtime  levothyroxine 50 MICROGram(s) Oral daily  melatonin 5 milliGRAM(s) Oral at bedtime  metoprolol tartrate 12.5 milliGRAM(s) Oral two times a day  midodrine. 10 milliGRAM(s) Oral three times a day PRN  morphine  - Injectable 2 milliGRAM(s) IV Push every 6 hours PRN  ondansetron Injectable 4 milliGRAM(s) IV Push every 8 hours PRN  oseltamivir 30 milliGRAM(s) Oral daily  pantoprazole    Tablet 40 milliGRAM(s) Oral daily  senna 2 Tablet(s) Oral at bedtime  sertraline 50 milliGRAM(s) Oral daily  sertraline 25 milliGRAM(s) Oral daily  simvastatin 10 milliGRAM(s) Oral at bedtime  tamsulosin 0.4 milliGRAM(s) Oral at bedtime  traMADol 50 milliGRAM(s) Oral three times a day PRN      PMHX/PSHX:  ASHD (arteriosclerotic heart disease)    CAD (coronary artery disease)    CRI (chronic renal insufficiency)    Diabetes mellitus type II    Hypertension    Hyperlipidemia    Pacemaker    Chronic atrial fibrillation    Asthma    Hypothyroid    Dementia    H/O carotid stenosis    Enlarged prostate    Hx of CABG    Coronary stent    Hernia    Cardiac pacemaker    History of amputation of toe        Family history:  Family history of chronic ischemic heart disease (Father)        Social History:     ROS:     General:  No wt loss, fevers, chills, night sweats, fatigue,   Eyes:  Good vision, no reported pain  ENT:  No sore throat, pain, runny nose, dysphagia  CV:  No pain, palpitations, hypo/hypertension  Resp:  No dyspnea, cough, tachypnea, wheezing  GI:  No pain, No nausea, No vomiting, No diarrhea, No constipation, No weight loss, No fever, No pruritis, No rectal bleeding, No tarry stools, No dysphagia,  :  No pain, bleeding, incontinence, nocturia  Muscle:  No pain, weakness  Neuro:  No weakness, tingling, memory problems  Psych:  No fatigue, insomnia, mood problems, depression  Endocrine:  No polyuria, polydipsia, cold/heat intolerance  Heme:  No petechiae, ecchymosis, easy bruisability  Skin:  No rash, tattoos, scars, edema      PHYSICAL EXAM:   Vital Signs:  Vital Signs Last 24 Hrs  T(C): 37.4 (31 Dec 2023 08:31), Max: 37.8 (31 Dec 2023 06:27)  T(F): 99.3 (31 Dec 2023 08:31), Max: 100 (31 Dec 2023 06:27)  HR: 70 (31 Dec 2023 08:31) (70 - 102)  BP: 104/63 (31 Dec 2023 08:31) (95/55 - 125/70)  BP(mean): --  RR: 19 (31 Dec 2023 08:31) (16 - 20)  SpO2: 97% (31 Dec 2023 08:31) (97% - 100%)    Parameters below as of 31 Dec 2023 08:31  Patient On (Oxygen Delivery Method): nasal cannula  O2 Flow (L/min): 2    Daily Height in cm: 175.26 (30 Dec 2023 20:12)    Daily     GENERAL:  Appears stated age, well-groomed, well-nourished, no distress  HEENT:  NC/AT,  conjunctivae clear and pink, no thyromegaly, nodules, adenopathy, no JVD, sclera -anicteric  CHEST:  Full & symmetric excursion, no increased effort, breath sounds clear  HEART:  Regular rhythm, S1, S2, no murmur/rub/S3/S4, no abdominal bruit, no edema  ABDOMEN:  Soft, non-tender, non-distended, normoactive bowel sounds,  no masses ,no hepato-splenomegaly, no signs of chronic liver disease  EXTEREMITIES:  no cyanosis,clubbing or edema  SKIN:  No rash/erythema/ecchymoses/petechiae/wounds/abscess/warm/dry  NEURO:  Alert, oriented, no asterixis, no tremor, no encephalopathy    LABS:                        8.1    5.77  )-----------( 159      ( 31 Dec 2023 10:28 )             26.1     12-31    136  |  99  |  25<H>  ----------------------------<  70  3.6   |  28  |  3.80<H>    Ca    7.7<L>      31 Dec 2023 10:28  Mg     2.0     12-31    TPro  5.5<L>  /  Alb  2.7<L>  /  TBili  0.7  /  DBili  x   /  AST  335<H>  /  ALT  140<H>  /  AlkPhos  161<H>  12-31    LIVER FUNCTIONS - ( 31 Dec 2023 10:28 )  Alb: 2.7 g/dL / Pro: 5.5 g/dL / ALK PHOS: 161 U/L / ALT: 140 U/L / AST: 335 U/L / GGT: x           PT/INR - ( 30 Dec 2023 21:40 )   PT: 27.3 sec;   INR: 2.39 ratio         PTT - ( 30 Dec 2023 21:40 )  PTT:34.7 sec  Urinalysis Basic - ( 31 Dec 2023 10:28 )    Color: x / Appearance: x / SG: x / pH: x  Gluc: 70 mg/dL / Ketone: x  / Bili: x / Urobili: x   Blood: x / Protein: x / Nitrite: x   Leuk Esterase: x / RBC: x / WBC x   Sq Epi: x / Non Sq Epi: x / Bacteria: x          Imaging:

## 2024-01-01 LAB
ALBUMIN SERPL ELPH-MCNC: 2.6 G/DL — LOW (ref 3.3–5)
ALBUMIN SERPL ELPH-MCNC: 2.6 G/DL — LOW (ref 3.3–5)
ALP SERPL-CCNC: 162 U/L — HIGH (ref 40–120)
ALP SERPL-CCNC: 162 U/L — HIGH (ref 40–120)
ALT FLD-CCNC: 240 U/L — HIGH (ref 12–78)
ALT FLD-CCNC: 240 U/L — HIGH (ref 12–78)
ANION GAP SERPL CALC-SCNC: 9 MMOL/L — SIGNIFICANT CHANGE UP (ref 5–17)
ANION GAP SERPL CALC-SCNC: 9 MMOL/L — SIGNIFICANT CHANGE UP (ref 5–17)
AST SERPL-CCNC: 310 U/L — HIGH (ref 15–37)
AST SERPL-CCNC: 310 U/L — HIGH (ref 15–37)
BASOPHILS # BLD AUTO: 0.04 K/UL — SIGNIFICANT CHANGE UP (ref 0–0.2)
BASOPHILS # BLD AUTO: 0.04 K/UL — SIGNIFICANT CHANGE UP (ref 0–0.2)
BASOPHILS NFR BLD AUTO: 1 % — SIGNIFICANT CHANGE UP (ref 0–2)
BASOPHILS NFR BLD AUTO: 1 % — SIGNIFICANT CHANGE UP (ref 0–2)
BILIRUB SERPL-MCNC: 0.5 MG/DL — SIGNIFICANT CHANGE UP (ref 0.2–1.2)
BILIRUB SERPL-MCNC: 0.5 MG/DL — SIGNIFICANT CHANGE UP (ref 0.2–1.2)
BUN SERPL-MCNC: 40 MG/DL — HIGH (ref 7–23)
BUN SERPL-MCNC: 40 MG/DL — HIGH (ref 7–23)
CALCIUM SERPL-MCNC: 7.9 MG/DL — LOW (ref 8.5–10.1)
CALCIUM SERPL-MCNC: 7.9 MG/DL — LOW (ref 8.5–10.1)
CHLORIDE SERPL-SCNC: 100 MMOL/L — SIGNIFICANT CHANGE UP (ref 96–108)
CHLORIDE SERPL-SCNC: 100 MMOL/L — SIGNIFICANT CHANGE UP (ref 96–108)
CO2 SERPL-SCNC: 27 MMOL/L — SIGNIFICANT CHANGE UP (ref 22–31)
CO2 SERPL-SCNC: 27 MMOL/L — SIGNIFICANT CHANGE UP (ref 22–31)
CREAT SERPL-MCNC: 4.8 MG/DL — HIGH (ref 0.5–1.3)
CREAT SERPL-MCNC: 4.8 MG/DL — HIGH (ref 0.5–1.3)
EGFR: 12 ML/MIN/1.73M2 — LOW
EGFR: 12 ML/MIN/1.73M2 — LOW
EOSINOPHIL # BLD AUTO: 0.04 K/UL — SIGNIFICANT CHANGE UP (ref 0–0.5)
EOSINOPHIL # BLD AUTO: 0.04 K/UL — SIGNIFICANT CHANGE UP (ref 0–0.5)
EOSINOPHIL NFR BLD AUTO: 1 % — SIGNIFICANT CHANGE UP (ref 0–6)
EOSINOPHIL NFR BLD AUTO: 1 % — SIGNIFICANT CHANGE UP (ref 0–6)
GLUCOSE SERPL-MCNC: 68 MG/DL — LOW (ref 70–99)
GLUCOSE SERPL-MCNC: 68 MG/DL — LOW (ref 70–99)
HAV IGM SER-ACNC: SIGNIFICANT CHANGE UP
HBV CORE IGM SER-ACNC: SIGNIFICANT CHANGE UP
HBV SURFACE AG SER-ACNC: SIGNIFICANT CHANGE UP
HCT VFR BLD CALC: 27.6 % — LOW (ref 39–50)
HCT VFR BLD CALC: 27.6 % — LOW (ref 39–50)
HCV AB S/CO SERPL IA: 0.25 S/CO — SIGNIFICANT CHANGE UP (ref 0–0.99)
HCV AB S/CO SERPL IA: 0.25 S/CO — SIGNIFICANT CHANGE UP (ref 0–0.99)
HCV AB S/CO SERPL IA: 0.28 S/CO — SIGNIFICANT CHANGE UP (ref 0–0.99)
HCV AB S/CO SERPL IA: 0.28 S/CO — SIGNIFICANT CHANGE UP (ref 0–0.99)
HCV AB SERPL-IMP: SIGNIFICANT CHANGE UP
HGB BLD-MCNC: 8.6 G/DL — LOW (ref 13–17)
HGB BLD-MCNC: 8.6 G/DL — LOW (ref 13–17)
INR BLD: 2.02 RATIO — HIGH (ref 0.85–1.18)
INR BLD: 2.02 RATIO — HIGH (ref 0.85–1.18)
LYMPHOCYTES # BLD AUTO: 0.25 K/UL — LOW (ref 1–3.3)
LYMPHOCYTES # BLD AUTO: 0.25 K/UL — LOW (ref 1–3.3)
LYMPHOCYTES # BLD AUTO: 7 % — LOW (ref 13–44)
LYMPHOCYTES # BLD AUTO: 7 % — LOW (ref 13–44)
MCHC RBC-ENTMCNC: 31.2 GM/DL — LOW (ref 32–36)
MCHC RBC-ENTMCNC: 31.2 GM/DL — LOW (ref 32–36)
MCHC RBC-ENTMCNC: 33.7 PG — SIGNIFICANT CHANGE UP (ref 27–34)
MCHC RBC-ENTMCNC: 33.7 PG — SIGNIFICANT CHANGE UP (ref 27–34)
MCV RBC AUTO: 108.2 FL — HIGH (ref 80–100)
MCV RBC AUTO: 108.2 FL — HIGH (ref 80–100)
MONOCYTES # BLD AUTO: 0.32 K/UL — SIGNIFICANT CHANGE UP (ref 0–0.9)
MONOCYTES # BLD AUTO: 0.32 K/UL — SIGNIFICANT CHANGE UP (ref 0–0.9)
MONOCYTES NFR BLD AUTO: 9 % — SIGNIFICANT CHANGE UP (ref 2–14)
MONOCYTES NFR BLD AUTO: 9 % — SIGNIFICANT CHANGE UP (ref 2–14)
NEUTROPHILS # BLD AUTO: 2.88 K/UL — SIGNIFICANT CHANGE UP (ref 1.8–7.4)
NEUTROPHILS # BLD AUTO: 2.88 K/UL — SIGNIFICANT CHANGE UP (ref 1.8–7.4)
NEUTROPHILS NFR BLD AUTO: 73 % — SIGNIFICANT CHANGE UP (ref 43–77)
NEUTROPHILS NFR BLD AUTO: 73 % — SIGNIFICANT CHANGE UP (ref 43–77)
NRBC # BLD: SIGNIFICANT CHANGE UP /100 WBCS (ref 0–0)
NRBC # BLD: SIGNIFICANT CHANGE UP /100 WBCS (ref 0–0)
PLATELET # BLD AUTO: 166 K/UL — SIGNIFICANT CHANGE UP (ref 150–400)
PLATELET # BLD AUTO: 166 K/UL — SIGNIFICANT CHANGE UP (ref 150–400)
POTASSIUM SERPL-MCNC: 3.7 MMOL/L — SIGNIFICANT CHANGE UP (ref 3.5–5.3)
POTASSIUM SERPL-MCNC: 3.7 MMOL/L — SIGNIFICANT CHANGE UP (ref 3.5–5.3)
POTASSIUM SERPL-SCNC: 3.7 MMOL/L — SIGNIFICANT CHANGE UP (ref 3.5–5.3)
POTASSIUM SERPL-SCNC: 3.7 MMOL/L — SIGNIFICANT CHANGE UP (ref 3.5–5.3)
PROT SERPL-MCNC: 5.5 G/DL — LOW (ref 6–8.3)
PROT SERPL-MCNC: 5.5 G/DL — LOW (ref 6–8.3)
PROTHROM AB SERPL-ACNC: 23.2 SEC — HIGH (ref 9.5–13)
PROTHROM AB SERPL-ACNC: 23.2 SEC — HIGH (ref 9.5–13)
RBC # BLD: 2.55 M/UL — LOW (ref 4.2–5.8)
RBC # BLD: 2.55 M/UL — LOW (ref 4.2–5.8)
RBC # FLD: 14.6 % — HIGH (ref 10.3–14.5)
RBC # FLD: 14.6 % — HIGH (ref 10.3–14.5)
SODIUM SERPL-SCNC: 136 MMOL/L — SIGNIFICANT CHANGE UP (ref 135–145)
SODIUM SERPL-SCNC: 136 MMOL/L — SIGNIFICANT CHANGE UP (ref 135–145)
TROPONIN I, HIGH SENSITIVITY RESULT: 1880.2 NG/L — HIGH
TROPONIN I, HIGH SENSITIVITY RESULT: 1880.2 NG/L — HIGH
WBC # BLD: 3.56 K/UL — LOW (ref 3.8–10.5)
WBC # BLD: 3.56 K/UL — LOW (ref 3.8–10.5)
WBC # FLD AUTO: 3.56 K/UL — LOW (ref 3.8–10.5)
WBC # FLD AUTO: 3.56 K/UL — LOW (ref 3.8–10.5)

## 2024-01-01 RX ORDER — DEXTROSE 50 % IN WATER 50 %
12.5 SYRINGE (ML) INTRAVENOUS ONCE
Refills: 0 | Status: COMPLETED | OUTPATIENT
Start: 2024-01-01 | End: 2024-01-01

## 2024-01-01 RX ORDER — MIDODRINE HYDROCHLORIDE 2.5 MG/1
10 TABLET ORAL THREE TIMES A DAY
Refills: 0 | Status: DISCONTINUED | OUTPATIENT
Start: 2024-01-01 | End: 2024-01-03

## 2024-01-01 RX ORDER — ERYTHROPOIETIN 10000 [IU]/ML
10000 INJECTION, SOLUTION INTRAVENOUS; SUBCUTANEOUS
Refills: 0 | Status: DISCONTINUED | OUTPATIENT
Start: 2024-01-01 | End: 2024-01-05

## 2024-01-01 RX ORDER — URSODIOL 250 MG/1
300 TABLET, FILM COATED ORAL
Refills: 0 | Status: DISCONTINUED | OUTPATIENT
Start: 2024-01-01 | End: 2024-01-05

## 2024-01-01 RX ADMIN — PANTOPRAZOLE SODIUM 40 MILLIGRAM(S): 20 TABLET, DELAYED RELEASE ORAL at 11:54

## 2024-01-01 RX ADMIN — TAMSULOSIN HYDROCHLORIDE 0.4 MILLIGRAM(S): 0.4 CAPSULE ORAL at 22:53

## 2024-01-01 RX ADMIN — MORPHINE SULFATE 2 MILLIGRAM(S): 50 CAPSULE, EXTENDED RELEASE ORAL at 12:19

## 2024-01-01 RX ADMIN — APIXABAN 5 MILLIGRAM(S): 2.5 TABLET, FILM COATED ORAL at 08:12

## 2024-01-01 RX ADMIN — BRIMONIDINE TARTRATE 1 DROP(S): 2 SOLUTION/ DROPS OPHTHALMIC at 20:17

## 2024-01-01 RX ADMIN — SENNA PLUS 2 TABLET(S): 8.6 TABLET ORAL at 22:54

## 2024-01-01 RX ADMIN — MIDODRINE HYDROCHLORIDE 10 MILLIGRAM(S): 2.5 TABLET ORAL at 17:42

## 2024-01-01 RX ADMIN — Medication 500 MILLIGRAM(S): at 11:53

## 2024-01-01 RX ADMIN — Medication 81 MILLIGRAM(S): at 11:54

## 2024-01-01 RX ADMIN — Medication 12.5 MILLIGRAM(S): at 17:42

## 2024-01-01 RX ADMIN — SIMVASTATIN 10 MILLIGRAM(S): 20 TABLET, FILM COATED ORAL at 22:54

## 2024-01-01 RX ADMIN — SERTRALINE 25 MILLIGRAM(S): 25 TABLET, FILM COATED ORAL at 11:53

## 2024-01-01 RX ADMIN — Medication 1 TABLET(S): at 11:54

## 2024-01-01 RX ADMIN — APIXABAN 5 MILLIGRAM(S): 2.5 TABLET, FILM COATED ORAL at 17:42

## 2024-01-01 RX ADMIN — Medication 667 MILLIGRAM(S): at 08:27

## 2024-01-01 RX ADMIN — DORZOLAMIDE HYDROCHLORIDE TIMOLOL MALEATE 1 DROP(S): 20; 5 SOLUTION/ DROPS OPHTHALMIC at 08:12

## 2024-01-01 RX ADMIN — Medication 5 MILLIGRAM(S): at 22:53

## 2024-01-01 RX ADMIN — TRAMADOL HYDROCHLORIDE 50 MILLIGRAM(S): 50 TABLET ORAL at 17:42

## 2024-01-01 RX ADMIN — Medication 30 MILLIGRAM(S): at 12:19

## 2024-01-01 RX ADMIN — URSODIOL 300 MILLIGRAM(S): 250 TABLET, FILM COATED ORAL at 18:57

## 2024-01-01 RX ADMIN — BUDESONIDE AND FORMOTEROL FUMARATE DIHYDRATE 2 PUFF(S): 160; 4.5 AEROSOL RESPIRATORY (INHALATION) at 08:13

## 2024-01-01 RX ADMIN — CALCITRIOL 0.25 MICROGRAM(S): 0.5 CAPSULE ORAL at 08:12

## 2024-01-01 RX ADMIN — Medication 12.5 GRAM(S): at 19:15

## 2024-01-01 RX ADMIN — MORPHINE SULFATE 2 MILLIGRAM(S): 50 CAPSULE, EXTENDED RELEASE ORAL at 12:49

## 2024-01-01 RX ADMIN — Medication 50 MICROGRAM(S): at 08:11

## 2024-01-01 RX ADMIN — BRIMONIDINE TARTRATE 1 DROP(S): 2 SOLUTION/ DROPS OPHTHALMIC at 08:12

## 2024-01-01 RX ADMIN — DORZOLAMIDE HYDROCHLORIDE TIMOLOL MALEATE 1 DROP(S): 20; 5 SOLUTION/ DROPS OPHTHALMIC at 20:17

## 2024-01-01 RX ADMIN — SERTRALINE 50 MILLIGRAM(S): 25 TABLET, FILM COATED ORAL at 11:53

## 2024-01-01 RX ADMIN — FINASTERIDE 5 MILLIGRAM(S): 5 TABLET, FILM COATED ORAL at 11:53

## 2024-01-01 NOTE — CONSULT NOTE ADULT - ASSESSMENT
ESRD on HD  clotted AVF, s/p thrombectomy, and venoplasty  Hypotension  Influenza A  Anemia    Next HD tomorrow. Continue midodrine for hypotension. To continue current meds. Procrit for anemia.     Further recommendations pending clinical course. Thank you for the courtesy of this referral.

## 2024-01-01 NOTE — PROGRESS NOTE ADULT - ASSESSMENT
REASON FOR VISIT  .. Management of problems listed below      ROS  . Patient unable to give ROS     PHYSICAL EXAM    HEENT Unremarkable  atraumatic   RESP Fair air entry  Harsh breath sound   CARDIAC S1 S2 No S3     NO JVD    ABDOMEN No hepatosplenomegaly   PEDAL EDEMA present No calf tenderness  NO rash       GENERAL DATA .   GOC.  .. 12/30/2023 full code    ICU STAY.  .. no  COVID. .. scv2   BEST PRACTICE ISSUES.    HOB ELEVATN.  .. Yes  DVT PPLX.  ..   12/31/2023 apixaba 5.2 (af)         DIET.   ..  12/31/2023 soft bite   STRESS ULCER PPLX.   .  ..   12/31/2023 protonix 40   ALLGY. ..    levaquin   WT. ..  12/30/2023 68  BMI. ..  12/30/2023 22  PROCEDURES.   .. 12/26/2023 Right upper extremity brachiocephalic fistula with long segment   thrombosis/absent flow within the body of the fistula as well as outflow   vein stent.   .. 12/26/2023 Had R AV fistula thrombectomy    ABGS.   .  VS/ PO/IO/ VENT/ DRIPS.   1/1/2024 afeb 75 96/52   1/1/2024 3l 100%     PRESENTATION.  . CC 12/30/2023. Pt DCed 12/30/2023 On arrival to [facility] SBP 70 RA rest po 85%  . HPI 12/30/2023.73 m with past medical history of end-stage renal disease on dialysis, chronic hypotension, afib, recent admission for clogged AV fistula is presenting for hypotension and hypoxia.  Patient was discharged back to [facility] today but sent to ED due to hypotension.  Otherwise denies any new complaints  . PMHx . ESRD A fib Recent clogged av fistula asthma   . HOME MEDS . levoxyl 50 amiodarone 200 apixaba 5.2 finasteride 5 sertraline 25 simvastatin 10 midodrine 10.2 symbicort 80   . PROSTHETICS POA.   .. 1) RUE AV fistula    .. 2) l sc PPM incision without palp generator )saus was taken out because it was hurting him)  .. 3) RIJ Temp HD cath was inserted 12/26    PROBLEM DATA.    . INFLUENZA   .. Flu a 12/30 (+)   .. 12/31/2023 oseltamivir 30.5d     . CO INFECTION  .. w 12/30-12/31-1/1/2023 w 4.8 - 5.7 - 3.5   .. cxr 12/30/2023 sl incr markings elevated l diaphragm   .. Doubt coinfection     . SHORTNESS OF BREATH   .. Likely sec  flu     . HYPOXIA  .. cxr 12/30 wire sternotomy np cpmsolidtn nc cw 9/2022   .. VTE unlikely as pt is on apixaba  .. Likely sec flu asthma fluid overload  .. target po 90-95%     . ASTHMA   .. 12/31/2023 duoneb.4p   .. 12/30/2023 symbicort  .. 12/30/2023 spiriva     . ORTHOSTATIC   .. 1/1/2024  midodrine 10.3     . A fib  .. 12/31/2023 apixaba 5.2    .. 12/31/2023 metoprolol 12.5 x 2   .. 12/31 AMIODARONE 200     . CAD   .. Tr 12/31-12/31/-12/31- 1/1/2024 Tr [id number] [id number]     . ANEMIA   .. Hb 12/30-12/31/2023 - 1/1/2024 Hb 8.5 - 8.1 - 8.6     .. Monitor     . ELEVATED LFTS   .. LFTS 12/30-12/31/2023 - 1/1/2024  .. -161- 162  .. -335 - 310   .. -140 - 240   .... 12/31 ac hepatitis profile (-)   .... 12/31 US Possible cirrhosis sp chanelle   .. 1/1/2024 ursodiol 300     . ESRD   .. HD as per nisha;     . SUMMARY.   . 73 m with past medical history of end-stage renal disease on dialysis, chronic hypotension, afib, recent admission for clogged AV fistula 12/26-12/30 admitted 12/30/2023  for hypotension and hypoxia.   .t tested (+) for flu on 12/30     COURSE .   . FLU   .... 12/31/2023 Tamiflu started   . ASTHMA Symbicort  . CHR A fib on Apixa poa   . ELEVATED CARDIAC ENZYMES   .... On asa   .... Cardio on case   . FLUID OVERLOAD   . ESRD   .... Getting HD   . ELEVATED LFTS   .... 12/31 ac hepatitis profile (-)   .... 12/31 US Possible cirrhosis sp chanelle   . ANEMIA Target Hb 7 (+)     OVERALL PLAN/DISPO   .... Monitor LFTS   .... complete tamiflu     TIME SPENT.  . Over 36 minutes aggregate care time spent on encounter; activities included   direct patient care, counseling and/or coordinating care reviewing notes, lab data/ imaging , discussion with multidisciplinary team/ patient  /family and explaining in detail risks, benefits, alternatives  of the recommendations     PATIENT.  . MICHEL GONZALEZ      REASON FOR VISIT  .. Management of problems listed below      ROS  . Patient unable to give ROS     PHYSICAL EXAM    HEENT Unremarkable  atraumatic   RESP Fair air entry  Harsh breath sound   CARDIAC S1 S2 No S3     NO JVD    ABDOMEN No hepatosplenomegaly   PEDAL EDEMA present No calf tenderness  NO rash       GENERAL DATA .   GOC.  .. 12/30/2023 full code    ICU STAY.  .. no  COVID. .. scv2   BEST PRACTICE ISSUES.    HOB ELEVATN.  .. Yes  DVT PPLX.  ..   12/31/2023 apixaba 5.2 (af)         DIET.   ..  12/31/2023 soft bite   STRESS ULCER PPLX.   .  ..   12/31/2023 protonix 40   ALLGY. ..    levaquin   WT. ..  12/30/2023 68  BMI. ..  12/30/2023 22  PROCEDURES.   .. 12/26/2023 Right upper extremity brachiocephalic fistula with long segment   thrombosis/absent flow within the body of the fistula as well as outflow   vein stent.   .. 12/26/2023 Had R AV fistula thrombectomy    ABGS.   .  VS/ PO/IO/ VENT/ DRIPS.   1/1/2024 afeb 75 96/52   1/1/2024 3l 100%     PRESENTATION.  . CC 12/30/2023. Pt DCed 12/30/2023 On arrival to [facility] SBP 70 RA rest po 85%  . HPI 12/30/2023.73 m with past medical history of end-stage renal disease on dialysis, chronic hypotension, afib, recent admission for clogged AV fistula is presenting for hypotension and hypoxia.  Patient was discharged back to [facility] today but sent to ED due to hypotension.  Otherwise denies any new complaints  . PMHx . ESRD A fib Recent clogged av fistula asthma   . HOME MEDS . levoxyl 50 amiodarone 200 apixaba 5.2 finasteride 5 sertraline 25 simvastatin 10 midodrine 10.2 symbicort 80   . PROSTHETICS POA.   .. 1) RUE AV fistula    .. 2) l sc PPM incision without palp generator )saus was taken out because it was hurting him)  .. 3) RIJ Temp HD cath was inserted 12/26    PROBLEM DATA.    . INFLUENZA   .. Flu a 12/30 (+)   .. 12/31/2023 oseltamivir 30.5d     . CO INFECTION  .. w 12/30-12/31-1/1/2023 w 4.8 - 5.7 - 3.5   .. cxr 12/30/2023 sl incr markings elevated l diaphragm   .. Doubt coinfection     . SHORTNESS OF BREATH   .. Likely sec  flu     . HYPOXIA  .. cxr 12/30 wire sternotomy np cpmsolidtn nc cw 9/2022   .. VTE unlikely as pt is on apixaba  .. Likely sec flu asthma fluid overload  .. target po 90-95%     . ASTHMA   .. 12/31/2023 duoneb.4p   .. 12/30/2023 symbicort  .. 12/30/2023 spiriva     . ORTHOSTATIC   .. 1/1/2024  midodrine 10.3     . A fib  .. 12/31/2023 apixaba 5.2    .. 12/31/2023 metoprolol 12.5 x 2   .. 12/31 AMIODARONE 200     . CAD   .. Tr 12/31-12/31/-12/31- 1/1/2024 Tr [id number]     . ANEMIA   .. Hb 12/30-12/31/2023 - 1/1/2024 Hb 8.5 - 8.1 - 8.6     .. Monitor     . ELEVATED LFTS   .. LFTS 12/30-12/31/2023 - 1/1/2024  .. -161- 162  .. -335 - 310   .. -140 - 240   .... 12/31 ac hepatitis profile (-)   .... 12/31 US Possible cirrhosis sp chanelle   .. 1/1/2024 ursodiol 300     . ESRD   .. HD as per nisha;     . SUMMARY.   . 73 m with past medical history of end-stage renal disease on dialysis, chronic hypotension, afib, recent admission for clogged AV fistula 12/26-12/30 admitted 12/30/2023  for hypotension and hypoxia.   .t tested (+) for flu on 12/30     COURSE .   . FLU   .... 12/31/2023 Tamiflu started   . ASTHMA Symbicort  . CHR A fib on Apixa poa   . ELEVATED CARDIAC ENZYMES   .... On asa   .... Cardio on case   . FLUID OVERLOAD   . ESRD   .... Getting HD   . ELEVATED LFTS   .... 12/31 ac hepatitis profile (-)   .... 12/31 US Possible cirrhosis sp chanelle   . ANEMIA Target Hb 7 (+)     OVERALL PLAN/DISPO   .... Monitor LFTS   .... complete tamiflu     TIME SPENT.  . Over 36 minutes aggregate care time spent on encounter; activities included   direct patient care, counseling and/or coordinating care reviewing notes, lab data/ imaging , discussion with multidisciplinary team/ patient  /family and explaining in detail risks, benefits, alternatives  of the recommendations     PATIENT.  . MICHEL GONZALEZ

## 2024-01-01 NOTE — PROGRESS NOTE ADULT - SUBJECTIVE AND OBJECTIVE BOX
CHIEF COMPLAINT/ REASON FOR VISIT  .. Patient was seen to address the  issue listed under PROBLEM LIST which is located toward bottom of this note     LISA NAVARRETE APER 09    Allergies    Levaquin (Anaphylaxis; Flushing; Short breath)    Intolerances        PAST MEDICAL & SURGICAL HISTORY:  ASHD (arteriosclerotic heart disease)      CAD (coronary artery disease)  s/p stent, CABG      CRI (chronic renal insufficiency)  ESRD on HD      Diabetes mellitus type II      Hypertension      Hyperlipidemia      Pacemaker      Chronic atrial fibrillation      Asthma      Hypothyroid      Dementia      H/O carotid stenosis      Enlarged prostate      Hx of CABG      Coronary stent      Cardiac pacemaker      History of amputation of toe  right great toe          FAMILY HISTORY:  Family history of chronic ischemic heart disease (Father)  father  of MI at age 59        Home Medications:  acetaminophen 325 mg oral tablet: 2 tab(s) orally 3 times a day as needed for  mild pain (30 Dec 2023 13:03)  Acidophilus oral capsule: 1 cap(s) orally once a day (28 Dec 2023 19:43)  amiodarone 200 mg oral tablet: 1 tab(s) orally once a day (30 Dec 2023 13:03)  apixaban 5 mg oral tablet: 1 tab(s) orally 2 times a day (30 Dec 2023 13:03)  Aspercreme Max Strength Lidocaine XL Patch 4% topical film: Apply topically to affected area once a day LEFT SHOULDER  10 AM -10 PM (30 Dec 2023 13:03)  brimonidine 0.2% ophthalmic solution: 1 drop(s) in each affected eye 2 times a day both eyes (28 Dec 2023 19:43)  calcitriol 0.25 mcg oral capsule: 1 cap(s) orally 3 times a week Monday, Wednesday, Friday (28 Dec 2023 19:43)  calcium acetate 667 mg oral capsule: 1 cap(s) orally 3 times a day (28 Dec 2023 19:43)  dorzolamide-timolol 2.23%-0.68% (2%-0.5% base) ophthalmic solution: 1 drop(s) in each eye 2 times a day (28 Dec 2023 19:44)  finasteride 5 mg oral tablet: 1 tab(s) orally once a day (at bedtime) (28 Dec 2023 19:44)  latanoprost 0.005% ophthalmic solution: 1 drop(s) in each eye once a day (at bedtime) Both eyes (28 Dec 2023 19:44)  Levothroid 50 mcg (0.05 mg) oral tablet: 1 tab(s) orally once a day (28 Dec 2023 19:44)  melatonin 5 mg oral tablet: 1 tab(s) orally once a day (at bedtime) (28 Dec 2023 19:44)  menthol 5% topical pad: Apply topically to affected area once a day from 7am - 3pm (28 Dec 2023 19:44)  midodrine 10 mg oral tablet: 1 tab(s) orally 2 times a day hold for SBP &gt;130 (28 Dec 2023 19:44)  sertraline 25 mg oral tablet: 1 tab(s) orally once a day total daily dose- 75mg (28 Dec 2023 19:44)  sertraline 50 mg oral tablet: 1 tab(s) orally once a day (at bedtime) total dose- 75mg daily (28 Dec 2023 19:44)  simvastatin 10 mg oral tablet: 1 tab(s) orally once a day (at bedtime) (28 Dec 2023 19:44)  Symbicort 80 mcg-4.5 mcg/inh inhalation aerosol: 2 puff(s) inhaled 2 times a day (28 Dec 2023 19:44)  tamsulosin 0.4 mg oral capsule: 1 cap(s) orally once a day (at bedtime) (28 Dec 2023 19:44)  traMADol 50 mg oral tablet: 1 tab(s) orally 3 times a day As needed Moderate Pain (4 - 6) (30 Dec 2023 13:03)  Vitamin C 500 mg oral tablet, chewable: 1 tab(s) chewed once a day (28 Dec 2023 19:44)      MEDICATIONS  (STANDING):  aMIOdarone    Tablet 200 milliGRAM(s) Oral daily  apixaban 5 milliGRAM(s) Oral two times a day  ascorbic acid 500 milliGRAM(s) Oral daily  aspirin enteric coated 81 milliGRAM(s) Oral daily  brimonidine 0.2% Ophthalmic Solution 1 Drop(s) Both EYES two times a day  budesonide 160 MICROgram(s)/formoterol 4.5 MICROgram(s) Inhaler 2 Puff(s) Inhalation two times a day  calcitriol   Capsule 0.25 MICROGram(s) Oral <User Schedule>  calcium acetate 667 milliGRAM(s) Oral three times a day with meals  dextrose 5%. 1000 milliLiter(s) (50 mL/Hr) IV Continuous <Continuous>  dextrose 5%. 1000 milliLiter(s) (100 mL/Hr) IV Continuous <Continuous>  dextrose 50% Injectable 25 Gram(s) IV Push once  dextrose 50% Injectable 25 Gram(s) IV Push once  dextrose 50% Injectable 12.5 Gram(s) IV Push once  dorzolamide 2%/timolol 0.5% Ophthalmic Solution 1 Drop(s) Both EYES two times a day  finasteride 5 milliGRAM(s) Oral daily  glucagon  Injectable 1 milliGRAM(s) IntraMuscular once  insulin lispro (ADMELOG) corrective regimen sliding scale   SubCutaneous at bedtime  insulin lispro (ADMELOG) corrective regimen sliding scale   SubCutaneous three times a day before meals  lactobacillus acidophilus 1 Tablet(s) Oral daily  latanoprost 0.005% Ophthalmic Solution 1 Drop(s) Both EYES at bedtime  levothyroxine 50 MICROGram(s) Oral daily  melatonin 5 milliGRAM(s) Oral at bedtime  metoprolol tartrate 12.5 milliGRAM(s) Oral two times a day  oseltamivir 30 milliGRAM(s) Oral daily  pantoprazole    Tablet 40 milliGRAM(s) Oral daily  senna 2 Tablet(s) Oral at bedtime  sertraline 25 milliGRAM(s) Oral daily  sertraline 50 milliGRAM(s) Oral daily  simvastatin 10 milliGRAM(s) Oral at bedtime  tamsulosin 0.4 milliGRAM(s) Oral at bedtime    MEDICATIONS  (PRN):  acetaminophen     Tablet .. 650 milliGRAM(s) Oral every 6 hours PRN Temp greater or equal to 38C (100.4F), Mild Pain (1 - 3)  albuterol/ipratropium for Nebulization 3 milliLiter(s) Nebulizer every 6 hours PRN Shortness of Breath and/or Wheezing  aluminum hydroxide/magnesium hydroxide/simethicone Suspension 30 milliLiter(s) Oral every 4 hours PRN Dyspepsia  bisacodyl Suppository 10 milliGRAM(s) Rectal daily PRN Constipation  dextrose Oral Gel 15 Gram(s) Oral once PRN Blood Glucose LESS THAN 70 milliGRAM(s)/deciliter  guaiFENesin Oral Liquid (Sugar-Free) 200 milliGRAM(s) Oral every 6 hours PRN Cough  midodrine. 10 milliGRAM(s) Oral three times a day PRN for systolic BP<100  morphine  - Injectable 2 milliGRAM(s) IV Push every 6 hours PRN Severe Pain (7 - 10)  ondansetron Injectable 4 milliGRAM(s) IV Push every 8 hours PRN Nausea and/or Vomiting  traMADol 50 milliGRAM(s) Oral three times a day PRN Moderate Pain (4 - 6)              Vital Signs Last 24 Hrs  T(C): 36.5 (2024 08:29), Max: 36.9 (31 Dec 2023 17:09)  T(F): 97.7 (2024 08:29), Max: 98.5 (31 Dec 2023 17:09)  HR: 75 (2024 08:29) (70 - 75)  BP: 96/52 (2024 08:29) (94/57 - 96/57)  BP(mean): --  RR: 14 (2024 08:29) (14 - 22)  SpO2: 100% (2024 08:29) (99% - 100%)    Parameters below as of 2024 08:29  Patient On (Oxygen Delivery Method): nasal cannula  O2 Flow (L/min): 3                LABS:                        8.6    3.56  )-----------( 166      ( 2024 07:02 )             27.6         136  |  100  |  40<H>  ----------------------------<  68<L>  3.7   |  27  |  4.80<H>    Ca    7.9<L>      2024 07:02  Mg     2.0     12-31    TPro  5.5<L>  /  Alb  2.6<L>  /  TBili  0.5  /  DBili  0.2  /  AST  310<H>  /  ALT  240<H>  /  AlkPhos  162<H>      PT/INR - ( 2024 07:02 )   PT: 23.2 sec;   INR: 2.02 ratio         PTT - ( 30 Dec 2023 21:40 )  PTT:34.7 sec  Urinalysis Basic - ( 2024 07:02 )    Color: x / Appearance: x / SG: x / pH: x  Gluc: 68 mg/dL / Ketone: x  / Bili: x / Urobili: x   Blood: x / Protein: x / Nitrite: x   Leuk Esterase: x / RBC: x / WBC x   Sq Epi: x / Non Sq Epi: x / Bacteria: x            WBC:  WBC Count: 3.56 K/uL ( @ 07:02)  WBC Count: 5.77 K/uL ( @ 10:28)  WBC Count: 4.80 K/uL ( @ 21:40)      MICROBIOLOGY:  RECENT CULTURES:   .Blood Blood-Peripheral XXXX XXXX   No growth at 24 hours     .Blood Blood-Peripheral XXXX XXXX   No growth at 24 hours          CARDIAC MARKERS ( 31 Dec 2023 02:06 )  x     / x     / 563 U/L / x     / 11.6 ng/mL  CARDIAC MARKERS ( 31 Dec 2023 01:11 )  x     / x     / 71 U/L / x     / <1.0 ng/mL        PT/INR - ( 2024 07:02 )   PT: 23.2 sec;   INR: 2.02 ratio         PTT - ( 30 Dec 2023 21:40 )  PTT:34.7 sec    Sodium:  Sodium: 136 mmol/L ( @ 07:02)  Sodium: 136 mmol/L ( @ 10:28)  Sodium: 137 mmol/L ( @ 21:40)      4.80 mg/dL  @ 07:02  3.80 mg/dL  @ 10:28  3.20 mg/dL  @ 21:40      Hemoglobin:  Hemoglobin: 8.6 g/dL ( @ 07:02)  Hemoglobin: 8.1 g/dL ( @ 10:28)  Hemoglobin: 8.5 g/dL ( @ 21:40)      Platelets: Platelet Count - Automated: 166 K/uL ( @ 07:02)  Platelet Count - Automated: 159 K/uL ( @ 10:28)  Platelet Count - Automated: 155 K/uL ( @ 21:40)      LIVER FUNCTIONS - ( 2024 07:02 )  Alb: 2.6 g/dL / Pro: 5.5 g/dL / ALK PHOS: 162 U/L / ALT: 240 U/L / AST: 310 U/L / GGT: x             Urinalysis Basic - ( 2024 07:02 )    Color: x / Appearance: x / SG: x / pH: x  Gluc: 68 mg/dL / Ketone: x  / Bili: x / Urobili: x   Blood: x / Protein: x / Nitrite: x   Leuk Esterase: x / RBC: x / WBC x   Sq Epi: x / Non Sq Epi: x / Bacteria: x        RADIOLOGY & ADDITIONAL STUDIES:      MICROBIOLOGY:  RECENT CULTURES:   .Blood Blood-Peripheral XXXX XXXX   No growth at 24 hours    - .Blood Blood-Peripheral XXXX XXXX   No growth at 24 hours

## 2024-01-01 NOTE — ED ADULT NURSE REASSESSMENT NOTE - NS ED NURSE REASSESS COMMENT FT1
Assumed patient care, patient is AOx3, remains admitted awaiting a bed, remains on cardiac monitor, remains on isolation for flu. Patient reports he is hungry, awaiting breakfast trays. Patient with diaper on, clean and dry at this time. Assumed patient care at 0730am, patient is AOx3, remains admitted awaiting a bed, remains on cardiac monitor, remains on isolation for flu. Patient reports he is hungry, awaiting breakfast trays. Patient with diaper on, clean and dry at this time.

## 2024-01-01 NOTE — CONSULT NOTE ADULT - ASSESSMENT
72 yo , LTC resident with past medical history of end-stage renal disease on dialysis, chronic hypotension, afib, recent admission for clogged AV fistula is presenting for hypotension and hypoxia.    esrd  anemia  weakness  frailty  ataxic gait  AF  OP  OA  AV fistula malfunction   74 yo , LTC resident with past medical history of end-stage renal disease on dialysis, chronic hypotension, afib, recent admission for clogged AV fistula is presenting for hypotension and hypoxia.    esrd  anemia  weakness  frailty  ataxic gait  AF  OP  OA  AV fistula malfunction

## 2024-01-01 NOTE — CONSULT NOTE ADULT - SUBJECTIVE AND OBJECTIVE BOX
Patient is a 73y old  Male who presents with a chief complaint of hypoxia and hypotension (2024 09:58)    HPI:  Patient  is 74 yo , LTC resident with past medical history of end-stage renal disease on dialysis, chronic hypotension, afib, recent admission for clogged AV fistula is presenting for hypotension and hypoxia.  Patient was discharged back to [facility]   but sent to ED due to hypotension and hypoxia on arrival to [facility] .His vitals were stable upon discharge and he was monitored 2 hrs after HD session by house staff .  Otherwise denies any new complaints.  Patient was diagnosed with Influenza , also found to have JAZMINE elevation .Seen by Dr Garcia cardiologist Admitted  to telemetry unit for monitoring , send 3 sets of cardiac enzymes to rule out acute coronary event, obtain ECHO to evaluate LVEF, cardiology consult  ,continue current management, O2 supply, anticoagulation plan as per cardiology consult Palliative care consult requested ,to discuss advance directives and complete MOLST  (31 Dec 2023 10:10)      PAST MEDICAL HISTORY:  ASHD (arteriosclerotic heart disease)    CAD (coronary artery disease)    CRI (chronic renal insufficiency)    Diabetes mellitus type II    Hypertension    Hyperlipidemia    Pacemaker    Chronic atrial fibrillation    Asthma    Hypothyroid    Dementia    H/O carotid stenosis    Enlarged prostate        PAST SURGICAL HISTORY:  Hx of CABG    Coronary stent    Hernia    Cardiac pacemaker    History of amputation of toe        FAMILY HISTORY:  Family history of chronic ischemic heart disease (Father)  father  of MI at age 59        SOCIAL HISTORY:    Allergies    Levaquin (Anaphylaxis; Flushing; Short breath)    Intolerances      Home Medications:  acetaminophen 325 mg oral tablet: 2 tab(s) orally 3 times a day as needed for  mild pain (30 Dec 2023 13:03)  Acidophilus oral capsule: 1 cap(s) orally once a day (28 Dec 2023 19:43)  amiodarone 200 mg oral tablet: 1 tab(s) orally once a day (30 Dec 2023 13:03)  apixaban 5 mg oral tablet: 1 tab(s) orally 2 times a day (30 Dec 2023 13:03)  Aspercreme Max Strength Lidocaine XL Patch 4% topical film: Apply topically to affected area once a day LEFT SHOULDER  10 AM -10 PM (30 Dec 2023 13:03)  brimonidine 0.2% ophthalmic solution: 1 drop(s) in each affected eye 2 times a day both eyes (28 Dec 2023 19:43)  calcitriol 0.25 mcg oral capsule: 1 cap(s) orally 3 times a week Monday, Wednesday, Friday (28 Dec 2023 19:43)  calcium acetate 667 mg oral capsule: 1 cap(s) orally 3 times a day (28 Dec 2023 19:43)  dorzolamide-timolol 2.23%-0.68% (2%-0.5% base) ophthalmic solution: 1 drop(s) in each eye 2 times a day (28 Dec 2023 19:44)  finasteride 5 mg oral tablet: 1 tab(s) orally once a day (at bedtime) (28 Dec 2023 19:44)  latanoprost 0.005% ophthalmic solution: 1 drop(s) in each eye once a day (at bedtime) Both eyes (28 Dec 2023 19:44)  Levothroid 50 mcg (0.05 mg) oral tablet: 1 tab(s) orally once a day (28 Dec 2023 19:44)  melatonin 5 mg oral tablet: 1 tab(s) orally once a day (at bedtime) (28 Dec 2023 19:44)  menthol 5% topical pad: Apply topically to affected area once a day from 7am - 3pm (28 Dec 2023 19:44)  midodrine 10 mg oral tablet: 1 tab(s) orally 2 times a day hold for SBP &gt;130 (28 Dec 2023 19:44)  sertraline 25 mg oral tablet: 1 tab(s) orally once a day total daily dose- 75mg (28 Dec 2023 19:44)  sertraline 50 mg oral tablet: 1 tab(s) orally once a day (at bedtime) total dose- 75mg daily (28 Dec 2023 19:44)  simvastatin 10 mg oral tablet: 1 tab(s) orally once a day (at bedtime) (28 Dec 2023 19:44)  Symbicort 80 mcg-4.5 mcg/inh inhalation aerosol: 2 puff(s) inhaled 2 times a day (28 Dec 2023 19:44)  tamsulosin 0.4 mg oral capsule: 1 cap(s) orally once a day (at bedtime) (28 Dec 2023 19:44)  traMADol 50 mg oral tablet: 1 tab(s) orally 3 times a day As needed Moderate Pain (4 - 6) (30 Dec 2023 13:03)  Vitamin C 500 mg oral tablet, chewable: 1 tab(s) chewed once a day (28 Dec 2023 19:44)    MEDICATIONS  (STANDING):  aMIOdarone    Tablet 200 milliGRAM(s) Oral daily  apixaban 5 milliGRAM(s) Oral two times a day  ascorbic acid 500 milliGRAM(s) Oral daily  aspirin enteric coated 81 milliGRAM(s) Oral daily  brimonidine 0.2% Ophthalmic Solution 1 Drop(s) Both EYES two times a day  budesonide 160 MICROgram(s)/formoterol 4.5 MICROgram(s) Inhaler 2 Puff(s) Inhalation two times a day  calcitriol   Capsule 0.25 MICROGram(s) Oral <User Schedule>  calcium acetate 667 milliGRAM(s) Oral three times a day with meals  dextrose 5%. 1000 milliLiter(s) (50 mL/Hr) IV Continuous <Continuous>  dextrose 5%. 1000 milliLiter(s) (100 mL/Hr) IV Continuous <Continuous>  dextrose 50% Injectable 25 Gram(s) IV Push once  dextrose 50% Injectable 25 Gram(s) IV Push once  dextrose 50% Injectable 12.5 Gram(s) IV Push once  dorzolamide 2%/timolol 0.5% Ophthalmic Solution 1 Drop(s) Both EYES two times a day  finasteride 5 milliGRAM(s) Oral daily  glucagon  Injectable 1 milliGRAM(s) IntraMuscular once  insulin lispro (ADMELOG) corrective regimen sliding scale   SubCutaneous three times a day before meals  insulin lispro (ADMELOG) corrective regimen sliding scale   SubCutaneous at bedtime  lactobacillus acidophilus 1 Tablet(s) Oral daily  latanoprost 0.005% Ophthalmic Solution 1 Drop(s) Both EYES at bedtime  levothyroxine 50 MICROGram(s) Oral daily  melatonin 5 milliGRAM(s) Oral at bedtime  metoprolol tartrate 12.5 milliGRAM(s) Oral two times a day  oseltamivir 30 milliGRAM(s) Oral daily  pantoprazole    Tablet 40 milliGRAM(s) Oral daily  senna 2 Tablet(s) Oral at bedtime  sertraline 50 milliGRAM(s) Oral daily  sertraline 25 milliGRAM(s) Oral daily  simvastatin 10 milliGRAM(s) Oral at bedtime  tamsulosin 0.4 milliGRAM(s) Oral at bedtime    MEDICATIONS  (PRN):  acetaminophen     Tablet .. 650 milliGRAM(s) Oral every 6 hours PRN Temp greater or equal to 38C (100.4F), Mild Pain (1 - 3)  albuterol/ipratropium for Nebulization 3 milliLiter(s) Nebulizer every 6 hours PRN Shortness of Breath and/or Wheezing  aluminum hydroxide/magnesium hydroxide/simethicone Suspension 30 milliLiter(s) Oral every 4 hours PRN Dyspepsia  bisacodyl Suppository 10 milliGRAM(s) Rectal daily PRN Constipation  dextrose Oral Gel 15 Gram(s) Oral once PRN Blood Glucose LESS THAN 70 milliGRAM(s)/deciliter  guaiFENesin Oral Liquid (Sugar-Free) 200 milliGRAM(s) Oral every 6 hours PRN Cough  midodrine. 10 milliGRAM(s) Oral three times a day PRN for systolic BP<100  morphine  - Injectable 2 milliGRAM(s) IV Push every 6 hours PRN Severe Pain (7 - 10)  ondansetron Injectable 4 milliGRAM(s) IV Push every 8 hours PRN Nausea and/or Vomiting  traMADol 50 milliGRAM(s) Oral three times a day PRN Moderate Pain (4 - 6)      REVIEW OF SYSTEMS:  General:   Respiratory: No cough, SOB  Cardiovascular: No CP or Palpitations	  Gastrointestinal: No nausea, Vomiting. No diarrhea  Genitourinary: No urinary complaints	  Musculoskeletal: No leg swelling, No new rash or lesions	  Neurological: 	  all other systems negative    T(F): 97.7 (24 @ 08:29), Max: 98.5 (23 @ 17:09)  HR: 75 (24 @ 08:29) (70 - 75)  BP: 96/52 (24 @ 08:29) (94/57 - 96/57)  RR: 14 (24 @ 08:29) (14 - 22)  SpO2: 100% (24 @ 08:29) (99% - 100%)  Wt(kg): --    PHYSICAL EXAM:  General: NAD  Respiratory: b/l air entry  Cardiovascular: S1 S2  Gastrointestinal: soft  Extremities: edema            136  |  100  |  40<H>  ----------------------------<  68<L>  3.7   |  27  |  4.80<H>    Ca    7.9<L>      2024 07:02  Mg     2.0         TPro  5.5<L>  /  Alb  2.6<L>  /  TBili  0.5  /  DBili  0.2  /  AST  310<H>  /  ALT  240<H>  /  AlkPhos  162<H>                            8.6    3.56  )-----------( 166      ( 2024 07:02 )             27.6       Potassium: 3.7 mmol/L ( @ 07:02)  Blood Urea Nitrogen: 40 mg/dL ( @ 07:02)  Calcium: 7.9 mg/dL ( @ 07:02)  Hemoglobin: 8.6 g/dL ( @ 07:02)      Creatinine, Serum: 4.80 ( @ 07:02)  Creatinine, Serum: 3.80 ( @ 10:28)  Creatinine, Serum: 3.20 ( @ 21:40)      Urinalysis Basic - ( 2024 07:02 )    Color: x / Appearance: x / SG: x / pH: x  Gluc: 68 mg/dL / Ketone: x  / Bili: x / Urobili: x   Blood: x / Protein: x / Nitrite: x   Leuk Esterase: x / RBC: x / WBC x   Sq Epi: x / Non Sq Epi: x / Bacteria: x      LIVER FUNCTIONS - ( 2024 07:02 )  Alb: 2.6 g/dL / Pro: 5.5 g/dL / ALK PHOS: 162 U/L / ALT: 240 U/L / AST: 310 U/L / GGT: x           CARDIAC MARKERS ( 31 Dec 2023 02:06 )  x     / x     / 563 U/L / x     / 11.6 ng/mL  CARDIAC MARKERS ( 31 Dec 2023 01:11 )  x     / x     / 71 U/L / x     / <1.0 ng/mL      Creatine Kinase, Serum: 563 U/L (23 @ 02:06)  Creatine Kinase, Serum: 71 U/L (23 @ 01:11)          I&O's Detail        Culture - Blood (collected 30 Dec 2023 21:45)  Source: .Blood Blood-Peripheral  Preliminary Report (2024 02:03):    No growth at 24 hours    Culture - Blood (collected 30 Dec 2023 21:40)  Source: .Blood Blood-Peripheral  Preliminary Report (2024 02:03):    No growth at 24 hours         Patient is a 73y old  Male who presents with a chief complaint of hypoxia and hypotension (2024 09:58)    HPI:  Patient  is 74 yo , LTC resident with past medical history of end-stage renal disease on dialysis, chronic hypotension, afib, recent admission for clogged AV fistula is presenting for hypotension and hypoxia.  Patient was discharged back to [facility]   but sent to ED due to hypotension and hypoxia on arrival to [facility] .His vitals were stable upon discharge and he was monitored 2 hrs after HD session by house staff .  Otherwise denies any new complaints.  Patient was diagnosed with Influenza , also found to have JAZMINE elevation .Seen by Dr Garcia cardiologist Admitted  to telemetry unit for monitoring , send 3 sets of cardiac enzymes to rule out acute coronary event, obtain ECHO to evaluate LVEF, cardiology consult  ,continue current management, O2 supply, anticoagulation plan as per cardiology consult Palliative care consult requested ,to discuss advance directives and complete MOLST  (31 Dec 2023 10:10)      PAST MEDICAL HISTORY:  ASHD (arteriosclerotic heart disease)    CAD (coronary artery disease)    CRI (chronic renal insufficiency)    Diabetes mellitus type II    Hypertension    Hyperlipidemia    Pacemaker    Chronic atrial fibrillation    Asthma    Hypothyroid    Dementia    H/O carotid stenosis    Enlarged prostate        PAST SURGICAL HISTORY:  Hx of CABG    Coronary stent    Hernia    Cardiac pacemaker    History of amputation of toe        FAMILY HISTORY:  Family history of chronic ischemic heart disease (Father)  father  of MI at age 59        SOCIAL HISTORY:    Allergies    Levaquin (Anaphylaxis; Flushing; Short breath)    Intolerances      Home Medications:  acetaminophen 325 mg oral tablet: 2 tab(s) orally 3 times a day as needed for  mild pain (30 Dec 2023 13:03)  Acidophilus oral capsule: 1 cap(s) orally once a day (28 Dec 2023 19:43)  amiodarone 200 mg oral tablet: 1 tab(s) orally once a day (30 Dec 2023 13:03)  apixaban 5 mg oral tablet: 1 tab(s) orally 2 times a day (30 Dec 2023 13:03)  Aspercreme Max Strength Lidocaine XL Patch 4% topical film: Apply topically to affected area once a day LEFT SHOULDER  10 AM -10 PM (30 Dec 2023 13:03)  brimonidine 0.2% ophthalmic solution: 1 drop(s) in each affected eye 2 times a day both eyes (28 Dec 2023 19:43)  calcitriol 0.25 mcg oral capsule: 1 cap(s) orally 3 times a week Monday, Wednesday, Friday (28 Dec 2023 19:43)  calcium acetate 667 mg oral capsule: 1 cap(s) orally 3 times a day (28 Dec 2023 19:43)  dorzolamide-timolol 2.23%-0.68% (2%-0.5% base) ophthalmic solution: 1 drop(s) in each eye 2 times a day (28 Dec 2023 19:44)  finasteride 5 mg oral tablet: 1 tab(s) orally once a day (at bedtime) (28 Dec 2023 19:44)  latanoprost 0.005% ophthalmic solution: 1 drop(s) in each eye once a day (at bedtime) Both eyes (28 Dec 2023 19:44)  Levothroid 50 mcg (0.05 mg) oral tablet: 1 tab(s) orally once a day (28 Dec 2023 19:44)  melatonin 5 mg oral tablet: 1 tab(s) orally once a day (at bedtime) (28 Dec 2023 19:44)  menthol 5% topical pad: Apply topically to affected area once a day from 7am - 3pm (28 Dec 2023 19:44)  midodrine 10 mg oral tablet: 1 tab(s) orally 2 times a day hold for SBP &gt;130 (28 Dec 2023 19:44)  sertraline 25 mg oral tablet: 1 tab(s) orally once a day total daily dose- 75mg (28 Dec 2023 19:44)  sertraline 50 mg oral tablet: 1 tab(s) orally once a day (at bedtime) total dose- 75mg daily (28 Dec 2023 19:44)  simvastatin 10 mg oral tablet: 1 tab(s) orally once a day (at bedtime) (28 Dec 2023 19:44)  Symbicort 80 mcg-4.5 mcg/inh inhalation aerosol: 2 puff(s) inhaled 2 times a day (28 Dec 2023 19:44)  tamsulosin 0.4 mg oral capsule: 1 cap(s) orally once a day (at bedtime) (28 Dec 2023 19:44)  traMADol 50 mg oral tablet: 1 tab(s) orally 3 times a day As needed Moderate Pain (4 - 6) (30 Dec 2023 13:03)  Vitamin C 500 mg oral tablet, chewable: 1 tab(s) chewed once a day (28 Dec 2023 19:44)    MEDICATIONS  (STANDING):  aMIOdarone    Tablet 200 milliGRAM(s) Oral daily  apixaban 5 milliGRAM(s) Oral two times a day  ascorbic acid 500 milliGRAM(s) Oral daily  aspirin enteric coated 81 milliGRAM(s) Oral daily  brimonidine 0.2% Ophthalmic Solution 1 Drop(s) Both EYES two times a day  budesonide 160 MICROgram(s)/formoterol 4.5 MICROgram(s) Inhaler 2 Puff(s) Inhalation two times a day  calcitriol   Capsule 0.25 MICROGram(s) Oral <User Schedule>  calcium acetate 667 milliGRAM(s) Oral three times a day with meals  dextrose 5%. 1000 milliLiter(s) (50 mL/Hr) IV Continuous <Continuous>  dextrose 5%. 1000 milliLiter(s) (100 mL/Hr) IV Continuous <Continuous>  dextrose 50% Injectable 25 Gram(s) IV Push once  dextrose 50% Injectable 25 Gram(s) IV Push once  dextrose 50% Injectable 12.5 Gram(s) IV Push once  dorzolamide 2%/timolol 0.5% Ophthalmic Solution 1 Drop(s) Both EYES two times a day  finasteride 5 milliGRAM(s) Oral daily  glucagon  Injectable 1 milliGRAM(s) IntraMuscular once  insulin lispro (ADMELOG) corrective regimen sliding scale   SubCutaneous three times a day before meals  insulin lispro (ADMELOG) corrective regimen sliding scale   SubCutaneous at bedtime  lactobacillus acidophilus 1 Tablet(s) Oral daily  latanoprost 0.005% Ophthalmic Solution 1 Drop(s) Both EYES at bedtime  levothyroxine 50 MICROGram(s) Oral daily  melatonin 5 milliGRAM(s) Oral at bedtime  metoprolol tartrate 12.5 milliGRAM(s) Oral two times a day  oseltamivir 30 milliGRAM(s) Oral daily  pantoprazole    Tablet 40 milliGRAM(s) Oral daily  senna 2 Tablet(s) Oral at bedtime  sertraline 50 milliGRAM(s) Oral daily  sertraline 25 milliGRAM(s) Oral daily  simvastatin 10 milliGRAM(s) Oral at bedtime  tamsulosin 0.4 milliGRAM(s) Oral at bedtime    MEDICATIONS  (PRN):  acetaminophen     Tablet .. 650 milliGRAM(s) Oral every 6 hours PRN Temp greater or equal to 38C (100.4F), Mild Pain (1 - 3)  albuterol/ipratropium for Nebulization 3 milliLiter(s) Nebulizer every 6 hours PRN Shortness of Breath and/or Wheezing  aluminum hydroxide/magnesium hydroxide/simethicone Suspension 30 milliLiter(s) Oral every 4 hours PRN Dyspepsia  bisacodyl Suppository 10 milliGRAM(s) Rectal daily PRN Constipation  dextrose Oral Gel 15 Gram(s) Oral once PRN Blood Glucose LESS THAN 70 milliGRAM(s)/deciliter  guaiFENesin Oral Liquid (Sugar-Free) 200 milliGRAM(s) Oral every 6 hours PRN Cough  midodrine. 10 milliGRAM(s) Oral three times a day PRN for systolic BP<100  morphine  - Injectable 2 milliGRAM(s) IV Push every 6 hours PRN Severe Pain (7 - 10)  ondansetron Injectable 4 milliGRAM(s) IV Push every 8 hours PRN Nausea and/or Vomiting  traMADol 50 milliGRAM(s) Oral three times a day PRN Moderate Pain (4 - 6)      REVIEW OF SYSTEMS:  General:   Respiratory: No cough, SOB  Cardiovascular: No CP or Palpitations	  Gastrointestinal: No nausea, Vomiting. No diarrhea  Genitourinary: No urinary complaints	  Musculoskeletal: No leg swelling, No new rash or lesions	  Neurological: 	  all other systems negative    T(F): 97.7 (24 @ 08:29), Max: 98.5 (23 @ 17:09)  HR: 75 (24 @ 08:29) (70 - 75)  BP: 96/52 (24 @ 08:29) (94/57 - 96/57)  RR: 14 (24 @ 08:29) (14 - 22)  SpO2: 100% (24 @ 08:29) (99% - 100%)  Wt(kg): --    PHYSICAL EXAM:  General: NAD  Respiratory: b/l air entry  Cardiovascular: S1 S2  Gastrointestinal: soft  Extremities: edema            136  |  100  |  40<H>  ----------------------------<  68<L>  3.7   |  27  |  4.80<H>    Ca    7.9<L>      2024 07:02  Mg     2.0         TPro  5.5<L>  /  Alb  2.6<L>  /  TBili  0.5  /  DBili  0.2  /  AST  310<H>  /  ALT  240<H>  /  AlkPhos  162<H>                            8.6    3.56  )-----------( 166      ( 2024 07:02 )             27.6       Potassium: 3.7 mmol/L ( @ 07:02)  Blood Urea Nitrogen: 40 mg/dL ( @ 07:02)  Calcium: 7.9 mg/dL ( @ 07:02)  Hemoglobin: 8.6 g/dL ( @ 07:02)      Creatinine, Serum: 4.80 ( @ 07:02)  Creatinine, Serum: 3.80 ( @ 10:28)  Creatinine, Serum: 3.20 ( @ 21:40)      Urinalysis Basic - ( 2024 07:02 )    Color: x / Appearance: x / SG: x / pH: x  Gluc: 68 mg/dL / Ketone: x  / Bili: x / Urobili: x   Blood: x / Protein: x / Nitrite: x   Leuk Esterase: x / RBC: x / WBC x   Sq Epi: x / Non Sq Epi: x / Bacteria: x      LIVER FUNCTIONS - ( 2024 07:02 )  Alb: 2.6 g/dL / Pro: 5.5 g/dL / ALK PHOS: 162 U/L / ALT: 240 U/L / AST: 310 U/L / GGT: x           CARDIAC MARKERS ( 31 Dec 2023 02:06 )  x     / x     / 563 U/L / x     / 11.6 ng/mL  CARDIAC MARKERS ( 31 Dec 2023 01:11 )  x     / x     / 71 U/L / x     / <1.0 ng/mL      Creatine Kinase, Serum: 563 U/L (23 @ 02:06)  Creatine Kinase, Serum: 71 U/L (23 @ 01:11)          I&O's Detail        Culture - Blood (collected 30 Dec 2023 21:45)  Source: .Blood Blood-Peripheral  Preliminary Report (2024 02:03):    No growth at 24 hours    Culture - Blood (collected 30 Dec 2023 21:40)  Source: .Blood Blood-Peripheral  Preliminary Report (2024 02:03):    No growth at 24 hours         Patient is a 73y old  Male who presents with a chief complaint of hypoxia and hypotension (2024 09:58)    HPI:  Patient  is 74 yo , LTC resident with past medical history of end-stage renal disease on dialysis, chronic hypotension, afib, recent admission for clogged AV fistula is presenting for hypotension and hypoxia.  Patient was discharged back to [facility]   but sent to ED due to hypotension and hypoxia on arrival to [facility] .His vitals were stable upon discharge and he was monitored 2 hrs after HD session by house staff .  Otherwise denies any new complaints.  Patient was diagnosed with Influenza , also found to have JAZMINE elevation .Seen by Dr Garcia cardiologist Admitted  to telemetry unit for monitoring , send 3 sets of cardiac enzymes to rule out acute coronary event, obtain ECHO to evaluate LVEF, cardiology consult  ,continue current management, O2 supply, anticoagulation plan as per cardiology consult Palliative care consult requested ,to discuss advance directives and complete MOLST  (31 Dec 2023 10:10)    Renal consult called for ESRD on hemodialysis.       PAST MEDICAL HISTORY:  ASHD (arteriosclerotic heart disease)    CAD (coronary artery disease)    CRI (chronic renal insufficiency)    Diabetes mellitus type II    Hypertension    Hyperlipidemia    Pacemaker    Chronic atrial fibrillation    Asthma    Hypothyroid    Dementia    H/O carotid stenosis    Enlarged prostate        PAST SURGICAL HISTORY:  Hx of CABG    Coronary stent    Hernia    Cardiac pacemaker    History of amputation of toe        FAMILY HISTORY:  Family history of chronic ischemic heart disease (Father)  father  of MI at age 59        SOCIAL HISTORY: No smoking or alcohol use     Allergies    Levaquin (Anaphylaxis; Flushing; Short breath)    Intolerances      Home Medications:  acetaminophen 325 mg oral tablet: 2 tab(s) orally 3 times a day as needed for  mild pain (30 Dec 2023 13:03)  Acidophilus oral capsule: 1 cap(s) orally once a day (28 Dec 2023 19:43)  amiodarone 200 mg oral tablet: 1 tab(s) orally once a day (30 Dec 2023 13:03)  apixaban 5 mg oral tablet: 1 tab(s) orally 2 times a day (30 Dec 2023 13:03)  Aspercreme Max Strength Lidocaine XL Patch 4% topical film: Apply topically to affected area once a day LEFT SHOULDER  10 AM -10 PM (30 Dec 2023 13:03)  brimonidine 0.2% ophthalmic solution: 1 drop(s) in each affected eye 2 times a day both eyes (28 Dec 2023 19:43)  calcitriol 0.25 mcg oral capsule: 1 cap(s) orally 3 times a week Monday, Wednesday, Friday (28 Dec 2023 19:43)  calcium acetate 667 mg oral capsule: 1 cap(s) orally 3 times a day (28 Dec 2023 19:43)  dorzolamide-timolol 2.23%-0.68% (2%-0.5% base) ophthalmic solution: 1 drop(s) in each eye 2 times a day (28 Dec 2023 19:44)  finasteride 5 mg oral tablet: 1 tab(s) orally once a day (at bedtime) (28 Dec 2023 19:44)  latanoprost 0.005% ophthalmic solution: 1 drop(s) in each eye once a day (at bedtime) Both eyes (28 Dec 2023 19:44)  Levothroid 50 mcg (0.05 mg) oral tablet: 1 tab(s) orally once a day (28 Dec 2023 19:44)  melatonin 5 mg oral tablet: 1 tab(s) orally once a day (at bedtime) (28 Dec 2023 19:44)  menthol 5% topical pad: Apply topically to affected area once a day from 7am - 3pm (28 Dec 2023 19:44)  midodrine 10 mg oral tablet: 1 tab(s) orally 2 times a day hold for SBP &gt;130 (28 Dec 2023 19:44)  sertraline 25 mg oral tablet: 1 tab(s) orally once a day total daily dose- 75mg (28 Dec 2023 19:44)  sertraline 50 mg oral tablet: 1 tab(s) orally once a day (at bedtime) total dose- 75mg daily (28 Dec 2023 19:44)  simvastatin 10 mg oral tablet: 1 tab(s) orally once a day (at bedtime) (28 Dec 2023 19:44)  Symbicort 80 mcg-4.5 mcg/inh inhalation aerosol: 2 puff(s) inhaled 2 times a day (28 Dec 2023 19:44)  tamsulosin 0.4 mg oral capsule: 1 cap(s) orally once a day (at bedtime) (28 Dec 2023 19:44)  traMADol 50 mg oral tablet: 1 tab(s) orally 3 times a day As needed Moderate Pain (4 - 6) (30 Dec 2023 13:03)  Vitamin C 500 mg oral tablet, chewable: 1 tab(s) chewed once a day (28 Dec 2023 19:44)    MEDICATIONS  (STANDING):  aMIOdarone    Tablet 200 milliGRAM(s) Oral daily  apixaban 5 milliGRAM(s) Oral two times a day  ascorbic acid 500 milliGRAM(s) Oral daily  aspirin enteric coated 81 milliGRAM(s) Oral daily  brimonidine 0.2% Ophthalmic Solution 1 Drop(s) Both EYES two times a day  budesonide 160 MICROgram(s)/formoterol 4.5 MICROgram(s) Inhaler 2 Puff(s) Inhalation two times a day  calcitriol   Capsule 0.25 MICROGram(s) Oral <User Schedule>  calcium acetate 667 milliGRAM(s) Oral three times a day with meals  dextrose 5%. 1000 milliLiter(s) (50 mL/Hr) IV Continuous <Continuous>  dextrose 5%. 1000 milliLiter(s) (100 mL/Hr) IV Continuous <Continuous>  dextrose 50% Injectable 25 Gram(s) IV Push once  dextrose 50% Injectable 25 Gram(s) IV Push once  dextrose 50% Injectable 12.5 Gram(s) IV Push once  dorzolamide 2%/timolol 0.5% Ophthalmic Solution 1 Drop(s) Both EYES two times a day  finasteride 5 milliGRAM(s) Oral daily  glucagon  Injectable 1 milliGRAM(s) IntraMuscular once  insulin lispro (ADMELOG) corrective regimen sliding scale   SubCutaneous three times a day before meals  insulin lispro (ADMELOG) corrective regimen sliding scale   SubCutaneous at bedtime  lactobacillus acidophilus 1 Tablet(s) Oral daily  latanoprost 0.005% Ophthalmic Solution 1 Drop(s) Both EYES at bedtime  levothyroxine 50 MICROGram(s) Oral daily  melatonin 5 milliGRAM(s) Oral at bedtime  metoprolol tartrate 12.5 milliGRAM(s) Oral two times a day  oseltamivir 30 milliGRAM(s) Oral daily  pantoprazole    Tablet 40 milliGRAM(s) Oral daily  senna 2 Tablet(s) Oral at bedtime  sertraline 50 milliGRAM(s) Oral daily  sertraline 25 milliGRAM(s) Oral daily  simvastatin 10 milliGRAM(s) Oral at bedtime  tamsulosin 0.4 milliGRAM(s) Oral at bedtime    MEDICATIONS  (PRN):  acetaminophen     Tablet .. 650 milliGRAM(s) Oral every 6 hours PRN Temp greater or equal to 38C (100.4F), Mild Pain (1 - 3)  albuterol/ipratropium for Nebulization 3 milliLiter(s) Nebulizer every 6 hours PRN Shortness of Breath and/or Wheezing  aluminum hydroxide/magnesium hydroxide/simethicone Suspension 30 milliLiter(s) Oral every 4 hours PRN Dyspepsia  bisacodyl Suppository 10 milliGRAM(s) Rectal daily PRN Constipation  dextrose Oral Gel 15 Gram(s) Oral once PRN Blood Glucose LESS THAN 70 milliGRAM(s)/deciliter  guaiFENesin Oral Liquid (Sugar-Free) 200 milliGRAM(s) Oral every 6 hours PRN Cough  midodrine. 10 milliGRAM(s) Oral three times a day PRN for systolic BP<100  morphine  - Injectable 2 milliGRAM(s) IV Push every 6 hours PRN Severe Pain (7 - 10)  ondansetron Injectable 4 milliGRAM(s) IV Push every 8 hours PRN Nausea and/or Vomiting  traMADol 50 milliGRAM(s) Oral three times a day PRN Moderate Pain (4 - 6)      REVIEW OF SYSTEMS:  General: shoulder pain  Respiratory: No cough, SOB  Cardiovascular: No CP or Palpitations	  Gastrointestinal: No nausea, Vomiting. No diarrhea  Genitourinary: No urinary complaints	  Musculoskeletal: No new rash or lesions	  all other systems negative    T(F): 97.7 (24 @ 08:29), Max: 98.5 (23 @ 17:09)  HR: 75 (24 @ 08:29) (70 - 75)  BP: 96/52 (24 @ 08:29) (94/57 - 96/57)  RR: 14 (24 @ 08:29) (14 - 22)  SpO2: 100% (24 @ 08:29) (99% - 100%)  Wt(kg): --    PHYSICAL EXAM:  General: NAD  Respiratory: b/l air entry  Cardiovascular: S1 S2  Gastrointestinal: soft  Extremities: no edema            136  |  100  |  40<H>  ----------------------------<  68<L>  3.7   |  27  |  4.80<H>    Ca    7.9<L>      2024 07:02  Mg     2.0         TPro  5.5<L>  /  Alb  2.6<L>  /  TBili  0.5  /  DBili  0.2  /  AST  310<H>  /  ALT  240<H>  /  AlkPhos  162<H>                            8.6    3.56  )-----------( 166      ( 2024 07:02 )             27.6       Potassium: 3.7 mmol/L ( @ 07:02)  Blood Urea Nitrogen: 40 mg/dL ( @ 07:02)  Calcium: 7.9 mg/dL ( @ 07:02)  Hemoglobin: 8.6 g/dL ( @ 07:02)      Creatinine, Serum: 4.80 ( @ 07:02)  Creatinine, Serum: 3.80 ( @ 10:28)  Creatinine, Serum: 3.20 ( @ 21:40)      Urinalysis Basic - ( 2024 07:02 )    Color: x / Appearance: x / SG: x / pH: x  Gluc: 68 mg/dL / Ketone: x  / Bili: x / Urobili: x   Blood: x / Protein: x / Nitrite: x   Leuk Esterase: x / RBC: x / WBC x   Sq Epi: x / Non Sq Epi: x / Bacteria: x      LIVER FUNCTIONS - ( 2024 07:02 )  Alb: 2.6 g/dL / Pro: 5.5 g/dL / ALK PHOS: 162 U/L / ALT: 240 U/L / AST: 310 U/L / GGT: x           CARDIAC MARKERS ( 31 Dec 2023 02:06 )  x     / x     / 563 U/L / x     / 11.6 ng/mL  CARDIAC MARKERS ( 31 Dec 2023 01:11 )  x     / x     / 71 U/L / x     / <1.0 ng/mL      Creatine Kinase, Serum: 563 U/L (23 @ 02:06)  Creatine Kinase, Serum: 71 U/L (23 @ 01:11)          I&O's Detail        Culture - Blood (collected 30 Dec 2023 21:45)  Source: .Blood Blood-Peripheral  Preliminary Report (2024 02:03):    No growth at 24 hours    Culture - Blood (collected 30 Dec 2023 21:40)  Source: .Blood Blood-Peripheral  Preliminary Report (2024 02:03):    No growth at 24 hours    < from: Xray Chest 1 View- PORTABLE-Urgent (23 @ 21:23) >    ACC: [id number] EXAM:  XR CHEST PORTABLE URGENT 1V   ORDERED BY:  ALBINA MENON     PROCEDURE DATE:  2023          INTERPRETATION:  Chest portable semierect    CLINICAL HISTORY: Sepsis    Comparison:  2022    IMPRESSION: No change in the heart and mediastinum with wire sternotomy   sutures and implanted cardiac device again seen. Lungs reveal areas of   linear atelectasis/fibrosis. No evidence of parenchymal consolidation.   Angles sharp.    Bones without acute abnormality.    A right axillary/subclavian stent is identified.    --- End of Report ---            LEROY GIRARD MD; Attending Radiologist  This document has been electronically signed. Dec 31 2023 11:26AM    < end of copied text >       Patient is a 73y old  Male who presents with a chief complaint of hypoxia and hypotension (2024 09:58)    HPI:  Patient  is 74 yo , LTC resident with past medical history of end-stage renal disease on dialysis, chronic hypotension, afib, recent admission for clogged AV fistula is presenting for hypotension and hypoxia.  Patient was discharged back to [facility]   but sent to ED due to hypotension and hypoxia on arrival to [facility] .His vitals were stable upon discharge and he was monitored 2 hrs after HD session by house staff .  Otherwise denies any new complaints.  Patient was diagnosed with Influenza , also found to have JAZMINE elevation .Seen by Dr Garcia cardiologist Admitted  to telemetry unit for monitoring , send 3 sets of cardiac enzymes to rule out acute coronary event, obtain ECHO to evaluate LVEF, cardiology consult  ,continue current management, O2 supply, anticoagulation plan as per cardiology consult Palliative care consult requested ,to discuss advance directives and complete MOLST  (31 Dec 2023 10:10)    Renal consult called for ESRD on hemodialysis.       PAST MEDICAL HISTORY:  ASHD (arteriosclerotic heart disease)    CAD (coronary artery disease)    CRI (chronic renal insufficiency)    Diabetes mellitus type II    Hypertension    Hyperlipidemia    Pacemaker    Chronic atrial fibrillation    Asthma    Hypothyroid    Dementia    H/O carotid stenosis    Enlarged prostate        PAST SURGICAL HISTORY:  Hx of CABG    Coronary stent    Hernia    Cardiac pacemaker    History of amputation of toe        FAMILY HISTORY:  Family history of chronic ischemic heart disease (Father)  father  of MI at age 59        SOCIAL HISTORY: No smoking or alcohol use     Allergies    Levaquin (Anaphylaxis; Flushing; Short breath)    Intolerances      Home Medications:  acetaminophen 325 mg oral tablet: 2 tab(s) orally 3 times a day as needed for  mild pain (30 Dec 2023 13:03)  Acidophilus oral capsule: 1 cap(s) orally once a day (28 Dec 2023 19:43)  amiodarone 200 mg oral tablet: 1 tab(s) orally once a day (30 Dec 2023 13:03)  apixaban 5 mg oral tablet: 1 tab(s) orally 2 times a day (30 Dec 2023 13:03)  Aspercreme Max Strength Lidocaine XL Patch 4% topical film: Apply topically to affected area once a day LEFT SHOULDER  10 AM -10 PM (30 Dec 2023 13:03)  brimonidine 0.2% ophthalmic solution: 1 drop(s) in each affected eye 2 times a day both eyes (28 Dec 2023 19:43)  calcitriol 0.25 mcg oral capsule: 1 cap(s) orally 3 times a week Monday, Wednesday, Friday (28 Dec 2023 19:43)  calcium acetate 667 mg oral capsule: 1 cap(s) orally 3 times a day (28 Dec 2023 19:43)  dorzolamide-timolol 2.23%-0.68% (2%-0.5% base) ophthalmic solution: 1 drop(s) in each eye 2 times a day (28 Dec 2023 19:44)  finasteride 5 mg oral tablet: 1 tab(s) orally once a day (at bedtime) (28 Dec 2023 19:44)  latanoprost 0.005% ophthalmic solution: 1 drop(s) in each eye once a day (at bedtime) Both eyes (28 Dec 2023 19:44)  Levothroid 50 mcg (0.05 mg) oral tablet: 1 tab(s) orally once a day (28 Dec 2023 19:44)  melatonin 5 mg oral tablet: 1 tab(s) orally once a day (at bedtime) (28 Dec 2023 19:44)  menthol 5% topical pad: Apply topically to affected area once a day from 7am - 3pm (28 Dec 2023 19:44)  midodrine 10 mg oral tablet: 1 tab(s) orally 2 times a day hold for SBP &gt;130 (28 Dec 2023 19:44)  sertraline 25 mg oral tablet: 1 tab(s) orally once a day total daily dose- 75mg (28 Dec 2023 19:44)  sertraline 50 mg oral tablet: 1 tab(s) orally once a day (at bedtime) total dose- 75mg daily (28 Dec 2023 19:44)  simvastatin 10 mg oral tablet: 1 tab(s) orally once a day (at bedtime) (28 Dec 2023 19:44)  Symbicort 80 mcg-4.5 mcg/inh inhalation aerosol: 2 puff(s) inhaled 2 times a day (28 Dec 2023 19:44)  tamsulosin 0.4 mg oral capsule: 1 cap(s) orally once a day (at bedtime) (28 Dec 2023 19:44)  traMADol 50 mg oral tablet: 1 tab(s) orally 3 times a day As needed Moderate Pain (4 - 6) (30 Dec 2023 13:03)  Vitamin C 500 mg oral tablet, chewable: 1 tab(s) chewed once a day (28 Dec 2023 19:44)    MEDICATIONS  (STANDING):  aMIOdarone    Tablet 200 milliGRAM(s) Oral daily  apixaban 5 milliGRAM(s) Oral two times a day  ascorbic acid 500 milliGRAM(s) Oral daily  aspirin enteric coated 81 milliGRAM(s) Oral daily  brimonidine 0.2% Ophthalmic Solution 1 Drop(s) Both EYES two times a day  budesonide 160 MICROgram(s)/formoterol 4.5 MICROgram(s) Inhaler 2 Puff(s) Inhalation two times a day  calcitriol   Capsule 0.25 MICROGram(s) Oral <User Schedule>  calcium acetate 667 milliGRAM(s) Oral three times a day with meals  dextrose 5%. 1000 milliLiter(s) (50 mL/Hr) IV Continuous <Continuous>  dextrose 5%. 1000 milliLiter(s) (100 mL/Hr) IV Continuous <Continuous>  dextrose 50% Injectable 25 Gram(s) IV Push once  dextrose 50% Injectable 25 Gram(s) IV Push once  dextrose 50% Injectable 12.5 Gram(s) IV Push once  dorzolamide 2%/timolol 0.5% Ophthalmic Solution 1 Drop(s) Both EYES two times a day  finasteride 5 milliGRAM(s) Oral daily  glucagon  Injectable 1 milliGRAM(s) IntraMuscular once  insulin lispro (ADMELOG) corrective regimen sliding scale   SubCutaneous three times a day before meals  insulin lispro (ADMELOG) corrective regimen sliding scale   SubCutaneous at bedtime  lactobacillus acidophilus 1 Tablet(s) Oral daily  latanoprost 0.005% Ophthalmic Solution 1 Drop(s) Both EYES at bedtime  levothyroxine 50 MICROGram(s) Oral daily  melatonin 5 milliGRAM(s) Oral at bedtime  metoprolol tartrate 12.5 milliGRAM(s) Oral two times a day  oseltamivir 30 milliGRAM(s) Oral daily  pantoprazole    Tablet 40 milliGRAM(s) Oral daily  senna 2 Tablet(s) Oral at bedtime  sertraline 50 milliGRAM(s) Oral daily  sertraline 25 milliGRAM(s) Oral daily  simvastatin 10 milliGRAM(s) Oral at bedtime  tamsulosin 0.4 milliGRAM(s) Oral at bedtime    MEDICATIONS  (PRN):  acetaminophen     Tablet .. 650 milliGRAM(s) Oral every 6 hours PRN Temp greater or equal to 38C (100.4F), Mild Pain (1 - 3)  albuterol/ipratropium for Nebulization 3 milliLiter(s) Nebulizer every 6 hours PRN Shortness of Breath and/or Wheezing  aluminum hydroxide/magnesium hydroxide/simethicone Suspension 30 milliLiter(s) Oral every 4 hours PRN Dyspepsia  bisacodyl Suppository 10 milliGRAM(s) Rectal daily PRN Constipation  dextrose Oral Gel 15 Gram(s) Oral once PRN Blood Glucose LESS THAN 70 milliGRAM(s)/deciliter  guaiFENesin Oral Liquid (Sugar-Free) 200 milliGRAM(s) Oral every 6 hours PRN Cough  midodrine. 10 milliGRAM(s) Oral three times a day PRN for systolic BP<100  morphine  - Injectable 2 milliGRAM(s) IV Push every 6 hours PRN Severe Pain (7 - 10)  ondansetron Injectable 4 milliGRAM(s) IV Push every 8 hours PRN Nausea and/or Vomiting  traMADol 50 milliGRAM(s) Oral three times a day PRN Moderate Pain (4 - 6)      REVIEW OF SYSTEMS:  General: shoulder pain  Respiratory: No cough, SOB  Cardiovascular: No CP or Palpitations	  Gastrointestinal: No nausea, Vomiting. No diarrhea  Genitourinary: No urinary complaints	  Musculoskeletal: No new rash or lesions	  all other systems negative    T(F): 97.7 (24 @ 08:29), Max: 98.5 (23 @ 17:09)  HR: 75 (24 @ 08:29) (70 - 75)  BP: 96/52 (24 @ 08:29) (94/57 - 96/57)  RR: 14 (24 @ 08:29) (14 - 22)  SpO2: 100% (24 @ 08:29) (99% - 100%)  Wt(kg): --    PHYSICAL EXAM:  General: NAD  Respiratory: b/l air entry  Cardiovascular: S1 S2  Gastrointestinal: soft  Extremities: no edema            136  |  100  |  40<H>  ----------------------------<  68<L>  3.7   |  27  |  4.80<H>    Ca    7.9<L>      2024 07:02  Mg     2.0         TPro  5.5<L>  /  Alb  2.6<L>  /  TBili  0.5  /  DBili  0.2  /  AST  310<H>  /  ALT  240<H>  /  AlkPhos  162<H>                            8.6    3.56  )-----------( 166      ( 2024 07:02 )             27.6       Potassium: 3.7 mmol/L ( @ 07:02)  Blood Urea Nitrogen: 40 mg/dL ( @ 07:02)  Calcium: 7.9 mg/dL ( @ 07:02)  Hemoglobin: 8.6 g/dL ( @ 07:02)      Creatinine, Serum: 4.80 ( @ 07:02)  Creatinine, Serum: 3.80 ( @ 10:28)  Creatinine, Serum: 3.20 ( @ 21:40)      Urinalysis Basic - ( 2024 07:02 )    Color: x / Appearance: x / SG: x / pH: x  Gluc: 68 mg/dL / Ketone: x  / Bili: x / Urobili: x   Blood: x / Protein: x / Nitrite: x   Leuk Esterase: x / RBC: x / WBC x   Sq Epi: x / Non Sq Epi: x / Bacteria: x      LIVER FUNCTIONS - ( 2024 07:02 )  Alb: 2.6 g/dL / Pro: 5.5 g/dL / ALK PHOS: 162 U/L / ALT: 240 U/L / AST: 310 U/L / GGT: x           CARDIAC MARKERS ( 31 Dec 2023 02:06 )  x     / x     / 563 U/L / x     / 11.6 ng/mL  CARDIAC MARKERS ( 31 Dec 2023 01:11 )  x     / x     / 71 U/L / x     / <1.0 ng/mL      Creatine Kinase, Serum: 563 U/L (23 @ 02:06)  Creatine Kinase, Serum: 71 U/L (23 @ 01:11)          I&O's Detail        Culture - Blood (collected 30 Dec 2023 21:45)  Source: .Blood Blood-Peripheral  Preliminary Report (2024 02:03):    No growth at 24 hours    Culture - Blood (collected 30 Dec 2023 21:40)  Source: .Blood Blood-Peripheral  Preliminary Report (2024 02:03):    No growth at 24 hours    < from: Xray Chest 1 View- PORTABLE-Urgent (23 @ 21:23) >    ACC: [id number] EXAM:  XR CHEST PORTABLE URGENT 1V   ORDERED BY:  ALBINA MENON     PROCEDURE DATE:  2023          INTERPRETATION:  Chest portable semierect    CLINICAL HISTORY: Sepsis    Comparison:  2022    IMPRESSION: No change in the heart and mediastinum with wire sternotomy   sutures and implanted cardiac device again seen. Lungs reveal areas of   linear atelectasis/fibrosis. No evidence of parenchymal consolidation.   Angles sharp.    Bones without acute abnormality.    A right axillary/subclavian stent is identified.    --- End of Report ---            LEROY GIRARD MD; Attending Radiologist  This document has been electronically signed. Dec 31 2023 11:26AM    < end of copied text >

## 2024-01-01 NOTE — PROVIDER CONTACT NOTE (HYPOGLYCEMIA EVENT) - NS PROVIDER CONTACT BACKGROUND-HYPO
Age: 73y    Gender: Male    POCT Blood Glucose:  133 mg/dL (01-01-24 @ 19:20)  65 mg/dL (01-01-24 @ 19:00)  57 mg/dL (01-01-24 @ 18:38)  59 mg/dL (01-01-24 @ 18:34)  71 mg/dL (01-01-24 @ 12:11)  73 mg/dL (01-01-24 @ 08:07)      eMAR:dextrose 50% Injectable   12.5 Gram(s) IV Push (01-01-24 @ 19:15)    finasteride   5 milliGRAM(s) Oral (01-01-24 @ 11:53)    levothyroxine   50 MICROGram(s) Oral (01-01-24 @ 08:11)

## 2024-01-01 NOTE — CONSULT NOTE ADULT - SUBJECTIVE AND OBJECTIVE BOX
Date/Time Patient Seen:  		  Referring MD:   Data Reviewed	       Patient is a 73y old  Male who presents with a chief complaint of hypoxia and hypotension (31 Dec 2023 16:21)      Subjective/HPI   Patient  is 74 yo , C resident with past medical history of end-stage renal disease on dialysis, chronic hypotension, afib, recent admission for clogged AV fistula is presenting for hypotension and hypoxia.  Patient was discharged back to [facility] 12/30  but sent to ED due to hypotension and hypoxia on arrival to [facility] .His vitals were stable upon discharge and he was monitored 2 hrs after HD session by house staff .  Otherwise denies any new complaints.  Patient was diagnosed with Influenza , also found to have JAZMINE elevation .Seen by Dr Garcia cardiologist Admitted  to telemetry unit for monitoring , send 3 sets of cardiac enzymes to rule out acute coronary event, obtain ECHO to evaluate LVEF, cardiology consult  ,continue current management, O2 supply, anticoagulation plan as per cardiology consult Palliative care consult requested ,to discuss advance directives and complete MOLST   PAST MEDICAL & SURGICAL HISTORY:  ASHD (arteriosclerotic heart disease)    CAD (coronary artery disease)  s/p stent, CABG    CRI (chronic renal insufficiency)  ESRD on HD    Diabetes mellitus type II    Hypertension    Hyperlipidemia    Pacemaker    Chronic atrial fibrillation    Asthma    Hypothyroid    Dementia    H/O carotid stenosis    Enlarged prostate    Hx of CABG    Coronary stent    Hernia    Cardiac pacemaker    History of amputation of toe  right great toe    FAMILY HISTORY:  Father  Still living? Unknown  Family history of chronic ischemic heart disease, Age at diagnosis: Age Unknown.     Social History:  · Substance use	No  · Social History (marital status, living situation, occupation, and sexual history)	[facility] .No ETOH or TOBACCO reported.     Tobacco Screening:  · Core Measure Site	Yes  · Has the patient used tobacco in the past 30 days?	No    Risk Assessment:    Present on Admission:  Deep Venous Thrombosis	no  Pulmonary Embolus	no     HIV Screening:  · In accordance with [place] State law, we offer every patient who comes to our ED an HIV test. Would you like to be tested today?	Previously Declined (within the last year)        Medication list         MEDICATIONS  (STANDING):  aMIOdarone    Tablet 200 milliGRAM(s) Oral daily  apixaban 5 milliGRAM(s) Oral two times a day  ascorbic acid 500 milliGRAM(s) Oral daily  aspirin enteric coated 81 milliGRAM(s) Oral daily  brimonidine 0.2% Ophthalmic Solution 1 Drop(s) Both EYES two times a day  budesonide 160 MICROgram(s)/formoterol 4.5 MICROgram(s) Inhaler 2 Puff(s) Inhalation two times a day  calcitriol   Capsule 0.25 MICROGram(s) Oral <User Schedule>  calcium acetate 667 milliGRAM(s) Oral three times a day with meals  dextrose 5%. 1000 milliLiter(s) (100 mL/Hr) IV Continuous <Continuous>  dextrose 5%. 1000 milliLiter(s) (50 mL/Hr) IV Continuous <Continuous>  dextrose 50% Injectable 25 Gram(s) IV Push once  dextrose 50% Injectable 25 Gram(s) IV Push once  dextrose 50% Injectable 12.5 Gram(s) IV Push once  dorzolamide 2%/timolol 0.5% Ophthalmic Solution 1 Drop(s) Both EYES two times a day  finasteride 5 milliGRAM(s) Oral daily  glucagon  Injectable 1 milliGRAM(s) IntraMuscular once  insulin lispro (ADMELOG) corrective regimen sliding scale   SubCutaneous three times a day before meals  insulin lispro (ADMELOG) corrective regimen sliding scale   SubCutaneous at bedtime  lactobacillus acidophilus 1 Tablet(s) Oral daily  latanoprost 0.005% Ophthalmic Solution 1 Drop(s) Both EYES at bedtime  levothyroxine 50 MICROGram(s) Oral daily  melatonin 5 milliGRAM(s) Oral at bedtime  metoprolol tartrate 12.5 milliGRAM(s) Oral two times a day  oseltamivir 30 milliGRAM(s) Oral daily  pantoprazole    Tablet 40 milliGRAM(s) Oral daily  senna 2 Tablet(s) Oral at bedtime  sertraline 50 milliGRAM(s) Oral daily  sertraline 25 milliGRAM(s) Oral daily  simvastatin 10 milliGRAM(s) Oral at bedtime  tamsulosin 0.4 milliGRAM(s) Oral at bedtime    MEDICATIONS  (PRN):  acetaminophen     Tablet .. 650 milliGRAM(s) Oral every 6 hours PRN Temp greater or equal to 38C (100.4F), Mild Pain (1 - 3)  albuterol/ipratropium for Nebulization 3 milliLiter(s) Nebulizer every 6 hours PRN Shortness of Breath and/or Wheezing  aluminum hydroxide/magnesium hydroxide/simethicone Suspension 30 milliLiter(s) Oral every 4 hours PRN Dyspepsia  bisacodyl Suppository 10 milliGRAM(s) Rectal daily PRN Constipation  dextrose Oral Gel 15 Gram(s) Oral once PRN Blood Glucose LESS THAN 70 milliGRAM(s)/deciliter  guaiFENesin Oral Liquid (Sugar-Free) 200 milliGRAM(s) Oral every 6 hours PRN Cough  midodrine. 10 milliGRAM(s) Oral three times a day PRN for systolic BP<100  morphine  - Injectable 2 milliGRAM(s) IV Push every 6 hours PRN Severe Pain (7 - 10)  ondansetron Injectable 4 milliGRAM(s) IV Push every 8 hours PRN Nausea and/or Vomiting  traMADol 50 milliGRAM(s) Oral three times a day PRN Moderate Pain (4 - 6)         Vitals log        ICU Vital Signs Last 24 Hrs  T(C): 36.5 (01 Jan 2024 08:29), Max: 36.9 (31 Dec 2023 17:09)  T(F): 97.7 (01 Jan 2024 08:29), Max: 98.5 (31 Dec 2023 17:09)  HR: 75 (01 Jan 2024 08:29) (70 - 75)  BP: 96/52 (01 Jan 2024 08:29) (94/57 - 96/57)  BP(mean): --  ABP: --  ABP(mean): --  RR: 14 (01 Jan 2024 08:29) (14 - 22)  SpO2: 100% (01 Jan 2024 08:29) (99% - 100%)    O2 Parameters below as of 01 Jan 2024 08:29  Patient On (Oxygen Delivery Method): nasal cannula  O2 Flow (L/min): 3               Input and Output:  I&O's Detail      Lab Data                        8.6    3.56  )-----------( 166      ( 01 Jan 2024 07:02 )             27.6     01-01    136  |  100  |  40<H>  ----------------------------<  68<L>  3.7   |  27  |  4.80<H>    Ca    7.9<L>      01 Jan 2024 07:02  Mg     2.0     12-31    TPro  5.5<L>  /  Alb  2.6<L>  /  TBili  0.5  /  DBili  0.2  /  AST  310<H>  /  ALT  240<H>  /  AlkPhos  162<H>  01-01      CARDIAC MARKERS ( 31 Dec 2023 02:06 )  x     / x     / 563 U/L / x     / 11.6 ng/mL  CARDIAC MARKERS ( 31 Dec 2023 01:11 )  x     / x     / 71 U/L / x     / <1.0 ng/mL        Review of Systems	  weakness      Objective     Physical Examination    heart s1s2  lung dc BS      Pertinent Lab findings & Imaging      Smart:  NO   Adequate UO     I&O's Detail           Discussed with:     Cultures:	        Radiology      ACC: [id number] EXAM:  US ABDOMEN COMPLETE   ORDERED BY: AYAD AYALA     PROCEDURE DATE:  12/31/2023          INTERPRETATION:  CLINICAL INDICATION: elevated lfts    TECHNIQUE: Abdominal ultrasound was performed.    COMPARISON: 12/2/2023.    FINDINGS: This study was suboptimal due to patient's condition and bowel   gas.    LIVER: 15.9 cm Somewhat nodular contour.  BILIARY: No intrahepatic or extrahepatic biliary dilatation. Visualized   common bile duct measuring up to 0.5 cm.  GALLBLADDER: Cholecystectomy.  PANCREAS: Poorly visualized due to bowel gas.  RIGHT KIDNEY: Atrophic, measuring 8.4 cm No hydronephrosis. Increased   echogenicity, reflecting parenchymal disease. Difficult to visualized   previous renal stone on this study.  LEFT KIDNEY: Not visualized.  SPLEEN: Not visualized.  ADDITIONAL: No ascites.    IMPRESSION:    Cholecystectomy. Somewhat nodular contour of the liver, which can be seen   in cirrhosis. Follow-up may be obtained as indicated.    --- End of Report ---            ALEJANDRO MARIANO MD; Attending Radiologist  This document has been electronically signed. Jan 1 2024 12:47AM                         Date/Time Patient Seen:  		  Referring MD:   Data Reviewed	       Patient is a 73y old  Male who presents with a chief complaint of hypoxia and hypotension (31 Dec 2023 16:21)      Subjective/HPI   Patient  is 74 yo , C resident with past medical history of end-stage renal disease on dialysis, chronic hypotension, afib, recent admission for clogged AV fistula is presenting for hypotension and hypoxia.  Patient was discharged back to [facility] 12/30  but sent to ED due to hypotension and hypoxia on arrival to [facility] .His vitals were stable upon discharge and he was monitored 2 hrs after HD session by house staff .  Otherwise denies any new complaints.  Patient was diagnosed with Influenza , also found to have JAZMINE elevation .Seen by Dr Garcia cardiologist Admitted  to telemetry unit for monitoring , send 3 sets of cardiac enzymes to rule out acute coronary event, obtain ECHO to evaluate LVEF, cardiology consult  ,continue current management, O2 supply, anticoagulation plan as per cardiology consult Palliative care consult requested ,to discuss advance directives and complete MOLST   PAST MEDICAL & SURGICAL HISTORY:  ASHD (arteriosclerotic heart disease)    CAD (coronary artery disease)  s/p stent, CABG    CRI (chronic renal insufficiency)  ESRD on HD    Diabetes mellitus type II    Hypertension    Hyperlipidemia    Pacemaker    Chronic atrial fibrillation    Asthma    Hypothyroid    Dementia    H/O carotid stenosis    Enlarged prostate    Hx of CABG    Coronary stent    Hernia    Cardiac pacemaker    History of amputation of toe  right great toe    FAMILY HISTORY:  Father  Still living? Unknown  Family history of chronic ischemic heart disease, Age at diagnosis: Age Unknown.     Social History:  · Substance use	No  · Social History (marital status, living situation, occupation, and sexual history)	[facility] .No ETOH or TOBACCO reported.     Tobacco Screening:  · Core Measure Site	Yes  · Has the patient used tobacco in the past 30 days?	No    Risk Assessment:    Present on Admission:  Deep Venous Thrombosis	no  Pulmonary Embolus	no     HIV Screening:  · In accordance with [place] law, we offer every patient who comes to our ED an HIV test. Would you like to be tested today?	Previously Declined (within the last year)        Medication list         MEDICATIONS  (STANDING):  aMIOdarone    Tablet 200 milliGRAM(s) Oral daily  apixaban 5 milliGRAM(s) Oral two times a day  ascorbic acid 500 milliGRAM(s) Oral daily  aspirin enteric coated 81 milliGRAM(s) Oral daily  brimonidine 0.2% Ophthalmic Solution 1 Drop(s) Both EYES two times a day  budesonide 160 MICROgram(s)/formoterol 4.5 MICROgram(s) Inhaler 2 Puff(s) Inhalation two times a day  calcitriol   Capsule 0.25 MICROGram(s) Oral <User Schedule>  calcium acetate 667 milliGRAM(s) Oral three times a day with meals  dextrose 5%. 1000 milliLiter(s) (100 mL/Hr) IV Continuous <Continuous>  dextrose 5%. 1000 milliLiter(s) (50 mL/Hr) IV Continuous <Continuous>  dextrose 50% Injectable 25 Gram(s) IV Push once  dextrose 50% Injectable 25 Gram(s) IV Push once  dextrose 50% Injectable 12.5 Gram(s) IV Push once  dorzolamide 2%/timolol 0.5% Ophthalmic Solution 1 Drop(s) Both EYES two times a day  finasteride 5 milliGRAM(s) Oral daily  glucagon  Injectable 1 milliGRAM(s) IntraMuscular once  insulin lispro (ADMELOG) corrective regimen sliding scale   SubCutaneous three times a day before meals  insulin lispro (ADMELOG) corrective regimen sliding scale   SubCutaneous at bedtime  lactobacillus acidophilus 1 Tablet(s) Oral daily  latanoprost 0.005% Ophthalmic Solution 1 Drop(s) Both EYES at bedtime  levothyroxine 50 MICROGram(s) Oral daily  melatonin 5 milliGRAM(s) Oral at bedtime  metoprolol tartrate 12.5 milliGRAM(s) Oral two times a day  oseltamivir 30 milliGRAM(s) Oral daily  pantoprazole    Tablet 40 milliGRAM(s) Oral daily  senna 2 Tablet(s) Oral at bedtime  sertraline 50 milliGRAM(s) Oral daily  sertraline 25 milliGRAM(s) Oral daily  simvastatin 10 milliGRAM(s) Oral at bedtime  tamsulosin 0.4 milliGRAM(s) Oral at bedtime    MEDICATIONS  (PRN):  acetaminophen     Tablet .. 650 milliGRAM(s) Oral every 6 hours PRN Temp greater or equal to 38C (100.4F), Mild Pain (1 - 3)  albuterol/ipratropium for Nebulization 3 milliLiter(s) Nebulizer every 6 hours PRN Shortness of Breath and/or Wheezing  aluminum hydroxide/magnesium hydroxide/simethicone Suspension 30 milliLiter(s) Oral every 4 hours PRN Dyspepsia  bisacodyl Suppository 10 milliGRAM(s) Rectal daily PRN Constipation  dextrose Oral Gel 15 Gram(s) Oral once PRN Blood Glucose LESS THAN 70 milliGRAM(s)/deciliter  guaiFENesin Oral Liquid (Sugar-Free) 200 milliGRAM(s) Oral every 6 hours PRN Cough  midodrine. 10 milliGRAM(s) Oral three times a day PRN for systolic BP<100  morphine  - Injectable 2 milliGRAM(s) IV Push every 6 hours PRN Severe Pain (7 - 10)  ondansetron Injectable 4 milliGRAM(s) IV Push every 8 hours PRN Nausea and/or Vomiting  traMADol 50 milliGRAM(s) Oral three times a day PRN Moderate Pain (4 - 6)         Vitals log        ICU Vital Signs Last 24 Hrs  T(C): 36.5 (01 Jan 2024 08:29), Max: 36.9 (31 Dec 2023 17:09)  T(F): 97.7 (01 Jan 2024 08:29), Max: 98.5 (31 Dec 2023 17:09)  HR: 75 (01 Jan 2024 08:29) (70 - 75)  BP: 96/52 (01 Jan 2024 08:29) (94/57 - 96/57)  BP(mean): --  ABP: --  ABP(mean): --  RR: 14 (01 Jan 2024 08:29) (14 - 22)  SpO2: 100% (01 Jan 2024 08:29) (99% - 100%)    O2 Parameters below as of 01 Jan 2024 08:29  Patient On (Oxygen Delivery Method): nasal cannula  O2 Flow (L/min): 3               Input and Output:  I&O's Detail      Lab Data                        8.6    3.56  )-----------( 166      ( 01 Jan 2024 07:02 )             27.6     01-01    136  |  100  |  40<H>  ----------------------------<  68<L>  3.7   |  27  |  4.80<H>    Ca    7.9<L>      01 Jan 2024 07:02  Mg     2.0     12-31    TPro  5.5<L>  /  Alb  2.6<L>  /  TBili  0.5  /  DBili  0.2  /  AST  310<H>  /  ALT  240<H>  /  AlkPhos  162<H>  01-01      CARDIAC MARKERS ( 31 Dec 2023 02:06 )  x     / x     / 563 U/L / x     / 11.6 ng/mL  CARDIAC MARKERS ( 31 Dec 2023 01:11 )  x     / x     / 71 U/L / x     / <1.0 ng/mL        Review of Systems	  weakness      Objective     Physical Examination    heart s1s2  lung dc BS      Pertinent Lab findings & Imaging      Smart:  NO   Adequate UO     I&O's Detail           Discussed with:     Cultures:	        Radiology      ACC: [id number] EXAM:  US ABDOMEN COMPLETE   ORDERED BY: AYAD AYALA     PROCEDURE DATE:  12/31/2023          INTERPRETATION:  CLINICAL INDICATION: elevated lfts    TECHNIQUE: Abdominal ultrasound was performed.    COMPARISON: 12/2/2023.    FINDINGS: This study was suboptimal due to patient's condition and bowel   gas.    LIVER: 15.9 cm Somewhat nodular contour.  BILIARY: No intrahepatic or extrahepatic biliary dilatation. Visualized   common bile duct measuring up to 0.5 cm.  GALLBLADDER: Cholecystectomy.  PANCREAS: Poorly visualized due to bowel gas.  RIGHT KIDNEY: Atrophic, measuring 8.4 cm No hydronephrosis. Increased   echogenicity, reflecting parenchymal disease. Difficult to visualized   previous renal stone on this study.  LEFT KIDNEY: Not visualized.  SPLEEN: Not visualized.  ADDITIONAL: No ascites.    IMPRESSION:    Cholecystectomy. Somewhat nodular contour of the liver, which can be seen   in cirrhosis. Follow-up may be obtained as indicated.    --- End of Report ---            ALEJANDRO MARIANO MD; Attending Radiologist  This document has been electronically signed. Jan 1 2024 12:47AM

## 2024-01-01 NOTE — PROGRESS NOTE ADULT - SUBJECTIVE AND OBJECTIVE BOX
INTERVAL HPI/OVERNIGHT EVENTS:  No new overnight event.  No N/V/D. no abd pain    Allergies    Levaquin (Anaphylaxis; Flushing; Short breath)    Intolerances          General:  No wt loss, fevers, chills, night sweats, fatigue,   Eyes:  Good vision, no reported pain  ENT:  No sore throat, pain, runny nose, dysphagia  CV:  No pain, palpitations, hypo/hypertension  Resp:  No dyspnea, cough, tachypnea, wheezing  GI:  No pain, No nausea, No vomiting, No diarrhea, No constipation, No weight loss, No fever, No pruritis, No rectal bleeding, No tarry stools, No dysphagia,  :  No pain, bleeding, incontinence, nocturia  Muscle:  No pain, weakness  Neuro:  No weakness, tingling, memory problems  Psych:  No fatigue, insomnia, mood problems, depression  Endocrine:  No polyuria, polydipsia, cold/heat intolerance  Heme:  No petechiae, ecchymosis, easy bruisability  Skin:  No rash, tattoos, scars, edema      PHYSICAL EXAM:   Vital Signs:  Vital Signs Last 24 Hrs  T(C): 36.7 (01 Jan 2024 13:30), Max: 36.7 (01 Jan 2024 13:30)  T(F): 98 (01 Jan 2024 13:30), Max: 98 (01 Jan 2024 13:30)  HR: 74 (01 Jan 2024 13:30) (74 - 75)  BP: 110/54 (01 Jan 2024 13:30) (96/52 - 110/54)  BP(mean): --  RR: 19 (01 Jan 2024 13:30) (14 - 19)  SpO2: 100% (01 Jan 2024 13:30) (100% - 100%)    Parameters below as of 01 Jan 2024 13:30  Patient On (Oxygen Delivery Method): nasal cannula      Daily     Daily I&O's Summary      GENERAL:  Appears stated age, well-groomed, well-nourished, no distress  HEENT:  NC/AT,  conjunctivae clear and pink, no thyromegaly, nodules, adenopathy, no JVD, sclera -anicteric  CHEST:  Full & symmetric excursion, no increased effort, breath sounds clear  HEART:  Regular rhythm, S1, S2, no murmur/rub/S3/S4, no abdominal bruit, no edema  ABDOMEN:  Soft, non-tender, non-distended, normoactive bowel sounds,  no masses ,no hepato-splenomegaly, no signs of chronic liver disease  EXTEREMITIES:  no cyanosis,clubbing or edema  SKIN:  No rash/erythema/ecchymoses/petechiae/wounds/abscess/warm/dry  NEURO:  Alert, oriented, no asterixis, no tremor, no encephalopathy      LABS:                        8.6    3.56  )-----------( 166      ( 01 Jan 2024 07:02 )             27.6     01-01    136  |  100  |  40<H>  ----------------------------<  68<L>  3.7   |  27  |  4.80<H>    Ca    7.9<L>      01 Jan 2024 07:02  Mg     2.0     12-31    TPro  5.5<L>  /  Alb  2.6<L>  /  TBili  0.5  /  DBili  0.2  /  AST  310<H>  /  ALT  240<H>  /  AlkPhos  162<H>  01-01    PT/INR - ( 01 Jan 2024 07:02 )   PT: 23.2 sec;   INR: 2.02 ratio         PTT - ( 30 Dec 2023 21:40 )  PTT:34.7 sec  Urinalysis Basic - ( 01 Jan 2024 07:02 )    Color: x / Appearance: x / SG: x / pH: x  Gluc: 68 mg/dL / Ketone: x  / Bili: x / Urobili: x   Blood: x / Protein: x / Nitrite: x   Leuk Esterase: x / RBC: x / WBC x   Sq Epi: x / Non Sq Epi: x / Bacteria: x      amylase   lipase  RADIOLOGY & ADDITIONAL TESTS:

## 2024-01-01 NOTE — PATIENT PROFILE ADULT - FALL HARM RISK - HARM RISK INTERVENTIONS
Assistance with ambulation/Assistance OOB with selected safe patient handling equipment/Communicate Risk of Fall with Harm to all staff/Discuss with provider need for PT consult/Monitor gait and stability/Provide patient with walking aids - walker, cane, crutches/Reinforce activity limits and safety measures with patient and family/Tailored Fall Risk Interventions/Visual Cue: Yellow wristband and red socks/Bed in lowest position, wheels locked, appropriate side rails in place/Call bell, personal items and telephone in reach/Instruct patient to call for assistance before getting out of bed or chair/Non-slip footwear when patient is out of bed/[place] to call system/Physically safe environment - no spills, clutter or unnecessary equipment/Purposeful Proactive Rounding/Room/bathroom lighting operational, light cord in reach Assistance with ambulation/Assistance OOB with selected safe patient handling equipment/Communicate Risk of Fall with Harm to all staff/Discuss with provider need for PT consult/Monitor gait and stability/Provide patient with walking aids - walker, cane, crutches/Reinforce activity limits and safety measures with patient and family/Tailored Fall Risk Interventions/Visual Cue: Yellow wristband and red socks/Bed in lowest position, wheels locked, appropriate side rails in place/Call bell, personal items and telephone in reach/Instruct patient to call for assistance before getting out of bed or chair/Non-slip footwear when patient is out of bed/[place] to call system/Physically safe environment - no spills, clutter or unnecessary equipment/Purposeful Proactive Rounding/Room/bathroom lighting operational, light cord in reach

## 2024-01-01 NOTE — CHART NOTE - NSCHARTNOTEFT_GEN_A_CORE
Called by RN for patient with apparent pus draining from penis. Patient seen and examined, he denies any urinary symptoms at this time. Patient is noted to be anuric w/ ESRD on dialysis.    On physical exam, there is a collection of greenish fluid apparent on the inner front of patient's diaper.    Possible patient has pyocystis, will attempt straight cath for culture and bladder US. Patient may require bladder irrigation with antimicrobial solution vs abx.

## 2024-01-01 NOTE — CONSULT NOTE ADULT - REASON FOR ADMISSION
hypoxia and hypotension
sepsis

## 2024-01-01 NOTE — PROGRESS NOTE ADULT - SUBJECTIVE AND OBJECTIVE BOX
[facility]       HPI:  Patient  is 74 yo , C resident with past medical history of end-stage renal disease on dialysis, chronic hypotension, afib, recent admission for clogged AV fistula is presenting for hypotension and hypoxia.  Patient was discharged back to [facility] 12/30  but sent to ED due to hypotension and hypoxia on arrival to [facility] .His vitals were stable upon discharge and he was monitored 2 hrs after HD session by house staff .  Otherwise denies any new complaints.  Patient was diagnosed with Influenza , also found to have JAZMINE elevation .Seen by Dr Garcia cardiologist Admitted  to telemetry unit for monitoring , send 3 sets of cardiac enzymes to rule out acute coronary event, obtain ECHO to evaluate LVEF, cardiology consult  ,continue current management, O2 supply, anticoagulation plan as per cardiology consult Palliative care consult requested ,to discuss advance directives and complete MOLST  (31 Dec 2023 10:10)        SUBJECTIVE:      ALLERGIES:  Allergies    Levaquin (Anaphylaxis; Flushing; Short breath)    Intolerances          MEDICATIONS  (STANDING):  aMIOdarone    Tablet 200 milliGRAM(s) Oral daily  apixaban 5 milliGRAM(s) Oral two times a day  ascorbic acid 500 milliGRAM(s) Oral daily  aspirin enteric coated 81 milliGRAM(s) Oral daily  brimonidine 0.2% Ophthalmic Solution 1 Drop(s) Both EYES two times a day  budesonide 160 MICROgram(s)/formoterol 4.5 MICROgram(s) Inhaler 2 Puff(s) Inhalation two times a day  calcitriol   Capsule 0.25 MICROGram(s) Oral <User Schedule>  calcium acetate 667 milliGRAM(s) Oral three times a day with meals  dextrose 5%. 1000 milliLiter(s) (100 mL/Hr) IV Continuous <Continuous>  dextrose 5%. 1000 milliLiter(s) (50 mL/Hr) IV Continuous <Continuous>  dextrose 50% Injectable 25 Gram(s) IV Push once  dextrose 50% Injectable 25 Gram(s) IV Push once  dextrose 50% Injectable 12.5 Gram(s) IV Push once  dorzolamide 2%/timolol 0.5% Ophthalmic Solution 1 Drop(s) Both EYES two times a day  epoetin kayla-epbx (RETACRIT) Injectable 57486 Unit(s) IV Push <User Schedule>  finasteride 5 milliGRAM(s) Oral daily  glucagon  Injectable 1 milliGRAM(s) IntraMuscular once  insulin lispro (ADMELOG) corrective regimen sliding scale   SubCutaneous at bedtime  insulin lispro (ADMELOG) corrective regimen sliding scale   SubCutaneous three times a day before meals  lactobacillus acidophilus 1 Tablet(s) Oral daily  latanoprost 0.005% Ophthalmic Solution 1 Drop(s) Both EYES at bedtime  levothyroxine 50 MICROGram(s) Oral daily  melatonin 5 milliGRAM(s) Oral at bedtime  metoprolol tartrate 12.5 milliGRAM(s) Oral two times a day  midodrine. 10 milliGRAM(s) Oral three times a day  oseltamivir 30 milliGRAM(s) Oral daily  pantoprazole    Tablet 40 milliGRAM(s) Oral daily  senna 2 Tablet(s) Oral at bedtime  sertraline 50 milliGRAM(s) Oral daily  sertraline 25 milliGRAM(s) Oral daily  simvastatin 10 milliGRAM(s) Oral at bedtime  tamsulosin 0.4 milliGRAM(s) Oral at bedtime  ursodiol Capsule 300 milliGRAM(s) Oral two times a day    MEDICATIONS  (PRN):  acetaminophen     Tablet .. 650 milliGRAM(s) Oral every 6 hours PRN Temp greater or equal to 38C (100.4F), Mild Pain (1 - 3)  albuterol/ipratropium for Nebulization 3 milliLiter(s) Nebulizer every 6 hours PRN Shortness of Breath and/or Wheezing  aluminum hydroxide/magnesium hydroxide/simethicone Suspension 30 milliLiter(s) Oral every 4 hours PRN Dyspepsia  bisacodyl Suppository 10 milliGRAM(s) Rectal daily PRN Constipation  dextrose Oral Gel 15 Gram(s) Oral once PRN Blood Glucose LESS THAN 70 milliGRAM(s)/deciliter  guaiFENesin Oral Liquid (Sugar-Free) 200 milliGRAM(s) Oral every 6 hours PRN Cough  morphine  - Injectable 2 milliGRAM(s) IV Push every 6 hours PRN Severe Pain (7 - 10)  ondansetron Injectable 4 milliGRAM(s) IV Push every 8 hours PRN Nausea and/or Vomiting  traMADol 50 milliGRAM(s) Oral three times a day PRN Moderate Pain (4 - 6)      REVIEW OF SYSTEMS:  CONSTITUTIONAL: No fever,  RESPIRATORY: No cough, wheezing, shortness of breath  CARDIOVASCULAR: No chest pain, dyspnea, palpitations, dizziness, syncope, paroxysmal nocturnal dyspnea, orthopnea, or arm or leg swelling  GASTROINTESTINAL: No abdominal  or epigastric pain, nausea, vomiting,  diarrhea  NEUROLOGICAL: No headaches,  loss of strength, numbness, or tremors    Vital Signs Last 24 Hrs  T(C): 36.9 (02 Jan 2024 00:20), Max: 36.9 (02 Jan 2024 00:20)  T(F): 98.4 (02 Jan 2024 00:20), Max: 98.4 (02 Jan 2024 00:20)  HR: 76 (02 Jan 2024 00:20) (74 - 88)  BP: 110/59 (02 Jan 2024 00:20) (96/52 - 114/58)  BP(mean): --  RR: 19 (02 Jan 2024 00:20) (14 - 19)  SpO2: 100% (02 Jan 2024 00:20) (100% - 100%)    Parameters below as of 02 Jan 2024 00:20  Patient On (Oxygen Delivery Method): nasal cannula        PHYSICAL EXAM:  HEAD:  Atraumatic, Normocephalic  NECK: Supple and normal thyroid.  No JVD or carotid bruit.   HEART: S1, S2 regular , 1/6 soft ejection systolic murmur in mitral area , no thrill and no gallops .  PULMONARY: Bilateral vesicular breathing , few scattered ronchi and few scattered rales are present .  ABDOMEN: Soft nontender and positive bowl sounds   EXTREMITIES:  No clubbing, cyanosis, or pedal  edema  NEUROLOGICAL: AAOX3 , no focal deficit .    LABS:                        8.6    3.56  )-----------( 166      ( 01 Jan 2024 07:02 )             27.6     01-01    136  |  100  |  40<H>  ----------------------------<  68<L>  3.7   |  27  |  4.80<H>    Ca    7.9<L>      01 Jan 2024 07:02  Mg     2.0     12-31    TPro  5.5<L>  /  Alb  2.6<L>  /  TBili  0.5  /  DBili  0.2  /  AST  310<H>  /  ALT  240<H>  /  AlkPhos  162<H>  01-01    CARDIAC MARKERS ( 31 Dec 2023 02:06 )  x     / x     / 563 U/L / x     / 11.6 ng/mL      PT/INR - ( 01 Jan 2024 07:02 )   PT: 23.2 sec;   INR: 2.02 ratio           Urinalysis Basic - ( 01 Jan 2024 07:02 )    Color: x / Appearance: x / SG: x / pH: x  Gluc: 68 mg/dL / Ketone: x  / Bili: x / Urobili: x   Blood: x / Protein: x / Nitrite: x   Leuk Esterase: x / RBC: x / WBC x   Sq Epi: x / Non Sq Epi: x / Bacteria: x      BNP      EKG:  ECHO:  IMAGING:    Assessment/Plan    Will continue to follow during hospital course and give further recommendations as needed . Thanks for your referral . if any questions please contact me at office [phone])cell [phone])  [facility]       HPI:  Patient  is 72 yo , C resident with past medical history of end-stage renal disease on dialysis, chronic hypotension, afib, recent admission for clogged AV fistula is presenting for hypotension and hypoxia.  Patient was discharged back to [facility] 12/30  but sent to ED due to hypotension and hypoxia on arrival to [facility] .His vitals were stable upon discharge and he was monitored 2 hrs after HD session by house staff .  Otherwise denies any new complaints.  Patient was diagnosed with Influenza , also found to have JAZMINE elevation .Seen by Dr Garcia cardiologist Admitted  to telemetry unit for monitoring , send 3 sets of cardiac enzymes to rule out acute coronary event, obtain ECHO to evaluate LVEF, cardiology consult  ,continue current management, O2 supply, anticoagulation plan as per cardiology consult Palliative care consult requested ,to discuss advance directives and complete MOLST  (31 Dec 2023 10:10)        SUBJECTIVE:      ALLERGIES:  Allergies    Levaquin (Anaphylaxis; Flushing; Short breath)    Intolerances          MEDICATIONS  (STANDING):  aMIOdarone    Tablet 200 milliGRAM(s) Oral daily  apixaban 5 milliGRAM(s) Oral two times a day  ascorbic acid 500 milliGRAM(s) Oral daily  aspirin enteric coated 81 milliGRAM(s) Oral daily  brimonidine 0.2% Ophthalmic Solution 1 Drop(s) Both EYES two times a day  budesonide 160 MICROgram(s)/formoterol 4.5 MICROgram(s) Inhaler 2 Puff(s) Inhalation two times a day  calcitriol   Capsule 0.25 MICROGram(s) Oral <User Schedule>  calcium acetate 667 milliGRAM(s) Oral three times a day with meals  dextrose 5%. 1000 milliLiter(s) (100 mL/Hr) IV Continuous <Continuous>  dextrose 5%. 1000 milliLiter(s) (50 mL/Hr) IV Continuous <Continuous>  dextrose 50% Injectable 25 Gram(s) IV Push once  dextrose 50% Injectable 25 Gram(s) IV Push once  dextrose 50% Injectable 12.5 Gram(s) IV Push once  dorzolamide 2%/timolol 0.5% Ophthalmic Solution 1 Drop(s) Both EYES two times a day  epoetin kayla-epbx (RETACRIT) Injectable 88534 Unit(s) IV Push <User Schedule>  finasteride 5 milliGRAM(s) Oral daily  glucagon  Injectable 1 milliGRAM(s) IntraMuscular once  insulin lispro (ADMELOG) corrective regimen sliding scale   SubCutaneous at bedtime  insulin lispro (ADMELOG) corrective regimen sliding scale   SubCutaneous three times a day before meals  lactobacillus acidophilus 1 Tablet(s) Oral daily  latanoprost 0.005% Ophthalmic Solution 1 Drop(s) Both EYES at bedtime  levothyroxine 50 MICROGram(s) Oral daily  melatonin 5 milliGRAM(s) Oral at bedtime  metoprolol tartrate 12.5 milliGRAM(s) Oral two times a day  midodrine. 10 milliGRAM(s) Oral three times a day  oseltamivir 30 milliGRAM(s) Oral daily  pantoprazole    Tablet 40 milliGRAM(s) Oral daily  senna 2 Tablet(s) Oral at bedtime  sertraline 50 milliGRAM(s) Oral daily  sertraline 25 milliGRAM(s) Oral daily  simvastatin 10 milliGRAM(s) Oral at bedtime  tamsulosin 0.4 milliGRAM(s) Oral at bedtime  ursodiol Capsule 300 milliGRAM(s) Oral two times a day    MEDICATIONS  (PRN):  acetaminophen     Tablet .. 650 milliGRAM(s) Oral every 6 hours PRN Temp greater or equal to 38C (100.4F), Mild Pain (1 - 3)  albuterol/ipratropium for Nebulization 3 milliLiter(s) Nebulizer every 6 hours PRN Shortness of Breath and/or Wheezing  aluminum hydroxide/magnesium hydroxide/simethicone Suspension 30 milliLiter(s) Oral every 4 hours PRN Dyspepsia  bisacodyl Suppository 10 milliGRAM(s) Rectal daily PRN Constipation  dextrose Oral Gel 15 Gram(s) Oral once PRN Blood Glucose LESS THAN 70 milliGRAM(s)/deciliter  guaiFENesin Oral Liquid (Sugar-Free) 200 milliGRAM(s) Oral every 6 hours PRN Cough  morphine  - Injectable 2 milliGRAM(s) IV Push every 6 hours PRN Severe Pain (7 - 10)  ondansetron Injectable 4 milliGRAM(s) IV Push every 8 hours PRN Nausea and/or Vomiting  traMADol 50 milliGRAM(s) Oral three times a day PRN Moderate Pain (4 - 6)      REVIEW OF SYSTEMS:  CONSTITUTIONAL: No fever,  RESPIRATORY: No cough, wheezing, shortness of breath  CARDIOVASCULAR: No chest pain, dyspnea, palpitations, dizziness, syncope, paroxysmal nocturnal dyspnea, orthopnea, or arm or leg swelling  GASTROINTESTINAL: No abdominal  or epigastric pain, nausea, vomiting,  diarrhea  NEUROLOGICAL: No headaches,  loss of strength, numbness, or tremors    Vital Signs Last 24 Hrs  T(C): 36.9 (02 Jan 2024 00:20), Max: 36.9 (02 Jan 2024 00:20)  T(F): 98.4 (02 Jan 2024 00:20), Max: 98.4 (02 Jan 2024 00:20)  HR: 76 (02 Jan 2024 00:20) (74 - 88)  BP: 110/59 (02 Jan 2024 00:20) (96/52 - 114/58)  BP(mean): --  RR: 19 (02 Jan 2024 00:20) (14 - 19)  SpO2: 100% (02 Jan 2024 00:20) (100% - 100%)    Parameters below as of 02 Jan 2024 00:20  Patient On (Oxygen Delivery Method): nasal cannula        PHYSICAL EXAM:  HEAD:  Atraumatic, Normocephalic  NECK: Supple and normal thyroid.  No JVD or carotid bruit.   HEART: S1, S2 regular , 1/6 soft ejection systolic murmur in mitral area , no thrill and no gallops .  PULMONARY: Bilateral vesicular breathing , few scattered ronchi and few scattered rales are present .  ABDOMEN: Soft nontender and positive bowl sounds   EXTREMITIES:  No clubbing, cyanosis, or pedal  edema  NEUROLOGICAL: AAOX3 , no focal deficit .    LABS:                        8.6    3.56  )-----------( 166      ( 01 Jan 2024 07:02 )             27.6     01-01    136  |  100  |  40<H>  ----------------------------<  68<L>  3.7   |  27  |  4.80<H>    Ca    7.9<L>      01 Jan 2024 07:02  Mg     2.0     12-31    TPro  5.5<L>  /  Alb  2.6<L>  /  TBili  0.5  /  DBili  0.2  /  AST  310<H>  /  ALT  240<H>  /  AlkPhos  162<H>  01-01    CARDIAC MARKERS ( 31 Dec 2023 02:06 )  x     / x     / 563 U/L / x     / 11.6 ng/mL      PT/INR - ( 01 Jan 2024 07:02 )   PT: 23.2 sec;   INR: 2.02 ratio           Urinalysis Basic - ( 01 Jan 2024 07:02 )    Color: x / Appearance: x / SG: x / pH: x  Gluc: 68 mg/dL / Ketone: x  / Bili: x / Urobili: x   Blood: x / Protein: x / Nitrite: x   Leuk Esterase: x / RBC: x / WBC x   Sq Epi: x / Non Sq Epi: x / Bacteria: x      BNP      EKG:  ECHO:  IMAGING:    Assessment/Plan    Will continue to follow during hospital course and give further recommendations as needed . Thanks for your referral . if any questions please contact me at office [phone])cell [phone])  MI SANDOVAL MD [place] [id number]  [address]  OFFICE : [phone]   CELL : [phone]  CARDIOLOGY F/U  :       HPI:  Patient  is 74 yo , LTC resident with past medical history of end-stage renal disease on dialysis, chronic hypotension, afib, recent admission for clogged AV fistula is presenting for hypotension and hypoxia.  Patient was discharged back to [facility] 12/30  but sent to ED due to hypotension and hypoxia on arrival to [facility] .His vitals were stable upon discharge and he was monitored 2 hrs after HD session by house staff .  Otherwise denies any new complaints.  Patient was diagnosed with Influenza , also found to have JAZMINE elevation .Seen by Dr Sandoval cardiologist Admitted  to telemetry unit for monitoring , send 3 sets of cardiac enzymes to rule out acute coronary event, obtain ECHO to evaluate LVEF, cardiology consult  ,continue current management, O2 supply, anticoagulation plan as per cardiology consult Palliative care consult requested ,to discuss advance directives and complete MOLST  (31 Dec 2023 10:10)        SUBJECTIVE: Patient feeling better .       ALLERGIES:  Allergies    Levaquin (Anaphylaxis; Flushing; Short breath)    Intolerances          MEDICATIONS  (STANDING):  aMIOdarone    Tablet 200 milliGRAM(s) Oral daily  apixaban 5 milliGRAM(s) Oral two times a day  ascorbic acid 500 milliGRAM(s) Oral daily  aspirin enteric coated 81 milliGRAM(s) Oral daily  brimonidine 0.2% Ophthalmic Solution 1 Drop(s) Both EYES two times a day  budesonide 160 MICROgram(s)/formoterol 4.5 MICROgram(s) Inhaler 2 Puff(s) Inhalation two times a day  calcitriol   Capsule 0.25 MICROGram(s) Oral <User Schedule>  calcium acetate 667 milliGRAM(s) Oral three times a day with meals  dextrose 5%. 1000 milliLiter(s) (100 mL/Hr) IV Continuous <Continuous>  dextrose 5%. 1000 milliLiter(s) (50 mL/Hr) IV Continuous <Continuous>  dextrose 50% Injectable 25 Gram(s) IV Push once  dextrose 50% Injectable 25 Gram(s) IV Push once  dextrose 50% Injectable 12.5 Gram(s) IV Push once  dorzolamide 2%/timolol 0.5% Ophthalmic Solution 1 Drop(s) Both EYES two times a day  epoetin kayla-epbx (RETACRIT) Injectable 91080 Unit(s) IV Push <User Schedule>  finasteride 5 milliGRAM(s) Oral daily  glucagon  Injectable 1 milliGRAM(s) IntraMuscular once  insulin lispro (ADMELOG) corrective regimen sliding scale   SubCutaneous at bedtime  insulin lispro (ADMELOG) corrective regimen sliding scale   SubCutaneous three times a day before meals  lactobacillus acidophilus 1 Tablet(s) Oral daily  latanoprost 0.005% Ophthalmic Solution 1 Drop(s) Both EYES at bedtime  levothyroxine 50 MICROGram(s) Oral daily  melatonin 5 milliGRAM(s) Oral at bedtime  metoprolol tartrate 12.5 milliGRAM(s) Oral two times a day  midodrine. 10 milliGRAM(s) Oral three times a day  oseltamivir 30 milliGRAM(s) Oral daily  pantoprazole    Tablet 40 milliGRAM(s) Oral daily  senna 2 Tablet(s) Oral at bedtime  sertraline 50 milliGRAM(s) Oral daily  sertraline 25 milliGRAM(s) Oral daily  simvastatin 10 milliGRAM(s) Oral at bedtime  tamsulosin 0.4 milliGRAM(s) Oral at bedtime  ursodiol Capsule 300 milliGRAM(s) Oral two times a day    MEDICATIONS  (PRN):  acetaminophen     Tablet .. 650 milliGRAM(s) Oral every 6 hours PRN Temp greater or equal to 38C (100.4F), Mild Pain (1 - 3)  albuterol/ipratropium for Nebulization 3 milliLiter(s) Nebulizer every 6 hours PRN Shortness of Breath and/or Wheezing  aluminum hydroxide/magnesium hydroxide/simethicone Suspension 30 milliLiter(s) Oral every 4 hours PRN Dyspepsia  bisacodyl Suppository 10 milliGRAM(s) Rectal daily PRN Constipation  dextrose Oral Gel 15 Gram(s) Oral once PRN Blood Glucose LESS THAN 70 milliGRAM(s)/deciliter  guaiFENesin Oral Liquid (Sugar-Free) 200 milliGRAM(s) Oral every 6 hours PRN Cough  morphine  - Injectable 2 milliGRAM(s) IV Push every 6 hours PRN Severe Pain (7 - 10)  ondansetron Injectable 4 milliGRAM(s) IV Push every 8 hours PRN Nausea and/or Vomiting  traMADol 50 milliGRAM(s) Oral three times a day PRN Moderate Pain (4 - 6)      REVIEW OF SYSTEMS:  CONSTITUTIONAL: No fever,  RESPIRATORY: Some improvement in  cough, wheezing, shortness of breath  CARDIOVASCULAR: No chest pain,  palpitations, dizziness, syncope, paroxysmal nocturnal dyspnea,  or arm or leg swelling but has SOB with mild exertion .  GASTROINTESTINAL: No abdominal  or epigastric pain, nausea, vomiting,  diarrhea  NEUROLOGICAL: No headaches,  loss of strength, numbness, or tremors  Skin : No itching   Hematology : No active bleeding  Endocrinology : No heat and cold intolerance   Psychiatry : Patient is confused .  Genitourinary : No dysuria   Musculoskeletal : Patinet has mild arthritis     Vital Signs Last 24 Hrs  T(C): 36.9 (02 Jan 2024 00:20), Max: 36.9 (02 Jan 2024 00:20)  T(F): 98.4 (02 Jan 2024 00:20), Max: 98.4 (02 Jan 2024 00:20)  HR: 76 (02 Jan 2024 00:20) (74 - 88)  BP: 110/59 (02 Jan 2024 00:20) (96/52 - 114/58)  BP(mean): --  RR: 19 (02 Jan 2024 00:20) (14 - 19)  SpO2: 100% (02 Jan 2024 00:20) (100% - 100%)    Parameters below as of 02 Jan 2024 00:20  Patient On (Oxygen Delivery Method): nasal cannula        PHYSICAL EXAM:  HEAD:  Atraumatic, Normocephalic  NECK: Supple and normal thyroid.  No JVD or carotid bruit.   HEART: S1, S2 irregular , 1/6 soft ejection systolic murmur in mitral area , no thrill and no gallops .  PULMONARY: Bilateral vesicular breathing , few scattered rhonchi and few scattered rales are present .  ABDOMEN: Soft nontender and positive bowl sounds   EXTREMITIES:  No clubbing, cyanosis, or pedal  edema  NEUROLOGICAL: AA and confused .   Skin : No rashes .  Musculoskeletal : No joint swellings .    LABS:                        8.6    3.56  )-----------( 166      ( 01 Jan 2024 07:02 )             27.6     01-01    136  |  100  |  40<H>  ----------------------------<  68<L>  3.7   |  27  |  4.80<H>    Ca    7.9<L>      01 Jan 2024 07:02  Mg     2.0     12-31    TPro  5.5<L>  /  Alb  2.6<L>  /  TBili  0.5  /  DBili  0.2  /  AST  310<H>  /  ALT  240<H>  /  AlkPhos  162<H>  01-01    CARDIAC MARKERS ( 31 Dec 2023 02:06 )  x     / x     / 563 U/L / x     / 11.6 ng/mL      PT/INR - ( 01 Jan 2024 07:02 )   PT: 23.2 sec;   INR: 2.02 ratio           Urinalysis Basic - ( 01 Jan 2024 07:02 )    Color: x / Appearance: x / SG: x / pH: x  Gluc: 68 mg/dL / Ketone: x  / Bili: x / Urobili: x   Blood: x / Protein: x / Nitrite: x   Leuk Esterase: x / RBC: x / WBC x   Sq Epi: x / Non Sq Epi: x / Bacteria: x    Assessment/Plan  Patient has :  1) Acute viral pneumonia and better  2) Elevated Troponin I and which can be demand ischemia but considering significant elevation and trending , possibility of NON-ST JUNE can not be ruled out so will treat as  NON-ST JUNE .   3) ESRD and on hemodialysis .  4) Atrial fibrillation and stable   5) Hypertension and stable   Plan : 1) Due to multiple comorbidities , patient high risk for cardiac cath 2) Continue medical management 3) Will discuss with family   Will continue to follow during hospital course and give further recommendations as needed . Thanks for your referral . if any questions please contact me at office ([phone])cell [phone])  MI SANDOVAL MD [place] [id number]  [address]  OFFICE : [phone]   CELL : [phone]  CARDIOLOGY F/U  :       HPI:  Patient  is 74 yo , LTC resident with past medical history of end-stage renal disease on dialysis, chronic hypotension, afib, recent admission for clogged AV fistula is presenting for hypotension and hypoxia.  Patient was discharged back to [facility] 12/30  but sent to ED due to hypotension and hypoxia on arrival to [facility] .His vitals were stable upon discharge and he was monitored 2 hrs after HD session by house staff .  Otherwise denies any new complaints.  Patient was diagnosed with Influenza , also found to have JAZMINE elevation .Seen by Dr Sandoval cardiologist Admitted  to telemetry unit for monitoring , send 3 sets of cardiac enzymes to rule out acute coronary event, obtain ECHO to evaluate LVEF, cardiology consult  ,continue current management, O2 supply, anticoagulation plan as per cardiology consult Palliative care consult requested ,to discuss advance directives and complete MOLST  (31 Dec 2023 10:10)        SUBJECTIVE: Patient feeling better .       ALLERGIES:  Allergies    Levaquin (Anaphylaxis; Flushing; Short breath)    Intolerances          MEDICATIONS  (STANDING):  aMIOdarone    Tablet 200 milliGRAM(s) Oral daily  apixaban 5 milliGRAM(s) Oral two times a day  ascorbic acid 500 milliGRAM(s) Oral daily  aspirin enteric coated 81 milliGRAM(s) Oral daily  brimonidine 0.2% Ophthalmic Solution 1 Drop(s) Both EYES two times a day  budesonide 160 MICROgram(s)/formoterol 4.5 MICROgram(s) Inhaler 2 Puff(s) Inhalation two times a day  calcitriol   Capsule 0.25 MICROGram(s) Oral <User Schedule>  calcium acetate 667 milliGRAM(s) Oral three times a day with meals  dextrose 5%. 1000 milliLiter(s) (100 mL/Hr) IV Continuous <Continuous>  dextrose 5%. 1000 milliLiter(s) (50 mL/Hr) IV Continuous <Continuous>  dextrose 50% Injectable 25 Gram(s) IV Push once  dextrose 50% Injectable 25 Gram(s) IV Push once  dextrose 50% Injectable 12.5 Gram(s) IV Push once  dorzolamide 2%/timolol 0.5% Ophthalmic Solution 1 Drop(s) Both EYES two times a day  epoetin kayla-epbx (RETACRIT) Injectable 92959 Unit(s) IV Push <User Schedule>  finasteride 5 milliGRAM(s) Oral daily  glucagon  Injectable 1 milliGRAM(s) IntraMuscular once  insulin lispro (ADMELOG) corrective regimen sliding scale   SubCutaneous at bedtime  insulin lispro (ADMELOG) corrective regimen sliding scale   SubCutaneous three times a day before meals  lactobacillus acidophilus 1 Tablet(s) Oral daily  latanoprost 0.005% Ophthalmic Solution 1 Drop(s) Both EYES at bedtime  levothyroxine 50 MICROGram(s) Oral daily  melatonin 5 milliGRAM(s) Oral at bedtime  metoprolol tartrate 12.5 milliGRAM(s) Oral two times a day  midodrine. 10 milliGRAM(s) Oral three times a day  oseltamivir 30 milliGRAM(s) Oral daily  pantoprazole    Tablet 40 milliGRAM(s) Oral daily  senna 2 Tablet(s) Oral at bedtime  sertraline 50 milliGRAM(s) Oral daily  sertraline 25 milliGRAM(s) Oral daily  simvastatin 10 milliGRAM(s) Oral at bedtime  tamsulosin 0.4 milliGRAM(s) Oral at bedtime  ursodiol Capsule 300 milliGRAM(s) Oral two times a day    MEDICATIONS  (PRN):  acetaminophen     Tablet .. 650 milliGRAM(s) Oral every 6 hours PRN Temp greater or equal to 38C (100.4F), Mild Pain (1 - 3)  albuterol/ipratropium for Nebulization 3 milliLiter(s) Nebulizer every 6 hours PRN Shortness of Breath and/or Wheezing  aluminum hydroxide/magnesium hydroxide/simethicone Suspension 30 milliLiter(s) Oral every 4 hours PRN Dyspepsia  bisacodyl Suppository 10 milliGRAM(s) Rectal daily PRN Constipation  dextrose Oral Gel 15 Gram(s) Oral once PRN Blood Glucose LESS THAN 70 milliGRAM(s)/deciliter  guaiFENesin Oral Liquid (Sugar-Free) 200 milliGRAM(s) Oral every 6 hours PRN Cough  morphine  - Injectable 2 milliGRAM(s) IV Push every 6 hours PRN Severe Pain (7 - 10)  ondansetron Injectable 4 milliGRAM(s) IV Push every 8 hours PRN Nausea and/or Vomiting  traMADol 50 milliGRAM(s) Oral three times a day PRN Moderate Pain (4 - 6)      REVIEW OF SYSTEMS:  CONSTITUTIONAL: No fever,  RESPIRATORY: Some improvement in  cough, wheezing, shortness of breath  CARDIOVASCULAR: No chest pain,  palpitations, dizziness, syncope, paroxysmal nocturnal dyspnea,  or arm or leg swelling but has SOB with mild exertion .  GASTROINTESTINAL: No abdominal  or epigastric pain, nausea, vomiting,  diarrhea  NEUROLOGICAL: No headaches,  loss of strength, numbness, or tremors  Skin : No itching   Hematology : No active bleeding  Endocrinology : No heat and cold intolerance   Psychiatry : Patient is confused .  Genitourinary : No dysuria   Musculoskeletal : Patinet has mild arthritis     Vital Signs Last 24 Hrs  T(C): 36.9 (02 Jan 2024 00:20), Max: 36.9 (02 Jan 2024 00:20)  T(F): 98.4 (02 Jan 2024 00:20), Max: 98.4 (02 Jan 2024 00:20)  HR: 76 (02 Jan 2024 00:20) (74 - 88)  BP: 110/59 (02 Jan 2024 00:20) (96/52 - 114/58)  BP(mean): --  RR: 19 (02 Jan 2024 00:20) (14 - 19)  SpO2: 100% (02 Jan 2024 00:20) (100% - 100%)    Parameters below as of 02 Jan 2024 00:20  Patient On (Oxygen Delivery Method): nasal cannula        PHYSICAL EXAM:  HEAD:  Atraumatic, Normocephalic  NECK: Supple and normal thyroid.  No JVD or carotid bruit.   HEART: S1, S2 irregular , 1/6 soft ejection systolic murmur in mitral area , no thrill and no gallops .  PULMONARY: Bilateral vesicular breathing , few scattered rhonchi and few scattered rales are present .  ABDOMEN: Soft nontender and positive bowl sounds   EXTREMITIES:  No clubbing, cyanosis, or pedal  edema  NEUROLOGICAL: AA and confused .   Skin : No rashes .  Musculoskeletal : No joint swellings .    LABS:                        8.6    3.56  )-----------( 166      ( 01 Jan 2024 07:02 )             27.6     01-01    136  |  100  |  40<H>  ----------------------------<  68<L>  3.7   |  27  |  4.80<H>    Ca    7.9<L>      01 Jan 2024 07:02  Mg     2.0     12-31    TPro  5.5<L>  /  Alb  2.6<L>  /  TBili  0.5  /  DBili  0.2  /  AST  310<H>  /  ALT  240<H>  /  AlkPhos  162<H>  01-01    CARDIAC MARKERS ( 31 Dec 2023 02:06 )  x     / x     / 563 U/L / x     / 11.6 ng/mL      PT/INR - ( 01 Jan 2024 07:02 )   PT: 23.2 sec;   INR: 2.02 ratio           Urinalysis Basic - ( 01 Jan 2024 07:02 )    Color: x / Appearance: x / SG: x / pH: x  Gluc: 68 mg/dL / Ketone: x  / Bili: x / Urobili: x   Blood: x / Protein: x / Nitrite: x   Leuk Esterase: x / RBC: x / WBC x   Sq Epi: x / Non Sq Epi: x / Bacteria: x    Assessment/Plan  Patient has :  1) Acute viral pneumonia and better  2) Elevated Troponin I and which can be demand ischemia but considering significant elevation and trending , possibility of NON-ST JUNE can not be ruled out so will treat as  NON-ST JUNE .   3) ESRD and on hemodialysis .  4) Atrial fibrillation and stable   5) Hypertension and stable   Plan : 1) Due to multiple comorbidities , patient high risk for cardiac cath 2) Continue medical management 3) Will discuss with family   Will continue to follow during hospital course and give further recommendations as needed . Thanks for your referral . if any questions please contact me at office ([phone]

## 2024-01-01 NOTE — CONSULT NOTE ADULT - SUBJECTIVE AND OBJECTIVE BOX
Patient is a 73y old  Male who presents with a chief complaint of hypoxia and hypotension (01 Jan 2024 17:31)      HPI:  Patient  is 72 yo , LTC resident with past medical history of end-stage renal disease on dialysis, chronic hypotension, afib, recent admission for clogged AV fistula is presenting for hypotension and hypoxia.  Patient was discharged back to [facility] 12/30  but sent to ED due to hypotension and hypoxia on arrival to [facility] .His vitals were stable upon discharge and he was monitored 2 hrs after HD session by house staff .  Otherwise denies any new complaints.  Patient was diagnosed with Influenza , also found to have JAZMINE elevation .Seen by Dr Garcia cardiologist Admitted  to telemetry unit for monitoring , send 3 sets of cardiac enzymes to rule out acute coronary event, obtain ECHO to evaluate LVEF, cardiology consult  ,continue current management, O2 supply, anticoagulation plan as per cardiology consult Palliative care consult requested ,to discuss advance directives and complete MOLST  (31 Dec 2023 10:10)      Asked to see patient for ID Consult    PAST MEDICAL & SURGICAL HISTORY:  ASHD (arteriosclerotic heart disease)      CAD (coronary artery disease)  s/p stent, CABG      CRI (chronic renal insufficiency)  ESRD on HD      Diabetes mellitus type II      Hypertension      Hyperlipidemia      Pacemaker      Chronic atrial fibrillation      Asthma      Hypothyroid      Dementia      H/O carotid stenosis      Enlarged prostate      Hx of CABG      Coronary stent      Cardiac pacemaker      History of amputation of toe  right great toe          Allergies    Levaquin (Anaphylaxis; Flushing; Short breath)    Intolerances        REVIEW OF SYSTEMS:  All systems below were reviewed and are negative [  ]  HEENT:  ID:  Pulmonary:  Cardiac:  GI:  Renal:  Musculoskeletal:  All other systems above were reviewed and are negative   [  ]    HOME MEDICATIONS:    MEDICATIONS  (STANDING):  aMIOdarone    Tablet 200 milliGRAM(s) Oral daily  apixaban 5 milliGRAM(s) Oral two times a day  ascorbic acid 500 milliGRAM(s) Oral daily  aspirin enteric coated 81 milliGRAM(s) Oral daily  brimonidine 0.2% Ophthalmic Solution 1 Drop(s) Both EYES two times a day  budesonide 160 MICROgram(s)/formoterol 4.5 MICROgram(s) Inhaler 2 Puff(s) Inhalation two times a day  calcitriol   Capsule 0.25 MICROGram(s) Oral <User Schedule>  calcium acetate 667 milliGRAM(s) Oral three times a day with meals  dextrose 5%. 1000 milliLiter(s) (100 mL/Hr) IV Continuous <Continuous>  dextrose 5%. 1000 milliLiter(s) (50 mL/Hr) IV Continuous <Continuous>  dextrose 50% Injectable 25 Gram(s) IV Push once  dextrose 50% Injectable 12.5 Gram(s) IV Push once  dextrose 50% Injectable 12.5 Gram(s) IV Push once  dextrose 50% Injectable 25 Gram(s) IV Push once  dorzolamide 2%/timolol 0.5% Ophthalmic Solution 1 Drop(s) Both EYES two times a day  epoetin akyla-epbx (RETACRIT) Injectable 16230 Unit(s) IV Push <User Schedule>  finasteride 5 milliGRAM(s) Oral daily  glucagon  Injectable 1 milliGRAM(s) IntraMuscular once  insulin lispro (ADMELOG) corrective regimen sliding scale   SubCutaneous at bedtime  insulin lispro (ADMELOG) corrective regimen sliding scale   SubCutaneous three times a day before meals  lactobacillus acidophilus 1 Tablet(s) Oral daily  latanoprost 0.005% Ophthalmic Solution 1 Drop(s) Both EYES at bedtime  levothyroxine 50 MICROGram(s) Oral daily  melatonin 5 milliGRAM(s) Oral at bedtime  metoprolol tartrate 12.5 milliGRAM(s) Oral two times a day  midodrine. 10 milliGRAM(s) Oral three times a day  oseltamivir 30 milliGRAM(s) Oral daily  pantoprazole    Tablet 40 milliGRAM(s) Oral daily  senna 2 Tablet(s) Oral at bedtime  sertraline 50 milliGRAM(s) Oral daily  sertraline 25 milliGRAM(s) Oral daily  simvastatin 10 milliGRAM(s) Oral at bedtime  tamsulosin 0.4 milliGRAM(s) Oral at bedtime  ursodiol Capsule 300 milliGRAM(s) Oral two times a day    MEDICATIONS  (PRN):  acetaminophen     Tablet .. 650 milliGRAM(s) Oral every 6 hours PRN Temp greater or equal to 38C (100.4F), Mild Pain (1 - 3)  albuterol/ipratropium for Nebulization 3 milliLiter(s) Nebulizer every 6 hours PRN Shortness of Breath and/or Wheezing  aluminum hydroxide/magnesium hydroxide/simethicone Suspension 30 milliLiter(s) Oral every 4 hours PRN Dyspepsia  bisacodyl Suppository 10 milliGRAM(s) Rectal daily PRN Constipation  dextrose Oral Gel 15 Gram(s) Oral once PRN Blood Glucose LESS THAN 70 milliGRAM(s)/deciliter  guaiFENesin Oral Liquid (Sugar-Free) 200 milliGRAM(s) Oral every 6 hours PRN Cough  morphine  - Injectable 2 milliGRAM(s) IV Push every 6 hours PRN Severe Pain (7 - 10)  ondansetron Injectable 4 milliGRAM(s) IV Push every 8 hours PRN Nausea and/or Vomiting  traMADol 50 milliGRAM(s) Oral three times a day PRN Moderate Pain (4 - 6)      Vital Signs Last 24 Hrs  T(C): 36.7 (01 Jan 2024 13:30), Max: 36.7 (01 Jan 2024 13:30)  T(F): 98 (01 Jan 2024 13:30), Max: 98 (01 Jan 2024 13:30)  HR: 74 (01 Jan 2024 13:30) (74 - 75)  BP: 110/54 (01 Jan 2024 13:30) (96/52 - 110/54)  BP(mean): --  RR: 19 (01 Jan 2024 13:30) (14 - 19)  SpO2: 100% (01 Jan 2024 13:30) (100% - 100%)    Parameters below as of 01 Jan 2024 13:30  Patient On (Oxygen Delivery Method): nasal cannula        PHYSICAL EXAM:  HEENT:  Neck:  Lungs:  Heart:  Abdomen:  Genital/ Rectal:  Extremities:  Neurologic:  Vascular:    I&O's Summary    01 Jan 2024 07:01  -  01 Jan 2024 20:13  --------------------------------------------------------  IN: 0 mL / OUT: 0 mL / NET: 0 mL        LABORATORY:                          8.6    3.56  )-----------( 166      ( 01 Jan 2024 07:02 )             27.6           01-01    136  |  100  |  40<H>  ----------------------------<  68<L>  3.7   |  27  |  4.80<H>    Ca    7.9<L>      01 Jan 2024 07:02  Mg     2.0     12-31    TPro  5.5<L>  /  Alb  2.6<L>  /  TBili  0.5  /  DBili  0.2  /  AST  310<H>  /  ALT  240<H>  /  AlkPhos  162<H>  01-01          LABORATORY:    CBC Full  -  ( 01 Jan 2024 07:02 )  WBC Count : 3.56 K/uL  RBC Count : 2.55 M/uL  Hemoglobin : 8.6 g/dL  Hematocrit : 27.6 %  Platelet Count - Automated : 166 K/uL  Mean Cell Volume : 108.2 fl  Mean Cell Hemoglobin : 33.7 pg  Mean Cell Hemoglobin Concentration : 31.2 gm/dL  Auto Neutrophil # : 2.88 K/uL  Auto Lymphocyte # : 0.25 K/uL  Auto Monocyte # : 0.32 K/uL  Auto Eosinophil # : 0.04 K/uL  Auto Basophil # : 0.04 K/uL  Auto Neutrophil % : 73.0 %  Auto Lymphocyte % : 7.0 %  Auto Monocyte % : 9.0 %  Auto Eosinophil % : 1.0 %  Auto Basophil % : 1.0 %          01-01    136  |  100  |  40<H>  ----------------------------<  68<L>  3.7   |  27  |  4.80<H>    Ca    7.9<L>      01 Jan 2024 07:02  Mg     2.0     12-31    TPro  5.5<L>  /  Alb  2.6<L>  /  TBili  0.5  /  DBili  0.2  /  AST  310<H>  /  ALT  240<H>  /  AlkPhos  162<H>  01-01                    Wound culture:                12-30 @ 21:45  Organism --  Culture w/ gram stain --  Specimen Source .Blood Blood-Peripheral    Wound culture:                12-30 @ 21:40  Organism --  Culture w/ gram stain --  Specimen Source .Blood Blood-Peripheral        Abscess culture:             12-30 @ 21:45  Organism --  Gram Stain --  Specimen Source .Blood Blood-Peripheral    Abscess culture:             12-30 @ 21:40  Organism --  Gram Stain --  Specimen Source .Blood Blood-Peripheral            Tissue culture:           12-30 @ 21:45  Organism --  Gram Stain --  Specimen Source .Blood Blood-Peripheral    Tissue culture:           12-30 @ 21:40  Organism --  Gram Stain --  Specimen Source .Blood Blood-Peripheral        Body Fluid Smear & Culture:                        12-30 @ 21:45  AFB Smear  --  Culture Acid Fast Body Fluid w/ Smear  --  Culture Acid Fast Smear Concentrated   --    Culture Results:       No growth at 24 hours  Specimen Source .Blood Blood-Peripheral    Body Fluid Smear & Culture:                        12-30 @ 21:40  AFB Smear  --  Culture Acid Fast Body Fluid w/ Smear  --  Culture Acid Fast Smear Concentrated   --    Culture Results:       No growth at 24 hours  Specimen Source .Blood Blood-Peripheral                   Patient is a 73y old  Male who presents with a chief complaint of hypoxia and hypotension (01 Jan 2024 17:31)      HPI:  Patient  is 74 yo , LTC resident with past medical history of end-stage renal disease on dialysis, chronic hypotension, afib, recent admission for clogged AV fistula is presenting for hypotension and hypoxia.  Patient was discharged back to [facility] 12/30  but sent to ED due to hypotension and hypoxia on arrival to [facility] .His vitals were stable upon discharge and he was monitored 2 hrs after HD session by house staff .  Otherwise denies any new complaints.  Patient was diagnosed with Influenza , also found to have JAZMINE elevation .Seen by Dr Garcia cardiologist Admitted  to telemetry unit for monitoring , send 3 sets of cardiac enzymes to rule out acute coronary event, obtain ECHO to evaluate LVEF, cardiology consult  ,continue current management, O2 supply, anticoagulation plan as per cardiology consult Palliative care consult requested ,to discuss advance directives and complete MOLST  (31 Dec 2023 10:10)      Asked to see patient for ID Consult    PAST MEDICAL & SURGICAL HISTORY:  ASHD (arteriosclerotic heart disease)      CAD (coronary artery disease)  s/p stent, CABG      CRI (chronic renal insufficiency)  ESRD on HD      Diabetes mellitus type II      Hypertension      Hyperlipidemia      Pacemaker      Chronic atrial fibrillation      Asthma      Hypothyroid      Dementia      H/O carotid stenosis      Enlarged prostate      Hx of CABG      Coronary stent      Cardiac pacemaker      History of amputation of toe  right great toe          Allergies    Levaquin (Anaphylaxis; Flushing; Short breath)    Intolerances        REVIEW OF SYSTEMS:  All systems below were reviewed and are negative [  ]  HEENT:  ID:  Pulmonary:  Cardiac:  GI:  Renal:  Musculoskeletal:  All other systems above were reviewed and are negative   [  ]    HOME MEDICATIONS:    MEDICATIONS  (STANDING):  aMIOdarone    Tablet 200 milliGRAM(s) Oral daily  apixaban 5 milliGRAM(s) Oral two times a day  ascorbic acid 500 milliGRAM(s) Oral daily  aspirin enteric coated 81 milliGRAM(s) Oral daily  brimonidine 0.2% Ophthalmic Solution 1 Drop(s) Both EYES two times a day  budesonide 160 MICROgram(s)/formoterol 4.5 MICROgram(s) Inhaler 2 Puff(s) Inhalation two times a day  calcitriol   Capsule 0.25 MICROGram(s) Oral <User Schedule>  calcium acetate 667 milliGRAM(s) Oral three times a day with meals  dextrose 5%. 1000 milliLiter(s) (100 mL/Hr) IV Continuous <Continuous>  dextrose 5%. 1000 milliLiter(s) (50 mL/Hr) IV Continuous <Continuous>  dextrose 50% Injectable 25 Gram(s) IV Push once  dextrose 50% Injectable 12.5 Gram(s) IV Push once  dextrose 50% Injectable 12.5 Gram(s) IV Push once  dextrose 50% Injectable 25 Gram(s) IV Push once  dorzolamide 2%/timolol 0.5% Ophthalmic Solution 1 Drop(s) Both EYES two times a day  epoetin kayla-epbx (RETACRIT) Injectable 55161 Unit(s) IV Push <User Schedule>  finasteride 5 milliGRAM(s) Oral daily  glucagon  Injectable 1 milliGRAM(s) IntraMuscular once  insulin lispro (ADMELOG) corrective regimen sliding scale   SubCutaneous at bedtime  insulin lispro (ADMELOG) corrective regimen sliding scale   SubCutaneous three times a day before meals  lactobacillus acidophilus 1 Tablet(s) Oral daily  latanoprost 0.005% Ophthalmic Solution 1 Drop(s) Both EYES at bedtime  levothyroxine 50 MICROGram(s) Oral daily  melatonin 5 milliGRAM(s) Oral at bedtime  metoprolol tartrate 12.5 milliGRAM(s) Oral two times a day  midodrine. 10 milliGRAM(s) Oral three times a day  oseltamivir 30 milliGRAM(s) Oral daily  pantoprazole    Tablet 40 milliGRAM(s) Oral daily  senna 2 Tablet(s) Oral at bedtime  sertraline 50 milliGRAM(s) Oral daily  sertraline 25 milliGRAM(s) Oral daily  simvastatin 10 milliGRAM(s) Oral at bedtime  tamsulosin 0.4 milliGRAM(s) Oral at bedtime  ursodiol Capsule 300 milliGRAM(s) Oral two times a day    MEDICATIONS  (PRN):  acetaminophen     Tablet .. 650 milliGRAM(s) Oral every 6 hours PRN Temp greater or equal to 38C (100.4F), Mild Pain (1 - 3)  albuterol/ipratropium for Nebulization 3 milliLiter(s) Nebulizer every 6 hours PRN Shortness of Breath and/or Wheezing  aluminum hydroxide/magnesium hydroxide/simethicone Suspension 30 milliLiter(s) Oral every 4 hours PRN Dyspepsia  bisacodyl Suppository 10 milliGRAM(s) Rectal daily PRN Constipation  dextrose Oral Gel 15 Gram(s) Oral once PRN Blood Glucose LESS THAN 70 milliGRAM(s)/deciliter  guaiFENesin Oral Liquid (Sugar-Free) 200 milliGRAM(s) Oral every 6 hours PRN Cough  morphine  - Injectable 2 milliGRAM(s) IV Push every 6 hours PRN Severe Pain (7 - 10)  ondansetron Injectable 4 milliGRAM(s) IV Push every 8 hours PRN Nausea and/or Vomiting  traMADol 50 milliGRAM(s) Oral three times a day PRN Moderate Pain (4 - 6)      Vital Signs Last 24 Hrs  T(C): 36.7 (01 Jan 2024 13:30), Max: 36.7 (01 Jan 2024 13:30)  T(F): 98 (01 Jan 2024 13:30), Max: 98 (01 Jan 2024 13:30)  HR: 74 (01 Jan 2024 13:30) (74 - 75)  BP: 110/54 (01 Jan 2024 13:30) (96/52 - 110/54)  BP(mean): --  RR: 19 (01 Jan 2024 13:30) (14 - 19)  SpO2: 100% (01 Jan 2024 13:30) (100% - 100%)    Parameters below as of 01 Jan 2024 13:30  Patient On (Oxygen Delivery Method): nasal cannula        PHYSICAL EXAM:  HEENT:  Neck:  Lungs:  Heart:  Abdomen:  Genital/ Rectal:  Extremities:  Neurologic:  Vascular:    I&O's Summary    01 Jan 2024 07:01  -  01 Jan 2024 20:13  --------------------------------------------------------  IN: 0 mL / OUT: 0 mL / NET: 0 mL        LABORATORY:                          8.6    3.56  )-----------( 166      ( 01 Jan 2024 07:02 )             27.6           01-01    136  |  100  |  40<H>  ----------------------------<  68<L>  3.7   |  27  |  4.80<H>    Ca    7.9<L>      01 Jan 2024 07:02  Mg     2.0     12-31    TPro  5.5<L>  /  Alb  2.6<L>  /  TBili  0.5  /  DBili  0.2  /  AST  310<H>  /  ALT  240<H>  /  AlkPhos  162<H>  01-01          LABORATORY:    CBC Full  -  ( 01 Jan 2024 07:02 )  WBC Count : 3.56 K/uL  RBC Count : 2.55 M/uL  Hemoglobin : 8.6 g/dL  Hematocrit : 27.6 %  Platelet Count - Automated : 166 K/uL  Mean Cell Volume : 108.2 fl  Mean Cell Hemoglobin : 33.7 pg  Mean Cell Hemoglobin Concentration : 31.2 gm/dL  Auto Neutrophil # : 2.88 K/uL  Auto Lymphocyte # : 0.25 K/uL  Auto Monocyte # : 0.32 K/uL  Auto Eosinophil # : 0.04 K/uL  Auto Basophil # : 0.04 K/uL  Auto Neutrophil % : 73.0 %  Auto Lymphocyte % : 7.0 %  Auto Monocyte % : 9.0 %  Auto Eosinophil % : 1.0 %  Auto Basophil % : 1.0 %          01-01    136  |  100  |  40<H>  ----------------------------<  68<L>  3.7   |  27  |  4.80<H>    Ca    7.9<L>      01 Jan 2024 07:02  Mg     2.0     12-31    TPro  5.5<L>  /  Alb  2.6<L>  /  TBili  0.5  /  DBili  0.2  /  AST  310<H>  /  ALT  240<H>  /  AlkPhos  162<H>  01-01                    Wound culture:                12-30 @ 21:45  Organism --  Culture w/ gram stain --  Specimen Source .Blood Blood-Peripheral    Wound culture:                12-30 @ 21:40  Organism --  Culture w/ gram stain --  Specimen Source .Blood Blood-Peripheral        Abscess culture:             12-30 @ 21:45  Organism --  Gram Stain --  Specimen Source .Blood Blood-Peripheral    Abscess culture:             12-30 @ 21:40  Organism --  Gram Stain --  Specimen Source .Blood Blood-Peripheral            Tissue culture:           12-30 @ 21:45  Organism --  Gram Stain --  Specimen Source .Blood Blood-Peripheral    Tissue culture:           12-30 @ 21:40  Organism --  Gram Stain --  Specimen Source .Blood Blood-Peripheral        Body Fluid Smear & Culture:                        12-30 @ 21:45  AFB Smear  --  Culture Acid Fast Body Fluid w/ Smear  --  Culture Acid Fast Smear Concentrated   --    Culture Results:       No growth at 24 hours  Specimen Source .Blood Blood-Peripheral    Body Fluid Smear & Culture:                        12-30 @ 21:40  AFB Smear  --  Culture Acid Fast Body Fluid w/ Smear  --  Culture Acid Fast Smear Concentrated   --    Culture Results:       No growth at 24 hours  Specimen Source .Blood Blood-Peripheral                   Patient is a 73y old  Male who presents with a chief complaint of hypoxia and hypotension (01 Jan 2024 17:31)      HPI:  Patient  is 74 yo , LTC resident with past medical history of end-stage renal disease on dialysis, chronic hypotension, afib and recent admission for clogged AV fistula treated with thrombectomy who was discharged to the rehab on Dc 28/2023. He was brought back to the ED on Dc 30, 2023 for hypotension (BP 70/40) with hypoxia O2 sat 80% on RA at the rehab. In the EE he was very hypothermic (T 94.45F), hypotensive (BP 95/55) and tachycardic. CXR was negative for infiltrates. Nasal swab was positive for Influenza A. He was admitted and placed on po Tamiflu. Today he is feeling better with no hypothermia. Hypotension now is better (/55). His blood tests on admission showed acute elevation of LFTs from 2 days prior ( from 18,  from 20 and Alkp 128).       PAST MEDICAL & SURGICAL HISTORY:  ASHD (arteriosclerotic heart disease)      CAD (coronary artery disease)  s/p stent, CABG      CRI (chronic renal insufficiency)  ESRD on HD      Diabetes mellitus type II      Hypertension      Hyperlipidemia      Pacemaker      Chronic atrial fibrillation      Asthma      Hypothyroid      Dementia      H/O carotid stenosis      Enlarged prostate      Hx of CABG      Coronary stent      Cardiac pacemaker      History of amputation of toe  right great toe          Allergies    Levaquin (Anaphylaxis; Flushing; Short breath)    Intolerances        REVIEW OF SYSTEMS:  No vomiting  No abdominal pain.  No diarrhea.      HOME MEDICATIONS:    MEDICATIONS  (STANDING):  aMIOdarone    Tablet 200 milliGRAM(s) Oral daily  apixaban 5 milliGRAM(s) Oral two times a day  ascorbic acid 500 milliGRAM(s) Oral daily  aspirin enteric coated 81 milliGRAM(s) Oral daily  brimonidine 0.2% Ophthalmic Solution 1 Drop(s) Both EYES two times a day  budesonide 160 MICROgram(s)/formoterol 4.5 MICROgram(s) Inhaler 2 Puff(s) Inhalation two times a day  calcitriol   Capsule 0.25 MICROGram(s) Oral <User Schedule>  calcium acetate 667 milliGRAM(s) Oral three times a day with meals  dextrose 5%. 1000 milliLiter(s) (100 mL/Hr) IV Continuous <Continuous>  dextrose 5%. 1000 milliLiter(s) (50 mL/Hr) IV Continuous <Continuous>  dextrose 50% Injectable 25 Gram(s) IV Push once  dextrose 50% Injectable 12.5 Gram(s) IV Push once  dextrose 50% Injectable 12.5 Gram(s) IV Push once  dextrose 50% Injectable 25 Gram(s) IV Push once  dorzolamide 2%/timolol 0.5% Ophthalmic Solution 1 Drop(s) Both EYES two times a day  epoetin kayla-epbx (RETACRIT) Injectable 58415 Unit(s) IV Push <User Schedule>  finasteride 5 milliGRAM(s) Oral daily  glucagon  Injectable 1 milliGRAM(s) IntraMuscular once  insulin lispro (ADMELOG) corrective regimen sliding scale   SubCutaneous at bedtime  insulin lispro (ADMELOG) corrective regimen sliding scale   SubCutaneous three times a day before meals  lactobacillus acidophilus 1 Tablet(s) Oral daily  latanoprost 0.005% Ophthalmic Solution 1 Drop(s) Both EYES at bedtime  levothyroxine 50 MICROGram(s) Oral daily  melatonin 5 milliGRAM(s) Oral at bedtime  metoprolol tartrate 12.5 milliGRAM(s) Oral two times a day  midodrine. 10 milliGRAM(s) Oral three times a day  oseltamivir 30 milliGRAM(s) Oral daily  pantoprazole    Tablet 40 milliGRAM(s) Oral daily  senna 2 Tablet(s) Oral at bedtime  sertraline 50 milliGRAM(s) Oral daily  sertraline 25 milliGRAM(s) Oral daily  simvastatin 10 milliGRAM(s) Oral at bedtime  tamsulosin 0.4 milliGRAM(s) Oral at bedtime  ursodiol Capsule 300 milliGRAM(s) Oral two times a day    MEDICATIONS  (PRN):  acetaminophen     Tablet .. 650 milliGRAM(s) Oral every 6 hours PRN Temp greater or equal to 38C (100.4F), Mild Pain (1 - 3)  albuterol/ipratropium for Nebulization 3 milliLiter(s) Nebulizer every 6 hours PRN Shortness of Breath and/or Wheezing  aluminum hydroxide/magnesium hydroxide/simethicone Suspension 30 milliLiter(s) Oral every 4 hours PRN Dyspepsia  bisacodyl Suppository 10 milliGRAM(s) Rectal daily PRN Constipation  dextrose Oral Gel 15 Gram(s) Oral once PRN Blood Glucose LESS THAN 70 milliGRAM(s)/deciliter  guaiFENesin Oral Liquid (Sugar-Free) 200 milliGRAM(s) Oral every 6 hours PRN Cough  morphine  - Injectable 2 milliGRAM(s) IV Push every 6 hours PRN Severe Pain (7 - 10)  ondansetron Injectable 4 milliGRAM(s) IV Push every 8 hours PRN Nausea and/or Vomiting  traMADol 50 milliGRAM(s) Oral three times a day PRN Moderate Pain (4 - 6)      Vital Signs Last 24 Hrs  T(C): 36.7 (01 Jan 2024 13:30), Max: 36.7 (01 Jan 2024 13:30)  T(F): 98 (01 Jan 2024 13:30), Max: 98 (01 Jan 2024 13:30)  HR: 74 (01 Jan 2024 13:30) (74 - 75)  BP: 110/54 (01 Jan 2024 13:30) (96/52 - 110/54)  BP(mean): --  RR: 19 (01 Jan 2024 13:30) (14 - 19)  SpO2: 100% (01 Jan 2024 13:30) (100% - 100%)    Parameters below as of 01 Jan 2024 13:30  Patient On (Oxygen Delivery Method): nasal cannula        PHYSICAL EXAM:  HEENT: NC/AT, PERRLA  Neck: Soft.   Lungs: Coarse BS bilaterally, no wheezing  Heart: RRR, no murmurs.   Abdomen: Soft, no tenderness  Genital/ Rectal: No blevins catheter.  Extremities: No ulcers.   Neurologic: Confused. No focal weakness.      I&O's Summary    01 Jan 2024 07:01  -  01 Jan 2024 20:13  --------------------------------------------------------  IN: 0 mL / OUT: 0 mL / NET: 0 mL        LABORATORY:                          8.6    3.56  )-----------( 166      ( 01 Jan 2024 07:02 )             27.6           01-01    136  |  100  |  40<H>  ----------------------------<  68<L>  3.7   |  27  |  4.80<H>    Ca    7.9<L>      01 Jan 2024 07:02  Mg     2.0     12-31    TPro  5.5<L>  /  Alb  2.6<L>  /  TBili  0.5  /  DBili  0.2  /  AST  310<H>  /  ALT  240<H>  /  AlkPhos  162<H>  01-01          LABORATORY:    CBC Full  -  ( 01 Jan 2024 07:02 )  WBC Count : 3.56 K/uL  RBC Count : 2.55 M/uL  Hemoglobin : 8.6 g/dL  Hematocrit : 27.6 %  Platelet Count - Automated : 166 K/uL  Mean Cell Volume : 108.2 fl  Mean Cell Hemoglobin : 33.7 pg  Mean Cell Hemoglobin Concentration : 31.2 gm/dL  Auto Neutrophil # : 2.88 K/uL  Auto Lymphocyte # : 0.25 K/uL  Auto Monocyte # : 0.32 K/uL  Auto Eosinophil # : 0.04 K/uL  Auto Basophil # : 0.04 K/uL  Auto Neutrophil % : 73.0 %  Auto Lymphocyte % : 7.0 %  Auto Monocyte % : 9.0 %  Auto Eosinophil % : 1.0 %  Auto Basophil % : 1.0 %          01-01    136  |  100  |  40<H>  ----------------------------<  68<L>  3.7   |  27  |  4.80<H>    Ca    7.9<L>      01 Jan 2024 07:02  Mg     2.0     12-31    TPro  5.5<L>  /  Alb  2.6<L>  /  TBili  0.5  /  DBili  0.2  /  AST  310<H>  /  ALT  240<H>  /  AlkPhos  162<H>  01-01      Wound culture:                12-30 @ 21:45  Organism --  Culture w/ gram stain --  Specimen Source .Blood Blood-Peripheral    Wound culture:                12-30 @ 21:40  Organism --  Culture w/ gram stain --  Specimen Source .Blood Blood-Peripheral        Abscess culture:             12-30 @ 21:45  Organism --  Gram Stain --  Specimen Source .Blood Blood-Peripheral    Abscess culture:             12-30 @ 21:40  Organism --  Gram Stain --  Specimen Source .Blood Blood-Peripheral            Tissue culture:           12-30 @ 21:45  Organism --  Gram Stain --  Specimen Source .Blood Blood-Peripheral    Tissue culture:           12-30 @ 21:40  Organism --  Gram Stain --  Specimen Source .Blood Blood-Peripheral      Assessment and plan:    1. Influenza A.  2. Hypothermia.  3. Hypotension.  4. Elevated LFTS.  5. ESRD on HD.  6. Hypoxia.    . Continue po Tamiflu for a total of 5 days.  . Monitor the progression of SOB and hypoxia.  . Follow all cultures.  . Acute elevation of LFTs noted. Unclear sources. Probably due to hypotension. GI evaluation in progress.      Thank you                  Patient is a 73y old  Male who presents with a chief complaint of hypoxia and hypotension (01 Jan 2024 17:31)      HPI:  Patient  is 74 yo , LTC resident with past medical history of end-stage renal disease on dialysis, chronic hypotension, afib and recent admission for clogged AV fistula treated with thrombectomy who was discharged to the rehab on Dc 28/2023. He was brought back to the ED on Dc 30, 2023 for hypotension (BP 70/40) with hypoxia O2 sat 80% on RA at the rehab. In the EE he was very hypothermic (T 94.45F), hypotensive (BP 95/55) and tachycardic. CXR was negative for infiltrates. Nasal swab was positive for Influenza A. He was admitted and placed on po Tamiflu. Today he is feeling better with no hypothermia. Hypotension now is better (/55). His blood tests on admission showed acute elevation of LFTs from 2 days prior ( from 18,  from 20 and Alkp 128).       PAST MEDICAL & SURGICAL HISTORY:  ASHD (arteriosclerotic heart disease)      CAD (coronary artery disease)  s/p stent, CABG      CRI (chronic renal insufficiency)  ESRD on HD      Diabetes mellitus type II      Hypertension      Hyperlipidemia      Pacemaker      Chronic atrial fibrillation      Asthma      Hypothyroid      Dementia      H/O carotid stenosis      Enlarged prostate      Hx of CABG      Coronary stent      Cardiac pacemaker      History of amputation of toe  right great toe          Allergies    Levaquin (Anaphylaxis; Flushing; Short breath)    Intolerances        REVIEW OF SYSTEMS:  No vomiting  No abdominal pain.  No diarrhea.      HOME MEDICATIONS:    MEDICATIONS  (STANDING):  aMIOdarone    Tablet 200 milliGRAM(s) Oral daily  apixaban 5 milliGRAM(s) Oral two times a day  ascorbic acid 500 milliGRAM(s) Oral daily  aspirin enteric coated 81 milliGRAM(s) Oral daily  brimonidine 0.2% Ophthalmic Solution 1 Drop(s) Both EYES two times a day  budesonide 160 MICROgram(s)/formoterol 4.5 MICROgram(s) Inhaler 2 Puff(s) Inhalation two times a day  calcitriol   Capsule 0.25 MICROGram(s) Oral <User Schedule>  calcium acetate 667 milliGRAM(s) Oral three times a day with meals  dextrose 5%. 1000 milliLiter(s) (100 mL/Hr) IV Continuous <Continuous>  dextrose 5%. 1000 milliLiter(s) (50 mL/Hr) IV Continuous <Continuous>  dextrose 50% Injectable 25 Gram(s) IV Push once  dextrose 50% Injectable 12.5 Gram(s) IV Push once  dextrose 50% Injectable 12.5 Gram(s) IV Push once  dextrose 50% Injectable 25 Gram(s) IV Push once  dorzolamide 2%/timolol 0.5% Ophthalmic Solution 1 Drop(s) Both EYES two times a day  epoetin kayla-epbx (RETACRIT) Injectable 63825 Unit(s) IV Push <User Schedule>  finasteride 5 milliGRAM(s) Oral daily  glucagon  Injectable 1 milliGRAM(s) IntraMuscular once  insulin lispro (ADMELOG) corrective regimen sliding scale   SubCutaneous at bedtime  insulin lispro (ADMELOG) corrective regimen sliding scale   SubCutaneous three times a day before meals  lactobacillus acidophilus 1 Tablet(s) Oral daily  latanoprost 0.005% Ophthalmic Solution 1 Drop(s) Both EYES at bedtime  levothyroxine 50 MICROGram(s) Oral daily  melatonin 5 milliGRAM(s) Oral at bedtime  metoprolol tartrate 12.5 milliGRAM(s) Oral two times a day  midodrine. 10 milliGRAM(s) Oral three times a day  oseltamivir 30 milliGRAM(s) Oral daily  pantoprazole    Tablet 40 milliGRAM(s) Oral daily  senna 2 Tablet(s) Oral at bedtime  sertraline 50 milliGRAM(s) Oral daily  sertraline 25 milliGRAM(s) Oral daily  simvastatin 10 milliGRAM(s) Oral at bedtime  tamsulosin 0.4 milliGRAM(s) Oral at bedtime  ursodiol Capsule 300 milliGRAM(s) Oral two times a day    MEDICATIONS  (PRN):  acetaminophen     Tablet .. 650 milliGRAM(s) Oral every 6 hours PRN Temp greater or equal to 38C (100.4F), Mild Pain (1 - 3)  albuterol/ipratropium for Nebulization 3 milliLiter(s) Nebulizer every 6 hours PRN Shortness of Breath and/or Wheezing  aluminum hydroxide/magnesium hydroxide/simethicone Suspension 30 milliLiter(s) Oral every 4 hours PRN Dyspepsia  bisacodyl Suppository 10 milliGRAM(s) Rectal daily PRN Constipation  dextrose Oral Gel 15 Gram(s) Oral once PRN Blood Glucose LESS THAN 70 milliGRAM(s)/deciliter  guaiFENesin Oral Liquid (Sugar-Free) 200 milliGRAM(s) Oral every 6 hours PRN Cough  morphine  - Injectable 2 milliGRAM(s) IV Push every 6 hours PRN Severe Pain (7 - 10)  ondansetron Injectable 4 milliGRAM(s) IV Push every 8 hours PRN Nausea and/or Vomiting  traMADol 50 milliGRAM(s) Oral three times a day PRN Moderate Pain (4 - 6)      Vital Signs Last 24 Hrs  T(C): 36.7 (01 Jan 2024 13:30), Max: 36.7 (01 Jan 2024 13:30)  T(F): 98 (01 Jan 2024 13:30), Max: 98 (01 Jan 2024 13:30)  HR: 74 (01 Jan 2024 13:30) (74 - 75)  BP: 110/54 (01 Jan 2024 13:30) (96/52 - 110/54)  BP(mean): --  RR: 19 (01 Jan 2024 13:30) (14 - 19)  SpO2: 100% (01 Jan 2024 13:30) (100% - 100%)    Parameters below as of 01 Jan 2024 13:30  Patient On (Oxygen Delivery Method): nasal cannula        PHYSICAL EXAM:  HEENT: NC/AT, PERRLA  Neck: Soft.   Lungs: Coarse BS bilaterally, no wheezing  Heart: RRR, no murmurs.   Abdomen: Soft, no tenderness  Genital/ Rectal: No blevins catheter.  Extremities: No ulcers.   Neurologic: Confused. No focal weakness.      I&O's Summary    01 Jan 2024 07:01  -  01 Jan 2024 20:13  --------------------------------------------------------  IN: 0 mL / OUT: 0 mL / NET: 0 mL        LABORATORY:                          8.6    3.56  )-----------( 166      ( 01 Jan 2024 07:02 )             27.6           01-01    136  |  100  |  40<H>  ----------------------------<  68<L>  3.7   |  27  |  4.80<H>    Ca    7.9<L>      01 Jan 2024 07:02  Mg     2.0     12-31    TPro  5.5<L>  /  Alb  2.6<L>  /  TBili  0.5  /  DBili  0.2  /  AST  310<H>  /  ALT  240<H>  /  AlkPhos  162<H>  01-01          LABORATORY:    CBC Full  -  ( 01 Jan 2024 07:02 )  WBC Count : 3.56 K/uL  RBC Count : 2.55 M/uL  Hemoglobin : 8.6 g/dL  Hematocrit : 27.6 %  Platelet Count - Automated : 166 K/uL  Mean Cell Volume : 108.2 fl  Mean Cell Hemoglobin : 33.7 pg  Mean Cell Hemoglobin Concentration : 31.2 gm/dL  Auto Neutrophil # : 2.88 K/uL  Auto Lymphocyte # : 0.25 K/uL  Auto Monocyte # : 0.32 K/uL  Auto Eosinophil # : 0.04 K/uL  Auto Basophil # : 0.04 K/uL  Auto Neutrophil % : 73.0 %  Auto Lymphocyte % : 7.0 %  Auto Monocyte % : 9.0 %  Auto Eosinophil % : 1.0 %  Auto Basophil % : 1.0 %          01-01    136  |  100  |  40<H>  ----------------------------<  68<L>  3.7   |  27  |  4.80<H>    Ca    7.9<L>      01 Jan 2024 07:02  Mg     2.0     12-31    TPro  5.5<L>  /  Alb  2.6<L>  /  TBili  0.5  /  DBili  0.2  /  AST  310<H>  /  ALT  240<H>  /  AlkPhos  162<H>  01-01      Wound culture:                12-30 @ 21:45  Organism --  Culture w/ gram stain --  Specimen Source .Blood Blood-Peripheral    Wound culture:                12-30 @ 21:40  Organism --  Culture w/ gram stain --  Specimen Source .Blood Blood-Peripheral        Abscess culture:             12-30 @ 21:45  Organism --  Gram Stain --  Specimen Source .Blood Blood-Peripheral    Abscess culture:             12-30 @ 21:40  Organism --  Gram Stain --  Specimen Source .Blood Blood-Peripheral            Tissue culture:           12-30 @ 21:45  Organism --  Gram Stain --  Specimen Source .Blood Blood-Peripheral    Tissue culture:           12-30 @ 21:40  Organism --  Gram Stain --  Specimen Source .Blood Blood-Peripheral      Assessment and plan:    1. Influenza A.  2. Hypothermia.  3. Hypotension.  4. Elevated LFTS.  5. ESRD on HD.  6. Hypoxia.    . Continue po Tamiflu for a total of 5 days.  . Monitor the progression of SOB and hypoxia.  . Follow all cultures.  . Acute elevation of LFTs noted. Unclear sources. Probably due to hypotension. GI evaluation in progress.      Thank you

## 2024-01-01 NOTE — PROGRESS NOTE ADULT - ASSESSMENT
inc lfts multifactorial  cad  chf  medications    plan    Avoid all non-essential hepatotoxic drugs	  Obtain Abdominal Ultrasound  Check viral hepatitis panel  add ursodiol 300 bid  Diet and weight control discussed    Monitor daily liver function test    Advanced care planning was discussed with patient and family.  Advanced care planning forms were reviewed and discussed.  Risks, benefits and alternatives of gastroenterologic procedures were discussed in detail and all questions were answered.    30 minutes spent.

## 2024-01-02 DIAGNOSIS — I21.4 NON-ST ELEVATION (NSTEMI) MYOCARDIAL INFARCTION: ICD-10-CM

## 2024-01-02 LAB
CHOLEST SERPL-MCNC: 97 MG/DL — SIGNIFICANT CHANGE UP
CHOLEST SERPL-MCNC: 97 MG/DL — SIGNIFICANT CHANGE UP
HDLC SERPL-MCNC: 29 MG/DL — LOW
HDLC SERPL-MCNC: 29 MG/DL — LOW
LIPID PNL WITH DIRECT LDL SERPL: 46 MG/DL — SIGNIFICANT CHANGE UP
LIPID PNL WITH DIRECT LDL SERPL: 46 MG/DL — SIGNIFICANT CHANGE UP
NON HDL CHOLESTEROL: 68 MG/DL — SIGNIFICANT CHANGE UP
NON HDL CHOLESTEROL: 68 MG/DL — SIGNIFICANT CHANGE UP
TRIGL SERPL-MCNC: 121 MG/DL — SIGNIFICANT CHANGE UP
TRIGL SERPL-MCNC: 121 MG/DL — SIGNIFICANT CHANGE UP
TROPONIN I, HIGH SENSITIVITY RESULT: 860.5 NG/L — HIGH
TROPONIN I, HIGH SENSITIVITY RESULT: 860.5 NG/L — HIGH

## 2024-01-02 PROCEDURE — 76857 US EXAM PELVIC LIMITED: CPT | Mod: 26

## 2024-01-02 RX ORDER — ACETAMINOPHEN 500 MG
1000 TABLET ORAL ONCE
Refills: 0 | Status: COMPLETED | OUTPATIENT
Start: 2024-01-02 | End: 2024-01-02

## 2024-01-02 RX ORDER — MIDODRINE HYDROCHLORIDE 2.5 MG/1
10 TABLET ORAL ONCE
Refills: 0 | Status: COMPLETED | OUTPATIENT
Start: 2024-01-02 | End: 2024-01-02

## 2024-01-02 RX ORDER — SODIUM CHLORIDE 9 MG/ML
500 INJECTION INTRAMUSCULAR; INTRAVENOUS; SUBCUTANEOUS ONCE
Refills: 0 | Status: COMPLETED | OUTPATIENT
Start: 2024-01-02 | End: 2024-01-02

## 2024-01-02 RX ADMIN — DORZOLAMIDE HYDROCHLORIDE TIMOLOL MALEATE 1 DROP(S): 20; 5 SOLUTION/ DROPS OPHTHALMIC at 06:17

## 2024-01-02 RX ADMIN — ERYTHROPOIETIN 10000 UNIT(S): 10000 INJECTION, SOLUTION INTRAVENOUS; SUBCUTANEOUS at 10:28

## 2024-01-02 RX ADMIN — Medication 1 TABLET(S): at 14:17

## 2024-01-02 RX ADMIN — APIXABAN 5 MILLIGRAM(S): 2.5 TABLET, FILM COATED ORAL at 06:16

## 2024-01-02 RX ADMIN — SODIUM CHLORIDE 500 MILLILITER(S): 9 INJECTION INTRAMUSCULAR; INTRAVENOUS; SUBCUTANEOUS at 21:09

## 2024-01-02 RX ADMIN — Medication 667 MILLIGRAM(S): at 17:15

## 2024-01-02 RX ADMIN — Medication 30 MILLIGRAM(S): at 14:18

## 2024-01-02 RX ADMIN — BRIMONIDINE TARTRATE 1 DROP(S): 2 SOLUTION/ DROPS OPHTHALMIC at 17:14

## 2024-01-02 RX ADMIN — APIXABAN 5 MILLIGRAM(S): 2.5 TABLET, FILM COATED ORAL at 17:15

## 2024-01-02 RX ADMIN — MIDODRINE HYDROCHLORIDE 10 MILLIGRAM(S): 2.5 TABLET ORAL at 06:16

## 2024-01-02 RX ADMIN — Medication 81 MILLIGRAM(S): at 14:17

## 2024-01-02 RX ADMIN — TRAMADOL HYDROCHLORIDE 50 MILLIGRAM(S): 50 TABLET ORAL at 21:10

## 2024-01-02 RX ADMIN — LATANOPROST 1 DROP(S): 0.05 SOLUTION/ DROPS OPHTHALMIC; TOPICAL at 01:05

## 2024-01-02 RX ADMIN — URSODIOL 300 MILLIGRAM(S): 250 TABLET, FILM COATED ORAL at 17:15

## 2024-01-02 RX ADMIN — Medication 667 MILLIGRAM(S): at 08:36

## 2024-01-02 RX ADMIN — Medication 5 MILLIGRAM(S): at 21:11

## 2024-01-02 RX ADMIN — MIDODRINE HYDROCHLORIDE 10 MILLIGRAM(S): 2.5 TABLET ORAL at 14:18

## 2024-01-02 RX ADMIN — DORZOLAMIDE HYDROCHLORIDE TIMOLOL MALEATE 1 DROP(S): 20; 5 SOLUTION/ DROPS OPHTHALMIC at 17:14

## 2024-01-02 RX ADMIN — FINASTERIDE 5 MILLIGRAM(S): 5 TABLET, FILM COATED ORAL at 14:16

## 2024-01-02 RX ADMIN — BRIMONIDINE TARTRATE 1 DROP(S): 2 SOLUTION/ DROPS OPHTHALMIC at 06:17

## 2024-01-02 RX ADMIN — TRAMADOL HYDROCHLORIDE 50 MILLIGRAM(S): 50 TABLET ORAL at 21:40

## 2024-01-02 RX ADMIN — SERTRALINE 50 MILLIGRAM(S): 25 TABLET, FILM COATED ORAL at 14:17

## 2024-01-02 RX ADMIN — SERTRALINE 25 MILLIGRAM(S): 25 TABLET, FILM COATED ORAL at 14:17

## 2024-01-02 RX ADMIN — Medication 400 MILLIGRAM(S): at 21:10

## 2024-01-02 RX ADMIN — Medication 500 MILLIGRAM(S): at 14:17

## 2024-01-02 RX ADMIN — URSODIOL 300 MILLIGRAM(S): 250 TABLET, FILM COATED ORAL at 06:16

## 2024-01-02 RX ADMIN — Medication 1000 MILLIGRAM(S): at 22:18

## 2024-01-02 RX ADMIN — MIDODRINE HYDROCHLORIDE 10 MILLIGRAM(S): 2.5 TABLET ORAL at 20:34

## 2024-01-02 RX ADMIN — Medication 50 MICROGRAM(S): at 06:16

## 2024-01-02 RX ADMIN — MIDODRINE HYDROCHLORIDE 10 MILLIGRAM(S): 2.5 TABLET ORAL at 10:36

## 2024-01-02 RX ADMIN — Medication 3 MILLILITER(S): at 21:15

## 2024-01-02 RX ADMIN — AMIODARONE HYDROCHLORIDE 200 MILLIGRAM(S): 400 TABLET ORAL at 06:16

## 2024-01-02 RX ADMIN — PANTOPRAZOLE SODIUM 40 MILLIGRAM(S): 20 TABLET, DELAYED RELEASE ORAL at 14:17

## 2024-01-02 RX ADMIN — BUDESONIDE AND FORMOTEROL FUMARATE DIHYDRATE 2 PUFF(S): 160; 4.5 AEROSOL RESPIRATORY (INHALATION) at 06:40

## 2024-01-02 NOTE — PROGRESS NOTE ADULT - ASSESSMENT
REASON FOR VISIT  .. Management of problems listed below        REVIEW OF SYMPTOMS   Able to give ROS  Yes     RELIABILITY +/-   CONSTITUTIONAL Weakness Yes    ENDOCRINE  No heat or cold intolerance    ALLERGY No hives  Sore throat No stridor  RESP Shortness of breath YES   NEURO New weakness No   CARDIAC   Palpitations No         PHYSICAL EXAM    HEENT Unremarkable  atraumatic   RESP Fair air entry  Harsh breath sound   CARDIAC S1 S2 No S3     NO JVD    ABDOMEN No hepatosplenomegaly   PEDAL EDEMA present No calf tenderness  NO rash     GENERAL DATA .   GOC.  .. 12/30/2023 full code    ICU STAY.  .. no  COVID. .. scv2   BEST PRACTICE ISSUES.    HOB ELEVATN.  .. Yes  DVT PPLX.  ..   12/31/2023 apixaba 5.2 (af)         DIET.   ..  12/31/2023 soft bite   STRESS ULCER PPLX.   .  ..   12/31/2023 protonix 40   ALLGY. ..    levaquin   WT. ..  12/30/2023 68  BMI. ..  12/30/2023 22  PROCEDURES.   .. 12/26/2023 Right upper extremity brachiocephalic fistula with long segment   thrombosis/absent flow within the body of the fistula as well as outflow   vein stent.   .. 12/26/2023 Had R AV fistula thrombectomy    ABGS.   .  VS/ PO/IO/ VENT/ DRIPS.  1/2/2024 afeb 69 89/52   1/2/2024 2l 97%      . SUMMARY.   . 73 m with past medical history of end-stage renal disease on dialysis, chronic hypotension, afib, recent admission for clogged AV fistula 12/26-12/30 admitted 12/30/2023  for hypotension and hypoxia.   .t tested (+) for flu on 12/30     COURSE .   . FLU   .... 12/31/2023 Tamiflu started   . ASTHMA Symbicort  . CHR A fib on Apixa poa Apixa was continued   . ELEVATED CARDIAC ENZYMES   .. Tr 12/31-12/31/-12/31- 1/1/2024 Tr [id number]   .... Rxed with asa   .... Cardio on case   . FLUID OVERLOAD   . ESRD   .... Getting HD   . ELEVATED LFTS   .... 12/31 ac hepatitis profile (-)   .... 12/31 US Possible cirrhosis sp chanelle   .. gi on case   . ANEMIA Target Hb 7 (+)     OVERALL PLAN/DISPO   .... Monitor LFTS   .... complete tamiflu          TIME SPENT.  . Over 36 minutes aggregate care time spent on encounter; activities included   direct patient care, counseling and/or coordinating care reviewing notes, lab data/ imaging , discussion with multidisciplinary team/ patient  /family and explaining in detail risks, benefits, alternatives  of the recommendations     PATIENT.  . MICHEL GONZALEZ          REASON FOR VISIT  .. Management of problems listed below        REVIEW OF SYMPTOMS   Able to give ROS  Yes     RELIABILITY +/-   CONSTITUTIONAL Weakness Yes    ENDOCRINE  No heat or cold intolerance    ALLERGY No hives  Sore throat No stridor  RESP Shortness of breath YES   NEURO New weakness No   CARDIAC   Palpitations No         PHYSICAL EXAM    HEENT Unremarkable  atraumatic   RESP Fair air entry  Harsh breath sound   CARDIAC S1 S2 No S3     NO JVD    ABDOMEN No hepatosplenomegaly   PEDAL EDEMA present No calf tenderness  NO rash     GENERAL DATA .   GOC.  .. 12/30/2023 full code    ICU STAY.  .. no  COVID. .. scv2   BEST PRACTICE ISSUES.    HOB ELEVATN.  .. Yes  DVT PPLX.  ..   12/31/2023 apixaba 5.2 (af)         DIET.   ..  12/31/2023 soft bite   STRESS ULCER PPLX.   .  ..   12/31/2023 protonix 40   ALLGY. ..    levaquin   WT. ..  12/30/2023 68  BMI. ..  12/30/2023 22  PROCEDURES.   .. 12/26/2023 Right upper extremity brachiocephalic fistula with long segment   thrombosis/absent flow within the body of the fistula as well as outflow   vein stent.   .. 12/26/2023 Had R AV fistula thrombectomy    ABGS.   .  VS/ PO/IO/ VENT/ DRIPS.  1/2/2024 afeb 69 89/52   1/2/2024 2l 97%      . SUMMARY.   . 73 m with past medical history of end-stage renal disease on dialysis, chronic hypotension, afib, recent admission for clogged AV fistula 12/26-12/30 admitted 12/30/2023  for hypotension and hypoxia.   .t tested (+) for flu on 12/30     COURSE .   . FLU   .... 12/31/2023 Tamiflu started   . ASTHMA Symbicort  . CHR A fib on Apixa poa Apixa was continued   . ELEVATED CARDIAC ENZYMES   .. Tr 12/31-12/31/-12/31- 1/1/2024 Tr [id number]   .... Rxed with asa   .... Cardio on case   . FLUID OVERLOAD   . ESRD   .... Getting HD   . ELEVATED LFTS   .... 12/31 ac hepatitis profile (-)   .... 12/31 US Possible cirrhosis sp chanelle   .. gi on case   . ANEMIA Target Hb 7 (+)     OVERALL PLAN/DISPO   .... Monitor LFTS   .... complete tamiflu          TIME SPENT.  . Over 36 minutes aggregate care time spent on encounter; activities included   direct patient care, counseling and/or coordinating care reviewing notes, lab data/ imaging , discussion with multidisciplinary team/ patient  /family and explaining in detail risks, benefits, alternatives  of the recommendations     PATIENT.  . MICHEL GONZALEZ

## 2024-01-02 NOTE — CARE COORDINATION ASSESSMENT. - FACILITY OF RESIDENCE YN
[facility] long term care with onsite dialysis/Yes [facility] long term care with onsite dialysis/Yes

## 2024-01-02 NOTE — CHART NOTE - NSCHARTNOTEFT_GEN_A_CORE
RN called for hypotension confirmed manually 80/40.   Pt admitted on 12/26 for clogged AV fistula, discharged on 12/30 and re-admitted the same day for hypotension and hypoxia found to have viral PNA (Influenza A) and possible NSTEMI (trop downtrend). Currently on Eliquis due to chronid Afib as well.   Pt with ESRD with last HD today. Pt was on midodrine 10mg bid and increased today to tid.    Pt seen and examined at bedside, screaming due to generalized b/l LE pain, asking to be moved to his room and lastly reporting h/o asthma and he can not breath. Pt A&Ox1 (person - baseline dementia) with no signs of respiratory distress, presenting mild active cough, normal O2 sat while screaming.  S1, S2, irregular rhythm and normal rate. Respiratory sounds with no wheezing, no rales, no crackles. LE with no edema, symmetric, no open/active wounds, no increasing pain on palpation.    Previous vitals seen with hypotensive episodes, normal HR, stable O2 sats on NC 3L.  - 500cc bolus x1 ordered.   - Midodrine 10mg x1 stat (next dose to be given earlier).  - Pt on morphine prn for severe pain but unable to receive now due to BP, and tramadol prn for mod. pain.   - Ofirmed 1gr IV x1 stat + tramadol as previously ordered  - Duoneb now and continue as ordered.   - RN to monitor BP in 1 hour. Call as needed.

## 2024-01-02 NOTE — DIETITIAN INITIAL EVALUATION ADULT - LAB (SPECIFY)
monitor renal indices, blood glucose levels. Given episodes of hypoglycemia, would hold off on consistent CHO restriction. Serum potassium currently appears WDL, would monitor to assess for need to add potassium restriction.   Message sent to MD regarding pending verification orders
no

## 2024-01-02 NOTE — PROGRESS NOTE ADULT - SUBJECTIVE AND OBJECTIVE BOX
[facility]       HPI:  Patient  is 72 yo , C resident with past medical history of end-stage renal disease on dialysis, chronic hypotension, afib, T2DM, recent admission for clogged AV fistula is presenting for hypotension and hypoxia.  Patient was discharged back to [facility] 12/30  but sent to ED due to hypotension and hypoxia on arrival to [facility] .His vitals were stable upon discharge and he was monitored 2 hrs after HD session by house staff .  Otherwise denies any new complaints.  Patient was diagnosed with Influenza , also found to have JAZMINE elevation .Seen by Dr Garcia cardiologist Admitted  to telemetry unit for monitoring , send 3 sets of cardiac enzymes to rule out acute coronary event, obtain ECHO to evaluate LVEF, cardiology consult  ,continue current management, O2 supply, anticoagulation plan as per cardiology consult Palliative care consult requested ,to discuss advance directives and complete MOLST  (31 Dec 2023 10:10)        SUBJECTIVE:      ALLERGIES:  Allergies    Levaquin (Anaphylaxis; Flushing; Short breath)    Intolerances          MEDICATIONS  (STANDING):  aMIOdarone    Tablet 200 milliGRAM(s) Oral daily  apixaban 5 milliGRAM(s) Oral two times a day  ascorbic acid 500 milliGRAM(s) Oral daily  aspirin enteric coated 81 milliGRAM(s) Oral daily  brimonidine 0.2% Ophthalmic Solution 1 Drop(s) Both EYES two times a day  budesonide 160 MICROgram(s)/formoterol 4.5 MICROgram(s) Inhaler 2 Puff(s) Inhalation two times a day  calcitriol   Capsule 0.25 MICROGram(s) Oral <User Schedule>  calcium acetate 667 milliGRAM(s) Oral three times a day with meals  dextrose 5%. 1000 milliLiter(s) (100 mL/Hr) IV Continuous <Continuous>  dextrose 5%. 1000 milliLiter(s) (50 mL/Hr) IV Continuous <Continuous>  dextrose 50% Injectable 25 Gram(s) IV Push once  dextrose 50% Injectable 25 Gram(s) IV Push once  dextrose 50% Injectable 12.5 Gram(s) IV Push once  dorzolamide 2%/timolol 0.5% Ophthalmic Solution 1 Drop(s) Both EYES two times a day  epoetin kayla-epbx (RETACRIT) Injectable 30646 Unit(s) IV Push <User Schedule>  finasteride 5 milliGRAM(s) Oral daily  glucagon  Injectable 1 milliGRAM(s) IntraMuscular once  insulin lispro (ADMELOG) corrective regimen sliding scale   SubCutaneous at bedtime  insulin lispro (ADMELOG) corrective regimen sliding scale   SubCutaneous three times a day before meals  lactobacillus acidophilus 1 Tablet(s) Oral daily  latanoprost 0.005% Ophthalmic Solution 1 Drop(s) Both EYES at bedtime  levothyroxine 50 MICROGram(s) Oral daily  melatonin 5 milliGRAM(s) Oral at bedtime  metoprolol tartrate 12.5 milliGRAM(s) Oral two times a day  midodrine. 10 milliGRAM(s) Oral three times a day  pantoprazole    Tablet 40 milliGRAM(s) Oral daily  senna 2 Tablet(s) Oral at bedtime  sertraline 50 milliGRAM(s) Oral daily  sertraline 25 milliGRAM(s) Oral daily  simvastatin 10 milliGRAM(s) Oral at bedtime  tamsulosin 0.4 milliGRAM(s) Oral at bedtime  ursodiol Capsule 300 milliGRAM(s) Oral two times a day    MEDICATIONS  (PRN):  acetaminophen     Tablet .. 650 milliGRAM(s) Oral every 6 hours PRN Temp greater or equal to 38C (100.4F), Mild Pain (1 - 3)  albuterol/ipratropium for Nebulization 3 milliLiter(s) Nebulizer every 6 hours PRN Shortness of Breath and/or Wheezing  aluminum hydroxide/magnesium hydroxide/simethicone Suspension 30 milliLiter(s) Oral every 4 hours PRN Dyspepsia  bisacodyl Suppository 10 milliGRAM(s) Rectal daily PRN Constipation  dextrose Oral Gel 15 Gram(s) Oral once PRN Blood Glucose LESS THAN 70 milliGRAM(s)/deciliter  guaiFENesin Oral Liquid (Sugar-Free) 200 milliGRAM(s) Oral every 6 hours PRN Cough  morphine  - Injectable 2 milliGRAM(s) IV Push every 6 hours PRN Severe Pain (7 - 10)  ondansetron Injectable 4 milliGRAM(s) IV Push every 8 hours PRN Nausea and/or Vomiting  traMADol 50 milliGRAM(s) Oral three times a day PRN Moderate Pain (4 - 6)      REVIEW OF SYSTEMS:  CONSTITUTIONAL: No fever,  RESPIRATORY: No cough, wheezing, shortness of breath  CARDIOVASCULAR: No chest pain, dyspnea, palpitations, dizziness, syncope, paroxysmal nocturnal dyspnea, orthopnea, or arm or leg swelling  GASTROINTESTINAL: No abdominal  or epigastric pain, nausea, vomiting,  diarrhea  NEUROLOGICAL: No headaches,  loss of strength, numbness, or tremors    Vital Signs Last 24 Hrs  T(C): 37.1 (02 Jan 2024 20:11), Max: 37.1 (02 Jan 2024 20:11)  T(F): 98.7 (02 Jan 2024 20:11), Max: 98.7 (02 Jan 2024 20:11)  HR: 61 (02 Jan 2024 22:19) (59 - 84)  BP: 89/40 (02 Jan 2024 22:19) (80/40 - 117/47)  BP(mean): --  RR: 18 (02 Jan 2024 20:11) (18 - 19)  SpO2: 98% (02 Jan 2024 21:15) (97% - 100%)    Parameters below as of 02 Jan 2024 21:15  Patient On (Oxygen Delivery Method): nasal cannula        PHYSICAL EXAM:  HEAD:  Atraumatic, Normocephalic  NECK: Supple and normal thyroid.  No JVD or carotid bruit.   HEART: S1, S2 regular , 1/6 soft ejection systolic murmur in mitral area , no thrill and no gallops .  PULMONARY: Bilateral vesicular breathing , few scattered ronchi and few scattered rales are present .  ABDOMEN: Soft nontender and positive bowl sounds   EXTREMITIES:  No clubbing, cyanosis, or pedal  edema  NEUROLOGICAL: AAOX3 , no focal deficit .    LABS:                        8.6    3.56  )-----------( 166      ( 01 Jan 2024 07:02 )             27.6     01-01    136  |  100  |  40<H>  ----------------------------<  68<L>  3.7   |  27  |  4.80<H>    Ca    7.9<L>      01 Jan 2024 07:02    TPro  5.5<L>  /  Alb  2.6<L>  /  TBili  0.5  /  DBili  0.2  /  AST  310<H>  /  ALT  240<H>  /  AlkPhos  162<H>  01-01        PT/INR - ( 01 Jan 2024 07:02 )   PT: 23.2 sec;   INR: 2.02 ratio           Urinalysis Basic - ( 01 Jan 2024 07:02 )    Color: x / Appearance: x / SG: x / pH: x  Gluc: 68 mg/dL / Ketone: x  / Bili: x / Urobili: x   Blood: x / Protein: x / Nitrite: x   Leuk Esterase: x / RBC: x / WBC x   Sq Epi: x / Non Sq Epi: x / Bacteria: x      BNP      EKG:  ECHO:  IMAGING:    Assessment/Plan    Will continue to follow during hospital course and give further recommendations as needed . Thanks for your referral . if any questions please contact me at office ([phone])cell [phone])  [facility] [place]       HPI:  Patient  is 72 yo , C resident with past medical history of end-stage renal disease on dialysis, chronic hypotension, afib, T2DM, recent admission for clogged AV fistula is presenting for hypotension and hypoxia.  Patient was discharged back to [facility] 12/30  but sent to ED due to hypotension and hypoxia on arrival to [facility] .His vitals were stable upon discharge and he was monitored 2 hrs after HD session by house staff .  Otherwise denies any new complaints.  Patient was diagnosed with Influenza , also found to have JAZMINE elevation .Seen by Dr Garcia cardiologist Admitted  to telemetry unit for monitoring , send 3 sets of cardiac enzymes to rule out acute coronary event, obtain ECHO to evaluate LVEF, cardiology consult  ,continue current management, O2 supply, anticoagulation plan as per cardiology consult Palliative care consult requested ,to discuss advance directives and complete MOLST  (31 Dec 2023 10:10)        SUBJECTIVE:      ALLERGIES:  Allergies    Levaquin (Anaphylaxis; Flushing; Short breath)    Intolerances          MEDICATIONS  (STANDING):  aMIOdarone    Tablet 200 milliGRAM(s) Oral daily  apixaban 5 milliGRAM(s) Oral two times a day  ascorbic acid 500 milliGRAM(s) Oral daily  aspirin enteric coated 81 milliGRAM(s) Oral daily  brimonidine 0.2% Ophthalmic Solution 1 Drop(s) Both EYES two times a day  budesonide 160 MICROgram(s)/formoterol 4.5 MICROgram(s) Inhaler 2 Puff(s) Inhalation two times a day  calcitriol   Capsule 0.25 MICROGram(s) Oral <User Schedule>  calcium acetate 667 milliGRAM(s) Oral three times a day with meals  dextrose 5%. 1000 milliLiter(s) (100 mL/Hr) IV Continuous <Continuous>  dextrose 5%. 1000 milliLiter(s) (50 mL/Hr) IV Continuous <Continuous>  dextrose 50% Injectable 25 Gram(s) IV Push once  dextrose 50% Injectable 25 Gram(s) IV Push once  dextrose 50% Injectable 12.5 Gram(s) IV Push once  dorzolamide 2%/timolol 0.5% Ophthalmic Solution 1 Drop(s) Both EYES two times a day  epoetin kayla-epbx (RETACRIT) Injectable 22763 Unit(s) IV Push <User Schedule>  finasteride 5 milliGRAM(s) Oral daily  glucagon  Injectable 1 milliGRAM(s) IntraMuscular once  insulin lispro (ADMELOG) corrective regimen sliding scale   SubCutaneous at bedtime  insulin lispro (ADMELOG) corrective regimen sliding scale   SubCutaneous three times a day before meals  lactobacillus acidophilus 1 Tablet(s) Oral daily  latanoprost 0.005% Ophthalmic Solution 1 Drop(s) Both EYES at bedtime  levothyroxine 50 MICROGram(s) Oral daily  melatonin 5 milliGRAM(s) Oral at bedtime  metoprolol tartrate 12.5 milliGRAM(s) Oral two times a day  midodrine. 10 milliGRAM(s) Oral three times a day  pantoprazole    Tablet 40 milliGRAM(s) Oral daily  senna 2 Tablet(s) Oral at bedtime  sertraline 50 milliGRAM(s) Oral daily  sertraline 25 milliGRAM(s) Oral daily  simvastatin 10 milliGRAM(s) Oral at bedtime  tamsulosin 0.4 milliGRAM(s) Oral at bedtime  ursodiol Capsule 300 milliGRAM(s) Oral two times a day    MEDICATIONS  (PRN):  acetaminophen     Tablet .. 650 milliGRAM(s) Oral every 6 hours PRN Temp greater or equal to 38C (100.4F), Mild Pain (1 - 3)  albuterol/ipratropium for Nebulization 3 milliLiter(s) Nebulizer every 6 hours PRN Shortness of Breath and/or Wheezing  aluminum hydroxide/magnesium hydroxide/simethicone Suspension 30 milliLiter(s) Oral every 4 hours PRN Dyspepsia  bisacodyl Suppository 10 milliGRAM(s) Rectal daily PRN Constipation  dextrose Oral Gel 15 Gram(s) Oral once PRN Blood Glucose LESS THAN 70 milliGRAM(s)/deciliter  guaiFENesin Oral Liquid (Sugar-Free) 200 milliGRAM(s) Oral every 6 hours PRN Cough  morphine  - Injectable 2 milliGRAM(s) IV Push every 6 hours PRN Severe Pain (7 - 10)  ondansetron Injectable 4 milliGRAM(s) IV Push every 8 hours PRN Nausea and/or Vomiting  traMADol 50 milliGRAM(s) Oral three times a day PRN Moderate Pain (4 - 6)      REVIEW OF SYSTEMS:  CONSTITUTIONAL: No fever,  RESPIRATORY: No cough, wheezing, shortness of breath  CARDIOVASCULAR: No chest pain, dyspnea, palpitations, dizziness, syncope, paroxysmal nocturnal dyspnea, orthopnea, or arm or leg swelling  GASTROINTESTINAL: No abdominal  or epigastric pain, nausea, vomiting,  diarrhea  NEUROLOGICAL: No headaches,  loss of strength, numbness, or tremors    Vital Signs Last 24 Hrs  T(C): 37.1 (02 Jan 2024 20:11), Max: 37.1 (02 Jan 2024 20:11)  T(F): 98.7 (02 Jan 2024 20:11), Max: 98.7 (02 Jan 2024 20:11)  HR: 61 (02 Jan 2024 22:19) (59 - 84)  BP: 89/40 (02 Jan 2024 22:19) (80/40 - 117/47)  BP(mean): --  RR: 18 (02 Jan 2024 20:11) (18 - 19)  SpO2: 98% (02 Jan 2024 21:15) (97% - 100%)    Parameters below as of 02 Jan 2024 21:15  Patient On (Oxygen Delivery Method): nasal cannula        PHYSICAL EXAM:  HEAD:  Atraumatic, Normocephalic  NECK: Supple and normal thyroid.  No JVD or carotid bruit.   HEART: S1, S2 regular , 1/6 soft ejection systolic murmur in mitral area , no thrill and no gallops .  PULMONARY: Bilateral vesicular breathing , few scattered ronchi and few scattered rales are present .  ABDOMEN: Soft nontender and positive bowl sounds   EXTREMITIES:  No clubbing, cyanosis, or pedal  edema  NEUROLOGICAL: AAOX3 , no focal deficit .    LABS:                        8.6    3.56  )-----------( 166      ( 01 Jan 2024 07:02 )             27.6     01-01    136  |  100  |  40<H>  ----------------------------<  68<L>  3.7   |  27  |  4.80<H>    Ca    7.9<L>      01 Jan 2024 07:02    TPro  5.5<L>  /  Alb  2.6<L>  /  TBili  0.5  /  DBili  0.2  /  AST  310<H>  /  ALT  240<H>  /  AlkPhos  162<H>  01-01        PT/INR - ( 01 Jan 2024 07:02 )   PT: 23.2 sec;   INR: 2.02 ratio           Urinalysis Basic - ( 01 Jan 2024 07:02 )    Color: x / Appearance: x / SG: x / pH: x  Gluc: 68 mg/dL / Ketone: x  / Bili: x / Urobili: x   Blood: x / Protein: x / Nitrite: x   Leuk Esterase: x / RBC: x / WBC x   Sq Epi: x / Non Sq Epi: x / Bacteria: x      BNP      EKG:  ECHO:  IMAGING:    Assessment/Plan    Will continue to follow during hospital course and give further recommendations as needed . Thanks for your referral . if any questions please contact me at office ([phone])cell [phone])  [facility] [place]       HPI:  Patient  is 74 yo , C resident with past medical history of end-stage renal disease on dialysis, chronic hypotension, afib, T2DM, recent admission for clogged AV fistula is presenting for hypotension and hypoxia.  Patient was discharged back to [facility] 12/30  but sent to ED due to hypotension and hypoxia on arrival to [facility] .His vitals were stable upon discharge and he was monitored 2 hrs after HD session by house staff .  Otherwise denies any new complaints.  Patient was diagnosed with Influenza , also found to have JAZMINE elevation .Seen by Dr Garcia cardiologist Admitted  to telemetry unit for monitoring , send 3 sets of cardiac enzymes to rule out acute coronary event, obtain ECHO to evaluate LVEF, cardiology consult  ,continue current management, O2 supply, anticoagulation plan as per cardiology consult Palliative care consult requested ,to discuss advance directives and complete MOLST  (31 Dec 2023 10:10)        SUBJECTIVE:      ALLERGIES:  Allergies    Levaquin (Anaphylaxis; Flushing; Short breath)    Intolerances          MEDICATIONS  (STANDING):  aMIOdarone    Tablet 200 milliGRAM(s) Oral daily  apixaban 5 milliGRAM(s) Oral two times a day  ascorbic acid 500 milliGRAM(s) Oral daily  aspirin enteric coated 81 milliGRAM(s) Oral daily  brimonidine 0.2% Ophthalmic Solution 1 Drop(s) Both EYES two times a day  budesonide 160 MICROgram(s)/formoterol 4.5 MICROgram(s) Inhaler 2 Puff(s) Inhalation two times a day  calcitriol   Capsule 0.25 MICROGram(s) Oral <User Schedule>  calcium acetate 667 milliGRAM(s) Oral three times a day with meals  dextrose 5%. 1000 milliLiter(s) (100 mL/Hr) IV Continuous <Continuous>  dextrose 5%. 1000 milliLiter(s) (50 mL/Hr) IV Continuous <Continuous>  dextrose 50% Injectable 25 Gram(s) IV Push once  dextrose 50% Injectable 25 Gram(s) IV Push once  dextrose 50% Injectable 12.5 Gram(s) IV Push once  dorzolamide 2%/timolol 0.5% Ophthalmic Solution 1 Drop(s) Both EYES two times a day  epoetin kayla-epbx (RETACRIT) Injectable 80961 Unit(s) IV Push <User Schedule>  finasteride 5 milliGRAM(s) Oral daily  glucagon  Injectable 1 milliGRAM(s) IntraMuscular once  insulin lispro (ADMELOG) corrective regimen sliding scale   SubCutaneous at bedtime  insulin lispro (ADMELOG) corrective regimen sliding scale   SubCutaneous three times a day before meals  lactobacillus acidophilus 1 Tablet(s) Oral daily  latanoprost 0.005% Ophthalmic Solution 1 Drop(s) Both EYES at bedtime  levothyroxine 50 MICROGram(s) Oral daily  melatonin 5 milliGRAM(s) Oral at bedtime  metoprolol tartrate 12.5 milliGRAM(s) Oral two times a day  midodrine. 10 milliGRAM(s) Oral three times a day  pantoprazole    Tablet 40 milliGRAM(s) Oral daily  senna 2 Tablet(s) Oral at bedtime  sertraline 50 milliGRAM(s) Oral daily  sertraline 25 milliGRAM(s) Oral daily  simvastatin 10 milliGRAM(s) Oral at bedtime  tamsulosin 0.4 milliGRAM(s) Oral at bedtime  ursodiol Capsule 300 milliGRAM(s) Oral two times a day    MEDICATIONS  (PRN):  acetaminophen     Tablet .. 650 milliGRAM(s) Oral every 6 hours PRN Temp greater or equal to 38C (100.4F), Mild Pain (1 - 3)  albuterol/ipratropium for Nebulization 3 milliLiter(s) Nebulizer every 6 hours PRN Shortness of Breath and/or Wheezing  aluminum hydroxide/magnesium hydroxide/simethicone Suspension 30 milliLiter(s) Oral every 4 hours PRN Dyspepsia  bisacodyl Suppository 10 milliGRAM(s) Rectal daily PRN Constipation  dextrose Oral Gel 15 Gram(s) Oral once PRN Blood Glucose LESS THAN 70 milliGRAM(s)/deciliter  guaiFENesin Oral Liquid (Sugar-Free) 200 milliGRAM(s) Oral every 6 hours PRN Cough  morphine  - Injectable 2 milliGRAM(s) IV Push every 6 hours PRN Severe Pain (7 - 10)  ondansetron Injectable 4 milliGRAM(s) IV Push every 8 hours PRN Nausea and/or Vomiting  traMADol 50 milliGRAM(s) Oral three times a day PRN Moderate Pain (4 - 6)      REVIEW OF SYSTEMS:  CONSTITUTIONAL: No fever,  RESPIRATORY: No cough, wheezing, shortness of breath  CARDIOVASCULAR: No chest pain, dyspnea, palpitations, dizziness, syncope, paroxysmal nocturnal dyspnea, orthopnea, or arm or leg swelling  GASTROINTESTINAL: No abdominal  or epigastric pain, nausea, vomiting,  diarrhea  NEUROLOGICAL: No headaches,  loss of strength, numbness, or tremors    Vital Signs Last 24 Hrs  T(C): 37.1 (02 Jan 2024 20:11), Max: 37.1 (02 Jan 2024 20:11)  T(F): 98.7 (02 Jan 2024 20:11), Max: 98.7 (02 Jan 2024 20:11)  HR: 61 (02 Jan 2024 22:19) (59 - 84)  BP: 89/40 (02 Jan 2024 22:19) (80/40 - 117/47)  BP(mean): --  RR: 18 (02 Jan 2024 20:11) (18 - 19)  SpO2: 98% (02 Jan 2024 21:15) (97% - 100%)    Parameters below as of 02 Jan 2024 21:15  Patient On (Oxygen Delivery Method): nasal cannula        PHYSICAL EXAM:  HEAD:  Atraumatic, Normocephalic  NECK: Supple and normal thyroid.  No JVD or carotid bruit.   HEART: S1, S2 regular , 1/6 soft ejection systolic murmur in mitral area , no thrill and no gallops .  PULMONARY: Bilateral vesicular breathing , few scattered ronchi and few scattered rales are present .  ABDOMEN: Soft nontender and positive bowl sounds   EXTREMITIES:  No clubbing, cyanosis, or pedal  edema  NEUROLOGICAL: AAOX3 , no focal deficit .    LABS:                        8.6    3.56  )-----------( 166      ( 01 Jan 2024 07:02 )             27.6     01-01    136  |  100  |  40<H>  ----------------------------<  68<L>  3.7   |  27  |  4.80<H>    Ca    7.9<L>      01 Jan 2024 07:02    TPro  5.5<L>  /  Alb  2.6<L>  /  TBili  0.5  /  DBili  0.2  /  AST  310<H>  /  ALT  240<H>  /  AlkPhos  162<H>  01-01        PT/INR - ( 01 Jan 2024 07:02 )   PT: 23.2 sec;   INR: 2.02 ratio           Urinalysis Basic - ( 01 Jan 2024 07:02 )    Color: x / Appearance: x / SG: x / pH: x  Gluc: 68 mg/dL / Ketone: x  / Bili: x / Urobili: x   Blood: x / Protein: x / Nitrite: x   Leuk Esterase: x / RBC: x / WBC x   Sq Epi: x / Non Sq Epi: x / Bacteria: x      Assessment/Plan  Patient has :  1) Acute viral pneumonia and better  2) Elevated Troponin I and which can be demand ischemia but considering significant elevation and trending , possibility of NON-ST JUNE can not be ruled out so will treat as  NON-ST JUNE .   3) ESRD and on hemodialysis .  4) Atrial fibrillation and stable   5) Hypertension and stable   Plan : 1) Due to multiple comorbidities , patient high risk for cardiac cath 2) Continue medical management 3) Will discuss with family   Will continue to follow during hospital course and give further recommendations as needed . Thanks for your referral . if any questions please contact me at office ([phone])cell [phone])      [facility] [place]       HPI:  Patient  is 72 yo , C resident with past medical history of end-stage renal disease on dialysis, chronic hypotension, afib, T2DM, recent admission for clogged AV fistula is presenting for hypotension and hypoxia.  Patient was discharged back to [facility] 12/30  but sent to ED due to hypotension and hypoxia on arrival to [facility] .His vitals were stable upon discharge and he was monitored 2 hrs after HD session by house staff .  Otherwise denies any new complaints.  Patient was diagnosed with Influenza , also found to have JAZMINE elevation .Seen by Dr Garcia cardiologist Admitted  to telemetry unit for monitoring , send 3 sets of cardiac enzymes to rule out acute coronary event, obtain ECHO to evaluate LVEF, cardiology consult  ,continue current management, O2 supply, anticoagulation plan as per cardiology consult Palliative care consult requested ,to discuss advance directives and complete MOLST  (31 Dec 2023 10:10)        SUBJECTIVE:      ALLERGIES:  Allergies    Levaquin (Anaphylaxis; Flushing; Short breath)    Intolerances          MEDICATIONS  (STANDING):  aMIOdarone    Tablet 200 milliGRAM(s) Oral daily  apixaban 5 milliGRAM(s) Oral two times a day  ascorbic acid 500 milliGRAM(s) Oral daily  aspirin enteric coated 81 milliGRAM(s) Oral daily  brimonidine 0.2% Ophthalmic Solution 1 Drop(s) Both EYES two times a day  budesonide 160 MICROgram(s)/formoterol 4.5 MICROgram(s) Inhaler 2 Puff(s) Inhalation two times a day  calcitriol   Capsule 0.25 MICROGram(s) Oral <User Schedule>  calcium acetate 667 milliGRAM(s) Oral three times a day with meals  dextrose 5%. 1000 milliLiter(s) (100 mL/Hr) IV Continuous <Continuous>  dextrose 5%. 1000 milliLiter(s) (50 mL/Hr) IV Continuous <Continuous>  dextrose 50% Injectable 25 Gram(s) IV Push once  dextrose 50% Injectable 25 Gram(s) IV Push once  dextrose 50% Injectable 12.5 Gram(s) IV Push once  dorzolamide 2%/timolol 0.5% Ophthalmic Solution 1 Drop(s) Both EYES two times a day  epoetin kayla-epbx (RETACRIT) Injectable 99429 Unit(s) IV Push <User Schedule>  finasteride 5 milliGRAM(s) Oral daily  glucagon  Injectable 1 milliGRAM(s) IntraMuscular once  insulin lispro (ADMELOG) corrective regimen sliding scale   SubCutaneous at bedtime  insulin lispro (ADMELOG) corrective regimen sliding scale   SubCutaneous three times a day before meals  lactobacillus acidophilus 1 Tablet(s) Oral daily  latanoprost 0.005% Ophthalmic Solution 1 Drop(s) Both EYES at bedtime  levothyroxine 50 MICROGram(s) Oral daily  melatonin 5 milliGRAM(s) Oral at bedtime  metoprolol tartrate 12.5 milliGRAM(s) Oral two times a day  midodrine. 10 milliGRAM(s) Oral three times a day  pantoprazole    Tablet 40 milliGRAM(s) Oral daily  senna 2 Tablet(s) Oral at bedtime  sertraline 50 milliGRAM(s) Oral daily  sertraline 25 milliGRAM(s) Oral daily  simvastatin 10 milliGRAM(s) Oral at bedtime  tamsulosin 0.4 milliGRAM(s) Oral at bedtime  ursodiol Capsule 300 milliGRAM(s) Oral two times a day    MEDICATIONS  (PRN):  acetaminophen     Tablet .. 650 milliGRAM(s) Oral every 6 hours PRN Temp greater or equal to 38C (100.4F), Mild Pain (1 - 3)  albuterol/ipratropium for Nebulization 3 milliLiter(s) Nebulizer every 6 hours PRN Shortness of Breath and/or Wheezing  aluminum hydroxide/magnesium hydroxide/simethicone Suspension 30 milliLiter(s) Oral every 4 hours PRN Dyspepsia  bisacodyl Suppository 10 milliGRAM(s) Rectal daily PRN Constipation  dextrose Oral Gel 15 Gram(s) Oral once PRN Blood Glucose LESS THAN 70 milliGRAM(s)/deciliter  guaiFENesin Oral Liquid (Sugar-Free) 200 milliGRAM(s) Oral every 6 hours PRN Cough  morphine  - Injectable 2 milliGRAM(s) IV Push every 6 hours PRN Severe Pain (7 - 10)  ondansetron Injectable 4 milliGRAM(s) IV Push every 8 hours PRN Nausea and/or Vomiting  traMADol 50 milliGRAM(s) Oral three times a day PRN Moderate Pain (4 - 6)      REVIEW OF SYSTEMS:  CONSTITUTIONAL: No fever,  RESPIRATORY: No cough, wheezing, shortness of breath  CARDIOVASCULAR: No chest pain, dyspnea, palpitations, dizziness, syncope, paroxysmal nocturnal dyspnea, orthopnea, or arm or leg swelling  GASTROINTESTINAL: No abdominal  or epigastric pain, nausea, vomiting,  diarrhea  NEUROLOGICAL: No headaches,  loss of strength, numbness, or tremors    Vital Signs Last 24 Hrs  T(C): 37.1 (02 Jan 2024 20:11), Max: 37.1 (02 Jan 2024 20:11)  T(F): 98.7 (02 Jan 2024 20:11), Max: 98.7 (02 Jan 2024 20:11)  HR: 61 (02 Jan 2024 22:19) (59 - 84)  BP: 89/40 (02 Jan 2024 22:19) (80/40 - 117/47)  BP(mean): --  RR: 18 (02 Jan 2024 20:11) (18 - 19)  SpO2: 98% (02 Jan 2024 21:15) (97% - 100%)    Parameters below as of 02 Jan 2024 21:15  Patient On (Oxygen Delivery Method): nasal cannula        PHYSICAL EXAM:  HEAD:  Atraumatic, Normocephalic  NECK: Supple and normal thyroid.  No JVD or carotid bruit.   HEART: S1, S2 regular , 1/6 soft ejection systolic murmur in mitral area , no thrill and no gallops .  PULMONARY: Bilateral vesicular breathing , few scattered ronchi and few scattered rales are present .  ABDOMEN: Soft nontender and positive bowl sounds   EXTREMITIES:  No clubbing, cyanosis, or pedal  edema  NEUROLOGICAL: AAOX3 , no focal deficit .    LABS:                        8.6    3.56  )-----------( 166      ( 01 Jan 2024 07:02 )             27.6     01-01    136  |  100  |  40<H>  ----------------------------<  68<L>  3.7   |  27  |  4.80<H>    Ca    7.9<L>      01 Jan 2024 07:02    TPro  5.5<L>  /  Alb  2.6<L>  /  TBili  0.5  /  DBili  0.2  /  AST  310<H>  /  ALT  240<H>  /  AlkPhos  162<H>  01-01        PT/INR - ( 01 Jan 2024 07:02 )   PT: 23.2 sec;   INR: 2.02 ratio           Urinalysis Basic - ( 01 Jan 2024 07:02 )    Color: x / Appearance: x / SG: x / pH: x  Gluc: 68 mg/dL / Ketone: x  / Bili: x / Urobili: x   Blood: x / Protein: x / Nitrite: x   Leuk Esterase: x / RBC: x / WBC x   Sq Epi: x / Non Sq Epi: x / Bacteria: x      Assessment/Plan  Patient has :  1) Acute viral pneumonia and better  2) Elevated Troponin I and which can be demand ischemia but considering significant elevation and trending , possibility of NON-ST JUNE can not be ruled out so will treat as  NON-ST JUNE .   3) ESRD and on hemodialysis .  4) Atrial fibrillation and stable   5) Hypertension and stable   Plan : 1) Due to multiple comorbidities , patient high risk for cardiac cath 2) Continue medical management 3) Will discuss with family   Will continue to follow during hospital course and give further recommendations as needed . Thanks for your referral . if any questions please contact me at office ([phone])cell [phone])       Subjective and Objective:   MI SANDOVAL MD [place] [address]  [address]  OFFICE : [phone]   CELL : [phone]  CARDIOLOGY F/U  :         Patient  is 72 yo , C resident with past medical history of end-stage renal disease on dialysis, chronic hypotension, afib, T2DM, recent admission for clogged AV fistula is presenting for hypotension and hypoxia.  Patient was discharged back to [facility] 12/30  but sent to ED due to hypotension and hypoxia on arrival to Replaced by [facility] .His vitals were stable upon discharge and he was monitored 2 hrs after HD session by house staff .  Otherwise denies any new complaints.  Patient was diagnosed with Influenza , also found to have JAZMINE elevation .Seen by Dr Sandoval cardiologist Admitted  to telemetry unit for monitoring , send 3 sets of cardiac enzymes to rule out acute coronary event, obtain ECHO to evaluate LVEF, cardiology consult  ,continue current management, O2 supply, anticoagulation plan as per cardiology consult Palliative care consult requested ,to discuss advance directives and complete MOLST  (31 Dec 2023 10:10)        SUBJECTIVE: Patient feeling better .      ALLERGIES:  Allergies    Levaquin (Anaphylaxis; Flushing; Short breath)    Intolerances          MEDICATIONS  (STANDING):  aMIOdarone    Tablet 200 milliGRAM(s) Oral daily  apixaban 5 milliGRAM(s) Oral two times a day  ascorbic acid 500 milliGRAM(s) Oral daily  aspirin enteric coated 81 milliGRAM(s) Oral daily  brimonidine 0.2% Ophthalmic Solution 1 Drop(s) Both EYES two times a day  budesonide 160 MICROgram(s)/formoterol 4.5 MICROgram(s) Inhaler 2 Puff(s) Inhalation two times a day  calcitriol   Capsule 0.25 MICROGram(s) Oral <User Schedule>  calcium acetate 667 milliGRAM(s) Oral three times a day with meals  dextrose 5%. 1000 milliLiter(s) (100 mL/Hr) IV Continuous <Continuous>  dextrose 5%. 1000 milliLiter(s) (50 mL/Hr) IV Continuous <Continuous>  dextrose 50% Injectable 25 Gram(s) IV Push once  dextrose 50% Injectable 25 Gram(s) IV Push once  dextrose 50% Injectable 12.5 Gram(s) IV Push once  dorzolamide 2%/timolol 0.5% Ophthalmic Solution 1 Drop(s) Both EYES two times a day  epoetin kayla-epbx (RETACRIT) Injectable 96332 Unit(s) IV Push <User Schedule>  finasteride 5 milliGRAM(s) Oral daily  glucagon  Injectable 1 milliGRAM(s) IntraMuscular once  insulin lispro (ADMELOG) corrective regimen sliding scale   SubCutaneous at bedtime  insulin lispro (ADMELOG) corrective regimen sliding scale   SubCutaneous three times a day before meals  lactobacillus acidophilus 1 Tablet(s) Oral daily  latanoprost 0.005% Ophthalmic Solution 1 Drop(s) Both EYES at bedtime  levothyroxine 50 MICROGram(s) Oral daily  melatonin 5 milliGRAM(s) Oral at bedtime  metoprolol tartrate 12.5 milliGRAM(s) Oral two times a day  midodrine. 10 milliGRAM(s) Oral three times a day  pantoprazole    Tablet 40 milliGRAM(s) Oral daily  senna 2 Tablet(s) Oral at bedtime  sertraline 50 milliGRAM(s) Oral daily  sertraline 25 milliGRAM(s) Oral daily  simvastatin 10 milliGRAM(s) Oral at bedtime  tamsulosin 0.4 milliGRAM(s) Oral at bedtime  ursodiol Capsule 300 milliGRAM(s) Oral two times a day    MEDICATIONS  (PRN):  acetaminophen     Tablet .. 650 milliGRAM(s) Oral every 6 hours PRN Temp greater or equal to 38C (100.4F), Mild Pain (1 - 3)  albuterol/ipratropium for Nebulization 3 milliLiter(s) Nebulizer every 6 hours PRN Shortness of Breath and/or Wheezing  aluminum hydroxide/magnesium hydroxide/simethicone Suspension 30 milliLiter(s) Oral every 4 hours PRN Dyspepsia  bisacodyl Suppository 10 milliGRAM(s) Rectal daily PRN Constipation  dextrose Oral Gel 15 Gram(s) Oral once PRN Blood Glucose LESS THAN 70 milliGRAM(s)/deciliter  guaiFENesin Oral Liquid (Sugar-Free) 200 milliGRAM(s) Oral every 6 hours PRN Cough  morphine  - Injectable 2 milliGRAM(s) IV Push every 6 hours PRN Severe Pain (7 - 10)  ondansetron Injectable 4 milliGRAM(s) IV Push every 8 hours PRN Nausea and/or Vomiting  traMADol 50 milliGRAM(s) Oral three times a day PRN Moderate Pain (4 - 6)      REVIEW OF SYSTEMS:  CONSTITUTIONAL: No fever,  RESPIRATORY: No cough, wheezing, shortness of breath  CARDIOVASCULAR: No chest pain, dyspnea, palpitations, dizziness, syncope, paroxysmal nocturnal dyspnea, orthopnea, or arm or leg swelling  GASTROINTESTINAL: No abdominal  or epigastric pain, nausea, vomiting,  diarrhea  NEUROLOGICAL: No headaches,  loss of strength, numbness, or tremors    Vital Signs Last 24 Hrs  T(C): 37.1 (02 Jan 2024 20:11), Max: 37.1 (02 Jan 2024 20:11)  T(F): 98.7 (02 Jan 2024 20:11), Max: 98.7 (02 Jan 2024 20:11)  HR: 61 (02 Jan 2024 22:19) (59 - 84)  BP: 89/40 (02 Jan 2024 22:19) (80/40 - 117/47)  BP(mean): --  RR: 18 (02 Jan 2024 20:11) (18 - 19)  SpO2: 98% (02 Jan 2024 21:15) (97% - 100%)    Parameters below as of 02 Jan 2024 21:15  Patient On (Oxygen Delivery Method): nasal cannula        PHYSICAL EXAM:  HEAD:  Atraumatic, Normocephalic  NECK: Supple and normal thyroid.  No JVD or carotid bruit.   HEART: S1, S2 regular , 1/6 soft ejection systolic murmur in mitral area , no thrill and no gallops .  PULMONARY: Bilateral vesicular breathing , few scattered ronchi and few scattered rales are present .  ABDOMEN: Soft nontender and positive bowl sounds   EXTREMITIES:  No clubbing, cyanosis, or pedal  edema  NEUROLOGICAL: AAOX3 , no focal deficit .    LABS:                        8.6    3.56  )-----------( 166      ( 01 Jan 2024 07:02 )             27.6     01-01    136  |  100  |  40<H>  ----------------------------<  68<L>  3.7   |  27  |  4.80<H>    Ca    7.9<L>      01 Jan 2024 07:02    TPro  5.5<L>  /  Alb  2.6<L>  /  TBili  0.5  /  DBili  0.2  /  AST  310<H>  /  ALT  240<H>  /  AlkPhos  162<H>  01-01        PT/INR - ( 01 Jan 2024 07:02 )   PT: 23.2 sec;   INR: 2.02 ratio           Urinalysis Basic - ( 01 Jan 2024 07:02 )    Color: x / Appearance: x / SG: x / pH: x  Gluc: 68 mg/dL / Ketone: x  / Bili: x / Urobili: x   Blood: x / Protein: x / Nitrite: x   Leuk Esterase: x / RBC: x / WBC x   Sq Epi: x / Non Sq Epi: x / Bacteria: x      Assessment/Plan  Patient has :  1) Acute viral pneumonia and better  2) Elevated Troponin I and which can be demand ischemia but considering significant elevation and trending , possibility of NON-ST JUNE can not be ruled out so will treat as  NON-ST JUNE .   3) ESRD and on hemodialysis .  4) Atrial fibrillation and stable   5) Hypertension and stable   Plan : 1) Due to multiple comorbidities , patient high risk for cardiac cath 2) Continue medical management 3) Will discuss with family   Will continue to follow during hospital course and give further recommendations as needed . Thanks for your referral . if any questions please contact me at office ([phone])cell [phone])       Subjective and Objective:   MI SANDOVAL MD [place] [address]  [address]  OFFICE : [phone]   CELL : [phone]  CARDIOLOGY F/U  :         Patient  is 72 yo , C resident with past medical history of end-stage renal disease on dialysis, chronic hypotension, afib, T2DM, recent admission for clogged AV fistula is presenting for hypotension and hypoxia.  Patient was discharged back to [facility] 12/30  but sent to ED due to hypotension and hypoxia on arrival to [facility] .His vitals were stable upon discharge and he was monitored 2 hrs after HD session by house staff .  Otherwise denies any new complaints.  Patient was diagnosed with Influenza , also found to have JAZMINE elevation .Seen by Dr Sandoval cardiologist Admitted  to telemetry unit for monitoring , send 3 sets of cardiac enzymes to rule out acute coronary event, obtain ECHO to evaluate LVEF, cardiology consult  ,continue current management, O2 supply, anticoagulation plan as per cardiology consult Palliative care consult requested ,to discuss advance directives and complete MOLST  (31 Dec 2023 10:10)        SUBJECTIVE: Patient feeling better .      ALLERGIES:  Allergies    Levaquin (Anaphylaxis; Flushing; Short breath)    Intolerances          MEDICATIONS  (STANDING):  aMIOdarone    Tablet 200 milliGRAM(s) Oral daily  apixaban 5 milliGRAM(s) Oral two times a day  ascorbic acid 500 milliGRAM(s) Oral daily  aspirin enteric coated 81 milliGRAM(s) Oral daily  brimonidine 0.2% Ophthalmic Solution 1 Drop(s) Both EYES two times a day  budesonide 160 MICROgram(s)/formoterol 4.5 MICROgram(s) Inhaler 2 Puff(s) Inhalation two times a day  calcitriol   Capsule 0.25 MICROGram(s) Oral <User Schedule>  calcium acetate 667 milliGRAM(s) Oral three times a day with meals  dextrose 5%. 1000 milliLiter(s) (100 mL/Hr) IV Continuous <Continuous>  dextrose 5%. 1000 milliLiter(s) (50 mL/Hr) IV Continuous <Continuous>  dextrose 50% Injectable 25 Gram(s) IV Push once  dextrose 50% Injectable 25 Gram(s) IV Push once  dextrose 50% Injectable 12.5 Gram(s) IV Push once  dorzolamide 2%/timolol 0.5% Ophthalmic Solution 1 Drop(s) Both EYES two times a day  epoetin kayla-epbx (RETACRIT) Injectable 23354 Unit(s) IV Push <User Schedule>  finasteride 5 milliGRAM(s) Oral daily  glucagon  Injectable 1 milliGRAM(s) IntraMuscular once  insulin lispro (ADMELOG) corrective regimen sliding scale   SubCutaneous at bedtime  insulin lispro (ADMELOG) corrective regimen sliding scale   SubCutaneous three times a day before meals  lactobacillus acidophilus 1 Tablet(s) Oral daily  latanoprost 0.005% Ophthalmic Solution 1 Drop(s) Both EYES at bedtime  levothyroxine 50 MICROGram(s) Oral daily  melatonin 5 milliGRAM(s) Oral at bedtime  metoprolol tartrate 12.5 milliGRAM(s) Oral two times a day  midodrine. 10 milliGRAM(s) Oral three times a day  pantoprazole    Tablet 40 milliGRAM(s) Oral daily  senna 2 Tablet(s) Oral at bedtime  sertraline 50 milliGRAM(s) Oral daily  sertraline 25 milliGRAM(s) Oral daily  simvastatin 10 milliGRAM(s) Oral at bedtime  tamsulosin 0.4 milliGRAM(s) Oral at bedtime  ursodiol Capsule 300 milliGRAM(s) Oral two times a day    MEDICATIONS  (PRN):  acetaminophen     Tablet .. 650 milliGRAM(s) Oral every 6 hours PRN Temp greater or equal to 38C (100.4F), Mild Pain (1 - 3)  albuterol/ipratropium for Nebulization 3 milliLiter(s) Nebulizer every 6 hours PRN Shortness of Breath and/or Wheezing  aluminum hydroxide/magnesium hydroxide/simethicone Suspension 30 milliLiter(s) Oral every 4 hours PRN Dyspepsia  bisacodyl Suppository 10 milliGRAM(s) Rectal daily PRN Constipation  dextrose Oral Gel 15 Gram(s) Oral once PRN Blood Glucose LESS THAN 70 milliGRAM(s)/deciliter  guaiFENesin Oral Liquid (Sugar-Free) 200 milliGRAM(s) Oral every 6 hours PRN Cough  morphine  - Injectable 2 milliGRAM(s) IV Push every 6 hours PRN Severe Pain (7 - 10)  ondansetron Injectable 4 milliGRAM(s) IV Push every 8 hours PRN Nausea and/or Vomiting  traMADol 50 milliGRAM(s) Oral three times a day PRN Moderate Pain (4 - 6)      REVIEW OF SYSTEMS:  CONSTITUTIONAL: No fever,  RESPIRATORY: No cough, wheezing, shortness of breath  CARDIOVASCULAR: No chest pain, dyspnea, palpitations, dizziness, syncope, paroxysmal nocturnal dyspnea, orthopnea, or arm or leg swelling  GASTROINTESTINAL: No abdominal  or epigastric pain, nausea, vomiting,  diarrhea  NEUROLOGICAL: No headaches,  loss of strength, numbness, or tremors    Vital Signs Last 24 Hrs  T(C): 37.1 (02 Jan 2024 20:11), Max: 37.1 (02 Jan 2024 20:11)  T(F): 98.7 (02 Jan 2024 20:11), Max: 98.7 (02 Jan 2024 20:11)  HR: 61 (02 Jan 2024 22:19) (59 - 84)  BP: 89/40 (02 Jan 2024 22:19) (80/40 - 117/47)  BP(mean): --  RR: 18 (02 Jan 2024 20:11) (18 - 19)  SpO2: 98% (02 Jan 2024 21:15) (97% - 100%)    Parameters below as of 02 Jan 2024 21:15  Patient On (Oxygen Delivery Method): nasal cannula        PHYSICAL EXAM:  HEAD:  Atraumatic, Normocephalic  NECK: Supple and normal thyroid.  No JVD or carotid bruit.   HEART: S1, S2 regular , 1/6 soft ejection systolic murmur in mitral area , no thrill and no gallops .  PULMONARY: Bilateral vesicular breathing , few scattered ronchi and few scattered rales are present .  ABDOMEN: Soft nontender and positive bowl sounds   EXTREMITIES:  No clubbing, cyanosis, or pedal  edema  NEUROLOGICAL: AAOX3 , no focal deficit .    LABS:                        8.6    3.56  )-----------( 166      ( 01 Jan 2024 07:02 )             27.6     01-01    136  |  100  |  40<H>  ----------------------------<  68<L>  3.7   |  27  |  4.80<H>    Ca    7.9<L>      01 Jan 2024 07:02    TPro  5.5<L>  /  Alb  2.6<L>  /  TBili  0.5  /  DBili  0.2  /  AST  310<H>  /  ALT  240<H>  /  AlkPhos  162<H>  01-01        PT/INR - ( 01 Jan 2024 07:02 )   PT: 23.2 sec;   INR: 2.02 ratio           Urinalysis Basic - ( 01 Jan 2024 07:02 )    Color: x / Appearance: x / SG: x / pH: x  Gluc: 68 mg/dL / Ketone: x  / Bili: x / Urobili: x   Blood: x / Protein: x / Nitrite: x   Leuk Esterase: x / RBC: x / WBC x   Sq Epi: x / Non Sq Epi: x / Bacteria: x      Assessment/Plan  Patient has :  1) Acute viral pneumonia and better  2) Elevated Troponin I and which can be demand ischemia but considering significant elevation and trending , possibility of NON-ST JUNE can not be ruled out so will treat as  NON-ST JUNE .   3) ESRD and on hemodialysis .  4) Atrial fibrillation and stable   5) Hypertension and stable   Plan : 1) Due to multiple comorbidities , patient high risk for cardiac cath 2) Continue medical management 3) Will discuss with family   Will continue to follow during hospital course and give further recommendations as needed . Thanks for your referral . if any questions please contact me at office ([phone])cell [phone])       Subjective and Objective:   MI SANDOVAL MD [place] [address]  [address]  OFFICE : [phone]   CELL : [phone]  CARDIOLOGY F/U  :         Patient  is 74 yo , C resident with past medical history of end-stage renal disease on dialysis, chronic hypotension, afib, T2DM, recent admission for clogged AV fistula is presenting for hypotension and hypoxia.  Patient was discharged back to [facility] 12/30  but sent to ED due to hypotension and hypoxia on arrival to [facility] .His vitals were stable upon discharge and he was monitored 2 hrs after HD session by house staff .  Otherwise denies any new complaints.  Patient was diagnosed with Influenza , also found to have JAZMINE elevation .Seen by Dr Sandoval cardiologist Admitted  to telemetry unit for monitoring , send 3 sets of cardiac enzymes to rule out acute coronary event, obtain ECHO to evaluate LVEF, cardiology consult  ,continue current management, O2 supply, anticoagulation plan as per cardiology consult Palliative care consult requested ,to discuss advance directives and complete MOLST  (31 Dec 2023 10:10)        SUBJECTIVE: Patient feeling better .      ALLERGIES:  Allergies    Levaquin (Anaphylaxis; Flushing; Short breath)    Intolerances          MEDICATIONS  (STANDING):  aMIOdarone    Tablet 200 milliGRAM(s) Oral daily  apixaban 5 milliGRAM(s) Oral two times a day  ascorbic acid 500 milliGRAM(s) Oral daily  aspirin enteric coated 81 milliGRAM(s) Oral daily  brimonidine 0.2% Ophthalmic Solution 1 Drop(s) Both EYES two times a day  budesonide 160 MICROgram(s)/formoterol 4.5 MICROgram(s) Inhaler 2 Puff(s) Inhalation two times a day  calcitriol   Capsule 0.25 MICROGram(s) Oral <User Schedule>  calcium acetate 667 milliGRAM(s) Oral three times a day with meals  dextrose 5%. 1000 milliLiter(s) (100 mL/Hr) IV Continuous <Continuous>  dextrose 5%. 1000 milliLiter(s) (50 mL/Hr) IV Continuous <Continuous>  dextrose 50% Injectable 25 Gram(s) IV Push once  dextrose 50% Injectable 25 Gram(s) IV Push once  dextrose 50% Injectable 12.5 Gram(s) IV Push once  dorzolamide 2%/timolol 0.5% Ophthalmic Solution 1 Drop(s) Both EYES two times a day  epoetin kayla-epbx (RETACRIT) Injectable 05131 Unit(s) IV Push <User Schedule>  finasteride 5 milliGRAM(s) Oral daily  glucagon  Injectable 1 milliGRAM(s) IntraMuscular once  insulin lispro (ADMELOG) corrective regimen sliding scale   SubCutaneous at bedtime  insulin lispro (ADMELOG) corrective regimen sliding scale   SubCutaneous three times a day before meals  lactobacillus acidophilus 1 Tablet(s) Oral daily  latanoprost 0.005% Ophthalmic Solution 1 Drop(s) Both EYES at bedtime  levothyroxine 50 MICROGram(s) Oral daily  melatonin 5 milliGRAM(s) Oral at bedtime  metoprolol tartrate 12.5 milliGRAM(s) Oral two times a day  midodrine. 10 milliGRAM(s) Oral three times a day  pantoprazole    Tablet 40 milliGRAM(s) Oral daily  senna 2 Tablet(s) Oral at bedtime  sertraline 50 milliGRAM(s) Oral daily  sertraline 25 milliGRAM(s) Oral daily  simvastatin 10 milliGRAM(s) Oral at bedtime  tamsulosin 0.4 milliGRAM(s) Oral at bedtime  ursodiol Capsule 300 milliGRAM(s) Oral two times a day    MEDICATIONS  (PRN):  acetaminophen     Tablet .. 650 milliGRAM(s) Oral every 6 hours PRN Temp greater or equal to 38C (100.4F), Mild Pain (1 - 3)  albuterol/ipratropium for Nebulization 3 milliLiter(s) Nebulizer every 6 hours PRN Shortness of Breath and/or Wheezing  aluminum hydroxide/magnesium hydroxide/simethicone Suspension 30 milliLiter(s) Oral every 4 hours PRN Dyspepsia  bisacodyl Suppository 10 milliGRAM(s) Rectal daily PRN Constipation  dextrose Oral Gel 15 Gram(s) Oral once PRN Blood Glucose LESS THAN 70 milliGRAM(s)/deciliter  guaiFENesin Oral Liquid (Sugar-Free) 200 milliGRAM(s) Oral every 6 hours PRN Cough  morphine  - Injectable 2 milliGRAM(s) IV Push every 6 hours PRN Severe Pain (7 - 10)  ondansetron Injectable 4 milliGRAM(s) IV Push every 8 hours PRN Nausea and/or Vomiting  traMADol 50 milliGRAM(s) Oral three times a day PRN Moderate Pain (4 - 6)      REVIEW OF SYSTEMS:  CONSTITUTIONAL: No fever,  RESPIRATORY: Some improvement in  cough, wheezing, shortness of breath  CARDIOVASCULAR: No chest pain,  palpitations, dizziness, syncope, paroxysmal nocturnal dyspnea,  or arm or leg swelling but has SOB with mild exertion .  GASTROINTESTINAL: No abdominal  or epigastric pain, nausea, vomiting,  diarrhea  NEUROLOGICAL: No headaches,  loss of strength, numbness, or tremors  Skin : No itching   Hematology : No active bleeding  Endocrinology : No heat and cold intolerance   Psychiatry : Patient is confused .  Genitourinary : No dysuria   Musculoskeletal : Patinet has mild arthritis   Vital Signs Last 24 Hrs  T(C): 37.1 (02 Jan 2024 20:11), Max: 37.1 (02 Jan 2024 20:11)  T(F): 98.7 (02 Jan 2024 20:11), Max: 98.7 (02 Jan 2024 20:11)  HR: 61 (02 Jan 2024 22:19) (59 - 84)  BP: 89/40 (02 Jan 2024 22:19) (80/40 - 117/47)  BP(mean): --  RR: 18 (02 Jan 2024 20:11) (18 - 19)  SpO2: 98% (02 Jan 2024 21:15) (97% - 100%)    Parameters below as of 02 Jan 2024 21:15  Patient On (Oxygen Delivery Method): nasal cannula        PHYSICAL EXAM:  HEAD:  Atraumatic, Normocephalic  NECK: Supple and normal thyroid.  No JVD or carotid bruit.   HEART: S1, S2 irregular , 1/6 soft ejection systolic murmur in mitral area , no thrill and no gallops .  PULMONARY: Bilateral vesicular breathing , few scattered rhonchi and few scattered rales are present .  ABDOMEN: Soft nontender and positive bowl sounds   EXTREMITIES:  No clubbing, cyanosis, or pedal  edema  NEUROLOGICAL: AA and confused , no focal deficit .  Skin : No rashes .  Musculoskeletal : Np joint swellings .    LABS:                        8.6    3.56  )-----------( 166      ( 01 Jan 2024 07:02 )             27.6     01-01    136  |  100  |  40<H>  ----------------------------<  68<L>  3.7   |  27  |  4.80<H>    Ca    7.9<L>      01 Jan 2024 07:02    TPro  5.5<L>  /  Alb  2.6<L>  /  TBili  0.5  /  DBili  0.2  /  AST  310<H>  /  ALT  240<H>  /  AlkPhos  162<H>  01-01        PT/INR - ( 01 Jan 2024 07:02 )   PT: 23.2 sec;   INR: 2.02 ratio           Urinalysis Basic - ( 01 Jan 2024 07:02 )    Color: x / Appearance: x / SG: x / pH: x  Gluc: 68 mg/dL / Ketone: x  / Bili: x / Urobili: x   Blood: x / Protein: x / Nitrite: x   Leuk Esterase: x / RBC: x / WBC x   Sq Epi: x / Non Sq Epi: x / Bacteria: x      Assessment/Plan  Patient has :  1) Acute viral pneumonia and better . Positive for Influenza .  2) Elevated Troponin I and which can be demand ischemia but considering significant elevation and trending , possibility of NON-ST JUNE can not be ruled out so will treat as  NON-ST JUNE .   3) ESRD and on hemodialysis .  4) Atrial fibrillation and stable . Intermittently slow ventricular rate after adding even low dose of metoprolol for NON-ST JUNE . LV EF normal 60%   5) Hypertension and stable . However patient BP lower limit of normal due to autonomic insufficiency .  6) Dementia   Plan : 1) Due to multiple comorbidities , patient high risk for cardiac cath 2) Continue medical management 3) Will discuss with family 4) Discontinue metoprolol due to Low BP and bradycardia episodes   Will continue to follow during hospital course and give further recommendations as needed . Thanks for your referral . if any questions please contact me at office ([phone] [place] [phone])       Subjective and Objective:   MI SANDOVAL MD [place] [address]  [address]  OFFICE : [phone]   CELL : [phone]  CARDIOLOGY F/U  :         Patient  is 72 yo , C resident with past medical history of end-stage renal disease on dialysis, chronic hypotension, afib, T2DM, recent admission for clogged AV fistula is presenting for hypotension and hypoxia.  Patient was discharged back to [facility] 12/30  but sent to ED due to hypotension and hypoxia on arrival to [facility] .His vitals were stable upon discharge and he was monitored 2 hrs after HD session by house staff .  Otherwise denies any new complaints.  Patient was diagnosed with Influenza , also found to have JAZMINE elevation .Seen by Dr Sandoval cardiologist Admitted  to telemetry unit for monitoring , send 3 sets of cardiac enzymes to rule out acute coronary event, obtain ECHO to evaluate LVEF, cardiology consult  ,continue current management, O2 supply, anticoagulation plan as per cardiology consult Palliative care consult requested ,to discuss advance directives and complete MOLST  (31 Dec 2023 10:10)        SUBJECTIVE: Patient feeling better .      ALLERGIES:  Allergies    Levaquin (Anaphylaxis; Flushing; Short breath)    Intolerances          MEDICATIONS  (STANDING):  aMIOdarone    Tablet 200 milliGRAM(s) Oral daily  apixaban 5 milliGRAM(s) Oral two times a day  ascorbic acid 500 milliGRAM(s) Oral daily  aspirin enteric coated 81 milliGRAM(s) Oral daily  brimonidine 0.2% Ophthalmic Solution 1 Drop(s) Both EYES two times a day  budesonide 160 MICROgram(s)/formoterol 4.5 MICROgram(s) Inhaler 2 Puff(s) Inhalation two times a day  calcitriol   Capsule 0.25 MICROGram(s) Oral <User Schedule>  calcium acetate 667 milliGRAM(s) Oral three times a day with meals  dextrose 5%. 1000 milliLiter(s) (100 mL/Hr) IV Continuous <Continuous>  dextrose 5%. 1000 milliLiter(s) (50 mL/Hr) IV Continuous <Continuous>  dextrose 50% Injectable 25 Gram(s) IV Push once  dextrose 50% Injectable 25 Gram(s) IV Push once  dextrose 50% Injectable 12.5 Gram(s) IV Push once  dorzolamide 2%/timolol 0.5% Ophthalmic Solution 1 Drop(s) Both EYES two times a day  epoetin kayla-epbx (RETACRIT) Injectable 47762 Unit(s) IV Push <User Schedule>  finasteride 5 milliGRAM(s) Oral daily  glucagon  Injectable 1 milliGRAM(s) IntraMuscular once  insulin lispro (ADMELOG) corrective regimen sliding scale   SubCutaneous at bedtime  insulin lispro (ADMELOG) corrective regimen sliding scale   SubCutaneous three times a day before meals  lactobacillus acidophilus 1 Tablet(s) Oral daily  latanoprost 0.005% Ophthalmic Solution 1 Drop(s) Both EYES at bedtime  levothyroxine 50 MICROGram(s) Oral daily  melatonin 5 milliGRAM(s) Oral at bedtime  metoprolol tartrate 12.5 milliGRAM(s) Oral two times a day  midodrine. 10 milliGRAM(s) Oral three times a day  pantoprazole    Tablet 40 milliGRAM(s) Oral daily  senna 2 Tablet(s) Oral at bedtime  sertraline 50 milliGRAM(s) Oral daily  sertraline 25 milliGRAM(s) Oral daily  simvastatin 10 milliGRAM(s) Oral at bedtime  tamsulosin 0.4 milliGRAM(s) Oral at bedtime  ursodiol Capsule 300 milliGRAM(s) Oral two times a day    MEDICATIONS  (PRN):  acetaminophen     Tablet .. 650 milliGRAM(s) Oral every 6 hours PRN Temp greater or equal to 38C (100.4F), Mild Pain (1 - 3)  albuterol/ipratropium for Nebulization 3 milliLiter(s) Nebulizer every 6 hours PRN Shortness of Breath and/or Wheezing  aluminum hydroxide/magnesium hydroxide/simethicone Suspension 30 milliLiter(s) Oral every 4 hours PRN Dyspepsia  bisacodyl Suppository 10 milliGRAM(s) Rectal daily PRN Constipation  dextrose Oral Gel 15 Gram(s) Oral once PRN Blood Glucose LESS THAN 70 milliGRAM(s)/deciliter  guaiFENesin Oral Liquid (Sugar-Free) 200 milliGRAM(s) Oral every 6 hours PRN Cough  morphine  - Injectable 2 milliGRAM(s) IV Push every 6 hours PRN Severe Pain (7 - 10)  ondansetron Injectable 4 milliGRAM(s) IV Push every 8 hours PRN Nausea and/or Vomiting  traMADol 50 milliGRAM(s) Oral three times a day PRN Moderate Pain (4 - 6)      REVIEW OF SYSTEMS:  CONSTITUTIONAL: No fever,  RESPIRATORY: Some improvement in  cough, wheezing, shortness of breath  CARDIOVASCULAR: No chest pain,  palpitations, dizziness, syncope, paroxysmal nocturnal dyspnea,  or arm or leg swelling but has SOB with mild exertion .  GASTROINTESTINAL: No abdominal  or epigastric pain, nausea, vomiting,  diarrhea  NEUROLOGICAL: No headaches,  loss of strength, numbness, or tremors  Skin : No itching   Hematology : No active bleeding  Endocrinology : No heat and cold intolerance   Psychiatry : Patient is confused .  Genitourinary : No dysuria   Musculoskeletal : Patinet has mild arthritis   Vital Signs Last 24 Hrs  T(C): 37.1 (02 Jan 2024 20:11), Max: 37.1 (02 Jan 2024 20:11)  T(F): 98.7 (02 Jan 2024 20:11), Max: 98.7 (02 Jan 2024 20:11)  HR: 61 (02 Jan 2024 22:19) (59 - 84)  BP: 89/40 (02 Jan 2024 22:19) (80/40 - 117/47)  BP(mean): --  RR: 18 (02 Jan 2024 20:11) (18 - 19)  SpO2: 98% (02 Jan 2024 21:15) (97% - 100%)    Parameters below as of 02 Jan 2024 21:15  Patient On (Oxygen Delivery Method): nasal cannula        PHYSICAL EXAM:  HEAD:  Atraumatic, Normocephalic  NECK: Supple and normal thyroid.  No JVD or carotid bruit.   HEART: S1, S2 irregular , 1/6 soft ejection systolic murmur in mitral area , no thrill and no gallops .  PULMONARY: Bilateral vesicular breathing , few scattered rhonchi and few scattered rales are present .  ABDOMEN: Soft nontender and positive bowl sounds   EXTREMITIES:  No clubbing, cyanosis, or pedal  edema  NEUROLOGICAL: AA and confused , no focal deficit .  Skin : No rashes .  Musculoskeletal : Np joint swellings .    LABS:                        8.6    3.56  )-----------( 166      ( 01 Jan 2024 07:02 )             27.6     01-01    136  |  100  |  40<H>  ----------------------------<  68<L>  3.7   |  27  |  4.80<H>    Ca    7.9<L>      01 Jan 2024 07:02    TPro  5.5<L>  /  Alb  2.6<L>  /  TBili  0.5  /  DBili  0.2  /  AST  310<H>  /  ALT  240<H>  /  AlkPhos  162<H>  01-01        PT/INR - ( 01 Jan 2024 07:02 )   PT: 23.2 sec;   INR: 2.02 ratio           Urinalysis Basic - ( 01 Jan 2024 07:02 )    Color: x / Appearance: x / SG: x / pH: x  Gluc: 68 mg/dL / Ketone: x  / Bili: x / Urobili: x   Blood: x / Protein: x / Nitrite: x   Leuk Esterase: x / RBC: x / WBC x   Sq Epi: x / Non Sq Epi: x / Bacteria: x      Assessment/Plan  Patient has :  1) Acute viral pneumonia and better . Positive for Influenza .  2) Elevated Troponin I and which can be demand ischemia but considering significant elevation and trending , possibility of NON-ST JUNE can not be ruled out so will treat as  NON-ST JUNE .   3) ESRD and on hemodialysis .  4) Atrial fibrillation and stable . Intermittently slow ventricular rate after adding even low dose of metoprolol for NON-ST JUNE . LV EF normal 60%   5) Hypertension and stable . However patient BP lower limit of normal due to autonomic insufficiency .  6) Dementia   Plan : 1) Due to multiple comorbidities , patient high risk for cardiac cath 2) Continue medical management 3) Will discuss with family 4) Discontinue metoprolol due to Low BP and bradycardia episodes   Will continue to follow during hospital course and give further recommendations as needed . Thanks for your referral . if any questions please contact me at office ([phone])

## 2024-01-02 NOTE — CARE COORDINATION ASSESSMENT. - QUALITY OF FAMILY RELATIONSHIPS
Brother,  Israel Bowen [phone]./supportive Brother,  Israel Bowen [phone]./supportive

## 2024-01-02 NOTE — PROGRESS NOTE ADULT - ASSESSMENT
ESRD on HD  clotted AVF, s/p thrombectomy, and venoplasty  Hypotension, on midodrine  Anemia    HD today. Persistent hypotension. On midodrine. Cardiology follow up. Check echo.   Continue midodrine for hypotension. To continue current meds. Procrit for anemia. Overall prognosis poor.

## 2024-01-02 NOTE — PROGRESS NOTE ADULT - SUBJECTIVE AND OBJECTIVE BOX
CHIEF COMPLAINT/ REASON FOR VISIT  .. Patient was seen to address the  issue listed under PROBLEM LIST which is located toward bottom of this note     LISA NAVARRETE 2EAS 219 D1    Allergies    Levaquin (Anaphylaxis; Flushing; Short breath)    Intolerances        PAST MEDICAL & SURGICAL HISTORY:  ASHD (arteriosclerotic heart disease)      CAD (coronary artery disease)  s/p stent, CABG      CRI (chronic renal insufficiency)  ESRD on HD      Diabetes mellitus type II      Hypertension      Hyperlipidemia      Pacemaker      Chronic atrial fibrillation      Asthma      Hypothyroid      Dementia      H/O carotid stenosis      Enlarged prostate      Hx of CABG      Coronary stent      Cardiac pacemaker      History of amputation of toe  right great toe          FAMILY HISTORY:  Family history of chronic ischemic heart disease (Father)  father  of MI at age 59        Home Medications:  acetaminophen 325 mg oral tablet: 2 tab(s) orally 3 times a day as needed for  mild pain (30 Dec 2023 13:03)  Acidophilus oral capsule: 1 cap(s) orally once a day (28 Dec 2023 19:43)  amiodarone 200 mg oral tablet: 1 tab(s) orally once a day (30 Dec 2023 13:03)  apixaban 5 mg oral tablet: 1 tab(s) orally 2 times a day (30 Dec 2023 13:03)  Aspercreme Max Strength Lidocaine XL Patch 4% topical film: Apply topically to affected area once a day LEFT SHOULDER  10 AM -10 PM (30 Dec 2023 13:03)  brimonidine 0.2% ophthalmic solution: 1 drop(s) in each affected eye 2 times a day both eyes (28 Dec 2023 19:43)  calcitriol 0.25 mcg oral capsule: 1 cap(s) orally 3 times a week Monday, Wednesday, Friday (28 Dec 2023 19:43)  calcium acetate 667 mg oral capsule: 1 cap(s) orally 3 times a day (28 Dec 2023 19:43)  dorzolamide-timolol 2.23%-0.68% (2%-0.5% base) ophthalmic solution: 1 drop(s) in each eye 2 times a day (28 Dec 2023 19:44)  finasteride 5 mg oral tablet: 1 tab(s) orally once a day (at bedtime) (28 Dec 2023 19:44)  latanoprost 0.005% ophthalmic solution: 1 drop(s) in each eye once a day (at bedtime) Both eyes (28 Dec 2023 19:44)  Levothroid 50 mcg (0.05 mg) oral tablet: 1 tab(s) orally once a day (28 Dec 2023 19:44)  melatonin 5 mg oral tablet: 1 tab(s) orally once a day (at bedtime) (28 Dec 2023 19:44)  menthol 5% topical pad: Apply topically to affected area once a day from 7am - 3pm (28 Dec 2023 19:44)  midodrine 10 mg oral tablet: 1 tab(s) orally 2 times a day hold for SBP &gt;130 (28 Dec 2023 19:44)  sertraline 25 mg oral tablet: 1 tab(s) orally once a day total daily dose- 75mg (28 Dec 2023 19:44)  sertraline 50 mg oral tablet: 1 tab(s) orally once a day (at bedtime) total dose- 75mg daily (28 Dec 2023 19:44)  simvastatin 10 mg oral tablet: 1 tab(s) orally once a day (at bedtime) (28 Dec 2023 19:44)  Symbicort 80 mcg-4.5 mcg/inh inhalation aerosol: 2 puff(s) inhaled 2 times a day (28 Dec 2023 19:44)  tamsulosin 0.4 mg oral capsule: 1 cap(s) orally once a day (at bedtime) (28 Dec 2023 19:44)  traMADol 50 mg oral tablet: 1 tab(s) orally 3 times a day As needed Moderate Pain (4 - 6) (30 Dec 2023 13:03)  Vitamin C 500 mg oral tablet, chewable: 1 tab(s) chewed once a day (28 Dec 2023 19:44)      MEDICATIONS  (STANDING):  aMIOdarone    Tablet 200 milliGRAM(s) Oral daily  apixaban 5 milliGRAM(s) Oral two times a day  ascorbic acid 500 milliGRAM(s) Oral daily  aspirin enteric coated 81 milliGRAM(s) Oral daily  brimonidine 0.2% Ophthalmic Solution 1 Drop(s) Both EYES two times a day  budesonide 160 MICROgram(s)/formoterol 4.5 MICROgram(s) Inhaler 2 Puff(s) Inhalation two times a day  calcitriol   Capsule 0.25 MICROGram(s) Oral <User Schedule>  calcium acetate 667 milliGRAM(s) Oral three times a day with meals  dextrose 5%. 1000 milliLiter(s) (100 mL/Hr) IV Continuous <Continuous>  dextrose 5%. 1000 milliLiter(s) (50 mL/Hr) IV Continuous <Continuous>  dextrose 50% Injectable 25 Gram(s) IV Push once  dextrose 50% Injectable 25 Gram(s) IV Push once  dextrose 50% Injectable 12.5 Gram(s) IV Push once  dorzolamide 2%/timolol 0.5% Ophthalmic Solution 1 Drop(s) Both EYES two times a day  epoetin kayla-epbx (RETACRIT) Injectable 34021 Unit(s) IV Push <User Schedule>  finasteride 5 milliGRAM(s) Oral daily  glucagon  Injectable 1 milliGRAM(s) IntraMuscular once  insulin lispro (ADMELOG) corrective regimen sliding scale   SubCutaneous three times a day before meals  insulin lispro (ADMELOG) corrective regimen sliding scale   SubCutaneous at bedtime  lactobacillus acidophilus 1 Tablet(s) Oral daily  latanoprost 0.005% Ophthalmic Solution 1 Drop(s) Both EYES at bedtime  levothyroxine 50 MICROGram(s) Oral daily  melatonin 5 milliGRAM(s) Oral at bedtime  metoprolol tartrate 12.5 milliGRAM(s) Oral two times a day  midodrine. 10 milliGRAM(s) Oral three times a day  oseltamivir 30 milliGRAM(s) Oral daily  pantoprazole    Tablet 40 milliGRAM(s) Oral daily  senna 2 Tablet(s) Oral at bedtime  sertraline 50 milliGRAM(s) Oral daily  sertraline 25 milliGRAM(s) Oral daily  simvastatin 10 milliGRAM(s) Oral at bedtime  tamsulosin 0.4 milliGRAM(s) Oral at bedtime  ursodiol Capsule 300 milliGRAM(s) Oral two times a day    MEDICATIONS  (PRN):  acetaminophen     Tablet .. 650 milliGRAM(s) Oral every 6 hours PRN Temp greater or equal to 38C (100.4F), Mild Pain (1 - 3)  albuterol/ipratropium for Nebulization 3 milliLiter(s) Nebulizer every 6 hours PRN Shortness of Breath and/or Wheezing  aluminum hydroxide/magnesium hydroxide/simethicone Suspension 30 milliLiter(s) Oral every 4 hours PRN Dyspepsia  bisacodyl Suppository 10 milliGRAM(s) Rectal daily PRN Constipation  dextrose Oral Gel 15 Gram(s) Oral once PRN Blood Glucose LESS THAN 70 milliGRAM(s)/deciliter  guaiFENesin Oral Liquid (Sugar-Free) 200 milliGRAM(s) Oral every 6 hours PRN Cough  morphine  - Injectable 2 milliGRAM(s) IV Push every 6 hours PRN Severe Pain (7 - 10)  ondansetron Injectable 4 milliGRAM(s) IV Push every 8 hours PRN Nausea and/or Vomiting  traMADol 50 milliGRAM(s) Oral three times a day PRN Moderate Pain (4 - 6)              Vital Signs Last 24 Hrs  T(C): 36.7 (2024 04:13), Max: 36.9 (2024 00:20)  T(F): 98 (2024 04:13), Max: 98.4 (2024 00:20)  HR: 84 (2024 04:13) (74 - 88)  BP: 111/58 (2024 04:13) (96/52 - 114/58)  BP(mean): --  RR: 19 (2024 04:13) (14 - 19)  SpO2: 98% (2024 04:13) (98% - 100%)    Parameters below as of 2024 04:13  Patient On (Oxygen Delivery Method): nasal cannula          24 @ 07:01  -  24 @ 06:52  --------------------------------------------------------  IN: 0 mL / OUT: 0 mL / NET: 0 mL              LABS:                        8.6    3.56  )-----------( 166      ( 2024 07:02 )             27.6         136  |  100  |  40<H>  ----------------------------<  68<L>  3.7   |  27  |  4.80<H>    Ca    7.9<L>      2024 07:02  Mg     2.0     12-31    TPro  5.5<L>  /  Alb  2.6<L>  /  TBili  0.5  /  DBili  0.2  /  AST  310<H>  /  ALT  240<H>  /  AlkPhos  162<H>      PT/INR - ( 2024 07:02 )   PT: 23.2 sec;   INR: 2.02 ratio           Urinalysis Basic - ( 2024 07:02 )    Color: x / Appearance: x / SG: x / pH: x  Gluc: 68 mg/dL / Ketone: x  / Bili: x / Urobili: x   Blood: x / Protein: x / Nitrite: x   Leuk Esterase: x / RBC: x / WBC x   Sq Epi: x / Non Sq Epi: x / Bacteria: x            WBC:  WBC Count: 3.56 K/uL ( @ 07:02)  WBC Count: 5.77 K/uL ( @ 10:28)  WBC Count: 4.80 K/uL ( @ 21:40)      MICROBIOLOGY:  RECENT CULTURES:   .Blood Blood-Peripheral XXXX XXXX   No growth at 48 Hours     .Blood Blood-Peripheral XXXX XXXX   No growth at 48 Hours                PT/INR - ( 2024 07:02 )   PT: 23.2 sec;   INR: 2.02 ratio             Sodium:  Sodium: 136 mmol/L ( @ 07:02)  Sodium: 136 mmol/L ( @ 10:28)  Sodium: 137 mmol/L ( @ 21:40)      4.80 mg/dL  @ 07:02  3.80 mg/dL  @ 10:28  3.20 mg/dL  @ 21:40      Hemoglobin:  Hemoglobin: 8.6 g/dL ( @ 07:02)  Hemoglobin: 8.1 g/dL ( @ 10:28)  Hemoglobin: 8.5 g/dL ( @ 21:40)      Platelets: Platelet Count - Automated: 166 K/uL ( @ 07:02)  Platelet Count - Automated: 159 K/uL ( @ 10:28)  Platelet Count - Automated: 155 K/uL ( @ 21:40)      LIVER FUNCTIONS - ( 2024 07:02 )  Alb: 2.6 g/dL / Pro: 5.5 g/dL / ALK PHOS: 162 U/L / ALT: 240 U/L / AST: 310 U/L / GGT: x             Urinalysis Basic - ( 2024 07:02 )    Color: x / Appearance: x / SG: x / pH: x  Gluc: 68 mg/dL / Ketone: x  / Bili: x / Urobili: x   Blood: x / Protein: x / Nitrite: x   Leuk Esterase: x / RBC: x / WBC x   Sq Epi: x / Non Sq Epi: x / Bacteria: x        RADIOLOGY & ADDITIONAL STUDIES:      MICROBIOLOGY:  RECENT CULTURES:   .Blood Blood-Peripheral XXXX XXXX   No growth at 48 Hours    - .Blood Blood-Peripheral XXXX XXXX   No growth at 48 Hours                  CHIEF COMPLAINT/ REASON FOR VISIT  .. Patient was seen to address the  issue listed under PROBLEM LIST which is located toward bottom of this note     LISA NAVARRETE 2EAS 219 D1    Allergies    Levaquin (Anaphylaxis; Flushing; Short breath)    Intolerances        PAST MEDICAL & SURGICAL HISTORY:  ASHD (arteriosclerotic heart disease)      CAD (coronary artery disease)  s/p stent, CABG      CRI (chronic renal insufficiency)  ESRD on HD      Diabetes mellitus type II      Hypertension      Hyperlipidemia      Pacemaker      Chronic atrial fibrillation      Asthma      Hypothyroid      Dementia      H/O carotid stenosis      Enlarged prostate      Hx of CABG      Coronary stent      Cardiac pacemaker      History of amputation of toe  right great toe          FAMILY HISTORY:  Family history of chronic ischemic heart disease (Father)  father  of MI at age 59        Home Medications:  acetaminophen 325 mg oral tablet: 2 tab(s) orally 3 times a day as needed for  mild pain (30 Dec 2023 13:03)  Acidophilus oral capsule: 1 cap(s) orally once a day (28 Dec 2023 19:43)  amiodarone 200 mg oral tablet: 1 tab(s) orally once a day (30 Dec 2023 13:03)  apixaban 5 mg oral tablet: 1 tab(s) orally 2 times a day (30 Dec 2023 13:03)  Aspercreme Max Strength Lidocaine XL Patch 4% topical film: Apply topically to affected area once a day LEFT SHOULDER  10 AM -10 PM (30 Dec 2023 13:03)  brimonidine 0.2% ophthalmic solution: 1 drop(s) in each affected eye 2 times a day both eyes (28 Dec 2023 19:43)  calcitriol 0.25 mcg oral capsule: 1 cap(s) orally 3 times a week Monday, Wednesday, Friday (28 Dec 2023 19:43)  calcium acetate 667 mg oral capsule: 1 cap(s) orally 3 times a day (28 Dec 2023 19:43)  dorzolamide-timolol 2.23%-0.68% (2%-0.5% base) ophthalmic solution: 1 drop(s) in each eye 2 times a day (28 Dec 2023 19:44)  finasteride 5 mg oral tablet: 1 tab(s) orally once a day (at bedtime) (28 Dec 2023 19:44)  latanoprost 0.005% ophthalmic solution: 1 drop(s) in each eye once a day (at bedtime) Both eyes (28 Dec 2023 19:44)  Levothroid 50 mcg (0.05 mg) oral tablet: 1 tab(s) orally once a day (28 Dec 2023 19:44)  melatonin 5 mg oral tablet: 1 tab(s) orally once a day (at bedtime) (28 Dec 2023 19:44)  menthol 5% topical pad: Apply topically to affected area once a day from 7am - 3pm (28 Dec 2023 19:44)  midodrine 10 mg oral tablet: 1 tab(s) orally 2 times a day hold for SBP &gt;130 (28 Dec 2023 19:44)  sertraline 25 mg oral tablet: 1 tab(s) orally once a day total daily dose- 75mg (28 Dec 2023 19:44)  sertraline 50 mg oral tablet: 1 tab(s) orally once a day (at bedtime) total dose- 75mg daily (28 Dec 2023 19:44)  simvastatin 10 mg oral tablet: 1 tab(s) orally once a day (at bedtime) (28 Dec 2023 19:44)  Symbicort 80 mcg-4.5 mcg/inh inhalation aerosol: 2 puff(s) inhaled 2 times a day (28 Dec 2023 19:44)  tamsulosin 0.4 mg oral capsule: 1 cap(s) orally once a day (at bedtime) (28 Dec 2023 19:44)  traMADol 50 mg oral tablet: 1 tab(s) orally 3 times a day As needed Moderate Pain (4 - 6) (30 Dec 2023 13:03)  Vitamin C 500 mg oral tablet, chewable: 1 tab(s) chewed once a day (28 Dec 2023 19:44)      MEDICATIONS  (STANDING):  aMIOdarone    Tablet 200 milliGRAM(s) Oral daily  apixaban 5 milliGRAM(s) Oral two times a day  ascorbic acid 500 milliGRAM(s) Oral daily  aspirin enteric coated 81 milliGRAM(s) Oral daily  brimonidine 0.2% Ophthalmic Solution 1 Drop(s) Both EYES two times a day  budesonide 160 MICROgram(s)/formoterol 4.5 MICROgram(s) Inhaler 2 Puff(s) Inhalation two times a day  calcitriol   Capsule 0.25 MICROGram(s) Oral <User Schedule>  calcium acetate 667 milliGRAM(s) Oral three times a day with meals  dextrose 5%. 1000 milliLiter(s) (100 mL/Hr) IV Continuous <Continuous>  dextrose 5%. 1000 milliLiter(s) (50 mL/Hr) IV Continuous <Continuous>  dextrose 50% Injectable 25 Gram(s) IV Push once  dextrose 50% Injectable 25 Gram(s) IV Push once  dextrose 50% Injectable 12.5 Gram(s) IV Push once  dorzolamide 2%/timolol 0.5% Ophthalmic Solution 1 Drop(s) Both EYES two times a day  epoetin kayla-epbx (RETACRIT) Injectable 49949 Unit(s) IV Push <User Schedule>  finasteride 5 milliGRAM(s) Oral daily  glucagon  Injectable 1 milliGRAM(s) IntraMuscular once  insulin lispro (ADMELOG) corrective regimen sliding scale   SubCutaneous three times a day before meals  insulin lispro (ADMELOG) corrective regimen sliding scale   SubCutaneous at bedtime  lactobacillus acidophilus 1 Tablet(s) Oral daily  latanoprost 0.005% Ophthalmic Solution 1 Drop(s) Both EYES at bedtime  levothyroxine 50 MICROGram(s) Oral daily  melatonin 5 milliGRAM(s) Oral at bedtime  metoprolol tartrate 12.5 milliGRAM(s) Oral two times a day  midodrine. 10 milliGRAM(s) Oral three times a day  oseltamivir 30 milliGRAM(s) Oral daily  pantoprazole    Tablet 40 milliGRAM(s) Oral daily  senna 2 Tablet(s) Oral at bedtime  sertraline 50 milliGRAM(s) Oral daily  sertraline 25 milliGRAM(s) Oral daily  simvastatin 10 milliGRAM(s) Oral at bedtime  tamsulosin 0.4 milliGRAM(s) Oral at bedtime  ursodiol Capsule 300 milliGRAM(s) Oral two times a day    MEDICATIONS  (PRN):  acetaminophen     Tablet .. 650 milliGRAM(s) Oral every 6 hours PRN Temp greater or equal to 38C (100.4F), Mild Pain (1 - 3)  albuterol/ipratropium for Nebulization 3 milliLiter(s) Nebulizer every 6 hours PRN Shortness of Breath and/or Wheezing  aluminum hydroxide/magnesium hydroxide/simethicone Suspension 30 milliLiter(s) Oral every 4 hours PRN Dyspepsia  bisacodyl Suppository 10 milliGRAM(s) Rectal daily PRN Constipation  dextrose Oral Gel 15 Gram(s) Oral once PRN Blood Glucose LESS THAN 70 milliGRAM(s)/deciliter  guaiFENesin Oral Liquid (Sugar-Free) 200 milliGRAM(s) Oral every 6 hours PRN Cough  morphine  - Injectable 2 milliGRAM(s) IV Push every 6 hours PRN Severe Pain (7 - 10)  ondansetron Injectable 4 milliGRAM(s) IV Push every 8 hours PRN Nausea and/or Vomiting  traMADol 50 milliGRAM(s) Oral three times a day PRN Moderate Pain (4 - 6)              Vital Signs Last 24 Hrs  T(C): 36.7 (2024 04:13), Max: 36.9 (2024 00:20)  T(F): 98 (2024 04:13), Max: 98.4 (2024 00:20)  HR: 84 (2024 04:13) (74 - 88)  BP: 111/58 (2024 04:13) (96/52 - 114/58)  BP(mean): --  RR: 19 (2024 04:13) (14 - 19)  SpO2: 98% (2024 04:13) (98% - 100%)    Parameters below as of 2024 04:13  Patient On (Oxygen Delivery Method): nasal cannula          24 @ 07:01  -  24 @ 06:52  --------------------------------------------------------  IN: 0 mL / OUT: 0 mL / NET: 0 mL              LABS:                        8.6    3.56  )-----------( 166      ( 2024 07:02 )             27.6         136  |  100  |  40<H>  ----------------------------<  68<L>  3.7   |  27  |  4.80<H>    Ca    7.9<L>      2024 07:02  Mg     2.0     12-31    TPro  5.5<L>  /  Alb  2.6<L>  /  TBili  0.5  /  DBili  0.2  /  AST  310<H>  /  ALT  240<H>  /  AlkPhos  162<H>      PT/INR - ( 2024 07:02 )   PT: 23.2 sec;   INR: 2.02 ratio           Urinalysis Basic - ( 2024 07:02 )    Color: x / Appearance: x / SG: x / pH: x  Gluc: 68 mg/dL / Ketone: x  / Bili: x / Urobili: x   Blood: x / Protein: x / Nitrite: x   Leuk Esterase: x / RBC: x / WBC x   Sq Epi: x / Non Sq Epi: x / Bacteria: x            WBC:  WBC Count: 3.56 K/uL ( @ 07:02)  WBC Count: 5.77 K/uL ( @ 10:28)  WBC Count: 4.80 K/uL ( @ 21:40)      MICROBIOLOGY:  RECENT CULTURES:   .Blood Blood-Peripheral XXXX XXXX   No growth at 48 Hours     .Blood Blood-Peripheral XXXX XXXX   No growth at 48 Hours                PT/INR - ( 2024 07:02 )   PT: 23.2 sec;   INR: 2.02 ratio             Sodium:  Sodium: 136 mmol/L ( @ 07:02)  Sodium: 136 mmol/L ( @ 10:28)  Sodium: 137 mmol/L ( @ 21:40)      4.80 mg/dL  @ 07:02  3.80 mg/dL  @ 10:28  3.20 mg/dL  @ 21:40      Hemoglobin:  Hemoglobin: 8.6 g/dL ( @ 07:02)  Hemoglobin: 8.1 g/dL ( @ 10:28)  Hemoglobin: 8.5 g/dL ( @ 21:40)      Platelets: Platelet Count - Automated: 166 K/uL ( @ 07:02)  Platelet Count - Automated: 159 K/uL ( @ 10:28)  Platelet Count - Automated: 155 K/uL ( @ 21:40)      LIVER FUNCTIONS - ( 2024 07:02 )  Alb: 2.6 g/dL / Pro: 5.5 g/dL / ALK PHOS: 162 U/L / ALT: 240 U/L / AST: 310 U/L / GGT: x             Urinalysis Basic - ( 2024 07:02 )    Color: x / Appearance: x / SG: x / pH: x  Gluc: 68 mg/dL / Ketone: x  / Bili: x / Urobili: x   Blood: x / Protein: x / Nitrite: x   Leuk Esterase: x / RBC: x / WBC x   Sq Epi: x / Non Sq Epi: x / Bacteria: x        RADIOLOGY & ADDITIONAL STUDIES:      MICROBIOLOGY:  RECENT CULTURES:   .Blood Blood-Peripheral XXXX XXXX   No growth at 48 Hours    - .Blood Blood-Peripheral XXXX XXXX   No growth at 48 Hours

## 2024-01-02 NOTE — DIETITIAN INITIAL EVALUATION ADULT - OTHER INFO
Patient denies food allergies, reports some pain with swallowing, reports sore throat (note patient + FLU). No complaints of GI distress at present. Reports missing teeth but denies need for pureed diet. Weight discrepancy noted, from previous nutrition assessment < 1 week ago patient was 165, however dry weight had been recorded as 152 pounds, which is relatively stable to current admit weight of 150 pounds. Food preferences obtained, to be provided.

## 2024-01-02 NOTE — DIETITIAN INITIAL EVALUATION ADULT - REASON FOR ADMISSION
Hypotension  per H&P:   Reason for Admission: hypoxia and hypotension  History of Present Illness:   Patient  is 72 yo , [facility] resident with past medical history of end-stage renal disease on dialysis, chronic hypotension, afib, T2DM, recent admission for clogged AV fistula is presenting for hypotension and hypoxia.  Patient was discharged back to [facility] 12/30  but sent to ED due to hypotension and hypoxia on arrival to [facility] .His vitals were stable upon discharge and he was monitored 2 hrs after HD session by house staff .  Otherwise denies any new complaints.  Patient was diagnosed with Influenza , also found to have JAZMINE elevation .Seen by Dr Garcia cardiologist Admitted  to telemetry unit for monitoring , send 3 sets of cardiac enzymes to rule out acute coronary event, obtain ECHO to evaluate LVEF, cardiology consult  ,continue current management, O2 supply, anticoagulation plan as per cardiology consult Palliative care consult requested ,to discuss advance directives and complete MOLST      Hypotension  per H&P:   Reason for Admission: hypoxia and hypotension  History of Present Illness:   Patient  is 74 yo , LTC resident with past medical history of end-stage renal disease on dialysis, chronic hypotension, afib, T2DM, recent admission for clogged AV fistula is presenting for hypotension and hypoxia.  Patient was discharged back to [facility] 12/30  but sent to ED due to hypotension and hypoxia on arrival to [facility] .His vitals were stable upon discharge and he was monitored 2 hrs after HD session by house staff .  Otherwise denies any new complaints.  Patient was diagnosed with Influenza , also found to have JAZMINE elevation .Seen by Dr Garcia cardiologist Admitted  to telemetry unit for monitoring , send 3 sets of cardiac enzymes to rule out acute coronary event, obtain ECHO to evaluate LVEF, cardiology consult  ,continue current management, O2 supply, anticoagulation plan as per cardiology consult Palliative care consult requested ,to discuss advance directives and complete MOLST

## 2024-01-02 NOTE — PHARMACOTHERAPY INTERVENTION NOTE - COMMENTS
Patient is a 74 yo male admitted with influenza A (detected 12/30/23), currently ordered for oseltamivir 30 mg Q24H x 5 days. Patient also has a history of ESRD on HD. Typical dosing for patients on hemodialysis is 30 mg x 1 followed my 30 mg after each hemodialysis session for a 5 day course. Patient is currently on day 3 of therapy and recommended holding off on tomorrow's dose, followed by last dose of 30 mg post-HD on Jan 4th. Discussed with ID Dr. Paul and recommendation was accepted, order was entered. Patient is a 72 yo male admitted with influenza A (detected 12/30/23), currently ordered for oseltamivir 30 mg Q24H x 5 days. Patient also has a history of ESRD on HD. Typical dosing for patients on hemodialysis is 30 mg x 1 followed my 30 mg after each hemodialysis session for a 5 day course. Patient is currently on day 3 of therapy and recommended holding off on tomorrow's dose, followed by last dose of 30 mg post-HD on Jan 4th. Discussed with ID Dr. Paul and recommendation was accepted, order was entered.

## 2024-01-02 NOTE — PROGRESS NOTE ADULT - SUBJECTIVE AND OBJECTIVE BOX
PROGRESS NOTE  Patient is a 73y old  Male who presents with a chief complaint of hypoxia and hypotension (02 Jan 2024 15:03)    Chart and available morning labs /imaging are reviewed electronically , urgent issues addressed . More information  is being added upon completion of rounds , when more information is collected and management discussed with consultants , medical staff and social service/case management on the floor   OVERNIGHT  JAZMINE 800 reported by medical staff . All above noted Patient is resting in a bed comfortably .Confused ,poor mentation .No distress noted Case d/w Dr Crystal and Dr Garcia , input requested Spoke to the brother on a phone ,possible angiogram discussed     HPI:  Patient  is 74 yo , [facility] resident with past medical history of end-stage renal disease on dialysis, chronic hypotension, afib, T2DM, recent admission for clogged AV fistula is presenting for hypotension and hypoxia.  Patient was discharged back to [facility] 12/30  but sent to ED due to hypotension and hypoxia on arrival to [facility] .His vitals were stable upon discharge and he was monitored 2 hrs after HD session by house staff .  Otherwise denies any new complaints.  Patient was diagnosed with Influenza , also found to have JAZMINE elevation .Seen by Dr Garcia cardiologist Admitted  to telemetry unit for monitoring , send 3 sets of cardiac enzymes to rule out acute coronary event, obtain ECHO to evaluate LVEF, cardiology consult  ,continue current management, O2 supply, anticoagulation plan as per cardiology consult Palliative care consult requested ,to discuss advance directives and complete MOLST  (31 Dec 2023 10:10)    PAST MEDICAL & SURGICAL HISTORY:  ASHD (arteriosclerotic heart disease)      CAD (coronary artery disease)  s/p stent, CABG      CRI (chronic renal insufficiency)  ESRD on HD      Diabetes mellitus type II      Hypertension      Hyperlipidemia      Pacemaker      Chronic atrial fibrillation      Asthma      Hypothyroid      Dementia      H/O carotid stenosis      Enlarged prostate      Hx of CABG      Coronary stent      Cardiac pacemaker      History of amputation of toe  right great toe          MEDICATIONS  (STANDING):  aMIOdarone    Tablet 200 milliGRAM(s) Oral daily  apixaban 5 milliGRAM(s) Oral two times a day  ascorbic acid 500 milliGRAM(s) Oral daily  aspirin enteric coated 81 milliGRAM(s) Oral daily  brimonidine 0.2% Ophthalmic Solution 1 Drop(s) Both EYES two times a day  budesonide 160 MICROgram(s)/formoterol 4.5 MICROgram(s) Inhaler 2 Puff(s) Inhalation two times a day  calcitriol   Capsule 0.25 MICROGram(s) Oral <User Schedule>  calcium acetate 667 milliGRAM(s) Oral three times a day with meals  dextrose 5%. 1000 milliLiter(s) (100 mL/Hr) IV Continuous <Continuous>  dextrose 5%. 1000 milliLiter(s) (50 mL/Hr) IV Continuous <Continuous>  dextrose 50% Injectable 25 Gram(s) IV Push once  dextrose 50% Injectable 12.5 Gram(s) IV Push once  dextrose 50% Injectable 25 Gram(s) IV Push once  dorzolamide 2%/timolol 0.5% Ophthalmic Solution 1 Drop(s) Both EYES two times a day  epoetin kayla-epbx (RETACRIT) Injectable 98325 Unit(s) IV Push <User Schedule>  finasteride 5 milliGRAM(s) Oral daily  glucagon  Injectable 1 milliGRAM(s) IntraMuscular once  insulin lispro (ADMELOG) corrective regimen sliding scale   SubCutaneous at bedtime  insulin lispro (ADMELOG) corrective regimen sliding scale   SubCutaneous three times a day before meals  lactobacillus acidophilus 1 Tablet(s) Oral daily  latanoprost 0.005% Ophthalmic Solution 1 Drop(s) Both EYES at bedtime  levothyroxine 50 MICROGram(s) Oral daily  melatonin 5 milliGRAM(s) Oral at bedtime  metoprolol tartrate 12.5 milliGRAM(s) Oral two times a day  midodrine. 10 milliGRAM(s) Oral three times a day  oseltamivir 30 milliGRAM(s) Oral daily  pantoprazole    Tablet 40 milliGRAM(s) Oral daily  senna 2 Tablet(s) Oral at bedtime  sertraline 50 milliGRAM(s) Oral daily  sertraline 25 milliGRAM(s) Oral daily  simvastatin 10 milliGRAM(s) Oral at bedtime  tamsulosin 0.4 milliGRAM(s) Oral at bedtime  ursodiol Capsule 300 milliGRAM(s) Oral two times a day    MEDICATIONS  (PRN):  acetaminophen     Tablet .. 650 milliGRAM(s) Oral every 6 hours PRN Temp greater or equal to 38C (100.4F), Mild Pain (1 - 3)  albuterol/ipratropium for Nebulization 3 milliLiter(s) Nebulizer every 6 hours PRN Shortness of Breath and/or Wheezing  aluminum hydroxide/magnesium hydroxide/simethicone Suspension 30 milliLiter(s) Oral every 4 hours PRN Dyspepsia  bisacodyl Suppository 10 milliGRAM(s) Rectal daily PRN Constipation  dextrose Oral Gel 15 Gram(s) Oral once PRN Blood Glucose LESS THAN 70 milliGRAM(s)/deciliter  guaiFENesin Oral Liquid (Sugar-Free) 200 milliGRAM(s) Oral every 6 hours PRN Cough  morphine  - Injectable 2 milliGRAM(s) IV Push every 6 hours PRN Severe Pain (7 - 10)  ondansetron Injectable 4 milliGRAM(s) IV Push every 8 hours PRN Nausea and/or Vomiting  traMADol 50 milliGRAM(s) Oral three times a day PRN Moderate Pain (4 - 6)      OBJECTIVE    T(C): 36.5 (01-02-24 @ 13:05), Max: 36.9 (01-02-24 @ 00:20)  HR: 60 (01-02-24 @ 14:29) (59 - 88)  BP: 93/60 (01-02-24 @ 14:29) (84/47 - 117/47)  RR: 18 (01-02-24 @ 13:05) (18 - 19)  SpO2: 100% (01-02-24 @ 13:05) (97% - 100%)  Wt(kg): --  I&O's Summary    01 Jan 2024 07:01  -  02 Jan 2024 07:00  --------------------------------------------------------  IN: 0 mL / OUT: 0 mL / NET: 0 mL    02 Jan 2024 07:01  -  02 Jan 2024 17:13  --------------------------------------------------------  IN: 1100 mL / OUT: 600 mL / NET: 500 mL          REVIEW OF SYSTEMS:  CONSTITUTIONAL: No fever, weight loss, or fatigue  EYES: No eye pain, visual disturbances, or discharge  ENMT:   No sinus or throat pain  NECK: No pain or stiffness  RESPIRATORY: No cough, wheezing, chills or hemoptysis; No shortness of breath  CARDIOVASCULAR: No chest pain, palpitations, dizziness, or leg swelling  GASTROINTESTINAL: No abdominal pain. No nausea, vomiting; No diarrhea or constipation. No melena or hematochezia.  GENITOURINARY: No dysuria, frequency, hematuria, or incontinence  NEUROLOGICAL: No headaches, memory loss, loss of strength, numbness, or tremors  SKIN: No itching, burning, rashes, or lesions   MUSCULOSKELETAL: No joint pain or swelling; No muscle, back, or extremity pain    PHYSICAL EXAM:  Appearance: NAD. VS past 24 hrs -as above   HEENT:   Moist oral mucosa. Conjunctiva clear b/l.   Neck : supple  Respiratory: Lungs CTAB.  Gastrointestinal:  Soft, nontender. No rebound. No rigidity. BS present	  Cardiovascular: RRR ,S1S2 present  Neurologic: Non-focal. Moving all extremities.  Extremities: No edema. No erythema. No calf tenderness.  Skin: No rashes, No ecchymoses, No cyanosis.	  wounds ,skin lesions-See skin assesment flow sheet   LABS:                        8.6    3.56  )-----------( 166      ( 01 Jan 2024 07:02 )             27.6     01-01    136  |  100  |  40<H>  ----------------------------<  68<L>  3.7   |  27  |  4.80<H>    Ca    7.9<L>      01 Jan 2024 07:02    TPro  5.5<L>  /  Alb  2.6<L>  /  TBili  0.5  /  DBili  0.2  /  AST  310<H>  /  ALT  240<H>  /  AlkPhos  162<H>  01-01    CAPILLARY BLOOD GLUCOSE      POCT Blood Glucose.: 102 mg/dL (02 Jan 2024 16:31)  POCT Blood Glucose.: 106 mg/dL (02 Jan 2024 13:58)  POCT Blood Glucose.: 112 mg/dL (02 Jan 2024 12:26)  POCT Blood Glucose.: 95 mg/dL (02 Jan 2024 07:57)  POCT Blood Glucose.: 133 mg/dL (01 Jan 2024 22:38)  POCT Blood Glucose.: 133 mg/dL (01 Jan 2024 19:20)  POCT Blood Glucose.: 65 mg/dL (01 Jan 2024 19:00)  POCT Blood Glucose.: 57 mg/dL (01 Jan 2024 18:38)  POCT Blood Glucose.: 59 mg/dL (01 Jan 2024 18:34)    PT/INR - ( 01 Jan 2024 07:02 )   PT: 23.2 sec;   INR: 2.02 ratio           Urinalysis Basic - ( 01 Jan 2024 07:02 )    Color: x / Appearance: x / SG: x / pH: x  Gluc: 68 mg/dL / Ketone: x  / Bili: x / Urobili: x   Blood: x / Protein: x / Nitrite: x   Leuk Esterase: x / RBC: x / WBC x   Sq Epi: x / Non Sq Epi: x / Bacteria: x        Culture - Blood (collected 30 Dec 2023 21:45)  Source: .Blood Blood-Peripheral  Preliminary Report (02 Jan 2024 02:03):    No growth at 48 Hours    Culture - Blood (collected 30 Dec 2023 21:40)  Source: .Blood Blood-Peripheral  Preliminary Report (02 Jan 2024 02:03):    No growth at 48 Hours      RADIOLOGY & ADDITIONAL TESTS:   reviewed elctronically  ASSESSMENT/PLAN: 	    25 minutes aggregate time was spent on this visit, 50% visit time spent in care co-ordination with other attendings and counselling patient .I have discussed care plan with patient / HCP/family member brother Israel on a phone  ,who expressed understanding of problems treatment and their effect and side effects, alternatives in details. I have asked if they have any questions and concerns and appropriately addressed them to best of my ability. ACP-Advance care planning was discussed , pallitaive care issues ,CMO ,GOC ,MOLST  form ,advance directives were reviewed .All questions were answered to the best of my knowledge - 25 m PROGRESS NOTE  Patient is a 73y old  Male who presents with a chief complaint of hypoxia and hypotension (02 Jan 2024 15:03)    Chart and available morning labs /imaging are reviewed electronically , urgent issues addressed . More information  is being added upon completion of rounds , when more information is collected and management discussed with consultants , medical staff and social service/case management on the floor   OVERNIGHT  JAZMINE 800 reported by medical staff . All above noted Patient is resting in a bed comfortably .Confused ,poor mentation .No distress noted Case d/w Dr Crystal and Dr Garcia , input requested Spoke to the brother on a phone ,possible angiogram discussed     HPI:  Patient  is 72 yo , [facility] resident with past medical history of end-stage renal disease on dialysis, chronic hypotension, afib, T2DM, recent admission for clogged AV fistula is presenting for hypotension and hypoxia.  Patient was discharged back to [facility] 12/30  but sent to ED due to hypotension and hypoxia on arrival to [facility] .His vitals were stable upon discharge and he was monitored 2 hrs after HD session by house staff .  Otherwise denies any new complaints.  Patient was diagnosed with Influenza , also found to have JAZMINE elevation .Seen by Dr Garcia cardiologist Admitted  to telemetry unit for monitoring , send 3 sets of cardiac enzymes to rule out acute coronary event, obtain ECHO to evaluate LVEF, cardiology consult  ,continue current management, O2 supply, anticoagulation plan as per cardiology consult Palliative care consult requested ,to discuss advance directives and complete MOLST  (31 Dec 2023 10:10)    PAST MEDICAL & SURGICAL HISTORY:  ASHD (arteriosclerotic heart disease)      CAD (coronary artery disease)  s/p stent, CABG      CRI (chronic renal insufficiency)  ESRD on HD      Diabetes mellitus type II      Hypertension      Hyperlipidemia      Pacemaker      Chronic atrial fibrillation      Asthma      Hypothyroid      Dementia      H/O carotid stenosis      Enlarged prostate      Hx of CABG      Coronary stent      Cardiac pacemaker      History of amputation of toe  right great toe          MEDICATIONS  (STANDING):  aMIOdarone    Tablet 200 milliGRAM(s) Oral daily  apixaban 5 milliGRAM(s) Oral two times a day  ascorbic acid 500 milliGRAM(s) Oral daily  aspirin enteric coated 81 milliGRAM(s) Oral daily  brimonidine 0.2% Ophthalmic Solution 1 Drop(s) Both EYES two times a day  budesonide 160 MICROgram(s)/formoterol 4.5 MICROgram(s) Inhaler 2 Puff(s) Inhalation two times a day  calcitriol   Capsule 0.25 MICROGram(s) Oral <User Schedule>  calcium acetate 667 milliGRAM(s) Oral three times a day with meals  dextrose 5%. 1000 milliLiter(s) (100 mL/Hr) IV Continuous <Continuous>  dextrose 5%. 1000 milliLiter(s) (50 mL/Hr) IV Continuous <Continuous>  dextrose 50% Injectable 25 Gram(s) IV Push once  dextrose 50% Injectable 12.5 Gram(s) IV Push once  dextrose 50% Injectable 25 Gram(s) IV Push once  dorzolamide 2%/timolol 0.5% Ophthalmic Solution 1 Drop(s) Both EYES two times a day  epoetin kayla-epbx (RETACRIT) Injectable 55689 Unit(s) IV Push <User Schedule>  finasteride 5 milliGRAM(s) Oral daily  glucagon  Injectable 1 milliGRAM(s) IntraMuscular once  insulin lispro (ADMELOG) corrective regimen sliding scale   SubCutaneous at bedtime  insulin lispro (ADMELOG) corrective regimen sliding scale   SubCutaneous three times a day before meals  lactobacillus acidophilus 1 Tablet(s) Oral daily  latanoprost 0.005% Ophthalmic Solution 1 Drop(s) Both EYES at bedtime  levothyroxine 50 MICROGram(s) Oral daily  melatonin 5 milliGRAM(s) Oral at bedtime  metoprolol tartrate 12.5 milliGRAM(s) Oral two times a day  midodrine. 10 milliGRAM(s) Oral three times a day  oseltamivir 30 milliGRAM(s) Oral daily  pantoprazole    Tablet 40 milliGRAM(s) Oral daily  senna 2 Tablet(s) Oral at bedtime  sertraline 50 milliGRAM(s) Oral daily  sertraline 25 milliGRAM(s) Oral daily  simvastatin 10 milliGRAM(s) Oral at bedtime  tamsulosin 0.4 milliGRAM(s) Oral at bedtime  ursodiol Capsule 300 milliGRAM(s) Oral two times a day    MEDICATIONS  (PRN):  acetaminophen     Tablet .. 650 milliGRAM(s) Oral every 6 hours PRN Temp greater or equal to 38C (100.4F), Mild Pain (1 - 3)  albuterol/ipratropium for Nebulization 3 milliLiter(s) Nebulizer every 6 hours PRN Shortness of Breath and/or Wheezing  aluminum hydroxide/magnesium hydroxide/simethicone Suspension 30 milliLiter(s) Oral every 4 hours PRN Dyspepsia  bisacodyl Suppository 10 milliGRAM(s) Rectal daily PRN Constipation  dextrose Oral Gel 15 Gram(s) Oral once PRN Blood Glucose LESS THAN 70 milliGRAM(s)/deciliter  guaiFENesin Oral Liquid (Sugar-Free) 200 milliGRAM(s) Oral every 6 hours PRN Cough  morphine  - Injectable 2 milliGRAM(s) IV Push every 6 hours PRN Severe Pain (7 - 10)  ondansetron Injectable 4 milliGRAM(s) IV Push every 8 hours PRN Nausea and/or Vomiting  traMADol 50 milliGRAM(s) Oral three times a day PRN Moderate Pain (4 - 6)      OBJECTIVE    T(C): 36.5 (01-02-24 @ 13:05), Max: 36.9 (01-02-24 @ 00:20)  HR: 60 (01-02-24 @ 14:29) (59 - 88)  BP: 93/60 (01-02-24 @ 14:29) (84/47 - 117/47)  RR: 18 (01-02-24 @ 13:05) (18 - 19)  SpO2: 100% (01-02-24 @ 13:05) (97% - 100%)  Wt(kg): --  I&O's Summary    01 Jan 2024 07:01  -  02 Jan 2024 07:00  --------------------------------------------------------  IN: 0 mL / OUT: 0 mL / NET: 0 mL    02 Jan 2024 07:01  -  02 Jan 2024 17:13  --------------------------------------------------------  IN: 1100 mL / OUT: 600 mL / NET: 500 mL          REVIEW OF SYSTEMS:  CONSTITUTIONAL: No fever, weight loss, or fatigue  EYES: No eye pain, visual disturbances, or discharge  ENMT:   No sinus or throat pain  NECK: No pain or stiffness  RESPIRATORY: No cough, wheezing, chills or hemoptysis; No shortness of breath  CARDIOVASCULAR: No chest pain, palpitations, dizziness, or leg swelling  GASTROINTESTINAL: No abdominal pain. No nausea, vomiting; No diarrhea or constipation. No melena or hematochezia.  GENITOURINARY: No dysuria, frequency, hematuria, or incontinence  NEUROLOGICAL: No headaches, memory loss, loss of strength, numbness, or tremors  SKIN: No itching, burning, rashes, or lesions   MUSCULOSKELETAL: No joint pain or swelling; No muscle, back, or extremity pain    PHYSICAL EXAM:  Appearance: NAD. VS past 24 hrs -as above   HEENT:   Moist oral mucosa. Conjunctiva clear b/l.   Neck : supple  Respiratory: Lungs CTAB.  Gastrointestinal:  Soft, nontender. No rebound. No rigidity. BS present	  Cardiovascular: RRR ,S1S2 present  Neurologic: Non-focal. Moving all extremities.  Extremities: No edema. No erythema. No calf tenderness.  Skin: No rashes, No ecchymoses, No cyanosis.	  wounds ,skin lesions-See skin assesment flow sheet   LABS:                        8.6    3.56  )-----------( 166      ( 01 Jan 2024 07:02 )             27.6     01-01    136  |  100  |  40<H>  ----------------------------<  68<L>  3.7   |  27  |  4.80<H>    Ca    7.9<L>      01 Jan 2024 07:02    TPro  5.5<L>  /  Alb  2.6<L>  /  TBili  0.5  /  DBili  0.2  /  AST  310<H>  /  ALT  240<H>  /  AlkPhos  162<H>  01-01    CAPILLARY BLOOD GLUCOSE      POCT Blood Glucose.: 102 mg/dL (02 Jan 2024 16:31)  POCT Blood Glucose.: 106 mg/dL (02 Jan 2024 13:58)  POCT Blood Glucose.: 112 mg/dL (02 Jan 2024 12:26)  POCT Blood Glucose.: 95 mg/dL (02 Jan 2024 07:57)  POCT Blood Glucose.: 133 mg/dL (01 Jan 2024 22:38)  POCT Blood Glucose.: 133 mg/dL (01 Jan 2024 19:20)  POCT Blood Glucose.: 65 mg/dL (01 Jan 2024 19:00)  POCT Blood Glucose.: 57 mg/dL (01 Jan 2024 18:38)  POCT Blood Glucose.: 59 mg/dL (01 Jan 2024 18:34)    PT/INR - ( 01 Jan 2024 07:02 )   PT: 23.2 sec;   INR: 2.02 ratio           Urinalysis Basic - ( 01 Jan 2024 07:02 )    Color: x / Appearance: x / SG: x / pH: x  Gluc: 68 mg/dL / Ketone: x  / Bili: x / Urobili: x   Blood: x / Protein: x / Nitrite: x   Leuk Esterase: x / RBC: x / WBC x   Sq Epi: x / Non Sq Epi: x / Bacteria: x        Culture - Blood (collected 30 Dec 2023 21:45)  Source: .Blood Blood-Peripheral  Preliminary Report (02 Jan 2024 02:03):    No growth at 48 Hours    Culture - Blood (collected 30 Dec 2023 21:40)  Source: .Blood Blood-Peripheral  Preliminary Report (02 Jan 2024 02:03):    No growth at 48 Hours      RADIOLOGY & ADDITIONAL TESTS:   reviewed elctronically  ASSESSMENT/PLAN: 	    25 minutes aggregate time was spent on this visit, 50% visit time spent in care co-ordination with other attendings and counselling patient .I have discussed care plan with patient / HCP/family member brother Israel on a phone  ,who expressed understanding of problems treatment and their effect and side effects, alternatives in details. I have asked if they have any questions and concerns and appropriately addressed them to best of my ability. ACP-Advance care planning was discussed , pallitaive care issues ,CMO ,GOC ,MOLST  form ,advance directives were reviewed .All questions were answered to the best of my knowledge - 25 m

## 2024-01-02 NOTE — DIETITIAN INITIAL EVALUATION ADULT - PERTINENT MEDS FT
MEDICATIONS  (STANDING):  aMIOdarone    Tablet 200 milliGRAM(s) Oral daily  apixaban 5 milliGRAM(s) Oral two times a day  ascorbic acid 500 milliGRAM(s) Oral daily  aspirin enteric coated 81 milliGRAM(s) Oral daily  brimonidine 0.2% Ophthalmic Solution 1 Drop(s) Both EYES two times a day  budesonide 160 MICROgram(s)/formoterol 4.5 MICROgram(s) Inhaler 2 Puff(s) Inhalation two times a day  calcitriol   Capsule 0.25 MICROGram(s) Oral <User Schedule>  calcium acetate 667 milliGRAM(s) Oral three times a day with meals  dextrose 5%. 1000 milliLiter(s) (100 mL/Hr) IV Continuous <Continuous>  dextrose 5%. 1000 milliLiter(s) (50 mL/Hr) IV Continuous <Continuous>  dextrose 50% Injectable 25 Gram(s) IV Push once  dextrose 50% Injectable 12.5 Gram(s) IV Push once  dextrose 50% Injectable 25 Gram(s) IV Push once  dorzolamide 2%/timolol 0.5% Ophthalmic Solution 1 Drop(s) Both EYES two times a day  epoetin kayla-epbx (RETACRIT) Injectable 57250 Unit(s) IV Push <User Schedule>  finasteride 5 milliGRAM(s) Oral daily  glucagon  Injectable 1 milliGRAM(s) IntraMuscular once  insulin lispro (ADMELOG) corrective regimen sliding scale   SubCutaneous three times a day before meals  insulin lispro (ADMELOG) corrective regimen sliding scale   SubCutaneous at bedtime  lactobacillus acidophilus 1 Tablet(s) Oral daily  latanoprost 0.005% Ophthalmic Solution 1 Drop(s) Both EYES at bedtime  levothyroxine 50 MICROGram(s) Oral daily  melatonin 5 milliGRAM(s) Oral at bedtime  metoprolol tartrate 12.5 milliGRAM(s) Oral two times a day  midodrine. 10 milliGRAM(s) Oral three times a day  midodrine. 10 milliGRAM(s) Oral once  oseltamivir 30 milliGRAM(s) Oral daily  pantoprazole    Tablet 40 milliGRAM(s) Oral daily  senna 2 Tablet(s) Oral at bedtime  sertraline 50 milliGRAM(s) Oral daily  sertraline 25 milliGRAM(s) Oral daily  simvastatin 10 milliGRAM(s) Oral at bedtime  tamsulosin 0.4 milliGRAM(s) Oral at bedtime  ursodiol Capsule 300 milliGRAM(s) Oral two times a day    MEDICATIONS  (PRN):  acetaminophen     Tablet .. 650 milliGRAM(s) Oral every 6 hours PRN Temp greater or equal to 38C (100.4F), Mild Pain (1 - 3)  albuterol/ipratropium for Nebulization 3 milliLiter(s) Nebulizer every 6 hours PRN Shortness of Breath and/or Wheezing  aluminum hydroxide/magnesium hydroxide/simethicone Suspension 30 milliLiter(s) Oral every 4 hours PRN Dyspepsia  bisacodyl Suppository 10 milliGRAM(s) Rectal daily PRN Constipation  dextrose Oral Gel 15 Gram(s) Oral once PRN Blood Glucose LESS THAN 70 milliGRAM(s)/deciliter  guaiFENesin Oral Liquid (Sugar-Free) 200 milliGRAM(s) Oral every 6 hours PRN Cough  morphine  - Injectable 2 milliGRAM(s) IV Push every 6 hours PRN Severe Pain (7 - 10)  ondansetron Injectable 4 milliGRAM(s) IV Push every 8 hours PRN Nausea and/or Vomiting  traMADol 50 milliGRAM(s) Oral three times a day PRN Moderate Pain (4 - 6)   MEDICATIONS  (STANDING):  aMIOdarone    Tablet 200 milliGRAM(s) Oral daily  apixaban 5 milliGRAM(s) Oral two times a day  ascorbic acid 500 milliGRAM(s) Oral daily  aspirin enteric coated 81 milliGRAM(s) Oral daily  brimonidine 0.2% Ophthalmic Solution 1 Drop(s) Both EYES two times a day  budesonide 160 MICROgram(s)/formoterol 4.5 MICROgram(s) Inhaler 2 Puff(s) Inhalation two times a day  calcitriol   Capsule 0.25 MICROGram(s) Oral <User Schedule>  calcium acetate 667 milliGRAM(s) Oral three times a day with meals  dextrose 5%. 1000 milliLiter(s) (100 mL/Hr) IV Continuous <Continuous>  dextrose 5%. 1000 milliLiter(s) (50 mL/Hr) IV Continuous <Continuous>  dextrose 50% Injectable 25 Gram(s) IV Push once  dextrose 50% Injectable 12.5 Gram(s) IV Push once  dextrose 50% Injectable 25 Gram(s) IV Push once  dorzolamide 2%/timolol 0.5% Ophthalmic Solution 1 Drop(s) Both EYES two times a day  epoetin kayla-epbx (RETACRIT) Injectable 56777 Unit(s) IV Push <User Schedule>  finasteride 5 milliGRAM(s) Oral daily  glucagon  Injectable 1 milliGRAM(s) IntraMuscular once  insulin lispro (ADMELOG) corrective regimen sliding scale   SubCutaneous three times a day before meals  insulin lispro (ADMELOG) corrective regimen sliding scale   SubCutaneous at bedtime  lactobacillus acidophilus 1 Tablet(s) Oral daily  latanoprost 0.005% Ophthalmic Solution 1 Drop(s) Both EYES at bedtime  levothyroxine 50 MICROGram(s) Oral daily  melatonin 5 milliGRAM(s) Oral at bedtime  metoprolol tartrate 12.5 milliGRAM(s) Oral two times a day  midodrine. 10 milliGRAM(s) Oral three times a day  midodrine. 10 milliGRAM(s) Oral once  oseltamivir 30 milliGRAM(s) Oral daily  pantoprazole    Tablet 40 milliGRAM(s) Oral daily  senna 2 Tablet(s) Oral at bedtime  sertraline 50 milliGRAM(s) Oral daily  sertraline 25 milliGRAM(s) Oral daily  simvastatin 10 milliGRAM(s) Oral at bedtime  tamsulosin 0.4 milliGRAM(s) Oral at bedtime  ursodiol Capsule 300 milliGRAM(s) Oral two times a day    MEDICATIONS  (PRN):  acetaminophen     Tablet .. 650 milliGRAM(s) Oral every 6 hours PRN Temp greater or equal to 38C (100.4F), Mild Pain (1 - 3)  albuterol/ipratropium for Nebulization 3 milliLiter(s) Nebulizer every 6 hours PRN Shortness of Breath and/or Wheezing  aluminum hydroxide/magnesium hydroxide/simethicone Suspension 30 milliLiter(s) Oral every 4 hours PRN Dyspepsia  bisacodyl Suppository 10 milliGRAM(s) Rectal daily PRN Constipation  dextrose Oral Gel 15 Gram(s) Oral once PRN Blood Glucose LESS THAN 70 milliGRAM(s)/deciliter  guaiFENesin Oral Liquid (Sugar-Free) 200 milliGRAM(s) Oral every 6 hours PRN Cough  morphine  - Injectable 2 milliGRAM(s) IV Push every 6 hours PRN Severe Pain (7 - 10)  ondansetron Injectable 4 milliGRAM(s) IV Push every 8 hours PRN Nausea and/or Vomiting  traMADol 50 milliGRAM(s) Oral three times a day PRN Moderate Pain (4 - 6)

## 2024-01-02 NOTE — DIETITIAN INITIAL EVALUATION ADULT - NS FNS ENTERAL CURRENT ORDER
January 25, 2018      [facility]  [address] [address]  Phone: [phone]  Fax: [phone]       Patient: Doug Gray   YOB: 1988  Date of Visit: 01/25/2018    To Whom It May Concern:    Jerson Gray  was at [facility] on 01/25/2018. He may return to work/school on 01/29/2018 with restrictions (Avoid impact to wound, keep clean and dry). If you have any questions or concerns, or if I can be of further assistance, please do not hesitate to contact me.    Sincerely,          Wiley Chau MD     
Current diet order meets estimated nutrient requirements

## 2024-01-02 NOTE — DIETITIAN INITIAL EVALUATION ADULT - NSFNSADHERENCEPTAFT_GEN_A_CORE
from recent previous RD assessment it appears patient was receiving easy to chew consistent CHO renal diet

## 2024-01-02 NOTE — PROGRESS NOTE ADULT - SUBJECTIVE AND OBJECTIVE BOX
Patient is a 73y old  Male who presents with a chief complaint of SOB (27 Dec 2023 04:46)    Patient seen in follow up for ESRD on HD.        PAST MEDICAL HISTORY:  ASHD (arteriosclerotic heart disease)    CAD (coronary artery disease)    CRI (chronic renal insufficiency)    Diabetes mellitus type II    Hypertension    Hyperlipidemia    Pacemaker    Chronic atrial fibrillation    Asthma    Hypothyroid    Dementia    H/O carotid stenosis    Enlarged prostate      MEDICATIONS  (STANDING):  aMIOdarone    Tablet 200 milliGRAM(s) Oral daily  apixaban 5 milliGRAM(s) Oral two times a day  ascorbic acid 500 milliGRAM(s) Oral daily  aspirin enteric coated 81 milliGRAM(s) Oral daily  brimonidine 0.2% Ophthalmic Solution 1 Drop(s) Both EYES two times a day  budesonide 160 MICROgram(s)/formoterol 4.5 MICROgram(s) Inhaler 2 Puff(s) Inhalation two times a day  calcitriol   Capsule 0.25 MICROGram(s) Oral <User Schedule>  calcium acetate 667 milliGRAM(s) Oral three times a day with meals  dextrose 5%. 1000 milliLiter(s) (100 mL/Hr) IV Continuous <Continuous>  dextrose 5%. 1000 milliLiter(s) (50 mL/Hr) IV Continuous <Continuous>  dextrose 50% Injectable 25 Gram(s) IV Push once  dextrose 50% Injectable 12.5 Gram(s) IV Push once  dextrose 50% Injectable 25 Gram(s) IV Push once  dorzolamide 2%/timolol 0.5% Ophthalmic Solution 1 Drop(s) Both EYES two times a day  epoetin kayla-epbx (RETACRIT) Injectable 57778 Unit(s) IV Push <User Schedule>  finasteride 5 milliGRAM(s) Oral daily  glucagon  Injectable 1 milliGRAM(s) IntraMuscular once  insulin lispro (ADMELOG) corrective regimen sliding scale   SubCutaneous three times a day before meals  insulin lispro (ADMELOG) corrective regimen sliding scale   SubCutaneous at bedtime  lactobacillus acidophilus 1 Tablet(s) Oral daily  latanoprost 0.005% Ophthalmic Solution 1 Drop(s) Both EYES at bedtime  levothyroxine 50 MICROGram(s) Oral daily  melatonin 5 milliGRAM(s) Oral at bedtime  metoprolol tartrate 12.5 milliGRAM(s) Oral two times a day  midodrine. 10 milliGRAM(s) Oral three times a day  midodrine. 10 milliGRAM(s) Oral once  oseltamivir 30 milliGRAM(s) Oral daily  pantoprazole    Tablet 40 milliGRAM(s) Oral daily  senna 2 Tablet(s) Oral at bedtime  sertraline 50 milliGRAM(s) Oral daily  sertraline 25 milliGRAM(s) Oral daily  simvastatin 10 milliGRAM(s) Oral at bedtime  tamsulosin 0.4 milliGRAM(s) Oral at bedtime  ursodiol Capsule 300 milliGRAM(s) Oral two times a day    MEDICATIONS  (PRN):  acetaminophen     Tablet .. 650 milliGRAM(s) Oral every 6 hours PRN Temp greater or equal to 38C (100.4F), Mild Pain (1 - 3)  albuterol/ipratropium for Nebulization 3 milliLiter(s) Nebulizer every 6 hours PRN Shortness of Breath and/or Wheezing  aluminum hydroxide/magnesium hydroxide/simethicone Suspension 30 milliLiter(s) Oral every 4 hours PRN Dyspepsia  bisacodyl Suppository 10 milliGRAM(s) Rectal daily PRN Constipation  dextrose Oral Gel 15 Gram(s) Oral once PRN Blood Glucose LESS THAN 70 milliGRAM(s)/deciliter  guaiFENesin Oral Liquid (Sugar-Free) 200 milliGRAM(s) Oral every 6 hours PRN Cough  morphine  - Injectable 2 milliGRAM(s) IV Push every 6 hours PRN Severe Pain (7 - 10)  ondansetron Injectable 4 milliGRAM(s) IV Push every 8 hours PRN Nausea and/or Vomiting  traMADol 50 milliGRAM(s) Oral three times a day PRN Moderate Pain (4 - 6)    T(C): 36.7 (01-02-24 @ 04:13), Max: 36.9 (12-31-23 @ 17:09)  HR: 84 (01-02-24 @ 04:13) (70 - 88)  BP: 111/58 (01-02-24 @ 04:13) (94/57 - 114/58)  RR: 19 (01-02-24 @ 04:13)  SpO2: 98% (01-02-24 @ 04:13)  Wt(kg): --  I&O's Detail    01 Jan 2024 07:01  -  02 Jan 2024 07:00  --------------------------------------------------------  IN:  Total IN: 0 mL    OUT:    Voided (mL): 0 mL  Total OUT: 0 mL    Total NET: 0 mL                PHYSICAL EXAM:  General: No distress, + dialysis catheter  Respiratory: b/l air entry  Cardiovascular: S1 S2  Gastrointestinal: soft  Extremities:  no edema, AVF, no bruit              LABORATORY:                        8.6    3.56  )-----------( 166      ( 01 Jan 2024 07:02 )             27.6     01-01    136  |  100  |  40<H>  ----------------------------<  68<L>  3.7   |  27  |  4.80<H>    Ca    7.9<L>      01 Jan 2024 07:02    TPro  5.5<L>  /  Alb  2.6<L>  /  TBili  0.5  /  DBili  0.2  /  AST  310<H>  /  ALT  240<H>  /  AlkPhos  162<H>  01-01    Sodium: 136 mmol/L (01-01 @ 07:02)    Potassium: 3.7 mmol/L (01-01 @ 07:02)    Hemoglobin: 8.6 g/dL (01-01 @ 07:02)  Hemoglobin: 8.1 g/dL (12-31 @ 10:28)  Hemoglobin: 8.5 g/dL (12-30 @ 21:40)    Creatinine, Serum 4.80 (01-01 @ 07:02)  Creatinine, Serum 3.80 (12-31 @ 10:28)  Creatinine, Serum 3.20 (12-30 @ 21:40)        LIVER FUNCTIONS - ( 01 Jan 2024 07:02 )  Alb: 2.6 g/dL / Pro: 5.5 g/dL / ALK PHOS: 162 U/L / ALT: 240 U/L / AST: 310 U/L / GGT: x           Urinalysis Basic - ( 01 Jan 2024 07:02 )    Color: x / Appearance: x / SG: x / pH: x  Gluc: 68 mg/dL / Ketone: x  / Bili: x / Urobili: x   Blood: x / Protein: x / Nitrite: x   Leuk Esterase: x / RBC: x / WBC x   Sq Epi: x / Non Sq Epi: x / Bacteria: x       Patient is a 73y old  Male who presents with a chief complaint of SOB (27 Dec 2023 04:46)    Patient seen in follow up for ESRD on HD.        PAST MEDICAL HISTORY:  ASHD (arteriosclerotic heart disease)    CAD (coronary artery disease)    CRI (chronic renal insufficiency)    Diabetes mellitus type II    Hypertension    Hyperlipidemia    Pacemaker    Chronic atrial fibrillation    Asthma    Hypothyroid    Dementia    H/O carotid stenosis    Enlarged prostate      MEDICATIONS  (STANDING):  aMIOdarone    Tablet 200 milliGRAM(s) Oral daily  apixaban 5 milliGRAM(s) Oral two times a day  ascorbic acid 500 milliGRAM(s) Oral daily  aspirin enteric coated 81 milliGRAM(s) Oral daily  brimonidine 0.2% Ophthalmic Solution 1 Drop(s) Both EYES two times a day  budesonide 160 MICROgram(s)/formoterol 4.5 MICROgram(s) Inhaler 2 Puff(s) Inhalation two times a day  calcitriol   Capsule 0.25 MICROGram(s) Oral <User Schedule>  calcium acetate 667 milliGRAM(s) Oral three times a day with meals  dextrose 5%. 1000 milliLiter(s) (100 mL/Hr) IV Continuous <Continuous>  dextrose 5%. 1000 milliLiter(s) (50 mL/Hr) IV Continuous <Continuous>  dextrose 50% Injectable 25 Gram(s) IV Push once  dextrose 50% Injectable 12.5 Gram(s) IV Push once  dextrose 50% Injectable 25 Gram(s) IV Push once  dorzolamide 2%/timolol 0.5% Ophthalmic Solution 1 Drop(s) Both EYES two times a day  epoetin kayla-epbx (RETACRIT) Injectable 89798 Unit(s) IV Push <User Schedule>  finasteride 5 milliGRAM(s) Oral daily  glucagon  Injectable 1 milliGRAM(s) IntraMuscular once  insulin lispro (ADMELOG) corrective regimen sliding scale   SubCutaneous three times a day before meals  insulin lispro (ADMELOG) corrective regimen sliding scale   SubCutaneous at bedtime  lactobacillus acidophilus 1 Tablet(s) Oral daily  latanoprost 0.005% Ophthalmic Solution 1 Drop(s) Both EYES at bedtime  levothyroxine 50 MICROGram(s) Oral daily  melatonin 5 milliGRAM(s) Oral at bedtime  metoprolol tartrate 12.5 milliGRAM(s) Oral two times a day  midodrine. 10 milliGRAM(s) Oral three times a day  midodrine. 10 milliGRAM(s) Oral once  oseltamivir 30 milliGRAM(s) Oral daily  pantoprazole    Tablet 40 milliGRAM(s) Oral daily  senna 2 Tablet(s) Oral at bedtime  sertraline 50 milliGRAM(s) Oral daily  sertraline 25 milliGRAM(s) Oral daily  simvastatin 10 milliGRAM(s) Oral at bedtime  tamsulosin 0.4 milliGRAM(s) Oral at bedtime  ursodiol Capsule 300 milliGRAM(s) Oral two times a day    MEDICATIONS  (PRN):  acetaminophen     Tablet .. 650 milliGRAM(s) Oral every 6 hours PRN Temp greater or equal to 38C (100.4F), Mild Pain (1 - 3)  albuterol/ipratropium for Nebulization 3 milliLiter(s) Nebulizer every 6 hours PRN Shortness of Breath and/or Wheezing  aluminum hydroxide/magnesium hydroxide/simethicone Suspension 30 milliLiter(s) Oral every 4 hours PRN Dyspepsia  bisacodyl Suppository 10 milliGRAM(s) Rectal daily PRN Constipation  dextrose Oral Gel 15 Gram(s) Oral once PRN Blood Glucose LESS THAN 70 milliGRAM(s)/deciliter  guaiFENesin Oral Liquid (Sugar-Free) 200 milliGRAM(s) Oral every 6 hours PRN Cough  morphine  - Injectable 2 milliGRAM(s) IV Push every 6 hours PRN Severe Pain (7 - 10)  ondansetron Injectable 4 milliGRAM(s) IV Push every 8 hours PRN Nausea and/or Vomiting  traMADol 50 milliGRAM(s) Oral three times a day PRN Moderate Pain (4 - 6)    T(C): 36.7 (01-02-24 @ 04:13), Max: 36.9 (12-31-23 @ 17:09)  HR: 84 (01-02-24 @ 04:13) (70 - 88)  BP: 111/58 (01-02-24 @ 04:13) (94/57 - 114/58)  RR: 19 (01-02-24 @ 04:13)  SpO2: 98% (01-02-24 @ 04:13)  Wt(kg): --  I&O's Detail    01 Jan 2024 07:01  -  02 Jan 2024 07:00  --------------------------------------------------------  IN:  Total IN: 0 mL    OUT:    Voided (mL): 0 mL  Total OUT: 0 mL    Total NET: 0 mL                PHYSICAL EXAM:  General: No distress, + dialysis catheter  Respiratory: b/l air entry  Cardiovascular: S1 S2  Gastrointestinal: soft  Extremities:  no edema, AVF, no bruit              LABORATORY:                        8.6    3.56  )-----------( 166      ( 01 Jan 2024 07:02 )             27.6     01-01    136  |  100  |  40<H>  ----------------------------<  68<L>  3.7   |  27  |  4.80<H>    Ca    7.9<L>      01 Jan 2024 07:02    TPro  5.5<L>  /  Alb  2.6<L>  /  TBili  0.5  /  DBili  0.2  /  AST  310<H>  /  ALT  240<H>  /  AlkPhos  162<H>  01-01    Sodium: 136 mmol/L (01-01 @ 07:02)    Potassium: 3.7 mmol/L (01-01 @ 07:02)    Hemoglobin: 8.6 g/dL (01-01 @ 07:02)  Hemoglobin: 8.1 g/dL (12-31 @ 10:28)  Hemoglobin: 8.5 g/dL (12-30 @ 21:40)    Creatinine, Serum 4.80 (01-01 @ 07:02)  Creatinine, Serum 3.80 (12-31 @ 10:28)  Creatinine, Serum 3.20 (12-30 @ 21:40)        LIVER FUNCTIONS - ( 01 Jan 2024 07:02 )  Alb: 2.6 g/dL / Pro: 5.5 g/dL / ALK PHOS: 162 U/L / ALT: 240 U/L / AST: 310 U/L / GGT: x           Urinalysis Basic - ( 01 Jan 2024 07:02 )    Color: x / Appearance: x / SG: x / pH: x  Gluc: 68 mg/dL / Ketone: x  / Bili: x / Urobili: x   Blood: x / Protein: x / Nitrite: x   Leuk Esterase: x / RBC: x / WBC x   Sq Epi: x / Non Sq Epi: x / Bacteria: x

## 2024-01-02 NOTE — CARE COORDINATION ASSESSMENT. - PATIENT'S ABILITY TO COMPREHEND/PARTICIPATE IN CARE IS LIMITED DUE TO:
9/17/2021         RE: Michelle Joy  [facility]  [facility] [address]        Dear Colleague,    Thank you for referring your patient, Michelle Joy, to the [facility] [place]. Please see a copy of my visit note below.    ASSESSMENT & PLAN    Michelle was seen today for pain.    Diagnoses and all orders for this visit:    Right hip pain  -     MRI Lumbar spine w/o contrast; Future    Right-sided low back pain with right-sided sciatica, unspecified chronicity  -     MRI Lumbar spine w/o contrast; Future      Question source for her diffuse symptoms.  Differential includes lumbar source/neurologic, given numbness/tingling to right LE; hip area source (e.g., labrum, snapping hip, possibly instability) given pain in that area (though no clear source for pain/symptoms on imaging so far); vascular issue is not favored, though she had episode of LE swelling and describes dusky foot/toes.  MRI hip without clear source so far.  We discussed additional work up, including MRI lumbar spine, MRI hip with contrast, Edx testing, possibly vascular testing.  Start with lumbar MRI, given MRI hip x2 done already. If no clear source on lumbar MRI, possible right hip MR arthrogram.  Crutches, rest in the meantime.  Letter provided for athletics.  Plan to contact them with MRI lumbar result to start, may have them return for recheck as well.  Questions answered. Discussed signs and symptoms that may indicate more serious issues; the patient was instructed to seek appropriate care if noted. Michelle indicates understanding of these issues and agrees with the plan.        See Patient Instructions  Patient Instructions   We discussed potential low back source for the symptoms, given some pain in the right low back and posterior hip, with radiating numbness and tingling to the right lower extremity.  We also discussed potential for hip labral pathology, and possibly some component snapping hip.  Plan MRI  scan lumbar spine next.  Keep up with comfortable home exercises from physical therapy.  Crutches are as needed.  Plan to contact you with MRI results.    Advanced imaging is done by appointment. Some insurance companies may require a prior authorization to be completed which can delay the time until you are able to schedule your appointment.   Please call [place] [place] [facility] [place]: [phone] to schedule your MRI.  Depending on your availability you can usually schedule within the next 1-2 days.  If you are active on [employer], you may have access to your test results before your provider is able to review the study and advise on next steps.      The clinic will call you with results, if you have not heard from the clinic within 3-4 days following your MRI please contact us at the number listed below.       If you have any further questions for your physician or physician s care team you can call [phone] and use option 3 to leave a voice message. Calls received during business hours will be returned same day.        Matthew Wick, [facility] [place]    -----  Chief Complaint   Patient presents with     Right Hip - Pain       SUBJECTIVE  Michelle Joy is a/an 18 year old female who is seen as a self referral for evaluation of right hip pain.  States she has had multiple injuries of her hip, starting in 9/2019 where she had her leg pulled while on the ground during a volleyball game and heard a pop in her hip.  Had another injury 4/27/21, when she twisted while running to avoid a tag in softball she again had a pop in her hip and was unable to move her leg.    Did have PT following initially injury. Has been doing PT since second incident in April.  She has been on crutches for the past week due to an increase in pain.    Pain over the lateral aspect of her lateral hip.  Numbness down her leg.  Pain in her low back on the right side.  Pain with numbness and  "tingling to her toes.     Per mom, she has had a purple foot the last 2 times it has happened.  An orthopedic surgeon at the match told her she may have had a partial dislocation due to distraction assisting with the blood flow.      Has had 2 MRI's and 2  Xrays     The patient is seen with their father and with their mother.      Onset: 2 years(s) ago. Patient describes injury as twisted hip   Location of Pain: right low back and hip   Worsened by: activity   Better with: nothing   Treatments tried: physical therapy   Associated symptoms: numbness, tingling and feeling of instability    Orthopedic/Surgical history: UNKNOWN  Social History/Occupation: 13, [place], Volleyball, Softball.      No family history pertinent to patient's problem today.     **  Crutches and PT after first injury. Did not resolve.  Pop sensation felt deep, and traveled laterally with each.  Hip area pain is more anterior, lateral.    Numbness noted after this past Saturday, nearly 1 week ago. Noted paresthesias. Also had dusky coloration on sideline, had evaluation on sideline, then had traction, and went to ED.    Injury in April 2021 had pallor afterward, somewhat cool.    Numbness is diffuse in LE, is more lateral-anterior thigh. Radiates to foot/toes.  Notes most proximal aspect is mid lumbar area.        REVIEW OF SYSTEMS:  Review of Systems   All other systems reviewed and are negative.        OBJECTIVE:  /68   Ht 1.6 m (5' 3\")   Wt 66.7 kg (147 lb)   BMI 26.04 kg/m     General: healthy, alert and in no distress  HEENT: no scleral icterus or conjunctival erythema  Skin: no suspicious lesions or rash. No jaundice.  CV: distal perfusion intact   Resp: normal respiratory effort without conversational dyspnea   Psych: normal mood and affect  Gait: crutches  Neuro: Normal light sensory exam of lower extremity     Low back exam:    Inspection:     no visible deformity in the low back       normal skin       normal vascular       " normal lymphatic    ROM:     limited flexion due to pain       limited extension due to pain    Tender:     paraspinal muscles       SI joint  Posterior hip  right    Non Tender:     remainder of lumbar spine    Strength:     hip flexion 3/5       knee extension 4/5       ankle dorsiflexion 5-/5       ankle plantarflexion 5/5       dorsiflexion of the great toe 4+/5  Above for right, with some giving way strength  Intact on left    Reflexes:     patellar (L3, L4) symmetric normal       achilles tendons (S1) symmetric normal    Special tests:     straight leg raise left neg        straight leg raise right pos       Pain with attempted JLUIS        slump test pos right           Right hip exam    Inspection:      no edema or ecchymosis in hip area    ROM:     Range of motion limited by pain  Pain at hip with flexion, extension, ER, IR  AROM limited by pain throughout    Strength:      abduction 3/5       adduction 4/5    Tender:      greater trochanter       Posterior hip    Special Tests:      positive (+) FADIR       Pain with scour       Log roll with pain           RADIOLOGY:  I independently visualized and reviewed these images with the patient    No bony abnormality on plain films.    MRI scans without clear source for symptoms.        MR HIP RIGHT WO CONTRAST      Narrative    This document is currently in Final Status     Exam Accession# [id number]     MR HIP RIGHT WO CONTRAST     HISTORY: Hip trauma, initial exam; Region of Interest and Additional Information->r hip locked up diving in volleyball, severe pain with any movement, leg feels numb, this is a recurrent problem, thanks       COMPARISON: 5/11/2021.     Findings:     No fracture. Negative for avascular necrosis.     The iliopsoas, hamstrings, gluteus minimus, gluteus medius tendons appear intact. Mild increased signal at the right rectus femoris insertion, could be secondary to mild tendon sprain in the appropriate setting.     No labral tear by  nonarthrographic imaging.     IMPRESSION:       Mild increased signal at the right rectus femoris insertion, could be secondary to mild tendon sprain in the appropriate setting.     If symptoms persist/worsen, follow-up MR arthrography would be an option to exclude an occult labral tear, at the discretion of orthopedic surgery.     Electronically Signed: Kaiser Parrish 9/12/2021 12:23 AM          MR HIP RIGHT WO CONTRAST      Narrative    This document is currently in Final Status     Exam Accession# [id number]     MR HIP RIGHT WO CONTRAST     HISTORY: Clicking hip; lateral snapping hip vs dislocation. Other specific joint derangements of right hip, not elsewhere classified.     COMPARISON: None.     FINDINGS: Acetabular labrum intact. Articular cartilage preserved. No effusion. Marrow signal intensity is normal. Mildly nonspherical femoral heads bilaterally. Sacroiliac joint spaces are preserved. Myotendinous structures normal in signal intensity. No abnormal fluid collection.     IMPRESSION:   1.  Mild cam-type JENNIFER bilaterally.   2.  No right hip labral tear. If concern persists for snapping, recommend dynamic MSK ultrasound.     Dictated By: iNgel Munguia MD 5/12/2021 8:05 AM   Edited By: RENÉE 5/12/2021 8:25 AM     Electronically Signed: Nigel Munguia MD 5/12/2021 9:13 AM          XR HIP RIGHT LAT W PELVIS AP      Narrative    This document is currently in Final Status     Exam Accession# [id number]     XR HIP RIGHT LAT W PELVIS AP     HISTORY: Hip subluxation during softball, rule out fx;     COMPARISON: 09/21/2019.     FINDINGS: Normal hip joint spaces. Normal acetabular coverage. No evidence of fracture. Intact pelvis.     IMPRESSION: Normal evaluation.     Dictated By: Escobar Hunter MD 4/29/2021 4:51 PM   Edited By: HECTOR 4/29/2021 5:03 PM     Electronically Signed: Escobar Hunter MD 4/29/2021 8:40 PM            Review of prior external note(s) from - previous treatment; see chart  Review of the result(s) of each unique  test - imaging  Independent interpretation of a test performed by another physician/other qualified health care professional (not separately reported) - imaging  Ordering of each unique test  37 minutes spent on the date of the encounter doing chart review, history and exam, documentation and further activities per the note             Again, thank you for allowing me to participate in the care of your patient.        Sincerely,        Matthew Wick, DO     dementia

## 2024-01-02 NOTE — DIETITIAN INITIAL EVALUATION ADULT - PERTINENT LABORATORY DATA
01-01    136  |  100  |  40<H>  ----------------------------<  68<L>  3.7   |  27  |  4.80<H>    Ca    7.9<L>      01 Jan 2024 07:02    TPro  5.5<L>  /  Alb  2.6<L>  /  TBili  0.5  /  DBili  0.2  /  AST  310<H>  /  ALT  240<H>  /  AlkPhos  162<H>  01-01  POCT Blood Glucose.: 112 mg/dL (01-02-24 @ 12:26)  A1C with Estimated Average Glucose Result: 8.0 % (12-27-23 @ 08:12)

## 2024-01-02 NOTE — PROGRESS NOTE ADULT - ASSESSMENT
inc lfts multifactorial  cad  chf  medications    suspect 2/2 statin, amiodarone and sepsis  monitor lfts  will follow     Advanced care planning was discussed with patient and family.  Advanced care planning forms were reviewed and discussed.  Risks, benefits and alternatives of gastroenterologic procedures were discussed in detail and all questions were answered.    30 minutes spent.

## 2024-01-02 NOTE — PROGRESS NOTE ADULT - ASSESSMENT
Patient  is 74 yo  LTC resident with past medical history of end-stage renal disease on dialysis, chronic hypotension, afib, recent admission for clogged AV fistula is presenting for hypotension and hypoxia.  Patient was discharged back to [facility] 12/30  but sent to ED due to hypotension and hypoxia on arrival to [facility] .His vitals were stable upon discharge and he was monitored 2 hrs after HD session by house staff .  Otherwise denies any new complaints.  Patient was diagnosed with Influenza , also found to have JAZMINE elevation .Seen by Dr Garcia cardiologist Admitted  to telemetry unit for monitoring , send 3 sets of cardiac enzymes to rule out acute coronary event, obtain ECHO to evaluate LVEF, cardiology consult  ,continue current management, O2 supply, anticoagulation plan as per cardiology consult Palliative care consult requested ,to discuss advance directives and complete MOLST  Patient  is 72 yo  LTC resident with past medical history of end-stage renal disease on dialysis, chronic hypotension, afib, recent admission for clogged AV fistula is presenting for hypotension and hypoxia.  Patient was discharged back to [facility] 12/30  but sent to ED due to hypotension and hypoxia on arrival to [facility] .His vitals were stable upon discharge and he was monitored 2 hrs after HD session by house staff .  Otherwise denies any new complaints.  Patient was diagnosed with Influenza , also found to have JAZMINE elevation .Seen by Dr Garcia cardiologist Admitted  to telemetry unit for monitoring , send 3 sets of cardiac enzymes to rule out acute coronary event, obtain ECHO to evaluate LVEF, cardiology consult  ,continue current management, O2 supply, anticoagulation plan as per cardiology consult Palliative care consult requested ,to discuss advance directives and complete MOLST

## 2024-01-02 NOTE — DIETITIAN INITIAL EVALUATION ADULT - ORAL INTAKE PTA/DIET HISTORY
noted patient is a patient from [facility], diet order unavailable from transfer records noted patient is a patient from [facility], diet order unavailable from transfer records

## 2024-01-02 NOTE — PROGRESS NOTE ADULT - ASSESSMENT
72 yo , LTC resident with past medical history of end-stage renal disease on dialysis, chronic hypotension, afib, recent admission for clogged AV fistula is presenting for hypotension and hypoxia.    esrd  anemia  weakness  frailty  ataxic gait  AF  OP  OA  AV fistula malfunction    on tamiflu  penile dc  vs noted  isolation precs  oral hygiene  skin care  assist with needs  monitor VS and HD and Sat  HD as per renal

## 2024-01-02 NOTE — CARE COORDINATION ASSESSMENT. - READMITTED REASON YN
Nurse's Notes                                                                                     

 [facility]                                                                

Name: Tor Mccarty Jr                                                                             

Age: 15 yrs                                                                                       

Sex: Male                                                                                         

: 2003                                                                                   

MRN: [id number]                                                                                   

Arrival Date: 2018                                                                          

Time: 06:45                                                                                       

Account#: [id number]                                                                            

Bed 20                                                                                            

Private MD: Patrice Stone H                                                                        

Diagnosis: Sprain of anterior cruciate ligament of left knee                                      

                                                                                                  

Presentation:                                                                                     

                                                                                             

06:58 Presenting complaint: Patient states: "I was playing football and somebody pushed me    aa5 

      and my left leg went backwards to far (hyperextension)". Pt reports incident occurred       

      Tuesday, c/o left knee pain. Ambulatory with crutches.                                      

06:58 Transition of care: patient was not received from another setting of care. Onset of     aa5 

      symptoms was 2018. Risk Assessment: Do you want to hurt yourself or someone       

      else? Patient reports no desire to harm self or others. Care prior to arrival: None.        

06:58 Method Of Arrival: Ambulatory                                                           aa5 

06:58 Acuity: NAN 4                                                                           aa5 

                                                                                                  

Triage Assessment:                                                                                

07:00 Injury Description: Football injury.                                                    aa5 

                                                                                                  

Historical:                                                                                       

- Allergies:                                                                                      

07:00 No Known Allergies;                                                                     aa5 

- PMHx:                                                                                           

07:00 None;                                                                                   aa5 

- PSHx:                                                                                           

07:00 None;                                                                                   aa5 

                                                                                                  

- Immunization history:: Childhood immunizations are up to date.                                  

- Social history:: Smoking status: Patient/guardian denies using tobacco.                         

- Ebola Screening: : No symptoms or risks identified at this time.                                

                                                                                                  

                                                                                                  

Screenin:13 Abuse screen: Denies threats or abuse. Nutritional screening: No deficits noted.        aa5 

      Tuberculosis screening: No symptoms or risk factors identified.                             

07:13 Pedi Fall Risk Total Score: 0-1 Points : Low Risk for Falls.                            aa5 

                                                                                                  

      Fall Risk Scale Score:                                                                      

07:13 Mobility: Ambulatory or transfer with assistive device (1); Mentation: Developmentally  aa5 

      appropriate and alert (0); Elimination: Independent (0); Hx of Falls: No (0); Current       

      Meds: No (0); Total Score: 1                                                                

Assessment:                                                                                       

07:00 General: Appears comfortable, Behavior is calm, cooperative. Pain: Complains of pain in aa5 

      left knee Pain does not radiate. Pain currently is 0 out of 10 on a pain scale. Quality     

      of pain is described as aching, Pain began 2-3 days ago. Is intermittent, Aggravated by     

      touch to left knee and movement. Neuro: Level of Consciousness is awake, alert, obeys       

      commands, Oriented to person, place, time, situation. Cardiovascular: Heart tones S1 S2     

      present Rhythm is regular. Respiratory: Airway is patent Respiratory effort is even,        

      unlabored, Respiratory pattern is regular, symmetrical. GI: No signs and/or symptoms        

      were reported involving the gastrointestinal system. : No signs and/or symptoms were      

      reported regarding the genitourinary system. EENT: No signs and/or symptoms were            

      reported regarding the EENT system. Derm: Skin is pink, warm \T\ dry. Musculoskeletal:      

      Range of motion: limited in left knee Swelling present in left knee.                        

08:00 Reassessment: Patient is alert, oriented x 3, equal unlabored respirations, skin        aa5 

      warm/dry/pink.                                                                              

                                                                                                  

Vital Signs:                                                                                      

07:00  / 78; Pulse 80; Resp 16 S; Temp 98.0(TE); Pulse Ox 100% on R/A; Weight 48.08 kg  aa5 

      (M); Pain 0/10;                                                                             

                                                                                                  

ED Course:                                                                                        

06:45 Patient arrived in ED.                                                                  am2 

06:46 Patrice Stone MD is Private Physician.                                                   am2 

06:55 [facility]                                                               jr8 

06:55 Yves Barclay MD is Attending Physician.                                             jr8 

06:57 Arm band placed on Patient placed in an exam room, on a stretcher.                      aa5 

06:57 Patient has correct armband on for positive identification. Bed in low position. Call   aa5 

      light in reach. Side rails up X2. Adult w/ patient.                                         

07:09 Francisca Jovel, RN is Primary Nurse.                                                   aa5 

07:13 Triage completed.                                                                       aa5 

07:38 X-ray completed. Portable x-ray completed in exam room. Patient tolerated procedure     sw  

      well.                                                                                       

07:38 XRAY Knee LEFT 3 view In Process Unspecified.                                           EDMS

07:53 Kalyan Healy MD is Referral Physician.                                              jr8 

08:00 Knee immobilizer applied on left knee.                                                  aa5 

08:00 No provider procedures requiring assistance completed.                                  aa5 

08:00 Patient did not have IV access during this emergency room visit.                        aa5 

                                                                                                  

Administered Medications:                                                                         

No medications were administered                                                                  

                                                                                                  

                                                                                                  

Outcome:                                                                                          

07:54 Discharge ordered by MD.                                                                jr8 

08:05 Discharged to home ambulatory, with crutches, with family.                              aa5 

08:05 Condition: stable                                                                           

08:05 Discharge instructions given to Patient and pt's father  Instructed on discharge            

      instructions, follow up and referral plans. Demonstrated understanding of instructions,     

      follow-up care.                                                                             

08:07 Patient left the ED.                                                                    aa5 

                                                                                                  

Signatures:                                                                                       

Dispatcher [employer]                           Francisca Nicole RN                     RN   aa5                                                  

Tonio Capellan PA PA   jr8                                                  

Chayo Hammond Amanda                               Yossi                                                  

                                                                                                  

**************************************************************************************************
Yes

## 2024-01-02 NOTE — GOALS OF CARE CONVERSATION - ADVANCED CARE PLANNING - CONVERSATION DETAILS
hunter - [facility] brother     full code hunter - [facility] brother     full code

## 2024-01-02 NOTE — CARE COORDINATION ASSESSMENT. - NSCAREPROVIDERS_GEN_ALL_CORE_FT
CARE PROVIDERS:  Accepting Physician: Ivory Tobar  Administration: Arie Jj  Administration: Renu Knott  Administration: Noe Davis  Admitting: Ivory Tobar  Attending: Ivory Tobar  Cardiology Technician: Mali Velasquez  Consultant: Weil, Patricia  Consultant: Gordon Garcia  Consultant: Keny Cannon  Consultant: Melvin Forrest  Consultant: Domenic Paul  Consultant: Yousif Carranza  Consultant: Sparkle Fraga  Consultant: Debra Willard  Covering Team: Thony Huber  Covering Team: Francie Cedeno  ED Attending: Segun Alcocer  ED Nurse: Emory Aceves  Nurse: Nolberto Maria  Nurse: Roxanne Cummings  Nurse: Chris Mccarthy  Nurse: Rolanda King  Nurse: Clarke Viera  Ordered: ServiceAccount, SCMMLM  Ordered: ADM, User  Ordered: Bonilla, Fortino  Ordered: Doctor, Unknown  Outpatient Provider: Fortino Crystal  Outpatient Provider: Karel Thompson  Outpatient Provider: Mara Matias  Override: Cameron Chavez  Override: Kenn Allison  Override: Chris Mccarthy  Override: Laxmi Valdivia  Override: Sarah Sanchez  Override: Tony Haines  PCA/Nursing Assistant: Amy Santoro  Primary Team: Ivory Tobar  Quality Review: Michelle Garnett  Registered Dietitian: Thu Contreras  Registered Dietitian: Diana Wilkins  Research: Sulma Silva  Respiratory Therapy: Nettie Easton  : Randi Jones// Supp. Assoc.: Yi Garcia

## 2024-01-02 NOTE — PROGRESS NOTE ADULT - SUBJECTIVE AND OBJECTIVE BOX
Date/Time Patient Seen:  		  Referring MD:   Data Reviewed	       Patient is a 73y old  Male who presents with a chief complaint of hypoxia and hypotension (01 Jan 2024 20:12)      Subjective/HPI     PAST MEDICAL & SURGICAL HISTORY:  ASHD (arteriosclerotic heart disease)    CAD (coronary artery disease)  s/p stent, CABG    CRI (chronic renal insufficiency)  ESRD on HD    Diabetes mellitus type II    Hypertension    Hyperlipidemia    Pacemaker    Chronic atrial fibrillation    Asthma    Hypothyroid    Dementia    H/O carotid stenosis    Enlarged prostate    Hx of CABG    Coronary stent    Hernia    Cardiac pacemaker    History of amputation of toe  right great toe          Medication list         MEDICATIONS  (STANDING):  aMIOdarone    Tablet 200 milliGRAM(s) Oral daily  apixaban 5 milliGRAM(s) Oral two times a day  ascorbic acid 500 milliGRAM(s) Oral daily  aspirin enteric coated 81 milliGRAM(s) Oral daily  brimonidine 0.2% Ophthalmic Solution 1 Drop(s) Both EYES two times a day  budesonide 160 MICROgram(s)/formoterol 4.5 MICROgram(s) Inhaler 2 Puff(s) Inhalation two times a day  calcitriol   Capsule 0.25 MICROGram(s) Oral <User Schedule>  calcium acetate 667 milliGRAM(s) Oral three times a day with meals  dextrose 5%. 1000 milliLiter(s) (50 mL/Hr) IV Continuous <Continuous>  dextrose 5%. 1000 milliLiter(s) (100 mL/Hr) IV Continuous <Continuous>  dextrose 50% Injectable 25 Gram(s) IV Push once  dextrose 50% Injectable 25 Gram(s) IV Push once  dextrose 50% Injectable 12.5 Gram(s) IV Push once  dorzolamide 2%/timolol 0.5% Ophthalmic Solution 1 Drop(s) Both EYES two times a day  epoetin kayla-epbx (RETACRIT) Injectable 45053 Unit(s) IV Push <User Schedule>  finasteride 5 milliGRAM(s) Oral daily  glucagon  Injectable 1 milliGRAM(s) IntraMuscular once  insulin lispro (ADMELOG) corrective regimen sliding scale   SubCutaneous at bedtime  insulin lispro (ADMELOG) corrective regimen sliding scale   SubCutaneous three times a day before meals  lactobacillus acidophilus 1 Tablet(s) Oral daily  latanoprost 0.005% Ophthalmic Solution 1 Drop(s) Both EYES at bedtime  levothyroxine 50 MICROGram(s) Oral daily  melatonin 5 milliGRAM(s) Oral at bedtime  metoprolol tartrate 12.5 milliGRAM(s) Oral two times a day  midodrine. 10 milliGRAM(s) Oral three times a day  oseltamivir 30 milliGRAM(s) Oral daily  pantoprazole    Tablet 40 milliGRAM(s) Oral daily  senna 2 Tablet(s) Oral at bedtime  sertraline 50 milliGRAM(s) Oral daily  sertraline 25 milliGRAM(s) Oral daily  simvastatin 10 milliGRAM(s) Oral at bedtime  tamsulosin 0.4 milliGRAM(s) Oral at bedtime  ursodiol Capsule 300 milliGRAM(s) Oral two times a day    MEDICATIONS  (PRN):  acetaminophen     Tablet .. 650 milliGRAM(s) Oral every 6 hours PRN Temp greater or equal to 38C (100.4F), Mild Pain (1 - 3)  albuterol/ipratropium for Nebulization 3 milliLiter(s) Nebulizer every 6 hours PRN Shortness of Breath and/or Wheezing  aluminum hydroxide/magnesium hydroxide/simethicone Suspension 30 milliLiter(s) Oral every 4 hours PRN Dyspepsia  bisacodyl Suppository 10 milliGRAM(s) Rectal daily PRN Constipation  dextrose Oral Gel 15 Gram(s) Oral once PRN Blood Glucose LESS THAN 70 milliGRAM(s)/deciliter  guaiFENesin Oral Liquid (Sugar-Free) 200 milliGRAM(s) Oral every 6 hours PRN Cough  morphine  - Injectable 2 milliGRAM(s) IV Push every 6 hours PRN Severe Pain (7 - 10)  ondansetron Injectable 4 milliGRAM(s) IV Push every 8 hours PRN Nausea and/or Vomiting  traMADol 50 milliGRAM(s) Oral three times a day PRN Moderate Pain (4 - 6)         Vitals log        ICU Vital Signs Last 24 Hrs  T(C): 36.7 (02 Jan 2024 04:13), Max: 36.9 (02 Jan 2024 00:20)  T(F): 98 (02 Jan 2024 04:13), Max: 98.4 (02 Jan 2024 00:20)  HR: 84 (02 Jan 2024 04:13) (74 - 88)  BP: 111/58 (02 Jan 2024 04:13) (96/52 - 114/58)  BP(mean): --  ABP: --  ABP(mean): --  RR: 19 (02 Jan 2024 04:13) (14 - 19)  SpO2: 98% (02 Jan 2024 04:13) (98% - 100%)    O2 Parameters below as of 02 Jan 2024 04:13  Patient On (Oxygen Delivery Method): nasal cannula                 Input and Output:  I&O's Detail    01 Jan 2024 07:01  -  02 Jan 2024 05:30  --------------------------------------------------------  IN:  Total IN: 0 mL    OUT:    Voided (mL): 0 mL  Total OUT: 0 mL    Total NET: 0 mL          Lab Data                        8.6    3.56  )-----------( 166      ( 01 Jan 2024 07:02 )             27.6     01-01    136  |  100  |  40<H>  ----------------------------<  68<L>  3.7   |  27  |  4.80<H>    Ca    7.9<L>      01 Jan 2024 07:02  Mg     2.0     12-31    TPro  5.5<L>  /  Alb  2.6<L>  /  TBili  0.5  /  DBili  0.2  /  AST  310<H>  /  ALT  240<H>  /  AlkPhos  162<H>  01-01            Review of Systems	      Objective     Physical Examination    heart s1s2  lung dc BS  head nc      Pertinent Lab findings & Imaging      Bing:  NO   Adequate UO     I&O's Detail    01 Jan 2024 07:01  -  02 Jan 2024 05:30  --------------------------------------------------------  IN:  Total IN: 0 mL    OUT:    Voided (mL): 0 mL  Total OUT: 0 mL    Total NET: 0 mL               Discussed with:     Cultures:	        Radiology                             Date/Time Patient Seen:  		  Referring MD:   Data Reviewed	       Patient is a 73y old  Male who presents with a chief complaint of hypoxia and hypotension (01 Jan 2024 20:12)      Subjective/HPI     PAST MEDICAL & SURGICAL HISTORY:  ASHD (arteriosclerotic heart disease)    CAD (coronary artery disease)  s/p stent, CABG    CRI (chronic renal insufficiency)  ESRD on HD    Diabetes mellitus type II    Hypertension    Hyperlipidemia    Pacemaker    Chronic atrial fibrillation    Asthma    Hypothyroid    Dementia    H/O carotid stenosis    Enlarged prostate    Hx of CABG    Coronary stent    Hernia    Cardiac pacemaker    History of amputation of toe  right great toe          Medication list         MEDICATIONS  (STANDING):  aMIOdarone    Tablet 200 milliGRAM(s) Oral daily  apixaban 5 milliGRAM(s) Oral two times a day  ascorbic acid 500 milliGRAM(s) Oral daily  aspirin enteric coated 81 milliGRAM(s) Oral daily  brimonidine 0.2% Ophthalmic Solution 1 Drop(s) Both EYES two times a day  budesonide 160 MICROgram(s)/formoterol 4.5 MICROgram(s) Inhaler 2 Puff(s) Inhalation two times a day  calcitriol   Capsule 0.25 MICROGram(s) Oral <User Schedule>  calcium acetate 667 milliGRAM(s) Oral three times a day with meals  dextrose 5%. 1000 milliLiter(s) (50 mL/Hr) IV Continuous <Continuous>  dextrose 5%. 1000 milliLiter(s) (100 mL/Hr) IV Continuous <Continuous>  dextrose 50% Injectable 25 Gram(s) IV Push once  dextrose 50% Injectable 25 Gram(s) IV Push once  dextrose 50% Injectable 12.5 Gram(s) IV Push once  dorzolamide 2%/timolol 0.5% Ophthalmic Solution 1 Drop(s) Both EYES two times a day  epoetin kayla-epbx (RETACRIT) Injectable 31615 Unit(s) IV Push <User Schedule>  finasteride 5 milliGRAM(s) Oral daily  glucagon  Injectable 1 milliGRAM(s) IntraMuscular once  insulin lispro (ADMELOG) corrective regimen sliding scale   SubCutaneous at bedtime  insulin lispro (ADMELOG) corrective regimen sliding scale   SubCutaneous three times a day before meals  lactobacillus acidophilus 1 Tablet(s) Oral daily  latanoprost 0.005% Ophthalmic Solution 1 Drop(s) Both EYES at bedtime  levothyroxine 50 MICROGram(s) Oral daily  melatonin 5 milliGRAM(s) Oral at bedtime  metoprolol tartrate 12.5 milliGRAM(s) Oral two times a day  midodrine. 10 milliGRAM(s) Oral three times a day  oseltamivir 30 milliGRAM(s) Oral daily  pantoprazole    Tablet 40 milliGRAM(s) Oral daily  senna 2 Tablet(s) Oral at bedtime  sertraline 50 milliGRAM(s) Oral daily  sertraline 25 milliGRAM(s) Oral daily  simvastatin 10 milliGRAM(s) Oral at bedtime  tamsulosin 0.4 milliGRAM(s) Oral at bedtime  ursodiol Capsule 300 milliGRAM(s) Oral two times a day    MEDICATIONS  (PRN):  acetaminophen     Tablet .. 650 milliGRAM(s) Oral every 6 hours PRN Temp greater or equal to 38C (100.4F), Mild Pain (1 - 3)  albuterol/ipratropium for Nebulization 3 milliLiter(s) Nebulizer every 6 hours PRN Shortness of Breath and/or Wheezing  aluminum hydroxide/magnesium hydroxide/simethicone Suspension 30 milliLiter(s) Oral every 4 hours PRN Dyspepsia  bisacodyl Suppository 10 milliGRAM(s) Rectal daily PRN Constipation  dextrose Oral Gel 15 Gram(s) Oral once PRN Blood Glucose LESS THAN 70 milliGRAM(s)/deciliter  guaiFENesin Oral Liquid (Sugar-Free) 200 milliGRAM(s) Oral every 6 hours PRN Cough  morphine  - Injectable 2 milliGRAM(s) IV Push every 6 hours PRN Severe Pain (7 - 10)  ondansetron Injectable 4 milliGRAM(s) IV Push every 8 hours PRN Nausea and/or Vomiting  traMADol 50 milliGRAM(s) Oral three times a day PRN Moderate Pain (4 - 6)         Vitals log        ICU Vital Signs Last 24 Hrs  T(C): 36.7 (02 Jan 2024 04:13), Max: 36.9 (02 Jan 2024 00:20)  T(F): 98 (02 Jan 2024 04:13), Max: 98.4 (02 Jan 2024 00:20)  HR: 84 (02 Jan 2024 04:13) (74 - 88)  BP: 111/58 (02 Jan 2024 04:13) (96/52 - 114/58)  BP(mean): --  ABP: --  ABP(mean): --  RR: 19 (02 Jan 2024 04:13) (14 - 19)  SpO2: 98% (02 Jan 2024 04:13) (98% - 100%)    O2 Parameters below as of 02 Jan 2024 04:13  Patient On (Oxygen Delivery Method): nasal cannula                 Input and Output:  I&O's Detail    01 Jan 2024 07:01  -  02 Jan 2024 05:30  --------------------------------------------------------  IN:  Total IN: 0 mL    OUT:    Voided (mL): 0 mL  Total OUT: 0 mL    Total NET: 0 mL          Lab Data                        8.6    3.56  )-----------( 166      ( 01 Jan 2024 07:02 )             27.6     01-01    136  |  100  |  40<H>  ----------------------------<  68<L>  3.7   |  27  |  4.80<H>    Ca    7.9<L>      01 Jan 2024 07:02  Mg     2.0     12-31    TPro  5.5<L>  /  Alb  2.6<L>  /  TBili  0.5  /  DBili  0.2  /  AST  310<H>  /  ALT  240<H>  /  AlkPhos  162<H>  01-01            Review of Systems	      Objective     Physical Examination    heart s1s2  lung dc BS  head nc      Pertinent Lab findings & Imaging      Bing:  NO   Adequate UO     I&O's Detail    01 Jan 2024 07:01  -  02 Jan 2024 05:30  --------------------------------------------------------  IN:  Total IN: 0 mL    OUT:    Voided (mL): 0 mL  Total OUT: 0 mL    Total NET: 0 mL               Discussed with:     Cultures:	        Radiology

## 2024-01-03 LAB
ANION GAP SERPL CALC-SCNC: 2 MMOL/L — LOW (ref 5–17)
ANION GAP SERPL CALC-SCNC: 2 MMOL/L — LOW (ref 5–17)
BUN SERPL-MCNC: 26 MG/DL — HIGH (ref 7–23)
BUN SERPL-MCNC: 26 MG/DL — HIGH (ref 7–23)
CALCIUM SERPL-MCNC: 7.6 MG/DL — LOW (ref 8.5–10.1)
CALCIUM SERPL-MCNC: 7.6 MG/DL — LOW (ref 8.5–10.1)
CHLORIDE SERPL-SCNC: 101 MMOL/L — SIGNIFICANT CHANGE UP (ref 96–108)
CHLORIDE SERPL-SCNC: 101 MMOL/L — SIGNIFICANT CHANGE UP (ref 96–108)
CO2 SERPL-SCNC: 31 MMOL/L — SIGNIFICANT CHANGE UP (ref 22–31)
CO2 SERPL-SCNC: 31 MMOL/L — SIGNIFICANT CHANGE UP (ref 22–31)
CREAT SERPL-MCNC: 4 MG/DL — HIGH (ref 0.5–1.3)
CREAT SERPL-MCNC: 4 MG/DL — HIGH (ref 0.5–1.3)
EGFR: 15 ML/MIN/1.73M2 — LOW
EGFR: 15 ML/MIN/1.73M2 — LOW
GLUCOSE SERPL-MCNC: 106 MG/DL — HIGH (ref 70–99)
GLUCOSE SERPL-MCNC: 106 MG/DL — HIGH (ref 70–99)
HCT VFR BLD CALC: 26.3 % — LOW (ref 39–50)
HCT VFR BLD CALC: 26.3 % — LOW (ref 39–50)
HGB BLD-MCNC: 8.2 G/DL — LOW (ref 13–17)
HGB BLD-MCNC: 8.2 G/DL — LOW (ref 13–17)
MCHC RBC-ENTMCNC: 31.2 GM/DL — LOW (ref 32–36)
MCHC RBC-ENTMCNC: 31.2 GM/DL — LOW (ref 32–36)
MCHC RBC-ENTMCNC: 33.6 PG — SIGNIFICANT CHANGE UP (ref 27–34)
MCHC RBC-ENTMCNC: 33.6 PG — SIGNIFICANT CHANGE UP (ref 27–34)
MCV RBC AUTO: 107.8 FL — HIGH (ref 80–100)
MCV RBC AUTO: 107.8 FL — HIGH (ref 80–100)
NRBC # BLD: 3 /100 WBCS — HIGH (ref 0–0)
NRBC # BLD: 3 /100 WBCS — HIGH (ref 0–0)
PLATELET # BLD AUTO: 149 K/UL — LOW (ref 150–400)
PLATELET # BLD AUTO: 149 K/UL — LOW (ref 150–400)
POTASSIUM SERPL-MCNC: 3.6 MMOL/L — SIGNIFICANT CHANGE UP (ref 3.5–5.3)
POTASSIUM SERPL-MCNC: 3.6 MMOL/L — SIGNIFICANT CHANGE UP (ref 3.5–5.3)
POTASSIUM SERPL-SCNC: 3.6 MMOL/L — SIGNIFICANT CHANGE UP (ref 3.5–5.3)
POTASSIUM SERPL-SCNC: 3.6 MMOL/L — SIGNIFICANT CHANGE UP (ref 3.5–5.3)
RBC # BLD: 2.44 M/UL — LOW (ref 4.2–5.8)
RBC # BLD: 2.44 M/UL — LOW (ref 4.2–5.8)
RBC # FLD: 14.5 % — SIGNIFICANT CHANGE UP (ref 10.3–14.5)
RBC # FLD: 14.5 % — SIGNIFICANT CHANGE UP (ref 10.3–14.5)
SODIUM SERPL-SCNC: 134 MMOL/L — LOW (ref 135–145)
SODIUM SERPL-SCNC: 134 MMOL/L — LOW (ref 135–145)
TROPONIN I, HIGH SENSITIVITY RESULT: 509.6 NG/L — HIGH
TROPONIN I, HIGH SENSITIVITY RESULT: 509.6 NG/L — HIGH
WBC # BLD: 3.77 K/UL — LOW (ref 3.8–10.5)
WBC # BLD: 3.77 K/UL — LOW (ref 3.8–10.5)
WBC # FLD AUTO: 3.77 K/UL — LOW (ref 3.8–10.5)
WBC # FLD AUTO: 3.77 K/UL — LOW (ref 3.8–10.5)

## 2024-01-03 RX ORDER — LATANOPROST 0.05 MG/ML
1 SOLUTION/ DROPS OPHTHALMIC; TOPICAL
Refills: 0 | DISCHARGE

## 2024-01-03 RX ORDER — SERTRALINE 25 MG/1
1 TABLET, FILM COATED ORAL
Refills: 0 | DISCHARGE

## 2024-01-03 RX ORDER — BRIMONIDINE TARTRATE 2 MG/MG
1 SOLUTION/ DROPS OPHTHALMIC
Refills: 0 | DISCHARGE

## 2024-01-03 RX ORDER — LACTOBACILLUS ACIDOPHILUS 100MM CELL
1 CAPSULE ORAL
Refills: 0 | DISCHARGE

## 2024-01-03 RX ORDER — ALPRAZOLAM 0.25 MG
0.25 TABLET ORAL ONCE
Refills: 0 | Status: DISCONTINUED | OUTPATIENT
Start: 2024-01-03 | End: 2024-01-03

## 2024-01-03 RX ORDER — BUDESONIDE AND FORMOTEROL FUMARATE DIHYDRATE 160; 4.5 UG/1; UG/1
2 AEROSOL RESPIRATORY (INHALATION)
Refills: 0 | DISCHARGE

## 2024-01-03 RX ORDER — TAMSULOSIN HYDROCHLORIDE 0.4 MG/1
1 CAPSULE ORAL
Refills: 0 | DISCHARGE

## 2024-01-03 RX ORDER — MIDODRINE HYDROCHLORIDE 2.5 MG/1
15 TABLET ORAL THREE TIMES A DAY
Refills: 0 | Status: DISCONTINUED | OUTPATIENT
Start: 2024-01-03 | End: 2024-01-05

## 2024-01-03 RX ORDER — DORZOLAMIDE HYDROCHLORIDE TIMOLOL MALEATE 20; 5 MG/ML; MG/ML
1 SOLUTION/ DROPS OPHTHALMIC
Refills: 0 | DISCHARGE

## 2024-01-03 RX ORDER — CALCIUM ACETATE 667 MG
1 TABLET ORAL
Refills: 0 | DISCHARGE

## 2024-01-03 RX ORDER — ASCORBIC ACID 60 MG
1 TABLET,CHEWABLE ORAL
Refills: 0 | DISCHARGE

## 2024-01-03 RX ORDER — LANOLIN ALCOHOL/MO/W.PET/CERES
1 CREAM (GRAM) TOPICAL
Refills: 0 | DISCHARGE

## 2024-01-03 RX ORDER — CALCITRIOL 0.5 UG/1
1 CAPSULE ORAL
Refills: 0 | DISCHARGE

## 2024-01-03 RX ORDER — LEVOTHYROXINE SODIUM 125 MCG
1 TABLET ORAL
Refills: 0 | DISCHARGE

## 2024-01-03 RX ADMIN — Medication 500 MILLIGRAM(S): at 11:21

## 2024-01-03 RX ADMIN — DORZOLAMIDE HYDROCHLORIDE TIMOLOL MALEATE 1 DROP(S): 20; 5 SOLUTION/ DROPS OPHTHALMIC at 18:15

## 2024-01-03 RX ADMIN — Medication 200 MILLIGRAM(S): at 01:08

## 2024-01-03 RX ADMIN — APIXABAN 5 MILLIGRAM(S): 2.5 TABLET, FILM COATED ORAL at 05:04

## 2024-01-03 RX ADMIN — LATANOPROST 1 DROP(S): 0.05 SOLUTION/ DROPS OPHTHALMIC; TOPICAL at 22:15

## 2024-01-03 RX ADMIN — TAMSULOSIN HYDROCHLORIDE 0.4 MILLIGRAM(S): 0.4 CAPSULE ORAL at 22:16

## 2024-01-03 RX ADMIN — TRAMADOL HYDROCHLORIDE 50 MILLIGRAM(S): 50 TABLET ORAL at 08:25

## 2024-01-03 RX ADMIN — CALCITRIOL 0.25 MICROGRAM(S): 0.5 CAPSULE ORAL at 11:21

## 2024-01-03 RX ADMIN — BRIMONIDINE TARTRATE 1 DROP(S): 2 SOLUTION/ DROPS OPHTHALMIC at 18:31

## 2024-01-03 RX ADMIN — Medication 3 MILLILITER(S): at 20:32

## 2024-01-03 RX ADMIN — TRAMADOL HYDROCHLORIDE 50 MILLIGRAM(S): 50 TABLET ORAL at 22:16

## 2024-01-03 RX ADMIN — Medication 3 MILLILITER(S): at 14:07

## 2024-01-03 RX ADMIN — URSODIOL 300 MILLIGRAM(S): 250 TABLET, FILM COATED ORAL at 05:04

## 2024-01-03 RX ADMIN — SENNA PLUS 2 TABLET(S): 8.6 TABLET ORAL at 22:16

## 2024-01-03 RX ADMIN — Medication 1 TABLET(S): at 11:20

## 2024-01-03 RX ADMIN — TRAMADOL HYDROCHLORIDE 50 MILLIGRAM(S): 50 TABLET ORAL at 05:44

## 2024-01-03 RX ADMIN — APIXABAN 5 MILLIGRAM(S): 2.5 TABLET, FILM COATED ORAL at 18:17

## 2024-01-03 RX ADMIN — SERTRALINE 25 MILLIGRAM(S): 25 TABLET, FILM COATED ORAL at 11:20

## 2024-01-03 RX ADMIN — MIDODRINE HYDROCHLORIDE 15 MILLIGRAM(S): 2.5 TABLET ORAL at 18:17

## 2024-01-03 RX ADMIN — SIMVASTATIN 10 MILLIGRAM(S): 20 TABLET, FILM COATED ORAL at 22:17

## 2024-01-03 RX ADMIN — Medication 667 MILLIGRAM(S): at 13:56

## 2024-01-03 RX ADMIN — Medication 5 MILLIGRAM(S): at 22:17

## 2024-01-03 RX ADMIN — Medication 3 MILLILITER(S): at 06:37

## 2024-01-03 RX ADMIN — BRIMONIDINE TARTRATE 1 DROP(S): 2 SOLUTION/ DROPS OPHTHALMIC at 05:04

## 2024-01-03 RX ADMIN — Medication 200 MILLIGRAM(S): at 08:41

## 2024-01-03 RX ADMIN — MIDODRINE HYDROCHLORIDE 10 MILLIGRAM(S): 2.5 TABLET ORAL at 11:21

## 2024-01-03 RX ADMIN — TRAMADOL HYDROCHLORIDE 50 MILLIGRAM(S): 50 TABLET ORAL at 09:25

## 2024-01-03 RX ADMIN — Medication 81 MILLIGRAM(S): at 11:21

## 2024-01-03 RX ADMIN — BUDESONIDE AND FORMOTEROL FUMARATE DIHYDRATE 2 PUFF(S): 160; 4.5 AEROSOL RESPIRATORY (INHALATION) at 19:41

## 2024-01-03 RX ADMIN — FINASTERIDE 5 MILLIGRAM(S): 5 TABLET, FILM COATED ORAL at 11:20

## 2024-01-03 RX ADMIN — TRAMADOL HYDROCHLORIDE 50 MILLIGRAM(S): 50 TABLET ORAL at 04:44

## 2024-01-03 RX ADMIN — Medication 0.25 MILLIGRAM(S): at 10:07

## 2024-01-03 RX ADMIN — Medication 667 MILLIGRAM(S): at 18:18

## 2024-01-03 RX ADMIN — Medication 667 MILLIGRAM(S): at 08:25

## 2024-01-03 RX ADMIN — Medication 200 MILLIGRAM(S): at 22:15

## 2024-01-03 RX ADMIN — SERTRALINE 50 MILLIGRAM(S): 25 TABLET, FILM COATED ORAL at 11:20

## 2024-01-03 RX ADMIN — DORZOLAMIDE HYDROCHLORIDE TIMOLOL MALEATE 1 DROP(S): 20; 5 SOLUTION/ DROPS OPHTHALMIC at 05:05

## 2024-01-03 RX ADMIN — URSODIOL 300 MILLIGRAM(S): 250 TABLET, FILM COATED ORAL at 18:18

## 2024-01-03 RX ADMIN — Medication 50 MICROGRAM(S): at 05:04

## 2024-01-03 RX ADMIN — PANTOPRAZOLE SODIUM 40 MILLIGRAM(S): 20 TABLET, DELAYED RELEASE ORAL at 11:30

## 2024-01-03 RX ADMIN — MIDODRINE HYDROCHLORIDE 10 MILLIGRAM(S): 2.5 TABLET ORAL at 05:04

## 2024-01-03 RX ADMIN — BUDESONIDE AND FORMOTEROL FUMARATE DIHYDRATE 2 PUFF(S): 160; 4.5 AEROSOL RESPIRATORY (INHALATION) at 05:05

## 2024-01-03 NOTE — PROGRESS NOTE ADULT - ASSESSMENT
ESRD on HD  clotted AVF, s/p thrombectomy, and venoplasty  Hypotension, on midodrine  Anemia    Next HD tomorrow. Persistent hypotension. Will increase midodrine. Cardiology follow up.   Continue midodrine for hypotension. To continue current meds. Procrit for anemia. Overall prognosis poor.

## 2024-01-03 NOTE — PROGRESS NOTE ADULT - SUBJECTIVE AND OBJECTIVE BOX
Patient is a 73y old  Male who presents with a chief complaint of SOB (27 Dec 2023 04:46)  Patient seen in follow up for ESRD on HD.        PAST MEDICAL HISTORY:  ASHD (arteriosclerotic heart disease)    CAD (coronary artery disease)    CRI (chronic renal insufficiency)    Diabetes mellitus type II    Hypertension    Hyperlipidemia    Pacemaker    Chronic atrial fibrillation    Asthma    Hypothyroid    Dementia    H/O carotid stenosis    Enlarged prostate      MEDICATIONS  (STANDING):  aMIOdarone    Tablet 200 milliGRAM(s) Oral daily  apixaban 5 milliGRAM(s) Oral two times a day  ascorbic acid 500 milliGRAM(s) Oral daily  aspirin enteric coated 81 milliGRAM(s) Oral daily  brimonidine 0.2% Ophthalmic Solution 1 Drop(s) Both EYES two times a day  budesonide 160 MICROgram(s)/formoterol 4.5 MICROgram(s) Inhaler 2 Puff(s) Inhalation two times a day  calcitriol   Capsule 0.25 MICROGram(s) Oral <User Schedule>  calcium acetate 667 milliGRAM(s) Oral three times a day with meals  dextrose 5%. 1000 milliLiter(s) (100 mL/Hr) IV Continuous <Continuous>  dextrose 5%. 1000 milliLiter(s) (50 mL/Hr) IV Continuous <Continuous>  dextrose 50% Injectable 25 Gram(s) IV Push once  dextrose 50% Injectable 25 Gram(s) IV Push once  dextrose 50% Injectable 12.5 Gram(s) IV Push once  dorzolamide 2%/timolol 0.5% Ophthalmic Solution 1 Drop(s) Both EYES two times a day  epoetin kayla-epbx (RETACRIT) Injectable 52088 Unit(s) IV Push <User Schedule>  finasteride 5 milliGRAM(s) Oral daily  glucagon  Injectable 1 milliGRAM(s) IntraMuscular once  insulin lispro (ADMELOG) corrective regimen sliding scale   SubCutaneous at bedtime  insulin lispro (ADMELOG) corrective regimen sliding scale   SubCutaneous three times a day before meals  lactobacillus acidophilus 1 Tablet(s) Oral daily  latanoprost 0.005% Ophthalmic Solution 1 Drop(s) Both EYES at bedtime  levothyroxine 50 MICROGram(s) Oral daily  melatonin 5 milliGRAM(s) Oral at bedtime  midodrine. 10 milliGRAM(s) Oral three times a day  pantoprazole    Tablet 40 milliGRAM(s) Oral daily  senna 2 Tablet(s) Oral at bedtime  sertraline 50 milliGRAM(s) Oral daily  sertraline 25 milliGRAM(s) Oral daily  simvastatin 10 milliGRAM(s) Oral at bedtime  tamsulosin 0.4 milliGRAM(s) Oral at bedtime  ursodiol Capsule 300 milliGRAM(s) Oral two times a day    MEDICATIONS  (PRN):  acetaminophen     Tablet .. 650 milliGRAM(s) Oral every 6 hours PRN Temp greater or equal to 38C (100.4F), Mild Pain (1 - 3)  albuterol/ipratropium for Nebulization 3 milliLiter(s) Nebulizer every 6 hours PRN Shortness of Breath and/or Wheezing  aluminum hydroxide/magnesium hydroxide/simethicone Suspension 30 milliLiter(s) Oral every 4 hours PRN Dyspepsia  bisacodyl Suppository 10 milliGRAM(s) Rectal daily PRN Constipation  dextrose Oral Gel 15 Gram(s) Oral once PRN Blood Glucose LESS THAN 70 milliGRAM(s)/deciliter  guaiFENesin Oral Liquid (Sugar-Free) 200 milliGRAM(s) Oral every 6 hours PRN Cough  morphine  - Injectable 2 milliGRAM(s) IV Push every 6 hours PRN Severe Pain (7 - 10)  ondansetron Injectable 4 milliGRAM(s) IV Push every 8 hours PRN Nausea and/or Vomiting  traMADol 50 milliGRAM(s) Oral three times a day PRN Moderate Pain (4 - 6)    T(C): 36.7 (01-03-24 @ 11:21), Max: 37.1 (01-02-24 @ 20:11)  HR: 80 (01-03-24 @ 11:21) (59 - 88)  BP: 92/54 (01-03-24 @ 11:21) (80/40 - 117/47)  RR: 18 (01-03-24 @ 11:21)  SpO2: 99% (01-03-24 @ 11:21)  Wt(kg): --  I&O's Detail    02 Jan 2024 07:01  -  03 Jan 2024 07:00  --------------------------------------------------------  IN:    IV PiggyBack: 100 mL    Other (mL): 1100 mL    Sodium Chloride 0.9% Bolus: 500 mL  Total IN: 1700 mL    OUT:    Other (mL): 600 mL  Total OUT: 600 mL    Total NET: 1100 mL        PHYSICAL EXAM:  General: No distress, + dialysis catheter  Respiratory: b/l air entry  Cardiovascular: S1 S2  Gastrointestinal: soft  Extremities:  no edema, AVF, no bruit              LABORATORY:                        8.2    3.77  )-----------( 149      ( 03 Jan 2024 07:55 )             26.3     01-03    134<L>  |  101  |  26<H>  ----------------------------<  106<H>  3.6   |  31  |  4.00<H>    Ca    7.6<L>      03 Jan 2024 07:55      Sodium: 134 mmol/L (01-03 @ 07:55)    Potassium: 3.6 mmol/L (01-03 @ 07:55)    Hemoglobin: 8.2 g/dL (01-03 @ 07:55)  Hemoglobin: 8.6 g/dL (01-01 @ 07:02)    Creatinine, Serum 4.00 (01-03 @ 07:55)  Creatinine, Serum 4.80 (01-01 @ 07:02)      Urinalysis Basic - ( 03 Jan 2024 07:55 )    Color: x / Appearance: x / SG: x / pH: x  Gluc: 106 mg/dL / Ketone: x  / Bili: x / Urobili: x   Blood: x / Protein: x / Nitrite: x   Leuk Esterase: x / RBC: x / WBC x   Sq Epi: x / Non Sq Epi: x / Bacteria: x       Patient is a 73y old  Male who presents with a chief complaint of SOB (27 Dec 2023 04:46)  Patient seen in follow up for ESRD on HD.        PAST MEDICAL HISTORY:  ASHD (arteriosclerotic heart disease)    CAD (coronary artery disease)    CRI (chronic renal insufficiency)    Diabetes mellitus type II    Hypertension    Hyperlipidemia    Pacemaker    Chronic atrial fibrillation    Asthma    Hypothyroid    Dementia    H/O carotid stenosis    Enlarged prostate      MEDICATIONS  (STANDING):  aMIOdarone    Tablet 200 milliGRAM(s) Oral daily  apixaban 5 milliGRAM(s) Oral two times a day  ascorbic acid 500 milliGRAM(s) Oral daily  aspirin enteric coated 81 milliGRAM(s) Oral daily  brimonidine 0.2% Ophthalmic Solution 1 Drop(s) Both EYES two times a day  budesonide 160 MICROgram(s)/formoterol 4.5 MICROgram(s) Inhaler 2 Puff(s) Inhalation two times a day  calcitriol   Capsule 0.25 MICROGram(s) Oral <User Schedule>  calcium acetate 667 milliGRAM(s) Oral three times a day with meals  dextrose 5%. 1000 milliLiter(s) (100 mL/Hr) IV Continuous <Continuous>  dextrose 5%. 1000 milliLiter(s) (50 mL/Hr) IV Continuous <Continuous>  dextrose 50% Injectable 25 Gram(s) IV Push once  dextrose 50% Injectable 25 Gram(s) IV Push once  dextrose 50% Injectable 12.5 Gram(s) IV Push once  dorzolamide 2%/timolol 0.5% Ophthalmic Solution 1 Drop(s) Both EYES two times a day  epoetin kayla-epbx (RETACRIT) Injectable 07799 Unit(s) IV Push <User Schedule>  finasteride 5 milliGRAM(s) Oral daily  glucagon  Injectable 1 milliGRAM(s) IntraMuscular once  insulin lispro (ADMELOG) corrective regimen sliding scale   SubCutaneous at bedtime  insulin lispro (ADMELOG) corrective regimen sliding scale   SubCutaneous three times a day before meals  lactobacillus acidophilus 1 Tablet(s) Oral daily  latanoprost 0.005% Ophthalmic Solution 1 Drop(s) Both EYES at bedtime  levothyroxine 50 MICROGram(s) Oral daily  melatonin 5 milliGRAM(s) Oral at bedtime  midodrine. 10 milliGRAM(s) Oral three times a day  pantoprazole    Tablet 40 milliGRAM(s) Oral daily  senna 2 Tablet(s) Oral at bedtime  sertraline 50 milliGRAM(s) Oral daily  sertraline 25 milliGRAM(s) Oral daily  simvastatin 10 milliGRAM(s) Oral at bedtime  tamsulosin 0.4 milliGRAM(s) Oral at bedtime  ursodiol Capsule 300 milliGRAM(s) Oral two times a day    MEDICATIONS  (PRN):  acetaminophen     Tablet .. 650 milliGRAM(s) Oral every 6 hours PRN Temp greater or equal to 38C (100.4F), Mild Pain (1 - 3)  albuterol/ipratropium for Nebulization 3 milliLiter(s) Nebulizer every 6 hours PRN Shortness of Breath and/or Wheezing  aluminum hydroxide/magnesium hydroxide/simethicone Suspension 30 milliLiter(s) Oral every 4 hours PRN Dyspepsia  bisacodyl Suppository 10 milliGRAM(s) Rectal daily PRN Constipation  dextrose Oral Gel 15 Gram(s) Oral once PRN Blood Glucose LESS THAN 70 milliGRAM(s)/deciliter  guaiFENesin Oral Liquid (Sugar-Free) 200 milliGRAM(s) Oral every 6 hours PRN Cough  morphine  - Injectable 2 milliGRAM(s) IV Push every 6 hours PRN Severe Pain (7 - 10)  ondansetron Injectable 4 milliGRAM(s) IV Push every 8 hours PRN Nausea and/or Vomiting  traMADol 50 milliGRAM(s) Oral three times a day PRN Moderate Pain (4 - 6)    T(C): 36.7 (01-03-24 @ 11:21), Max: 37.1 (01-02-24 @ 20:11)  HR: 80 (01-03-24 @ 11:21) (59 - 88)  BP: 92/54 (01-03-24 @ 11:21) (80/40 - 117/47)  RR: 18 (01-03-24 @ 11:21)  SpO2: 99% (01-03-24 @ 11:21)  Wt(kg): --  I&O's Detail    02 Jan 2024 07:01  -  03 Jan 2024 07:00  --------------------------------------------------------  IN:    IV PiggyBack: 100 mL    Other (mL): 1100 mL    Sodium Chloride 0.9% Bolus: 500 mL  Total IN: 1700 mL    OUT:    Other (mL): 600 mL  Total OUT: 600 mL    Total NET: 1100 mL        PHYSICAL EXAM:  General: No distress, + dialysis catheter  Respiratory: b/l air entry  Cardiovascular: S1 S2  Gastrointestinal: soft  Extremities:  no edema, AVF, no bruit              LABORATORY:                        8.2    3.77  )-----------( 149      ( 03 Jan 2024 07:55 )             26.3     01-03    134<L>  |  101  |  26<H>  ----------------------------<  106<H>  3.6   |  31  |  4.00<H>    Ca    7.6<L>      03 Jan 2024 07:55      Sodium: 134 mmol/L (01-03 @ 07:55)    Potassium: 3.6 mmol/L (01-03 @ 07:55)    Hemoglobin: 8.2 g/dL (01-03 @ 07:55)  Hemoglobin: 8.6 g/dL (01-01 @ 07:02)    Creatinine, Serum 4.00 (01-03 @ 07:55)  Creatinine, Serum 4.80 (01-01 @ 07:02)      Urinalysis Basic - ( 03 Jan 2024 07:55 )    Color: x / Appearance: x / SG: x / pH: x  Gluc: 106 mg/dL / Ketone: x  / Bili: x / Urobili: x   Blood: x / Protein: x / Nitrite: x   Leuk Esterase: x / RBC: x / WBC x   Sq Epi: x / Non Sq Epi: x / Bacteria: x

## 2024-01-03 NOTE — PROGRESS NOTE ADULT - SUBJECTIVE AND OBJECTIVE BOX
Interval History:    CENTRAL LINE:   [  ] YES       [  ] NO  MATOS:                 [  ] YES       [  ] NO         REVIEW OF SYSTEMS:  All Systems below were reviewed and are negative [  ]  HEENT:  ID:  Pulmonary:  Cardiac:  GI:  Renal:  Musculoskeletal:  All other systems above were reviewed and are negative   [  ]      MEDICATIONS  (STANDING):  aMIOdarone    Tablet 200 milliGRAM(s) Oral daily  apixaban 5 milliGRAM(s) Oral two times a day  ascorbic acid 500 milliGRAM(s) Oral daily  aspirin enteric coated 81 milliGRAM(s) Oral daily  brimonidine 0.2% Ophthalmic Solution 1 Drop(s) Both EYES two times a day  budesonide 160 MICROgram(s)/formoterol 4.5 MICROgram(s) Inhaler 2 Puff(s) Inhalation two times a day  calcitriol   Capsule 0.25 MICROGram(s) Oral <User Schedule>  calcium acetate 667 milliGRAM(s) Oral three times a day with meals  dextrose 5%. 1000 milliLiter(s) (100 mL/Hr) IV Continuous <Continuous>  dextrose 5%. 1000 milliLiter(s) (50 mL/Hr) IV Continuous <Continuous>  dextrose 50% Injectable 25 Gram(s) IV Push once  dextrose 50% Injectable 25 Gram(s) IV Push once  dextrose 50% Injectable 12.5 Gram(s) IV Push once  dorzolamide 2%/timolol 0.5% Ophthalmic Solution 1 Drop(s) Both EYES two times a day  epoetin kayla-epbx (RETACRIT) Injectable 95608 Unit(s) IV Push <User Schedule>  finasteride 5 milliGRAM(s) Oral daily  glucagon  Injectable 1 milliGRAM(s) IntraMuscular once  insulin lispro (ADMELOG) corrective regimen sliding scale   SubCutaneous at bedtime  insulin lispro (ADMELOG) corrective regimen sliding scale   SubCutaneous three times a day before meals  lactobacillus acidophilus 1 Tablet(s) Oral daily  latanoprost 0.005% Ophthalmic Solution 1 Drop(s) Both EYES at bedtime  levothyroxine 50 MICROGram(s) Oral daily  melatonin 5 milliGRAM(s) Oral at bedtime  midodrine. 15 milliGRAM(s) Oral three times a day  pantoprazole    Tablet 40 milliGRAM(s) Oral daily  senna 2 Tablet(s) Oral at bedtime  sertraline 50 milliGRAM(s) Oral daily  sertraline 25 milliGRAM(s) Oral daily  simvastatin 10 milliGRAM(s) Oral at bedtime  tamsulosin 0.4 milliGRAM(s) Oral at bedtime  ursodiol Capsule 300 milliGRAM(s) Oral two times a day    MEDICATIONS  (PRN):  acetaminophen     Tablet .. 650 milliGRAM(s) Oral every 6 hours PRN Temp greater or equal to 38C (100.4F), Mild Pain (1 - 3)  albuterol/ipratropium for Nebulization 3 milliLiter(s) Nebulizer every 6 hours PRN Shortness of Breath and/or Wheezing  aluminum hydroxide/magnesium hydroxide/simethicone Suspension 30 milliLiter(s) Oral every 4 hours PRN Dyspepsia  bisacodyl Suppository 10 milliGRAM(s) Rectal daily PRN Constipation  dextrose Oral Gel 15 Gram(s) Oral once PRN Blood Glucose LESS THAN 70 milliGRAM(s)/deciliter  guaiFENesin Oral Liquid (Sugar-Free) 200 milliGRAM(s) Oral every 6 hours PRN Cough  morphine  - Injectable 2 milliGRAM(s) IV Push every 6 hours PRN Severe Pain (7 - 10)  ondansetron Injectable 4 milliGRAM(s) IV Push every 8 hours PRN Nausea and/or Vomiting  traMADol 50 milliGRAM(s) Oral three times a day PRN Moderate Pain (4 - 6)      Vital Signs Last 24 Hrs  T(C): 36.7 (03 Jan 2024 11:21), Max: 37.1 (02 Jan 2024 20:11)  T(F): 98 (03 Jan 2024 11:21), Max: 98.7 (02 Jan 2024 20:11)  HR: 68 (03 Jan 2024 15:00) (61 - 82)  BP: 92/54 (03 Jan 2024 11:21) (80/40 - 103/59)  BP(mean): --  RR: 18 (03 Jan 2024 11:21) (18 - 18)  SpO2: 98% (03 Jan 2024 15:00) (98% - 100%)    Parameters below as of 03 Jan 2024 15:00  Patient On (Oxygen Delivery Method): nasal cannula, 4L        I&O's Summary    02 Jan 2024 07:01  -  03 Jan 2024 07:00  --------------------------------------------------------  IN: 1700 mL / OUT: 600 mL / NET: 1100 mL        PHYSICAL EXAM:  HEENT: NC/AT; PERRLA  Neck: Soft; no tenderness  Lungs: CTA bilaterally; no wheezing.   Heart:  Abdomen:  Genital/ Rectal:  Extremities:  Neurologic:  Vascular:      LABORATORY:    CBC Full  -  ( 03 Jan 2024 07:55 )  WBC Count : 3.77 K/uL  RBC Count : 2.44 M/uL  Hemoglobin : 8.2 g/dL  Hematocrit : 26.3 %  Platelet Count - Automated : 149 K/uL  Mean Cell Volume : 107.8 fl  Mean Cell Hemoglobin : 33.6 pg  Mean Cell Hemoglobin Concentration : 31.2 gm/dL  Auto Neutrophil # : x  Auto Lymphocyte # : x  Auto Monocyte # : x  Auto Eosinophil # : x  Auto Basophil # : x  Auto Neutrophil % : x  Auto Lymphocyte % : x  Auto Monocyte % : x  Auto Eosinophil % : x  Auto Basophil % : x          01-03    134<L>  |  101  |  26<H>  ----------------------------<  106<H>  3.6   |  31  |  4.00<H>    Ca    7.6<L>      03 Jan 2024 07:55            Assessment and Plan:          Domenic Paul MD   [phone].      Interval History:    CENTRAL LINE:   [  ] YES       [  ] NO  MATOS:                 [  ] YES       [  ] NO         REVIEW OF SYSTEMS:  All Systems below were reviewed and are negative [  ]  HEENT:  ID:  Pulmonary:  Cardiac:  GI:  Renal:  Musculoskeletal:  All other systems above were reviewed and are negative   [  ]      MEDICATIONS  (STANDING):  aMIOdarone    Tablet 200 milliGRAM(s) Oral daily  apixaban 5 milliGRAM(s) Oral two times a day  ascorbic acid 500 milliGRAM(s) Oral daily  aspirin enteric coated 81 milliGRAM(s) Oral daily  brimonidine 0.2% Ophthalmic Solution 1 Drop(s) Both EYES two times a day  budesonide 160 MICROgram(s)/formoterol 4.5 MICROgram(s) Inhaler 2 Puff(s) Inhalation two times a day  calcitriol   Capsule 0.25 MICROGram(s) Oral <User Schedule>  calcium acetate 667 milliGRAM(s) Oral three times a day with meals  dextrose 5%. 1000 milliLiter(s) (100 mL/Hr) IV Continuous <Continuous>  dextrose 5%. 1000 milliLiter(s) (50 mL/Hr) IV Continuous <Continuous>  dextrose 50% Injectable 25 Gram(s) IV Push once  dextrose 50% Injectable 25 Gram(s) IV Push once  dextrose 50% Injectable 12.5 Gram(s) IV Push once  dorzolamide 2%/timolol 0.5% Ophthalmic Solution 1 Drop(s) Both EYES two times a day  epoetin kayla-epbx (RETACRIT) Injectable 28545 Unit(s) IV Push <User Schedule>  finasteride 5 milliGRAM(s) Oral daily  glucagon  Injectable 1 milliGRAM(s) IntraMuscular once  insulin lispro (ADMELOG) corrective regimen sliding scale   SubCutaneous at bedtime  insulin lispro (ADMELOG) corrective regimen sliding scale   SubCutaneous three times a day before meals  lactobacillus acidophilus 1 Tablet(s) Oral daily  latanoprost 0.005% Ophthalmic Solution 1 Drop(s) Both EYES at bedtime  levothyroxine 50 MICROGram(s) Oral daily  melatonin 5 milliGRAM(s) Oral at bedtime  midodrine. 15 milliGRAM(s) Oral three times a day  pantoprazole    Tablet 40 milliGRAM(s) Oral daily  senna 2 Tablet(s) Oral at bedtime  sertraline 50 milliGRAM(s) Oral daily  sertraline 25 milliGRAM(s) Oral daily  simvastatin 10 milliGRAM(s) Oral at bedtime  tamsulosin 0.4 milliGRAM(s) Oral at bedtime  ursodiol Capsule 300 milliGRAM(s) Oral two times a day    MEDICATIONS  (PRN):  acetaminophen     Tablet .. 650 milliGRAM(s) Oral every 6 hours PRN Temp greater or equal to 38C (100.4F), Mild Pain (1 - 3)  albuterol/ipratropium for Nebulization 3 milliLiter(s) Nebulizer every 6 hours PRN Shortness of Breath and/or Wheezing  aluminum hydroxide/magnesium hydroxide/simethicone Suspension 30 milliLiter(s) Oral every 4 hours PRN Dyspepsia  bisacodyl Suppository 10 milliGRAM(s) Rectal daily PRN Constipation  dextrose Oral Gel 15 Gram(s) Oral once PRN Blood Glucose LESS THAN 70 milliGRAM(s)/deciliter  guaiFENesin Oral Liquid (Sugar-Free) 200 milliGRAM(s) Oral every 6 hours PRN Cough  morphine  - Injectable 2 milliGRAM(s) IV Push every 6 hours PRN Severe Pain (7 - 10)  ondansetron Injectable 4 milliGRAM(s) IV Push every 8 hours PRN Nausea and/or Vomiting  traMADol 50 milliGRAM(s) Oral three times a day PRN Moderate Pain (4 - 6)      Vital Signs Last 24 Hrs  T(C): 36.7 (03 Jan 2024 11:21), Max: 37.1 (02 Jan 2024 20:11)  T(F): 98 (03 Jan 2024 11:21), Max: 98.7 (02 Jan 2024 20:11)  HR: 68 (03 Jan 2024 15:00) (61 - 82)  BP: 92/54 (03 Jan 2024 11:21) (80/40 - 103/59)  BP(mean): --  RR: 18 (03 Jan 2024 11:21) (18 - 18)  SpO2: 98% (03 Jan 2024 15:00) (98% - 100%)    Parameters below as of 03 Jan 2024 15:00  Patient On (Oxygen Delivery Method): nasal cannula, 4L        I&O's Summary    02 Jan 2024 07:01  -  03 Jan 2024 07:00  --------------------------------------------------------  IN: 1700 mL / OUT: 600 mL / NET: 1100 mL        PHYSICAL EXAM:  HEENT: NC/AT; PERRLA  Neck: Soft; no tenderness  Lungs: CTA bilaterally; no wheezing.   Heart:  Abdomen:  Genital/ Rectal:  Extremities:  Neurologic:  Vascular:      LABORATORY:    CBC Full  -  ( 03 Jan 2024 07:55 )  WBC Count : 3.77 K/uL  RBC Count : 2.44 M/uL  Hemoglobin : 8.2 g/dL  Hematocrit : 26.3 %  Platelet Count - Automated : 149 K/uL  Mean Cell Volume : 107.8 fl  Mean Cell Hemoglobin : 33.6 pg  Mean Cell Hemoglobin Concentration : 31.2 gm/dL  Auto Neutrophil # : x  Auto Lymphocyte # : x  Auto Monocyte # : x  Auto Eosinophil # : x  Auto Basophil # : x  Auto Neutrophil % : x  Auto Lymphocyte % : x  Auto Monocyte % : x  Auto Eosinophil % : x  Auto Basophil % : x          01-03    134<L>  |  101  |  26<H>  ----------------------------<  106<H>  3.6   |  31  |  4.00<H>    Ca    7.6<L>      03 Jan 2024 07:55            Assessment and Plan:          Domenic Paul MD   [phone].      He is afebrile  Still has a cough      MEDICATIONS  (STANDING):  aMIOdarone    Tablet 200 milliGRAM(s) Oral daily  apixaban 5 milliGRAM(s) Oral two times a day  ascorbic acid 500 milliGRAM(s) Oral daily  aspirin enteric coated 81 milliGRAM(s) Oral daily  brimonidine 0.2% Ophthalmic Solution 1 Drop(s) Both EYES two times a day  budesonide 160 MICROgram(s)/formoterol 4.5 MICROgram(s) Inhaler 2 Puff(s) Inhalation two times a day  calcitriol   Capsule 0.25 MICROGram(s) Oral <User Schedule>  calcium acetate 667 milliGRAM(s) Oral three times a day with meals  dextrose 5%. 1000 milliLiter(s) (100 mL/Hr) IV Continuous <Continuous>  dextrose 5%. 1000 milliLiter(s) (50 mL/Hr) IV Continuous <Continuous>  dextrose 50% Injectable 25 Gram(s) IV Push once  dextrose 50% Injectable 25 Gram(s) IV Push once  dextrose 50% Injectable 12.5 Gram(s) IV Push once  dorzolamide 2%/timolol 0.5% Ophthalmic Solution 1 Drop(s) Both EYES two times a day  epoetin kayla-epbx (RETACRIT) Injectable 42641 Unit(s) IV Push <User Schedule>  finasteride 5 milliGRAM(s) Oral daily  glucagon  Injectable 1 milliGRAM(s) IntraMuscular once  insulin lispro (ADMELOG) corrective regimen sliding scale   SubCutaneous at bedtime  insulin lispro (ADMELOG) corrective regimen sliding scale   SubCutaneous three times a day before meals  lactobacillus acidophilus 1 Tablet(s) Oral daily  latanoprost 0.005% Ophthalmic Solution 1 Drop(s) Both EYES at bedtime  levothyroxine 50 MICROGram(s) Oral daily  melatonin 5 milliGRAM(s) Oral at bedtime  midodrine. 15 milliGRAM(s) Oral three times a day  pantoprazole    Tablet 40 milliGRAM(s) Oral daily  senna 2 Tablet(s) Oral at bedtime  sertraline 50 milliGRAM(s) Oral daily  sertraline 25 milliGRAM(s) Oral daily  simvastatin 10 milliGRAM(s) Oral at bedtime  tamsulosin 0.4 milliGRAM(s) Oral at bedtime  ursodiol Capsule 300 milliGRAM(s) Oral two times a day    MEDICATIONS  (PRN):  acetaminophen     Tablet .. 650 milliGRAM(s) Oral every 6 hours PRN Temp greater or equal to 38C (100.4F), Mild Pain (1 - 3)  albuterol/ipratropium for Nebulization 3 milliLiter(s) Nebulizer every 6 hours PRN Shortness of Breath and/or Wheezing  aluminum hydroxide/magnesium hydroxide/simethicone Suspension 30 milliLiter(s) Oral every 4 hours PRN Dyspepsia  bisacodyl Suppository 10 milliGRAM(s) Rectal daily PRN Constipation  dextrose Oral Gel 15 Gram(s) Oral once PRN Blood Glucose LESS THAN 70 milliGRAM(s)/deciliter  guaiFENesin Oral Liquid (Sugar-Free) 200 milliGRAM(s) Oral every 6 hours PRN Cough  morphine  - Injectable 2 milliGRAM(s) IV Push every 6 hours PRN Severe Pain (7 - 10)  ondansetron Injectable 4 milliGRAM(s) IV Push every 8 hours PRN Nausea and/or Vomiting  traMADol 50 milliGRAM(s) Oral three times a day PRN Moderate Pain (4 - 6)      Vital Signs Last 24 Hrs  T(C): 36.7 (03 Jan 2024 11:21), Max: 37.1 (02 Jan 2024 20:11)  T(F): 98 (03 Jan 2024 11:21), Max: 98.7 (02 Jan 2024 20:11)  HR: 68 (03 Jan 2024 15:00) (61 - 82)  BP: 92/54 (03 Jan 2024 11:21) (80/40 - 103/59)  BP(mean): --  RR: 18 (03 Jan 2024 11:21) (18 - 18)  SpO2: 98% (03 Jan 2024 15:00) (98% - 100%)    Parameters below as of 03 Jan 2024 15:00  Patient On (Oxygen Delivery Method): nasal cannula, 4L        I&O's Summary    02 Jan 2024 07:01  -  03 Jan 2024 07:00  --------------------------------------------------------  IN: 1700 mL / OUT: 600 mL / NET: 1100 mL        PHYSICAL EXAM:  HEENT: NC/AT; PERRLA  Neck: Soft; no tenderness  Lungs: CTA bilaterally; no wheezing.   Heart:  Abdomen:  Genital/ Rectal:  Extremities:  Neurologic:  Vascular:      LABORATORY:    CBC Full  -  ( 03 Jan 2024 07:55 )  WBC Count : 3.77 K/uL  RBC Count : 2.44 M/uL  Hemoglobin : 8.2 g/dL  Hematocrit : 26.3 %  Platelet Count - Automated : 149 K/uL  Mean Cell Volume : 107.8 fl  Mean Cell Hemoglobin : 33.6 pg  Mean Cell Hemoglobin Concentration : 31.2 gm/dL  Auto Neutrophil # : x  Auto Lymphocyte # : x  Auto Monocyte # : x  Auto Eosinophil # : x  Auto Basophil # : x  Auto Neutrophil % : x  Auto Lymphocyte % : x  Auto Monocyte % : x  Auto Eosinophil % : x  Auto Basophil % : x      134<L>  |  101  |  26<H>  ----------------------------<  106<H>  3.6   |  31  |  4.00<H>    Ca    7.6<L>      03 Jan 2024 07:55      Assessment and Plan:    1. Influenza A.  2. Hypothermia.  3. Hypotension.  4. Elevated LFTS.  5. ESRD on HD.  6. Hypoxia.    . Continue po Tamiflu for a total of 5 days.  . Monitor the progression of SOB and hypoxia.  . Follow all cultures.  . Acute elevation of LFTs noted. Unclear sources. Probably due to hypotension. GI evaluation in progress.        Domenic Paul MD   [phone].      He is afebrile  Still has a cough      MEDICATIONS  (STANDING):  aMIOdarone    Tablet 200 milliGRAM(s) Oral daily  apixaban 5 milliGRAM(s) Oral two times a day  ascorbic acid 500 milliGRAM(s) Oral daily  aspirin enteric coated 81 milliGRAM(s) Oral daily  brimonidine 0.2% Ophthalmic Solution 1 Drop(s) Both EYES two times a day  budesonide 160 MICROgram(s)/formoterol 4.5 MICROgram(s) Inhaler 2 Puff(s) Inhalation two times a day  calcitriol   Capsule 0.25 MICROGram(s) Oral <User Schedule>  calcium acetate 667 milliGRAM(s) Oral three times a day with meals  dextrose 5%. 1000 milliLiter(s) (100 mL/Hr) IV Continuous <Continuous>  dextrose 5%. 1000 milliLiter(s) (50 mL/Hr) IV Continuous <Continuous>  dextrose 50% Injectable 25 Gram(s) IV Push once  dextrose 50% Injectable 25 Gram(s) IV Push once  dextrose 50% Injectable 12.5 Gram(s) IV Push once  dorzolamide 2%/timolol 0.5% Ophthalmic Solution 1 Drop(s) Both EYES two times a day  epoetin kayla-epbx (RETACRIT) Injectable 73143 Unit(s) IV Push <User Schedule>  finasteride 5 milliGRAM(s) Oral daily  glucagon  Injectable 1 milliGRAM(s) IntraMuscular once  insulin lispro (ADMELOG) corrective regimen sliding scale   SubCutaneous at bedtime  insulin lispro (ADMELOG) corrective regimen sliding scale   SubCutaneous three times a day before meals  lactobacillus acidophilus 1 Tablet(s) Oral daily  latanoprost 0.005% Ophthalmic Solution 1 Drop(s) Both EYES at bedtime  levothyroxine 50 MICROGram(s) Oral daily  melatonin 5 milliGRAM(s) Oral at bedtime  midodrine. 15 milliGRAM(s) Oral three times a day  pantoprazole    Tablet 40 milliGRAM(s) Oral daily  senna 2 Tablet(s) Oral at bedtime  sertraline 50 milliGRAM(s) Oral daily  sertraline 25 milliGRAM(s) Oral daily  simvastatin 10 milliGRAM(s) Oral at bedtime  tamsulosin 0.4 milliGRAM(s) Oral at bedtime  ursodiol Capsule 300 milliGRAM(s) Oral two times a day    MEDICATIONS  (PRN):  acetaminophen     Tablet .. 650 milliGRAM(s) Oral every 6 hours PRN Temp greater or equal to 38C (100.4F), Mild Pain (1 - 3)  albuterol/ipratropium for Nebulization 3 milliLiter(s) Nebulizer every 6 hours PRN Shortness of Breath and/or Wheezing  aluminum hydroxide/magnesium hydroxide/simethicone Suspension 30 milliLiter(s) Oral every 4 hours PRN Dyspepsia  bisacodyl Suppository 10 milliGRAM(s) Rectal daily PRN Constipation  dextrose Oral Gel 15 Gram(s) Oral once PRN Blood Glucose LESS THAN 70 milliGRAM(s)/deciliter  guaiFENesin Oral Liquid (Sugar-Free) 200 milliGRAM(s) Oral every 6 hours PRN Cough  morphine  - Injectable 2 milliGRAM(s) IV Push every 6 hours PRN Severe Pain (7 - 10)  ondansetron Injectable 4 milliGRAM(s) IV Push every 8 hours PRN Nausea and/or Vomiting  traMADol 50 milliGRAM(s) Oral three times a day PRN Moderate Pain (4 - 6)      Vital Signs Last 24 Hrs  T(C): 36.7 (03 Jan 2024 11:21), Max: 37.1 (02 Jan 2024 20:11)  T(F): 98 (03 Jan 2024 11:21), Max: 98.7 (02 Jan 2024 20:11)  HR: 68 (03 Jan 2024 15:00) (61 - 82)  BP: 92/54 (03 Jan 2024 11:21) (80/40 - 103/59)  BP(mean): --  RR: 18 (03 Jan 2024 11:21) (18 - 18)  SpO2: 98% (03 Jan 2024 15:00) (98% - 100%)    Parameters below as of 03 Jan 2024 15:00  Patient On (Oxygen Delivery Method): nasal cannula, 4L        I&O's Summary    02 Jan 2024 07:01  -  03 Jan 2024 07:00  --------------------------------------------------------  IN: 1700 mL / OUT: 600 mL / NET: 1100 mL        PHYSICAL EXAM:  HEENT: NC/AT; PERRLA  Neck: Soft; no tenderness  Lungs: CTA bilaterally; no wheezing.   Heart:  Abdomen:  Genital/ Rectal:  Extremities:  Neurologic:  Vascular:      LABORATORY:    CBC Full  -  ( 03 Jan 2024 07:55 )  WBC Count : 3.77 K/uL  RBC Count : 2.44 M/uL  Hemoglobin : 8.2 g/dL  Hematocrit : 26.3 %  Platelet Count - Automated : 149 K/uL  Mean Cell Volume : 107.8 fl  Mean Cell Hemoglobin : 33.6 pg  Mean Cell Hemoglobin Concentration : 31.2 gm/dL  Auto Neutrophil # : x  Auto Lymphocyte # : x  Auto Monocyte # : x  Auto Eosinophil # : x  Auto Basophil # : x  Auto Neutrophil % : x  Auto Lymphocyte % : x  Auto Monocyte % : x  Auto Eosinophil % : x  Auto Basophil % : x      134<L>  |  101  |  26<H>  ----------------------------<  106<H>  3.6   |  31  |  4.00<H>    Ca    7.6<L>      03 Jan 2024 07:55      Assessment and Plan:    1. Influenza A.  2. Hypothermia.  3. Hypotension.  4. Elevated LFTS.  5. ESRD on HD.  6. Hypoxia.    . Continue po Tamiflu for a total of 5 days.  . Monitor the progression of SOB and hypoxia.  . Follow all cultures.  . Acute elevation of LFTs noted. Unclear sources. Probably due to hypotension. GI evaluation in progress.        Domenic Paul MD   [phone].

## 2024-01-03 NOTE — PROGRESS NOTE ADULT - SUBJECTIVE AND OBJECTIVE BOX
CHIEF COMPLAINT/ REASON FOR VISIT  .. Patient was seen to address the  issue listed under PROBLEM LIST which is located toward bottom of this note     LISA NAVARRETE 2EAS 219 D1    Allergies    Levaquin (Anaphylaxis; Flushing; Short breath)    Intolerances        PAST MEDICAL & SURGICAL HISTORY:  ASHD (arteriosclerotic heart disease)      CAD (coronary artery disease)  s/p stent, CABG      CRI (chronic renal insufficiency)  ESRD on HD      Diabetes mellitus type II      Hypertension      Hyperlipidemia      Pacemaker      Chronic atrial fibrillation      Asthma      Hypothyroid      Dementia      H/O carotid stenosis      Enlarged prostate      Hx of CABG      Coronary stent      Cardiac pacemaker      History of amputation of toe  right great toe          FAMILY HISTORY:  Family history of chronic ischemic heart disease (Father)  father  of MI at age 59        Home Medications:  acetaminophen 325 mg oral tablet: 2 tab(s) orally 3 times a day as needed for  mild pain (30 Dec 2023 13:03)  Acidophilus oral capsule: 1 cap(s) orally once a day (28 Dec 2023 19:43)  amiodarone 200 mg oral tablet: 1 tab(s) orally once a day (30 Dec 2023 13:03)  apixaban 5 mg oral tablet: 1 tab(s) orally 2 times a day (30 Dec 2023 13:03)  Aspercreme Max Strength Lidocaine XL Patch 4% topical film: Apply topically to affected area once a day LEFT SHOULDER  10 AM -10 PM (30 Dec 2023 13:03)  brimonidine 0.2% ophthalmic solution: 1 drop(s) in each affected eye 2 times a day both eyes (28 Dec 2023 19:43)  calcitriol 0.25 mcg oral capsule: 1 cap(s) orally 3 times a week Monday, Wednesday, Friday (28 Dec 2023 19:43)  calcium acetate 667 mg oral capsule: 1 cap(s) orally 3 times a day (28 Dec 2023 19:43)  dorzolamide-timolol 2.23%-0.68% (2%-0.5% base) ophthalmic solution: 1 drop(s) in each eye 2 times a day (28 Dec 2023 19:44)  finasteride 5 mg oral tablet: 1 tab(s) orally once a day (at bedtime) (28 Dec 2023 19:44)  latanoprost 0.005% ophthalmic solution: 1 drop(s) in each eye once a day (at bedtime) Both eyes (28 Dec 2023 19:44)  Levothroid 50 mcg (0.05 mg) oral tablet: 1 tab(s) orally once a day (28 Dec 2023 19:44)  melatonin 5 mg oral tablet: 1 tab(s) orally once a day (at bedtime) (28 Dec 2023 19:44)  menthol 5% topical pad: Apply topically to affected area once a day from 7am - 3pm (28 Dec 2023 19:44)  midodrine 10 mg oral tablet: 1 tab(s) orally 2 times a day hold for SBP &gt;130 (28 Dec 2023 19:44)  sertraline 25 mg oral tablet: 1 tab(s) orally once a day total daily dose- 75mg (28 Dec 2023 19:44)  sertraline 50 mg oral tablet: 1 tab(s) orally once a day (at bedtime) total dose- 75mg daily (28 Dec 2023 19:44)  simvastatin 10 mg oral tablet: 1 tab(s) orally once a day (at bedtime) (28 Dec 2023 19:44)  Symbicort 80 mcg-4.5 mcg/inh inhalation aerosol: 2 puff(s) inhaled 2 times a day (28 Dec 2023 19:44)  tamsulosin 0.4 mg oral capsule: 1 cap(s) orally once a day (at bedtime) (28 Dec 2023 19:44)  traMADol 50 mg oral tablet: 1 tab(s) orally 3 times a day As needed Moderate Pain (4 - 6) (30 Dec 2023 13:03)  Vitamin C 500 mg oral tablet, chewable: 1 tab(s) chewed once a day (28 Dec 2023 19:44)      MEDICATIONS  (STANDING):  aMIOdarone    Tablet 200 milliGRAM(s) Oral daily  apixaban 5 milliGRAM(s) Oral two times a day  ascorbic acid 500 milliGRAM(s) Oral daily  aspirin enteric coated 81 milliGRAM(s) Oral daily  brimonidine 0.2% Ophthalmic Solution 1 Drop(s) Both EYES two times a day  budesonide 160 MICROgram(s)/formoterol 4.5 MICROgram(s) Inhaler 2 Puff(s) Inhalation two times a day  calcitriol   Capsule 0.25 MICROGram(s) Oral <User Schedule>  calcium acetate 667 milliGRAM(s) Oral three times a day with meals  dextrose 5%. 1000 milliLiter(s) (100 mL/Hr) IV Continuous <Continuous>  dextrose 5%. 1000 milliLiter(s) (50 mL/Hr) IV Continuous <Continuous>  dextrose 50% Injectable 25 Gram(s) IV Push once  dextrose 50% Injectable 25 Gram(s) IV Push once  dextrose 50% Injectable 12.5 Gram(s) IV Push once  dorzolamide 2%/timolol 0.5% Ophthalmic Solution 1 Drop(s) Both EYES two times a day  epoetin kayla-epbx (RETACRIT) Injectable 45510 Unit(s) IV Push <User Schedule>  finasteride 5 milliGRAM(s) Oral daily  glucagon  Injectable 1 milliGRAM(s) IntraMuscular once  insulin lispro (ADMELOG) corrective regimen sliding scale   SubCutaneous three times a day before meals  insulin lispro (ADMELOG) corrective regimen sliding scale   SubCutaneous at bedtime  lactobacillus acidophilus 1 Tablet(s) Oral daily  latanoprost 0.005% Ophthalmic Solution 1 Drop(s) Both EYES at bedtime  levothyroxine 50 MICROGram(s) Oral daily  melatonin 5 milliGRAM(s) Oral at bedtime  midodrine. 10 milliGRAM(s) Oral three times a day  pantoprazole    Tablet 40 milliGRAM(s) Oral daily  senna 2 Tablet(s) Oral at bedtime  sertraline 25 milliGRAM(s) Oral daily  sertraline 50 milliGRAM(s) Oral daily  simvastatin 10 milliGRAM(s) Oral at bedtime  tamsulosin 0.4 milliGRAM(s) Oral at bedtime  ursodiol Capsule 300 milliGRAM(s) Oral two times a day    MEDICATIONS  (PRN):  acetaminophen     Tablet .. 650 milliGRAM(s) Oral every 6 hours PRN Temp greater or equal to 38C (100.4F), Mild Pain (1 - 3)  albuterol/ipratropium for Nebulization 3 milliLiter(s) Nebulizer every 6 hours PRN Shortness of Breath and/or Wheezing  aluminum hydroxide/magnesium hydroxide/simethicone Suspension 30 milliLiter(s) Oral every 4 hours PRN Dyspepsia  bisacodyl Suppository 10 milliGRAM(s) Rectal daily PRN Constipation  dextrose Oral Gel 15 Gram(s) Oral once PRN Blood Glucose LESS THAN 70 milliGRAM(s)/deciliter  guaiFENesin Oral Liquid (Sugar-Free) 200 milliGRAM(s) Oral every 6 hours PRN Cough  morphine  - Injectable 2 milliGRAM(s) IV Push every 6 hours PRN Severe Pain (7 - 10)  ondansetron Injectable 4 milliGRAM(s) IV Push every 8 hours PRN Nausea and/or Vomiting  traMADol 50 milliGRAM(s) Oral three times a day PRN Moderate Pain (4 - 6)              Vital Signs Last 24 Hrs  T(C): 36.6 (2024 04:30), Max: 37.1 (2024 20:11)  T(F): 97.8 (2024 04:30), Max: 98.7 (2024 20:11)  HR: 64 (2024 04:30) (59 - 82)  BP: 103/59 (2024 04:30) (80/40 - 117/47)  BP(mean): --  RR: 18 (2024 04:30) (18 - 18)  SpO2: 98% (2024 04:30) (97% - 100%)    Parameters below as of 2024 04:30  Patient On (Oxygen Delivery Method): nasal cannula  O2 Flow (L/min): 2        24 @ 07:  -  24 @ 07:00  --------------------------------------------------------  IN: 0 mL / OUT: 0 mL / NET: 0 mL    24 @ 07:01  -  24 @ 06:40  --------------------------------------------------------  IN: 1700 mL / OUT: 600 mL / NET: 1100 mL              LABS:                        8.6    3.56  )-----------( 166      ( 2024 07:02 )             27.6         136  |  100  |  40<H>  ----------------------------<  68<L>  3.7   |  27  |  4.80<H>    Ca    7.9<L>      2024 07:02    TPro  5.5<L>  /  Alb  2.6<L>  /  TBili  0.5  /  DBili  0.2  /  AST  310<H>  /  ALT  240<H>  /  AlkPhos  162<H>      PT/INR - ( 2024 07:02 )   PT: 23.2 sec;   INR: 2.02 ratio           Urinalysis Basic - ( 2024 07:02 )    Color: x / Appearance: x / SG: x / pH: x  Gluc: 68 mg/dL / Ketone: x  / Bili: x / Urobili: x   Blood: x / Protein: x / Nitrite: x   Leuk Esterase: x / RBC: x / WBC x   Sq Epi: x / Non Sq Epi: x / Bacteria: x            WBC:  WBC Count: 3.56 K/uL ( @ 07:02)  WBC Count: 5.77 K/uL ( @ 10:28)  WBC Count: 4.80 K/uL ( @ 21:40)      MICROBIOLOGY:  RECENT CULTURES:   .Blood Blood-Peripheral XXXX XXXX   No growth at 72 Hours     .Blood Blood-Peripheral XXXX XXXX   No growth at 72 Hours                PT/INR - ( 2024 07:02 )   PT: 23.2 sec;   INR: 2.02 ratio             Sodium:  Sodium: 136 mmol/L ( @ 07:02)  Sodium: 136 mmol/L ( @ 10:28)  Sodium: 137 mmol/L ( @ 21:40)      4.80 mg/dL  @ 07:02  3.80 mg/dL  @ 10:28  3.20 mg/dL  @ 21:40      Hemoglobin:  Hemoglobin: 8.6 g/dL ( @ 07:02)  Hemoglobin: 8.1 g/dL ( @ 10:28)  Hemoglobin: 8.5 g/dL ( @ 21:40)      Platelets: Platelet Count - Automated: 166 K/uL (01-01 @ 07:02)  Platelet Count - Automated: 159 K/uL ( @ 10:28)  Platelet Count - Automated: 155 K/uL ( @ 21:40)      LIVER FUNCTIONS - ( 2024 07:02 )  Alb: 2.6 g/dL / Pro: 5.5 g/dL / ALK PHOS: 162 U/L / ALT: 240 U/L / AST: 310 U/L / GGT: x             Urinalysis Basic - ( 2024 07:02 )    Color: x / Appearance: x / SG: x / pH: x  Gluc: 68 mg/dL / Ketone: x  / Bili: x / Urobili: x   Blood: x / Protein: x / Nitrite: x   Leuk Esterase: x / RBC: x / WBC x   Sq Epi: x / Non Sq Epi: x / Bacteria: x        RADIOLOGY & ADDITIONAL STUDIES:      MICROBIOLOGY:  RECENT CULTURES:   .Blood Blood-Peripheral XXXX XXXX   No growth at 72 Hours     .Blood Blood-Peripheral XXXX XXXX   No growth at 72 Hours                  CHIEF COMPLAINT/ REASON FOR VISIT  .. Patient was seen to address the  issue listed under PROBLEM LIST which is located toward bottom of this note     LISA NAVARRETE 2EAS 219 D1    Allergies    Levaquin (Anaphylaxis; Flushing; Short breath)    Intolerances        PAST MEDICAL & SURGICAL HISTORY:  ASHD (arteriosclerotic heart disease)      CAD (coronary artery disease)  s/p stent, CABG      CRI (chronic renal insufficiency)  ESRD on HD      Diabetes mellitus type II      Hypertension      Hyperlipidemia      Pacemaker      Chronic atrial fibrillation      Asthma      Hypothyroid      Dementia      H/O carotid stenosis      Enlarged prostate      Hx of CABG      Coronary stent      Cardiac pacemaker      History of amputation of toe  right great toe          FAMILY HISTORY:  Family history of chronic ischemic heart disease (Father)  father  of MI at age 59        Home Medications:  acetaminophen 325 mg oral tablet: 2 tab(s) orally 3 times a day as needed for  mild pain (30 Dec 2023 13:03)  Acidophilus oral capsule: 1 cap(s) orally once a day (28 Dec 2023 19:43)  amiodarone 200 mg oral tablet: 1 tab(s) orally once a day (30 Dec 2023 13:03)  apixaban 5 mg oral tablet: 1 tab(s) orally 2 times a day (30 Dec 2023 13:03)  Aspercreme Max Strength Lidocaine XL Patch 4% topical film: Apply topically to affected area once a day LEFT SHOULDER  10 AM -10 PM (30 Dec 2023 13:03)  brimonidine 0.2% ophthalmic solution: 1 drop(s) in each affected eye 2 times a day both eyes (28 Dec 2023 19:43)  calcitriol 0.25 mcg oral capsule: 1 cap(s) orally 3 times a week Monday, Wednesday, Friday (28 Dec 2023 19:43)  calcium acetate 667 mg oral capsule: 1 cap(s) orally 3 times a day (28 Dec 2023 19:43)  dorzolamide-timolol 2.23%-0.68% (2%-0.5% base) ophthalmic solution: 1 drop(s) in each eye 2 times a day (28 Dec 2023 19:44)  finasteride 5 mg oral tablet: 1 tab(s) orally once a day (at bedtime) (28 Dec 2023 19:44)  latanoprost 0.005% ophthalmic solution: 1 drop(s) in each eye once a day (at bedtime) Both eyes (28 Dec 2023 19:44)  Levothroid 50 mcg (0.05 mg) oral tablet: 1 tab(s) orally once a day (28 Dec 2023 19:44)  melatonin 5 mg oral tablet: 1 tab(s) orally once a day (at bedtime) (28 Dec 2023 19:44)  menthol 5% topical pad: Apply topically to affected area once a day from 7am - 3pm (28 Dec 2023 19:44)  midodrine 10 mg oral tablet: 1 tab(s) orally 2 times a day hold for SBP &gt;130 (28 Dec 2023 19:44)  sertraline 25 mg oral tablet: 1 tab(s) orally once a day total daily dose- 75mg (28 Dec 2023 19:44)  sertraline 50 mg oral tablet: 1 tab(s) orally once a day (at bedtime) total dose- 75mg daily (28 Dec 2023 19:44)  simvastatin 10 mg oral tablet: 1 tab(s) orally once a day (at bedtime) (28 Dec 2023 19:44)  Symbicort 80 mcg-4.5 mcg/inh inhalation aerosol: 2 puff(s) inhaled 2 times a day (28 Dec 2023 19:44)  tamsulosin 0.4 mg oral capsule: 1 cap(s) orally once a day (at bedtime) (28 Dec 2023 19:44)  traMADol 50 mg oral tablet: 1 tab(s) orally 3 times a day As needed Moderate Pain (4 - 6) (30 Dec 2023 13:03)  Vitamin C 500 mg oral tablet, chewable: 1 tab(s) chewed once a day (28 Dec 2023 19:44)      MEDICATIONS  (STANDING):  aMIOdarone    Tablet 200 milliGRAM(s) Oral daily  apixaban 5 milliGRAM(s) Oral two times a day  ascorbic acid 500 milliGRAM(s) Oral daily  aspirin enteric coated 81 milliGRAM(s) Oral daily  brimonidine 0.2% Ophthalmic Solution 1 Drop(s) Both EYES two times a day  budesonide 160 MICROgram(s)/formoterol 4.5 MICROgram(s) Inhaler 2 Puff(s) Inhalation two times a day  calcitriol   Capsule 0.25 MICROGram(s) Oral <User Schedule>  calcium acetate 667 milliGRAM(s) Oral three times a day with meals  dextrose 5%. 1000 milliLiter(s) (100 mL/Hr) IV Continuous <Continuous>  dextrose 5%. 1000 milliLiter(s) (50 mL/Hr) IV Continuous <Continuous>  dextrose 50% Injectable 25 Gram(s) IV Push once  dextrose 50% Injectable 25 Gram(s) IV Push once  dextrose 50% Injectable 12.5 Gram(s) IV Push once  dorzolamide 2%/timolol 0.5% Ophthalmic Solution 1 Drop(s) Both EYES two times a day  epoetin kayla-epbx (RETACRIT) Injectable 73270 Unit(s) IV Push <User Schedule>  finasteride 5 milliGRAM(s) Oral daily  glucagon  Injectable 1 milliGRAM(s) IntraMuscular once  insulin lispro (ADMELOG) corrective regimen sliding scale   SubCutaneous three times a day before meals  insulin lispro (ADMELOG) corrective regimen sliding scale   SubCutaneous at bedtime  lactobacillus acidophilus 1 Tablet(s) Oral daily  latanoprost 0.005% Ophthalmic Solution 1 Drop(s) Both EYES at bedtime  levothyroxine 50 MICROGram(s) Oral daily  melatonin 5 milliGRAM(s) Oral at bedtime  midodrine. 10 milliGRAM(s) Oral three times a day  pantoprazole    Tablet 40 milliGRAM(s) Oral daily  senna 2 Tablet(s) Oral at bedtime  sertraline 25 milliGRAM(s) Oral daily  sertraline 50 milliGRAM(s) Oral daily  simvastatin 10 milliGRAM(s) Oral at bedtime  tamsulosin 0.4 milliGRAM(s) Oral at bedtime  ursodiol Capsule 300 milliGRAM(s) Oral two times a day    MEDICATIONS  (PRN):  acetaminophen     Tablet .. 650 milliGRAM(s) Oral every 6 hours PRN Temp greater or equal to 38C (100.4F), Mild Pain (1 - 3)  albuterol/ipratropium for Nebulization 3 milliLiter(s) Nebulizer every 6 hours PRN Shortness of Breath and/or Wheezing  aluminum hydroxide/magnesium hydroxide/simethicone Suspension 30 milliLiter(s) Oral every 4 hours PRN Dyspepsia  bisacodyl Suppository 10 milliGRAM(s) Rectal daily PRN Constipation  dextrose Oral Gel 15 Gram(s) Oral once PRN Blood Glucose LESS THAN 70 milliGRAM(s)/deciliter  guaiFENesin Oral Liquid (Sugar-Free) 200 milliGRAM(s) Oral every 6 hours PRN Cough  morphine  - Injectable 2 milliGRAM(s) IV Push every 6 hours PRN Severe Pain (7 - 10)  ondansetron Injectable 4 milliGRAM(s) IV Push every 8 hours PRN Nausea and/or Vomiting  traMADol 50 milliGRAM(s) Oral three times a day PRN Moderate Pain (4 - 6)              Vital Signs Last 24 Hrs  T(C): 36.6 (2024 04:30), Max: 37.1 (2024 20:11)  T(F): 97.8 (2024 04:30), Max: 98.7 (2024 20:11)  HR: 64 (2024 04:30) (59 - 82)  BP: 103/59 (2024 04:30) (80/40 - 117/47)  BP(mean): --  RR: 18 (2024 04:30) (18 - 18)  SpO2: 98% (2024 04:30) (97% - 100%)    Parameters below as of 2024 04:30  Patient On (Oxygen Delivery Method): nasal cannula  O2 Flow (L/min): 2        24 @ 07:  -  24 @ 07:00  --------------------------------------------------------  IN: 0 mL / OUT: 0 mL / NET: 0 mL    24 @ 07:01  -  24 @ 06:40  --------------------------------------------------------  IN: 1700 mL / OUT: 600 mL / NET: 1100 mL              LABS:                        8.6    3.56  )-----------( 166      ( 2024 07:02 )             27.6         136  |  100  |  40<H>  ----------------------------<  68<L>  3.7   |  27  |  4.80<H>    Ca    7.9<L>      2024 07:02    TPro  5.5<L>  /  Alb  2.6<L>  /  TBili  0.5  /  DBili  0.2  /  AST  310<H>  /  ALT  240<H>  /  AlkPhos  162<H>      PT/INR - ( 2024 07:02 )   PT: 23.2 sec;   INR: 2.02 ratio           Urinalysis Basic - ( 2024 07:02 )    Color: x / Appearance: x / SG: x / pH: x  Gluc: 68 mg/dL / Ketone: x  / Bili: x / Urobili: x   Blood: x / Protein: x / Nitrite: x   Leuk Esterase: x / RBC: x / WBC x   Sq Epi: x / Non Sq Epi: x / Bacteria: x            WBC:  WBC Count: 3.56 K/uL ( @ 07:02)  WBC Count: 5.77 K/uL ( @ 10:28)  WBC Count: 4.80 K/uL ( @ 21:40)      MICROBIOLOGY:  RECENT CULTURES:   .Blood Blood-Peripheral XXXX XXXX   No growth at 72 Hours     .Blood Blood-Peripheral XXXX XXXX   No growth at 72 Hours                PT/INR - ( 2024 07:02 )   PT: 23.2 sec;   INR: 2.02 ratio             Sodium:  Sodium: 136 mmol/L ( @ 07:02)  Sodium: 136 mmol/L ( @ 10:28)  Sodium: 137 mmol/L ( @ 21:40)      4.80 mg/dL  @ 07:02  3.80 mg/dL  @ 10:28  3.20 mg/dL  @ 21:40      Hemoglobin:  Hemoglobin: 8.6 g/dL ( @ 07:02)  Hemoglobin: 8.1 g/dL ( @ 10:28)  Hemoglobin: 8.5 g/dL ( @ 21:40)      Platelets: Platelet Count - Automated: 166 K/uL (01-01 @ 07:02)  Platelet Count - Automated: 159 K/uL ( @ 10:28)  Platelet Count - Automated: 155 K/uL ( @ 21:40)      LIVER FUNCTIONS - ( 2024 07:02 )  Alb: 2.6 g/dL / Pro: 5.5 g/dL / ALK PHOS: 162 U/L / ALT: 240 U/L / AST: 310 U/L / GGT: x             Urinalysis Basic - ( 2024 07:02 )    Color: x / Appearance: x / SG: x / pH: x  Gluc: 68 mg/dL / Ketone: x  / Bili: x / Urobili: x   Blood: x / Protein: x / Nitrite: x   Leuk Esterase: x / RBC: x / WBC x   Sq Epi: x / Non Sq Epi: x / Bacteria: x        RADIOLOGY & ADDITIONAL STUDIES:      MICROBIOLOGY:  RECENT CULTURES:   .Blood Blood-Peripheral XXXX XXXX   No growth at 72 Hours     .Blood Blood-Peripheral XXXX XXXX   No growth at 72 Hours

## 2024-01-03 NOTE — PROGRESS NOTE ADULT - SUBJECTIVE AND OBJECTIVE BOX
PROGRESS NOTE  Patient is a 73y old  Male who presents with a chief complaint of hypoxia and hypotension (03 Jan 2024 11:54)    Chart and available morning labs /imaging are reviewed electronically , urgent issues addressed . More information  is being added upon completion of rounds , when more information is collected and management discussed with consultants , medical staff and social service/case management on the floor   OVERNIGHT  No new issues reported by medical staff . All above noted Patient is resting in a bed comfortably .Confused ,poor mentation .No distress noted   Midodrine dose increased by nephrologist  HPI:  Patient  is 72 yo , LTC resident with past medical history of end-stage renal disease on dialysis, chronic hypotension, afib, T2DM, recent admission for clogged AV fistula is presenting for hypotension and hypoxia.  Patient was discharged back to [facility] 12/30  but sent to ED due to hypotension and hypoxia on arrival to [facility] .His vitals were stable upon discharge and he was monitored 2 hrs after HD session by house staff .  Otherwise denies any new complaints.  Patient was diagnosed with Influenza , also found to have JAZMINE elevation .Seen by Dr Garcia cardiologist Admitted  to telemetry unit for monitoring , send 3 sets of cardiac enzymes to rule out acute coronary event, obtain ECHO to evaluate LVEF, cardiology consult  ,continue current management, O2 supply, anticoagulation plan as per cardiology consult Palliative care consult requested ,to discuss advance directives and complete MOLST  (31 Dec 2023 10:10)    PAST MEDICAL & SURGICAL HISTORY:  ASHD (arteriosclerotic heart disease)      CAD (coronary artery disease)  s/p stent, CABG      CRI (chronic renal insufficiency)  ESRD on HD      Diabetes mellitus type II      Hypertension      Hyperlipidemia      Pacemaker      Chronic atrial fibrillation      Asthma      Hypothyroid      Dementia      H/O carotid stenosis      Enlarged prostate      Hx of CABG      Coronary stent      Cardiac pacemaker      History of amputation of toe  right great toe          MEDICATIONS  (STANDING):  aMIOdarone    Tablet 200 milliGRAM(s) Oral daily  apixaban 5 milliGRAM(s) Oral two times a day  ascorbic acid 500 milliGRAM(s) Oral daily  aspirin enteric coated 81 milliGRAM(s) Oral daily  brimonidine 0.2% Ophthalmic Solution 1 Drop(s) Both EYES two times a day  budesonide 160 MICROgram(s)/formoterol 4.5 MICROgram(s) Inhaler 2 Puff(s) Inhalation two times a day  calcitriol   Capsule 0.25 MICROGram(s) Oral <User Schedule>  calcium acetate 667 milliGRAM(s) Oral three times a day with meals  dextrose 5%. 1000 milliLiter(s) (100 mL/Hr) IV Continuous <Continuous>  dextrose 5%. 1000 milliLiter(s) (50 mL/Hr) IV Continuous <Continuous>  dextrose 50% Injectable 25 Gram(s) IV Push once  dextrose 50% Injectable 12.5 Gram(s) IV Push once  dextrose 50% Injectable 25 Gram(s) IV Push once  dorzolamide 2%/timolol 0.5% Ophthalmic Solution 1 Drop(s) Both EYES two times a day  epoetin kayla-epbx (RETACRIT) Injectable 29113 Unit(s) IV Push <User Schedule>  finasteride 5 milliGRAM(s) Oral daily  glucagon  Injectable 1 milliGRAM(s) IntraMuscular once  insulin lispro (ADMELOG) corrective regimen sliding scale   SubCutaneous at bedtime  insulin lispro (ADMELOG) corrective regimen sliding scale   SubCutaneous three times a day before meals  lactobacillus acidophilus 1 Tablet(s) Oral daily  latanoprost 0.005% Ophthalmic Solution 1 Drop(s) Both EYES at bedtime  levothyroxine 50 MICROGram(s) Oral daily  melatonin 5 milliGRAM(s) Oral at bedtime  midodrine. 15 milliGRAM(s) Oral three times a day  pantoprazole    Tablet 40 milliGRAM(s) Oral daily  senna 2 Tablet(s) Oral at bedtime  sertraline 50 milliGRAM(s) Oral daily  sertraline 25 milliGRAM(s) Oral daily  simvastatin 10 milliGRAM(s) Oral at bedtime  tamsulosin 0.4 milliGRAM(s) Oral at bedtime  ursodiol Capsule 300 milliGRAM(s) Oral two times a day    MEDICATIONS  (PRN):  acetaminophen     Tablet .. 650 milliGRAM(s) Oral every 6 hours PRN Temp greater or equal to 38C (100.4F), Mild Pain (1 - 3)  albuterol/ipratropium for Nebulization 3 milliLiter(s) Nebulizer every 6 hours PRN Shortness of Breath and/or Wheezing  aluminum hydroxide/magnesium hydroxide/simethicone Suspension 30 milliLiter(s) Oral every 4 hours PRN Dyspepsia  bisacodyl Suppository 10 milliGRAM(s) Rectal daily PRN Constipation  dextrose Oral Gel 15 Gram(s) Oral once PRN Blood Glucose LESS THAN 70 milliGRAM(s)/deciliter  guaiFENesin Oral Liquid (Sugar-Free) 200 milliGRAM(s) Oral every 6 hours PRN Cough  morphine  - Injectable 2 milliGRAM(s) IV Push every 6 hours PRN Severe Pain (7 - 10)  ondansetron Injectable 4 milliGRAM(s) IV Push every 8 hours PRN Nausea and/or Vomiting  traMADol 50 milliGRAM(s) Oral three times a day PRN Moderate Pain (4 - 6)      OBJECTIVE    T(C): 36.7 (01-03-24 @ 11:21), Max: 37.1 (01-02-24 @ 20:11)  HR: 68 (01-03-24 @ 15:00) (61 - 82)  BP: 92/54 (01-03-24 @ 11:21) (80/40 - 103/59)  RR: 18 (01-03-24 @ 11:21) (18 - 18)  SpO2: 98% (01-03-24 @ 15:00) (98% - 100%)  Wt(kg): --  I&O's Summary    02 Jan 2024 07:01  -  03 Jan 2024 07:00  --------------------------------------------------------  IN: 1700 mL / OUT: 600 mL / NET: 1100 mL          REVIEW OF SYSTEMS:  Patient is  unable to provide any information/ROS  due to baseline mental status.     PHYSICAL EXAM:  Appearance: NAD. VS past 24 hrs -as above   HEENT:   Moist oral mucosa. Conjunctiva clear b/l.   Neck : supple  Respiratory: Lungs CTAB.  Gastrointestinal:  Soft, nontender. No rebound. No rigidity. BS present	  Cardiovascular: RRR ,S1S2 present  Neurologic: Non-focal. Moving all extremities.  Extremities: No edema. No erythema. No calf tenderness.  Skin: No rashes, No ecchymoses, No cyanosis.	  wounds ,skin lesions-See skin assesment flow sheet   LABS:                        8.2    3.77  )-----------( 149      ( 03 Jan 2024 07:55 )             26.3     01-03    134<L>  |  101  |  26<H>  ----------------------------<  106<H>  3.6   |  31  |  4.00<H>    Ca    7.6<L>      03 Jan 2024 07:55      CAPILLARY BLOOD GLUCOSE      POCT Blood Glucose.: 128 mg/dL (03 Jan 2024 11:43)  POCT Blood Glucose.: 113 mg/dL (03 Jan 2024 08:07)  POCT Blood Glucose.: 139 mg/dL (02 Jan 2024 22:12)  POCT Blood Glucose.: 102 mg/dL (02 Jan 2024 16:31)      Urinalysis Basic - ( 03 Jan 2024 07:55 )    Color: x / Appearance: x / SG: x / pH: x  Gluc: 106 mg/dL / Ketone: x  / Bili: x / Urobili: x   Blood: x / Protein: x / Nitrite: x   Leuk Esterase: x / RBC: x / WBC x   Sq Epi: x / Non Sq Epi: x / Bacteria: x        Culture - Blood (collected 30 Dec 2023 21:45)  Source: .Blood Blood-Peripheral  Preliminary Report (03 Jan 2024 02:02):    No growth at 72 Hours    Culture - Blood (collected 30 Dec 2023 21:40)  Source: .Blood Blood-Peripheral  Preliminary Report (03 Jan 2024 02:02):    No growth at 72 Hours      RADIOLOGY & ADDITIONAL TESTS:   reviewed elctronically  ASSESSMENT/PLAN: 	    25 minutes aggregate time was spent on this visit, 50% visit time spent in care co-ordination with other attendings and counselling patient .I have discussed care plan with patient / HCP/family member brother Israel ,who expressed understanding of problems treatment and their effect and side effects, alternatives in details. I have asked if they have any questions and concerns and appropriately addressed them to best of my ability. ACP-Advance care planning was discussed , pallitaive care issues ,CMO ,GOC ,MOLST  form ,advance directives were reviewed .All questions were answered to the best of my knowledge - 25 m Dr Garcia and Dr Crystal spoke to the brother as well ,HCP preferrs conservative management of nstemi D/w RN on SG 2 Madeline  PROGRESS NOTE  Patient is a 73y old  Male who presents with a chief complaint of hypoxia and hypotension (03 Jan 2024 11:54)    Chart and available morning labs /imaging are reviewed electronically , urgent issues addressed . More information  is being added upon completion of rounds , when more information is collected and management discussed with consultants , medical staff and social service/case management on the floor   OVERNIGHT  No new issues reported by medical staff . All above noted Patient is resting in a bed comfortably .Confused ,poor mentation .No distress noted   Midodrine dose increased by nephrologist  HPI:  Patient  is 74 yo , LTC resident with past medical history of end-stage renal disease on dialysis, chronic hypotension, afib, T2DM, recent admission for clogged AV fistula is presenting for hypotension and hypoxia.  Patient was discharged back to [facility] 12/30  but sent to ED due to hypotension and hypoxia on arrival to [facility] .His vitals were stable upon discharge and he was monitored 2 hrs after HD session by house staff .  Otherwise denies any new complaints.  Patient was diagnosed with Influenza , also found to have JAZMINE elevation .Seen by Dr Garcia cardiologist Admitted  to telemetry unit for monitoring , send 3 sets of cardiac enzymes to rule out acute coronary event, obtain ECHO to evaluate LVEF, cardiology consult  ,continue current management, O2 supply, anticoagulation plan as per cardiology consult Palliative care consult requested ,to discuss advance directives and complete MOLST  (31 Dec 2023 10:10)    PAST MEDICAL & SURGICAL HISTORY:  ASHD (arteriosclerotic heart disease)      CAD (coronary artery disease)  s/p stent, CABG      CRI (chronic renal insufficiency)  ESRD on HD      Diabetes mellitus type II      Hypertension      Hyperlipidemia      Pacemaker      Chronic atrial fibrillation      Asthma      Hypothyroid      Dementia      H/O carotid stenosis      Enlarged prostate      Hx of CABG      Coronary stent      Cardiac pacemaker      History of amputation of toe  right great toe          MEDICATIONS  (STANDING):  aMIOdarone    Tablet 200 milliGRAM(s) Oral daily  apixaban 5 milliGRAM(s) Oral two times a day  ascorbic acid 500 milliGRAM(s) Oral daily  aspirin enteric coated 81 milliGRAM(s) Oral daily  brimonidine 0.2% Ophthalmic Solution 1 Drop(s) Both EYES two times a day  budesonide 160 MICROgram(s)/formoterol 4.5 MICROgram(s) Inhaler 2 Puff(s) Inhalation two times a day  calcitriol   Capsule 0.25 MICROGram(s) Oral <User Schedule>  calcium acetate 667 milliGRAM(s) Oral three times a day with meals  dextrose 5%. 1000 milliLiter(s) (100 mL/Hr) IV Continuous <Continuous>  dextrose 5%. 1000 milliLiter(s) (50 mL/Hr) IV Continuous <Continuous>  dextrose 50% Injectable 25 Gram(s) IV Push once  dextrose 50% Injectable 12.5 Gram(s) IV Push once  dextrose 50% Injectable 25 Gram(s) IV Push once  dorzolamide 2%/timolol 0.5% Ophthalmic Solution 1 Drop(s) Both EYES two times a day  epoetin kayla-epbx (RETACRIT) Injectable 87849 Unit(s) IV Push <User Schedule>  finasteride 5 milliGRAM(s) Oral daily  glucagon  Injectable 1 milliGRAM(s) IntraMuscular once  insulin lispro (ADMELOG) corrective regimen sliding scale   SubCutaneous at bedtime  insulin lispro (ADMELOG) corrective regimen sliding scale   SubCutaneous three times a day before meals  lactobacillus acidophilus 1 Tablet(s) Oral daily  latanoprost 0.005% Ophthalmic Solution 1 Drop(s) Both EYES at bedtime  levothyroxine 50 MICROGram(s) Oral daily  melatonin 5 milliGRAM(s) Oral at bedtime  midodrine. 15 milliGRAM(s) Oral three times a day  pantoprazole    Tablet 40 milliGRAM(s) Oral daily  senna 2 Tablet(s) Oral at bedtime  sertraline 50 milliGRAM(s) Oral daily  sertraline 25 milliGRAM(s) Oral daily  simvastatin 10 milliGRAM(s) Oral at bedtime  tamsulosin 0.4 milliGRAM(s) Oral at bedtime  ursodiol Capsule 300 milliGRAM(s) Oral two times a day    MEDICATIONS  (PRN):  acetaminophen     Tablet .. 650 milliGRAM(s) Oral every 6 hours PRN Temp greater or equal to 38C (100.4F), Mild Pain (1 - 3)  albuterol/ipratropium for Nebulization 3 milliLiter(s) Nebulizer every 6 hours PRN Shortness of Breath and/or Wheezing  aluminum hydroxide/magnesium hydroxide/simethicone Suspension 30 milliLiter(s) Oral every 4 hours PRN Dyspepsia  bisacodyl Suppository 10 milliGRAM(s) Rectal daily PRN Constipation  dextrose Oral Gel 15 Gram(s) Oral once PRN Blood Glucose LESS THAN 70 milliGRAM(s)/deciliter  guaiFENesin Oral Liquid (Sugar-Free) 200 milliGRAM(s) Oral every 6 hours PRN Cough  morphine  - Injectable 2 milliGRAM(s) IV Push every 6 hours PRN Severe Pain (7 - 10)  ondansetron Injectable 4 milliGRAM(s) IV Push every 8 hours PRN Nausea and/or Vomiting  traMADol 50 milliGRAM(s) Oral three times a day PRN Moderate Pain (4 - 6)      OBJECTIVE    T(C): 36.7 (01-03-24 @ 11:21), Max: 37.1 (01-02-24 @ 20:11)  HR: 68 (01-03-24 @ 15:00) (61 - 82)  BP: 92/54 (01-03-24 @ 11:21) (80/40 - 103/59)  RR: 18 (01-03-24 @ 11:21) (18 - 18)  SpO2: 98% (01-03-24 @ 15:00) (98% - 100%)  Wt(kg): --  I&O's Summary    02 Jan 2024 07:01  -  03 Jan 2024 07:00  --------------------------------------------------------  IN: 1700 mL / OUT: 600 mL / NET: 1100 mL          REVIEW OF SYSTEMS:  Patient is  unable to provide any information/ROS  due to baseline mental status.     PHYSICAL EXAM:  Appearance: NAD. VS past 24 hrs -as above   HEENT:   Moist oral mucosa. Conjunctiva clear b/l.   Neck : supple  Respiratory: Lungs CTAB.  Gastrointestinal:  Soft, nontender. No rebound. No rigidity. BS present	  Cardiovascular: RRR ,S1S2 present  Neurologic: Non-focal. Moving all extremities.  Extremities: No edema. No erythema. No calf tenderness.  Skin: No rashes, No ecchymoses, No cyanosis.	  wounds ,skin lesions-See skin assesment flow sheet   LABS:                        8.2    3.77  )-----------( 149      ( 03 Jan 2024 07:55 )             26.3     01-03    134<L>  |  101  |  26<H>  ----------------------------<  106<H>  3.6   |  31  |  4.00<H>    Ca    7.6<L>      03 Jan 2024 07:55      CAPILLARY BLOOD GLUCOSE      POCT Blood Glucose.: 128 mg/dL (03 Jan 2024 11:43)  POCT Blood Glucose.: 113 mg/dL (03 Jan 2024 08:07)  POCT Blood Glucose.: 139 mg/dL (02 Jan 2024 22:12)  POCT Blood Glucose.: 102 mg/dL (02 Jan 2024 16:31)      Urinalysis Basic - ( 03 Jan 2024 07:55 )    Color: x / Appearance: x / SG: x / pH: x  Gluc: 106 mg/dL / Ketone: x  / Bili: x / Urobili: x   Blood: x / Protein: x / Nitrite: x   Leuk Esterase: x / RBC: x / WBC x   Sq Epi: x / Non Sq Epi: x / Bacteria: x        Culture - Blood (collected 30 Dec 2023 21:45)  Source: .Blood Blood-Peripheral  Preliminary Report (03 Jan 2024 02:02):    No growth at 72 Hours    Culture - Blood (collected 30 Dec 2023 21:40)  Source: .Blood Blood-Peripheral  Preliminary Report (03 Jan 2024 02:02):    No growth at 72 Hours      RADIOLOGY & ADDITIONAL TESTS:   reviewed elctronically  ASSESSMENT/PLAN: 	    25 minutes aggregate time was spent on this visit, 50% visit time spent in care co-ordination with other attendings and counselling patient .I have discussed care plan with patient / HCP/family member brother Israel ,who expressed understanding of problems treatment and their effect and side effects, alternatives in details. I have asked if they have any questions and concerns and appropriately addressed them to best of my ability. ACP-Advance care planning was discussed , pallitaive care issues ,CMO ,GOC ,MOLST  form ,advance directives were reviewed .All questions were answered to the best of my knowledge - 25 m Dr Garcia and Dr Crystal spoke to the brother as well ,HCP preferrs conservative management of nstemi D/w RN on SG 2 Madeline

## 2024-01-03 NOTE — PROGRESS NOTE ADULT - SUBJECTIVE AND OBJECTIVE BOX
[facility]  Elliot Fraga PA-C  [address] [place] [address]  [phone]      INTERVAL HPI/OVERNIGHT EVENTS:  Pt s/e  No new GI events    MEDICATIONS  (STANDING):  aMIOdarone    Tablet 200 milliGRAM(s) Oral daily  apixaban 5 milliGRAM(s) Oral two times a day  ascorbic acid 500 milliGRAM(s) Oral daily  aspirin enteric coated 81 milliGRAM(s) Oral daily  brimonidine 0.2% Ophthalmic Solution 1 Drop(s) Both EYES two times a day  budesonide 160 MICROgram(s)/formoterol 4.5 MICROgram(s) Inhaler 2 Puff(s) Inhalation two times a day  calcitriol   Capsule 0.25 MICROGram(s) Oral <User Schedule>  calcium acetate 667 milliGRAM(s) Oral three times a day with meals  dextrose 5%. 1000 milliLiter(s) (100 mL/Hr) IV Continuous <Continuous>  dextrose 5%. 1000 milliLiter(s) (50 mL/Hr) IV Continuous <Continuous>  dextrose 50% Injectable 25 Gram(s) IV Push once  dextrose 50% Injectable 12.5 Gram(s) IV Push once  dextrose 50% Injectable 25 Gram(s) IV Push once  dorzolamide 2%/timolol 0.5% Ophthalmic Solution 1 Drop(s) Both EYES two times a day  epoetin kayla-epbx (RETACRIT) Injectable 90327 Unit(s) IV Push <User Schedule>  finasteride 5 milliGRAM(s) Oral daily  glucagon  Injectable 1 milliGRAM(s) IntraMuscular once  insulin lispro (ADMELOG) corrective regimen sliding scale   SubCutaneous at bedtime  insulin lispro (ADMELOG) corrective regimen sliding scale   SubCutaneous three times a day before meals  lactobacillus acidophilus 1 Tablet(s) Oral daily  latanoprost 0.005% Ophthalmic Solution 1 Drop(s) Both EYES at bedtime  levothyroxine 50 MICROGram(s) Oral daily  melatonin 5 milliGRAM(s) Oral at bedtime  midodrine. 10 milliGRAM(s) Oral three times a day  pantoprazole    Tablet 40 milliGRAM(s) Oral daily  senna 2 Tablet(s) Oral at bedtime  sertraline 50 milliGRAM(s) Oral daily  sertraline 25 milliGRAM(s) Oral daily  simvastatin 10 milliGRAM(s) Oral at bedtime  tamsulosin 0.4 milliGRAM(s) Oral at bedtime  ursodiol Capsule 300 milliGRAM(s) Oral two times a day    MEDICATIONS  (PRN):  acetaminophen     Tablet .. 650 milliGRAM(s) Oral every 6 hours PRN Temp greater or equal to 38C (100.4F), Mild Pain (1 - 3)  albuterol/ipratropium for Nebulization 3 milliLiter(s) Nebulizer every 6 hours PRN Shortness of Breath and/or Wheezing  aluminum hydroxide/magnesium hydroxide/simethicone Suspension 30 milliLiter(s) Oral every 4 hours PRN Dyspepsia  bisacodyl Suppository 10 milliGRAM(s) Rectal daily PRN Constipation  dextrose Oral Gel 15 Gram(s) Oral once PRN Blood Glucose LESS THAN 70 milliGRAM(s)/deciliter  guaiFENesin Oral Liquid (Sugar-Free) 200 milliGRAM(s) Oral every 6 hours PRN Cough  morphine  - Injectable 2 milliGRAM(s) IV Push every 6 hours PRN Severe Pain (7 - 10)  ondansetron Injectable 4 milliGRAM(s) IV Push every 8 hours PRN Nausea and/or Vomiting  traMADol 50 milliGRAM(s) Oral three times a day PRN Moderate Pain (4 - 6)      Allergies  Levaquin (Anaphylaxis; Flushing; Short breath)      PHYSICAL EXAM:   Vital Signs:  Vital Signs Last 24 Hrs  T(C): 36.6 (2024 04:30), Max: 37.1 (2024 20:11)  T(F): 97.8 (2024 04:30), Max: 98.7 (2024 20:11)  HR: 68 (2024 06:37) (60 - 82)  BP: 92/60 (2024 10:07) (80/40 - 117/47)  BP(mean): --  RR: 18 (2024 04:30) (18 - 18)  SpO2: 98% (2024 06:37) (98% - 100%)    Parameters below as of 2024 06:37  Patient On (Oxygen Delivery Method): nasal cannula, 4L      Daily     Daily Weight in k.1 (2024 04:30)    GENERAL:  Appears stated age  HEENT:  NC/AT  CHEST:  Full & symmetric excursion  HEART:  Regular rhythm  ABDOMEN:  Soft, non-tender, non-distended  EXTEREMITIES:  no cyanosis  SKIN:  No rash  NEURO:  Alert      LABS:                        8.2    3.77  )-----------( 149      ( 2024 07:55 )             26.3     01-03    134<L>  |  101  |  26<H>  ----------------------------<  106<H>  3.6   |  31  |  4.00<H>    Ca    7.6<L>      2024 07:55        Urinalysis Basic - ( 2024 07:55 )    Color: x / Appearance: x / SG: x / pH: x  Gluc: 106 mg/dL / Ketone: x  / Bili: x / Urobili: x   Blood: x / Protein: x / Nitrite: x   Leuk Esterase: x / RBC: x / WBC x   Sq Epi: x / Non Sq Epi: x / Bacteria: x   [facility]  Elliot Fraga PA-C  [address] [place] [address]  [phone]      INTERVAL HPI/OVERNIGHT EVENTS:  Pt s/e  No new GI events    MEDICATIONS  (STANDING):  aMIOdarone    Tablet 200 milliGRAM(s) Oral daily  apixaban 5 milliGRAM(s) Oral two times a day  ascorbic acid 500 milliGRAM(s) Oral daily  aspirin enteric coated 81 milliGRAM(s) Oral daily  brimonidine 0.2% Ophthalmic Solution 1 Drop(s) Both EYES two times a day  budesonide 160 MICROgram(s)/formoterol 4.5 MICROgram(s) Inhaler 2 Puff(s) Inhalation two times a day  calcitriol   Capsule 0.25 MICROGram(s) Oral <User Schedule>  calcium acetate 667 milliGRAM(s) Oral three times a day with meals  dextrose 5%. 1000 milliLiter(s) (100 mL/Hr) IV Continuous <Continuous>  dextrose 5%. 1000 milliLiter(s) (50 mL/Hr) IV Continuous <Continuous>  dextrose 50% Injectable 25 Gram(s) IV Push once  dextrose 50% Injectable 12.5 Gram(s) IV Push once  dextrose 50% Injectable 25 Gram(s) IV Push once  dorzolamide 2%/timolol 0.5% Ophthalmic Solution 1 Drop(s) Both EYES two times a day  epoetin kayla-epbx (RETACRIT) Injectable 28195 Unit(s) IV Push <User Schedule>  finasteride 5 milliGRAM(s) Oral daily  glucagon  Injectable 1 milliGRAM(s) IntraMuscular once  insulin lispro (ADMELOG) corrective regimen sliding scale   SubCutaneous at bedtime  insulin lispro (ADMELOG) corrective regimen sliding scale   SubCutaneous three times a day before meals  lactobacillus acidophilus 1 Tablet(s) Oral daily  latanoprost 0.005% Ophthalmic Solution 1 Drop(s) Both EYES at bedtime  levothyroxine 50 MICROGram(s) Oral daily  melatonin 5 milliGRAM(s) Oral at bedtime  midodrine. 10 milliGRAM(s) Oral three times a day  pantoprazole    Tablet 40 milliGRAM(s) Oral daily  senna 2 Tablet(s) Oral at bedtime  sertraline 50 milliGRAM(s) Oral daily  sertraline 25 milliGRAM(s) Oral daily  simvastatin 10 milliGRAM(s) Oral at bedtime  tamsulosin 0.4 milliGRAM(s) Oral at bedtime  ursodiol Capsule 300 milliGRAM(s) Oral two times a day    MEDICATIONS  (PRN):  acetaminophen     Tablet .. 650 milliGRAM(s) Oral every 6 hours PRN Temp greater or equal to 38C (100.4F), Mild Pain (1 - 3)  albuterol/ipratropium for Nebulization 3 milliLiter(s) Nebulizer every 6 hours PRN Shortness of Breath and/or Wheezing  aluminum hydroxide/magnesium hydroxide/simethicone Suspension 30 milliLiter(s) Oral every 4 hours PRN Dyspepsia  bisacodyl Suppository 10 milliGRAM(s) Rectal daily PRN Constipation  dextrose Oral Gel 15 Gram(s) Oral once PRN Blood Glucose LESS THAN 70 milliGRAM(s)/deciliter  guaiFENesin Oral Liquid (Sugar-Free) 200 milliGRAM(s) Oral every 6 hours PRN Cough  morphine  - Injectable 2 milliGRAM(s) IV Push every 6 hours PRN Severe Pain (7 - 10)  ondansetron Injectable 4 milliGRAM(s) IV Push every 8 hours PRN Nausea and/or Vomiting  traMADol 50 milliGRAM(s) Oral three times a day PRN Moderate Pain (4 - 6)      Allergies  Levaquin (Anaphylaxis; Flushing; Short breath)      PHYSICAL EXAM:   Vital Signs:  Vital Signs Last 24 Hrs  T(C): 36.6 (2024 04:30), Max: 37.1 (2024 20:11)  T(F): 97.8 (2024 04:30), Max: 98.7 (2024 20:11)  HR: 68 (2024 06:37) (60 - 82)  BP: 92/60 (2024 10:07) (80/40 - 117/47)  BP(mean): --  RR: 18 (2024 04:30) (18 - 18)  SpO2: 98% (2024 06:37) (98% - 100%)    Parameters below as of 2024 06:37  Patient On (Oxygen Delivery Method): nasal cannula, 4L      Daily     Daily Weight in k.1 (2024 04:30)    GENERAL:  Appears stated age  HEENT:  NC/AT  CHEST:  Full & symmetric excursion  HEART:  Regular rhythm  ABDOMEN:  Soft, non-tender, non-distended  EXTEREMITIES:  no cyanosis  SKIN:  No rash  NEURO:  Alert      LABS:                        8.2    3.77  )-----------( 149      ( 2024 07:55 )             26.3     01-03    134<L>  |  101  |  26<H>  ----------------------------<  106<H>  3.6   |  31  |  4.00<H>    Ca    7.6<L>      2024 07:55        Urinalysis Basic - ( 2024 07:55 )    Color: x / Appearance: x / SG: x / pH: x  Gluc: 106 mg/dL / Ketone: x  / Bili: x / Urobili: x   Blood: x / Protein: x / Nitrite: x   Leuk Esterase: x / RBC: x / WBC x   Sq Epi: x / Non Sq Epi: x / Bacteria: x

## 2024-01-03 NOTE — PROGRESS NOTE ADULT - ASSESSMENT
Patient  is 72 yo  LTC resident with past medical history of end-stage renal disease on dialysis, chronic hypotension, afib, recent admission for clogged AV fistula is presenting for hypotension and hypoxia.  Patient was discharged back to [facility] 12/30  but sent to ED due to hypotension and hypoxia on arrival to [facility] .His vitals were stable upon discharge and he was monitored 2 hrs after HD session by house staff .  Otherwise denies any new complaints.  Patient was diagnosed with Influenza , also found to have JAZMINE elevation .Seen by Dr Garcia cardiologist Admitted  to telemetry unit for monitoring , send 3 sets of cardiac enzymes to rule out acute coronary event, obtain ECHO to evaluate LVEF, cardiology consult  ,continue current management, O2 supply, anticoagulation plan as per cardiology consult Palliative care consult requested ,to discuss advance directives and complete MOLST  Patient  is 74 yo  LTC resident with past medical history of end-stage renal disease on dialysis, chronic hypotension, afib, recent admission for clogged AV fistula is presenting for hypotension and hypoxia.  Patient was discharged back to [facility] 12/30  but sent to ED due to hypotension and hypoxia on arrival to [facility] .His vitals were stable upon discharge and he was monitored 2 hrs after HD session by house staff .  Otherwise denies any new complaints.  Patient was diagnosed with Influenza , also found to have JAZMINE elevation .Seen by Dr Garcia cardiologist Admitted  to telemetry unit for monitoring , send 3 sets of cardiac enzymes to rule out acute coronary event, obtain ECHO to evaluate LVEF, cardiology consult  ,continue current management, O2 supply, anticoagulation plan as per cardiology consult Palliative care consult requested ,to discuss advance directives and complete MOLST

## 2024-01-03 NOTE — PROGRESS NOTE ADULT - SUBJECTIVE AND OBJECTIVE BOX
[facility] [place]       HPI:  Patient  is 72 yo , C resident with past medical history of end-stage renal disease on dialysis, chronic hypotension, afib, T2DM, recent admission for clogged AV fistula is presenting for hypotension and hypoxia.  Patient was discharged back to [facility] 12/30  but sent to ED due to hypotension and hypoxia on arrival to [facility] .His vitals were stable upon discharge and he was monitored 2 hrs after HD session by house staff .  Otherwise denies any new complaints.  Patient was diagnosed with Influenza , also found to have JAZMINE elevation .Seen by Dr Garcia cardiologist Admitted  to telemetry unit for monitoring , send 3 sets of cardiac enzymes to rule out acute coronary event, obtain ECHO to evaluate LVEF, cardiology consult  ,continue current management, O2 supply, anticoagulation plan as per cardiology consult Palliative care consult requested ,to discuss advance directives and complete MOLST  (31 Dec 2023 10:10)        SUBJECTIVE:      ALLERGIES:  Allergies    Levaquin (Anaphylaxis; Flushing; Short breath)    Intolerances          MEDICATIONS  (STANDING):  aMIOdarone    Tablet 200 milliGRAM(s) Oral daily  apixaban 5 milliGRAM(s) Oral two times a day  ascorbic acid 500 milliGRAM(s) Oral daily  aspirin enteric coated 81 milliGRAM(s) Oral daily  brimonidine 0.2% Ophthalmic Solution 1 Drop(s) Both EYES two times a day  budesonide 160 MICROgram(s)/formoterol 4.5 MICROgram(s) Inhaler 2 Puff(s) Inhalation two times a day  calcitriol   Capsule 0.25 MICROGram(s) Oral <User Schedule>  calcium acetate 667 milliGRAM(s) Oral three times a day with meals  dextrose 5%. 1000 milliLiter(s) (50 mL/Hr) IV Continuous <Continuous>  dextrose 5%. 1000 milliLiter(s) (100 mL/Hr) IV Continuous <Continuous>  dextrose 50% Injectable 25 Gram(s) IV Push once  dextrose 50% Injectable 25 Gram(s) IV Push once  dextrose 50% Injectable 12.5 Gram(s) IV Push once  dorzolamide 2%/timolol 0.5% Ophthalmic Solution 1 Drop(s) Both EYES two times a day  epoetin kayla-epbx (RETACRIT) Injectable 70337 Unit(s) IV Push <User Schedule>  finasteride 5 milliGRAM(s) Oral daily  glucagon  Injectable 1 milliGRAM(s) IntraMuscular once  insulin lispro (ADMELOG) corrective regimen sliding scale   SubCutaneous at bedtime  insulin lispro (ADMELOG) corrective regimen sliding scale   SubCutaneous three times a day before meals  lactobacillus acidophilus 1 Tablet(s) Oral daily  latanoprost 0.005% Ophthalmic Solution 1 Drop(s) Both EYES at bedtime  levothyroxine 50 MICROGram(s) Oral daily  melatonin 5 milliGRAM(s) Oral at bedtime  midodrine. 15 milliGRAM(s) Oral three times a day  oseltamivir 30 milliGRAM(s) Oral once  pantoprazole    Tablet 40 milliGRAM(s) Oral daily  senna 2 Tablet(s) Oral at bedtime  sertraline 25 milliGRAM(s) Oral daily  sertraline 50 milliGRAM(s) Oral daily  simvastatin 10 milliGRAM(s) Oral at bedtime  tamsulosin 0.4 milliGRAM(s) Oral at bedtime  ursodiol Capsule 300 milliGRAM(s) Oral two times a day    MEDICATIONS  (PRN):  acetaminophen     Tablet .. 650 milliGRAM(s) Oral every 6 hours PRN Temp greater or equal to 38C (100.4F), Mild Pain (1 - 3)  albuterol/ipratropium for Nebulization 3 milliLiter(s) Nebulizer every 6 hours PRN Shortness of Breath and/or Wheezing  aluminum hydroxide/magnesium hydroxide/simethicone Suspension 30 milliLiter(s) Oral every 4 hours PRN Dyspepsia  bisacodyl Suppository 10 milliGRAM(s) Rectal daily PRN Constipation  dextrose Oral Gel 15 Gram(s) Oral once PRN Blood Glucose LESS THAN 70 milliGRAM(s)/deciliter  guaiFENesin Oral Liquid (Sugar-Free) 200 milliGRAM(s) Oral every 6 hours PRN Cough  morphine  - Injectable 2 milliGRAM(s) IV Push every 6 hours PRN Severe Pain (7 - 10)  ondansetron Injectable 4 milliGRAM(s) IV Push every 8 hours PRN Nausea and/or Vomiting  traMADol 50 milliGRAM(s) Oral three times a day PRN Moderate Pain (4 - 6)      REVIEW OF SYSTEMS:  CONSTITUTIONAL: No fever,  RESPIRATORY: No cough, wheezing, shortness of breath  CARDIOVASCULAR: No chest pain, dyspnea, palpitations, dizziness, syncope, paroxysmal nocturnal dyspnea, orthopnea, or arm or leg swelling  GASTROINTESTINAL: No abdominal  or epigastric pain, nausea, vomiting,  diarrhea  NEUROLOGICAL: No headaches,  loss of strength, numbness, or tremors    Vital Signs Last 24 Hrs  T(C): 36.4 (04 Jan 2024 00:56), Max: 36.7 (03 Jan 2024 11:21)  T(F): 97.6 (04 Jan 2024 00:56), Max: 98 (03 Jan 2024 11:21)  HR: 74 (04 Jan 2024 00:56) (64 - 80)  BP: 99/64 (04 Jan 2024 00:56) (92/54 - 103/59)  BP(mean): --  RR: 18 (04 Jan 2024 00:56) (18 - 18)  SpO2: 100% (04 Jan 2024 00:56) (98% - 100%)    Parameters below as of 04 Jan 2024 00:56  Patient On (Oxygen Delivery Method): nasal cannula  O2 Flow (L/min): 3      PHYSICAL EXAM:  HEAD:  Atraumatic, Normocephalic  NECK: Supple and normal thyroid.  No JVD or carotid bruit.   HEART: S1, S2 regular , 1/6 soft ejection systolic murmur in mitral area , no thrill and no gallops .  PULMONARY: Bilateral vesicular breathing , few scattered ronchi and few scattered rales are present .  ABDOMEN: Soft nontender and positive bowl sounds   EXTREMITIES:  No clubbing, cyanosis, or pedal  edema  NEUROLOGICAL: AAOX3 , no focal deficit .    LABS:                        8.2    3.77  )-----------( 149      ( 03 Jan 2024 07:55 )             26.3     01-03    134<L>  |  101  |  26<H>  ----------------------------<  106<H>  3.6   |  31  |  4.00<H>    Ca    7.6<L>      03 Jan 2024 07:55            Urinalysis Basic - ( 03 Jan 2024 07:55 )    Color: x / Appearance: x / SG: x / pH: x  Gluc: 106 mg/dL / Ketone: x  / Bili: x / Urobili: x   Blood: x / Protein: x / Nitrite: x   Leuk Esterase: x / RBC: x / WBC x   Sq Epi: x / Non Sq Epi: x / Bacteria: x      BNP      EKG:  ECHO:  IMAGING:    Assessment/Plan    Will continue to follow during hospital course and give further recommendations as needed . Thanks for your referral . if any questions please contact me at office ([phone])cell [phone])  [facility] [place]       HPI:  Patient  is 74 yo , C resident with past medical history of end-stage renal disease on dialysis, chronic hypotension, afib, T2DM, recent admission for clogged AV fistula is presenting for hypotension and hypoxia.  Patient was discharged back to [facility] 12/30  but sent to ED due to hypotension and hypoxia on arrival to [facility] .His vitals were stable upon discharge and he was monitored 2 hrs after HD session by house staff .  Otherwise denies any new complaints.  Patient was diagnosed with Influenza , also found to have JAZMINE elevation .Seen by Dr Garcia cardiologist Admitted  to telemetry unit for monitoring , send 3 sets of cardiac enzymes to rule out acute coronary event, obtain ECHO to evaluate LVEF, cardiology consult  ,continue current management, O2 supply, anticoagulation plan as per cardiology consult Palliative care consult requested ,to discuss advance directives and complete MOLST  (31 Dec 2023 10:10)        SUBJECTIVE:      ALLERGIES:  Allergies    Levaquin (Anaphylaxis; Flushing; Short breath)    Intolerances          MEDICATIONS  (STANDING):  aMIOdarone    Tablet 200 milliGRAM(s) Oral daily  apixaban 5 milliGRAM(s) Oral two times a day  ascorbic acid 500 milliGRAM(s) Oral daily  aspirin enteric coated 81 milliGRAM(s) Oral daily  brimonidine 0.2% Ophthalmic Solution 1 Drop(s) Both EYES two times a day  budesonide 160 MICROgram(s)/formoterol 4.5 MICROgram(s) Inhaler 2 Puff(s) Inhalation two times a day  calcitriol   Capsule 0.25 MICROGram(s) Oral <User Schedule>  calcium acetate 667 milliGRAM(s) Oral three times a day with meals  dextrose 5%. 1000 milliLiter(s) (50 mL/Hr) IV Continuous <Continuous>  dextrose 5%. 1000 milliLiter(s) (100 mL/Hr) IV Continuous <Continuous>  dextrose 50% Injectable 25 Gram(s) IV Push once  dextrose 50% Injectable 25 Gram(s) IV Push once  dextrose 50% Injectable 12.5 Gram(s) IV Push once  dorzolamide 2%/timolol 0.5% Ophthalmic Solution 1 Drop(s) Both EYES two times a day  epoetin kayla-epbx (RETACRIT) Injectable 76982 Unit(s) IV Push <User Schedule>  finasteride 5 milliGRAM(s) Oral daily  glucagon  Injectable 1 milliGRAM(s) IntraMuscular once  insulin lispro (ADMELOG) corrective regimen sliding scale   SubCutaneous at bedtime  insulin lispro (ADMELOG) corrective regimen sliding scale   SubCutaneous three times a day before meals  lactobacillus acidophilus 1 Tablet(s) Oral daily  latanoprost 0.005% Ophthalmic Solution 1 Drop(s) Both EYES at bedtime  levothyroxine 50 MICROGram(s) Oral daily  melatonin 5 milliGRAM(s) Oral at bedtime  midodrine. 15 milliGRAM(s) Oral three times a day  oseltamivir 30 milliGRAM(s) Oral once  pantoprazole    Tablet 40 milliGRAM(s) Oral daily  senna 2 Tablet(s) Oral at bedtime  sertraline 25 milliGRAM(s) Oral daily  sertraline 50 milliGRAM(s) Oral daily  simvastatin 10 milliGRAM(s) Oral at bedtime  tamsulosin 0.4 milliGRAM(s) Oral at bedtime  ursodiol Capsule 300 milliGRAM(s) Oral two times a day    MEDICATIONS  (PRN):  acetaminophen     Tablet .. 650 milliGRAM(s) Oral every 6 hours PRN Temp greater or equal to 38C (100.4F), Mild Pain (1 - 3)  albuterol/ipratropium for Nebulization 3 milliLiter(s) Nebulizer every 6 hours PRN Shortness of Breath and/or Wheezing  aluminum hydroxide/magnesium hydroxide/simethicone Suspension 30 milliLiter(s) Oral every 4 hours PRN Dyspepsia  bisacodyl Suppository 10 milliGRAM(s) Rectal daily PRN Constipation  dextrose Oral Gel 15 Gram(s) Oral once PRN Blood Glucose LESS THAN 70 milliGRAM(s)/deciliter  guaiFENesin Oral Liquid (Sugar-Free) 200 milliGRAM(s) Oral every 6 hours PRN Cough  morphine  - Injectable 2 milliGRAM(s) IV Push every 6 hours PRN Severe Pain (7 - 10)  ondansetron Injectable 4 milliGRAM(s) IV Push every 8 hours PRN Nausea and/or Vomiting  traMADol 50 milliGRAM(s) Oral three times a day PRN Moderate Pain (4 - 6)      REVIEW OF SYSTEMS:  CONSTITUTIONAL: No fever,  RESPIRATORY: No cough, wheezing, shortness of breath  CARDIOVASCULAR: No chest pain, dyspnea, palpitations, dizziness, syncope, paroxysmal nocturnal dyspnea, orthopnea, or arm or leg swelling  GASTROINTESTINAL: No abdominal  or epigastric pain, nausea, vomiting,  diarrhea  NEUROLOGICAL: No headaches,  loss of strength, numbness, or tremors    Vital Signs Last 24 Hrs  T(C): 36.4 (04 Jan 2024 00:56), Max: 36.7 (03 Jan 2024 11:21)  T(F): 97.6 (04 Jan 2024 00:56), Max: 98 (03 Jan 2024 11:21)  HR: 74 (04 Jan 2024 00:56) (64 - 80)  BP: 99/64 (04 Jan 2024 00:56) (92/54 - 103/59)  BP(mean): --  RR: 18 (04 Jan 2024 00:56) (18 - 18)  SpO2: 100% (04 Jan 2024 00:56) (98% - 100%)    Parameters below as of 04 Jan 2024 00:56  Patient On (Oxygen Delivery Method): nasal cannula  O2 Flow (L/min): 3      PHYSICAL EXAM:  HEAD:  Atraumatic, Normocephalic  NECK: Supple and normal thyroid.  No JVD or carotid bruit.   HEART: S1, S2 regular , 1/6 soft ejection systolic murmur in mitral area , no thrill and no gallops .  PULMONARY: Bilateral vesicular breathing , few scattered ronchi and few scattered rales are present .  ABDOMEN: Soft nontender and positive bowl sounds   EXTREMITIES:  No clubbing, cyanosis, or pedal  edema  NEUROLOGICAL: AAOX3 , no focal deficit .    LABS:                        8.2    3.77  )-----------( 149      ( 03 Jan 2024 07:55 )             26.3     01-03    134<L>  |  101  |  26<H>  ----------------------------<  106<H>  3.6   |  31  |  4.00<H>    Ca    7.6<L>      03 Jan 2024 07:55            Urinalysis Basic - ( 03 Jan 2024 07:55 )    Color: x / Appearance: x / SG: x / pH: x  Gluc: 106 mg/dL / Ketone: x  / Bili: x / Urobili: x   Blood: x / Protein: x / Nitrite: x   Leuk Esterase: x / RBC: x / WBC x   Sq Epi: x / Non Sq Epi: x / Bacteria: x      BNP      EKG:  ECHO:  IMAGING:    Assessment/Plan    Will continue to follow during hospital course and give further recommendations as needed . Thanks for your referral . if any questions please contact me at office ([phone])cell [phone])  MI SANDOVAL MD [place] [id number]  [address]  OFFICE : [phone]   CELL : [phone]  CARDIOLOGY F/U  :       HPI:  Patient  is 72 yo , LTC resident with past medical history of end-stage renal disease on dialysis, chronic hypotension, afib, T2DM, recent admission for clogged AV fistula is presenting for hypotension and hypoxia.  Patient was discharged back to [facility] 12/30  but sent to ED due to hypotension and hypoxia on arrival to [facility] .His vitals were stable upon discharge and he was monitored 2 hrs after HD session by house staff .  Otherwise denies any new complaints.  Patient was diagnosed with Influenza , also found to have JAZMINE elevation .Seen by Dr Sandoval cardiologist Admitted  to telemetry unit for monitoring , send 3 sets of cardiac enzymes to rule out acute coronary event, obtain ECHO to evaluate LVEF, cardiology consult  ,continue current management, O2 supply, anticoagulation plan as per cardiology consult Palliative care consult requested ,to discuss advance directives and complete MOLST  (31 Dec 2023 10:10)        SUBJECTIVE: Patient feeling better .      ALLERGIES:  Allergies    Levaquin (Anaphylaxis; Flushing; Short breath)    Intolerances          MEDICATIONS  (STANDING):  aMIOdarone    Tablet 200 milliGRAM(s) Oral daily  apixaban 5 milliGRAM(s) Oral two times a day  ascorbic acid 500 milliGRAM(s) Oral daily  aspirin enteric coated 81 milliGRAM(s) Oral daily  brimonidine 0.2% Ophthalmic Solution 1 Drop(s) Both EYES two times a day  budesonide 160 MICROgram(s)/formoterol 4.5 MICROgram(s) Inhaler 2 Puff(s) Inhalation two times a day  calcitriol   Capsule 0.25 MICROGram(s) Oral <User Schedule>  calcium acetate 667 milliGRAM(s) Oral three times a day with meals  dextrose 5%. 1000 milliLiter(s) (50 mL/Hr) IV Continuous <Continuous>  dextrose 5%. 1000 milliLiter(s) (100 mL/Hr) IV Continuous <Continuous>  dextrose 50% Injectable 25 Gram(s) IV Push once  dextrose 50% Injectable 25 Gram(s) IV Push once  dextrose 50% Injectable 12.5 Gram(s) IV Push once  dorzolamide 2%/timolol 0.5% Ophthalmic Solution 1 Drop(s) Both EYES two times a day  epoetin kayla-epbx (RETACRIT) Injectable 82865 Unit(s) IV Push <User Schedule>  finasteride 5 milliGRAM(s) Oral daily  glucagon  Injectable 1 milliGRAM(s) IntraMuscular once  insulin lispro (ADMELOG) corrective regimen sliding scale   SubCutaneous at bedtime  insulin lispro (ADMELOG) corrective regimen sliding scale   SubCutaneous three times a day before meals  lactobacillus acidophilus 1 Tablet(s) Oral daily  latanoprost 0.005% Ophthalmic Solution 1 Drop(s) Both EYES at bedtime  levothyroxine 50 MICROGram(s) Oral daily  melatonin 5 milliGRAM(s) Oral at bedtime  midodrine. 15 milliGRAM(s) Oral three times a day  oseltamivir 30 milliGRAM(s) Oral once  pantoprazole    Tablet 40 milliGRAM(s) Oral daily  senna 2 Tablet(s) Oral at bedtime  sertraline 25 milliGRAM(s) Oral daily  sertraline 50 milliGRAM(s) Oral daily  simvastatin 10 milliGRAM(s) Oral at bedtime  tamsulosin 0.4 milliGRAM(s) Oral at bedtime  ursodiol Capsule 300 milliGRAM(s) Oral two times a day    MEDICATIONS  (PRN):  acetaminophen     Tablet .. 650 milliGRAM(s) Oral every 6 hours PRN Temp greater or equal to 38C (100.4F), Mild Pain (1 - 3)  albuterol/ipratropium for Nebulization 3 milliLiter(s) Nebulizer every 6 hours PRN Shortness of Breath and/or Wheezing  aluminum hydroxide/magnesium hydroxide/simethicone Suspension 30 milliLiter(s) Oral every 4 hours PRN Dyspepsia  bisacodyl Suppository 10 milliGRAM(s) Rectal daily PRN Constipation  dextrose Oral Gel 15 Gram(s) Oral once PRN Blood Glucose LESS THAN 70 milliGRAM(s)/deciliter  guaiFENesin Oral Liquid (Sugar-Free) 200 milliGRAM(s) Oral every 6 hours PRN Cough  morphine  - Injectable 2 milliGRAM(s) IV Push every 6 hours PRN Severe Pain (7 - 10)  ondansetron Injectable 4 milliGRAM(s) IV Push every 8 hours PRN Nausea and/or Vomiting  traMADol 50 milliGRAM(s) Oral three times a day PRN Moderate Pain (4 - 6)      REVIEW OF SYSTEMS:  CONSTITUTIONAL: No fever,  RESPIRATORY: No cough, wheezing, shortness of breath  CARDIOVASCULAR: No chest pain, dyspnea, palpitations, dizziness, syncope, paroxysmal nocturnal dyspnea, orthopnea, or arm or leg swelling  GASTROINTESTINAL: No abdominal  or epigastric pain, nausea, vomiting,  diarrhea  NEUROLOGICAL: No headaches,  loss of strength, numbness, or tremors    Vital Signs Last 24 Hrs  T(C): 36.4 (04 Jan 2024 00:56), Max: 36.7 (03 Jan 2024 11:21)  T(F): 97.6 (04 Jan 2024 00:56), Max: 98 (03 Jan 2024 11:21)  HR: 74 (04 Jan 2024 00:56) (64 - 80)  BP: 99/64 (04 Jan 2024 00:56) (92/54 - 103/59)  BP(mean): --  RR: 18 (04 Jan 2024 00:56) (18 - 18)  SpO2: 100% (04 Jan 2024 00:56) (98% - 100%)    Parameters below as of 04 Jan 2024 00:56  Patient On (Oxygen Delivery Method): nasal cannula  O2 Flow (L/min): 3      PHYSICAL EXAM:  HEAD:  Atraumatic, Normocephalic  NECK: Supple and normal thyroid.  No JVD or carotid bruit.   HEART: S1, S2 regular , 1/6 soft ejection systolic murmur in mitral area , no thrill and no gallops .  PULMONARY: Bilateral vesicular breathing , few scattered ronchi and few scattered rales are present .  ABDOMEN: Soft nontender and positive bowl sounds   EXTREMITIES:  No clubbing, cyanosis, or pedal  edema  NEUROLOGICAL: AAOX3 , no focal deficit .    LABS:                        8.2    3.77  )-----------( 149      ( 03 Jan 2024 07:55 )             26.3     01-03    134<L>  |  101  |  26<H>  ----------------------------<  106<H>  3.6   |  31  |  4.00<H>    Ca    7.6<L>      03 Jan 2024 07:55            Urinalysis Basic - ( 03 Jan 2024 07:55 )    Color: x / Appearance: x / SG: x / pH: x  Gluc: 106 mg/dL / Ketone: x  / Bili: x / Urobili: x   Blood: x / Protein: x / Nitrite: x   Leuk Esterase: x / RBC: x / WBC x   Sq Epi: x / Non Sq Epi: x / Bacteria: x      BNP      EKG:  ECHO:  IMAGING:    Assessment/Plan    Will continue to follow during hospital course and give further recommendations as needed . Thanks for your referral . if any questions please contact me at office ([phone])cell [phone])  MI SANDOVAL MD [place] [id number]  [address]  OFFICE : [phone]   CELL : [phone]  CARDIOLOGY F/U  :       HPI:  Patient  is 74 yo , LTC resident with past medical history of end-stage renal disease on dialysis, chronic hypotension, afib, T2DM, recent admission for clogged AV fistula is presenting for hypotension and hypoxia.  Patient was discharged back to [facility] 12/30  but sent to ED due to hypotension and hypoxia on arrival to [facility] .His vitals were stable upon discharge and he was monitored 2 hrs after HD session by house staff .  Otherwise denies any new complaints.  Patient was diagnosed with Influenza , also found to have JAZMINE elevation .Seen by Dr Sandoval cardiologist Admitted  to telemetry unit for monitoring , send 3 sets of cardiac enzymes to rule out acute coronary event, obtain ECHO to evaluate LVEF, cardiology consult  ,continue current management, O2 supply, anticoagulation plan as per cardiology consult Palliative care consult requested ,to discuss advance directives and complete MOLST  (31 Dec 2023 10:10)        SUBJECTIVE: Patient feeling better .      ALLERGIES:  Allergies    Levaquin (Anaphylaxis; Flushing; Short breath)    Intolerances          MEDICATIONS  (STANDING):  aMIOdarone    Tablet 200 milliGRAM(s) Oral daily  apixaban 5 milliGRAM(s) Oral two times a day  ascorbic acid 500 milliGRAM(s) Oral daily  aspirin enteric coated 81 milliGRAM(s) Oral daily  brimonidine 0.2% Ophthalmic Solution 1 Drop(s) Both EYES two times a day  budesonide 160 MICROgram(s)/formoterol 4.5 MICROgram(s) Inhaler 2 Puff(s) Inhalation two times a day  calcitriol   Capsule 0.25 MICROGram(s) Oral <User Schedule>  calcium acetate 667 milliGRAM(s) Oral three times a day with meals  dextrose 5%. 1000 milliLiter(s) (50 mL/Hr) IV Continuous <Continuous>  dextrose 5%. 1000 milliLiter(s) (100 mL/Hr) IV Continuous <Continuous>  dextrose 50% Injectable 25 Gram(s) IV Push once  dextrose 50% Injectable 25 Gram(s) IV Push once  dextrose 50% Injectable 12.5 Gram(s) IV Push once  dorzolamide 2%/timolol 0.5% Ophthalmic Solution 1 Drop(s) Both EYES two times a day  epoetin kayla-epbx (RETACRIT) Injectable 35359 Unit(s) IV Push <User Schedule>  finasteride 5 milliGRAM(s) Oral daily  glucagon  Injectable 1 milliGRAM(s) IntraMuscular once  insulin lispro (ADMELOG) corrective regimen sliding scale   SubCutaneous at bedtime  insulin lispro (ADMELOG) corrective regimen sliding scale   SubCutaneous three times a day before meals  lactobacillus acidophilus 1 Tablet(s) Oral daily  latanoprost 0.005% Ophthalmic Solution 1 Drop(s) Both EYES at bedtime  levothyroxine 50 MICROGram(s) Oral daily  melatonin 5 milliGRAM(s) Oral at bedtime  midodrine. 15 milliGRAM(s) Oral three times a day  oseltamivir 30 milliGRAM(s) Oral once  pantoprazole    Tablet 40 milliGRAM(s) Oral daily  senna 2 Tablet(s) Oral at bedtime  sertraline 25 milliGRAM(s) Oral daily  sertraline 50 milliGRAM(s) Oral daily  simvastatin 10 milliGRAM(s) Oral at bedtime  tamsulosin 0.4 milliGRAM(s) Oral at bedtime  ursodiol Capsule 300 milliGRAM(s) Oral two times a day    MEDICATIONS  (PRN):  acetaminophen     Tablet .. 650 milliGRAM(s) Oral every 6 hours PRN Temp greater or equal to 38C (100.4F), Mild Pain (1 - 3)  albuterol/ipratropium for Nebulization 3 milliLiter(s) Nebulizer every 6 hours PRN Shortness of Breath and/or Wheezing  aluminum hydroxide/magnesium hydroxide/simethicone Suspension 30 milliLiter(s) Oral every 4 hours PRN Dyspepsia  bisacodyl Suppository 10 milliGRAM(s) Rectal daily PRN Constipation  dextrose Oral Gel 15 Gram(s) Oral once PRN Blood Glucose LESS THAN 70 milliGRAM(s)/deciliter  guaiFENesin Oral Liquid (Sugar-Free) 200 milliGRAM(s) Oral every 6 hours PRN Cough  morphine  - Injectable 2 milliGRAM(s) IV Push every 6 hours PRN Severe Pain (7 - 10)  ondansetron Injectable 4 milliGRAM(s) IV Push every 8 hours PRN Nausea and/or Vomiting  traMADol 50 milliGRAM(s) Oral three times a day PRN Moderate Pain (4 - 6)      REVIEW OF SYSTEMS:  CONSTITUTIONAL: No fever,  RESPIRATORY: No cough, wheezing, shortness of breath  CARDIOVASCULAR: No chest pain, dyspnea, palpitations, dizziness, syncope, paroxysmal nocturnal dyspnea, orthopnea, or arm or leg swelling  GASTROINTESTINAL: No abdominal  or epigastric pain, nausea, vomiting,  diarrhea  NEUROLOGICAL: No headaches,  loss of strength, numbness, or tremors    Vital Signs Last 24 Hrs  T(C): 36.4 (04 Jan 2024 00:56), Max: 36.7 (03 Jan 2024 11:21)  T(F): 97.6 (04 Jan 2024 00:56), Max: 98 (03 Jan 2024 11:21)  HR: 74 (04 Jan 2024 00:56) (64 - 80)  BP: 99/64 (04 Jan 2024 00:56) (92/54 - 103/59)  BP(mean): --  RR: 18 (04 Jan 2024 00:56) (18 - 18)  SpO2: 100% (04 Jan 2024 00:56) (98% - 100%)    Parameters below as of 04 Jan 2024 00:56  Patient On (Oxygen Delivery Method): nasal cannula  O2 Flow (L/min): 3      PHYSICAL EXAM:  HEAD:  Atraumatic, Normocephalic  NECK: Supple and normal thyroid.  No JVD or carotid bruit.   HEART: S1, S2 regular , 1/6 soft ejection systolic murmur in mitral area , no thrill and no gallops .  PULMONARY: Bilateral vesicular breathing , few scattered ronchi and few scattered rales are present .  ABDOMEN: Soft nontender and positive bowl sounds   EXTREMITIES:  No clubbing, cyanosis, or pedal  edema  NEUROLOGICAL: AAOX3 , no focal deficit .    LABS:                        8.2    3.77  )-----------( 149      ( 03 Jan 2024 07:55 )             26.3     01-03    134<L>  |  101  |  26<H>  ----------------------------<  106<H>  3.6   |  31  |  4.00<H>    Ca    7.6<L>      03 Jan 2024 07:55            Urinalysis Basic - ( 03 Jan 2024 07:55 )    Color: x / Appearance: x / SG: x / pH: x  Gluc: 106 mg/dL / Ketone: x  / Bili: x / Urobili: x   Blood: x / Protein: x / Nitrite: x   Leuk Esterase: x / RBC: x / WBC x   Sq Epi: x / Non Sq Epi: x / Bacteria: x      BNP      EKG:  ECHO:  IMAGING:    Assessment/Plan    Will continue to follow during hospital course and give further recommendations as needed . Thanks for your referral . if any questions please contact me at office ([phone] [phone])  MI SANDOVAL MD [place] [id number]  [address]  OFFICE : [phone]   CELL : [phone]  CARDIOLOGY F/U  :       HPI:  Patient  is 74 yo , LTC resident with past medical history of end-stage renal disease on dialysis, chronic hypotension, afib, T2DM, recent admission for clogged AV fistula is presenting for hypotension and hypoxia.  Patient was discharged back to [facility] 12/30  but sent to ED due to hypotension and hypoxia on arrival to [facility] .His vitals were stable upon discharge and he was monitored 2 hrs after HD session by house staff .  Otherwise denies any new complaints.  Patient was diagnosed with Influenza , also found to have JAZMINE elevation .Seen by Dr Sandoval cardiologist Admitted  to telemetry unit for monitoring , send 3 sets of cardiac enzymes to rule out acute coronary event, obtain ECHO to evaluate LVEF, cardiology consult  ,continue current management, O2 supply, anticoagulation plan as per cardiology consult Palliative care consult requested ,to discuss advance directives and complete MOLST  (31 Dec 2023 10:10)        SUBJECTIVE: Patient feeling better .      ALLERGIES:  Allergies    Levaquin (Anaphylaxis; Flushing; Short breath)    Intolerances          MEDICATIONS  (STANDING):  aMIOdarone    Tablet 200 milliGRAM(s) Oral daily  apixaban 5 milliGRAM(s) Oral two times a day  ascorbic acid 500 milliGRAM(s) Oral daily  aspirin enteric coated 81 milliGRAM(s) Oral daily  brimonidine 0.2% Ophthalmic Solution 1 Drop(s) Both EYES two times a day  budesonide 160 MICROgram(s)/formoterol 4.5 MICROgram(s) Inhaler 2 Puff(s) Inhalation two times a day  calcitriol   Capsule 0.25 MICROGram(s) Oral <User Schedule>  calcium acetate 667 milliGRAM(s) Oral three times a day with meals  dextrose 5%. 1000 milliLiter(s) (50 mL/Hr) IV Continuous <Continuous>  dextrose 5%. 1000 milliLiter(s) (100 mL/Hr) IV Continuous <Continuous>  dextrose 50% Injectable 25 Gram(s) IV Push once  dextrose 50% Injectable 25 Gram(s) IV Push once  dextrose 50% Injectable 12.5 Gram(s) IV Push once  dorzolamide 2%/timolol 0.5% Ophthalmic Solution 1 Drop(s) Both EYES two times a day  epoetin kayla-epbx (RETACRIT) Injectable 71481 Unit(s) IV Push <User Schedule>  finasteride 5 milliGRAM(s) Oral daily  glucagon  Injectable 1 milliGRAM(s) IntraMuscular once  insulin lispro (ADMELOG) corrective regimen sliding scale   SubCutaneous at bedtime  insulin lispro (ADMELOG) corrective regimen sliding scale   SubCutaneous three times a day before meals  lactobacillus acidophilus 1 Tablet(s) Oral daily  latanoprost 0.005% Ophthalmic Solution 1 Drop(s) Both EYES at bedtime  levothyroxine 50 MICROGram(s) Oral daily  melatonin 5 milliGRAM(s) Oral at bedtime  midodrine. 15 milliGRAM(s) Oral three times a day  oseltamivir 30 milliGRAM(s) Oral once  pantoprazole    Tablet 40 milliGRAM(s) Oral daily  senna 2 Tablet(s) Oral at bedtime  sertraline 25 milliGRAM(s) Oral daily  sertraline 50 milliGRAM(s) Oral daily  simvastatin 10 milliGRAM(s) Oral at bedtime  tamsulosin 0.4 milliGRAM(s) Oral at bedtime  ursodiol Capsule 300 milliGRAM(s) Oral two times a day    MEDICATIONS  (PRN):  acetaminophen     Tablet .. 650 milliGRAM(s) Oral every 6 hours PRN Temp greater or equal to 38C (100.4F), Mild Pain (1 - 3)  albuterol/ipratropium for Nebulization 3 milliLiter(s) Nebulizer every 6 hours PRN Shortness of Breath and/or Wheezing  aluminum hydroxide/magnesium hydroxide/simethicone Suspension 30 milliLiter(s) Oral every 4 hours PRN Dyspepsia  bisacodyl Suppository 10 milliGRAM(s) Rectal daily PRN Constipation  dextrose Oral Gel 15 Gram(s) Oral once PRN Blood Glucose LESS THAN 70 milliGRAM(s)/deciliter  guaiFENesin Oral Liquid (Sugar-Free) 200 milliGRAM(s) Oral every 6 hours PRN Cough  morphine  - Injectable 2 milliGRAM(s) IV Push every 6 hours PRN Severe Pain (7 - 10)  ondansetron Injectable 4 milliGRAM(s) IV Push every 8 hours PRN Nausea and/or Vomiting  traMADol 50 milliGRAM(s) Oral three times a day PRN Moderate Pain (4 - 6)      REVIEW OF SYSTEMS:  CONSTITUTIONAL: No fever,  RESPIRATORY: Some improvement in  cough, wheezing, shortness of breath  CARDIOVASCULAR: No chest pain,  palpitations, dizziness, syncope, paroxysmal nocturnal dyspnea,  or arm or leg swelling but has SOB with mild exertion .  GASTROINTESTINAL: No abdominal  or epigastric pain, nausea, vomiting,  diarrhea  NEUROLOGICAL: No headaches,  loss of strength, numbness, or tremors  Skin : No itching   Hematology : No active bleeding  Endocrinology : No heat and cold intolerance   Psychiatry : Patient is confused .  Genitourinary : No dysuria   Musculoskeletal : Patinet has mild arthritis     Vital Signs Last 24 Hrs  T(C): 36.4 (04 Jan 2024 00:56), Max: 36.7 (03 Jan 2024 11:21)  T(F): 97.6 (04 Jan 2024 00:56), Max: 98 (03 Jan 2024 11:21)  HR: 74 (04 Jan 2024 00:56) (64 - 80)  BP: 99/64 (04 Jan 2024 00:56) (92/54 - 103/59)  BP(mean): --  RR: 18 (04 Jan 2024 00:56) (18 - 18)  SpO2: 100% (04 Jan 2024 00:56) (98% - 100%)    Parameters below as of 04 Jan 2024 00:56  Patient On (Oxygen Delivery Method): nasal cannula  O2 Flow (L/min): 3      PHYSICAL EXAM:  HEAD:  Atraumatic, Normocephalic  NECK: Supple and normal thyroid.  No JVD or carotid bruit.   HEART: S1, S2 irregular , 1/6 soft ejection systolic murmur in mitral area , no thrill and no gallops .  PULMONARY: Bilateral vesicular breathing , few scattered rhonchi and few scattered rales are present .  ABDOMEN: Soft nontender and positive bowl sounds   EXTREMITIES:  No clubbing, cyanosis, or pedal  edema  NEUROLOGICAL: Awake and mild confused  , no focal deficit .  Skin : No rashes .  Musculoskeletal : No joint swellings .    LABS:                        8.2    3.77  )-----------( 149      ( 03 Jan 2024 07:55 )             26.3     01-03    134<L>  |  101  |  26<H>  ----------------------------<  106<H>  3.6   |  31  |  4.00<H>    Ca    7.6<L>      03 Jan 2024 07:55            Urinalysis Basic - ( 03 Jan 2024 07:55 )    Color: x / Appearance: x / SG: x / pH: x  Gluc: 106 mg/dL / Ketone: x  / Bili: x / Urobili: x   Blood: x / Protein: x / Nitrite: x   Leuk Esterase: x / RBC: x / WBC x   Sq Epi: x / Non Sq Epi: x / Bacteria: x      Assessment/Plan    Will continue to follow during hospital course and give further recommendations as needed . Thanks for your referral . if any questions please contact me at office ([phone])cell [phone])  MI SANDOVAL MD [place] [id number]  [address]  OFFICE : [phone]   CELL : [phone]  CARDIOLOGY F/U  :       HPI:  Patient  is 74 yo , LTC resident with past medical history of end-stage renal disease on dialysis, chronic hypotension, afib, T2DM, recent admission for clogged AV fistula is presenting for hypotension and hypoxia.  Patient was discharged back to [facility] 12/30  but sent to ED due to hypotension and hypoxia on arrival to [facility] .His vitals were stable upon discharge and he was monitored 2 hrs after HD session by house staff .  Otherwise denies any new complaints.  Patient was diagnosed with Influenza , also found to have JAZMINE elevation .Seen by Dr Sandoval cardiologist Admitted  to telemetry unit for monitoring , send 3 sets of cardiac enzymes to rule out acute coronary event, obtain ECHO to evaluate LVEF, cardiology consult  ,continue current management, O2 supply, anticoagulation plan as per cardiology consult Palliative care consult requested ,to discuss advance directives and complete MOLST  (31 Dec 2023 10:10)        SUBJECTIVE: Patient feeling better .      ALLERGIES:  Allergies    Levaquin (Anaphylaxis; Flushing; Short breath)    Intolerances          MEDICATIONS  (STANDING):  aMIOdarone    Tablet 200 milliGRAM(s) Oral daily  apixaban 5 milliGRAM(s) Oral two times a day  ascorbic acid 500 milliGRAM(s) Oral daily  aspirin enteric coated 81 milliGRAM(s) Oral daily  brimonidine 0.2% Ophthalmic Solution 1 Drop(s) Both EYES two times a day  budesonide 160 MICROgram(s)/formoterol 4.5 MICROgram(s) Inhaler 2 Puff(s) Inhalation two times a day  calcitriol   Capsule 0.25 MICROGram(s) Oral <User Schedule>  calcium acetate 667 milliGRAM(s) Oral three times a day with meals  dextrose 5%. 1000 milliLiter(s) (50 mL/Hr) IV Continuous <Continuous>  dextrose 5%. 1000 milliLiter(s) (100 mL/Hr) IV Continuous <Continuous>  dextrose 50% Injectable 25 Gram(s) IV Push once  dextrose 50% Injectable 25 Gram(s) IV Push once  dextrose 50% Injectable 12.5 Gram(s) IV Push once  dorzolamide 2%/timolol 0.5% Ophthalmic Solution 1 Drop(s) Both EYES two times a day  epoetin kayla-epbx (RETACRIT) Injectable 89677 Unit(s) IV Push <User Schedule>  finasteride 5 milliGRAM(s) Oral daily  glucagon  Injectable 1 milliGRAM(s) IntraMuscular once  insulin lispro (ADMELOG) corrective regimen sliding scale   SubCutaneous at bedtime  insulin lispro (ADMELOG) corrective regimen sliding scale   SubCutaneous three times a day before meals  lactobacillus acidophilus 1 Tablet(s) Oral daily  latanoprost 0.005% Ophthalmic Solution 1 Drop(s) Both EYES at bedtime  levothyroxine 50 MICROGram(s) Oral daily  melatonin 5 milliGRAM(s) Oral at bedtime  midodrine. 15 milliGRAM(s) Oral three times a day  oseltamivir 30 milliGRAM(s) Oral once  pantoprazole    Tablet 40 milliGRAM(s) Oral daily  senna 2 Tablet(s) Oral at bedtime  sertraline 25 milliGRAM(s) Oral daily  sertraline 50 milliGRAM(s) Oral daily  simvastatin 10 milliGRAM(s) Oral at bedtime  tamsulosin 0.4 milliGRAM(s) Oral at bedtime  ursodiol Capsule 300 milliGRAM(s) Oral two times a day    MEDICATIONS  (PRN):  acetaminophen     Tablet .. 650 milliGRAM(s) Oral every 6 hours PRN Temp greater or equal to 38C (100.4F), Mild Pain (1 - 3)  albuterol/ipratropium for Nebulization 3 milliLiter(s) Nebulizer every 6 hours PRN Shortness of Breath and/or Wheezing  aluminum hydroxide/magnesium hydroxide/simethicone Suspension 30 milliLiter(s) Oral every 4 hours PRN Dyspepsia  bisacodyl Suppository 10 milliGRAM(s) Rectal daily PRN Constipation  dextrose Oral Gel 15 Gram(s) Oral once PRN Blood Glucose LESS THAN 70 milliGRAM(s)/deciliter  guaiFENesin Oral Liquid (Sugar-Free) 200 milliGRAM(s) Oral every 6 hours PRN Cough  morphine  - Injectable 2 milliGRAM(s) IV Push every 6 hours PRN Severe Pain (7 - 10)  ondansetron Injectable 4 milliGRAM(s) IV Push every 8 hours PRN Nausea and/or Vomiting  traMADol 50 milliGRAM(s) Oral three times a day PRN Moderate Pain (4 - 6)      REVIEW OF SYSTEMS:  CONSTITUTIONAL: No fever,  RESPIRATORY: Some improvement in  cough, wheezing, shortness of breath  CARDIOVASCULAR: No chest pain,  palpitations, dizziness, syncope, paroxysmal nocturnal dyspnea,  or arm or leg swelling but has SOB with mild exertion .  GASTROINTESTINAL: No abdominal  or epigastric pain, nausea, vomiting,  diarrhea  NEUROLOGICAL: No headaches,  loss of strength, numbness, or tremors  Skin : No itching   Hematology : No active bleeding  Endocrinology : No heat and cold intolerance   Psychiatry : Patient is confused .  Genitourinary : No dysuria   Musculoskeletal : Patinet has mild arthritis     Vital Signs Last 24 Hrs  T(C): 36.4 (04 Jan 2024 00:56), Max: 36.7 (03 Jan 2024 11:21)  T(F): 97.6 (04 Jan 2024 00:56), Max: 98 (03 Jan 2024 11:21)  HR: 74 (04 Jan 2024 00:56) (64 - 80)  BP: 99/64 (04 Jan 2024 00:56) (92/54 - 103/59)  BP(mean): --  RR: 18 (04 Jan 2024 00:56) (18 - 18)  SpO2: 100% (04 Jan 2024 00:56) (98% - 100%)    Parameters below as of 04 Jan 2024 00:56  Patient On (Oxygen Delivery Method): nasal cannula  O2 Flow (L/min): 3      PHYSICAL EXAM:  HEAD:  Atraumatic, Normocephalic  NECK: Supple and normal thyroid.  No JVD or carotid bruit.   HEART: S1, S2 irregular , 1/6 soft ejection systolic murmur in mitral area , no thrill and no gallops .  PULMONARY: Bilateral vesicular breathing , few scattered rhonchi and few scattered rales are present .  ABDOMEN: Soft nontender and positive bowl sounds   EXTREMITIES:  No clubbing, cyanosis, or pedal  edema  NEUROLOGICAL: Awake and mild confused  , no focal deficit .  Skin : No rashes .  Musculoskeletal : No joint swellings .    LABS:                        8.2    3.77  )-----------( 149      ( 03 Jan 2024 07:55 )             26.3     01-03    134<L>  |  101  |  26<H>  ----------------------------<  106<H>  3.6   |  31  |  4.00<H>    Ca    7.6<L>      03 Jan 2024 07:55            Urinalysis Basic - ( 03 Jan 2024 07:55 )    Color: x / Appearance: x / SG: x / pH: x  Gluc: 106 mg/dL / Ketone: x  / Bili: x / Urobili: x   Blood: x / Protein: x / Nitrite: x   Leuk Esterase: x / RBC: x / WBC x   Sq Epi: x / Non Sq Epi: x / Bacteria: x      Assessment/Plan    Will continue to follow during hospital course and give further recommendations as needed . Thanks for your referral . if any questions please contact me at office ([phone] [phone])  MI SANDOVAL MD [place] [id number]  [address]  OFFICE : [phone]   CELL : [phone]  CARDIOLOGY F/U  :       HPI:  Patient  is 72 yo , LTC resident with past medical history of end-stage renal disease on dialysis, chronic hypotension, afib, T2DM, recent admission for clogged AV fistula is presenting for hypotension and hypoxia.  Patient was discharged back to [facility] 12/30  but sent to ED due to hypotension and hypoxia on arrival to [facility] .His vitals were stable upon discharge and he was monitored 2 hrs after HD session by house staff .  Otherwise denies any new complaints.  Patient was diagnosed with Influenza , also found to have JAZMINE elevation .Seen by Dr Sandoval cardiologist Admitted  to telemetry unit for monitoring , send 3 sets of cardiac enzymes to rule out acute coronary event, obtain ECHO to evaluate LVEF, cardiology consult  ,continue current management, O2 supply, anticoagulation plan as per cardiology consult Palliative care consult requested ,to discuss advance directives and complete MOLST  (31 Dec 2023 10:10)        SUBJECTIVE: Patient feeling better .      ALLERGIES:  Allergies    Levaquin (Anaphylaxis; Flushing; Short breath)    Intolerances          MEDICATIONS  (STANDING):  aMIOdarone    Tablet 200 milliGRAM(s) Oral daily  apixaban 5 milliGRAM(s) Oral two times a day  ascorbic acid 500 milliGRAM(s) Oral daily  aspirin enteric coated 81 milliGRAM(s) Oral daily  brimonidine 0.2% Ophthalmic Solution 1 Drop(s) Both EYES two times a day  budesonide 160 MICROgram(s)/formoterol 4.5 MICROgram(s) Inhaler 2 Puff(s) Inhalation two times a day  calcitriol   Capsule 0.25 MICROGram(s) Oral <User Schedule>  calcium acetate 667 milliGRAM(s) Oral three times a day with meals  dextrose 5%. 1000 milliLiter(s) (50 mL/Hr) IV Continuous <Continuous>  dextrose 5%. 1000 milliLiter(s) (100 mL/Hr) IV Continuous <Continuous>  dextrose 50% Injectable 25 Gram(s) IV Push once  dextrose 50% Injectable 25 Gram(s) IV Push once  dextrose 50% Injectable 12.5 Gram(s) IV Push once  dorzolamide 2%/timolol 0.5% Ophthalmic Solution 1 Drop(s) Both EYES two times a day  epoetin kayla-epbx (RETACRIT) Injectable 87706 Unit(s) IV Push <User Schedule>  finasteride 5 milliGRAM(s) Oral daily  glucagon  Injectable 1 milliGRAM(s) IntraMuscular once  insulin lispro (ADMELOG) corrective regimen sliding scale   SubCutaneous at bedtime  insulin lispro (ADMELOG) corrective regimen sliding scale   SubCutaneous three times a day before meals  lactobacillus acidophilus 1 Tablet(s) Oral daily  latanoprost 0.005% Ophthalmic Solution 1 Drop(s) Both EYES at bedtime  levothyroxine 50 MICROGram(s) Oral daily  melatonin 5 milliGRAM(s) Oral at bedtime  midodrine. 15 milliGRAM(s) Oral three times a day  oseltamivir 30 milliGRAM(s) Oral once  pantoprazole    Tablet 40 milliGRAM(s) Oral daily  senna 2 Tablet(s) Oral at bedtime  sertraline 25 milliGRAM(s) Oral daily  sertraline 50 milliGRAM(s) Oral daily  simvastatin 10 milliGRAM(s) Oral at bedtime  tamsulosin 0.4 milliGRAM(s) Oral at bedtime  ursodiol Capsule 300 milliGRAM(s) Oral two times a day    MEDICATIONS  (PRN):  acetaminophen     Tablet .. 650 milliGRAM(s) Oral every 6 hours PRN Temp greater or equal to 38C (100.4F), Mild Pain (1 - 3)  albuterol/ipratropium for Nebulization 3 milliLiter(s) Nebulizer every 6 hours PRN Shortness of Breath and/or Wheezing  aluminum hydroxide/magnesium hydroxide/simethicone Suspension 30 milliLiter(s) Oral every 4 hours PRN Dyspepsia  bisacodyl Suppository 10 milliGRAM(s) Rectal daily PRN Constipation  dextrose Oral Gel 15 Gram(s) Oral once PRN Blood Glucose LESS THAN 70 milliGRAM(s)/deciliter  guaiFENesin Oral Liquid (Sugar-Free) 200 milliGRAM(s) Oral every 6 hours PRN Cough  morphine  - Injectable 2 milliGRAM(s) IV Push every 6 hours PRN Severe Pain (7 - 10)  ondansetron Injectable 4 milliGRAM(s) IV Push every 8 hours PRN Nausea and/or Vomiting  traMADol 50 milliGRAM(s) Oral three times a day PRN Moderate Pain (4 - 6)      REVIEW OF SYSTEMS:  CONSTITUTIONAL: No fever,  RESPIRATORY: Some improvement in  cough, wheezing, shortness of breath  CARDIOVASCULAR: No chest pain,  palpitations, dizziness, syncope, paroxysmal nocturnal dyspnea,  or arm or leg swelling but has SOB with mild exertion .  GASTROINTESTINAL: No abdominal  or epigastric pain, nausea, vomiting,  diarrhea  NEUROLOGICAL: No headaches,  loss of strength, numbness, or tremors  Skin : No itching   Hematology : No active bleeding  Endocrinology : No heat and cold intolerance   Psychiatry : Patient is confused .  Genitourinary : No dysuria   Musculoskeletal : Patinet has mild arthritis     Vital Signs Last 24 Hrs  T(C): 36.4 (04 Jan 2024 00:56), Max: 36.7 (03 Jan 2024 11:21)  T(F): 97.6 (04 Jan 2024 00:56), Max: 98 (03 Jan 2024 11:21)  HR: 74 (04 Jan 2024 00:56) (64 - 80)  BP: 99/64 (04 Jan 2024 00:56) (92/54 - 103/59)  BP(mean): --  RR: 18 (04 Jan 2024 00:56) (18 - 18)  SpO2: 100% (04 Jan 2024 00:56) (98% - 100%)    Parameters below as of 04 Jan 2024 00:56  Patient On (Oxygen Delivery Method): nasal cannula  O2 Flow (L/min): 3      PHYSICAL EXAM:  HEAD:  Atraumatic, Normocephalic  NECK: Supple and normal thyroid.  No JVD or carotid bruit.   HEART: S1, S2 irregular , 1/6 soft ejection systolic murmur in mitral area , no thrill and no gallops .  PULMONARY: Bilateral vesicular breathing , few scattered rhonchi and few scattered rales are present .  ABDOMEN: Soft nontender and positive bowl sounds   EXTREMITIES:  No clubbing, cyanosis, or pedal  edema  NEUROLOGICAL: Awake and mild confused  , no focal deficit .  Skin : No rashes .  Musculoskeletal : No joint swellings .    LABS:                        8.2    3.77  )-----------( 149      ( 03 Jan 2024 07:55 )             26.3     01-03    134<L>  |  101  |  26<H>  ----------------------------<  106<H>  3.6   |  31  |  4.00<H>    Ca    7.6<L>      03 Jan 2024 07:55            Urinalysis Basic - ( 03 Jan 2024 07:55 )    Color: x / Appearance: x / SG: x / pH: x  Gluc: 106 mg/dL / Ketone: x  / Bili: x / Urobili: x   Blood: x / Protein: x / Nitrite: x   Leuk Esterase: x / RBC: x / WBC x   Sq Epi: x / Non Sq Epi: x / Bacteria: x      Assessment/Plan  Patient has :  1) Acute viral pneumonia and better . Positive for Influenza .  2) Elevated Troponin I and which can be demand ischemia but considering significant elevation and trending , possibility of NON-ST JUNE can not be ruled out so will treat as  NON-ST JUNE .   3) ESRD and on hemodialysis .  4) Atrial fibrillation and stable . Intermittently slow ventricular rate after adding even low dose of metoprolol for NON-ST JUNE . LV EF normal 60%   5) Hypertension and stable . However patient BP lower limit of normal due to autonomic insufficiency .  6) Dementia   Plan : 1) Due to multiple comorbidities , patient high risk for cardiac cath 2) Continue medical management 3) Discussed with patient brother and considering significant high risk , he also wants only medical management and no invasive cardiac tests like cardiac cath or coronary artery stenting  . 4) Discontinued  metoprolol due to Low BP and bradycardia episodes   Will continue to follow during hospital course and give further recommendations as needed . Thanks for your referral . if any questions please contact me at office ([phone] ell [phone])            MI SANDOVAL MD [place] [id number]  [address]  OFFICE : [id number]   CELL : [phone]  CARDIOLOGY F/U  :       HPI:  Patient  is 74 yo , LTC resident with past medical history of end-stage renal disease on dialysis, chronic hypotension, afib, T2DM, recent admission for clogged AV fistula is presenting for hypotension and hypoxia.  Patient was discharged back to [facility] 12/30  but sent to ED due to hypotension and hypoxia on arrival to [facility] .His vitals were stable upon discharge and he was monitored 2 hrs after HD session by house staff .  Otherwise denies any new complaints.  Patient was diagnosed with Influenza , also found to have JAZMINE elevation .Seen by Dr Sandoval cardiologist Admitted  to telemetry unit for monitoring , send 3 sets of cardiac enzymes to rule out acute coronary event, obtain ECHO to evaluate LVEF, cardiology consult  ,continue current management, O2 supply, anticoagulation plan as per cardiology consult Palliative care consult requested ,to discuss advance directives and complete MOLST  (31 Dec 2023 10:10)        SUBJECTIVE: Patient feeling better .      ALLERGIES:  Allergies    Levaquin (Anaphylaxis; Flushing; Short breath)    Intolerances          MEDICATIONS  (STANDING):  aMIOdarone    Tablet 200 milliGRAM(s) Oral daily  apixaban 5 milliGRAM(s) Oral two times a day  ascorbic acid 500 milliGRAM(s) Oral daily  aspirin enteric coated 81 milliGRAM(s) Oral daily  brimonidine 0.2% Ophthalmic Solution 1 Drop(s) Both EYES two times a day  budesonide 160 MICROgram(s)/formoterol 4.5 MICROgram(s) Inhaler 2 Puff(s) Inhalation two times a day  calcitriol   Capsule 0.25 MICROGram(s) Oral <User Schedule>  calcium acetate 667 milliGRAM(s) Oral three times a day with meals  dextrose 5%. 1000 milliLiter(s) (50 mL/Hr) IV Continuous <Continuous>  dextrose 5%. 1000 milliLiter(s) (100 mL/Hr) IV Continuous <Continuous>  dextrose 50% Injectable 25 Gram(s) IV Push once  dextrose 50% Injectable 25 Gram(s) IV Push once  dextrose 50% Injectable 12.5 Gram(s) IV Push once  dorzolamide 2%/timolol 0.5% Ophthalmic Solution 1 Drop(s) Both EYES two times a day  epoetin kayla-epbx (RETACRIT) Injectable 63764 Unit(s) IV Push <User Schedule>  finasteride 5 milliGRAM(s) Oral daily  glucagon  Injectable 1 milliGRAM(s) IntraMuscular once  insulin lispro (ADMELOG) corrective regimen sliding scale   SubCutaneous at bedtime  insulin lispro (ADMELOG) corrective regimen sliding scale   SubCutaneous three times a day before meals  lactobacillus acidophilus 1 Tablet(s) Oral daily  latanoprost 0.005% Ophthalmic Solution 1 Drop(s) Both EYES at bedtime  levothyroxine 50 MICROGram(s) Oral daily  melatonin 5 milliGRAM(s) Oral at bedtime  midodrine. 15 milliGRAM(s) Oral three times a day  oseltamivir 30 milliGRAM(s) Oral once  pantoprazole    Tablet 40 milliGRAM(s) Oral daily  senna 2 Tablet(s) Oral at bedtime  sertraline 25 milliGRAM(s) Oral daily  sertraline 50 milliGRAM(s) Oral daily  simvastatin 10 milliGRAM(s) Oral at bedtime  tamsulosin 0.4 milliGRAM(s) Oral at bedtime  ursodiol Capsule 300 milliGRAM(s) Oral two times a day    MEDICATIONS  (PRN):  acetaminophen     Tablet .. 650 milliGRAM(s) Oral every 6 hours PRN Temp greater or equal to 38C (100.4F), Mild Pain (1 - 3)  albuterol/ipratropium for Nebulization 3 milliLiter(s) Nebulizer every 6 hours PRN Shortness of Breath and/or Wheezing  aluminum hydroxide/magnesium hydroxide/simethicone Suspension 30 milliLiter(s) Oral every 4 hours PRN Dyspepsia  bisacodyl Suppository 10 milliGRAM(s) Rectal daily PRN Constipation  dextrose Oral Gel 15 Gram(s) Oral once PRN Blood Glucose LESS THAN 70 milliGRAM(s)/deciliter  guaiFENesin Oral Liquid (Sugar-Free) 200 milliGRAM(s) Oral every 6 hours PRN Cough  morphine  - Injectable 2 milliGRAM(s) IV Push every 6 hours PRN Severe Pain (7 - 10)  ondansetron Injectable 4 milliGRAM(s) IV Push every 8 hours PRN Nausea and/or Vomiting  traMADol 50 milliGRAM(s) Oral three times a day PRN Moderate Pain (4 - 6)      REVIEW OF SYSTEMS:  CONSTITUTIONAL: No fever,  RESPIRATORY: Some improvement in  cough, wheezing, shortness of breath  CARDIOVASCULAR: No chest pain,  palpitations, dizziness, syncope, paroxysmal nocturnal dyspnea,  or arm or leg swelling but has SOB with mild exertion .  GASTROINTESTINAL: No abdominal  or epigastric pain, nausea, vomiting,  diarrhea  NEUROLOGICAL: No headaches,  loss of strength, numbness, or tremors  Skin : No itching   Hematology : No active bleeding  Endocrinology : No heat and cold intolerance   Psychiatry : Patient is confused .  Genitourinary : No dysuria   Musculoskeletal : Patinet has mild arthritis     Vital Signs Last 24 Hrs  T(C): 36.4 (04 Jan 2024 00:56), Max: 36.7 (03 Jan 2024 11:21)  T(F): 97.6 (04 Jan 2024 00:56), Max: 98 (03 Jan 2024 11:21)  HR: 74 (04 Jan 2024 00:56) (64 - 80)  BP: 99/64 (04 Jan 2024 00:56) (92/54 - 103/59)  BP(mean): --  RR: 18 (04 Jan 2024 00:56) (18 - 18)  SpO2: 100% (04 Jan 2024 00:56) (98% - 100%)    Parameters below as of 04 Jan 2024 00:56  Patient On (Oxygen Delivery Method): nasal cannula  O2 Flow (L/min): 3      PHYSICAL EXAM:  HEAD:  Atraumatic, Normocephalic  NECK: Supple and normal thyroid.  No JVD or carotid bruit.   HEART: S1, S2 irregular , 1/6 soft ejection systolic murmur in mitral area , no thrill and no gallops .  PULMONARY: Bilateral vesicular breathing , few scattered rhonchi and few scattered rales are present .  ABDOMEN: Soft nontender and positive bowl sounds   EXTREMITIES:  No clubbing, cyanosis, or pedal  edema  NEUROLOGICAL: Awake and mild confused  , no focal deficit .  Skin : No rashes .  Musculoskeletal : No joint swellings .    LABS:                        8.2    3.77  )-----------( 149      ( 03 Jan 2024 07:55 )             26.3     01-03    134<L>  |  101  |  26<H>  ----------------------------<  106<H>  3.6   |  31  |  4.00<H>    Ca    7.6<L>      03 Jan 2024 07:55            Urinalysis Basic - ( 03 Jan 2024 07:55 )    Color: x / Appearance: x / SG: x / pH: x  Gluc: 106 mg/dL / Ketone: x  / Bili: x / Urobili: x   Blood: x / Protein: x / Nitrite: x   Leuk Esterase: x / RBC: x / WBC x   Sq Epi: x / Non Sq Epi: x / Bacteria: x      Assessment/Plan  Patient has :  1) Acute viral pneumonia and better . Positive for Influenza .  2) Elevated Troponin I and which can be demand ischemia but considering significant elevation and trending , possibility of NON-ST JUNE can not be ruled out so will treat as  NON-ST JUNE .   3) ESRD and on hemodialysis .  4) Atrial fibrillation and stable . Intermittently slow ventricular rate after adding even low dose of metoprolol for NON-ST JUNE . LV EF normal 60%   5) Hypertension and stable . However patient BP lower limit of normal due to autonomic insufficiency .  6) Dementia   Plan : 1) Due to multiple comorbidities , patient high risk for cardiac cath 2) Continue medical management 3) Discussed with patient brother and considering significant high risk , he also wants only medical management and no invasive cardiac tests like cardiac cath or coronary artery stenting  . 4) Discontinued  metoprolol due to Low BP and bradycardia episodes   Will continue to follow during hospital course and give further recommendations as needed . Thanks for your referral . if any questions please contact me at office ([phone])

## 2024-01-03 NOTE — CHART NOTE - NSCHARTNOTEFT_GEN_A_CORE
RN called for SOB with O2 sat 100% on NC 3L, normal RR and no signs of respiratory distress. Pt screaming in the room and A&Ox1-2.    Pt with h/o dementia who presented similar episode last night and once pain management was provided pt was calm and resting.   Pt is s/p tramadol and melatonin as ordered.  Pt to receive Duoneb as ordered.   ----------  * 23:50 Pt seen at bedside. Resting comfortable on bed, no signs of acute respiratory distress on O2 by NC 3L.   Vitals noted, O2 sat 100%, afebrile, BP stable.   No further interventions at this time  RN to call if changes.

## 2024-01-03 NOTE — PROGRESS NOTE ADULT - ASSESSMENT
REASON FOR VISIT  .. Management of problems listed below      ROS  . Patient unable to give ROS     PHYSICAL EXAM    HEENT Unremarkable  atraumatic   RESP Fair air entry  Harsh breath sound   CARDIAC S1 S2 No S3     NO JVD    ABDOMEN No hepatosplenomegaly   PEDAL EDEMA present No calf tenderness  NO rash       GENERAL DATA .   GOC.  .. 12/30/2023 full code    ICU STAY.  .. no  COVID. .. scv2   BEST PRACTICE ISSUES.    HOB ELEVATN.  .. Yes  DVT PPLX.  ..   12/31/2023 apixaba 5.2 (af)         DIET.   ..  12/31/2023 soft bite   STRESS ULCER PPLX.   .  ..   12/31/2023 protonix 40   ALLGY. ..    levaquin   WT. ..  12/30/2023 68  BMI. ..  12/30/2023 22  PROCEDURES.   .. 12/26/2023 Right upper extremity brachiocephalic fistula with long segment   thrombosis/absent flow within the body of the fistula as well as outflow   vein stent.   .. 12/26/2023 Had R AV fistula thrombectomy    ABGS.   .  VS/ PO/IO/ VENT/ DRIPS.  1/3/2024 afeb 80 90/50   1/3/2024 3l 100%     PRESENTATION.  . CC 12/30/2023. Pt DCed 12/30/2023 On arrival to [facility] SBP 70 RA rest po 85%  . HPI 12/30/2023.73 m with past medical history of end-stage renal disease on dialysis, chronic hypotension, afib, recent admission for clogged AV fistula is presenting for hypotension and hypoxia.  Patient was discharged back to [facility] today but sent to ED due to hypotension.  Otherwise denies any new complaints  . PMHx . ESRD A fib Recent clogged av fistula asthma   . HOME MEDS . levoxyl 50 amiodarone 200 apixaba 5.2 finasteride 5 sertraline 25 simvastatin 10 midodrine 10.2 symbicort 80   . PROSTHETICS POA.   .. 1) RUE AV fistula    .. 2) l sc PPM incision without palp generator )saus was taken out because it was hurting him)  .. 3) RIJ Temp HD cath was inserted 12/26    PROBLEM DATA.    . INFLUENZA   .. Flu a 12/30 (+)   .. 12/31/2023 oseltamivir 30.5d     . CO INFECTION  .. w 12/30-12/31-1/1-1/3/2023 w 4.8 - 5.7 - 3.5 - 3.7   .. cxr 12/30/2023 sl incr markings elevated l diaphragm   .. Doubt coinfection     . SHORTNESS OF BREATH   .. Likely sec  flu     . HYPOXIA  .. cxr 12/30 wire sternotomy np cpmsolidtn nc cw 9/2022   .. VTE unlikely as pt is on apixaba  .. Likely sec flu asthma fluid overload  .. target po 90-95%     . ASTHMA   .. 12/31/2023 duoneb.4p   .. 12/30/2023 symbicort  .. 12/30/2023 spiriva     . ORTHOSTATIC   .. 1/1/2024  midodrine 10.3     . A fib  .. 12/31/2023 apixaba 5.2    .. 12/31/2023 metoprolol 12.5 x 2   .. 12/31 AMIODARONE 200     . CAD   .. Tr 12/31-12/31/-12/31- 1/1-1/3/2024 Tr 2160 -[id number] - 509    . ANEMIA   .. Hb 12/30-12/31/2023 - 1/1-1/3/2024 Hb 8.5 - 8.1 - 8.6   - 8.2   .. Monitor     . ELEVATED LFTS   .. LFTS 12/30-12/31/2023 - 1/1/2024  .. -161- 162  .. -335 - 310   .. -140 - 240   .... 12/31 ac hepatitis profile (-)   .... 12/31 US Possible cirrhosis sp chanelle   .. 1/1/2024 ursodiol 300     . ESRD   .. HD as per nisha;     . SUMMARY.   . 73 m with past medical history of end-stage renal disease on dialysis, chronic hypotension, afib, recent admission for clogged AV fistula 12/26-12/30 admitted 12/30/2023  for hypotension and hypoxia.   . Pt tested (+) for flu on 12/30     COURSE .   . FLU   .... 12/31/2023 Tamiflu started   . ASTHMA Symbicort  . CHR A fib on Apixa poa   . ELEVATED CARDIAC ENZYMES   .... On asa   .... Cardio on case   . FLUID OVERLOAD   . ESRD   .... Getting HD   . ELEVATED LFTS   .... 12/31 ac hepatitis profile (-)   .... 12/31 US Possible cirrhosis sp chanelle   . ANEMIA Target Hb 7 (+)     OVERALL PLAN/DISPO   .... Monitor LFTS   .... complete tamiflu     TIME SPENT.  . Over 36 minutes aggregate care time spent on encounter; activities included   direct patient care, counseling and/or coordinating care reviewing notes, lab data/ imaging , discussion with multidisciplinary team/ patient  /family and explaining in detail risks, benefits, alternatives  of the recommendations     PATIENT.  . MICHEL GONZALEZ 73 m 12/30/2023 1950 DR KRISS KHAN       REASON FOR VISIT  .. Management of problems listed below      ROS  . Patient unable to give ROS     PHYSICAL EXAM    HEENT Unremarkable  atraumatic   RESP Fair air entry  Harsh breath sound   CARDIAC S1 S2 No S3     NO JVD    ABDOMEN No hepatosplenomegaly   PEDAL EDEMA present No calf tenderness  NO rash       GENERAL DATA .   GOC.  .. 12/30/2023 full code    ICU STAY.  .. no  COVID. .. scv2   BEST PRACTICE ISSUES.    HOB ELEVATN.  .. Yes  DVT PPLX.  ..   12/31/2023 apixaba 5.2 (af)         DIET.   ..  12/31/2023 soft bite   STRESS ULCER PPLX.   .  ..   12/31/2023 protonix 40   ALLGY. ..    levaquin   WT. ..  12/30/2023 68  BMI. ..  12/30/2023 22  PROCEDURES.   .. 12/26/2023 Right upper extremity brachiocephalic fistula with long segment   thrombosis/absent flow within the body of the fistula as well as outflow   vein stent.   .. 12/26/2023 Had R AV fistula thrombectomy    ABGS.   .  VS/ PO/IO/ VENT/ DRIPS.  1/3/2024 afeb 80 90/50   1/3/2024 3l 100%     PRESENTATION.  . CC 12/30/2023. Pt DCed 12/30/2023 On arrival to [facility] SBP 70 RA rest po 85%  . HPI 12/30/2023.73 m with past medical history of end-stage renal disease on dialysis, chronic hypotension, afib, recent admission for clogged AV fistula is presenting for hypotension and hypoxia.  Patient was discharged back to [facility] today but sent to ED due to hypotension.  Otherwise denies any new complaints  . PMHx . ESRD A fib Recent clogged av fistula asthma   . HOME MEDS . levoxyl 50 amiodarone 200 apixaba 5.2 finasteride 5 sertraline 25 simvastatin 10 midodrine 10.2 symbicort 80   . PROSTHETICS POA.   .. 1) RUE AV fistula    .. 2) l sc PPM incision without palp generator )saus was taken out because it was hurting him)  .. 3) RIJ Temp HD cath was inserted 12/26    PROBLEM DATA.    . INFLUENZA   .. Flu a 12/30 (+)   .. 12/31/2023 oseltamivir 30.5d     . CO INFECTION  .. w 12/30-12/31-1/1-1/3/2023 w 4.8 - 5.7 - 3.5 - 3.7   .. cxr 12/30/2023 sl incr markings elevated l diaphragm   .. Doubt coinfection     . SHORTNESS OF BREATH   .. Likely sec  flu     . HYPOXIA  .. cxr 12/30 wire sternotomy np cpmsolidtn nc cw 9/2022   .. VTE unlikely as pt is on apixaba  .. Likely sec flu asthma fluid overload  .. target po 90-95%     . ASTHMA   .. 12/31/2023 duoneb.4p   .. 12/30/2023 symbicort  .. 12/30/2023 spiriva     . ORTHOSTATIC   .. 1/1/2024  midodrine 10.3     . A fib  .. 12/31/2023 apixaba 5.2    .. 12/31/2023 metoprolol 12.5 x 2   .. 12/31 AMIODARONE 200     . CAD   .. Tr 12/31-12/31/-12/31- 1/1-1/3/2024 Tr 2160 -[id number] - 509    . ANEMIA   .. Hb 12/30-12/31/2023 - 1/1-1/3/2024 Hb 8.5 - 8.1 - 8.6   - 8.2   .. Monitor     . ELEVATED LFTS   .. LFTS 12/30-12/31/2023 - 1/1/2024  .. -161- 162  .. -335 - 310   .. -140 - 240   .... 12/31 ac hepatitis profile (-)   .... 12/31 US Possible cirrhosis sp chanelle   .. 1/1/2024 ursodiol 300     . ESRD   .. HD as per nisha;     . SUMMARY.   . 73 m with past medical history of end-stage renal disease on dialysis, chronic hypotension, afib, recent admission for clogged AV fistula 12/26-12/30 admitted 12/30/2023  for hypotension and hypoxia.   . Pt tested (+) for flu on 12/30     COURSE .   . FLU   .... 12/31/2023 Tamiflu started   . ASTHMA Symbicort  . CHR A fib on Apixa poa   . ELEVATED CARDIAC ENZYMES   .... On asa   .... Cardio on case   . FLUID OVERLOAD   . ESRD   .... Getting HD   . ELEVATED LFTS   .... 12/31 ac hepatitis profile (-)   .... 12/31 US Possible cirrhosis sp chanelle   . ANEMIA Target Hb 7 (+)     OVERALL PLAN/DISPO   .... Monitor LFTS   .... complete tamiflu     TIME SPENT.  . Over 36 minutes aggregate care time spent on encounter; activities included   direct patient care, counseling and/or coordinating care reviewing notes, lab data/ imaging , discussion with multidisciplinary team/ patient  /family and explaining in detail risks, benefits, alternatives  of the recommendations     PATIENT.  . MICHEL GONZALEZ 73 m 12/30/2023 1950 DR KRISS KHAN

## 2024-01-04 LAB
ANION GAP SERPL CALC-SCNC: 8 MMOL/L — SIGNIFICANT CHANGE UP (ref 5–17)
ANION GAP SERPL CALC-SCNC: 8 MMOL/L — SIGNIFICANT CHANGE UP (ref 5–17)
BUN SERPL-MCNC: 31 MG/DL — HIGH (ref 7–23)
BUN SERPL-MCNC: 31 MG/DL — HIGH (ref 7–23)
CALCIUM SERPL-MCNC: 7.5 MG/DL — LOW (ref 8.5–10.1)
CALCIUM SERPL-MCNC: 7.5 MG/DL — LOW (ref 8.5–10.1)
CHLORIDE SERPL-SCNC: 100 MMOL/L — SIGNIFICANT CHANGE UP (ref 96–108)
CHLORIDE SERPL-SCNC: 100 MMOL/L — SIGNIFICANT CHANGE UP (ref 96–108)
CO2 SERPL-SCNC: 28 MMOL/L — SIGNIFICANT CHANGE UP (ref 22–31)
CO2 SERPL-SCNC: 28 MMOL/L — SIGNIFICANT CHANGE UP (ref 22–31)
CREAT SERPL-MCNC: 5 MG/DL — HIGH (ref 0.5–1.3)
CREAT SERPL-MCNC: 5 MG/DL — HIGH (ref 0.5–1.3)
EGFR: 12 ML/MIN/1.73M2 — LOW
EGFR: 12 ML/MIN/1.73M2 — LOW
GLUCOSE SERPL-MCNC: 98 MG/DL — SIGNIFICANT CHANGE UP (ref 70–99)
GLUCOSE SERPL-MCNC: 98 MG/DL — SIGNIFICANT CHANGE UP (ref 70–99)
HCT VFR BLD CALC: 26.1 % — LOW (ref 39–50)
HCT VFR BLD CALC: 26.1 % — LOW (ref 39–50)
HGB BLD-MCNC: 8.2 G/DL — LOW (ref 13–17)
HGB BLD-MCNC: 8.2 G/DL — LOW (ref 13–17)
MCHC RBC-ENTMCNC: 31.4 GM/DL — LOW (ref 32–36)
MCHC RBC-ENTMCNC: 31.4 GM/DL — LOW (ref 32–36)
MCHC RBC-ENTMCNC: 34 PG — SIGNIFICANT CHANGE UP (ref 27–34)
MCHC RBC-ENTMCNC: 34 PG — SIGNIFICANT CHANGE UP (ref 27–34)
MCV RBC AUTO: 108.3 FL — HIGH (ref 80–100)
MCV RBC AUTO: 108.3 FL — HIGH (ref 80–100)
NRBC # BLD: 1 /100 WBCS — HIGH (ref 0–0)
NRBC # BLD: 1 /100 WBCS — HIGH (ref 0–0)
PLATELET # BLD AUTO: 155 K/UL — SIGNIFICANT CHANGE UP (ref 150–400)
PLATELET # BLD AUTO: 155 K/UL — SIGNIFICANT CHANGE UP (ref 150–400)
POTASSIUM SERPL-MCNC: 3.8 MMOL/L — SIGNIFICANT CHANGE UP (ref 3.5–5.3)
POTASSIUM SERPL-MCNC: 3.8 MMOL/L — SIGNIFICANT CHANGE UP (ref 3.5–5.3)
POTASSIUM SERPL-SCNC: 3.8 MMOL/L — SIGNIFICANT CHANGE UP (ref 3.5–5.3)
POTASSIUM SERPL-SCNC: 3.8 MMOL/L — SIGNIFICANT CHANGE UP (ref 3.5–5.3)
RBC # BLD: 2.41 M/UL — LOW (ref 4.2–5.8)
RBC # BLD: 2.41 M/UL — LOW (ref 4.2–5.8)
RBC # FLD: 14.6 % — HIGH (ref 10.3–14.5)
RBC # FLD: 14.6 % — HIGH (ref 10.3–14.5)
SODIUM SERPL-SCNC: 136 MMOL/L — SIGNIFICANT CHANGE UP (ref 135–145)
SODIUM SERPL-SCNC: 136 MMOL/L — SIGNIFICANT CHANGE UP (ref 135–145)
TROPONIN I, HIGH SENSITIVITY RESULT: 342.7 NG/L — HIGH
TROPONIN I, HIGH SENSITIVITY RESULT: 342.7 NG/L — HIGH
WBC # BLD: 5.68 K/UL — SIGNIFICANT CHANGE UP (ref 3.8–10.5)
WBC # BLD: 5.68 K/UL — SIGNIFICANT CHANGE UP (ref 3.8–10.5)
WBC # FLD AUTO: 5.68 K/UL — SIGNIFICANT CHANGE UP (ref 3.8–10.5)
WBC # FLD AUTO: 5.68 K/UL — SIGNIFICANT CHANGE UP (ref 3.8–10.5)

## 2024-01-04 PROCEDURE — 93010 ELECTROCARDIOGRAM REPORT: CPT

## 2024-01-04 RX ORDER — ALPRAZOLAM 0.25 MG
0.25 TABLET ORAL ONCE
Refills: 0 | Status: DISCONTINUED | OUTPATIENT
Start: 2024-01-04 | End: 2024-01-04

## 2024-01-04 RX ADMIN — SERTRALINE 25 MILLIGRAM(S): 25 TABLET, FILM COATED ORAL at 13:11

## 2024-01-04 RX ADMIN — URSODIOL 300 MILLIGRAM(S): 250 TABLET, FILM COATED ORAL at 18:06

## 2024-01-04 RX ADMIN — MIDODRINE HYDROCHLORIDE 15 MILLIGRAM(S): 2.5 TABLET ORAL at 13:10

## 2024-01-04 RX ADMIN — APIXABAN 5 MILLIGRAM(S): 2.5 TABLET, FILM COATED ORAL at 06:08

## 2024-01-04 RX ADMIN — BRIMONIDINE TARTRATE 1 DROP(S): 2 SOLUTION/ DROPS OPHTHALMIC at 06:06

## 2024-01-04 RX ADMIN — TRAMADOL HYDROCHLORIDE 50 MILLIGRAM(S): 50 TABLET ORAL at 15:10

## 2024-01-04 RX ADMIN — Medication 50 MICROGRAM(S): at 06:08

## 2024-01-04 RX ADMIN — URSODIOL 300 MILLIGRAM(S): 250 TABLET, FILM COATED ORAL at 06:08

## 2024-01-04 RX ADMIN — Medication 3 MILLILITER(S): at 07:46

## 2024-01-04 RX ADMIN — TAMSULOSIN HYDROCHLORIDE 0.4 MILLIGRAM(S): 0.4 CAPSULE ORAL at 23:16

## 2024-01-04 RX ADMIN — Medication 200 MILLIGRAM(S): at 23:15

## 2024-01-04 RX ADMIN — TRAMADOL HYDROCHLORIDE 50 MILLIGRAM(S): 50 TABLET ORAL at 19:52

## 2024-01-04 RX ADMIN — DORZOLAMIDE HYDROCHLORIDE TIMOLOL MALEATE 1 DROP(S): 20; 5 SOLUTION/ DROPS OPHTHALMIC at 06:07

## 2024-01-04 RX ADMIN — TRAMADOL HYDROCHLORIDE 50 MILLIGRAM(S): 50 TABLET ORAL at 20:30

## 2024-01-04 RX ADMIN — FINASTERIDE 5 MILLIGRAM(S): 5 TABLET, FILM COATED ORAL at 13:10

## 2024-01-04 RX ADMIN — Medication 667 MILLIGRAM(S): at 18:06

## 2024-01-04 RX ADMIN — SENNA PLUS 2 TABLET(S): 8.6 TABLET ORAL at 23:14

## 2024-01-04 RX ADMIN — MIDODRINE HYDROCHLORIDE 15 MILLIGRAM(S): 2.5 TABLET ORAL at 06:07

## 2024-01-04 RX ADMIN — Medication 81 MILLIGRAM(S): at 13:10

## 2024-01-04 RX ADMIN — APIXABAN 5 MILLIGRAM(S): 2.5 TABLET, FILM COATED ORAL at 18:06

## 2024-01-04 RX ADMIN — Medication 500 MILLIGRAM(S): at 13:10

## 2024-01-04 RX ADMIN — Medication 3 MILLILITER(S): at 20:54

## 2024-01-04 RX ADMIN — Medication 0.25 MILLIGRAM(S): at 19:52

## 2024-01-04 RX ADMIN — MIDODRINE HYDROCHLORIDE 15 MILLIGRAM(S): 2.5 TABLET ORAL at 18:05

## 2024-01-04 RX ADMIN — Medication 1 TABLET(S): at 13:10

## 2024-01-04 RX ADMIN — LATANOPROST 1 DROP(S): 0.05 SOLUTION/ DROPS OPHTHALMIC; TOPICAL at 23:16

## 2024-01-04 RX ADMIN — ERYTHROPOIETIN 10000 UNIT(S): 10000 INJECTION, SOLUTION INTRAVENOUS; SUBCUTANEOUS at 10:33

## 2024-01-04 RX ADMIN — DORZOLAMIDE HYDROCHLORIDE TIMOLOL MALEATE 1 DROP(S): 20; 5 SOLUTION/ DROPS OPHTHALMIC at 18:17

## 2024-01-04 RX ADMIN — SIMVASTATIN 10 MILLIGRAM(S): 20 TABLET, FILM COATED ORAL at 23:15

## 2024-01-04 RX ADMIN — SERTRALINE 50 MILLIGRAM(S): 25 TABLET, FILM COATED ORAL at 13:10

## 2024-01-04 RX ADMIN — Medication 5 MILLIGRAM(S): at 23:14

## 2024-01-04 RX ADMIN — Medication 3 MILLILITER(S): at 14:13

## 2024-01-04 RX ADMIN — Medication 30 MILLIGRAM(S): at 14:10

## 2024-01-04 RX ADMIN — PANTOPRAZOLE SODIUM 40 MILLIGRAM(S): 20 TABLET, DELAYED RELEASE ORAL at 13:10

## 2024-01-04 RX ADMIN — Medication 200 MILLIGRAM(S): at 14:10

## 2024-01-04 RX ADMIN — TRAMADOL HYDROCHLORIDE 50 MILLIGRAM(S): 50 TABLET ORAL at 14:10

## 2024-01-04 NOTE — PROVIDER CONTACT NOTE (OTHER) - REASON
pt reports chest pain, chest tightness, difficulty breathing
EKG was done, asked provider if she wants to come and look at the EKG
cardiac enzymes are rising
patient anxious

## 2024-01-04 NOTE — PROGRESS NOTE ADULT - PROBLEM SELECTOR PLAN 6
likely multifactorial suspect 2/2 statin, amiodarone and infection   monitor lfts

## 2024-01-04 NOTE — PROGRESS NOTE ADULT - PROBLEM SELECTOR PLAN 4
HD as per schedule , d/w Dr Crystal

## 2024-01-04 NOTE — PROVIDER CONTACT NOTE (OTHER) - ACTION/TREATMENT ORDERED:
Dr. Tobar aware. check BP if its greater than 90 SBP may give xanax 0.25mg x 1 dose. will continue to assess and monitor.
no new orders
Repeat blood work at 22:00.
provider stated to place EKG into pt's chart

## 2024-01-04 NOTE — PROGRESS NOTE ADULT - SUBJECTIVE AND OBJECTIVE BOX
Patient is a 73y old  Male who presents with a chief complaint of SOB (27 Dec 2023 04:46)  Patient seen in follow up for ESRD on HD.        PAST MEDICAL HISTORY:  ASHD (arteriosclerotic heart disease)    CAD (coronary artery disease)    CRI (chronic renal insufficiency)    Diabetes mellitus type II    Hypertension    Hyperlipidemia    Pacemaker    Chronic atrial fibrillation    Asthma    Hypothyroid    Dementia    H/O carotid stenosis    Enlarged prostate      MEDICATIONS  (STANDING):  aMIOdarone    Tablet 200 milliGRAM(s) Oral daily  apixaban 5 milliGRAM(s) Oral two times a day  ascorbic acid 500 milliGRAM(s) Oral daily  aspirin enteric coated 81 milliGRAM(s) Oral daily  brimonidine 0.2% Ophthalmic Solution 1 Drop(s) Both EYES two times a day  budesonide 160 MICROgram(s)/formoterol 4.5 MICROgram(s) Inhaler 2 Puff(s) Inhalation two times a day  calcitriol   Capsule 0.25 MICROGram(s) Oral <User Schedule>  calcium acetate 667 milliGRAM(s) Oral three times a day with meals  dextrose 5%. 1000 milliLiter(s) (100 mL/Hr) IV Continuous <Continuous>  dextrose 5%. 1000 milliLiter(s) (50 mL/Hr) IV Continuous <Continuous>  dextrose 50% Injectable 25 Gram(s) IV Push once  dextrose 50% Injectable 12.5 Gram(s) IV Push once  dextrose 50% Injectable 25 Gram(s) IV Push once  dorzolamide 2%/timolol 0.5% Ophthalmic Solution 1 Drop(s) Both EYES two times a day  epoetin kayla-epbx (RETACRIT) Injectable 34247 Unit(s) IV Push <User Schedule>  finasteride 5 milliGRAM(s) Oral daily  glucagon  Injectable 1 milliGRAM(s) IntraMuscular once  guaiFENesin Oral Liquid (Sugar-Free) 200 milliGRAM(s) Oral three times a day  insulin lispro (ADMELOG) corrective regimen sliding scale   SubCutaneous three times a day before meals  insulin lispro (ADMELOG) corrective regimen sliding scale   SubCutaneous at bedtime  lactobacillus acidophilus 1 Tablet(s) Oral daily  latanoprost 0.005% Ophthalmic Solution 1 Drop(s) Both EYES at bedtime  levothyroxine 50 MICROGram(s) Oral daily  melatonin 5 milliGRAM(s) Oral at bedtime  midodrine. 15 milliGRAM(s) Oral three times a day  oseltamivir 30 milliGRAM(s) Oral once  pantoprazole    Tablet 40 milliGRAM(s) Oral daily  senna 2 Tablet(s) Oral at bedtime  sertraline 50 milliGRAM(s) Oral daily  sertraline 25 milliGRAM(s) Oral daily  simvastatin 10 milliGRAM(s) Oral at bedtime  tamsulosin 0.4 milliGRAM(s) Oral at bedtime  ursodiol Capsule 300 milliGRAM(s) Oral two times a day    MEDICATIONS  (PRN):  acetaminophen     Tablet .. 650 milliGRAM(s) Oral every 6 hours PRN Temp greater or equal to 38C (100.4F), Mild Pain (1 - 3)  albuterol/ipratropium for Nebulization 3 milliLiter(s) Nebulizer every 6 hours PRN Shortness of Breath and/or Wheezing  aluminum hydroxide/magnesium hydroxide/simethicone Suspension 30 milliLiter(s) Oral every 4 hours PRN Dyspepsia  bisacodyl Suppository 10 milliGRAM(s) Rectal daily PRN Constipation  dextrose Oral Gel 15 Gram(s) Oral once PRN Blood Glucose LESS THAN 70 milliGRAM(s)/deciliter  morphine  - Injectable 2 milliGRAM(s) IV Push every 6 hours PRN Severe Pain (7 - 10)  ondansetron Injectable 4 milliGRAM(s) IV Push every 8 hours PRN Nausea and/or Vomiting  traMADol 50 milliGRAM(s) Oral three times a day PRN Moderate Pain (4 - 6)    T(C): 36.3 (01-04-24 @ 12:30), Max: 37.1 (01-02-24 @ 20:11)  HR: 62 (01-04-24 @ 12:30) (60 - 92)  BP: 117/82 (01-04-24 @ 12:30) (80/40 - 141/83)  RR: 18 (01-04-24 @ 12:30)  SpO2: 100% (01-04-24 @ 12:30)  Wt(kg): --  I&O's Detail    04 Jan 2024 07:01  -  04 Jan 2024 13:02  --------------------------------------------------------  IN:  Total IN: 0 mL    OUT:    Other (mL): 500 mL  Total OUT: 500 mL    Total NET: -500 mL          PHYSICAL EXAM:  General: No distress, + dialysis catheter  Respiratory: b/l air entry  Cardiovascular: S1 S2  Gastrointestinal: soft  Extremities:  no edema, AVF, no bruit                LABORATORY:                        8.2    5.68  )-----------( 155      ( 04 Jan 2024 04:47 )             26.1     01-04    136  |  100  |  31<H>  ----------------------------<  98  3.8   |  28  |  5.00<H>    Ca    7.5<L>      04 Jan 2024 04:47      Sodium: 136 mmol/L (01-04 @ 04:47)  Sodium: 134 mmol/L (01-03 @ 07:55)    Potassium: 3.8 mmol/L (01-04 @ 04:47)  Potassium: 3.6 mmol/L (01-03 @ 07:55)    Hemoglobin: 8.2 g/dL (01-04 @ 04:47)  Hemoglobin: 8.2 g/dL (01-03 @ 07:55)    Creatinine, Serum 5.00 (01-04 @ 04:47)  Creatinine, Serum 4.00 (01-03 @ 07:55)          Urinalysis Basic - ( 04 Jan 2024 04:47 )    Color: x / Appearance: x / SG: x / pH: x  Gluc: 98 mg/dL / Ketone: x  / Bili: x / Urobili: x   Blood: x / Protein: x / Nitrite: x   Leuk Esterase: x / RBC: x / WBC x   Sq Epi: x / Non Sq Epi: x / Bacteria: x         Patient is a 73y old  Male who presents with a chief complaint of SOB (27 Dec 2023 04:46)  Patient seen in follow up for ESRD on HD.        PAST MEDICAL HISTORY:  ASHD (arteriosclerotic heart disease)    CAD (coronary artery disease)    CRI (chronic renal insufficiency)    Diabetes mellitus type II    Hypertension    Hyperlipidemia    Pacemaker    Chronic atrial fibrillation    Asthma    Hypothyroid    Dementia    H/O carotid stenosis    Enlarged prostate      MEDICATIONS  (STANDING):  aMIOdarone    Tablet 200 milliGRAM(s) Oral daily  apixaban 5 milliGRAM(s) Oral two times a day  ascorbic acid 500 milliGRAM(s) Oral daily  aspirin enteric coated 81 milliGRAM(s) Oral daily  brimonidine 0.2% Ophthalmic Solution 1 Drop(s) Both EYES two times a day  budesonide 160 MICROgram(s)/formoterol 4.5 MICROgram(s) Inhaler 2 Puff(s) Inhalation two times a day  calcitriol   Capsule 0.25 MICROGram(s) Oral <User Schedule>  calcium acetate 667 milliGRAM(s) Oral three times a day with meals  dextrose 5%. 1000 milliLiter(s) (100 mL/Hr) IV Continuous <Continuous>  dextrose 5%. 1000 milliLiter(s) (50 mL/Hr) IV Continuous <Continuous>  dextrose 50% Injectable 25 Gram(s) IV Push once  dextrose 50% Injectable 12.5 Gram(s) IV Push once  dextrose 50% Injectable 25 Gram(s) IV Push once  dorzolamide 2%/timolol 0.5% Ophthalmic Solution 1 Drop(s) Both EYES two times a day  epoetin kayla-epbx (RETACRIT) Injectable 92979 Unit(s) IV Push <User Schedule>  finasteride 5 milliGRAM(s) Oral daily  glucagon  Injectable 1 milliGRAM(s) IntraMuscular once  guaiFENesin Oral Liquid (Sugar-Free) 200 milliGRAM(s) Oral three times a day  insulin lispro (ADMELOG) corrective regimen sliding scale   SubCutaneous three times a day before meals  insulin lispro (ADMELOG) corrective regimen sliding scale   SubCutaneous at bedtime  lactobacillus acidophilus 1 Tablet(s) Oral daily  latanoprost 0.005% Ophthalmic Solution 1 Drop(s) Both EYES at bedtime  levothyroxine 50 MICROGram(s) Oral daily  melatonin 5 milliGRAM(s) Oral at bedtime  midodrine. 15 milliGRAM(s) Oral three times a day  oseltamivir 30 milliGRAM(s) Oral once  pantoprazole    Tablet 40 milliGRAM(s) Oral daily  senna 2 Tablet(s) Oral at bedtime  sertraline 50 milliGRAM(s) Oral daily  sertraline 25 milliGRAM(s) Oral daily  simvastatin 10 milliGRAM(s) Oral at bedtime  tamsulosin 0.4 milliGRAM(s) Oral at bedtime  ursodiol Capsule 300 milliGRAM(s) Oral two times a day    MEDICATIONS  (PRN):  acetaminophen     Tablet .. 650 milliGRAM(s) Oral every 6 hours PRN Temp greater or equal to 38C (100.4F), Mild Pain (1 - 3)  albuterol/ipratropium for Nebulization 3 milliLiter(s) Nebulizer every 6 hours PRN Shortness of Breath and/or Wheezing  aluminum hydroxide/magnesium hydroxide/simethicone Suspension 30 milliLiter(s) Oral every 4 hours PRN Dyspepsia  bisacodyl Suppository 10 milliGRAM(s) Rectal daily PRN Constipation  dextrose Oral Gel 15 Gram(s) Oral once PRN Blood Glucose LESS THAN 70 milliGRAM(s)/deciliter  morphine  - Injectable 2 milliGRAM(s) IV Push every 6 hours PRN Severe Pain (7 - 10)  ondansetron Injectable 4 milliGRAM(s) IV Push every 8 hours PRN Nausea and/or Vomiting  traMADol 50 milliGRAM(s) Oral three times a day PRN Moderate Pain (4 - 6)    T(C): 36.3 (01-04-24 @ 12:30), Max: 37.1 (01-02-24 @ 20:11)  HR: 62 (01-04-24 @ 12:30) (60 - 92)  BP: 117/82 (01-04-24 @ 12:30) (80/40 - 141/83)  RR: 18 (01-04-24 @ 12:30)  SpO2: 100% (01-04-24 @ 12:30)  Wt(kg): --  I&O's Detail    04 Jan 2024 07:01  -  04 Jan 2024 13:02  --------------------------------------------------------  IN:  Total IN: 0 mL    OUT:    Other (mL): 500 mL  Total OUT: 500 mL    Total NET: -500 mL          PHYSICAL EXAM:  General: No distress, + dialysis catheter  Respiratory: b/l air entry  Cardiovascular: S1 S2  Gastrointestinal: soft  Extremities:  no edema, AVF, no bruit                LABORATORY:                        8.2    5.68  )-----------( 155      ( 04 Jan 2024 04:47 )             26.1     01-04    136  |  100  |  31<H>  ----------------------------<  98  3.8   |  28  |  5.00<H>    Ca    7.5<L>      04 Jan 2024 04:47      Sodium: 136 mmol/L (01-04 @ 04:47)  Sodium: 134 mmol/L (01-03 @ 07:55)    Potassium: 3.8 mmol/L (01-04 @ 04:47)  Potassium: 3.6 mmol/L (01-03 @ 07:55)    Hemoglobin: 8.2 g/dL (01-04 @ 04:47)  Hemoglobin: 8.2 g/dL (01-03 @ 07:55)    Creatinine, Serum 5.00 (01-04 @ 04:47)  Creatinine, Serum 4.00 (01-03 @ 07:55)          Urinalysis Basic - ( 04 Jan 2024 04:47 )    Color: x / Appearance: x / SG: x / pH: x  Gluc: 98 mg/dL / Ketone: x  / Bili: x / Urobili: x   Blood: x / Protein: x / Nitrite: x   Leuk Esterase: x / RBC: x / WBC x   Sq Epi: x / Non Sq Epi: x / Bacteria: x

## 2024-01-04 NOTE — PROGRESS NOTE ADULT - TIME BILLING
in direct care of patient , reviewing labs and other results and adjusting medications and in discussion with other consultants , RN and PMD

## 2024-01-04 NOTE — PROVIDER CONTACT NOTE (OTHER) - SITUATION
Informed provider that Dr. Garcia ordered EKG, EKG was done, asked provider if she wanted to look at the EKG
Cardiac enzymes are rising:   Troponins: 12/31  02:06 2160.8   10:28 2974.4  13:30 3027.4  Albumin 2.7
pt reports chest pain, chest tightness, difficulty breathing
patient anxious and states he can't breathe O@ 3L on patient saturation at 100%. patient medicated for pain back and ribs. robussin given for cough

## 2024-01-04 NOTE — PROGRESS NOTE ADULT - ASSESSMENT
Patient  is 74 yo  LTC resident with past medical history of end-stage renal disease on dialysis, chronic hypotension, afib, recent admission for clogged AV fistula is presenting for hypotension and hypoxia.  Patient was discharged back to [facility] 12/30  but sent to ED due to hypotension and hypoxia on arrival to [facility] .His vitals were stable upon discharge and he was monitored 2 hrs after HD session by house staff .  Otherwise denies any new complaints.  Patient was diagnosed with Influenza , also found to have JAZMINE elevation .Seen by Dr Garcia cardiologist Admitted  to telemetry unit for monitoring , send 3 sets of cardiac enzymes to rule out acute coronary event, obtain ECHO to evaluate LVEF, cardiology consult  ,continue current management, O2 supply, anticoagulation plan as per cardiology consult Palliative care consult requested ,to discuss advance directives and complete MOLST  Patient  is 74 yo  LTC resident with past medical history of end-stage renal disease on dialysis, chronic hypotension, afib, recent admission for clogged AV fistula is presenting for hypotension and hypoxia.  Patient was discharged back to [facility] 12/30  but sent to ED due to hypotension and hypoxia on arrival to [facility] .His vitals were stable upon discharge and he was monitored 2 hrs after HD session by house staff .  Otherwise denies any new complaints.  Patient was diagnosed with Influenza , also found to have JAZMINE elevation .Seen by Dr Garcia cardiologist Admitted  to telemetry unit for monitoring , send 3 sets of cardiac enzymes to rule out acute coronary event, obtain ECHO to evaluate LVEF, cardiology consult  ,continue current management, O2 supply, anticoagulation plan as per cardiology consult Palliative care consult requested ,to discuss advance directives and complete MOLST

## 2024-01-04 NOTE — PROGRESS NOTE ADULT - PROBLEM SELECTOR PLAN 3
as per cardiologist Dr Garcia , dx and prognosis d/w brother

## 2024-01-04 NOTE — PROGRESS NOTE ADULT - PROBLEM SELECTOR PLAN 2
. Continue po Tamiflu for a total of 5 days.  . Monitor the progression of SOB and hypoxia.  . Follow all cultures. ID is following   . Acute elevation of LFTs noted.  Probably due to hypotension. GI evaluation in progress.

## 2024-01-04 NOTE — PROGRESS NOTE ADULT - ASSESSMENT
ESRD on HD  clotted AVF, s/p thrombectomy, and venoplasty  Hypotension, on midodrine  Anemia    HD today. Persistent hypotension. BP better today. Cardiology follow up.   Continue midodrine for hypotension. To continue current meds. Procrit for anemia.   Overall prognosis poor. D/c planning.

## 2024-01-04 NOTE — SOCIAL WORK PROGRESS NOTE - NSSWPROGRESSNOTE_GEN_ALL_CORE
referred patient to [facility] and they can accept back. I spoke with Dr. Tobar who said he can transition back on Friday, when he left last time he was sent back and his blood pressure still borderline today. I sent message to [facility] per MD can leave 11-12pm on Friday. Ambulance form in [facility]  referred patient to [facility] and they can accept back. I spoke with Dr. Tobar who said he can transition back on Friday, when he left last time he was sent back and his blood pressure still borderline today. I sent message to [facility] per MD can leave 11-12pm on Friday. Ambulance form in [facility]

## 2024-01-04 NOTE — PROGRESS NOTE ADULT - PROBLEM SELECTOR PLAN 8
patient has PPM - management as per Dr Garcia , cardiologist

## 2024-01-04 NOTE — CHART NOTE - NSCHARTNOTEFT_GEN_A_CORE
RN called for EKG review.    EKG order seen by Dr. Garcia for morning follow up due to possible NSTEMI on admission.     Troponin peak and has downtrend.   EKG seen with artifacts, Afib. T and ST abnormalities No ST elevations.   Vitals seen with BP: 99/60, HR: 68, afebrile, O2 sat 100% on NC 3L.   Cardio notes seen; patient is at high risk for cardiac cath and invasive treatment and family agrees to continue medical management.  Pt on ASA, statin, Eliquis. Metoprolol was dc due to bradycardia and hypotension.   No further intervention at this time. Continue same management and cardio follow up in am.   Pending am troponin ordered by Dr. Garcia. RN called for EKG review.    EKG order seen by Dr. Garcia for morning follow up due to possible NSTEMI on admission.     Troponin peak and has downtrend.   EKG seen with artifacts, Afib. T and ST abnormalities No ST elevations.   Vitals seen with BP: 99/60, HR: 68, afebrile, O2 sat 100% on NC 3L.   Cardio notes seen; patient is at high risk for cardiac cath and invasive treatment and family agrees to continue medical management.  Pt on ASA, statin, Eliquis. Metoprolol was dc due to bradycardia and hypotension.   No further intervention at this time. Continue same management and cardio follow up in am.   Pending am troponin ordered by Dr. Garcia.  ---------------------    * 6:26 am.    RN called for abnormal value - Troponin 342 this am.  Troponin peak 3027 on 12/30 and downtrending daily. Yesterday 509.   No further intervention at the moment.   Continue cardiology management.

## 2024-01-04 NOTE — PROGRESS NOTE ADULT - ASSESSMENT
REASON FOR VISIT  .. Management of problems listed below      ROS  . Patient unable to give ROS     PHYSICAL EXAM    HEENT Unremarkable  atraumatic   RESP Fair air entry  Harsh breath sound   CARDIAC S1 S2 No S3     NO JVD    ABDOMEN No hepatosplenomegaly   PEDAL EDEMA present No calf tenderness  NO rash       GENERAL DATA .   GOC.  .. 12/30/2023 full code    ICU STAY.  .. no  COVID. .. scv2   BEST PRACTICE ISSUES.    HOB ELEVATN.  .. Yes  DVT PPLX.  ..   12/31/2023 apixaba 5.2 (af)         DIET.   ..  12/31/2023 soft bite   STRESS ULCER PPLX.   .  ..   12/31/2023 protonix 40   ALLGY. ..    levaquin   WT. ..  12/30/2023 68  BMI. ..  12/30/2023 22  PROCEDURES.   .. 12/26/2023 Right upper extremity brachiocephalic fistula with long segment   thrombosis/absent flow within the body of the fistula as well as outflow   vein stent.   .. 12/26/2023 Had R AV fistula thrombectomy    ABGS.   .  VS/ PO/IO/ VENT/ DRIPS.  1/4/2024 afeb 65 100/68   1/4/2024 3l 97%    PRESENTATION.  . CC 12/30/2023. Pt DCed 12/30/2023 On arrival to [facility] SBP 70 RA rest po 85%  . HPI 12/30/2023.73 m with past medical history of end-stage renal disease on dialysis, chronic hypotension, afib, recent admission for clogged AV fistula is presenting for hypotension and hypoxia.  Patient was discharged back to [facility] today but sent to ED due to hypotension.  Otherwise denies any new complaints  . PMHx . ESRD A fib Recent clogged av fistula asthma   . HOME MEDS . levoxyl 50 amiodarone 200 apixaba 5.2 finasteride 5 sertraline 25 simvastatin 10 midodrine 10.2 symbicort 80   . PROSTHETICS POA.   .. 1) RUE AV fistula    .. 2) l sc PPM incision without palp generator )saus was taken out because it was hurting him)  .. 3) RIJ Temp HD cath was inserted 12/26    PROBLEM DATA.    . INFLUENZA   .. Flu a 12/30 (+)   .. 12/31/2023 oseltamivir 30.5d     . CO INFECTION  .. w 12/30-12/31-1/1-1/3/2023 w 4.8 - 5.7 - 3.5 - 3.7   .. cxr 12/30/2023 sl incr markings elevated l diaphragm   .. Doubt coinfection     . SHORTNESS OF BREATH   .. Likely sec  flu     . HYPOXIA  .. cxr 12/30 wire sternotomy np cpmsolidtn nc cw 9/2022   .. VTE unlikely as pt is on apixaba  .. Likely sec flu asthma fluid overload  .. target po 90-95%     . ASTHMA   .. 12/31/2023 duoneb.4p   .. 12/30/2023 symbicort  .. 12/30/2023 spiriva     . ORTHOSTATIC   .. 1/1/2024  midodrine 10.3     . A fib  .. 12/31/2023 apixaba 5.2    .. 12/31/2023 metoprolol 12.5 x 2   .. 12/31 AMIODARONE 200     . CAD   .. Tr 12/31-12/31/-12/31- 1/1-1/3/2024 Tr 2160 -[id number] - 509    . ANEMIA   .. Hb 12/30-12/31/2023 - 1/1-1/3-1/4/2024 Hb 8.5 - 8.1 - 8.6   - 8.2 - 8.2  .. Monitor     . ELEVATED LFTS   .. LFTS 12/30-12/31/2023 - 1/1/2024  .. -161- 162  .. -335 - 310   .. -140 - 240   .... 12/31 ac hepatitis profile (-)   .... 12/31 US Possible cirrhosis sp chanelle   .. 1/1/2024 ursodiol 300     . ESRD   .. HD as per nisha;     . SUMMARY.   . 73 m with past medical history of end-stage renal disease on dialysis, chronic hypotension, afib, recent admission for clogged AV fistula 12/26-12/30 admitted 12/30/2023  for hypotension and hypoxia.   . Pt tested (+) for flu on 12/30     COURSE .   . FLU   .... 12/31/2023 Tamiflu started   . ASTHMA Symbicort  . CHR A fib on Apixa poa   . ELEVATED CARDIAC ENZYMES   .... On asa   .... Cardio on case   . FLUID OVERLOAD   . ESRD   .... Getting HD   . ELEVATED LFTS   .... 12/31 ac hepatitis profile (-)   .... 12/31 US Possible cirrhosis sp chanelle   . ANEMIA Target Hb 7 (+)     OVERALL PLAN/DISPO   .... Monitor LFTS   .... completed tamiflu     TIME SPENT.  . Over 36 minutes aggregate care time spent on encounter; activities included   direct patient care, counseling and/or coordinating care reviewing notes, lab data/ imaging , discussion with multidisciplinary team/ patient  /family and explaining in detail risks, benefits, alternatives  of the recommendations     PATIENT.  . MICHEL GONZALEZ      REASON FOR VISIT  .. Management of problems listed below      ROS  . Patient unable to give ROS     PHYSICAL EXAM    HEENT Unremarkable  atraumatic   RESP Fair air entry  Harsh breath sound   CARDIAC S1 S2 No S3     NO JVD    ABDOMEN No hepatosplenomegaly   PEDAL EDEMA present No calf tenderness  NO rash       GENERAL DATA .   GOC.  .. 12/30/2023 full code    ICU STAY.  .. no  COVID. .. scv2   BEST PRACTICE ISSUES.    HOB ELEVATN.  .. Yes  DVT PPLX.  ..   12/31/2023 apixaba 5.2 (af)         DIET.   ..  12/31/2023 soft bite   STRESS ULCER PPLX.   .  ..   12/31/2023 protonix 40   ALLGY. ..    levaquin   WT. ..  12/30/2023 68  BMI. ..  12/30/2023 22  PROCEDURES.   .. 12/26/2023 Right upper extremity brachiocephalic fistula with long segment   thrombosis/absent flow within the body of the fistula as well as outflow   vein stent.   .. 12/26/2023 Had R AV fistula thrombectomy    ABGS.   .  VS/ PO/IO/ VENT/ DRIPS.  1/4/2024 afeb 65 100/68   1/4/2024 3l 97%    PRESENTATION.  . CC 12/30/2023. Pt DCed 12/30/2023 On arrival to [facility] SBP 70 RA rest po 85%  . HPI 12/30/2023.73 m with past medical history of end-stage renal disease on dialysis, chronic hypotension, afib, recent admission for clogged AV fistula is presenting for hypotension and hypoxia.  Patient was discharged back to [facility] today but sent to ED due to hypotension.  Otherwise denies any new complaints  . PMHx . ESRD A fib Recent clogged av fistula asthma   . HOME MEDS . levoxyl 50 amiodarone 200 apixaba 5.2 finasteride 5 sertraline 25 simvastatin 10 midodrine 10.2 symbicort 80   . PROSTHETICS POA.   .. 1) RUE AV fistula    .. 2) l sc PPM incision without palp generator )saus was taken out because it was hurting him)  .. 3) RIJ Temp HD cath was inserted 12/26    PROBLEM DATA.    . INFLUENZA   .. Flu a 12/30 (+)   .. 12/31/2023 oseltamivir 30.5d     . CO INFECTION  .. w 12/30-12/31-1/1-1/3/2023 w 4.8 - 5.7 - 3.5 - 3.7   .. cxr 12/30/2023 sl incr markings elevated l diaphragm   .. Doubt coinfection     . SHORTNESS OF BREATH   .. Likely sec  flu     . HYPOXIA  .. cxr 12/30 wire sternotomy np cpmsolidtn nc cw 9/2022   .. VTE unlikely as pt is on apixaba  .. Likely sec flu asthma fluid overload  .. target po 90-95%     . ASTHMA   .. 12/31/2023 duoneb.4p   .. 12/30/2023 symbicort  .. 12/30/2023 spiriva     . ORTHOSTATIC   .. 1/1/2024  midodrine 10.3     . A fib  .. 12/31/2023 apixaba 5.2    .. 12/31/2023 metoprolol 12.5 x 2   .. 12/31 AMIODARONE 200     . CAD   .. Tr 12/31-12/31/-12/31- 1/1-1/3/2024 Tr 2160 -[id number] - 509    . ANEMIA   .. Hb 12/30-12/31/2023 - 1/1-1/3-1/4/2024 Hb 8.5 - 8.1 - 8.6   - 8.2 - 8.2  .. Monitor     . ELEVATED LFTS   .. LFTS 12/30-12/31/2023 - 1/1/2024  .. -161- 162  .. -335 - 310   .. -140 - 240   .... 12/31 ac hepatitis profile (-)   .... 12/31 US Possible cirrhosis sp chanelle   .. 1/1/2024 ursodiol 300     . ESRD   .. HD as per nisha;     . SUMMARY.   . 73 m with past medical history of end-stage renal disease on dialysis, chronic hypotension, afib, recent admission for clogged AV fistula 12/26-12/30 admitted 12/30/2023  for hypotension and hypoxia.   . Pt tested (+) for flu on 12/30     COURSE .   . FLU   .... 12/31/2023 Tamiflu started   . ASTHMA Symbicort  . CHR A fib on Apixa poa   . ELEVATED CARDIAC ENZYMES   .... On asa   .... Cardio on case   . FLUID OVERLOAD   . ESRD   .... Getting HD   . ELEVATED LFTS   .... 12/31 ac hepatitis profile (-)   .... 12/31 US Possible cirrhosis sp chanelle   . ANEMIA Target Hb 7 (+)     OVERALL PLAN/DISPO   .... Monitor LFTS   .... completed tamiflu     TIME SPENT.  . Over 36 minutes aggregate care time spent on encounter; activities included   direct patient care, counseling and/or coordinating care reviewing notes, lab data/ imaging , discussion with multidisciplinary team/ patient  /family and explaining in detail risks, benefits, alternatives  of the recommendations     PATIENT.  . MICHEL GONZALEZ

## 2024-01-04 NOTE — PROGRESS NOTE ADULT - SUBJECTIVE AND OBJECTIVE BOX
[facility]  Elliot Fraga PA-C  [address] [place] [address]  [phone]      INTERVAL HPI/OVERNIGHT EVENTS:  Pt s/e  No new GI events    MEDICATIONS  (STANDING):  aMIOdarone    Tablet 200 milliGRAM(s) Oral daily  apixaban 5 milliGRAM(s) Oral two times a day  ascorbic acid 500 milliGRAM(s) Oral daily  aspirin enteric coated 81 milliGRAM(s) Oral daily  brimonidine 0.2% Ophthalmic Solution 1 Drop(s) Both EYES two times a day  budesonide 160 MICROgram(s)/formoterol 4.5 MICROgram(s) Inhaler 2 Puff(s) Inhalation two times a day  calcitriol   Capsule 0.25 MICROGram(s) Oral <User Schedule>  calcium acetate 667 milliGRAM(s) Oral three times a day with meals  dextrose 5%. 1000 milliLiter(s) (50 mL/Hr) IV Continuous <Continuous>  dextrose 5%. 1000 milliLiter(s) (100 mL/Hr) IV Continuous <Continuous>  dextrose 50% Injectable 25 Gram(s) IV Push once  dextrose 50% Injectable 12.5 Gram(s) IV Push once  dextrose 50% Injectable 25 Gram(s) IV Push once  dorzolamide 2%/timolol 0.5% Ophthalmic Solution 1 Drop(s) Both EYES two times a day  epoetin kayla-epbx (RETACRIT) Injectable 17658 Unit(s) IV Push <User Schedule>  finasteride 5 milliGRAM(s) Oral daily  glucagon  Injectable 1 milliGRAM(s) IntraMuscular once  guaiFENesin Oral Liquid (Sugar-Free) 200 milliGRAM(s) Oral three times a day  insulin lispro (ADMELOG) corrective regimen sliding scale   SubCutaneous three times a day before meals  insulin lispro (ADMELOG) corrective regimen sliding scale   SubCutaneous at bedtime  lactobacillus acidophilus 1 Tablet(s) Oral daily  latanoprost 0.005% Ophthalmic Solution 1 Drop(s) Both EYES at bedtime  levothyroxine 50 MICROGram(s) Oral daily  melatonin 5 milliGRAM(s) Oral at bedtime  midodrine. 15 milliGRAM(s) Oral three times a day  oseltamivir 30 milliGRAM(s) Oral once  pantoprazole    Tablet 40 milliGRAM(s) Oral daily  senna 2 Tablet(s) Oral at bedtime  sertraline 50 milliGRAM(s) Oral daily  sertraline 25 milliGRAM(s) Oral daily  simvastatin 10 milliGRAM(s) Oral at bedtime  tamsulosin 0.4 milliGRAM(s) Oral at bedtime  ursodiol Capsule 300 milliGRAM(s) Oral two times a day    MEDICATIONS  (PRN):  acetaminophen     Tablet .. 650 milliGRAM(s) Oral every 6 hours PRN Temp greater or equal to 38C (100.4F), Mild Pain (1 - 3)  albuterol/ipratropium for Nebulization 3 milliLiter(s) Nebulizer every 6 hours PRN Shortness of Breath and/or Wheezing  aluminum hydroxide/magnesium hydroxide/simethicone Suspension 30 milliLiter(s) Oral every 4 hours PRN Dyspepsia  bisacodyl Suppository 10 milliGRAM(s) Rectal daily PRN Constipation  dextrose Oral Gel 15 Gram(s) Oral once PRN Blood Glucose LESS THAN 70 milliGRAM(s)/deciliter  morphine  - Injectable 2 milliGRAM(s) IV Push every 6 hours PRN Severe Pain (7 - 10)  ondansetron Injectable 4 milliGRAM(s) IV Push every 8 hours PRN Nausea and/or Vomiting  traMADol 50 milliGRAM(s) Oral three times a day PRN Moderate Pain (4 - 6)      Allergies    Levaquin (Anaphylaxis; Flushing; Short breath)      PHYSICAL EXAM:   Vital Signs:  Vital Signs Last 24 Hrs  T(C): 36.6 (2024 09:19), Max: 36.6 (2024 09:19)  T(F): 97.9 (2024 09:19), Max: 97.9 (2024 09:19)  HR: 67 (2024 09:24) (65 - 74)  BP: 141/83 (2024 09:24) (93/60 - 141/83)  BP(mean): --  RR: 18 (2024 09:19) (18 - 18)  SpO2: 100% (2024 09:19) (98% - 100%)    Parameters below as of 2024 09:19  Patient On (Oxygen Delivery Method): nasal cannula  O2 Flow (L/min): 3    Daily     Daily Weight in k.1 (2024 09:19)    GENERAL:  Appears stated age  HEENT:  NC/AT  CHEST:  Full & symmetric excursion  HEART:  Regular rhythm  ABDOMEN:  Soft, non-tender, non-distended  EXTEREMITIES:  no cyanosis  SKIN:  No rash  NEURO:  Alert      LABS:                        8.2    5.68  )-----------( 155      ( 2024 04:47 )             26.1     01-    136  |  100  |  31<H>  ----------------------------<  98  3.8   |  28  |  5.00<H>    Ca    7.5<L>      2024 04:47        Urinalysis Basic - ( 2024 04:47 )    Color: x / Appearance: x / SG: x / pH: x  Gluc: 98 mg/dL / Ketone: x  / Bili: x / Urobili: x   Blood: x / Protein: x / Nitrite: x   Leuk Esterase: x / RBC: x / WBC x   Sq Epi: x / Non Sq Epi: x / Bacteria: x   [facility]  Elliot Fraga PA-C  [address] [place] [address]  [phone]      INTERVAL HPI/OVERNIGHT EVENTS:  Pt s/e  No new GI events    MEDICATIONS  (STANDING):  aMIOdarone    Tablet 200 milliGRAM(s) Oral daily  apixaban 5 milliGRAM(s) Oral two times a day  ascorbic acid 500 milliGRAM(s) Oral daily  aspirin enteric coated 81 milliGRAM(s) Oral daily  brimonidine 0.2% Ophthalmic Solution 1 Drop(s) Both EYES two times a day  budesonide 160 MICROgram(s)/formoterol 4.5 MICROgram(s) Inhaler 2 Puff(s) Inhalation two times a day  calcitriol   Capsule 0.25 MICROGram(s) Oral <User Schedule>  calcium acetate 667 milliGRAM(s) Oral three times a day with meals  dextrose 5%. 1000 milliLiter(s) (50 mL/Hr) IV Continuous <Continuous>  dextrose 5%. 1000 milliLiter(s) (100 mL/Hr) IV Continuous <Continuous>  dextrose 50% Injectable 25 Gram(s) IV Push once  dextrose 50% Injectable 12.5 Gram(s) IV Push once  dextrose 50% Injectable 25 Gram(s) IV Push once  dorzolamide 2%/timolol 0.5% Ophthalmic Solution 1 Drop(s) Both EYES two times a day  epoetin kayla-epbx (RETACRIT) Injectable 65854 Unit(s) IV Push <User Schedule>  finasteride 5 milliGRAM(s) Oral daily  glucagon  Injectable 1 milliGRAM(s) IntraMuscular once  guaiFENesin Oral Liquid (Sugar-Free) 200 milliGRAM(s) Oral three times a day  insulin lispro (ADMELOG) corrective regimen sliding scale   SubCutaneous three times a day before meals  insulin lispro (ADMELOG) corrective regimen sliding scale   SubCutaneous at bedtime  lactobacillus acidophilus 1 Tablet(s) Oral daily  latanoprost 0.005% Ophthalmic Solution 1 Drop(s) Both EYES at bedtime  levothyroxine 50 MICROGram(s) Oral daily  melatonin 5 milliGRAM(s) Oral at bedtime  midodrine. 15 milliGRAM(s) Oral three times a day  oseltamivir 30 milliGRAM(s) Oral once  pantoprazole    Tablet 40 milliGRAM(s) Oral daily  senna 2 Tablet(s) Oral at bedtime  sertraline 50 milliGRAM(s) Oral daily  sertraline 25 milliGRAM(s) Oral daily  simvastatin 10 milliGRAM(s) Oral at bedtime  tamsulosin 0.4 milliGRAM(s) Oral at bedtime  ursodiol Capsule 300 milliGRAM(s) Oral two times a day    MEDICATIONS  (PRN):  acetaminophen     Tablet .. 650 milliGRAM(s) Oral every 6 hours PRN Temp greater or equal to 38C (100.4F), Mild Pain (1 - 3)  albuterol/ipratropium for Nebulization 3 milliLiter(s) Nebulizer every 6 hours PRN Shortness of Breath and/or Wheezing  aluminum hydroxide/magnesium hydroxide/simethicone Suspension 30 milliLiter(s) Oral every 4 hours PRN Dyspepsia  bisacodyl Suppository 10 milliGRAM(s) Rectal daily PRN Constipation  dextrose Oral Gel 15 Gram(s) Oral once PRN Blood Glucose LESS THAN 70 milliGRAM(s)/deciliter  morphine  - Injectable 2 milliGRAM(s) IV Push every 6 hours PRN Severe Pain (7 - 10)  ondansetron Injectable 4 milliGRAM(s) IV Push every 8 hours PRN Nausea and/or Vomiting  traMADol 50 milliGRAM(s) Oral three times a day PRN Moderate Pain (4 - 6)      Allergies    Levaquin (Anaphylaxis; Flushing; Short breath)      PHYSICAL EXAM:   Vital Signs:  Vital Signs Last 24 Hrs  T(C): 36.6 (2024 09:19), Max: 36.6 (2024 09:19)  T(F): 97.9 (2024 09:19), Max: 97.9 (2024 09:19)  HR: 67 (2024 09:24) (65 - 74)  BP: 141/83 (2024 09:24) (93/60 - 141/83)  BP(mean): --  RR: 18 (2024 09:19) (18 - 18)  SpO2: 100% (2024 09:19) (98% - 100%)    Parameters below as of 2024 09:19  Patient On (Oxygen Delivery Method): nasal cannula  O2 Flow (L/min): 3    Daily     Daily Weight in k.1 (2024 09:19)    GENERAL:  Appears stated age  HEENT:  NC/AT  CHEST:  Full & symmetric excursion  HEART:  Regular rhythm  ABDOMEN:  Soft, non-tender, non-distended  EXTEREMITIES:  no cyanosis  SKIN:  No rash  NEURO:  Alert      LABS:                        8.2    5.68  )-----------( 155      ( 2024 04:47 )             26.1     01-    136  |  100  |  31<H>  ----------------------------<  98  3.8   |  28  |  5.00<H>    Ca    7.5<L>      2024 04:47        Urinalysis Basic - ( 2024 04:47 )    Color: x / Appearance: x / SG: x / pH: x  Gluc: 98 mg/dL / Ketone: x  / Bili: x / Urobili: x   Blood: x / Protein: x / Nitrite: x   Leuk Esterase: x / RBC: x / WBC x   Sq Epi: x / Non Sq Epi: x / Bacteria: x

## 2024-01-04 NOTE — PROGRESS NOTE ADULT - SUBJECTIVE AND OBJECTIVE BOX
CHIEF COMPLAINT/ REASON FOR VISIT  .. Patient was seen to address the  issue listed under PROBLEM LIST which is located toward bottom of this note     LISA NAVARRETE 2EAS 219 D1    Allergies    Levaquin (Anaphylaxis; Flushing; Short breath)    Intolerances        PAST MEDICAL & SURGICAL HISTORY:  ASHD (arteriosclerotic heart disease)      CAD (coronary artery disease)  s/p stent, CABG      CRI (chronic renal insufficiency)  ESRD on HD      Diabetes mellitus type II      Hypertension      Hyperlipidemia      Pacemaker      Chronic atrial fibrillation      Asthma      Hypothyroid      Dementia      H/O carotid stenosis      Enlarged prostate      Hx of CABG      Coronary stent      Cardiac pacemaker      History of amputation of toe  right great toe          FAMILY HISTORY:  Family history of chronic ischemic heart disease (Father)  father  of MI at age 59        Home Medications:  acetaminophen 325 mg oral tablet: 2 tab(s) orally 3 times a day as needed for  mild pain (2024 13:58)  Acidophilus oral capsule: 1 cap(s) orally once a day (2024 13:58)  apixaban 5 mg oral tablet: 1 tab(s) orally 2 times a day (2024 13:58)  brimonidine 0.2% ophthalmic solution: 1 drop(s) in each eye 2 times a day (2024 13:58)  calcitriol 0.25 mcg oral capsule: 1 cap(s) orally 3 times a week Monday, Wednesday, Friday (2024 13:58)  calcium acetate 667 mg oral capsule: 1 cap(s) orally 3 times a day (2024 13:58)  dorzolamide-timolol 2.23%-0.68% (2%-0.5% base) ophthalmic solution: 1 drop(s) in each eye 2 times a day (2024 13:58)  finasteride 5 mg oral tablet: 1 tab(s) orally once a day (at bedtime) (2024 13:58)  latanoprost 0.005% ophthalmic solution: 1 drop(s) in each eye once a day (2024 13:58)  levothyroxine 50 mcg (0.05 mg) oral tablet: 1 tab(s) orally once a day (2024 13:58)  melatonin 5 mg oral tablet: 1 tab(s) orally once a day (at bedtime) (2024 13:58)  midodrine 10 mg oral tablet: 1 tab(s) orally 2 times a day as needed (2024 13:58)  sertraline 25 mg oral tablet: 1 tab(s) orally once a day (at bedtime) take with sertraline 50MG (total 75MG) (2024 13:58)  sertraline 50 mg oral tablet: 1 tab(s) orally once a day (at bedtime) take with sertraline 25MG total(75MG) (2024 13:58)  simvastatin 10 mg oral tablet: 1 tab(s) orally once a day (at bedtime) (2024 13:58)  Symbicort 80 mcg-4.5 mcg/inh inhalation aerosol: 2 puff(s) inhaled 2 times a day (2024 13:58)  tamsulosin 0.4 mg oral capsule: 1 cap(s) orally once a day (2024 13:58)  Vitamin C 500 mg oral tablet, chewable: 1 tab(s) chewed once a day (2024 13:58)      MEDICATIONS  (STANDING):  aMIOdarone    Tablet 200 milliGRAM(s) Oral daily  apixaban 5 milliGRAM(s) Oral two times a day  ascorbic acid 500 milliGRAM(s) Oral daily  aspirin enteric coated 81 milliGRAM(s) Oral daily  brimonidine 0.2% Ophthalmic Solution 1 Drop(s) Both EYES two times a day  budesonide 160 MICROgram(s)/formoterol 4.5 MICROgram(s) Inhaler 2 Puff(s) Inhalation two times a day  calcitriol   Capsule 0.25 MICROGram(s) Oral <User Schedule>  calcium acetate 667 milliGRAM(s) Oral three times a day with meals  dextrose 5%. 1000 milliLiter(s) (50 mL/Hr) IV Continuous <Continuous>  dextrose 5%. 1000 milliLiter(s) (100 mL/Hr) IV Continuous <Continuous>  dextrose 50% Injectable 25 Gram(s) IV Push once  dextrose 50% Injectable 12.5 Gram(s) IV Push once  dextrose 50% Injectable 25 Gram(s) IV Push once  dorzolamide 2%/timolol 0.5% Ophthalmic Solution 1 Drop(s) Both EYES two times a day  epoetin kayla-epbx (RETACRIT) Injectable 16114 Unit(s) IV Push <User Schedule>  finasteride 5 milliGRAM(s) Oral daily  glucagon  Injectable 1 milliGRAM(s) IntraMuscular once  guaiFENesin Oral Liquid (Sugar-Free) 200 milliGRAM(s) Oral three times a day  insulin lispro (ADMELOG) corrective regimen sliding scale   SubCutaneous three times a day before meals  insulin lispro (ADMELOG) corrective regimen sliding scale   SubCutaneous at bedtime  lactobacillus acidophilus 1 Tablet(s) Oral daily  latanoprost 0.005% Ophthalmic Solution 1 Drop(s) Both EYES at bedtime  levothyroxine 50 MICROGram(s) Oral daily  melatonin 5 milliGRAM(s) Oral at bedtime  midodrine. 15 milliGRAM(s) Oral three times a day  oseltamivir 30 milliGRAM(s) Oral once  pantoprazole    Tablet 40 milliGRAM(s) Oral daily  senna 2 Tablet(s) Oral at bedtime  sertraline 50 milliGRAM(s) Oral daily  sertraline 25 milliGRAM(s) Oral daily  simvastatin 10 milliGRAM(s) Oral at bedtime  tamsulosin 0.4 milliGRAM(s) Oral at bedtime  ursodiol Capsule 300 milliGRAM(s) Oral two times a day    MEDICATIONS  (PRN):  acetaminophen     Tablet .. 650 milliGRAM(s) Oral every 6 hours PRN Temp greater or equal to 38C (100.4F), Mild Pain (1 - 3)  albuterol/ipratropium for Nebulization 3 milliLiter(s) Nebulizer every 6 hours PRN Shortness of Breath and/or Wheezing  aluminum hydroxide/magnesium hydroxide/simethicone Suspension 30 milliLiter(s) Oral every 4 hours PRN Dyspepsia  bisacodyl Suppository 10 milliGRAM(s) Rectal daily PRN Constipation  dextrose Oral Gel 15 Gram(s) Oral once PRN Blood Glucose LESS THAN 70 milliGRAM(s)/deciliter  morphine  - Injectable 2 milliGRAM(s) IV Push every 6 hours PRN Severe Pain (7 - 10)  ondansetron Injectable 4 milliGRAM(s) IV Push every 8 hours PRN Nausea and/or Vomiting  traMADol 50 milliGRAM(s) Oral three times a day PRN Moderate Pain (4 - 6)              Vital Signs Last 24 Hrs  T(C): 36.6 (2024 09:19), Max: 36.7 (2024 11:21)  T(F): 97.9 (2024 09:19), Max: 98 (2024 11:21)  HR: 67 (2024 09:24) (65 - 80)  BP: 141/83 (2024 09:24) (92/54 - 141/83)  BP(mean): --  RR: 18 (2024 09:19) (18 - 18)  SpO2: 100% (2024 09:19) (98% - 100%)    Parameters below as of 2024 09:19  Patient On (Oxygen Delivery Method): nasal cannula  O2 Flow (L/min): 3                LABS:                        8.2    5.68  )-----------( 155      ( 2024 04:47 )             26.1         136  |  100  |  31<H>  ----------------------------<  98  3.8   |  28  |  5.00<H>    Ca    7.5<L>      2024 04:47        Urinalysis Basic - ( 2024 04:47 )    Color: x / Appearance: x / SG: x / pH: x  Gluc: 98 mg/dL / Ketone: x  / Bili: x / Urobili: x   Blood: x / Protein: x / Nitrite: x   Leuk Esterase: x / RBC: x / WBC x   Sq Epi: x / Non Sq Epi: x / Bacteria: x            WBC:  WBC Count: 5.68 K/uL ( @ 04:47)  WBC Count: 3.77 K/uL ( @ 07:55)  WBC Count: 3.56 K/uL ( @ 07:02)      MICROBIOLOGY:  RECENT CULTURES:   .Blood Blood-Peripheral XXXX XXXX   No growth at 4 days    12-30 .Blood Blood-Peripheral XXXX XXXX   No growth at 4 days                    Sodium:  Sodium: 136 mmol/L ( @ 04:47)  Sodium: 134 mmol/L ( @ 07:55)  Sodium: 136 mmol/L ( @ 07:02)      5.00 mg/dL 01-04 @ 04:47  4.00 mg/dL  07:55  4.80 mg/dL  @ 07:02      Hemoglobin:  Hemoglobin: 8.2 g/dL ( @ 04:47)  Hemoglobin: 8.2 g/dL ( @ 07:55)  Hemoglobin: 8.6 g/dL ( @ 07:02)      Platelets: Platelet Count - Automated: 155 K/uL ( @ 04:47)  Platelet Count - Automated: 149 K/uL ( @ 07:55)  Platelet Count - Automated: 166 K/uL ( @ 07:02)          Urinalysis Basic - ( 2024 04:47 )    Color: x / Appearance: x / SG: x / pH: x  Gluc: 98 mg/dL / Ketone: x  / Bili: x / Urobili: x   Blood: x / Protein: x / Nitrite: x   Leuk Esterase: x / RBC: x / WBC x   Sq Epi: x / Non Sq Epi: x / Bacteria: x        RADIOLOGY & ADDITIONAL STUDIES:      MICROBIOLOGY:  RECENT CULTURES:   .Blood Blood-Peripheral XXXX XXXX   No growth at 4 days    - .Blood Blood-Peripheral XXXX XXXX   No growth at 4 days                  CHIEF COMPLAINT/ REASON FOR VISIT  .. Patient was seen to address the  issue listed under PROBLEM LIST which is located toward bottom of this note     LISA NAVARRETE 2EAS 219 D1    Allergies    Levaquin (Anaphylaxis; Flushing; Short breath)    Intolerances        PAST MEDICAL & SURGICAL HISTORY:  ASHD (arteriosclerotic heart disease)      CAD (coronary artery disease)  s/p stent, CABG      CRI (chronic renal insufficiency)  ESRD on HD      Diabetes mellitus type II      Hypertension      Hyperlipidemia      Pacemaker      Chronic atrial fibrillation      Asthma      Hypothyroid      Dementia      H/O carotid stenosis      Enlarged prostate      Hx of CABG      Coronary stent      Cardiac pacemaker      History of amputation of toe  right great toe          FAMILY HISTORY:  Family history of chronic ischemic heart disease (Father)  father  of MI at age 59        Home Medications:  acetaminophen 325 mg oral tablet: 2 tab(s) orally 3 times a day as needed for  mild pain (2024 13:58)  Acidophilus oral capsule: 1 cap(s) orally once a day (2024 13:58)  apixaban 5 mg oral tablet: 1 tab(s) orally 2 times a day (2024 13:58)  brimonidine 0.2% ophthalmic solution: 1 drop(s) in each eye 2 times a day (2024 13:58)  calcitriol 0.25 mcg oral capsule: 1 cap(s) orally 3 times a week Monday, Wednesday, Friday (2024 13:58)  calcium acetate 667 mg oral capsule: 1 cap(s) orally 3 times a day (2024 13:58)  dorzolamide-timolol 2.23%-0.68% (2%-0.5% base) ophthalmic solution: 1 drop(s) in each eye 2 times a day (2024 13:58)  finasteride 5 mg oral tablet: 1 tab(s) orally once a day (at bedtime) (2024 13:58)  latanoprost 0.005% ophthalmic solution: 1 drop(s) in each eye once a day (2024 13:58)  levothyroxine 50 mcg (0.05 mg) oral tablet: 1 tab(s) orally once a day (2024 13:58)  melatonin 5 mg oral tablet: 1 tab(s) orally once a day (at bedtime) (2024 13:58)  midodrine 10 mg oral tablet: 1 tab(s) orally 2 times a day as needed (2024 13:58)  sertraline 25 mg oral tablet: 1 tab(s) orally once a day (at bedtime) take with sertraline 50MG (total 75MG) (2024 13:58)  sertraline 50 mg oral tablet: 1 tab(s) orally once a day (at bedtime) take with sertraline 25MG total(75MG) (2024 13:58)  simvastatin 10 mg oral tablet: 1 tab(s) orally once a day (at bedtime) (2024 13:58)  Symbicort 80 mcg-4.5 mcg/inh inhalation aerosol: 2 puff(s) inhaled 2 times a day (2024 13:58)  tamsulosin 0.4 mg oral capsule: 1 cap(s) orally once a day (2024 13:58)  Vitamin C 500 mg oral tablet, chewable: 1 tab(s) chewed once a day (2024 13:58)      MEDICATIONS  (STANDING):  aMIOdarone    Tablet 200 milliGRAM(s) Oral daily  apixaban 5 milliGRAM(s) Oral two times a day  ascorbic acid 500 milliGRAM(s) Oral daily  aspirin enteric coated 81 milliGRAM(s) Oral daily  brimonidine 0.2% Ophthalmic Solution 1 Drop(s) Both EYES two times a day  budesonide 160 MICROgram(s)/formoterol 4.5 MICROgram(s) Inhaler 2 Puff(s) Inhalation two times a day  calcitriol   Capsule 0.25 MICROGram(s) Oral <User Schedule>  calcium acetate 667 milliGRAM(s) Oral three times a day with meals  dextrose 5%. 1000 milliLiter(s) (50 mL/Hr) IV Continuous <Continuous>  dextrose 5%. 1000 milliLiter(s) (100 mL/Hr) IV Continuous <Continuous>  dextrose 50% Injectable 25 Gram(s) IV Push once  dextrose 50% Injectable 12.5 Gram(s) IV Push once  dextrose 50% Injectable 25 Gram(s) IV Push once  dorzolamide 2%/timolol 0.5% Ophthalmic Solution 1 Drop(s) Both EYES two times a day  epoetin kayla-epbx (RETACRIT) Injectable 82032 Unit(s) IV Push <User Schedule>  finasteride 5 milliGRAM(s) Oral daily  glucagon  Injectable 1 milliGRAM(s) IntraMuscular once  guaiFENesin Oral Liquid (Sugar-Free) 200 milliGRAM(s) Oral three times a day  insulin lispro (ADMELOG) corrective regimen sliding scale   SubCutaneous three times a day before meals  insulin lispro (ADMELOG) corrective regimen sliding scale   SubCutaneous at bedtime  lactobacillus acidophilus 1 Tablet(s) Oral daily  latanoprost 0.005% Ophthalmic Solution 1 Drop(s) Both EYES at bedtime  levothyroxine 50 MICROGram(s) Oral daily  melatonin 5 milliGRAM(s) Oral at bedtime  midodrine. 15 milliGRAM(s) Oral three times a day  oseltamivir 30 milliGRAM(s) Oral once  pantoprazole    Tablet 40 milliGRAM(s) Oral daily  senna 2 Tablet(s) Oral at bedtime  sertraline 50 milliGRAM(s) Oral daily  sertraline 25 milliGRAM(s) Oral daily  simvastatin 10 milliGRAM(s) Oral at bedtime  tamsulosin 0.4 milliGRAM(s) Oral at bedtime  ursodiol Capsule 300 milliGRAM(s) Oral two times a day    MEDICATIONS  (PRN):  acetaminophen     Tablet .. 650 milliGRAM(s) Oral every 6 hours PRN Temp greater or equal to 38C (100.4F), Mild Pain (1 - 3)  albuterol/ipratropium for Nebulization 3 milliLiter(s) Nebulizer every 6 hours PRN Shortness of Breath and/or Wheezing  aluminum hydroxide/magnesium hydroxide/simethicone Suspension 30 milliLiter(s) Oral every 4 hours PRN Dyspepsia  bisacodyl Suppository 10 milliGRAM(s) Rectal daily PRN Constipation  dextrose Oral Gel 15 Gram(s) Oral once PRN Blood Glucose LESS THAN 70 milliGRAM(s)/deciliter  morphine  - Injectable 2 milliGRAM(s) IV Push every 6 hours PRN Severe Pain (7 - 10)  ondansetron Injectable 4 milliGRAM(s) IV Push every 8 hours PRN Nausea and/or Vomiting  traMADol 50 milliGRAM(s) Oral three times a day PRN Moderate Pain (4 - 6)              Vital Signs Last 24 Hrs  T(C): 36.6 (2024 09:19), Max: 36.7 (2024 11:21)  T(F): 97.9 (2024 09:19), Max: 98 (2024 11:21)  HR: 67 (2024 09:24) (65 - 80)  BP: 141/83 (2024 09:24) (92/54 - 141/83)  BP(mean): --  RR: 18 (2024 09:19) (18 - 18)  SpO2: 100% (2024 09:19) (98% - 100%)    Parameters below as of 2024 09:19  Patient On (Oxygen Delivery Method): nasal cannula  O2 Flow (L/min): 3                LABS:                        8.2    5.68  )-----------( 155      ( 2024 04:47 )             26.1         136  |  100  |  31<H>  ----------------------------<  98  3.8   |  28  |  5.00<H>    Ca    7.5<L>      2024 04:47        Urinalysis Basic - ( 2024 04:47 )    Color: x / Appearance: x / SG: x / pH: x  Gluc: 98 mg/dL / Ketone: x  / Bili: x / Urobili: x   Blood: x / Protein: x / Nitrite: x   Leuk Esterase: x / RBC: x / WBC x   Sq Epi: x / Non Sq Epi: x / Bacteria: x            WBC:  WBC Count: 5.68 K/uL ( @ 04:47)  WBC Count: 3.77 K/uL ( @ 07:55)  WBC Count: 3.56 K/uL ( @ 07:02)      MICROBIOLOGY:  RECENT CULTURES:   .Blood Blood-Peripheral XXXX XXXX   No growth at 4 days    12-30 .Blood Blood-Peripheral XXXX XXXX   No growth at 4 days                    Sodium:  Sodium: 136 mmol/L ( @ 04:47)  Sodium: 134 mmol/L ( @ 07:55)  Sodium: 136 mmol/L ( @ 07:02)      5.00 mg/dL 01-04 @ 04:47  4.00 mg/dL  07:55  4.80 mg/dL  @ 07:02      Hemoglobin:  Hemoglobin: 8.2 g/dL ( @ 04:47)  Hemoglobin: 8.2 g/dL ( @ 07:55)  Hemoglobin: 8.6 g/dL ( @ 07:02)      Platelets: Platelet Count - Automated: 155 K/uL ( @ 04:47)  Platelet Count - Automated: 149 K/uL ( @ 07:55)  Platelet Count - Automated: 166 K/uL ( @ 07:02)          Urinalysis Basic - ( 2024 04:47 )    Color: x / Appearance: x / SG: x / pH: x  Gluc: 98 mg/dL / Ketone: x  / Bili: x / Urobili: x   Blood: x / Protein: x / Nitrite: x   Leuk Esterase: x / RBC: x / WBC x   Sq Epi: x / Non Sq Epi: x / Bacteria: x        RADIOLOGY & ADDITIONAL STUDIES:      MICROBIOLOGY:  RECENT CULTURES:   .Blood Blood-Peripheral XXXX XXXX   No growth at 4 days    - .Blood Blood-Peripheral XXXX XXXX   No growth at 4 days

## 2024-01-04 NOTE — PROGRESS NOTE ADULT - SUBJECTIVE AND OBJECTIVE BOX
PROGRESS NOTE  Patient is a 73y old  Male who presents with a chief complaint of hypoxia and hypotension (03 Jan 2024 18:35)    Chart and available morning labs /imaging are reviewed electronically , urgent issues addressed . More information  is being added upon completion of rounds , when more information is collected and management discussed with consultants , medical staff and social service/case management on the floor   OVERNIGHT      HPI:  Patient  is 72 yo , LTC resident with past medical history of end-stage renal disease on dialysis, chronic hypotension, afib, T2DM, recent admission for clogged AV fistula is presenting for hypotension and hypoxia.  Patient was discharged back to [facility] 12/30  but sent to ED due to hypotension and hypoxia on arrival to [facility] .His vitals were stable upon discharge and he was monitored 2 hrs after HD session by house staff .  Otherwise denies any new complaints.  Patient was diagnosed with Influenza , also found to have JAZMINE elevation .Seen by Dr Garcia cardiologist Admitted  to telemetry unit for monitoring , send 3 sets of cardiac enzymes to rule out acute coronary event, obtain ECHO to evaluate LVEF, cardiology consult  ,continue current management, O2 supply, anticoagulation plan as per cardiology consult Palliative care consult requested ,to discuss advance directives and complete MOLST  (31 Dec 2023 10:10)    PAST MEDICAL & SURGICAL HISTORY:  ASHD (arteriosclerotic heart disease)      CAD (coronary artery disease)  s/p stent, CABG      CRI (chronic renal insufficiency)  ESRD on HD      Diabetes mellitus type II      Hypertension      Hyperlipidemia      Pacemaker      Chronic atrial fibrillation      Asthma      Hypothyroid      Dementia      H/O carotid stenosis      Enlarged prostate      Hx of CABG      Coronary stent      Cardiac pacemaker      History of amputation of toe  right great toe          MEDICATIONS  (STANDING):  aMIOdarone    Tablet 200 milliGRAM(s) Oral daily  apixaban 5 milliGRAM(s) Oral two times a day  ascorbic acid 500 milliGRAM(s) Oral daily  aspirin enteric coated 81 milliGRAM(s) Oral daily  brimonidine 0.2% Ophthalmic Solution 1 Drop(s) Both EYES two times a day  budesonide 160 MICROgram(s)/formoterol 4.5 MICROgram(s) Inhaler 2 Puff(s) Inhalation two times a day  calcitriol   Capsule 0.25 MICROGram(s) Oral <User Schedule>  calcium acetate 667 milliGRAM(s) Oral three times a day with meals  dextrose 5%. 1000 milliLiter(s) (100 mL/Hr) IV Continuous <Continuous>  dextrose 5%. 1000 milliLiter(s) (50 mL/Hr) IV Continuous <Continuous>  dextrose 50% Injectable 25 Gram(s) IV Push once  dextrose 50% Injectable 12.5 Gram(s) IV Push once  dextrose 50% Injectable 25 Gram(s) IV Push once  dorzolamide 2%/timolol 0.5% Ophthalmic Solution 1 Drop(s) Both EYES two times a day  epoetin kayla-epbx (RETACRIT) Injectable 07295 Unit(s) IV Push <User Schedule>  finasteride 5 milliGRAM(s) Oral daily  glucagon  Injectable 1 milliGRAM(s) IntraMuscular once  guaiFENesin Oral Liquid (Sugar-Free) 200 milliGRAM(s) Oral three times a day  insulin lispro (ADMELOG) corrective regimen sliding scale   SubCutaneous three times a day before meals  insulin lispro (ADMELOG) corrective regimen sliding scale   SubCutaneous at bedtime  lactobacillus acidophilus 1 Tablet(s) Oral daily  latanoprost 0.005% Ophthalmic Solution 1 Drop(s) Both EYES at bedtime  levothyroxine 50 MICROGram(s) Oral daily  melatonin 5 milliGRAM(s) Oral at bedtime  midodrine. 15 milliGRAM(s) Oral three times a day  oseltamivir 30 milliGRAM(s) Oral once  pantoprazole    Tablet 40 milliGRAM(s) Oral daily  senna 2 Tablet(s) Oral at bedtime  sertraline 50 milliGRAM(s) Oral daily  sertraline 25 milliGRAM(s) Oral daily  simvastatin 10 milliGRAM(s) Oral at bedtime  tamsulosin 0.4 milliGRAM(s) Oral at bedtime  ursodiol Capsule 300 milliGRAM(s) Oral two times a day    MEDICATIONS  (PRN):  acetaminophen     Tablet .. 650 milliGRAM(s) Oral every 6 hours PRN Temp greater or equal to 38C (100.4F), Mild Pain (1 - 3)  albuterol/ipratropium for Nebulization 3 milliLiter(s) Nebulizer every 6 hours PRN Shortness of Breath and/or Wheezing  aluminum hydroxide/magnesium hydroxide/simethicone Suspension 30 milliLiter(s) Oral every 4 hours PRN Dyspepsia  bisacodyl Suppository 10 milliGRAM(s) Rectal daily PRN Constipation  dextrose Oral Gel 15 Gram(s) Oral once PRN Blood Glucose LESS THAN 70 milliGRAM(s)/deciliter  morphine  - Injectable 2 milliGRAM(s) IV Push every 6 hours PRN Severe Pain (7 - 10)  ondansetron Injectable 4 milliGRAM(s) IV Push every 8 hours PRN Nausea and/or Vomiting  traMADol 50 milliGRAM(s) Oral three times a day PRN Moderate Pain (4 - 6)      OBJECTIVE    T(C): 36.1 (01-04-24 @ 04:54), Max: 36.7 (01-03-24 @ 11:21)  HR: 68 (01-04-24 @ 07:59) (65 - 80)  BP: 99/60 (01-04-24 @ 04:54) (92/54 - 102/59)  RR: 18 (01-04-24 @ 04:54) (18 - 18)  SpO2: 98% (01-04-24 @ 07:59) (98% - 100%)  Wt(kg): --  I&O's Summary        REVIEW OF SYSTEMS:  CONSTITUTIONAL: No fever, weight loss, or fatigue  EYES: No eye pain, visual disturbances, or discharge  ENMT:   No sinus or throat pain  NECK: No pain or stiffness  RESPIRATORY: No cough, wheezing, chills or hemoptysis; No shortness of breath  CARDIOVASCULAR: No chest pain, palpitations, dizziness, or leg swelling  GASTROINTESTINAL: No abdominal pain. No nausea, vomiting; No diarrhea or constipation. No melena or hematochezia.  GENITOURINARY: No dysuria, frequency, hematuria, or incontinence  NEUROLOGICAL: No headaches, memory loss, loss of strength, numbness, or tremors  SKIN: No itching, burning, rashes, or lesions   MUSCULOSKELETAL: No joint pain or swelling; No muscle, back, or extremity pain    PHYSICAL EXAM:  Appearance: NAD. VS past 24 hrs -as above   HEENT:   Moist oral mucosa. Conjunctiva clear b/l.   Neck : supple  Respiratory: Lungs CTAB.  Gastrointestinal:  Soft, nontender. No rebound. No rigidity. BS present	  Cardiovascular: RRR ,S1S2 present  Neurologic: Non-focal. Moving all extremities.  Extremities: No edema. No erythema. No calf tenderness.  Skin: No rashes, No ecchymoses, No cyanosis.	  wounds ,skin lesions-See skin assesment flow sheet   LABS:                        8.2    5.68  )-----------( 155      ( 04 Jan 2024 04:47 )             26.1     01-04    136  |  100  |  31<H>  ----------------------------<  98  3.8   |  28  |  5.00<H>    Ca    7.5<L>      04 Jan 2024 04:47      CAPILLARY BLOOD GLUCOSE      POCT Blood Glucose.: 113 mg/dL (04 Jan 2024 07:32)  POCT Blood Glucose.: 170 mg/dL (03 Jan 2024 21:26)  POCT Blood Glucose.: 128 mg/dL (03 Jan 2024 17:14)  POCT Blood Glucose.: 128 mg/dL (03 Jan 2024 11:43)      Urinalysis Basic - ( 04 Jan 2024 04:47 )    Color: x / Appearance: x / SG: x / pH: x  Gluc: 98 mg/dL / Ketone: x  / Bili: x / Urobili: x   Blood: x / Protein: x / Nitrite: x   Leuk Esterase: x / RBC: x / WBC x   Sq Epi: x / Non Sq Epi: x / Bacteria: x        Culture - Blood (collected 30 Dec 2023 21:45)  Source: .Blood Blood-Peripheral  Preliminary Report (04 Jan 2024 02:02):    No growth at 4 days    Culture - Blood (collected 30 Dec 2023 21:40)  Source: .Blood Blood-Peripheral  Preliminary Report (04 Jan 2024 02:02):    No growth at 4 days      RADIOLOGY & ADDITIONAL TESTS:   reviewed elctronically  ASSESSMENT/PLAN: 	     PROGRESS NOTE  Patient is a 73y old  Male who presents with a chief complaint of hypoxia and hypotension (03 Jan 2024 18:35)    Chart and available morning labs /imaging are reviewed electronically , urgent issues addressed . More information  is being added upon completion of rounds , when more information is collected and management discussed with consultants , medical staff and social service/case management on the floor   OVERNIGHT      HPI:  Patient  is 72 yo , LTC resident with past medical history of end-stage renal disease on dialysis, chronic hypotension, afib, T2DM, recent admission for clogged AV fistula is presenting for hypotension and hypoxia.  Patient was discharged back to [facility] 12/30  but sent to ED due to hypotension and hypoxia on arrival to [facility] .His vitals were stable upon discharge and he was monitored 2 hrs after HD session by house staff .  Otherwise denies any new complaints.  Patient was diagnosed with Influenza , also found to have JAZMINE elevation .Seen by Dr Garcia cardiologist Admitted  to telemetry unit for monitoring , send 3 sets of cardiac enzymes to rule out acute coronary event, obtain ECHO to evaluate LVEF, cardiology consult  ,continue current management, O2 supply, anticoagulation plan as per cardiology consult Palliative care consult requested ,to discuss advance directives and complete MOLST  (31 Dec 2023 10:10)    PAST MEDICAL & SURGICAL HISTORY:  ASHD (arteriosclerotic heart disease)      CAD (coronary artery disease)  s/p stent, CABG      CRI (chronic renal insufficiency)  ESRD on HD      Diabetes mellitus type II      Hypertension      Hyperlipidemia      Pacemaker      Chronic atrial fibrillation      Asthma      Hypothyroid      Dementia      H/O carotid stenosis      Enlarged prostate      Hx of CABG      Coronary stent      Cardiac pacemaker      History of amputation of toe  right great toe          MEDICATIONS  (STANDING):  aMIOdarone    Tablet 200 milliGRAM(s) Oral daily  apixaban 5 milliGRAM(s) Oral two times a day  ascorbic acid 500 milliGRAM(s) Oral daily  aspirin enteric coated 81 milliGRAM(s) Oral daily  brimonidine 0.2% Ophthalmic Solution 1 Drop(s) Both EYES two times a day  budesonide 160 MICROgram(s)/formoterol 4.5 MICROgram(s) Inhaler 2 Puff(s) Inhalation two times a day  calcitriol   Capsule 0.25 MICROGram(s) Oral <User Schedule>  calcium acetate 667 milliGRAM(s) Oral three times a day with meals  dextrose 5%. 1000 milliLiter(s) (100 mL/Hr) IV Continuous <Continuous>  dextrose 5%. 1000 milliLiter(s) (50 mL/Hr) IV Continuous <Continuous>  dextrose 50% Injectable 25 Gram(s) IV Push once  dextrose 50% Injectable 12.5 Gram(s) IV Push once  dextrose 50% Injectable 25 Gram(s) IV Push once  dorzolamide 2%/timolol 0.5% Ophthalmic Solution 1 Drop(s) Both EYES two times a day  epoetin kayla-epbx (RETACRIT) Injectable 73978 Unit(s) IV Push <User Schedule>  finasteride 5 milliGRAM(s) Oral daily  glucagon  Injectable 1 milliGRAM(s) IntraMuscular once  guaiFENesin Oral Liquid (Sugar-Free) 200 milliGRAM(s) Oral three times a day  insulin lispro (ADMELOG) corrective regimen sliding scale   SubCutaneous three times a day before meals  insulin lispro (ADMELOG) corrective regimen sliding scale   SubCutaneous at bedtime  lactobacillus acidophilus 1 Tablet(s) Oral daily  latanoprost 0.005% Ophthalmic Solution 1 Drop(s) Both EYES at bedtime  levothyroxine 50 MICROGram(s) Oral daily  melatonin 5 milliGRAM(s) Oral at bedtime  midodrine. 15 milliGRAM(s) Oral three times a day  oseltamivir 30 milliGRAM(s) Oral once  pantoprazole    Tablet 40 milliGRAM(s) Oral daily  senna 2 Tablet(s) Oral at bedtime  sertraline 50 milliGRAM(s) Oral daily  sertraline 25 milliGRAM(s) Oral daily  simvastatin 10 milliGRAM(s) Oral at bedtime  tamsulosin 0.4 milliGRAM(s) Oral at bedtime  ursodiol Capsule 300 milliGRAM(s) Oral two times a day    MEDICATIONS  (PRN):  acetaminophen     Tablet .. 650 milliGRAM(s) Oral every 6 hours PRN Temp greater or equal to 38C (100.4F), Mild Pain (1 - 3)  albuterol/ipratropium for Nebulization 3 milliLiter(s) Nebulizer every 6 hours PRN Shortness of Breath and/or Wheezing  aluminum hydroxide/magnesium hydroxide/simethicone Suspension 30 milliLiter(s) Oral every 4 hours PRN Dyspepsia  bisacodyl Suppository 10 milliGRAM(s) Rectal daily PRN Constipation  dextrose Oral Gel 15 Gram(s) Oral once PRN Blood Glucose LESS THAN 70 milliGRAM(s)/deciliter  morphine  - Injectable 2 milliGRAM(s) IV Push every 6 hours PRN Severe Pain (7 - 10)  ondansetron Injectable 4 milliGRAM(s) IV Push every 8 hours PRN Nausea and/or Vomiting  traMADol 50 milliGRAM(s) Oral three times a day PRN Moderate Pain (4 - 6)      OBJECTIVE    T(C): 36.1 (01-04-24 @ 04:54), Max: 36.7 (01-03-24 @ 11:21)  HR: 68 (01-04-24 @ 07:59) (65 - 80)  BP: 99/60 (01-04-24 @ 04:54) (92/54 - 102/59)  RR: 18 (01-04-24 @ 04:54) (18 - 18)  SpO2: 98% (01-04-24 @ 07:59) (98% - 100%)  Wt(kg): --  I&O's Summary        REVIEW OF SYSTEMS:  CONSTITUTIONAL: No fever, weight loss, or fatigue  EYES: No eye pain, visual disturbances, or discharge  ENMT:   No sinus or throat pain  NECK: No pain or stiffness  RESPIRATORY: No cough, wheezing, chills or hemoptysis; No shortness of breath  CARDIOVASCULAR: No chest pain, palpitations, dizziness, or leg swelling  GASTROINTESTINAL: No abdominal pain. No nausea, vomiting; No diarrhea or constipation. No melena or hematochezia.  GENITOURINARY: No dysuria, frequency, hematuria, or incontinence  NEUROLOGICAL: No headaches, memory loss, loss of strength, numbness, or tremors  SKIN: No itching, burning, rashes, or lesions   MUSCULOSKELETAL: No joint pain or swelling; No muscle, back, or extremity pain    PHYSICAL EXAM:  Appearance: NAD. VS past 24 hrs -as above   HEENT:   Moist oral mucosa. Conjunctiva clear b/l.   Neck : supple  Respiratory: Lungs CTAB.  Gastrointestinal:  Soft, nontender. No rebound. No rigidity. BS present	  Cardiovascular: RRR ,S1S2 present  Neurologic: Non-focal. Moving all extremities.  Extremities: No edema. No erythema. No calf tenderness.  Skin: No rashes, No ecchymoses, No cyanosis.	  wounds ,skin lesions-See skin assesment flow sheet   LABS:                        8.2    5.68  )-----------( 155      ( 04 Jan 2024 04:47 )             26.1     01-04    136  |  100  |  31<H>  ----------------------------<  98  3.8   |  28  |  5.00<H>    Ca    7.5<L>      04 Jan 2024 04:47      CAPILLARY BLOOD GLUCOSE      POCT Blood Glucose.: 113 mg/dL (04 Jan 2024 07:32)  POCT Blood Glucose.: 170 mg/dL (03 Jan 2024 21:26)  POCT Blood Glucose.: 128 mg/dL (03 Jan 2024 17:14)  POCT Blood Glucose.: 128 mg/dL (03 Jan 2024 11:43)      Urinalysis Basic - ( 04 Jan 2024 04:47 )    Color: x / Appearance: x / SG: x / pH: x  Gluc: 98 mg/dL / Ketone: x  / Bili: x / Urobili: x   Blood: x / Protein: x / Nitrite: x   Leuk Esterase: x / RBC: x / WBC x   Sq Epi: x / Non Sq Epi: x / Bacteria: x        Culture - Blood (collected 30 Dec 2023 21:45)  Source: .Blood Blood-Peripheral  Preliminary Report (04 Jan 2024 02:02):    No growth at 4 days    Culture - Blood (collected 30 Dec 2023 21:40)  Source: .Blood Blood-Peripheral  Preliminary Report (04 Jan 2024 02:02):    No growth at 4 days      RADIOLOGY & ADDITIONAL TESTS:   reviewed elctronically  ASSESSMENT/PLAN: 	     PROGRESS NOTE  Patient is a 73y old  Male who presents with a chief complaint of hypoxia and hypotension (03 Jan 2024 18:35)    Chart and available morning labs /imaging are reviewed electronically , urgent issues addressed . More information  is being added upon completion of rounds , when more information is collected and management discussed with consultants , medical staff and social service/case management on the floor   OVERNIGHT    Bp fluctuations reported ,scheduled for hd today   HPI:  Patient  is 72 yo , LTC resident with past medical history of end-stage renal disease on dialysis, chronic hypotension, afib, T2DM, recent admission for clogged AV fistula is presenting for hypotension and hypoxia.  Patient was discharged back to [facility] 12/30  but sent to ED due to hypotension and hypoxia on arrival to [facility] .His vitals were stable upon discharge and he was monitored 2 hrs after HD session by house staff .  Otherwise denies any new complaints.  Patient was diagnosed with Influenza , also found to have JAZMINE elevation .Seen by Dr Garcia cardiologist Admitted  to telemetry unit for monitoring , send 3 sets of cardiac enzymes to rule out acute coronary event, obtain ECHO to evaluate LVEF, cardiology consult  ,continue current management, O2 supply, anticoagulation plan as per cardiology consult Palliative care consult requested ,to discuss advance directives and complete MOLST  (31 Dec 2023 10:10)    PAST MEDICAL & SURGICAL HISTORY:  ASHD (arteriosclerotic heart disease)      CAD (coronary artery disease)  s/p stent, CABG      CRI (chronic renal insufficiency)  ESRD on HD      Diabetes mellitus type II      Hypertension      Hyperlipidemia      Pacemaker      Chronic atrial fibrillation      Asthma      Hypothyroid      Dementia      H/O carotid stenosis      Enlarged prostate      Hx of CABG      Coronary stent      Cardiac pacemaker      History of amputation of toe  right great toe          MEDICATIONS  (STANDING):  aMIOdarone    Tablet 200 milliGRAM(s) Oral daily  apixaban 5 milliGRAM(s) Oral two times a day  ascorbic acid 500 milliGRAM(s) Oral daily  aspirin enteric coated 81 milliGRAM(s) Oral daily  brimonidine 0.2% Ophthalmic Solution 1 Drop(s) Both EYES two times a day  budesonide 160 MICROgram(s)/formoterol 4.5 MICROgram(s) Inhaler 2 Puff(s) Inhalation two times a day  calcitriol   Capsule 0.25 MICROGram(s) Oral <User Schedule>  calcium acetate 667 milliGRAM(s) Oral three times a day with meals  dextrose 5%. 1000 milliLiter(s) (100 mL/Hr) IV Continuous <Continuous>  dextrose 5%. 1000 milliLiter(s) (50 mL/Hr) IV Continuous <Continuous>  dextrose 50% Injectable 25 Gram(s) IV Push once  dextrose 50% Injectable 12.5 Gram(s) IV Push once  dextrose 50% Injectable 25 Gram(s) IV Push once  dorzolamide 2%/timolol 0.5% Ophthalmic Solution 1 Drop(s) Both EYES two times a day  epoetin kayla-epbx (RETACRIT) Injectable 06539 Unit(s) IV Push <User Schedule>  finasteride 5 milliGRAM(s) Oral daily  glucagon  Injectable 1 milliGRAM(s) IntraMuscular once  guaiFENesin Oral Liquid (Sugar-Free) 200 milliGRAM(s) Oral three times a day  insulin lispro (ADMELOG) corrective regimen sliding scale   SubCutaneous three times a day before meals  insulin lispro (ADMELOG) corrective regimen sliding scale   SubCutaneous at bedtime  lactobacillus acidophilus 1 Tablet(s) Oral daily  latanoprost 0.005% Ophthalmic Solution 1 Drop(s) Both EYES at bedtime  levothyroxine 50 MICROGram(s) Oral daily  melatonin 5 milliGRAM(s) Oral at bedtime  midodrine. 15 milliGRAM(s) Oral three times a day  oseltamivir 30 milliGRAM(s) Oral once  pantoprazole    Tablet 40 milliGRAM(s) Oral daily  senna 2 Tablet(s) Oral at bedtime  sertraline 50 milliGRAM(s) Oral daily  sertraline 25 milliGRAM(s) Oral daily  simvastatin 10 milliGRAM(s) Oral at bedtime  tamsulosin 0.4 milliGRAM(s) Oral at bedtime  ursodiol Capsule 300 milliGRAM(s) Oral two times a day    MEDICATIONS  (PRN):  acetaminophen     Tablet .. 650 milliGRAM(s) Oral every 6 hours PRN Temp greater or equal to 38C (100.4F), Mild Pain (1 - 3)  albuterol/ipratropium for Nebulization 3 milliLiter(s) Nebulizer every 6 hours PRN Shortness of Breath and/or Wheezing  aluminum hydroxide/magnesium hydroxide/simethicone Suspension 30 milliLiter(s) Oral every 4 hours PRN Dyspepsia  bisacodyl Suppository 10 milliGRAM(s) Rectal daily PRN Constipation  dextrose Oral Gel 15 Gram(s) Oral once PRN Blood Glucose LESS THAN 70 milliGRAM(s)/deciliter  morphine  - Injectable 2 milliGRAM(s) IV Push every 6 hours PRN Severe Pain (7 - 10)  ondansetron Injectable 4 milliGRAM(s) IV Push every 8 hours PRN Nausea and/or Vomiting  traMADol 50 milliGRAM(s) Oral three times a day PRN Moderate Pain (4 - 6)      OBJECTIVE    T(C): 36.1 (01-04-24 @ 04:54), Max: 36.7 (01-03-24 @ 11:21)  HR: 68 (01-04-24 @ 07:59) (65 - 80)  BP: 99/60 (01-04-24 @ 04:54) (92/54 - 102/59)  RR: 18 (01-04-24 @ 04:54) (18 - 18)  SpO2: 98% (01-04-24 @ 07:59) (98% - 100%)  Wt(kg): --  I&O's Summary        REVIEW OF SYSTEMS:  CONSTITUTIONAL: No fever, weight loss, or fatigue  EYES: No eye pain, visual disturbances, or discharge  ENMT:   No sinus or throat pain  NECK: No pain or stiffness  RESPIRATORY: No cough, wheezing, chills or hemoptysis; No shortness of breath  CARDIOVASCULAR: No chest pain, palpitations, dizziness, or leg swelling  GASTROINTESTINAL: No abdominal pain. No nausea, vomiting; No diarrhea or constipation. No melena or hematochezia.  GENITOURINARY: No dysuria, frequency, hematuria, or incontinence  NEUROLOGICAL: No headaches, memory loss, loss of strength, numbness, or tremors  SKIN: No itching, burning, rashes, or lesions   MUSCULOSKELETAL: No joint pain or swelling; No muscle, back, or extremity pain    PHYSICAL EXAM:  Appearance: NAD. VS past 24 hrs -as above   HEENT:   Moist oral mucosa. Conjunctiva clear b/l.   Neck : supple  Respiratory: Lungs CTAB.  Gastrointestinal:  Soft, nontender. No rebound. No rigidity. BS present	  Cardiovascular: RRR ,S1S2 present  Neurologic: Non-focal. Moving all extremities.  Extremities: No edema. No erythema. No calf tenderness.  Skin: No rashes, No ecchymoses, No cyanosis.	  wounds ,skin lesions-See skin assesment flow sheet   LABS:                        8.2    5.68  )-----------( 155      ( 04 Jan 2024 04:47 )             26.1     01-04    136  |  100  |  31<H>  ----------------------------<  98  3.8   |  28  |  5.00<H>    Ca    7.5<L>      04 Jan 2024 04:47      CAPILLARY BLOOD GLUCOSE      POCT Blood Glucose.: 113 mg/dL (04 Jan 2024 07:32)  POCT Blood Glucose.: 170 mg/dL (03 Jan 2024 21:26)  POCT Blood Glucose.: 128 mg/dL (03 Jan 2024 17:14)  POCT Blood Glucose.: 128 mg/dL (03 Jan 2024 11:43)      Urinalysis Basic - ( 04 Jan 2024 04:47 )    Color: x / Appearance: x / SG: x / pH: x  Gluc: 98 mg/dL / Ketone: x  / Bili: x / Urobili: x   Blood: x / Protein: x / Nitrite: x   Leuk Esterase: x / RBC: x / WBC x   Sq Epi: x / Non Sq Epi: x / Bacteria: x        Culture - Blood (collected 30 Dec 2023 21:45)  Source: .Blood Blood-Peripheral  Preliminary Report (04 Jan 2024 02:02):    No growth at 4 days    Culture - Blood (collected 30 Dec 2023 21:40)  Source: .Blood Blood-Peripheral  Preliminary Report (04 Jan 2024 02:02):    No growth at 4 days      RADIOLOGY & ADDITIONAL TESTS:   reviewed elctronically  ASSESSMENT/PLAN: 	    25 minutes aggregate time was spent on this visit, 50% visit time spent in care co-ordination with other attendings and counselling patient .I have discussed care plan with patient / HCP/family member ,who expressed understanding of problems treatment and their effect and side effects, alternatives in details. I have asked if they have any questions and concerns and appropriately addressed them to best of my ability. Brother Israel refused card cath and requests continue conservative management only  PROGRESS NOTE  Patient is a 73y old  Male who presents with a chief complaint of hypoxia and hypotension (03 Jan 2024 18:35)    Chart and available morning labs /imaging are reviewed electronically , urgent issues addressed . More information  is being added upon completion of rounds , when more information is collected and management discussed with consultants , medical staff and social service/case management on the floor   OVERNIGHT    Bp fluctuations reported ,scheduled for hd today   HPI:  Patient  is 72 yo , LTC resident with past medical history of end-stage renal disease on dialysis, chronic hypotension, afib, T2DM, recent admission for clogged AV fistula is presenting for hypotension and hypoxia.  Patient was discharged back to [facility] 12/30  but sent to ED due to hypotension and hypoxia on arrival to [facility] .His vitals were stable upon discharge and he was monitored 2 hrs after HD session by house staff .  Otherwise denies any new complaints.  Patient was diagnosed with Influenza , also found to have JAZMINE elevation .Seen by Dr Garcia cardiologist Admitted  to telemetry unit for monitoring , send 3 sets of cardiac enzymes to rule out acute coronary event, obtain ECHO to evaluate LVEF, cardiology consult  ,continue current management, O2 supply, anticoagulation plan as per cardiology consult Palliative care consult requested ,to discuss advance directives and complete MOLST  (31 Dec 2023 10:10)    PAST MEDICAL & SURGICAL HISTORY:  ASHD (arteriosclerotic heart disease)      CAD (coronary artery disease)  s/p stent, CABG      CRI (chronic renal insufficiency)  ESRD on HD      Diabetes mellitus type II      Hypertension      Hyperlipidemia      Pacemaker      Chronic atrial fibrillation      Asthma      Hypothyroid      Dementia      H/O carotid stenosis      Enlarged prostate      Hx of CABG      Coronary stent      Cardiac pacemaker      History of amputation of toe  right great toe          MEDICATIONS  (STANDING):  aMIOdarone    Tablet 200 milliGRAM(s) Oral daily  apixaban 5 milliGRAM(s) Oral two times a day  ascorbic acid 500 milliGRAM(s) Oral daily  aspirin enteric coated 81 milliGRAM(s) Oral daily  brimonidine 0.2% Ophthalmic Solution 1 Drop(s) Both EYES two times a day  budesonide 160 MICROgram(s)/formoterol 4.5 MICROgram(s) Inhaler 2 Puff(s) Inhalation two times a day  calcitriol   Capsule 0.25 MICROGram(s) Oral <User Schedule>  calcium acetate 667 milliGRAM(s) Oral three times a day with meals  dextrose 5%. 1000 milliLiter(s) (100 mL/Hr) IV Continuous <Continuous>  dextrose 5%. 1000 milliLiter(s) (50 mL/Hr) IV Continuous <Continuous>  dextrose 50% Injectable 25 Gram(s) IV Push once  dextrose 50% Injectable 12.5 Gram(s) IV Push once  dextrose 50% Injectable 25 Gram(s) IV Push once  dorzolamide 2%/timolol 0.5% Ophthalmic Solution 1 Drop(s) Both EYES two times a day  epoetin kayla-epbx (RETACRIT) Injectable 85123 Unit(s) IV Push <User Schedule>  finasteride 5 milliGRAM(s) Oral daily  glucagon  Injectable 1 milliGRAM(s) IntraMuscular once  guaiFENesin Oral Liquid (Sugar-Free) 200 milliGRAM(s) Oral three times a day  insulin lispro (ADMELOG) corrective regimen sliding scale   SubCutaneous three times a day before meals  insulin lispro (ADMELOG) corrective regimen sliding scale   SubCutaneous at bedtime  lactobacillus acidophilus 1 Tablet(s) Oral daily  latanoprost 0.005% Ophthalmic Solution 1 Drop(s) Both EYES at bedtime  levothyroxine 50 MICROGram(s) Oral daily  melatonin 5 milliGRAM(s) Oral at bedtime  midodrine. 15 milliGRAM(s) Oral three times a day  oseltamivir 30 milliGRAM(s) Oral once  pantoprazole    Tablet 40 milliGRAM(s) Oral daily  senna 2 Tablet(s) Oral at bedtime  sertraline 50 milliGRAM(s) Oral daily  sertraline 25 milliGRAM(s) Oral daily  simvastatin 10 milliGRAM(s) Oral at bedtime  tamsulosin 0.4 milliGRAM(s) Oral at bedtime  ursodiol Capsule 300 milliGRAM(s) Oral two times a day    MEDICATIONS  (PRN):  acetaminophen     Tablet .. 650 milliGRAM(s) Oral every 6 hours PRN Temp greater or equal to 38C (100.4F), Mild Pain (1 - 3)  albuterol/ipratropium for Nebulization 3 milliLiter(s) Nebulizer every 6 hours PRN Shortness of Breath and/or Wheezing  aluminum hydroxide/magnesium hydroxide/simethicone Suspension 30 milliLiter(s) Oral every 4 hours PRN Dyspepsia  bisacodyl Suppository 10 milliGRAM(s) Rectal daily PRN Constipation  dextrose Oral Gel 15 Gram(s) Oral once PRN Blood Glucose LESS THAN 70 milliGRAM(s)/deciliter  morphine  - Injectable 2 milliGRAM(s) IV Push every 6 hours PRN Severe Pain (7 - 10)  ondansetron Injectable 4 milliGRAM(s) IV Push every 8 hours PRN Nausea and/or Vomiting  traMADol 50 milliGRAM(s) Oral three times a day PRN Moderate Pain (4 - 6)      OBJECTIVE    T(C): 36.1 (01-04-24 @ 04:54), Max: 36.7 (01-03-24 @ 11:21)  HR: 68 (01-04-24 @ 07:59) (65 - 80)  BP: 99/60 (01-04-24 @ 04:54) (92/54 - 102/59)  RR: 18 (01-04-24 @ 04:54) (18 - 18)  SpO2: 98% (01-04-24 @ 07:59) (98% - 100%)  Wt(kg): --  I&O's Summary        REVIEW OF SYSTEMS:  CONSTITUTIONAL: No fever, weight loss, or fatigue  EYES: No eye pain, visual disturbances, or discharge  ENMT:   No sinus or throat pain  NECK: No pain or stiffness  RESPIRATORY: No cough, wheezing, chills or hemoptysis; No shortness of breath  CARDIOVASCULAR: No chest pain, palpitations, dizziness, or leg swelling  GASTROINTESTINAL: No abdominal pain. No nausea, vomiting; No diarrhea or constipation. No melena or hematochezia.  GENITOURINARY: No dysuria, frequency, hematuria, or incontinence  NEUROLOGICAL: No headaches, memory loss, loss of strength, numbness, or tremors  SKIN: No itching, burning, rashes, or lesions   MUSCULOSKELETAL: No joint pain or swelling; No muscle, back, or extremity pain    PHYSICAL EXAM:  Appearance: NAD. VS past 24 hrs -as above   HEENT:   Moist oral mucosa. Conjunctiva clear b/l.   Neck : supple  Respiratory: Lungs CTAB.  Gastrointestinal:  Soft, nontender. No rebound. No rigidity. BS present	  Cardiovascular: RRR ,S1S2 present  Neurologic: Non-focal. Moving all extremities.  Extremities: No edema. No erythema. No calf tenderness.  Skin: No rashes, No ecchymoses, No cyanosis.	  wounds ,skin lesions-See skin assesment flow sheet   LABS:                        8.2    5.68  )-----------( 155      ( 04 Jan 2024 04:47 )             26.1     01-04    136  |  100  |  31<H>  ----------------------------<  98  3.8   |  28  |  5.00<H>    Ca    7.5<L>      04 Jan 2024 04:47      CAPILLARY BLOOD GLUCOSE      POCT Blood Glucose.: 113 mg/dL (04 Jan 2024 07:32)  POCT Blood Glucose.: 170 mg/dL (03 Jan 2024 21:26)  POCT Blood Glucose.: 128 mg/dL (03 Jan 2024 17:14)  POCT Blood Glucose.: 128 mg/dL (03 Jan 2024 11:43)      Urinalysis Basic - ( 04 Jan 2024 04:47 )    Color: x / Appearance: x / SG: x / pH: x  Gluc: 98 mg/dL / Ketone: x  / Bili: x / Urobili: x   Blood: x / Protein: x / Nitrite: x   Leuk Esterase: x / RBC: x / WBC x   Sq Epi: x / Non Sq Epi: x / Bacteria: x        Culture - Blood (collected 30 Dec 2023 21:45)  Source: .Blood Blood-Peripheral  Preliminary Report (04 Jan 2024 02:02):    No growth at 4 days    Culture - Blood (collected 30 Dec 2023 21:40)  Source: .Blood Blood-Peripheral  Preliminary Report (04 Jan 2024 02:02):    No growth at 4 days      RADIOLOGY & ADDITIONAL TESTS:   reviewed elctronically  ASSESSMENT/PLAN: 	    25 minutes aggregate time was spent on this visit, 50% visit time spent in care co-ordination with other attendings and counselling patient .I have discussed care plan with patient / HCP/family member ,who expressed understanding of problems treatment and their effect and side effects, alternatives in details. I have asked if they have any questions and concerns and appropriately addressed them to best of my ability. Brother Israel refused card cath and requests continue conservative management only

## 2024-01-04 NOTE — PROGRESS NOTE ADULT - PROBLEM/PLAN-5
I called to discuss care coordination with María Parkinson   I began to discuss my role and he interrupted stating \"I don't think so\" and ended the call. Episode completed.
DISPLAY PLAN FREE TEXT

## 2024-01-04 NOTE — PROGRESS NOTE ADULT - SUBJECTIVE AND OBJECTIVE BOX
Interval History:    CENTRAL LINE:   [  ] YES       [  ] NO  MATOS:                 [  ] YES       [  ] NO         REVIEW OF SYSTEMS:  All Systems below were reviewed and are negative [  ]  HEENT:  ID:  Pulmonary:  Cardiac:  GI:  Renal:  Musculoskeletal:  All other systems above were reviewed and are negative   [  ]      MEDICATIONS  (STANDING):  ALPRAZolam 0.25 milliGRAM(s) Oral once  aMIOdarone    Tablet 200 milliGRAM(s) Oral daily  apixaban 5 milliGRAM(s) Oral two times a day  ascorbic acid 500 milliGRAM(s) Oral daily  aspirin enteric coated 81 milliGRAM(s) Oral daily  brimonidine 0.2% Ophthalmic Solution 1 Drop(s) Both EYES two times a day  budesonide 160 MICROgram(s)/formoterol 4.5 MICROgram(s) Inhaler 2 Puff(s) Inhalation two times a day  calcitriol   Capsule 0.25 MICROGram(s) Oral <User Schedule>  calcium acetate 667 milliGRAM(s) Oral three times a day with meals  dextrose 5%. 1000 milliLiter(s) (50 mL/Hr) IV Continuous <Continuous>  dextrose 5%. 1000 milliLiter(s) (100 mL/Hr) IV Continuous <Continuous>  dextrose 50% Injectable 25 Gram(s) IV Push once  dextrose 50% Injectable 25 Gram(s) IV Push once  dextrose 50% Injectable 12.5 Gram(s) IV Push once  dorzolamide 2%/timolol 0.5% Ophthalmic Solution 1 Drop(s) Both EYES two times a day  epoetin kayla-epbx (RETACRIT) Injectable 18947 Unit(s) IV Push <User Schedule>  finasteride 5 milliGRAM(s) Oral daily  glucagon  Injectable 1 milliGRAM(s) IntraMuscular once  guaiFENesin Oral Liquid (Sugar-Free) 200 milliGRAM(s) Oral three times a day  insulin lispro (ADMELOG) corrective regimen sliding scale   SubCutaneous three times a day before meals  insulin lispro (ADMELOG) corrective regimen sliding scale   SubCutaneous at bedtime  lactobacillus acidophilus 1 Tablet(s) Oral daily  latanoprost 0.005% Ophthalmic Solution 1 Drop(s) Both EYES at bedtime  levothyroxine 50 MICROGram(s) Oral daily  melatonin 5 milliGRAM(s) Oral at bedtime  midodrine. 15 milliGRAM(s) Oral three times a day  pantoprazole    Tablet 40 milliGRAM(s) Oral daily  senna 2 Tablet(s) Oral at bedtime  sertraline 50 milliGRAM(s) Oral daily  sertraline 25 milliGRAM(s) Oral daily  simvastatin 10 milliGRAM(s) Oral at bedtime  tamsulosin 0.4 milliGRAM(s) Oral at bedtime  ursodiol Capsule 300 milliGRAM(s) Oral two times a day    MEDICATIONS  (PRN):  acetaminophen     Tablet .. 650 milliGRAM(s) Oral every 6 hours PRN Temp greater or equal to 38C (100.4F), Mild Pain (1 - 3)  albuterol/ipratropium for Nebulization 3 milliLiter(s) Nebulizer every 6 hours PRN Shortness of Breath and/or Wheezing  aluminum hydroxide/magnesium hydroxide/simethicone Suspension 30 milliLiter(s) Oral every 4 hours PRN Dyspepsia  bisacodyl Suppository 10 milliGRAM(s) Rectal daily PRN Constipation  dextrose Oral Gel 15 Gram(s) Oral once PRN Blood Glucose LESS THAN 70 milliGRAM(s)/deciliter  morphine  - Injectable 2 milliGRAM(s) IV Push every 6 hours PRN Severe Pain (7 - 10)  ondansetron Injectable 4 milliGRAM(s) IV Push every 8 hours PRN Nausea and/or Vomiting  traMADol 50 milliGRAM(s) Oral three times a day PRN Moderate Pain (4 - 6)      Vital Signs Last 24 Hrs  T(C): 36.3 (04 Jan 2024 12:30), Max: 36.6 (04 Jan 2024 09:19)  T(F): 97.4 (04 Jan 2024 12:30), Max: 97.9 (04 Jan 2024 09:19)  HR: 65 (04 Jan 2024 17:45) (62 - 92)  BP: 108/68 (04 Jan 2024 17:45) (93/60 - 141/83)  BP(mean): --  RR: 18 (04 Jan 2024 12:30) (18 - 18)  SpO2: 98% (04 Jan 2024 14:19) (98% - 100%)    Parameters below as of 04 Jan 2024 14:19  Patient On (Oxygen Delivery Method): nasal cannula, 3L        I&O's Summary    04 Jan 2024 07:01 - 04 Jan 2024 19:21  --------------------------------------------------------  IN: 0 mL / OUT: 500 mL / NET: -500 mL        PHYSICAL EXAM:  HEENT: NC/AT; PERRLA  Neck: Soft; no tenderness  Lungs: CTA bilaterally; no wheezing.   Heart:  Abdomen:  Genital/ Rectal:  Extremities:  Neurologic:  Vascular:      LABORATORY:    CBC Full  -  ( 04 Jan 2024 04:47 )  WBC Count : 5.68 K/uL  RBC Count : 2.41 M/uL  Hemoglobin : 8.2 g/dL  Hematocrit : 26.1 %  Platelet Count - Automated : 155 K/uL  Mean Cell Volume : 108.3 fl  Mean Cell Hemoglobin : 34.0 pg  Mean Cell Hemoglobin Concentration : 31.4 gm/dL  Auto Neutrophil # : x  Auto Lymphocyte # : x  Auto Monocyte # : x  Auto Eosinophil # : x  Auto Basophil # : x  Auto Neutrophil % : x  Auto Lymphocyte % : x  Auto Monocyte % : x  Auto Eosinophil % : x  Auto Basophil % : x          01-04    136  |  100  |  31<H>  ----------------------------<  98  3.8   |  28  |  5.00<H>    Ca    7.5<L>      04 Jan 2024 04:47            Assessment and Plan:          Domenic Paul MD   [phone].      Interval History:    CENTRAL LINE:   [  ] YES       [  ] NO  MATOS:                 [  ] YES       [  ] NO         REVIEW OF SYSTEMS:  All Systems below were reviewed and are negative [  ]  HEENT:  ID:  Pulmonary:  Cardiac:  GI:  Renal:  Musculoskeletal:  All other systems above were reviewed and are negative   [  ]      MEDICATIONS  (STANDING):  ALPRAZolam 0.25 milliGRAM(s) Oral once  aMIOdarone    Tablet 200 milliGRAM(s) Oral daily  apixaban 5 milliGRAM(s) Oral two times a day  ascorbic acid 500 milliGRAM(s) Oral daily  aspirin enteric coated 81 milliGRAM(s) Oral daily  brimonidine 0.2% Ophthalmic Solution 1 Drop(s) Both EYES two times a day  budesonide 160 MICROgram(s)/formoterol 4.5 MICROgram(s) Inhaler 2 Puff(s) Inhalation two times a day  calcitriol   Capsule 0.25 MICROGram(s) Oral <User Schedule>  calcium acetate 667 milliGRAM(s) Oral three times a day with meals  dextrose 5%. 1000 milliLiter(s) (50 mL/Hr) IV Continuous <Continuous>  dextrose 5%. 1000 milliLiter(s) (100 mL/Hr) IV Continuous <Continuous>  dextrose 50% Injectable 25 Gram(s) IV Push once  dextrose 50% Injectable 25 Gram(s) IV Push once  dextrose 50% Injectable 12.5 Gram(s) IV Push once  dorzolamide 2%/timolol 0.5% Ophthalmic Solution 1 Drop(s) Both EYES two times a day  epoetin kayla-epbx (RETACRIT) Injectable 73381 Unit(s) IV Push <User Schedule>  finasteride 5 milliGRAM(s) Oral daily  glucagon  Injectable 1 milliGRAM(s) IntraMuscular once  guaiFENesin Oral Liquid (Sugar-Free) 200 milliGRAM(s) Oral three times a day  insulin lispro (ADMELOG) corrective regimen sliding scale   SubCutaneous three times a day before meals  insulin lispro (ADMELOG) corrective regimen sliding scale   SubCutaneous at bedtime  lactobacillus acidophilus 1 Tablet(s) Oral daily  latanoprost 0.005% Ophthalmic Solution 1 Drop(s) Both EYES at bedtime  levothyroxine 50 MICROGram(s) Oral daily  melatonin 5 milliGRAM(s) Oral at bedtime  midodrine. 15 milliGRAM(s) Oral three times a day  pantoprazole    Tablet 40 milliGRAM(s) Oral daily  senna 2 Tablet(s) Oral at bedtime  sertraline 50 milliGRAM(s) Oral daily  sertraline 25 milliGRAM(s) Oral daily  simvastatin 10 milliGRAM(s) Oral at bedtime  tamsulosin 0.4 milliGRAM(s) Oral at bedtime  ursodiol Capsule 300 milliGRAM(s) Oral two times a day    MEDICATIONS  (PRN):  acetaminophen     Tablet .. 650 milliGRAM(s) Oral every 6 hours PRN Temp greater or equal to 38C (100.4F), Mild Pain (1 - 3)  albuterol/ipratropium for Nebulization 3 milliLiter(s) Nebulizer every 6 hours PRN Shortness of Breath and/or Wheezing  aluminum hydroxide/magnesium hydroxide/simethicone Suspension 30 milliLiter(s) Oral every 4 hours PRN Dyspepsia  bisacodyl Suppository 10 milliGRAM(s) Rectal daily PRN Constipation  dextrose Oral Gel 15 Gram(s) Oral once PRN Blood Glucose LESS THAN 70 milliGRAM(s)/deciliter  morphine  - Injectable 2 milliGRAM(s) IV Push every 6 hours PRN Severe Pain (7 - 10)  ondansetron Injectable 4 milliGRAM(s) IV Push every 8 hours PRN Nausea and/or Vomiting  traMADol 50 milliGRAM(s) Oral three times a day PRN Moderate Pain (4 - 6)      Vital Signs Last 24 Hrs  T(C): 36.3 (04 Jan 2024 12:30), Max: 36.6 (04 Jan 2024 09:19)  T(F): 97.4 (04 Jan 2024 12:30), Max: 97.9 (04 Jan 2024 09:19)  HR: 65 (04 Jan 2024 17:45) (62 - 92)  BP: 108/68 (04 Jan 2024 17:45) (93/60 - 141/83)  BP(mean): --  RR: 18 (04 Jan 2024 12:30) (18 - 18)  SpO2: 98% (04 Jan 2024 14:19) (98% - 100%)    Parameters below as of 04 Jan 2024 14:19  Patient On (Oxygen Delivery Method): nasal cannula, 3L        I&O's Summary    04 Jan 2024 07:01 - 04 Jan 2024 19:21  --------------------------------------------------------  IN: 0 mL / OUT: 500 mL / NET: -500 mL        PHYSICAL EXAM:  HEENT: NC/AT; PERRLA  Neck: Soft; no tenderness  Lungs: CTA bilaterally; no wheezing.   Heart:  Abdomen:  Genital/ Rectal:  Extremities:  Neurologic:  Vascular:      LABORATORY:    CBC Full  -  ( 04 Jan 2024 04:47 )  WBC Count : 5.68 K/uL  RBC Count : 2.41 M/uL  Hemoglobin : 8.2 g/dL  Hematocrit : 26.1 %  Platelet Count - Automated : 155 K/uL  Mean Cell Volume : 108.3 fl  Mean Cell Hemoglobin : 34.0 pg  Mean Cell Hemoglobin Concentration : 31.4 gm/dL  Auto Neutrophil # : x  Auto Lymphocyte # : x  Auto Monocyte # : x  Auto Eosinophil # : x  Auto Basophil # : x  Auto Neutrophil % : x  Auto Lymphocyte % : x  Auto Monocyte % : x  Auto Eosinophil % : x  Auto Basophil % : x          01-04    136  |  100  |  31<H>  ----------------------------<  98  3.8   |  28  |  5.00<H>    Ca    7.5<L>      04 Jan 2024 04:47            Assessment and Plan:          Domenic Paul MD   [phone].    He is afebrile  Comfortable.     MEDICATIONS  (STANDING):  ALPRAZolam 0.25 milliGRAM(s) Oral once  aMIOdarone    Tablet 200 milliGRAM(s) Oral daily  apixaban 5 milliGRAM(s) Oral two times a day  ascorbic acid 500 milliGRAM(s) Oral daily  aspirin enteric coated 81 milliGRAM(s) Oral daily  brimonidine 0.2% Ophthalmic Solution 1 Drop(s) Both EYES two times a day  budesonide 160 MICROgram(s)/formoterol 4.5 MICROgram(s) Inhaler 2 Puff(s) Inhalation two times a day  calcitriol   Capsule 0.25 MICROGram(s) Oral <User Schedule>  calcium acetate 667 milliGRAM(s) Oral three times a day with meals  dextrose 5%. 1000 milliLiter(s) (50 mL/Hr) IV Continuous <Continuous>  dextrose 5%. 1000 milliLiter(s) (100 mL/Hr) IV Continuous <Continuous>  dextrose 50% Injectable 25 Gram(s) IV Push once  dextrose 50% Injectable 25 Gram(s) IV Push once  dextrose 50% Injectable 12.5 Gram(s) IV Push once  dorzolamide 2%/timolol 0.5% Ophthalmic Solution 1 Drop(s) Both EYES two times a day  epoetin kayla-epbx (RETACRIT) Injectable 28213 Unit(s) IV Push <User Schedule>  finasteride 5 milliGRAM(s) Oral daily  glucagon  Injectable 1 milliGRAM(s) IntraMuscular once  guaiFENesin Oral Liquid (Sugar-Free) 200 milliGRAM(s) Oral three times a day  insulin lispro (ADMELOG) corrective regimen sliding scale   SubCutaneous three times a day before meals  insulin lispro (ADMELOG) corrective regimen sliding scale   SubCutaneous at bedtime  lactobacillus acidophilus 1 Tablet(s) Oral daily  latanoprost 0.005% Ophthalmic Solution 1 Drop(s) Both EYES at bedtime  levothyroxine 50 MICROGram(s) Oral daily  melatonin 5 milliGRAM(s) Oral at bedtime  midodrine. 15 milliGRAM(s) Oral three times a day  pantoprazole    Tablet 40 milliGRAM(s) Oral daily  senna 2 Tablet(s) Oral at bedtime  sertraline 50 milliGRAM(s) Oral daily  sertraline 25 milliGRAM(s) Oral daily  simvastatin 10 milliGRAM(s) Oral at bedtime  tamsulosin 0.4 milliGRAM(s) Oral at bedtime  ursodiol Capsule 300 milliGRAM(s) Oral two times a day    MEDICATIONS  (PRN):  acetaminophen     Tablet .. 650 milliGRAM(s) Oral every 6 hours PRN Temp greater or equal to 38C (100.4F), Mild Pain (1 - 3)  albuterol/ipratropium for Nebulization 3 milliLiter(s) Nebulizer every 6 hours PRN Shortness of Breath and/or Wheezing  aluminum hydroxide/magnesium hydroxide/simethicone Suspension 30 milliLiter(s) Oral every 4 hours PRN Dyspepsia  bisacodyl Suppository 10 milliGRAM(s) Rectal daily PRN Constipation  dextrose Oral Gel 15 Gram(s) Oral once PRN Blood Glucose LESS THAN 70 milliGRAM(s)/deciliter  morphine  - Injectable 2 milliGRAM(s) IV Push every 6 hours PRN Severe Pain (7 - 10)  ondansetron Injectable 4 milliGRAM(s) IV Push every 8 hours PRN Nausea and/or Vomiting  traMADol 50 milliGRAM(s) Oral three times a day PRN Moderate Pain (4 - 6)      Vital Signs Last 24 Hrs  T(C): 36.3 (04 Jan 2024 12:30), Max: 36.6 (04 Jan 2024 09:19)  T(F): 97.4 (04 Jan 2024 12:30), Max: 97.9 (04 Jan 2024 09:19)  HR: 65 (04 Jan 2024 17:45) (62 - 92)  BP: 108/68 (04 Jan 2024 17:45) (93/60 - 141/83)  BP(mean): --  RR: 18 (04 Jan 2024 12:30) (18 - 18)  SpO2: 98% (04 Jan 2024 14:19) (98% - 100%)    Parameters below as of 04 Jan 2024 14:19  Patient On (Oxygen Delivery Method): nasal cannula, 3L        I&O's Summary    04 Jan 2024 07:01  -  04 Jan 2024 19:21  --------------------------------------------------------  IN: 0 mL / OUT: 500 mL / NET: -500 mL        PHYSICAL EXAM:  HEENT: NC/AT; PERRLA  Neck: Soft; no tenderness  Lungs: CTA bilaterally; no wheezing.   Heart:  Abdomen:  Genital/ Rectal:  Extremities:  Neurologic:  Vascular:      LABORATORY:    CBC Full  -  ( 04 Jan 2024 04:47 )  WBC Count : 5.68 K/uL  RBC Count : 2.41 M/uL  Hemoglobin : 8.2 g/dL  Hematocrit : 26.1 %  Platelet Count - Automated : 155 K/uL  Mean Cell Volume : 108.3 fl  Mean Cell Hemoglobin : 34.0 pg  Mean Cell Hemoglobin Concentration : 31.4 gm/dL  Auto Neutrophil # : x  Auto Lymphocyte # : x  Auto Monocyte # : x  Auto Eosinophil # : x  Auto Basophil # : x  Auto Neutrophil % : x  Auto Lymphocyte % : x  Auto Monocyte % : x  Auto Eosinophil % : x  Auto Basophil % : x          01-04    136  |  100  |  31<H>  ----------------------------<  98  3.8   |  28  |  5.00<H>    Ca    7.5<L>      04 Jan 2024 04:47      Assessment and Plan:    1. Influenza A.  2. Hypothermia.  3. Hypotension.  4. Elevated LFTS.  5. ESRD on HD.  6. Hypoxia.    . Continue po Tamiflu for a total of 5 days.  . Monitor the progression of SOB and hypoxia.  . Follow all cultures.        Domenic Paul MD   [phone].    He is afebrile  Comfortable.     MEDICATIONS  (STANDING):  ALPRAZolam 0.25 milliGRAM(s) Oral once  aMIOdarone    Tablet 200 milliGRAM(s) Oral daily  apixaban 5 milliGRAM(s) Oral two times a day  ascorbic acid 500 milliGRAM(s) Oral daily  aspirin enteric coated 81 milliGRAM(s) Oral daily  brimonidine 0.2% Ophthalmic Solution 1 Drop(s) Both EYES two times a day  budesonide 160 MICROgram(s)/formoterol 4.5 MICROgram(s) Inhaler 2 Puff(s) Inhalation two times a day  calcitriol   Capsule 0.25 MICROGram(s) Oral <User Schedule>  calcium acetate 667 milliGRAM(s) Oral three times a day with meals  dextrose 5%. 1000 milliLiter(s) (50 mL/Hr) IV Continuous <Continuous>  dextrose 5%. 1000 milliLiter(s) (100 mL/Hr) IV Continuous <Continuous>  dextrose 50% Injectable 25 Gram(s) IV Push once  dextrose 50% Injectable 25 Gram(s) IV Push once  dextrose 50% Injectable 12.5 Gram(s) IV Push once  dorzolamide 2%/timolol 0.5% Ophthalmic Solution 1 Drop(s) Both EYES two times a day  epoetin kayla-epbx (RETACRIT) Injectable 85680 Unit(s) IV Push <User Schedule>  finasteride 5 milliGRAM(s) Oral daily  glucagon  Injectable 1 milliGRAM(s) IntraMuscular once  guaiFENesin Oral Liquid (Sugar-Free) 200 milliGRAM(s) Oral three times a day  insulin lispro (ADMELOG) corrective regimen sliding scale   SubCutaneous three times a day before meals  insulin lispro (ADMELOG) corrective regimen sliding scale   SubCutaneous at bedtime  lactobacillus acidophilus 1 Tablet(s) Oral daily  latanoprost 0.005% Ophthalmic Solution 1 Drop(s) Both EYES at bedtime  levothyroxine 50 MICROGram(s) Oral daily  melatonin 5 milliGRAM(s) Oral at bedtime  midodrine. 15 milliGRAM(s) Oral three times a day  pantoprazole    Tablet 40 milliGRAM(s) Oral daily  senna 2 Tablet(s) Oral at bedtime  sertraline 50 milliGRAM(s) Oral daily  sertraline 25 milliGRAM(s) Oral daily  simvastatin 10 milliGRAM(s) Oral at bedtime  tamsulosin 0.4 milliGRAM(s) Oral at bedtime  ursodiol Capsule 300 milliGRAM(s) Oral two times a day    MEDICATIONS  (PRN):  acetaminophen     Tablet .. 650 milliGRAM(s) Oral every 6 hours PRN Temp greater or equal to 38C (100.4F), Mild Pain (1 - 3)  albuterol/ipratropium for Nebulization 3 milliLiter(s) Nebulizer every 6 hours PRN Shortness of Breath and/or Wheezing  aluminum hydroxide/magnesium hydroxide/simethicone Suspension 30 milliLiter(s) Oral every 4 hours PRN Dyspepsia  bisacodyl Suppository 10 milliGRAM(s) Rectal daily PRN Constipation  dextrose Oral Gel 15 Gram(s) Oral once PRN Blood Glucose LESS THAN 70 milliGRAM(s)/deciliter  morphine  - Injectable 2 milliGRAM(s) IV Push every 6 hours PRN Severe Pain (7 - 10)  ondansetron Injectable 4 milliGRAM(s) IV Push every 8 hours PRN Nausea and/or Vomiting  traMADol 50 milliGRAM(s) Oral three times a day PRN Moderate Pain (4 - 6)      Vital Signs Last 24 Hrs  T(C): 36.3 (04 Jan 2024 12:30), Max: 36.6 (04 Jan 2024 09:19)  T(F): 97.4 (04 Jan 2024 12:30), Max: 97.9 (04 Jan 2024 09:19)  HR: 65 (04 Jan 2024 17:45) (62 - 92)  BP: 108/68 (04 Jan 2024 17:45) (93/60 - 141/83)  BP(mean): --  RR: 18 (04 Jan 2024 12:30) (18 - 18)  SpO2: 98% (04 Jan 2024 14:19) (98% - 100%)    Parameters below as of 04 Jan 2024 14:19  Patient On (Oxygen Delivery Method): nasal cannula, 3L        I&O's Summary    04 Jan 2024 07:01  -  04 Jan 2024 19:21  --------------------------------------------------------  IN: 0 mL / OUT: 500 mL / NET: -500 mL        PHYSICAL EXAM:  HEENT: NC/AT; PERRLA  Neck: Soft; no tenderness  Lungs: CTA bilaterally; no wheezing.   Heart:  Abdomen:  Genital/ Rectal:  Extremities:  Neurologic:  Vascular:      LABORATORY:    CBC Full  -  ( 04 Jan 2024 04:47 )  WBC Count : 5.68 K/uL  RBC Count : 2.41 M/uL  Hemoglobin : 8.2 g/dL  Hematocrit : 26.1 %  Platelet Count - Automated : 155 K/uL  Mean Cell Volume : 108.3 fl  Mean Cell Hemoglobin : 34.0 pg  Mean Cell Hemoglobin Concentration : 31.4 gm/dL  Auto Neutrophil # : x  Auto Lymphocyte # : x  Auto Monocyte # : x  Auto Eosinophil # : x  Auto Basophil # : x  Auto Neutrophil % : x  Auto Lymphocyte % : x  Auto Monocyte % : x  Auto Eosinophil % : x  Auto Basophil % : x          01-04    136  |  100  |  31<H>  ----------------------------<  98  3.8   |  28  |  5.00<H>    Ca    7.5<L>      04 Jan 2024 04:47      Assessment and Plan:    1. Influenza A.  2. Hypothermia.  3. Hypotension.  4. Elevated LFTS.  5. ESRD on HD.  6. Hypoxia.    . Continue po Tamiflu for a total of 5 days.  . Monitor the progression of SOB and hypoxia.  . Follow all cultures.        Domenic Paul MD   [phone].    He is afebrile  Comfortable.     MEDICATIONS  (STANDING):  ALPRAZolam 0.25 milliGRAM(s) Oral once  aMIOdarone    Tablet 200 milliGRAM(s) Oral daily  apixaban 5 milliGRAM(s) Oral two times a day  ascorbic acid 500 milliGRAM(s) Oral daily  aspirin enteric coated 81 milliGRAM(s) Oral daily  brimonidine 0.2% Ophthalmic Solution 1 Drop(s) Both EYES two times a day  budesonide 160 MICROgram(s)/formoterol 4.5 MICROgram(s) Inhaler 2 Puff(s) Inhalation two times a day  calcitriol   Capsule 0.25 MICROGram(s) Oral <User Schedule>  calcium acetate 667 milliGRAM(s) Oral three times a day with meals  dextrose 5%. 1000 milliLiter(s) (50 mL/Hr) IV Continuous <Continuous>  dextrose 5%. 1000 milliLiter(s) (100 mL/Hr) IV Continuous <Continuous>  dextrose 50% Injectable 25 Gram(s) IV Push once  dextrose 50% Injectable 25 Gram(s) IV Push once  dextrose 50% Injectable 12.5 Gram(s) IV Push once  dorzolamide 2%/timolol 0.5% Ophthalmic Solution 1 Drop(s) Both EYES two times a day  epoetin kayla-epbx (RETACRIT) Injectable 52737 Unit(s) IV Push <User Schedule>  finasteride 5 milliGRAM(s) Oral daily  glucagon  Injectable 1 milliGRAM(s) IntraMuscular once  guaiFENesin Oral Liquid (Sugar-Free) 200 milliGRAM(s) Oral three times a day  insulin lispro (ADMELOG) corrective regimen sliding scale   SubCutaneous three times a day before meals  insulin lispro (ADMELOG) corrective regimen sliding scale   SubCutaneous at bedtime  lactobacillus acidophilus 1 Tablet(s) Oral daily  latanoprost 0.005% Ophthalmic Solution 1 Drop(s) Both EYES at bedtime  levothyroxine 50 MICROGram(s) Oral daily  melatonin 5 milliGRAM(s) Oral at bedtime  midodrine. 15 milliGRAM(s) Oral three times a day  pantoprazole    Tablet 40 milliGRAM(s) Oral daily  senna 2 Tablet(s) Oral at bedtime  sertraline 50 milliGRAM(s) Oral daily  sertraline 25 milliGRAM(s) Oral daily  simvastatin 10 milliGRAM(s) Oral at bedtime  tamsulosin 0.4 milliGRAM(s) Oral at bedtime  ursodiol Capsule 300 milliGRAM(s) Oral two times a day    MEDICATIONS  (PRN):  acetaminophen     Tablet .. 650 milliGRAM(s) Oral every 6 hours PRN Temp greater or equal to 38C (100.4F), Mild Pain (1 - 3)  albuterol/ipratropium for Nebulization 3 milliLiter(s) Nebulizer every 6 hours PRN Shortness of Breath and/or Wheezing  aluminum hydroxide/magnesium hydroxide/simethicone Suspension 30 milliLiter(s) Oral every 4 hours PRN Dyspepsia  bisacodyl Suppository 10 milliGRAM(s) Rectal daily PRN Constipation  dextrose Oral Gel 15 Gram(s) Oral once PRN Blood Glucose LESS THAN 70 milliGRAM(s)/deciliter  morphine  - Injectable 2 milliGRAM(s) IV Push every 6 hours PRN Severe Pain (7 - 10)  ondansetron Injectable 4 milliGRAM(s) IV Push every 8 hours PRN Nausea and/or Vomiting  traMADol 50 milliGRAM(s) Oral three times a day PRN Moderate Pain (4 - 6)      Vital Signs Last 24 Hrs  T(C): 36.3 (04 Jan 2024 12:30), Max: 36.6 (04 Jan 2024 09:19)  T(F): 97.4 (04 Jan 2024 12:30), Max: 97.9 (04 Jan 2024 09:19)  HR: 65 (04 Jan 2024 17:45) (62 - 92)  BP: 108/68 (04 Jan 2024 17:45) (93/60 - 141/83)  BP(mean): --  RR: 18 (04 Jan 2024 12:30) (18 - 18)  SpO2: 98% (04 Jan 2024 14:19) (98% - 100%)    Parameters below as of 04 Jan 2024 14:19  Patient On (Oxygen Delivery Method): nasal cannula, 3L        I&O's Summary    04 Jan 2024 07:01  -  04 Jan 2024 19:21  --------------------------------------------------------  IN: 0 mL / OUT: 500 mL / NET: -500 mL      PHYSICAL EXAM:  HEENT: NC/AT; PERRLA  Neck: Soft; no tenderness  Lungs: Coarse BS bilaterally; no wheezing.   Heart: RRR, no murmurs.   Abdomen: Soft, no tenderness.   Genital/ Rectal: No blevins catheter.   Extremities: No ulcers.   Neurologic: Confused. No focal weakness.      LABORATORY:    CBC Full  -  ( 04 Jan 2024 04:47 )  WBC Count : 5.68 K/uL  RBC Count : 2.41 M/uL  Hemoglobin : 8.2 g/dL  Hematocrit : 26.1 %  Platelet Count - Automated : 155 K/uL  Mean Cell Volume : 108.3 fl  Mean Cell Hemoglobin : 34.0 pg  Mean Cell Hemoglobin Concentration : 31.4 gm/dL  Auto Neutrophil # : x  Auto Lymphocyte # : x  Auto Monocyte # : x  Auto Eosinophil # : x  Auto Basophil # : x  Auto Neutrophil % : x  Auto Lymphocyte % : x  Auto Monocyte % : x  Auto Eosinophil % : x  Auto Basophil % : x          01-04    136  |  100  |  31<H>  ----------------------------<  98  3.8   |  28  |  5.00<H>    Ca    7.5<L>      04 Jan 2024 04:47      Assessment and Plan:    1. Influenza A.  2. Hypothermia.  3. Hypotension.  4. Elevated LFTS.  5. ESRD on HD.  6. Hypoxia.    . Completed po Tamiflu for a total of 5 days.  . SOB is improving.  . Supportive care.     Domenic Paul MD   [phone].    He is afebrile  Comfortable.     MEDICATIONS  (STANDING):  ALPRAZolam 0.25 milliGRAM(s) Oral once  aMIOdarone    Tablet 200 milliGRAM(s) Oral daily  apixaban 5 milliGRAM(s) Oral two times a day  ascorbic acid 500 milliGRAM(s) Oral daily  aspirin enteric coated 81 milliGRAM(s) Oral daily  brimonidine 0.2% Ophthalmic Solution 1 Drop(s) Both EYES two times a day  budesonide 160 MICROgram(s)/formoterol 4.5 MICROgram(s) Inhaler 2 Puff(s) Inhalation two times a day  calcitriol   Capsule 0.25 MICROGram(s) Oral <User Schedule>  calcium acetate 667 milliGRAM(s) Oral three times a day with meals  dextrose 5%. 1000 milliLiter(s) (50 mL/Hr) IV Continuous <Continuous>  dextrose 5%. 1000 milliLiter(s) (100 mL/Hr) IV Continuous <Continuous>  dextrose 50% Injectable 25 Gram(s) IV Push once  dextrose 50% Injectable 25 Gram(s) IV Push once  dextrose 50% Injectable 12.5 Gram(s) IV Push once  dorzolamide 2%/timolol 0.5% Ophthalmic Solution 1 Drop(s) Both EYES two times a day  epoetin kayla-epbx (RETACRIT) Injectable [id number] Unit(s) IV Push <User Schedule>  finasteride 5 milliGRAM(s) Oral daily  glucagon  Injectable 1 milliGRAM(s) IntraMuscular once  guaiFENesin Oral Liquid (Sugar-Free) 200 milliGRAM(s) Oral three times a day  insulin lispro (ADMELOG) corrective regimen sliding scale   SubCutaneous three times a day before meals  insulin lispro (ADMELOG) corrective regimen sliding scale   SubCutaneous at bedtime  lactobacillus acidophilus 1 Tablet(s) Oral daily  latanoprost 0.005% Ophthalmic Solution 1 Drop(s) Both EYES at bedtime  levothyroxine 50 MICROGram(s) Oral daily  melatonin 5 milliGRAM(s) Oral at bedtime  midodrine. 15 milliGRAM(s) Oral three times a day  pantoprazole    Tablet 40 milliGRAM(s) Oral daily  senna 2 Tablet(s) Oral at bedtime  sertraline 50 milliGRAM(s) Oral daily  sertraline 25 milliGRAM(s) Oral daily  simvastatin 10 milliGRAM(s) Oral at bedtime  tamsulosin 0.4 milliGRAM(s) Oral at bedtime  ursodiol Capsule 300 milliGRAM(s) Oral two times a day    MEDICATIONS  (PRN):  acetaminophen     Tablet .. 650 milliGRAM(s) Oral every 6 hours PRN Temp greater or equal to 38C (100.4F), Mild Pain (1 - 3)  albuterol/ipratropium for Nebulization 3 milliLiter(s) Nebulizer every 6 hours PRN Shortness of Breath and/or Wheezing  aluminum hydroxide/magnesium hydroxide/simethicone Suspension 30 milliLiter(s) Oral every 4 hours PRN Dyspepsia  bisacodyl Suppository 10 milliGRAM(s) Rectal daily PRN Constipation  dextrose Oral Gel 15 Gram(s) Oral once PRN Blood Glucose LESS THAN 70 milliGRAM(s)/deciliter  morphine  - Injectable 2 milliGRAM(s) IV Push every 6 hours PRN Severe Pain (7 - 10)  ondansetron Injectable 4 milliGRAM(s) IV Push every 8 hours PRN Nausea and/or Vomiting  traMADol 50 milliGRAM(s) Oral three times a day PRN Moderate Pain (4 - 6)      Vital Signs Last 24 Hrs  T(C): 36.3 (04 Jan 2024 12:30), Max: 36.6 (04 Jan 2024 09:19)  T(F): 97.4 (04 Jan 2024 12:30), Max: 97.9 (04 Jan 2024 09:19)  HR: 65 (04 Jan 2024 17:45) (62 - 92)  BP: 108/68 (04 Jan 2024 17:45) (93/60 - 141/83)  BP(mean): --  RR: 18 (04 Jan 2024 12:30) (18 - 18)  SpO2: 98% (04 Jan 2024 14:19) (98% - 100%)    Parameters below as of 04 Jan 2024 14:19  Patient On (Oxygen Delivery Method): nasal cannula, 3L        I&O's Summary    04 Jan 2024 07:01  -  04 Jan 2024 19:21  --------------------------------------------------------  IN: 0 mL / OUT: 500 mL / NET: -500 mL      PHYSICAL EXAM:  HEENT: NC/AT; PERRLA  Neck: Soft; no tenderness  Lungs: Coarse BS bilaterally; no wheezing.   Heart: RRR, no murmurs.   Abdomen: Soft, no tenderness.   Genital/ Rectal: No blevins catheter.   Extremities: No ulcers.   Neurologic: Confused. No focal weakness.      LABORATORY:    CBC Full  -  ( 04 Jan 2024 04:47 )  WBC Count : 5.68 K/uL  RBC Count : 2.41 M/uL  Hemoglobin : 8.2 g/dL  Hematocrit : 26.1 %  Platelet Count - Automated : 155 K/uL  Mean Cell Volume : 108.3 fl  Mean Cell Hemoglobin : 34.0 pg  Mean Cell Hemoglobin Concentration : 31.4 gm/dL  Auto Neutrophil # : x  Auto Lymphocyte # : x  Auto Monocyte # : x  Auto Eosinophil # : x  Auto Basophil # : x  Auto Neutrophil % : x  Auto Lymphocyte % : x  Auto Monocyte % : x  Auto Eosinophil % : x  Auto Basophil % : x          01-04    136  |  100  |  31<H>  ----------------------------<  98  3.8   |  28  |  5.00<H>    Ca    7.5<L>      04 Jan 2024 04:47      Assessment and Plan:    1. Influenza A.  2. Hypothermia.  3. Hypotension.  4. Elevated LFTS.  5. ESRD on HD.  6. Hypoxia.    . Completed po Tamiflu for a total of 5 days.  . SOB is improving.  . Supportive care.     Domenic Paul MD   [phone].

## 2024-01-04 NOTE — PROGRESS NOTE ADULT - SUBJECTIVE AND OBJECTIVE BOX
[facility]       HPI:  Patient  is 72 yo , C resident with past medical history of end-stage renal disease on dialysis, chronic hypotension, afib, T2DM, recent admission for clogged AV fistula is presenting for hypotension and hypoxia.  Patient was discharged back to [facility] 12/30  but sent to ED due to hypotension and hypoxia on arrival to [facility] .His vitals were stable upon discharge and he was monitored 2 hrs after HD session by house staff .  Otherwise denies any new complaints.  Patient was diagnosed with Influenza , also found to have JAZMINE elevation .Seen by Dr Garcia cardiologist Admitted  to telemetry unit for monitoring , send 3 sets of cardiac enzymes to rule out acute coronary event, obtain ECHO to evaluate LVEF, cardiology consult  ,continue current management, O2 supply, anticoagulation plan as per cardiology consult Palliative care consult requested ,to discuss advance directives and complete MOLST  (31 Dec 2023 10:10)        SUBJECTIVE:      ALLERGIES:  Allergies    Levaquin (Anaphylaxis; Flushing; Short breath)    Intolerances          MEDICATIONS  (STANDING):  aMIOdarone    Tablet 200 milliGRAM(s) Oral daily  apixaban 5 milliGRAM(s) Oral two times a day  ascorbic acid 500 milliGRAM(s) Oral daily  aspirin enteric coated 81 milliGRAM(s) Oral daily  brimonidine 0.2% Ophthalmic Solution 1 Drop(s) Both EYES two times a day  budesonide 160 MICROgram(s)/formoterol 4.5 MICROgram(s) Inhaler 2 Puff(s) Inhalation two times a day  calcitriol   Capsule 0.25 MICROGram(s) Oral <User Schedule>  calcium acetate 667 milliGRAM(s) Oral three times a day with meals  dextrose 5%. 1000 milliLiter(s) (50 mL/Hr) IV Continuous <Continuous>  dextrose 5%. 1000 milliLiter(s) (100 mL/Hr) IV Continuous <Continuous>  dextrose 50% Injectable 25 Gram(s) IV Push once  dextrose 50% Injectable 25 Gram(s) IV Push once  dextrose 50% Injectable 12.5 Gram(s) IV Push once  dorzolamide 2%/timolol 0.5% Ophthalmic Solution 1 Drop(s) Both EYES two times a day  epoetin kayla-epbx (RETACRIT) Injectable 05305 Unit(s) IV Push <User Schedule>  finasteride 5 milliGRAM(s) Oral daily  glucagon  Injectable 1 milliGRAM(s) IntraMuscular once  guaiFENesin Oral Liquid (Sugar-Free) 200 milliGRAM(s) Oral three times a day  insulin lispro (ADMELOG) corrective regimen sliding scale   SubCutaneous three times a day before meals  insulin lispro (ADMELOG) corrective regimen sliding scale   SubCutaneous at bedtime  lactobacillus acidophilus 1 Tablet(s) Oral daily  latanoprost 0.005% Ophthalmic Solution 1 Drop(s) Both EYES at bedtime  levothyroxine 50 MICROGram(s) Oral daily  melatonin 5 milliGRAM(s) Oral at bedtime  midodrine. 15 milliGRAM(s) Oral three times a day  pantoprazole    Tablet 40 milliGRAM(s) Oral daily  senna 2 Tablet(s) Oral at bedtime  sertraline 50 milliGRAM(s) Oral daily  sertraline 25 milliGRAM(s) Oral daily  simvastatin 10 milliGRAM(s) Oral at bedtime  tamsulosin 0.4 milliGRAM(s) Oral at bedtime  ursodiol Capsule 300 milliGRAM(s) Oral two times a day    MEDICATIONS  (PRN):  acetaminophen     Tablet .. 650 milliGRAM(s) Oral every 6 hours PRN Temp greater or equal to 38C (100.4F), Mild Pain (1 - 3)  albuterol/ipratropium for Nebulization 3 milliLiter(s) Nebulizer every 6 hours PRN Shortness of Breath and/or Wheezing  aluminum hydroxide/magnesium hydroxide/simethicone Suspension 30 milliLiter(s) Oral every 4 hours PRN Dyspepsia  bisacodyl Suppository 10 milliGRAM(s) Rectal daily PRN Constipation  dextrose Oral Gel 15 Gram(s) Oral once PRN Blood Glucose LESS THAN 70 milliGRAM(s)/deciliter  morphine  - Injectable 2 milliGRAM(s) IV Push every 6 hours PRN Severe Pain (7 - 10)  ondansetron Injectable 4 milliGRAM(s) IV Push every 8 hours PRN Nausea and/or Vomiting  traMADol 50 milliGRAM(s) Oral three times a day PRN Moderate Pain (4 - 6)      REVIEW OF SYSTEMS:  CONSTITUTIONAL: No fever,  RESPIRATORY: No cough, wheezing, shortness of breath  CARDIOVASCULAR: No chest pain, dyspnea, palpitations, dizziness, syncope, paroxysmal nocturnal dyspnea, orthopnea, or arm or leg swelling  GASTROINTESTINAL: No abdominal  or epigastric pain, nausea, vomiting,  diarrhea  NEUROLOGICAL: No headaches,  loss of strength, numbness, or tremors    Vital Signs Last 24 Hrs  T(C): 36.7 (04 Jan 2024 23:55), Max: 36.8 (04 Jan 2024 21:05)  T(F): 98.1 (04 Jan 2024 23:55), Max: 98.2 (04 Jan 2024 21:05)  HR: 79 (04 Jan 2024 23:55) (62 - 92)  BP: 117/71 (04 Jan 2024 23:55) (99/60 - 141/83)  BP(mean): --  RR: 20 (04 Jan 2024 23:55) (18 - 20)  SpO2: 100% (04 Jan 2024 23:55) (97% - 100%)    Parameters below as of 04 Jan 2024 23:55  Patient On (Oxygen Delivery Method): nasal cannula  O2 Flow (L/min): 3      PHYSICAL EXAM:  HEAD:  Atraumatic, Normocephalic  NECK: Supple and normal thyroid.  No JVD or carotid bruit.   HEART: S1, S2 regular , 1/6 soft ejection systolic murmur in mitral area , no thrill and no gallops .  PULMONARY: Bilateral vesicular breathing , few scattered ronchi and few scattered rales are present .  ABDOMEN: Soft nontender and positive bowl sounds   EXTREMITIES:  No clubbing, cyanosis, or pedal  edema  NEUROLOGICAL: AAOX3 , no focal deficit .    LABS:                        8.2    5.68  )-----------( 155      ( 04 Jan 2024 04:47 )             26.1     01-04    136  |  100  |  31<H>  ----------------------------<  98  3.8   |  28  |  5.00<H>    Ca    7.5<L>      04 Jan 2024 04:47            Urinalysis Basic - ( 04 Jan 2024 04:47 )    Color: x / Appearance: x / SG: x / pH: x  Gluc: 98 mg/dL / Ketone: x  / Bili: x / Urobili: x   Blood: x / Protein: x / Nitrite: x   Leuk Esterase: x / RBC: x / WBC x   Sq Epi: x / Non Sq Epi: x / Bacteria: x      BNP      EKG:  ECHO:  IMAGING:    Assessment/Plan    Will continue to follow during hospital course and give further recommendations as needed . Thanks for your referral . if any questions please contact me at office ([phone])cell [phone])  [facility] [place]       HPI:  Patient  is 72 yo , C resident with past medical history of end-stage renal disease on dialysis, chronic hypotension, afib, T2DM, recent admission for clogged AV fistula is presenting for hypotension and hypoxia.  Patient was discharged back to [facility] 12/30  but sent to ED due to hypotension and hypoxia on arrival to [facility] .His vitals were stable upon discharge and he was monitored 2 hrs after HD session by house staff .  Otherwise denies any new complaints.  Patient was diagnosed with Influenza , also found to have JAZMINE elevation .Seen by Dr Garcia cardiologist Admitted  to telemetry unit for monitoring , send 3 sets of cardiac enzymes to rule out acute coronary event, obtain ECHO to evaluate LVEF, cardiology consult  ,continue current management, O2 supply, anticoagulation plan as per cardiology consult Palliative care consult requested ,to discuss advance directives and complete MOLST  (31 Dec 2023 10:10)        SUBJECTIVE:      ALLERGIES:  Allergies    Levaquin (Anaphylaxis; Flushing; Short breath)    Intolerances          MEDICATIONS  (STANDING):  aMIOdarone    Tablet 200 milliGRAM(s) Oral daily  apixaban 5 milliGRAM(s) Oral two times a day  ascorbic acid 500 milliGRAM(s) Oral daily  aspirin enteric coated 81 milliGRAM(s) Oral daily  brimonidine 0.2% Ophthalmic Solution 1 Drop(s) Both EYES two times a day  budesonide 160 MICROgram(s)/formoterol 4.5 MICROgram(s) Inhaler 2 Puff(s) Inhalation two times a day  calcitriol   Capsule 0.25 MICROGram(s) Oral <User Schedule>  calcium acetate 667 milliGRAM(s) Oral three times a day with meals  dextrose 5%. 1000 milliLiter(s) (50 mL/Hr) IV Continuous <Continuous>  dextrose 5%. 1000 milliLiter(s) (100 mL/Hr) IV Continuous <Continuous>  dextrose 50% Injectable 25 Gram(s) IV Push once  dextrose 50% Injectable 25 Gram(s) IV Push once  dextrose 50% Injectable 12.5 Gram(s) IV Push once  dorzolamide 2%/timolol 0.5% Ophthalmic Solution 1 Drop(s) Both EYES two times a day  epoetin kayla-epbx (RETACRIT) Injectable 52239 Unit(s) IV Push <User Schedule>  finasteride 5 milliGRAM(s) Oral daily  glucagon  Injectable 1 milliGRAM(s) IntraMuscular once  guaiFENesin Oral Liquid (Sugar-Free) 200 milliGRAM(s) Oral three times a day  insulin lispro (ADMELOG) corrective regimen sliding scale   SubCutaneous three times a day before meals  insulin lispro (ADMELOG) corrective regimen sliding scale   SubCutaneous at bedtime  lactobacillus acidophilus 1 Tablet(s) Oral daily  latanoprost 0.005% Ophthalmic Solution 1 Drop(s) Both EYES at bedtime  levothyroxine 50 MICROGram(s) Oral daily  melatonin 5 milliGRAM(s) Oral at bedtime  midodrine. 15 milliGRAM(s) Oral three times a day  pantoprazole    Tablet 40 milliGRAM(s) Oral daily  senna 2 Tablet(s) Oral at bedtime  sertraline 50 milliGRAM(s) Oral daily  sertraline 25 milliGRAM(s) Oral daily  simvastatin 10 milliGRAM(s) Oral at bedtime  tamsulosin 0.4 milliGRAM(s) Oral at bedtime  ursodiol Capsule 300 milliGRAM(s) Oral two times a day    MEDICATIONS  (PRN):  acetaminophen     Tablet .. 650 milliGRAM(s) Oral every 6 hours PRN Temp greater or equal to 38C (100.4F), Mild Pain (1 - 3)  albuterol/ipratropium for Nebulization 3 milliLiter(s) Nebulizer every 6 hours PRN Shortness of Breath and/or Wheezing  aluminum hydroxide/magnesium hydroxide/simethicone Suspension 30 milliLiter(s) Oral every 4 hours PRN Dyspepsia  bisacodyl Suppository 10 milliGRAM(s) Rectal daily PRN Constipation  dextrose Oral Gel 15 Gram(s) Oral once PRN Blood Glucose LESS THAN 70 milliGRAM(s)/deciliter  morphine  - Injectable 2 milliGRAM(s) IV Push every 6 hours PRN Severe Pain (7 - 10)  ondansetron Injectable 4 milliGRAM(s) IV Push every 8 hours PRN Nausea and/or Vomiting  traMADol 50 milliGRAM(s) Oral three times a day PRN Moderate Pain (4 - 6)      REVIEW OF SYSTEMS:  CONSTITUTIONAL: No fever,  RESPIRATORY: No cough, wheezing, shortness of breath  CARDIOVASCULAR: No chest pain, dyspnea, palpitations, dizziness, syncope, paroxysmal nocturnal dyspnea, orthopnea, or arm or leg swelling  GASTROINTESTINAL: No abdominal  or epigastric pain, nausea, vomiting,  diarrhea  NEUROLOGICAL: No headaches,  loss of strength, numbness, or tremors    Vital Signs Last 24 Hrs  T(C): 36.7 (04 Jan 2024 23:55), Max: 36.8 (04 Jan 2024 21:05)  T(F): 98.1 (04 Jan 2024 23:55), Max: 98.2 (04 Jan 2024 21:05)  HR: 79 (04 Jan 2024 23:55) (62 - 92)  BP: 117/71 (04 Jan 2024 23:55) (99/60 - 141/83)  BP(mean): --  RR: 20 (04 Jan 2024 23:55) (18 - 20)  SpO2: 100% (04 Jan 2024 23:55) (97% - 100%)    Parameters below as of 04 Jan 2024 23:55  Patient On (Oxygen Delivery Method): nasal cannula  O2 Flow (L/min): 3      PHYSICAL EXAM:  HEAD:  Atraumatic, Normocephalic  NECK: Supple and normal thyroid.  No JVD or carotid bruit.   HEART: S1, S2 regular , 1/6 soft ejection systolic murmur in mitral area , no thrill and no gallops .  PULMONARY: Bilateral vesicular breathing , few scattered ronchi and few scattered rales are present .  ABDOMEN: Soft nontender and positive bowl sounds   EXTREMITIES:  No clubbing, cyanosis, or pedal  edema  NEUROLOGICAL: AAOX3 , no focal deficit .    LABS:                        8.2    5.68  )-----------( 155      ( 04 Jan 2024 04:47 )             26.1     01-04    136  |  100  |  31<H>  ----------------------------<  98  3.8   |  28  |  5.00<H>    Ca    7.5<L>      04 Jan 2024 04:47            Urinalysis Basic - ( 04 Jan 2024 04:47 )    Color: x / Appearance: x / SG: x / pH: x  Gluc: 98 mg/dL / Ketone: x  / Bili: x / Urobili: x   Blood: x / Protein: x / Nitrite: x   Leuk Esterase: x / RBC: x / WBC x   Sq Epi: x / Non Sq Epi: x / Bacteria: x      BNP      EKG:  ECHO:  IMAGING:    Assessment/Plan    Will continue to follow during hospital course and give further recommendations as needed . Thanks for your referral . if any questions please contact me at office ([phone])cell [phone])  MI SANDOVAL MD [place] [id number]  [address]  OFFICE : [phone]   CELL : [phone]  CARDIOLOGY F/U  :       HPI:  Patient  is 72 yo , LTC resident with past medical history of end-stage renal disease on dialysis, chronic hypotension, afib, T2DM, recent admission for clogged AV fistula is presenting for hypotension and hypoxia.  Patient was discharged back to [facility] 12/30  but sent to ED due to hypotension and hypoxia on arrival to [facility] .His vitals were stable upon discharge and he was monitored 2 hrs after HD session by house staff .  Otherwise denies any new complaints.  Patient was diagnosed with Influenza , also found to have JAZMINE elevation .Seen by Dr Sandoval cardiologist Admitted  to telemetry unit for monitoring , send 3 sets of cardiac enzymes to rule out acute coronary event, obtain ECHO to evaluate LVEF, cardiology consult  ,continue current management, O2 supply, anticoagulation plan as per cardiology consult Palliative care consult requested ,to discuss advance directives and complete MOLST  (31 Dec 2023 10:10)        SUBJECTIVE: Patient feeling better .      ALLERGIES:  Allergies    Levaquin (Anaphylaxis; Flushing; Short breath)    Intolerances          MEDICATIONS  (STANDING):  aMIOdarone    Tablet 200 milliGRAM(s) Oral daily  apixaban 5 milliGRAM(s) Oral two times a day  ascorbic acid 500 milliGRAM(s) Oral daily  aspirin enteric coated 81 milliGRAM(s) Oral daily  brimonidine 0.2% Ophthalmic Solution 1 Drop(s) Both EYES two times a day  budesonide 160 MICROgram(s)/formoterol 4.5 MICROgram(s) Inhaler 2 Puff(s) Inhalation two times a day  calcitriol   Capsule 0.25 MICROGram(s) Oral <User Schedule>  calcium acetate 667 milliGRAM(s) Oral three times a day with meals  dextrose 5%. 1000 milliLiter(s) (50 mL/Hr) IV Continuous <Continuous>  dextrose 5%. 1000 milliLiter(s) (100 mL/Hr) IV Continuous <Continuous>  dextrose 50% Injectable 25 Gram(s) IV Push once  dextrose 50% Injectable 25 Gram(s) IV Push once  dextrose 50% Injectable 12.5 Gram(s) IV Push once  dorzolamide 2%/timolol 0.5% Ophthalmic Solution 1 Drop(s) Both EYES two times a day  epoetin kayla-epbx (RETACRIT) Injectable 47739 Unit(s) IV Push <User Schedule>  finasteride 5 milliGRAM(s) Oral daily  glucagon  Injectable 1 milliGRAM(s) IntraMuscular once  guaiFENesin Oral Liquid (Sugar-Free) 200 milliGRAM(s) Oral three times a day  insulin lispro (ADMELOG) corrective regimen sliding scale   SubCutaneous three times a day before meals  insulin lispro (ADMELOG) corrective regimen sliding scale   SubCutaneous at bedtime  lactobacillus acidophilus 1 Tablet(s) Oral daily  latanoprost 0.005% Ophthalmic Solution 1 Drop(s) Both EYES at bedtime  levothyroxine 50 MICROGram(s) Oral daily  melatonin 5 milliGRAM(s) Oral at bedtime  midodrine. 15 milliGRAM(s) Oral three times a day  pantoprazole    Tablet 40 milliGRAM(s) Oral daily  senna 2 Tablet(s) Oral at bedtime  sertraline 50 milliGRAM(s) Oral daily  sertraline 25 milliGRAM(s) Oral daily  simvastatin 10 milliGRAM(s) Oral at bedtime  tamsulosin 0.4 milliGRAM(s) Oral at bedtime  ursodiol Capsule 300 milliGRAM(s) Oral two times a day    MEDICATIONS  (PRN):  acetaminophen     Tablet .. 650 milliGRAM(s) Oral every 6 hours PRN Temp greater or equal to 38C (100.4F), Mild Pain (1 - 3)  albuterol/ipratropium for Nebulization 3 milliLiter(s) Nebulizer every 6 hours PRN Shortness of Breath and/or Wheezing  aluminum hydroxide/magnesium hydroxide/simethicone Suspension 30 milliLiter(s) Oral every 4 hours PRN Dyspepsia  bisacodyl Suppository 10 milliGRAM(s) Rectal daily PRN Constipation  dextrose Oral Gel 15 Gram(s) Oral once PRN Blood Glucose LESS THAN 70 milliGRAM(s)/deciliter  morphine  - Injectable 2 milliGRAM(s) IV Push every 6 hours PRN Severe Pain (7 - 10)  ondansetron Injectable 4 milliGRAM(s) IV Push every 8 hours PRN Nausea and/or Vomiting  traMADol 50 milliGRAM(s) Oral three times a day PRN Moderate Pain (4 - 6)      REVIEW OF SYSTEMS:  CONSTITUTIONAL: No fever,  RESPIRATORY: No cough, wheezing, shortness of breath  CARDIOVASCULAR: No chest pain, dyspnea, palpitations, dizziness, syncope, paroxysmal nocturnal dyspnea, orthopnea, or arm or leg swelling  GASTROINTESTINAL: No abdominal  or epigastric pain, nausea, vomiting,  diarrhea  NEUROLOGICAL: No headaches,  loss of strength, numbness, or tremors    Vital Signs Last 24 Hrs  T(C): 36.7 (04 Jan 2024 23:55), Max: 36.8 (04 Jan 2024 21:05)  T(F): 98.1 (04 Jan 2024 23:55), Max: 98.2 (04 Jan 2024 21:05)  HR: 79 (04 Jan 2024 23:55) (62 - 92)  BP: 117/71 (04 Jan 2024 23:55) (99/60 - 141/83)  BP(mean): --  RR: 20 (04 Jan 2024 23:55) (18 - 20)  SpO2: 100% (04 Jan 2024 23:55) (97% - 100%)    Parameters below as of 04 Jan 2024 23:55  Patient On (Oxygen Delivery Method): nasal cannula  O2 Flow (L/min): 3      PHYSICAL EXAM:  HEAD:  Atraumatic, Normocephalic  NECK: Supple and normal thyroid.  No JVD or carotid bruit.   HEART: S1, S2 regular , 1/6 soft ejection systolic murmur in mitral area , no thrill and no gallops .  PULMONARY: Bilateral vesicular breathing , few scattered ronchi and few scattered rales are present .  ABDOMEN: Soft nontender and positive bowl sounds   EXTREMITIES:  No clubbing, cyanosis, or pedal  edema  NEUROLOGICAL: AAOX3 , no focal deficit .    LABS:                        8.2    5.68  )-----------( 155      ( 04 Jan 2024 04:47 )             26.1     01-04    136  |  100  |  31<H>  ----------------------------<  98  3.8   |  28  |  5.00<H>    Ca    7.5<L>      04 Jan 2024 04:47            Urinalysis Basic - ( 04 Jan 2024 04:47 )    Color: x / Appearance: x / SG: x / pH: x  Gluc: 98 mg/dL / Ketone: x  / Bili: x / Urobili: x   Blood: x / Protein: x / Nitrite: x   Leuk Esterase: x / RBC: x / WBC x   Sq Epi: x / Non Sq Epi: x / Bacteria: x      BNP      EKG:  ECHO:  IMAGING:    Assessment/Plan    Will continue to follow during hospital course and give further recommendations as needed . Thanks for your referral . if any questions please contact me at office ([phone])cell [phone])  MI SANDOVAL MD [place] [id number]  [address]  OFFICE : [phone]   CELL : [phone]  CARDIOLOGY F/U  :       HPI:  Patient  is 74 yo , LTC resident with past medical history of end-stage renal disease on dialysis, chronic hypotension, afib, T2DM, recent admission for clogged AV fistula is presenting for hypotension and hypoxia.  Patient was discharged back to [facility] 12/30  but sent to ED due to hypotension and hypoxia on arrival to [facility] .His vitals were stable upon discharge and he was monitored 2 hrs after HD session by house staff .  Otherwise denies any new complaints.  Patient was diagnosed with Influenza , also found to have JAZMINE elevation .Seen by Dr aSndoval cardiologist Admitted  to telemetry unit for monitoring , send 3 sets of cardiac enzymes to rule out acute coronary event, obtain ECHO to evaluate LVEF, cardiology consult  ,continue current management, O2 supply, anticoagulation plan as per cardiology consult Palliative care consult requested ,to discuss advance directives and complete MOLST  (31 Dec 2023 10:10)        SUBJECTIVE: Patient feeling better .      ALLERGIES:  Allergies    Levaquin (Anaphylaxis; Flushing; Short breath)    Intolerances          MEDICATIONS  (STANDING):  aMIOdarone    Tablet 200 milliGRAM(s) Oral daily  apixaban 5 milliGRAM(s) Oral two times a day  ascorbic acid 500 milliGRAM(s) Oral daily  aspirin enteric coated 81 milliGRAM(s) Oral daily  brimonidine 0.2% Ophthalmic Solution 1 Drop(s) Both EYES two times a day  budesonide 160 MICROgram(s)/formoterol 4.5 MICROgram(s) Inhaler 2 Puff(s) Inhalation two times a day  calcitriol   Capsule 0.25 MICROGram(s) Oral <User Schedule>  calcium acetate 667 milliGRAM(s) Oral three times a day with meals  dextrose 5%. 1000 milliLiter(s) (50 mL/Hr) IV Continuous <Continuous>  dextrose 5%. 1000 milliLiter(s) (100 mL/Hr) IV Continuous <Continuous>  dextrose 50% Injectable 25 Gram(s) IV Push once  dextrose 50% Injectable 25 Gram(s) IV Push once  dextrose 50% Injectable 12.5 Gram(s) IV Push once  dorzolamide 2%/timolol 0.5% Ophthalmic Solution 1 Drop(s) Both EYES two times a day  epoetin kayla-epbx (RETACRIT) Injectable 25938 Unit(s) IV Push <User Schedule>  finasteride 5 milliGRAM(s) Oral daily  glucagon  Injectable 1 milliGRAM(s) IntraMuscular once  guaiFENesin Oral Liquid (Sugar-Free) 200 milliGRAM(s) Oral three times a day  insulin lispro (ADMELOG) corrective regimen sliding scale   SubCutaneous three times a day before meals  insulin lispro (ADMELOG) corrective regimen sliding scale   SubCutaneous at bedtime  lactobacillus acidophilus 1 Tablet(s) Oral daily  latanoprost 0.005% Ophthalmic Solution 1 Drop(s) Both EYES at bedtime  levothyroxine 50 MICROGram(s) Oral daily  melatonin 5 milliGRAM(s) Oral at bedtime  midodrine. 15 milliGRAM(s) Oral three times a day  pantoprazole    Tablet 40 milliGRAM(s) Oral daily  senna 2 Tablet(s) Oral at bedtime  sertraline 50 milliGRAM(s) Oral daily  sertraline 25 milliGRAM(s) Oral daily  simvastatin 10 milliGRAM(s) Oral at bedtime  tamsulosin 0.4 milliGRAM(s) Oral at bedtime  ursodiol Capsule 300 milliGRAM(s) Oral two times a day    MEDICATIONS  (PRN):  acetaminophen     Tablet .. 650 milliGRAM(s) Oral every 6 hours PRN Temp greater or equal to 38C (100.4F), Mild Pain (1 - 3)  albuterol/ipratropium for Nebulization 3 milliLiter(s) Nebulizer every 6 hours PRN Shortness of Breath and/or Wheezing  aluminum hydroxide/magnesium hydroxide/simethicone Suspension 30 milliLiter(s) Oral every 4 hours PRN Dyspepsia  bisacodyl Suppository 10 milliGRAM(s) Rectal daily PRN Constipation  dextrose Oral Gel 15 Gram(s) Oral once PRN Blood Glucose LESS THAN 70 milliGRAM(s)/deciliter  morphine  - Injectable 2 milliGRAM(s) IV Push every 6 hours PRN Severe Pain (7 - 10)  ondansetron Injectable 4 milliGRAM(s) IV Push every 8 hours PRN Nausea and/or Vomiting  traMADol 50 milliGRAM(s) Oral three times a day PRN Moderate Pain (4 - 6)      REVIEW OF SYSTEMS:  CONSTITUTIONAL: No fever,  RESPIRATORY: No cough, wheezing, shortness of breath  CARDIOVASCULAR: No chest pain, dyspnea, palpitations, dizziness, syncope, paroxysmal nocturnal dyspnea, orthopnea, or arm or leg swelling  GASTROINTESTINAL: No abdominal  or epigastric pain, nausea, vomiting,  diarrhea  NEUROLOGICAL: No headaches,  loss of strength, numbness, or tremors    Vital Signs Last 24 Hrs  T(C): 36.7 (04 Jan 2024 23:55), Max: 36.8 (04 Jan 2024 21:05)  T(F): 98.1 (04 Jan 2024 23:55), Max: 98.2 (04 Jan 2024 21:05)  HR: 79 (04 Jan 2024 23:55) (62 - 92)  BP: 117/71 (04 Jan 2024 23:55) (99/60 - 141/83)  BP(mean): --  RR: 20 (04 Jan 2024 23:55) (18 - 20)  SpO2: 100% (04 Jan 2024 23:55) (97% - 100%)    Parameters below as of 04 Jan 2024 23:55  Patient On (Oxygen Delivery Method): nasal cannula  O2 Flow (L/min): 3      PHYSICAL EXAM:  HEAD:  Atraumatic, Normocephalic  NECK: Supple and normal thyroid.  No JVD or carotid bruit.   HEART: S1, S2 regular , 1/6 soft ejection systolic murmur in mitral area , no thrill and no gallops .  PULMONARY: Bilateral vesicular breathing , few scattered ronchi and few scattered rales are present .  ABDOMEN: Soft nontender and positive bowl sounds   EXTREMITIES:  No clubbing, cyanosis, or pedal  edema  NEUROLOGICAL: AAOX3 , no focal deficit .    LABS:                        8.2    5.68  )-----------( 155      ( 04 Jan 2024 04:47 )             26.1     01-04    136  |  100  |  31<H>  ----------------------------<  98  3.8   |  28  |  5.00<H>    Ca    7.5<L>      04 Jan 2024 04:47            Urinalysis Basic - ( 04 Jan 2024 04:47 )    Color: x / Appearance: x / SG: x / pH: x  Gluc: 98 mg/dL / Ketone: x  / Bili: x / Urobili: x   Blood: x / Protein: x / Nitrite: x   Leuk Esterase: x / RBC: x / WBC x   Sq Epi: x / Non Sq Epi: x / Bacteria: x      BNP      EKG:  ECHO:  IMAGING:    Assessment/Plan    Will continue to follow during hospital course and give further recommendations as needed . Thanks for your referral . if any questions please contact me at office ([phone])cell [phone])  MI SANDOVAL MD [place] [id number]  [address]  OFFICE : [phone]   CELL : [id number]  CARDIOLOGY F/U  :       HPI:  Patient  is 72 yo , LTC resident with past medical history of end-stage renal disease on dialysis, chronic hypotension, afib, T2DM, recent admission for clogged AV fistula is presenting for hypotension and hypoxia.  Patient was discharged back to [facility] 12/30  but sent to ED [facility] to [facility]ension [facility] .His vitals were stable upon discharge and he was monitored 2 hrs after HD session by house staff .  Otherwise denies any new complaints.  Patient was diagnosed with Influenza , also found to have JAZMINE elevation .Seen by Dr Sandoval cardiologist Admitted  to telemetry unit for monitoring , send 3 sets of cardiac enzymes to rule out acute coronary event, obtain ECHO to evaluate LVEF, cardiology consult  ,continue current management, O2 supply, anticoagulation plan as per cardiology consult Palliative care consult requested ,to discuss advance directives and complete MOLST  (31 Dec 2023 10:10)        SUBJECTIVE: Patient feeling better .      ALLERGIES:  Allergies    Levaquin (Anaphylaxis; Flushing; Short breath)    Intolerances          MEDICATIONS  (STANDING):  aMIOdarone    Tablet 200 milliGRAM(s) Oral daily  apixaban 5 milliGRAM(s) Oral two times a day  ascorbic acid 500 milliGRAM(s) Oral daily  aspirin enteric coated 81 milliGRAM(s) Oral daily  brimonidine 0.2% Ophthalmic Solution 1 Drop(s) Both EYES two times a day  budesonide 160 MICROgram(s)/formoterol 4.5 MICROgram(s) Inhaler 2 Puff(s) Inhalation two times a day  calcitriol   Capsule 0.25 MICROGram(s) Oral <User Schedule>  calcium acetate 667 milliGRAM(s) Oral three times a day with meals  dextrose 5%. 1000 milliLiter(s) (50 mL/Hr) IV Continuous <Continuous>  dextrose 5%. 1000 milliLiter(s) (100 mL/Hr) IV Continuous <Continuous>  dextrose 50% Injectable 25 Gram(s) IV Push once  dextrose 50% Injectable 25 Gram(s) IV Push once  dextrose 50% Injectable 12.5 Gram(s) IV Push once  dorzolamide 2%/timolol 0.5% Ophthalmic Solution 1 Drop(s) Both EYES two times a day  epoetin kayla-epbx (RETACRIT) Injectable 89415 Unit(s) IV Push <User Schedule>  finasteride 5 milliGRAM(s) Oral daily  glucagon  Injectable 1 milliGRAM(s) IntraMuscular once  guaiFENesin Oral Liquid (Sugar-Free) 200 milliGRAM(s) Oral three times a day  insulin lispro (ADMELOG) corrective regimen sliding scale   SubCutaneous three times a day before meals  insulin lispro (ADMELOG) corrective regimen sliding scale   SubCutaneous at bedtime  lactobacillus acidophilus 1 Tablet(s) Oral daily  latanoprost 0.005% Ophthalmic Solution 1 Drop(s) Both EYES at bedtime  levothyroxine 50 MICROGram(s) Oral daily  melatonin 5 milliGRAM(s) Oral at bedtime  midodrine. 15 milliGRAM(s) Oral three times a day  pantoprazole    Tablet 40 milliGRAM(s) Oral daily  senna 2 Tablet(s) Oral at bedtime  sertraline 50 milliGRAM(s) Oral daily  sertraline 25 milliGRAM(s) Oral daily  simvastatin 10 milliGRAM(s) Oral at bedtime  tamsulosin 0.4 milliGRAM(s) Oral at bedtime  ursodiol Capsule 300 milliGRAM(s) Oral two times a day    MEDICATIONS  (PRN):  acetaminophen     Tablet .. 650 milliGRAM(s) Oral every 6 hours PRN Temp greater or equal to 38C (100.4F), Mild Pain (1 - 3)  albuterol/ipratropium for Nebulization 3 milliLiter(s) Nebulizer every 6 hours PRN Shortness of Breath and/or Wheezing  aluminum hydroxide/magnesium hydroxide/simethicone Suspension 30 milliLiter(s) Oral every 4 hours PRN Dyspepsia  bisacodyl Suppository 10 milliGRAM(s) Rectal daily PRN Constipation  dextrose Oral Gel 15 Gram(s) Oral once PRN Blood Glucose LESS THAN 70 milliGRAM(s)/deciliter  morphine  - Injectable 2 milliGRAM(s) IV Push every 6 hours PRN Severe Pain (7 - 10)  ondansetron Injectable 4 milliGRAM(s) IV Push every 8 hours PRN Nausea and/or Vomiting  traMADol 50 milliGRAM(s) Oral three times a day PRN Moderate Pain (4 - 6)      REVIEW OF SYSTEMS:  CONSTITUTIONAL: No fever,  RESPIRATORY: Some improvement in  cough, wheezing, shortness of breath  CARDIOVASCULAR: No chest pain,  palpitations, dizziness, syncope, paroxysmal nocturnal dyspnea,  or arm or leg swelling but has SOB with mild exertion .  GASTROINTESTINAL: No abdominal  or epigastric pain, nausea, vomiting,  diarrhea  NEUROLOGICAL: No headaches,  loss of strength, numbness, or tremors  Skin : No itching   Hematology : No active bleeding  Endocrinology : No heat and cold intolerance   Psychiatry : Patient is confused .  Genitourinary : No dysuria   Musculoskeletal : Patinet has mild arthritis       Vital Signs Last 24 Hrs  T(C): 36.7 (04 Jan 2024 23:55), Max: 36.8 (04 Jan 2024 21:05)  T(F): 98.1 (04 Jan 2024 23:55), Max: 98.2 (04 Jan 2024 21:05)  HR: 79 (04 Jan 2024 23:55) (62 - 92)  BP: 117/71 (04 Jan 2024 23:55) (99/60 - 141/83)  BP(mean): --  RR: 20 (04 Jan 2024 23:55) (18 - 20)  SpO2: 100% (04 Jan 2024 23:55) (97% - 100%)    Parameters below as of 04 Jan 2024 23:55  Patient On (Oxygen Delivery Method): nasal cannula  O2 Flow (L/min): 3      PHYSICAL EXAM:  HEAD:  Atraumatic, Normocephalic  NECK: Supple and normal thyroid.  No JVD or carotid bruit.   HEART: S1, S2 irregular , 1/6 soft ejection systolic murmur in mitral area , no thrill and no gallops .  PULMONARY: Bilateral vesicular breathing , few scattered rhonchi and few scattered rales are present .  ABDOMEN: Soft nontender and positive bowl sounds   EXTREMITIES:  No clubbing, cyanosis, or pedal  edema  NEUROLOGICAL: Awake and mild  confused  , no focal deficit .    LABS:                        8.2    5.68  )-----------( 155      ( 04 Jan 2024 04:47 )             26.1     01-04    136  |  100  |  31<H>  ----------------------------<  98  3.8   |  28  |  5.00<H>    Ca    7.5<L>      04 Jan 2024 04:47            Urinalysis Basic - ( 04 Jan 2024 04:47 )    Color: x / Appearance: x / SG: x / pH: x  Gluc: 98 mg/dL / Ketone: x  / Bili: x / Urobili: x   Blood: x / Protein: x / Nitrite: x   Leuk Esterase: x / RBC: x / WBC x   Sq Epi: x / Non Sq Epi: x / Bacteria: x      Assessment/Plan    Will continue to follow during hospital course and give further recommendations as needed . Thanks for your referral . if any questions please contact me at office ([phone])cell [phone])  MI SANDOVAL MD [place] [id number]  [address]  OFFICE : [id number]   CELL : [phone]  CARDIOLOGY F/U  :       HPI:  Patient  is 74 yo , LTC resident with past medical history of end-stage renal disease on dialysis, chronic hypotension, afib, T2DM, recent admission for clogged AV fistula is presenting for hypotension and hypoxia.  Patient was discharged back to [facility] 12/30  but sent to ED due to hypotension and hypoxia on arrival to [facility] .His vitals were stable upon discharge and he was monitored 2 hrs after HD session by house staff .  Otherwise denies any new complaints.  Patient was diagnosed with Influenza , also found to have JAZMINE elevation .Seen by Dr Sandoval cardiologist Admitted  to telemetry unit for monitoring , send 3 sets of cardiac enzymes to rule out acute coronary event, obtain ECHO to evaluate LVEF, cardiology consult  ,continue current management, O2 supply, anticoagulation plan as per cardiology consult Palliative care consult requested ,to discuss advance directives and complete MOLST  (31 Dec 2023 10:10)        SUBJECTIVE: Patient feeling better .      ALLERGIES:  Allergies    Levaquin (Anaphylaxis; Flushing; Short breath)    Intolerances          MEDICATIONS  (STANDING):  aMIOdarone    Tablet 200 milliGRAM(s) Oral daily  apixaban 5 milliGRAM(s) Oral two times a day  ascorbic acid 500 milliGRAM(s) Oral daily  aspirin enteric coated 81 milliGRAM(s) Oral daily  brimonidine 0.2% Ophthalmic Solution 1 Drop(s) Both EYES two times a day  budesonide 160 MICROgram(s)/formoterol 4.5 MICROgram(s) Inhaler 2 Puff(s) Inhalation two times a day  calcitriol   Capsule 0.25 MICROGram(s) Oral <User Schedule>  calcium acetate 667 milliGRAM(s) Oral three times a day with meals  dextrose 5%. 1000 milliLiter(s) (50 mL/Hr) IV Continuous <Continuous>  dextrose 5%. 1000 milliLiter(s) (100 mL/Hr) IV Continuous <Continuous>  dextrose 50% Injectable 25 Gram(s) IV Push once  dextrose 50% Injectable 25 Gram(s) IV Push once  dextrose 50% Injectable 12.5 Gram(s) IV Push once  dorzolamide 2%/timolol 0.5% Ophthalmic Solution 1 Drop(s) Both EYES two times a day  epoetin kayla-epbx (RETACRIT) Injectable 88255 Unit(s) IV Push <User Schedule>  finasteride 5 milliGRAM(s) Oral daily  glucagon  Injectable 1 milliGRAM(s) IntraMuscular once  guaiFENesin Oral Liquid (Sugar-Free) 200 milliGRAM(s) Oral three times a day  insulin lispro (ADMELOG) corrective regimen sliding scale   SubCutaneous three times a day before meals  insulin lispro (ADMELOG) corrective regimen sliding scale   SubCutaneous at bedtime  lactobacillus acidophilus 1 Tablet(s) Oral daily  latanoprost 0.005% Ophthalmic Solution 1 Drop(s) Both EYES at bedtime  levothyroxine 50 MICROGram(s) Oral daily  melatonin 5 milliGRAM(s) Oral at bedtime  midodrine. 15 milliGRAM(s) Oral three times a day  pantoprazole    Tablet 40 milliGRAM(s) Oral daily  senna 2 Tablet(s) Oral at bedtime  sertraline 50 milliGRAM(s) Oral daily  sertraline 25 milliGRAM(s) Oral daily  simvastatin 10 milliGRAM(s) Oral at bedtime  tamsulosin 0.4 milliGRAM(s) Oral at bedtime  ursodiol Capsule 300 milliGRAM(s) Oral two times a day    MEDICATIONS  (PRN):  acetaminophen     Tablet .. 650 milliGRAM(s) Oral every 6 hours PRN Temp greater or equal to 38C (100.4F), Mild Pain (1 - 3)  albuterol/ipratropium for Nebulization 3 milliLiter(s) Nebulizer every 6 hours PRN Shortness of Breath and/or Wheezing  aluminum hydroxide/magnesium hydroxide/simethicone Suspension 30 milliLiter(s) Oral every 4 hours PRN Dyspepsia  bisacodyl Suppository 10 milliGRAM(s) Rectal daily PRN Constipation  dextrose Oral Gel 15 Gram(s) Oral once PRN Blood Glucose LESS THAN 70 milliGRAM(s)/deciliter  morphine  - Injectable 2 milliGRAM(s) IV Push every 6 hours PRN Severe Pain (7 - 10)  ondansetron Injectable 4 milliGRAM(s) IV Push every 8 hours PRN Nausea and/or Vomiting  traMADol 50 milliGRAM(s) Oral three times a day PRN Moderate Pain (4 - 6)      REVIEW OF SYSTEMS:  CONSTITUTIONAL: No fever,  RESPIRATORY: Some improvement in  cough, wheezing, shortness of breath  CARDIOVASCULAR: No chest pain,  palpitations, dizziness, syncope, paroxysmal nocturnal dyspnea,  or arm or leg swelling but has SOB with mild exertion .  GASTROINTESTINAL: No abdominal  or epigastric pain, nausea, vomiting,  diarrhea  NEUROLOGICAL: No headaches,  loss of strength, numbness, or tremors  Skin : No itching   Hematology : No active bleeding  Endocrinology : No heat and cold intolerance   Psychiatry : Patient is confused .  Genitourinary : No dysuria   Musculoskeletal : Patinet has mild arthritis       Vital Signs Last 24 Hrs  T(C): 36.7 (04 Jan 2024 23:55), Max: 36.8 (04 Jan 2024 21:05)  T(F): 98.1 (04 Jan 2024 23:55), Max: 98.2 (04 Jan 2024 21:05)  HR: 79 (04 Jan 2024 23:55) (62 - 92)  BP: 117/71 (04 Jan 2024 23:55) (99/60 - 141/83)  BP(mean): --  RR: 20 (04 Jan 2024 23:55) (18 - 20)  SpO2: 100% (04 Jan 2024 23:55) (97% - 100%)    Parameters below as of 04 Jan 2024 23:55  Patient On (Oxygen Delivery Method): nasal cannula  O2 Flow (L/min): 3      PHYSICAL EXAM:  HEAD:  Atraumatic, Normocephalic  NECK: Supple and normal thyroid.  No JVD or carotid bruit.   HEART: S1, S2 irregular , 1/6 soft ejection systolic murmur in mitral area , no thrill and no gallops .  PULMONARY: Bilateral vesicular breathing , few scattered rhonchi and few scattered rales are present .  ABDOMEN: Soft nontender and positive bowl sounds   EXTREMITIES:  No clubbing, cyanosis, or pedal  edema  NEUROLOGICAL: Awake and mild  confused  , no focal deficit .    LABS:                        8.2    5.68  )-----------( 155      ( 04 Jan 2024 04:47 )             26.1     01-04    136  |  100  |  31<H>  ----------------------------<  98  3.8   |  28  |  5.00<H>    Ca    7.5<L>      04 Jan 2024 04:47            Urinalysis Basic - ( 04 Jan 2024 04:47 )    Color: x / Appearance: x / SG: x / pH: x  Gluc: 98 mg/dL / Ketone: x  / Bili: x / Urobili: x   Blood: x / Protein: x / Nitrite: x   Leuk Esterase: x / RBC: x / WBC x   Sq Epi: x / Non Sq Epi: x / Bacteria: x      Assessment/Plan    Will continue to follow during hospital course and give further recommendations as needed . Thanks for your referral . if any questions please contact me at office ([phone])cell [phone])  MI SANDOVAL MD [place] [id number]  [address]  OFFICE : [id number]   CELL : [phone]  CARDIOLOGY F/U  :       HPI:  Patient  is 72 yo , LTC resident with past medical history of end-stage renal disease on dialysis, chronic hypotension, afib, T2DM, recent admission for clogged AV fistula is presenting for hypotension and hypoxia.  [facility]  but sent to ED due to hypotension and hypoxia [facility] .His vitals were stable upon discharge and he was monitored 2 hrs after HD session by house staff .  Otherwise denies any new complaints.  Patient was diagnosed with Influenza , also found to have JAZMINE elevation .Seen by Dr Sandoval cardiologist Admitted  to telemetry unit for monitoring , send 3 sets of cardiac enzymes to rule out acute coronary event, obtain ECHO to evaluate LVEF, cardiology consult  ,continue current management, O2 supply, anticoagulation plan as per cardiology consult Palliative care consult requested ,to discuss advance directives and complete MOLST  (31 Dec 2023 10:10)        SUBJECTIVE: Patient feeling better .      ALLERGIES:  Allergies    Levaquin (Anaphylaxis; Flushing; Short breath)    Intolerances          MEDICATIONS  (STANDING):  aMIOdarone    Tablet 200 milliGRAM(s) Oral daily  apixaban 5 milliGRAM(s) Oral two times a day  ascorbic acid 500 milliGRAM(s) Oral daily  aspirin enteric coated 81 milliGRAM(s) Oral daily  brimonidine 0.2% Ophthalmic Solution 1 Drop(s) Both EYES two times a day  budesonide 160 MICROgram(s)/formoterol 4.5 MICROgram(s) Inhaler 2 Puff(s) Inhalation two times a day  calcitriol   Capsule 0.25 MICROGram(s) Oral <User Schedule>  calcium acetate 667 milliGRAM(s) Oral three times a day with meals  dextrose 5%. 1000 milliLiter(s) (50 mL/Hr) IV Continuous <Continuous>  dextrose 5%. 1000 milliLiter(s) (100 mL/Hr) IV Continuous <Continuous>  dextrose 50% Injectable 25 Gram(s) IV Push once  dextrose 50% Injectable 25 Gram(s) IV Push once  dextrose 50% Injectable 12.5 Gram(s) IV Push once  dorzolamide 2%/timolol 0.5% Ophthalmic Solution 1 Drop(s) Both EYES two times a day  epoetin kayla-epbx (RETACRIT) Injectable 10761 Unit(s) IV Push <User Schedule>  finasteride 5 milliGRAM(s) Oral daily  glucagon  Injectable 1 milliGRAM(s) IntraMuscular once  guaiFENesin Oral Liquid (Sugar-Free) 200 milliGRAM(s) Oral three times a day  insulin lispro (ADMELOG) corrective regimen sliding scale   SubCutaneous three times a day before meals  insulin lispro (ADMELOG) corrective regimen sliding scale   SubCutaneous at bedtime  lactobacillus acidophilus 1 Tablet(s) Oral daily  latanoprost 0.005% Ophthalmic Solution 1 Drop(s) Both EYES at bedtime  levothyroxine 50 MICROGram(s) Oral daily  melatonin 5 milliGRAM(s) Oral at bedtime  midodrine. 15 milliGRAM(s) Oral three times a day  pantoprazole    Tablet 40 milliGRAM(s) Oral daily  senna 2 Tablet(s) Oral at bedtime  sertraline 50 milliGRAM(s) Oral daily  sertraline 25 milliGRAM(s) Oral daily  simvastatin 10 milliGRAM(s) Oral at bedtime  tamsulosin 0.4 milliGRAM(s) Oral at bedtime  ursodiol Capsule 300 milliGRAM(s) Oral two times a day    MEDICATIONS  (PRN):  acetaminophen     Tablet .. 650 milliGRAM(s) Oral every 6 hours PRN Temp greater or equal to 38C (100.4F), Mild Pain (1 - 3)  albuterol/ipratropium for Nebulization 3 milliLiter(s) Nebulizer every 6 hours PRN Shortness of Breath and/or Wheezing  aluminum hydroxide/magnesium hydroxide/simethicone Suspension 30 milliLiter(s) Oral every 4 hours PRN Dyspepsia  bisacodyl Suppository 10 milliGRAM(s) Rectal daily PRN Constipation  dextrose Oral Gel 15 Gram(s) Oral once PRN Blood Glucose LESS THAN 70 milliGRAM(s)/deciliter  morphine  - Injectable 2 milliGRAM(s) IV Push every 6 hours PRN Severe Pain (7 - 10)  ondansetron Injectable 4 milliGRAM(s) IV Push every 8 hours PRN Nausea and/or Vomiting  traMADol 50 milliGRAM(s) Oral three times a day PRN Moderate Pain (4 - 6)      REVIEW OF SYSTEMS:  CONSTITUTIONAL: No fever,  RESPIRATORY: Some improvement in  cough, wheezing, shortness of breath  CARDIOVASCULAR: No chest pain,  palpitations, dizziness, syncope, paroxysmal nocturnal dyspnea,  or arm or leg swelling but has SOB with mild exertion .  GASTROINTESTINAL: No abdominal  or epigastric pain, nausea, vomiting,  diarrhea  NEUROLOGICAL: No headaches,  loss of strength, numbness, or tremors  Skin : No itching   Hematology : No active bleeding  Endocrinology : No heat and cold intolerance   Psychiatry : Patient is confused .  Genitourinary : No dysuria   Musculoskeletal : Patinet has mild arthritis       Vital Signs Last 24 Hrs  T(C): 36.7 (04 Jan 2024 23:55), Max: 36.8 (04 Jan 2024 21:05)  T(F): 98.1 (04 Jan 2024 23:55), Max: 98.2 (04 Jan 2024 21:05)  HR: 79 (04 Jan 2024 23:55) (62 - 92)  BP: 117/71 (04 Jan 2024 23:55) (99/60 - 141/83)  BP(mean): --  RR: 20 (04 Jan 2024 23:55) (18 - 20)  SpO2: 100% (04 Jan 2024 23:55) (97% - 100%)    Parameters below as of 04 Jan 2024 23:55  Patient On (Oxygen Delivery Method): nasal cannula  O2 Flow (L/min): 3      PHYSICAL EXAM:  HEAD:  Atraumatic, Normocephalic  NECK: Supple and normal thyroid.  No JVD or carotid bruit.   HEART: S1, S2 irregular , 1/6 soft ejection systolic murmur in mitral area , no thrill and no gallops .  PULMONARY: Bilateral vesicular breathing , few scattered rhonchi and few scattered rales are present .  ABDOMEN: Soft nontender and positive bowl sounds   EXTREMITIES:  No clubbing, cyanosis, or pedal  edema  NEUROLOGICAL: Awake and mild  confused  , no focal deficit .    LABS:                        8.2    5.68  )-----------( 155      ( 04 Jan 2024 04:47 )             26.1     01-04    136  |  100  |  31<H>  ----------------------------<  98  3.8   |  28  |  5.00<H>    Ca    7.5<L>      04 Jan 2024 04:47            Urinalysis Basic - ( 04 Jan 2024 04:47 )    Color: x / Appearance: x / SG: x / pH: x  Gluc: 98 mg/dL / Ketone: x  / Bili: x / Urobili: x   Blood: x / Protein: x / Nitrite: x   Leuk Esterase: x / RBC: x / WBC x   Sq Epi: x / Non Sq Epi: x / Bacteria: x      Assessment/Plan  Patient has :  1) Acute viral pneumonia and better . Positive for Influenza .  2) Elevated Troponin I and which can be demand ischemia but considering significant elevation and trending , possibility of NON-ST JUNE can not be ruled out so will treat as  NON-ST JUNE .   3) ESRD and on hemodialysis .  4) Atrial fibrillation and stable . Intermittently slow ventricular rate after adding even low dose of metoprolol for NON-ST JUNE . LV EF normal 60%   5) Hypertension and stable . However patient BP lower limit of normal due to autonomic insufficiency .  6) Dementia   Plan : 1) Due to multiple comorbidities , patient high risk for cardiac cath 2) Continue medical management 3) Discussed with patient brother and considering significant high risk , he also wants only medical management and no invasive cardiac tests like cardiac cath or coronary artery stenting  . 4) Discontinued  metoprolol due to Low BP and bradycardia episodes   Will continue to follow during hospital course and give further recommendations as needed . Thanks for your referral . if any questions please contact me at office ([phone] [place] [phone])      MI SANDOVAL MD [place] [id number]  [address]  OFFICE : [phone]   CELL : [phone]  CARDIOLOGY F/U  :       HPI:  Patient  is 72 yo , LTC resident with past medical history of end-stage renal disease on dialysis, chronic hypotension, afib, T2DM, recent admission for clogged AV fistula is presenting for hypotension and hypoxia.  Patient was discharged back to [facility] 12/30  but sent to ED due to hypotension and hypoxia on arrival to [facility] .His vitals were stable upon discharge and he was monitored 2 hrs after HD session by house staff .  Otherwise denies any new complaints.  Patient was diagnosed with Influenza , also found to have JAZMINE elevation .Seen by Dr Sandoval cardiologist Admitted  to telemetry unit for monitoring , send 3 sets of cardiac enzymes to rule out acute coronary event, obtain ECHO to evaluate LVEF, cardiology consult  ,continue current management, O2 supply, anticoagulation plan as per cardiology consult Palliative care consult requested ,to discuss advance directives and complete MOLST  (31 Dec 2023 10:10)        SUBJECTIVE: Patient feeling better .      ALLERGIES:  Allergies    Levaquin (Anaphylaxis; Flushing; Short breath)    Intolerances          MEDICATIONS  (STANDING):  aMIOdarone    Tablet 200 milliGRAM(s) Oral daily  apixaban 5 milliGRAM(s) Oral two times a day  ascorbic acid 500 milliGRAM(s) Oral daily  aspirin enteric coated 81 milliGRAM(s) Oral daily  brimonidine 0.2% Ophthalmic Solution 1 Drop(s) Both EYES two times a day  budesonide 160 MICROgram(s)/formoterol 4.5 MICROgram(s) Inhaler 2 Puff(s) Inhalation two times a day  calcitriol   Capsule 0.25 MICROGram(s) Oral <User Schedule>  calcium acetate 667 milliGRAM(s) Oral three times a day with meals  dextrose 5%. 1000 milliLiter(s) (50 mL/Hr) IV Continuous <Continuous>  dextrose 5%. 1000 milliLiter(s) (100 mL/Hr) IV Continuous <Continuous>  dextrose 50% Injectable 25 Gram(s) IV Push once  dextrose 50% Injectable 25 Gram(s) IV Push once  dextrose 50% Injectable 12.5 Gram(s) IV Push once  dorzolamide 2%/timolol 0.5% Ophthalmic Solution 1 Drop(s) Both EYES two times a day  epoetin kayla-epbx (RETACRIT) Injectable 37735 Unit(s) IV Push <User Schedule>  finasteride 5 milliGRAM(s) Oral daily  glucagon  Injectable 1 milliGRAM(s) IntraMuscular once  guaiFENesin Oral Liquid (Sugar-Free) 200 milliGRAM(s) Oral three times a day  insulin lispro (ADMELOG) corrective regimen sliding scale   SubCutaneous three times a day before meals  insulin lispro (ADMELOG) corrective regimen sliding scale   SubCutaneous at bedtime  lactobacillus acidophilus 1 Tablet(s) Oral daily  latanoprost 0.005% Ophthalmic Solution 1 Drop(s) Both EYES at bedtime  levothyroxine 50 MICROGram(s) Oral daily  melatonin 5 milliGRAM(s) Oral at bedtime  midodrine. 15 milliGRAM(s) Oral three times a day  pantoprazole    Tablet 40 milliGRAM(s) Oral daily  senna 2 Tablet(s) Oral at bedtime  sertraline 50 milliGRAM(s) Oral daily  sertraline 25 milliGRAM(s) Oral daily  simvastatin 10 milliGRAM(s) Oral at bedtime  tamsulosin 0.4 milliGRAM(s) Oral at bedtime  ursodiol Capsule 300 milliGRAM(s) Oral two times a day    MEDICATIONS  (PRN):  acetaminophen     Tablet .. 650 milliGRAM(s) Oral every 6 hours PRN Temp greater or equal to 38C (100.4F), Mild Pain (1 - 3)  albuterol/ipratropium for Nebulization 3 milliLiter(s) Nebulizer every 6 hours PRN Shortness of Breath and/or Wheezing  aluminum hydroxide/magnesium hydroxide/simethicone Suspension 30 milliLiter(s) Oral every 4 hours PRN Dyspepsia  bisacodyl Suppository 10 milliGRAM(s) Rectal daily PRN Constipation  dextrose Oral Gel 15 Gram(s) Oral once PRN Blood Glucose LESS THAN 70 milliGRAM(s)/deciliter  morphine  - Injectable 2 milliGRAM(s) IV Push every 6 hours PRN Severe Pain (7 - 10)  ondansetron Injectable 4 milliGRAM(s) IV Push every 8 hours PRN Nausea and/or Vomiting  traMADol 50 milliGRAM(s) Oral three times a day PRN Moderate Pain (4 - 6)      REVIEW OF SYSTEMS:  CONSTITUTIONAL: No fever,  RESPIRATORY: Some improvement in  cough, wheezing, shortness of breath  CARDIOVASCULAR: No chest pain,  palpitations, dizziness, syncope, paroxysmal nocturnal dyspnea,  or arm or leg swelling but has SOB with mild exertion .  GASTROINTESTINAL: No abdominal  or epigastric pain, nausea, vomiting,  diarrhea  NEUROLOGICAL: No headaches,  loss of strength, numbness, or tremors  Skin : No itching   Hematology : No active bleeding  Endocrinology : No heat and cold intolerance   Psychiatry : Patient is confused .  Genitourinary : No dysuria   Musculoskeletal : Patinet has mild arthritis       Vital Signs Last 24 Hrs  T(C): 36.7 (04 Jan 2024 23:55), Max: 36.8 (04 Jan 2024 21:05)  T(F): 98.1 (04 Jan 2024 23:55), Max: 98.2 (04 Jan 2024 21:05)  HR: 79 (04 Jan 2024 23:55) (62 - 92)  BP: 117/71 (04 Jan 2024 23:55) (99/60 - 141/83)  BP(mean): --  RR: 20 (04 Jan 2024 23:55) (18 - 20)  SpO2: 100% (04 Jan 2024 23:55) (97% - 100%)    Parameters below as of 04 Jan 2024 23:55  Patient On (Oxygen Delivery Method): nasal cannula  O2 Flow (L/min): 3      PHYSICAL EXAM:  HEAD:  Atraumatic, Normocephalic  NECK: Supple and normal thyroid.  No JVD or carotid bruit.   HEART: S1, S2 irregular , 1/6 soft ejection systolic murmur in mitral area , no thrill and no gallops .  PULMONARY: Bilateral vesicular breathing , few scattered rhonchi and few scattered rales are present .  ABDOMEN: Soft nontender and positive bowl sounds   EXTREMITIES:  No clubbing, cyanosis, or pedal  edema  NEUROLOGICAL: Awake and mild  confused  , no focal deficit .    LABS:                        8.2    5.68  )-----------( 155      ( 04 Jan 2024 04:47 )             26.1     01-04    136  |  100  |  31<H>  ----------------------------<  98  3.8   |  28  |  5.00<H>    Ca    7.5<L>      04 Jan 2024 04:47            Urinalysis Basic - ( 04 Jan 2024 04:47 )    Color: x / Appearance: x / SG: x / pH: x  Gluc: 98 mg/dL / Ketone: x  / Bili: x / Urobili: x   Blood: x / Protein: x / Nitrite: x   Leuk Esterase: x / RBC: x / WBC x   Sq Epi: x / Non Sq Epi: x / Bacteria: x      Assessment/Plan  Patient has :  1) Acute viral pneumonia and better . Positive for Influenza .  2) Elevated Troponin I and which can be demand ischemia but considering significant elevation and trending , possibility of NON-ST JUNE can not be ruled out so will treat as  NON-ST JUNE .   3) ESRD and on hemodialysis .  4) Atrial fibrillation and stable . Intermittently slow ventricular rate after adding even low dose of metoprolol for NON-ST JUNE . LV EF normal 60%   5) Hypertension and stable . However patient BP lower limit of normal due to autonomic insufficiency .  6) Dementia   Plan : 1) Due to multiple comorbidities , patient high risk for cardiac cath 2) Continue medical management 3) Discussed with patient brother and considering significant high risk , he also wants only medical management and no invasive cardiac tests like cardiac cath or coronary artery stenting  . 4) Discontinued  metoprolol due to Low BP and bradycardia episodes   Will continue to follow during hospital course and give further recommendations as needed . Thanks for your referral . if any questions please contact me at office ([phone])

## 2024-01-05 ENCOUNTER — TRANSCRIPTION ENCOUNTER (OUTPATIENT)
Age: 74
End: 2024-01-05

## 2024-01-05 VITALS
RESPIRATION RATE: 20 BRPM | DIASTOLIC BLOOD PRESSURE: 62 MMHG | HEART RATE: 83 BPM | OXYGEN SATURATION: 95 % | SYSTOLIC BLOOD PRESSURE: 106 MMHG | TEMPERATURE: 98 F

## 2024-01-05 LAB
ANION GAP SERPL CALC-SCNC: 6 MMOL/L — SIGNIFICANT CHANGE UP (ref 5–17)
ANION GAP SERPL CALC-SCNC: 6 MMOL/L — SIGNIFICANT CHANGE UP (ref 5–17)
BUN SERPL-MCNC: 19 MG/DL — SIGNIFICANT CHANGE UP (ref 7–23)
BUN SERPL-MCNC: 19 MG/DL — SIGNIFICANT CHANGE UP (ref 7–23)
CALCIUM SERPL-MCNC: 7.7 MG/DL — LOW (ref 8.5–10.1)
CALCIUM SERPL-MCNC: 7.7 MG/DL — LOW (ref 8.5–10.1)
CHLORIDE SERPL-SCNC: 101 MMOL/L — SIGNIFICANT CHANGE UP (ref 96–108)
CHLORIDE SERPL-SCNC: 101 MMOL/L — SIGNIFICANT CHANGE UP (ref 96–108)
CO2 SERPL-SCNC: 30 MMOL/L — SIGNIFICANT CHANGE UP (ref 22–31)
CO2 SERPL-SCNC: 30 MMOL/L — SIGNIFICANT CHANGE UP (ref 22–31)
CREAT SERPL-MCNC: 4 MG/DL — HIGH (ref 0.5–1.3)
CREAT SERPL-MCNC: 4 MG/DL — HIGH (ref 0.5–1.3)
CULTURE RESULTS: SIGNIFICANT CHANGE UP
EGFR: 15 ML/MIN/1.73M2 — LOW
EGFR: 15 ML/MIN/1.73M2 — LOW
GLUCOSE SERPL-MCNC: 82 MG/DL — SIGNIFICANT CHANGE UP (ref 70–99)
GLUCOSE SERPL-MCNC: 82 MG/DL — SIGNIFICANT CHANGE UP (ref 70–99)
HCT VFR BLD CALC: 26.1 % — LOW (ref 39–50)
HCT VFR BLD CALC: 26.1 % — LOW (ref 39–50)
HGB BLD-MCNC: 8.1 G/DL — LOW (ref 13–17)
HGB BLD-MCNC: 8.1 G/DL — LOW (ref 13–17)
MCHC RBC-ENTMCNC: 31 GM/DL — LOW (ref 32–36)
MCHC RBC-ENTMCNC: 31 GM/DL — LOW (ref 32–36)
MCHC RBC-ENTMCNC: 33.9 PG — SIGNIFICANT CHANGE UP (ref 27–34)
MCHC RBC-ENTMCNC: 33.9 PG — SIGNIFICANT CHANGE UP (ref 27–34)
MCV RBC AUTO: 109.2 FL — HIGH (ref 80–100)
MCV RBC AUTO: 109.2 FL — HIGH (ref 80–100)
NRBC # BLD: 0 /100 WBCS — SIGNIFICANT CHANGE UP (ref 0–0)
NRBC # BLD: 0 /100 WBCS — SIGNIFICANT CHANGE UP (ref 0–0)
PLATELET # BLD AUTO: 159 K/UL — SIGNIFICANT CHANGE UP (ref 150–400)
PLATELET # BLD AUTO: 159 K/UL — SIGNIFICANT CHANGE UP (ref 150–400)
POTASSIUM SERPL-MCNC: 3.6 MMOL/L — SIGNIFICANT CHANGE UP (ref 3.5–5.3)
POTASSIUM SERPL-MCNC: 3.6 MMOL/L — SIGNIFICANT CHANGE UP (ref 3.5–5.3)
POTASSIUM SERPL-SCNC: 3.6 MMOL/L — SIGNIFICANT CHANGE UP (ref 3.5–5.3)
POTASSIUM SERPL-SCNC: 3.6 MMOL/L — SIGNIFICANT CHANGE UP (ref 3.5–5.3)
RBC # BLD: 2.39 M/UL — LOW (ref 4.2–5.8)
RBC # BLD: 2.39 M/UL — LOW (ref 4.2–5.8)
RBC # FLD: 15 % — HIGH (ref 10.3–14.5)
RBC # FLD: 15 % — HIGH (ref 10.3–14.5)
SODIUM SERPL-SCNC: 137 MMOL/L — SIGNIFICANT CHANGE UP (ref 135–145)
SODIUM SERPL-SCNC: 137 MMOL/L — SIGNIFICANT CHANGE UP (ref 135–145)
SPECIMEN SOURCE: SIGNIFICANT CHANGE UP
WBC # BLD: 6.45 K/UL — SIGNIFICANT CHANGE UP (ref 3.8–10.5)
WBC # BLD: 6.45 K/UL — SIGNIFICANT CHANGE UP (ref 3.8–10.5)
WBC # FLD AUTO: 6.45 K/UL — SIGNIFICANT CHANGE UP (ref 3.8–10.5)
WBC # FLD AUTO: 6.45 K/UL — SIGNIFICANT CHANGE UP (ref 3.8–10.5)

## 2024-01-05 PROCEDURE — 93005 ELECTROCARDIOGRAM TRACING: CPT

## 2024-01-05 PROCEDURE — 80074 ACUTE HEPATITIS PANEL: CPT

## 2024-01-05 PROCEDURE — 80053 COMPREHEN METABOLIC PANEL: CPT

## 2024-01-05 PROCEDURE — 76857 US EXAM PELVIC LIMITED: CPT

## 2024-01-05 PROCEDURE — 87637 SARSCOV2&INF A&B&RSV AMP PRB: CPT

## 2024-01-05 PROCEDURE — 82553 CREATINE MB FRACTION: CPT

## 2024-01-05 PROCEDURE — 85610 PROTHROMBIN TIME: CPT

## 2024-01-05 PROCEDURE — 99261: CPT

## 2024-01-05 PROCEDURE — 82248 BILIRUBIN DIRECT: CPT

## 2024-01-05 PROCEDURE — 80061 LIPID PANEL: CPT

## 2024-01-05 PROCEDURE — 83605 ASSAY OF LACTIC ACID: CPT

## 2024-01-05 PROCEDURE — 71045 X-RAY EXAM CHEST 1 VIEW: CPT

## 2024-01-05 PROCEDURE — 83735 ASSAY OF MAGNESIUM: CPT

## 2024-01-05 PROCEDURE — 85027 COMPLETE CBC AUTOMATED: CPT

## 2024-01-05 PROCEDURE — 82962 GLUCOSE BLOOD TEST: CPT

## 2024-01-05 PROCEDURE — 36415 COLL VENOUS BLD VENIPUNCTURE: CPT

## 2024-01-05 PROCEDURE — 85730 THROMBOPLASTIN TIME PARTIAL: CPT

## 2024-01-05 PROCEDURE — 93306 TTE W/DOPPLER COMPLETE: CPT

## 2024-01-05 PROCEDURE — 99285 EMERGENCY DEPT VISIT HI MDM: CPT

## 2024-01-05 PROCEDURE — 94760 N-INVAS EAR/PLS OXIMETRY 1: CPT

## 2024-01-05 PROCEDURE — 84484 ASSAY OF TROPONIN QUANT: CPT

## 2024-01-05 PROCEDURE — 94640 AIRWAY INHALATION TREATMENT: CPT

## 2024-01-05 PROCEDURE — 87040 BLOOD CULTURE FOR BACTERIA: CPT

## 2024-01-05 PROCEDURE — 76700 US EXAM ABDOM COMPLETE: CPT

## 2024-01-05 PROCEDURE — 85025 COMPLETE CBC W/AUTO DIFF WBC: CPT

## 2024-01-05 PROCEDURE — 80048 BASIC METABOLIC PNL TOTAL CA: CPT

## 2024-01-05 PROCEDURE — 82550 ASSAY OF CK (CPK): CPT

## 2024-01-05 RX ORDER — AMIODARONE HYDROCHLORIDE 400 MG/1
1 TABLET ORAL
Qty: 0 | Refills: 0 | DISCHARGE
Start: 2024-01-05

## 2024-01-05 RX ORDER — URSODIOL 250 MG/1
1 TABLET, FILM COATED ORAL
Qty: 0 | Refills: 0 | DISCHARGE
Start: 2024-01-05

## 2024-01-05 RX ORDER — ACETAMINOPHEN 500 MG
2 TABLET ORAL
Qty: 0 | Refills: 0 | DISCHARGE

## 2024-01-05 RX ORDER — MIDODRINE HYDROCHLORIDE 2.5 MG/1
1 TABLET ORAL
Refills: 0 | DISCHARGE

## 2024-01-05 RX ORDER — ACETAMINOPHEN 500 MG
2 TABLET ORAL
Qty: 0 | Refills: 0 | DISCHARGE
Start: 2024-01-05

## 2024-01-05 RX ORDER — PANTOPRAZOLE SODIUM 20 MG/1
1 TABLET, DELAYED RELEASE ORAL
Qty: 0 | Refills: 0 | DISCHARGE
Start: 2024-01-05

## 2024-01-05 RX ORDER — FINASTERIDE 5 MG/1
1 TABLET, FILM COATED ORAL
Qty: 0 | Refills: 0 | DISCHARGE
Start: 2024-01-05

## 2024-01-05 RX ORDER — SENNA PLUS 8.6 MG/1
2 TABLET ORAL
Qty: 0 | Refills: 0 | DISCHARGE
Start: 2024-01-05

## 2024-01-05 RX ORDER — APIXABAN 2.5 MG/1
1 TABLET, FILM COATED ORAL
Qty: 0 | Refills: 0 | DISCHARGE
Start: 2024-01-05

## 2024-01-05 RX ORDER — LEVOTHYROXINE SODIUM 125 MCG
1 TABLET ORAL
Refills: 0 | DISCHARGE

## 2024-01-05 RX ORDER — ACETAMINOPHEN 500 MG
1000 TABLET ORAL ONCE
Refills: 0 | Status: COMPLETED | OUTPATIENT
Start: 2024-01-05 | End: 2024-01-05

## 2024-01-05 RX ORDER — LEVOTHYROXINE SODIUM 125 MCG
1 TABLET ORAL
Qty: 0 | Refills: 0 | DISCHARGE
Start: 2024-01-05

## 2024-01-05 RX ORDER — SIMVASTATIN 20 MG/1
1 TABLET, FILM COATED ORAL
Qty: 0 | Refills: 0 | DISCHARGE
Start: 2024-01-05

## 2024-01-05 RX ORDER — ASPIRIN/CALCIUM CARB/MAGNESIUM 324 MG
1 TABLET ORAL
Qty: 0 | Refills: 0 | DISCHARGE
Start: 2024-01-05

## 2024-01-05 RX ORDER — TAMSULOSIN HYDROCHLORIDE 0.4 MG/1
1 CAPSULE ORAL
Qty: 0 | Refills: 0 | DISCHARGE
Start: 2024-01-05

## 2024-01-05 RX ORDER — TRAMADOL HYDROCHLORIDE 50 MG/1
1 TABLET ORAL
Qty: 0 | Refills: 0 | DISCHARGE
Start: 2024-01-05

## 2024-01-05 RX ORDER — SIMVASTATIN 20 MG/1
1 TABLET, FILM COATED ORAL
Refills: 0 | DISCHARGE

## 2024-01-05 RX ORDER — FINASTERIDE 5 MG/1
1 TABLET, FILM COATED ORAL
Refills: 0 | DISCHARGE

## 2024-01-05 RX ORDER — TAMSULOSIN HYDROCHLORIDE 0.4 MG/1
1 CAPSULE ORAL
Refills: 0 | DISCHARGE

## 2024-01-05 RX ORDER — MIDODRINE HYDROCHLORIDE 2.5 MG/1
3 TABLET ORAL
Qty: 0 | Refills: 0 | DISCHARGE
Start: 2024-01-05

## 2024-01-05 RX ADMIN — Medication 50 MICROGRAM(S): at 05:17

## 2024-01-05 RX ADMIN — TRAMADOL HYDROCHLORIDE 50 MILLIGRAM(S): 50 TABLET ORAL at 12:52

## 2024-01-05 RX ADMIN — MORPHINE SULFATE 2 MILLIGRAM(S): 50 CAPSULE, EXTENDED RELEASE ORAL at 06:28

## 2024-01-05 RX ADMIN — Medication 667 MILLIGRAM(S): at 08:19

## 2024-01-05 RX ADMIN — MIDODRINE HYDROCHLORIDE 15 MILLIGRAM(S): 2.5 TABLET ORAL at 05:16

## 2024-01-05 RX ADMIN — BUDESONIDE AND FORMOTEROL FUMARATE DIHYDRATE 2 PUFF(S): 160; 4.5 AEROSOL RESPIRATORY (INHALATION) at 08:20

## 2024-01-05 RX ADMIN — Medication 400 MILLIGRAM(S): at 00:25

## 2024-01-05 RX ADMIN — MORPHINE SULFATE 2 MILLIGRAM(S): 50 CAPSULE, EXTENDED RELEASE ORAL at 07:24

## 2024-01-05 RX ADMIN — APIXABAN 5 MILLIGRAM(S): 2.5 TABLET, FILM COATED ORAL at 05:17

## 2024-01-05 RX ADMIN — MORPHINE SULFATE 2 MILLIGRAM(S): 50 CAPSULE, EXTENDED RELEASE ORAL at 00:19

## 2024-01-05 RX ADMIN — Medication 3 MILLILITER(S): at 08:00

## 2024-01-05 RX ADMIN — TRAMADOL HYDROCHLORIDE 50 MILLIGRAM(S): 50 TABLET ORAL at 05:35

## 2024-01-05 RX ADMIN — MORPHINE SULFATE 2 MILLIGRAM(S): 50 CAPSULE, EXTENDED RELEASE ORAL at 01:09

## 2024-01-05 RX ADMIN — URSODIOL 300 MILLIGRAM(S): 250 TABLET, FILM COATED ORAL at 05:15

## 2024-01-05 RX ADMIN — AMIODARONE HYDROCHLORIDE 200 MILLIGRAM(S): 400 TABLET ORAL at 05:17

## 2024-01-05 RX ADMIN — Medication 81 MILLIGRAM(S): at 11:44

## 2024-01-05 RX ADMIN — Medication 1000 MILLIGRAM(S): at 01:09

## 2024-01-05 RX ADMIN — TRAMADOL HYDROCHLORIDE 50 MILLIGRAM(S): 50 TABLET ORAL at 04:30

## 2024-01-05 RX ADMIN — Medication 200 MILLIGRAM(S): at 05:16

## 2024-01-05 RX ADMIN — SERTRALINE 25 MILLIGRAM(S): 25 TABLET, FILM COATED ORAL at 11:42

## 2024-01-05 RX ADMIN — TRAMADOL HYDROCHLORIDE 50 MILLIGRAM(S): 50 TABLET ORAL at 11:41

## 2024-01-05 RX ADMIN — DORZOLAMIDE HYDROCHLORIDE TIMOLOL MALEATE 1 DROP(S): 20; 5 SOLUTION/ DROPS OPHTHALMIC at 05:18

## 2024-01-05 RX ADMIN — MIDODRINE HYDROCHLORIDE 15 MILLIGRAM(S): 2.5 TABLET ORAL at 11:44

## 2024-01-05 RX ADMIN — SERTRALINE 50 MILLIGRAM(S): 25 TABLET, FILM COATED ORAL at 11:43

## 2024-01-05 RX ADMIN — BRIMONIDINE TARTRATE 1 DROP(S): 2 SOLUTION/ DROPS OPHTHALMIC at 05:17

## 2024-01-05 NOTE — DISCHARGE NOTE PROVIDER - NSDCCAREPROVSEEN_GEN_ALL_CORE_FT
Jose, Gordon Paul, Domenic Modi, Keyana Tobar, Ivory Cannon, Keny Martínez, Warren Forrest, Kostas Weil, Audrey Fraga, Sparkle

## 2024-01-05 NOTE — PROGRESS NOTE ADULT - SUBJECTIVE AND OBJECTIVE BOX
[facility]  Elliot Fraga PA-C  [address] [place] [address]  [phone]      INTERVAL HPI/OVERNIGHT EVENTS:  Pt s/e  No new GI events  LFTs normalizing    MEDICATIONS  (STANDING):  aMIOdarone    Tablet 200 milliGRAM(s) Oral daily  apixaban 5 milliGRAM(s) Oral two times a day  ascorbic acid 500 milliGRAM(s) Oral daily  aspirin enteric coated 81 milliGRAM(s) Oral daily  brimonidine 0.2% Ophthalmic Solution 1 Drop(s) Both EYES two times a day  budesonide 160 MICROgram(s)/formoterol 4.5 MICROgram(s) Inhaler 2 Puff(s) Inhalation two times a day  calcitriol   Capsule 0.25 MICROGram(s) Oral <User Schedule>  calcium acetate 667 milliGRAM(s) Oral three times a day with meals  dextrose 5%. 1000 milliLiter(s) (50 mL/Hr) IV Continuous <Continuous>  dextrose 5%. 1000 milliLiter(s) (100 mL/Hr) IV Continuous <Continuous>  dextrose 50% Injectable 25 Gram(s) IV Push once  dextrose 50% Injectable 25 Gram(s) IV Push once  dextrose 50% Injectable 12.5 Gram(s) IV Push once  dorzolamide 2%/timolol 0.5% Ophthalmic Solution 1 Drop(s) Both EYES two times a day  epoetin kayla-epbx (RETACRIT) Injectable 04301 Unit(s) IV Push <User Schedule>  finasteride 5 milliGRAM(s) Oral daily  glucagon  Injectable 1 milliGRAM(s) IntraMuscular once  guaiFENesin Oral Liquid (Sugar-Free) 200 milliGRAM(s) Oral three times a day  insulin lispro (ADMELOG) corrective regimen sliding scale   SubCutaneous three times a day before meals  insulin lispro (ADMELOG) corrective regimen sliding scale   SubCutaneous at bedtime  lactobacillus acidophilus 1 Tablet(s) Oral daily  latanoprost 0.005% Ophthalmic Solution 1 Drop(s) Both EYES at bedtime  levothyroxine 50 MICROGram(s) Oral daily  melatonin 5 milliGRAM(s) Oral at bedtime  midodrine. 15 milliGRAM(s) Oral three times a day  pantoprazole    Tablet 40 milliGRAM(s) Oral daily  senna 2 Tablet(s) Oral at bedtime  sertraline 50 milliGRAM(s) Oral daily  sertraline 25 milliGRAM(s) Oral daily  simvastatin 10 milliGRAM(s) Oral at bedtime  tamsulosin 0.4 milliGRAM(s) Oral at bedtime  ursodiol Capsule 300 milliGRAM(s) Oral two times a day    MEDICATIONS  (PRN):  acetaminophen     Tablet .. 650 milliGRAM(s) Oral every 6 hours PRN Temp greater or equal to 38C (100.4F), Mild Pain (1 - 3)  albuterol/ipratropium for Nebulization 3 milliLiter(s) Nebulizer every 6 hours PRN Shortness of Breath and/or Wheezing  aluminum hydroxide/magnesium hydroxide/simethicone Suspension 30 milliLiter(s) Oral every 4 hours PRN Dyspepsia  bisacodyl Suppository 10 milliGRAM(s) Rectal daily PRN Constipation  dextrose Oral Gel 15 Gram(s) Oral once PRN Blood Glucose LESS THAN 70 milliGRAM(s)/deciliter  morphine  - Injectable 2 milliGRAM(s) IV Push every 6 hours PRN Severe Pain (7 - 10)  ondansetron Injectable 4 milliGRAM(s) IV Push every 8 hours PRN Nausea and/or Vomiting  traMADol 50 milliGRAM(s) Oral three times a day PRN Moderate Pain (4 - 6)      Allergies    Levaquin (Anaphylaxis; Flushing; Short breath)      PHYSICAL EXAM:   Vital Signs:  Vital Signs Last 24 Hrs  T(C): 36.7 (2024 11:00), Max: 36.8 (2024 21:05)  T(F): 98.1 (2024 11:00), Max: 98.2 (2024 21:05)  HR: 83 (2024 11:00) (62 - 92)  BP: 106/62 (2024 11:00) (106/62 - 117/82)  BP(mean): --  RR: 20 (2024 11:00) (18 - 20)  SpO2: 95% (2024 11:00) (95% - 100%)    Parameters below as of 2024 11:00  Patient On (Oxygen Delivery Method): nasal cannula  O2 Flow (L/min): 3    Daily     Daily Weight in k.6 (2024 12:30)    GENERAL:  Appears stated age  HEENT:  NC/AT  CHEST:  Full & symmetric excursion  HEART:  Regular rhythm  ABDOMEN:  Soft, non-tender, non-distended  EXTEREMITIES:  no cyanosis  SKIN:  No rash  NEURO:  Alert      LABS:                        8.1    6.45  )-----------( 159      ( 2024 07:45 )             26.1     01-    137  |  101  |  19  ----------------------------<  82  3.6   |  30  |  4.00<H>    Ca    7.7<L>      2024 07:45        Urinalysis Basic - ( 2024 07:45 )    Color: x / Appearance: x / SG: x / pH: x  Gluc: 82 mg/dL / Ketone: x  / Bili: x / Urobili: x   Blood: x / Protein: x / Nitrite: x   Leuk Esterase: x / RBC: x / WBC x   Sq Epi: x / Non Sq Epi: x / Bacteria: x     [facility]  Elliot Fraga PA-C  [address] [place] [address]  [phone]      INTERVAL HPI/OVERNIGHT EVENTS:  Pt s/e  No new GI events  LFTs normalizing    MEDICATIONS  (STANDING):  aMIOdarone    Tablet 200 milliGRAM(s) Oral daily  apixaban 5 milliGRAM(s) Oral two times a day  ascorbic acid 500 milliGRAM(s) Oral daily  aspirin enteric coated 81 milliGRAM(s) Oral daily  brimonidine 0.2% Ophthalmic Solution 1 Drop(s) Both EYES two times a day  budesonide 160 MICROgram(s)/formoterol 4.5 MICROgram(s) Inhaler 2 Puff(s) Inhalation two times a day  calcitriol   Capsule 0.25 MICROGram(s) Oral <User Schedule>  calcium acetate 667 milliGRAM(s) Oral three times a day with meals  dextrose 5%. 1000 milliLiter(s) (50 mL/Hr) IV Continuous <Continuous>  dextrose 5%. 1000 milliLiter(s) (100 mL/Hr) IV Continuous <Continuous>  dextrose 50% Injectable 25 Gram(s) IV Push once  dextrose 50% Injectable 25 Gram(s) IV Push once  dextrose 50% Injectable 12.5 Gram(s) IV Push once  dorzolamide 2%/timolol 0.5% Ophthalmic Solution 1 Drop(s) Both EYES two times a day  epoetin kayla-epbx (RETACRIT) Injectable 24082 Unit(s) IV Push <User Schedule>  finasteride 5 milliGRAM(s) Oral daily  glucagon  Injectable 1 milliGRAM(s) IntraMuscular once  guaiFENesin Oral Liquid (Sugar-Free) 200 milliGRAM(s) Oral three times a day  insulin lispro (ADMELOG) corrective regimen sliding scale   SubCutaneous three times a day before meals  insulin lispro (ADMELOG) corrective regimen sliding scale   SubCutaneous at bedtime  lactobacillus acidophilus 1 Tablet(s) Oral daily  latanoprost 0.005% Ophthalmic Solution 1 Drop(s) Both EYES at bedtime  levothyroxine 50 MICROGram(s) Oral daily  melatonin 5 milliGRAM(s) Oral at bedtime  midodrine. 15 milliGRAM(s) Oral three times a day  pantoprazole    Tablet 40 milliGRAM(s) Oral daily  senna 2 Tablet(s) Oral at bedtime  sertraline 50 milliGRAM(s) Oral daily  sertraline 25 milliGRAM(s) Oral daily  simvastatin 10 milliGRAM(s) Oral at bedtime  tamsulosin 0.4 milliGRAM(s) Oral at bedtime  ursodiol Capsule 300 milliGRAM(s) Oral two times a day    MEDICATIONS  (PRN):  acetaminophen     Tablet .. 650 milliGRAM(s) Oral every 6 hours PRN Temp greater or equal to 38C (100.4F), Mild Pain (1 - 3)  albuterol/ipratropium for Nebulization 3 milliLiter(s) Nebulizer every 6 hours PRN Shortness of Breath and/or Wheezing  aluminum hydroxide/magnesium hydroxide/simethicone Suspension 30 milliLiter(s) Oral every 4 hours PRN Dyspepsia  bisacodyl Suppository 10 milliGRAM(s) Rectal daily PRN Constipation  dextrose Oral Gel 15 Gram(s) Oral once PRN Blood Glucose LESS THAN 70 milliGRAM(s)/deciliter  morphine  - Injectable 2 milliGRAM(s) IV Push every 6 hours PRN Severe Pain (7 - 10)  ondansetron Injectable 4 milliGRAM(s) IV Push every 8 hours PRN Nausea and/or Vomiting  traMADol 50 milliGRAM(s) Oral three times a day PRN Moderate Pain (4 - 6)      Allergies    Levaquin (Anaphylaxis; Flushing; Short breath)      PHYSICAL EXAM:   Vital Signs:  Vital Signs Last 24 Hrs  T(C): 36.7 (2024 11:00), Max: 36.8 (2024 21:05)  T(F): 98.1 (2024 11:00), Max: 98.2 (2024 21:05)  HR: 83 (2024 11:00) (62 - 92)  BP: 106/62 (2024 11:00) (106/62 - 117/82)  BP(mean): --  RR: 20 (2024 11:00) (18 - 20)  SpO2: 95% (2024 11:00) (95% - 100%)    Parameters below as of 2024 11:00  Patient On (Oxygen Delivery Method): nasal cannula  O2 Flow (L/min): 3    Daily     Daily Weight in k.6 (2024 12:30)    GENERAL:  Appears stated age  HEENT:  NC/AT  CHEST:  Full & symmetric excursion  HEART:  Regular rhythm  ABDOMEN:  Soft, non-tender, non-distended  EXTEREMITIES:  no cyanosis  SKIN:  No rash  NEURO:  Alert      LABS:                        8.1    6.45  )-----------( 159      ( 2024 07:45 )             26.1     01-    137  |  101  |  19  ----------------------------<  82  3.6   |  30  |  4.00<H>    Ca    7.7<L>      2024 07:45        Urinalysis Basic - ( 2024 07:45 )    Color: x / Appearance: x / SG: x / pH: x  Gluc: 82 mg/dL / Ketone: x  / Bili: x / Urobili: x   Blood: x / Protein: x / Nitrite: x   Leuk Esterase: x / RBC: x / WBC x   Sq Epi: x / Non Sq Epi: x / Bacteria: x

## 2024-01-05 NOTE — PROGRESS NOTE ADULT - PROVIDER SPECIALTY LIST ADULT
Cardiology
Gastroenterology
Gastroenterology
Infectious Disease
Nephrology
Pulmonology
Cardiology
Cardiology
Nephrology
Cardiology
Nephrology
Gastroenterology
Infectious Disease
Nephrology
Pulmonology
Gastroenterology
Palliative Care
Gastroenterology
Hospitalist

## 2024-01-05 NOTE — DISCHARGE NOTE NURSING/CASE MANAGEMENT/SOCIAL WORK - NSDCVIVACCINE_GEN_ALL_CORE_FT
Tdap; 28-Sep-2022 04:14; Leyda Thomson (RN); [employer]; [id number] (Exp. Date: 18-Jan-2024); IntraMuscular; Deltoid Right.; 0.5 milliLiter(s); VIS (VIS Published: 09-May-2013, VIS Presented: 28-Sep-2022);    Tdap; 28-Sep-2022 04:14; Leyda Thomson (RN); [employer]; [id number] (Exp. Date: 18-Jan-2024); IntraMuscular; Deltoid Right.; 0.5 milliLiter(s); VIS (VIS Published: 09-May-2013, VIS Presented: 28-Sep-2022);

## 2024-01-05 NOTE — CAREGIVER ENGAGEMENT NOTE - CAREGIVER OUTREACH NOTES - FREE TEXT
OSEI spoke with pt son Israel regarding plan for dc today. OSEI spoke with Radha from [facility] and they are able to accommodate pt going back today and they are able to dialyze pt today if back early. They agreed for OSEI to set up transport for pt today at 1230. OSEI set up [facility] via [facility]. SW to follow and remain available for any needs. OSEI spoke with pt son Israel regarding plan for dc today. OSEI spoke with Radha from [facility] and they are able to accommodate pt going back today and they are able to dialyze pt today if back early. They agreed for OSEI to set up transport for pt today at 1230. OSEI set up [facility] via [facility]. SW to follow and remain available for any needs.

## 2024-01-05 NOTE — PROGRESS NOTE ADULT - SUBJECTIVE AND OBJECTIVE BOX
Patient is a 73y old  Male who presents with a chief complaint of SOB (27 Dec 2023 04:46)  Patient seen in follow up for ESRD on HD.        PAST MEDICAL HISTORY:  ASHD (arteriosclerotic heart disease)    CAD (coronary artery disease)    CRI (chronic renal insufficiency)    Diabetes mellitus type II    Hypertension    Hyperlipidemia    Pacemaker    Chronic atrial fibrillation    Asthma    Hypothyroid    Dementia    H/O carotid stenosis    Enlarged prostate      MEDICATIONS  (STANDING):  aMIOdarone    Tablet 200 milliGRAM(s) Oral daily  apixaban 5 milliGRAM(s) Oral two times a day  ascorbic acid 500 milliGRAM(s) Oral daily  aspirin enteric coated 81 milliGRAM(s) Oral daily  brimonidine 0.2% Ophthalmic Solution 1 Drop(s) Both EYES two times a day  budesonide 160 MICROgram(s)/formoterol 4.5 MICROgram(s) Inhaler 2 Puff(s) Inhalation two times a day  calcitriol   Capsule 0.25 MICROGram(s) Oral <User Schedule>  calcium acetate 667 milliGRAM(s) Oral three times a day with meals  dextrose 5%. 1000 milliLiter(s) (100 mL/Hr) IV Continuous <Continuous>  dextrose 5%. 1000 milliLiter(s) (50 mL/Hr) IV Continuous <Continuous>  dextrose 50% Injectable 25 Gram(s) IV Push once  dextrose 50% Injectable 12.5 Gram(s) IV Push once  dextrose 50% Injectable 25 Gram(s) IV Push once  dorzolamide 2%/timolol 0.5% Ophthalmic Solution 1 Drop(s) Both EYES two times a day  epoetin kayla-epbx (RETACRIT) Injectable 33490 Unit(s) IV Push <User Schedule>  finasteride 5 milliGRAM(s) Oral daily  glucagon  Injectable 1 milliGRAM(s) IntraMuscular once  guaiFENesin Oral Liquid (Sugar-Free) 200 milliGRAM(s) Oral three times a day  insulin lispro (ADMELOG) corrective regimen sliding scale   SubCutaneous three times a day before meals  insulin lispro (ADMELOG) corrective regimen sliding scale   SubCutaneous at bedtime  lactobacillus acidophilus 1 Tablet(s) Oral daily  latanoprost 0.005% Ophthalmic Solution 1 Drop(s) Both EYES at bedtime  levothyroxine 50 MICROGram(s) Oral daily  melatonin 5 milliGRAM(s) Oral at bedtime  midodrine. 15 milliGRAM(s) Oral three times a day  pantoprazole    Tablet 40 milliGRAM(s) Oral daily  senna 2 Tablet(s) Oral at bedtime  sertraline 50 milliGRAM(s) Oral daily  sertraline 25 milliGRAM(s) Oral daily  simvastatin 10 milliGRAM(s) Oral at bedtime  tamsulosin 0.4 milliGRAM(s) Oral at bedtime  ursodiol Capsule 300 milliGRAM(s) Oral two times a day    MEDICATIONS  (PRN):  acetaminophen     Tablet .. 650 milliGRAM(s) Oral every 6 hours PRN Temp greater or equal to 38C (100.4F), Mild Pain (1 - 3)  albuterol/ipratropium for Nebulization 3 milliLiter(s) Nebulizer every 6 hours PRN Shortness of Breath and/or Wheezing  aluminum hydroxide/magnesium hydroxide/simethicone Suspension 30 milliLiter(s) Oral every 4 hours PRN Dyspepsia  bisacodyl Suppository 10 milliGRAM(s) Rectal daily PRN Constipation  dextrose Oral Gel 15 Gram(s) Oral once PRN Blood Glucose LESS THAN 70 milliGRAM(s)/deciliter  morphine  - Injectable 2 milliGRAM(s) IV Push every 6 hours PRN Severe Pain (7 - 10)  ondansetron Injectable 4 milliGRAM(s) IV Push every 8 hours PRN Nausea and/or Vomiting  traMADol 50 milliGRAM(s) Oral three times a day PRN Moderate Pain (4 - 6)    T(C): 36.7 (01-05-24 @ 11:00), Max: 36.8 (01-04-24 @ 21:05)  HR: 83 (01-05-24 @ 11:00) (62 - 92)  BP: 106/62 (01-05-24 @ 11:00) (93/60 - 141/83)  RR: 20 (01-05-24 @ 11:00)  SpO2: 95% (01-05-24 @ 11:00)  Wt(kg): --  I&O's Detail    04 Jan 2024 07:01  -  05 Jan 2024 07:00  --------------------------------------------------------  IN:  Total IN: 0 mL    OUT:    Other (mL): 500 mL  Total OUT: 500 mL    Total NET: -500 mL            PHYSICAL EXAM:  General: No distress, + dialysis catheter  Respiratory: b/l air entry  Cardiovascular: S1 S2  Gastrointestinal: soft  Extremities:  no edema, AVF, no bruit        LABORATORY:                        8.1    6.45  )-----------( 159      ( 05 Jan 2024 07:45 )             26.1     01-05    137  |  101  |  19  ----------------------------<  82  3.6   |  30  |  4.00<H>    Ca    7.7<L>      05 Jan 2024 07:45      Sodium: 137 mmol/L (01-05 @ 07:45)  Sodium: 136 mmol/L (01-04 @ 04:47)    Potassium: 3.6 mmol/L (01-05 @ 07:45)  Potassium: 3.8 mmol/L (01-04 @ 04:47)    Hemoglobin: 8.1 g/dL (01-05 @ 07:45)  Hemoglobin: 8.2 g/dL (01-04 @ 04:47)  Hemoglobin: 8.2 g/dL (01-03 @ 07:55)    Creatinine, Serum 4.00 (01-05 @ 07:45)  Creatinine, Serum 5.00 (01-04 @ 04:47)  Creatinine, Serum 4.00 (01-03 @ 07:55)          Urinalysis Basic - ( 05 Jan 2024 07:45 )    Color: x / Appearance: x / SG: x / pH: x  Gluc: 82 mg/dL / Ketone: x  / Bili: x / Urobili: x   Blood: x / Protein: x / Nitrite: x   Leuk Esterase: x / RBC: x / WBC x   Sq Epi: x / Non Sq Epi: x / Bacteria: x               Patient is a 73y old  Male who presents with a chief complaint of SOB (27 Dec 2023 04:46)  Patient seen in follow up for ESRD on HD.        PAST MEDICAL HISTORY:  ASHD (arteriosclerotic heart disease)    CAD (coronary artery disease)    CRI (chronic renal insufficiency)    Diabetes mellitus type II    Hypertension    Hyperlipidemia    Pacemaker    Chronic atrial fibrillation    Asthma    Hypothyroid    Dementia    H/O carotid stenosis    Enlarged prostate      MEDICATIONS  (STANDING):  aMIOdarone    Tablet 200 milliGRAM(s) Oral daily  apixaban 5 milliGRAM(s) Oral two times a day  ascorbic acid 500 milliGRAM(s) Oral daily  aspirin enteric coated 81 milliGRAM(s) Oral daily  brimonidine 0.2% Ophthalmic Solution 1 Drop(s) Both EYES two times a day  budesonide 160 MICROgram(s)/formoterol 4.5 MICROgram(s) Inhaler 2 Puff(s) Inhalation two times a day  calcitriol   Capsule 0.25 MICROGram(s) Oral <User Schedule>  calcium acetate 667 milliGRAM(s) Oral three times a day with meals  dextrose 5%. 1000 milliLiter(s) (100 mL/Hr) IV Continuous <Continuous>  dextrose 5%. 1000 milliLiter(s) (50 mL/Hr) IV Continuous <Continuous>  dextrose 50% Injectable 25 Gram(s) IV Push once  dextrose 50% Injectable 12.5 Gram(s) IV Push once  dextrose 50% Injectable 25 Gram(s) IV Push once  dorzolamide 2%/timolol 0.5% Ophthalmic Solution 1 Drop(s) Both EYES two times a day  epoetin kayla-epbx (RETACRIT) Injectable 91956 Unit(s) IV Push <User Schedule>  finasteride 5 milliGRAM(s) Oral daily  glucagon  Injectable 1 milliGRAM(s) IntraMuscular once  guaiFENesin Oral Liquid (Sugar-Free) 200 milliGRAM(s) Oral three times a day  insulin lispro (ADMELOG) corrective regimen sliding scale   SubCutaneous three times a day before meals  insulin lispro (ADMELOG) corrective regimen sliding scale   SubCutaneous at bedtime  lactobacillus acidophilus 1 Tablet(s) Oral daily  latanoprost 0.005% Ophthalmic Solution 1 Drop(s) Both EYES at bedtime  levothyroxine 50 MICROGram(s) Oral daily  melatonin 5 milliGRAM(s) Oral at bedtime  midodrine. 15 milliGRAM(s) Oral three times a day  pantoprazole    Tablet 40 milliGRAM(s) Oral daily  senna 2 Tablet(s) Oral at bedtime  sertraline 50 milliGRAM(s) Oral daily  sertraline 25 milliGRAM(s) Oral daily  simvastatin 10 milliGRAM(s) Oral at bedtime  tamsulosin 0.4 milliGRAM(s) Oral at bedtime  ursodiol Capsule 300 milliGRAM(s) Oral two times a day    MEDICATIONS  (PRN):  acetaminophen     Tablet .. 650 milliGRAM(s) Oral every 6 hours PRN Temp greater or equal to 38C (100.4F), Mild Pain (1 - 3)  albuterol/ipratropium for Nebulization 3 milliLiter(s) Nebulizer every 6 hours PRN Shortness of Breath and/or Wheezing  aluminum hydroxide/magnesium hydroxide/simethicone Suspension 30 milliLiter(s) Oral every 4 hours PRN Dyspepsia  bisacodyl Suppository 10 milliGRAM(s) Rectal daily PRN Constipation  dextrose Oral Gel 15 Gram(s) Oral once PRN Blood Glucose LESS THAN 70 milliGRAM(s)/deciliter  morphine  - Injectable 2 milliGRAM(s) IV Push every 6 hours PRN Severe Pain (7 - 10)  ondansetron Injectable 4 milliGRAM(s) IV Push every 8 hours PRN Nausea and/or Vomiting  traMADol 50 milliGRAM(s) Oral three times a day PRN Moderate Pain (4 - 6)    T(C): 36.7 (01-05-24 @ 11:00), Max: 36.8 (01-04-24 @ 21:05)  HR: 83 (01-05-24 @ 11:00) (62 - 92)  BP: 106/62 (01-05-24 @ 11:00) (93/60 - 141/83)  RR: 20 (01-05-24 @ 11:00)  SpO2: 95% (01-05-24 @ 11:00)  Wt(kg): --  I&O's Detail    04 Jan 2024 07:01  -  05 Jan 2024 07:00  --------------------------------------------------------  IN:  Total IN: 0 mL    OUT:    Other (mL): 500 mL  Total OUT: 500 mL    Total NET: -500 mL            PHYSICAL EXAM:  General: No distress, + dialysis catheter  Respiratory: b/l air entry  Cardiovascular: S1 S2  Gastrointestinal: soft  Extremities:  no edema, AVF, no bruit        LABORATORY:                        8.1    6.45  )-----------( 159      ( 05 Jan 2024 07:45 )             26.1     01-05    137  |  101  |  19  ----------------------------<  82  3.6   |  30  |  4.00<H>    Ca    7.7<L>      05 Jan 2024 07:45      Sodium: 137 mmol/L (01-05 @ 07:45)  Sodium: 136 mmol/L (01-04 @ 04:47)    Potassium: 3.6 mmol/L (01-05 @ 07:45)  Potassium: 3.8 mmol/L (01-04 @ 04:47)    Hemoglobin: 8.1 g/dL (01-05 @ 07:45)  Hemoglobin: 8.2 g/dL (01-04 @ 04:47)  Hemoglobin: 8.2 g/dL (01-03 @ 07:55)    Creatinine, Serum 4.00 (01-05 @ 07:45)  Creatinine, Serum 5.00 (01-04 @ 04:47)  Creatinine, Serum 4.00 (01-03 @ 07:55)          Urinalysis Basic - ( 05 Jan 2024 07:45 )    Color: x / Appearance: x / SG: x / pH: x  Gluc: 82 mg/dL / Ketone: x  / Bili: x / Urobili: x   Blood: x / Protein: x / Nitrite: x   Leuk Esterase: x / RBC: x / WBC x   Sq Epi: x / Non Sq Epi: x / Bacteria: x

## 2024-01-05 NOTE — DISCHARGE NOTE PROVIDER - NSDCCPCAREPLAN_GEN_ALL_CORE_FT
PRINCIPAL DISCHARGE DIAGNOSIS  Diagnosis: Acute respiratory failure with hypoxia  Assessment and Plan of Treatment:       SECONDARY DISCHARGE DIAGNOSES  Diagnosis: NSTEMI (non-ST elevation myocardial infarction)  Assessment and Plan of Treatment:     Diagnosis: Influenza A  Assessment and Plan of Treatment:     Diagnosis: Hypothyroidism  Assessment and Plan of Treatment:     Diagnosis: ESRD on dialysis  Assessment and Plan of Treatment:     Diagnosis: Transaminitis  Assessment and Plan of Treatment:     Diagnosis: CAD (coronary artery disease)  Assessment and Plan of Treatment:     Diagnosis: Afib  Assessment and Plan of Treatment:     Diagnosis: DM (diabetes mellitus)  Assessment and Plan of Treatment:     Diagnosis: Hypothyroidism  Assessment and Plan of Treatment:     Diagnosis: Elevated troponin  Assessment and Plan of Treatment:

## 2024-01-05 NOTE — PATIENT CHOICE NOTE. - NSPTCHOICESTATE_GEN_ALL_CORE
I have met with the patient and/or caregiver to discuss discharge goals and treatment plan. Patient and/or caregiver also provided with instructions on accessing the CMS Compare websites for additional information related to Post Acute Provider quality and resource use measures to assist them in evaluation of the providers and in selecting their post-acute provider of choice. Patient and caregiver were informed of the facilities that are owned and/or operated by [facility]. I have discussed with the patient the availability of in-network facilities and providers. Patient and caregiver provided with a list of post-acute providers whose services are appropriate to the discharge plans and patient needs.     For patient requiring durable medical equipment, patient and/or caregiver were informed that they have the right to request who provides the required equipment. I have met with the patient and/or caregiver to discuss discharge goals and treatment plan. Patient and/or caregiver also provided with instructions on accessing the CMS Compare websites for additional information related to Post Acute Provider quality and resource use measures to assist them in evaluation of the providers and in selecting their post-acute provider of choice. Patient and caregiver were informed of the facilities that are owned and/or operated by [facility]. I have discussed with the patient the availability of in-network facilities and providers. Patient and caregiver provided with a list of post-acute providers whose services are appropriate to the discharge plans and patient needs.     For patient requiring durable medical equipment, patient and/or caregiver were informed that they have the right to request who provides the required equipment.

## 2024-01-05 NOTE — DISCHARGE NOTE PROVIDER - HOSPITAL COURSE
Patient  is 72 yo  LTC resident with past medical history of end-stage renal disease on dialysis, chronic hypotension, afib, recent admission for clogged AV fistula is presenting for hypotension and hypoxia.  Patient was discharged back to [facility] 12/30  but sent to ED due to hypotension and hypoxia on arrival to [facility] .His vitals were stable upon discharge and he was monitored 2 hrs after HD session by house staff .  Otherwise denies any new complaints.  Patient was diagnosed with Influenza , also found to have JAZMINE elevation .Seen by Dr Garcia cardiologist Admitted  to telemetry unit for monitoring , send 3 sets of cardiac enzymes to rule out acute coronary event, obtain ECHO to evaluate LVEF, cardiology consult  ,continue current management, O2 supply, anticoagulation plan as per cardiology consult Palliative care consult requested ,to discuss advance directives and complete MOLST       Problem/Plan - 1:  ·  Problem: Acute respiratory failure with hypoxia.   ·  Plan: likely 2/2 to influenza ,poa.    Problem/Plan - 2:  ·  Problem: Influenza A.   ·  Plan: . Continue po Tamiflu for a total of 5 days.  . Monitor the progression of SOB and hypoxia.  . Follow all cultures. ID is following   . Acute elevation of LFTs noted.  Probably due to hypotension. GI evaluation in progress.    Problem/Plan - 3:  ·  Problem: NSTEMI (non-ST elevation myocardial infarction).   ·  Plan: as per cardiologist Dr Garcia , dx and prognosis d/w brother.    Problem/Plan - 4:  ·  Problem: ESRD on dialysis.   ·  Plan: HD as per schedule , d/w Dr Crystal.    Problem/Plan - 5:  ·  Problem: Hypotension.   ·  Plan: midodrine ,BP monitoring.    Problem/Plan - 6:  ·  Problem: Transaminitis.   ·  Plan: likely multifactorial suspect 2/2 statin, amiodarone and infection   monitor lfts.    Problem/Plan - 7:  ·  Problem: CAD (coronary artery disease).   ·  Plan: ruled in for NSTEMI POA.    Problem/Plan - 8:  ·  Problem: Afib.   ·  Plan: patient has PPM - management as per Dr Garcia , cardiologist.    Problem/Plan - 9:  ·  Problem: DM (diabetes mellitus).   ·  Plan: Accuchecks monitoring and insulin corrective regimen  sliding scale coverage with short acting inslulin, add longacting insulin as needed ,no concentrated sweets diet, serial labs ,HbA1C,education.    Problem/Plan - 10:  ·  Problem: Hypothyroidism.   ·  Plan; continue home medications.    Problem/Plan - 11:  ·  Problem: Prophylactic measure.   ·  Plan: Gastrointestinal stress ulcer prophylaxis and DVT prophylaxis administered. Patient  is 74 yo  LTC resident with past medical history of end-stage renal disease on dialysis, chronic hypotension, afib, recent admission for clogged AV fistula is presenting for hypotension and hypoxia.  Patient was discharged back to [facility] 12/30  but sent to ED due to hypotension and hypoxia on arrival to [facility] .His vitals were stable upon discharge and he was monitored 2 hrs after HD session by house staff .  Otherwise denies any new complaints.  Patient was diagnosed with Influenza , also found to have JAZMINE elevation .Seen by Dr Garcia cardiologist Admitted  to telemetry unit for monitoring , send 3 sets of cardiac enzymes to rule out acute coronary event, obtain ECHO to evaluate LVEF, cardiology consult  ,continue current management, O2 supply, anticoagulation plan as per cardiology consult Palliative care consult requested ,to discuss advance directives and complete MOLST       Problem/Plan - 1:  ·  Problem: Acute respiratory failure with hypoxia.   ·  Plan: likely 2/2 to influenza ,poa.    Problem/Plan - 2:  ·  Problem: Influenza A.   ·  Plan: . Continue po Tamiflu for a total of 5 days.  . Monitor the progression of SOB and hypoxia.  . Follow all cultures. ID is following   . Acute elevation of LFTs noted.  Probably due to hypotension. GI evaluation in progress.    Problem/Plan - 3:  ·  Problem: NSTEMI (non-ST elevation myocardial infarction).   ·  Plan: as per cardiologist Dr Garcia , dx and prognosis d/w brother.    Problem/Plan - 4:  ·  Problem: ESRD on dialysis.   ·  Plan: HD as per schedule , d/w Dr Crystal.    Problem/Plan - 5:  ·  Problem: Hypotension.   ·  Plan: midodrine ,BP monitoring.    Problem/Plan - 6:  ·  Problem: Transaminitis.   ·  Plan: likely multifactorial suspect 2/2 statin, amiodarone and infection   monitor lfts.    Problem/Plan - 7:  ·  Problem: CAD (coronary artery disease).   ·  Plan: ruled in for NSTEMI POA.    Problem/Plan - 8:  ·  Problem: Afib.   ·  Plan: patient has PPM - management as per Dr Garcia , cardiologist.    Problem/Plan - 9:  ·  Problem: DM (diabetes mellitus).   ·  Plan: Accuchecks monitoring and insulin corrective regimen  sliding scale coverage with short acting inslulin, add longacting insulin as needed ,no concentrated sweets diet, serial labs ,HbA1C,education.    Problem/Plan - 10:  ·  Problem: Hypothyroidism.   ·  Plan; continue home medications.    Problem/Plan - 11:  ·  Problem: Prophylactic measure.   ·  Plan: Gastrointestinal stress ulcer prophylaxis and DVT prophylaxis administered.

## 2024-01-05 NOTE — DISCHARGE NOTE PROVIDER - NSDCFUADDAPPT_GEN_ALL_CORE_FT
PLEASE ARRANGE HD SESSION 01/06/2924 ,D/W  WHO SPOKE TO DIALYSIS NURSE AT [facility] PLEASE ARRANGE HD SESSION 01/06/2924 ,D/W  WHO SPOKE TO DIALYSIS NURSE AT [facility]

## 2024-01-05 NOTE — PROGRESS NOTE ADULT - REASON FOR ADMISSION
hypoxia and hypotension

## 2024-01-05 NOTE — DISCHARGE NOTE NURSING/CASE MANAGEMENT/SOCIAL WORK - NSDCPEFALRISK_GEN_ALL_CORE
For information on Fall & Injury Prevention, visit: [email or web address] OR  [email or web address]-injury OR  https://www.cdc.gov/steadi/patient.html For information on Fall & Injury Prevention, visit: [email or web address]-mobility OR  [email or web address]-[email or web address]-avoid-injury OR  https://www.cdc.gov/steadi/patient.html

## 2024-01-05 NOTE — DISCHARGE NOTE PROVIDER - NSDCMRMEDTOKEN_GEN_ALL_CORE_FT
acetaminophen 325 mg oral tablet: 2 tab(s) orally every 6 hours As needed Temp greater or equal to 38C (100.4F), Mild Pain (1 - 3)  Acidophilus oral capsule: 1 cap(s) orally once a day  amiodarone 200 mg oral tablet: 1 tab(s) orally once a day  apixaban 5 mg oral tablet: 1 tab(s) orally 2 times a day  aspirin 81 mg oral delayed release tablet: 1 tab(s) orally once a day  brimonidine 0.2% ophthalmic solution: 1 drop(s) in each eye 2 times a day  calcitriol 0.25 mcg oral capsule: 1 cap(s) orally 3 times a week Monday, Wednesday, Friday  calcium acetate 667 mg oral capsule: 1 cap(s) orally 3 times a day  dorzolamide-timolol 2.23%-0.68% (2%-0.5% base) ophthalmic solution: 1 drop(s) in each eye 2 times a day  finasteride 5 mg oral tablet: 1 tab(s) orally once a day  guaiFENesin 100 mg/5 mL oral liquid: 10 milliliter(s) orally 3 times a day  latanoprost 0.005% ophthalmic solution: 1 drop(s) in each eye once a day  levothyroxine 50 mcg (0.05 mg) oral tablet: 1 tab(s) orally once a day  melatonin 5 mg oral tablet: 1 tab(s) orally once a day (at bedtime)  midodrine 5 mg oral tablet: 3 tab(s) orally 3 times a day HOLD FOR SBP ABOVE 130  pantoprazole 40 mg oral delayed release tablet: 1 tab(s) orally once a day  senna leaf extract oral tablet: 2 tab(s) orally once a day (at bedtime)  sertraline 25 mg oral tablet: 1 tab(s) orally once a day (at bedtime) take with sertraline 50MG (total 75MG)  sertraline 50 mg oral tablet: 1 tab(s) orally once a day (at bedtime) take with sertraline 25MG total(75MG)  simvastatin 10 mg oral tablet: 1 tab(s) orally once a day (at bedtime)  Symbicort 80 mcg-4.5 mcg/inh inhalation aerosol: 2 puff(s) inhaled 2 times a day  tamsulosin 0.4 mg oral capsule: 1 cap(s) orally once a day (at bedtime)  traMADol 50 mg oral tablet: 1 tab(s) orally 3 times a day As needed Moderate Pain (4 - 6)  ursodiol 300 mg oral capsule: 1 cap(s) orally 2 times a day  Vitamin C 500 mg oral tablet, chewable: 1 tab(s) chewed once a day

## 2024-01-05 NOTE — DISCHARGE NOTE NURSING/CASE MANAGEMENT/SOCIAL WORK - SOCIAL WORKER'S NAME
Ana Johnson, [facility] [phone] Ana Johnson, [facility] [phone]

## 2024-01-05 NOTE — DISCHARGE NOTE PROVIDER - CARE PROVIDER_API CALL
Fortino Crystal  Nephrology  [address], [address]  [place] [address]  Phone: [phone]  Fax: [phone]  Follow Up Time: 1-3 days   Fortino Crystal  Nephrology  [address], [address]  [place] [address]  Phone: [phone]  Fax: [phone]  Follow Up Time: 1-3 days

## 2024-01-05 NOTE — DISCHARGE NOTE NURSING/CASE MANAGEMENT/SOCIAL WORK - PATIENT PORTAL LINK FT
You can access the FollowMyHealth Patient Portal offered by [facility] by registering at the following website: [email or web address]. By joining [employer]’s FollowMyHealth portal, you will also be able to view your health information using other applications (apps) compatible with our system. You can access the FollowMyHealth Patient Portal offered by [facility] by registering at the following website: [email or web address]. By joining [employer]’s FollowMyHealth portal, you will also be able to view your health information using other applications (apps) compatible with our system.

## 2024-01-05 NOTE — PROGRESS NOTE ADULT - ASSESSMENT
.. Management of problems listed below      ROS  . Patient unable to give ROS     PHYSICAL EXAM    HEENT Unremarkable  atraumatic   RESP Fair air entry  Harsh breath sound   CARDIAC S1 S2 No S3     NO JVD    ABDOMEN No hepatosplenomegaly   PEDAL EDEMA present No calf tenderness  NO rash       GENERAL DATA .   GOC.  .. 12/30/2023 full code    ICU STAY.  .. no  COVID. .. scv2   BEST PRACTICE ISSUES.    HOB ELEVATN.  .. Yes  DVT PPLX.  ..   12/31/2023 apixaba 5.2 (af)         DIET.   ..  12/31/2023 soft bite   STRESS ULCER PPLX.   .  ..   12/31/2023 protonix 40   ALLGY. ..    levaquin   WT. ..  12/30/2023 68  BMI. ..  12/30/2023 22  PROCEDURES.   .. 12/26/2023 Right upper extremity brachiocephalic fistula with long segment   thrombosis/absent flow within the body of the fistula as well as outflow   vein stent.   .. 12/26/2023 Had R AV fistula thrombectomy    ABGS.   .  VS/ PO/IO/ VENT/ DRIPS.  1/5/2024 afeb 80 110/60   1/5/2024 3l 98%     . SUMMARY.   . 73 m with past medical history of end-stage renal disease on dialysis, chronic hypotension, afib, recent admission for clogged AV fistula 12/26-12/30 admitted 12/30/2023  for hypotension and hypoxia.   .t tested (+) for flu on 12/30     COURSE .   . FLU   .... 12/31/2023 Tamiflu started   . ASTHMA Symbicort  . CHR A fib on Apixa poa Apixa was continued   . ELEVATED CARDIAC ENZYMES   .. Tr 12/31-12/31/-12/31- 1/1/2024 Tr [id number]   .... Rxed with asa   .... Cardio on case   . FLUID OVERLOAD   . ESRD   .... Getting HD   . ELEVATED LFTS   .... 12/31 ac hepatitis profile (-)   .... 12/31  Possible cirrhosis sp chanelle   .. gi on case   . ANEMIA Target Hb 7 (+)     OVERALL PLAN/DISPO   .... Monitor LFTS   .... completed tamiflu          TIME SPENT.  . Over 36 minutes aggregate care time spent on encounter; activities included   direct patient care, counseling and/or coordinating care reviewing notes, lab data/ imaging , discussion with multidisciplinary team/ patient  /family and explaining in detail risks, benefits, alternatives  of the recommendations     PATIENT.  . MICHEL GONZALEZ 73 m 12/30/2023 1951 DR KRISS KHAN       .. Management of problems listed below      ROS  . Patient unable to give ROS     PHYSICAL EXAM    HEENT Unremarkable  atraumatic   RESP Fair air entry  Harsh breath sound   CARDIAC S1 S2 No S3     NO JVD    ABDOMEN No hepatosplenomegaly   PEDAL EDEMA present No calf tenderness  NO rash       GENERAL DATA .   GOC.  .. 12/30/2023 full code    ICU STAY.  .. no  COVID. .. scv2   BEST PRACTICE ISSUES.    HOB ELEVATN.  .. Yes  DVT PPLX.  ..   12/31/2023 apixaba 5.2 (af)         DIET.   ..  12/31/2023 soft bite   STRESS ULCER PPLX.   .  ..   12/31/2023 protonix 40   ALLGY. ..    levaquin   WT. ..  12/30/2023 68  BMI. ..  12/30/2023 22  PROCEDURES.   .. 12/26/2023 Right upper extremity brachiocephalic fistula with long segment   thrombosis/absent flow within the body of the fistula as well as outflow   vein stent.   .. 12/26/2023 Had R AV fistula thrombectomy    ABGS.   .  VS/ PO/IO/ VENT/ DRIPS.  1/5/2024 afeb 80 110/60   1/5/2024 3l 98%     . SUMMARY.   . 73 m with past medical history of end-stage renal disease on dialysis, chronic hypotension, afib, recent admission for clogged AV fistula 12/26-12/30 admitted 12/30/2023  for hypotension and hypoxia.   .t tested (+) for flu on 12/30     COURSE .   . FLU   .... 12/31/2023 Tamiflu started   . ASTHMA Symbicort  . CHR A fib on Apixa poa Apixa was continued   . ELEVATED CARDIAC ENZYMES   .. Tr 12/31-12/31/-12/31- 1/1/2024 Tr [id number]   .... Rxed with asa   .... Cardio on case   . FLUID OVERLOAD   . ESRD   .... Getting HD   . ELEVATED LFTS   .... 12/31 ac hepatitis profile (-)   .... 12/31  Possible cirrhosis sp chanelle   .. gi on case   . ANEMIA Target Hb 7 (+)     OVERALL PLAN/DISPO   .... Monitor LFTS   .... completed tamiflu          TIME SPENT.  . Over 36 minutes aggregate care time spent on encounter; activities included   direct patient care, counseling and/or coordinating care reviewing notes, lab data/ imaging , discussion with multidisciplinary team/ patient  /family and explaining in detail risks, benefits, alternatives  of the recommendations     PATIENT.  . MICHEL GONZALEZ 73 m 12/30/2023 1950 DR KRISS KHAN

## 2024-01-05 NOTE — PROGRESS NOTE ADULT - SUBJECTIVE AND OBJECTIVE BOX
CHIEF COMPLAINT/ REASON FOR VISIT  .. Patient was seen to address the  issue listed under PROBLEM LIST which is located toward bottom of this note     LISA NAVARRETE 2EAS 219 D1    Allergies    Levaquin (Anaphylaxis; Flushing; Short breath)    Intolerances        PAST MEDICAL & SURGICAL HISTORY:  ASHD (arteriosclerotic heart disease)      CAD (coronary artery disease)  s/p stent, CABG      CRI (chronic renal insufficiency)  ESRD on HD      Diabetes mellitus type II      Hypertension      Hyperlipidemia      Pacemaker      Chronic atrial fibrillation      Asthma      Hypothyroid      Dementia      H/O carotid stenosis      Enlarged prostate      Hx of CABG      Coronary stent      Cardiac pacemaker      History of amputation of toe  right great toe          FAMILY HISTORY:  Family history of chronic ischemic heart disease (Father)  father  of MI at age 59        Home Medications:  acetaminophen 325 mg oral tablet: 2 tab(s) orally 3 times a day as needed for  mild pain (2024 13:58)  Acidophilus oral capsule: 1 cap(s) orally once a day (2024 13:58)  apixaban 5 mg oral tablet: 1 tab(s) orally 2 times a day (2024 13:58)  brimonidine 0.2% ophthalmic solution: 1 drop(s) in each eye 2 times a day (2024 13:58)  calcitriol 0.25 mcg oral capsule: 1 cap(s) orally 3 times a week Monday, Wednesday, Friday (2024 13:58)  calcium acetate 667 mg oral capsule: 1 cap(s) orally 3 times a day (2024 13:58)  dorzolamide-timolol 2.23%-0.68% (2%-0.5% base) ophthalmic solution: 1 drop(s) in each eye 2 times a day (2024 13:58)  finasteride 5 mg oral tablet: 1 tab(s) orally once a day (at bedtime) (2024 13:58)  latanoprost 0.005% ophthalmic solution: 1 drop(s) in each eye once a day (2024 13:58)  levothyroxine 50 mcg (0.05 mg) oral tablet: 1 tab(s) orally once a day (2024 13:58)  melatonin 5 mg oral tablet: 1 tab(s) orally once a day (at bedtime) (2024 13:58)  midodrine 10 mg oral tablet: 1 tab(s) orally 2 times a day as needed (2024 13:58)  sertraline 25 mg oral tablet: 1 tab(s) orally once a day (at bedtime) take with sertraline 50MG (total 75MG) (2024 13:58)  sertraline 50 mg oral tablet: 1 tab(s) orally once a day (at bedtime) take with sertraline 25MG total(75MG) (2024 13:58)  simvastatin 10 mg oral tablet: 1 tab(s) orally once a day (at bedtime) (2024 13:58)  Symbicort 80 mcg-4.5 mcg/inh inhalation aerosol: 2 puff(s) inhaled 2 times a day (2024 13:58)  tamsulosin 0.4 mg oral capsule: 1 cap(s) orally once a day (2024 13:58)  Vitamin C 500 mg oral tablet, chewable: 1 tab(s) chewed once a day (2024 13:58)      MEDICATIONS  (STANDING):  aMIOdarone    Tablet 200 milliGRAM(s) Oral daily  apixaban 5 milliGRAM(s) Oral two times a day  ascorbic acid 500 milliGRAM(s) Oral daily  aspirin enteric coated 81 milliGRAM(s) Oral daily  brimonidine 0.2% Ophthalmic Solution 1 Drop(s) Both EYES two times a day  budesonide 160 MICROgram(s)/formoterol 4.5 MICROgram(s) Inhaler 2 Puff(s) Inhalation two times a day  calcitriol   Capsule 0.25 MICROGram(s) Oral <User Schedule>  calcium acetate 667 milliGRAM(s) Oral three times a day with meals  dextrose 5%. 1000 milliLiter(s) (100 mL/Hr) IV Continuous <Continuous>  dextrose 5%. 1000 milliLiter(s) (50 mL/Hr) IV Continuous <Continuous>  dextrose 50% Injectable 25 Gram(s) IV Push once  dextrose 50% Injectable 12.5 Gram(s) IV Push once  dextrose 50% Injectable 25 Gram(s) IV Push once  dorzolamide 2%/timolol 0.5% Ophthalmic Solution 1 Drop(s) Both EYES two times a day  epoetin kayla-epbx (RETACRIT) Injectable 83665 Unit(s) IV Push <User Schedule>  finasteride 5 milliGRAM(s) Oral daily  glucagon  Injectable 1 milliGRAM(s) IntraMuscular once  guaiFENesin Oral Liquid (Sugar-Free) 200 milliGRAM(s) Oral three times a day  insulin lispro (ADMELOG) corrective regimen sliding scale   SubCutaneous at bedtime  insulin lispro (ADMELOG) corrective regimen sliding scale   SubCutaneous three times a day before meals  lactobacillus acidophilus 1 Tablet(s) Oral daily  latanoprost 0.005% Ophthalmic Solution 1 Drop(s) Both EYES at bedtime  levothyroxine 50 MICROGram(s) Oral daily  melatonin 5 milliGRAM(s) Oral at bedtime  midodrine. 15 milliGRAM(s) Oral three times a day  pantoprazole    Tablet 40 milliGRAM(s) Oral daily  senna 2 Tablet(s) Oral at bedtime  sertraline 50 milliGRAM(s) Oral daily  sertraline 25 milliGRAM(s) Oral daily  simvastatin 10 milliGRAM(s) Oral at bedtime  tamsulosin 0.4 milliGRAM(s) Oral at bedtime  ursodiol Capsule 300 milliGRAM(s) Oral two times a day    MEDICATIONS  (PRN):  acetaminophen     Tablet .. 650 milliGRAM(s) Oral every 6 hours PRN Temp greater or equal to 38C (100.4F), Mild Pain (1 - 3)  albuterol/ipratropium for Nebulization 3 milliLiter(s) Nebulizer every 6 hours PRN Shortness of Breath and/or Wheezing  aluminum hydroxide/magnesium hydroxide/simethicone Suspension 30 milliLiter(s) Oral every 4 hours PRN Dyspepsia  bisacodyl Suppository 10 milliGRAM(s) Rectal daily PRN Constipation  dextrose Oral Gel 15 Gram(s) Oral once PRN Blood Glucose LESS THAN 70 milliGRAM(s)/deciliter  morphine  - Injectable 2 milliGRAM(s) IV Push every 6 hours PRN Severe Pain (7 - 10)  ondansetron Injectable 4 milliGRAM(s) IV Push every 8 hours PRN Nausea and/or Vomiting  traMADol 50 milliGRAM(s) Oral three times a day PRN Moderate Pain (4 - 6)              Vital Signs Last 24 Hrs  T(C): 36.3 (2024 05:25), Max: 36.8 (2024 21:05)  T(F): 97.4 (2024 05:25), Max: 98.2 (2024 21:05)  HR: 80 (2024 05:25) (62 - 92)  BP: 113/68 (2024 05:25) (108/68 - 141/83)  BP(mean): --  RR: 20 (2024 05:25) (18 - 20)  SpO2: 98% (2024 05:25) (97% - 100%)    Parameters below as of 2024 05:25  Patient On (Oxygen Delivery Method): nasal cannula  O2 Flow (L/min): 3        24 @ 07:01  -  24 @ 07:00  --------------------------------------------------------  IN: 0 mL / OUT: 500 mL / NET: -500 mL              LABS:                        8.2    5.68  )-----------( 155      ( 2024 04:47 )             26.1         136  |  100  |  31<H>  ----------------------------<  98  3.8   |  28  |  5.00<H>    Ca    7.5<L>      2024 04:47        Urinalysis Basic - ( 2024 04:47 )    Color: x / Appearance: x / SG: x / pH: x  Gluc: 98 mg/dL / Ketone: x  / Bili: x / Urobili: x   Blood: x / Protein: x / Nitrite: x   Leuk Esterase: x / RBC: x / WBC x   Sq Epi: x / Non Sq Epi: x / Bacteria: x            WBC:  WBC Count: 5.68 K/uL ( @ 04:47)  WBC Count: 3.77 K/uL ( @ 07:55)      MICROBIOLOGY:  RECENT CULTURES:  - .Blood Blood-Peripheral XXXX XXXX   No growth at 5 days    -30 .Blood Blood-Peripheral XXXX XXXX   No growth at 5 days                    Sodium:  Sodium: 136 mmol/L ( @ 04:47)  Sodium: 134 mmol/L ( @ 07:55)      5.00 mg/dL :47  4.00 mg/dL  @ 07:55      Hemoglobin:  Hemoglobin: 8.2 g/dL ( @ 04:47)  Hemoglobin: 8.2 g/dL ( @ 07:55)      Platelets: Platelet Count - Automated: 155 K/uL ( @ 04:47)  Platelet Count - Automated: 149 K/uL ( @ 07:55)          Urinalysis Basic - ( 2024 04:47 )    Color: x / Appearance: x / SG: x / pH: x  Gluc: 98 mg/dL / Ketone: x  / Bili: x / Urobili: x   Blood: x / Protein: x / Nitrite: x   Leuk Esterase: x / RBC: x / WBC x   Sq Epi: x / Non Sq Epi: x / Bacteria: x        RADIOLOGY & ADDITIONAL STUDIES:      MICROBIOLOGY:  RECENT CULTURES:   .Blood Blood-Peripheral XXXX XXXX   No growth at 5 days    -30 .Blood Blood-Peripheral XXXX XXXX   No growth at 5 days                  CHIEF COMPLAINT/ REASON FOR VISIT  .. Patient was seen to address the  issue listed under PROBLEM LIST which is located toward bottom of this note     LISA NAVARRETE 2EAS 219 D1    Allergies    Levaquin (Anaphylaxis; Flushing; Short breath)    Intolerances        PAST MEDICAL & SURGICAL HISTORY:  ASHD (arteriosclerotic heart disease)      CAD (coronary artery disease)  s/p stent, CABG      CRI (chronic renal insufficiency)  ESRD on HD      Diabetes mellitus type II      Hypertension      Hyperlipidemia      Pacemaker      Chronic atrial fibrillation      Asthma      Hypothyroid      Dementia      H/O carotid stenosis      Enlarged prostate      Hx of CABG      Coronary stent      Cardiac pacemaker      History of amputation of toe  right great toe          FAMILY HISTORY:  Family history of chronic ischemic heart disease (Father)  father  of MI at age 59        Home Medications:  acetaminophen 325 mg oral tablet: 2 tab(s) orally 3 times a day as needed for  mild pain (2024 13:58)  Acidophilus oral capsule: 1 cap(s) orally once a day (2024 13:58)  apixaban 5 mg oral tablet: 1 tab(s) orally 2 times a day (2024 13:58)  brimonidine 0.2% ophthalmic solution: 1 drop(s) in each eye 2 times a day (2024 13:58)  calcitriol 0.25 mcg oral capsule: 1 cap(s) orally 3 times a week Monday, Wednesday, Friday (2024 13:58)  calcium acetate 667 mg oral capsule: 1 cap(s) orally 3 times a day (2024 13:58)  dorzolamide-timolol 2.23%-0.68% (2%-0.5% base) ophthalmic solution: 1 drop(s) in each eye 2 times a day (2024 13:58)  finasteride 5 mg oral tablet: 1 tab(s) orally once a day (at bedtime) (2024 13:58)  latanoprost 0.005% ophthalmic solution: 1 drop(s) in each eye once a day (2024 13:58)  levothyroxine 50 mcg (0.05 mg) oral tablet: 1 tab(s) orally once a day (2024 13:58)  melatonin 5 mg oral tablet: 1 tab(s) orally once a day (at bedtime) (2024 13:58)  midodrine 10 mg oral tablet: 1 tab(s) orally 2 times a day as needed (2024 13:58)  sertraline 25 mg oral tablet: 1 tab(s) orally once a day (at bedtime) take with sertraline 50MG (total 75MG) (2024 13:58)  sertraline 50 mg oral tablet: 1 tab(s) orally once a day (at bedtime) take with sertraline 25MG total(75MG) (2024 13:58)  simvastatin 10 mg oral tablet: 1 tab(s) orally once a day (at bedtime) (2024 13:58)  Symbicort 80 mcg-4.5 mcg/inh inhalation aerosol: 2 puff(s) inhaled 2 times a day (2024 13:58)  tamsulosin 0.4 mg oral capsule: 1 cap(s) orally once a day (2024 13:58)  Vitamin C 500 mg oral tablet, chewable: 1 tab(s) chewed once a day (2024 13:58)      MEDICATIONS  (STANDING):  aMIOdarone    Tablet 200 milliGRAM(s) Oral daily  apixaban 5 milliGRAM(s) Oral two times a day  ascorbic acid 500 milliGRAM(s) Oral daily  aspirin enteric coated 81 milliGRAM(s) Oral daily  brimonidine 0.2% Ophthalmic Solution 1 Drop(s) Both EYES two times a day  budesonide 160 MICROgram(s)/formoterol 4.5 MICROgram(s) Inhaler 2 Puff(s) Inhalation two times a day  calcitriol   Capsule 0.25 MICROGram(s) Oral <User Schedule>  calcium acetate 667 milliGRAM(s) Oral three times a day with meals  dextrose 5%. 1000 milliLiter(s) (100 mL/Hr) IV Continuous <Continuous>  dextrose 5%. 1000 milliLiter(s) (50 mL/Hr) IV Continuous <Continuous>  dextrose 50% Injectable 25 Gram(s) IV Push once  dextrose 50% Injectable 12.5 Gram(s) IV Push once  dextrose 50% Injectable 25 Gram(s) IV Push once  dorzolamide 2%/timolol 0.5% Ophthalmic Solution 1 Drop(s) Both EYES two times a day  epoetin kayla-epbx (RETACRIT) Injectable 76045 Unit(s) IV Push <User Schedule>  finasteride 5 milliGRAM(s) Oral daily  glucagon  Injectable 1 milliGRAM(s) IntraMuscular once  guaiFENesin Oral Liquid (Sugar-Free) 200 milliGRAM(s) Oral three times a day  insulin lispro (ADMELOG) corrective regimen sliding scale   SubCutaneous at bedtime  insulin lispro (ADMELOG) corrective regimen sliding scale   SubCutaneous three times a day before meals  lactobacillus acidophilus 1 Tablet(s) Oral daily  latanoprost 0.005% Ophthalmic Solution 1 Drop(s) Both EYES at bedtime  levothyroxine 50 MICROGram(s) Oral daily  melatonin 5 milliGRAM(s) Oral at bedtime  midodrine. 15 milliGRAM(s) Oral three times a day  pantoprazole    Tablet 40 milliGRAM(s) Oral daily  senna 2 Tablet(s) Oral at bedtime  sertraline 50 milliGRAM(s) Oral daily  sertraline 25 milliGRAM(s) Oral daily  simvastatin 10 milliGRAM(s) Oral at bedtime  tamsulosin 0.4 milliGRAM(s) Oral at bedtime  ursodiol Capsule 300 milliGRAM(s) Oral two times a day    MEDICATIONS  (PRN):  acetaminophen     Tablet .. 650 milliGRAM(s) Oral every 6 hours PRN Temp greater or equal to 38C (100.4F), Mild Pain (1 - 3)  albuterol/ipratropium for Nebulization 3 milliLiter(s) Nebulizer every 6 hours PRN Shortness of Breath and/or Wheezing  aluminum hydroxide/magnesium hydroxide/simethicone Suspension 30 milliLiter(s) Oral every 4 hours PRN Dyspepsia  bisacodyl Suppository 10 milliGRAM(s) Rectal daily PRN Constipation  dextrose Oral Gel 15 Gram(s) Oral once PRN Blood Glucose LESS THAN 70 milliGRAM(s)/deciliter  morphine  - Injectable 2 milliGRAM(s) IV Push every 6 hours PRN Severe Pain (7 - 10)  ondansetron Injectable 4 milliGRAM(s) IV Push every 8 hours PRN Nausea and/or Vomiting  traMADol 50 milliGRAM(s) Oral three times a day PRN Moderate Pain (4 - 6)              Vital Signs Last 24 Hrs  T(C): 36.3 (2024 05:25), Max: 36.8 (2024 21:05)  T(F): 97.4 (2024 05:25), Max: 98.2 (2024 21:05)  HR: 80 (2024 05:25) (62 - 92)  BP: 113/68 (2024 05:25) (108/68 - 141/83)  BP(mean): --  RR: 20 (2024 05:25) (18 - 20)  SpO2: 98% (2024 05:25) (97% - 100%)    Parameters below as of 2024 05:25  Patient On (Oxygen Delivery Method): nasal cannula  O2 Flow (L/min): 3        24 @ 07:01  -  24 @ 07:00  --------------------------------------------------------  IN: 0 mL / OUT: 500 mL / NET: -500 mL              LABS:                        8.2    5.68  )-----------( 155      ( 2024 04:47 )             26.1         136  |  100  |  31<H>  ----------------------------<  98  3.8   |  28  |  5.00<H>    Ca    7.5<L>      2024 04:47        Urinalysis Basic - ( 2024 04:47 )    Color: x / Appearance: x / SG: x / pH: x  Gluc: 98 mg/dL / Ketone: x  / Bili: x / Urobili: x   Blood: x / Protein: x / Nitrite: x   Leuk Esterase: x / RBC: x / WBC x   Sq Epi: x / Non Sq Epi: x / Bacteria: x            WBC:  WBC Count: 5.68 K/uL ( @ 04:47)  WBC Count: 3.77 K/uL ( @ 07:55)      MICROBIOLOGY:  RECENT CULTURES:  - .Blood Blood-Peripheral XXXX XXXX   No growth at 5 days    -30 .Blood Blood-Peripheral XXXX XXXX   No growth at 5 days                    Sodium:  Sodium: 136 mmol/L ( @ 04:47)  Sodium: 134 mmol/L ( @ 07:55)      5.00 mg/dL :47  4.00 mg/dL  @ 07:55      Hemoglobin:  Hemoglobin: 8.2 g/dL ( @ 04:47)  Hemoglobin: 8.2 g/dL ( @ 07:55)      Platelets: Platelet Count - Automated: 155 K/uL ( @ 04:47)  Platelet Count - Automated: 149 K/uL ( @ 07:55)          Urinalysis Basic - ( 2024 04:47 )    Color: x / Appearance: x / SG: x / pH: x  Gluc: 98 mg/dL / Ketone: x  / Bili: x / Urobili: x   Blood: x / Protein: x / Nitrite: x   Leuk Esterase: x / RBC: x / WBC x   Sq Epi: x / Non Sq Epi: x / Bacteria: x        RADIOLOGY & ADDITIONAL STUDIES:      MICROBIOLOGY:  RECENT CULTURES:   .Blood Blood-Peripheral XXXX XXXX   No growth at 5 days    -30 .Blood Blood-Peripheral XXXX XXXX   No growth at 5 days

## 2024-01-05 NOTE — PROGRESS NOTE ADULT - ASSESSMENT
ESRD on HD  clotted AVF, s/p thrombectomy, and venoplasty  Hypotension, on midodrine  Anemia    Next HD tomorrow at [facility]. Persistent hypotension. BP better on midodrine. Cardiology follow up.   Continue midodrine for hypotension. To continue current meds. Procrit for anemia. Overall prognosis poor. D/c planning.  ESRD on HD  clotted AVF, s/p thrombectomy, and venoplasty  Hypotension, on midodrine  Anemia    Next HD tomorrow at [facility]. Persistent hypotension. BP better on midodrine. Cardiology follow up.   Continue midodrine for hypotension. To continue current meds. Procrit for anemia. Overall prognosis poor. D/c planning.

## 2024-01-05 NOTE — DISCHARGE NOTE PROVIDER - PROVIDER TOKENS
PROVIDER:[TOKEN:[[id number]],FOLLOWUP:[1-3 days]] PROVIDER:[TOKEN:[[id number]],FOLLOWUP:[1-3 days]]

## 2024-01-15 NOTE — ED ADULT NURSE NOTE - CHIEF COMPLAINT
Take Tylenol or Motrin for pain and/or fever  Use Povidine Iodine nasal spray such as CofixRx for nose and sinus symptoms (you can only order it online , not in local pharmacies)  use sinus irrigation for nasal congestion and discharge (buy a Neilmed sinus irritation bottle, salt package, a bottle of distilled water, a bottle of  hydrogen peroxide. First add Hydrogen peroxide to cover the bottom of the bottle, and add the salt package, then filled the bottle with distilled water for sinus irrigation ) for a few days only  If treating for allergy, no need to add hydrogen peroxide.  Recommend doing the irrigation at least twice a day.  steam treatment for congestion such as hot shower  Chest percussion  Drink plenty of hot green tea with ginger, can add raw organic honey with lemon  take lozenges or Chloraseptic spray, salt water gargle mouth for sore throat.    over-the-counter cough medication (such as Mucinex) as needed or take cough medication as prescribed.  Use albuterol inhaler as needed  Instructions provided as documented in the AVS.  Thank you for visiting [facility].  Please follow up with your PCP ASAP.  
The patient is a 72y Male complaining of fall.
NEGATIVE

## 2024-01-17 ENCOUNTER — TRANSCRIPTION ENCOUNTER (OUTPATIENT)
Age: 74
End: 2024-01-17

## 2024-07-11 NOTE — ED PROVIDER NOTE - CROS ED ROS STATEMENT
Sent down pts urine with a patient label incase they order a urine sample.  
all other ROS negative except as per HPI

## (undated) DEVICE — ADAPTER ROT NUT M/M

## (undated) DEVICE — SUT POLYSORB 2-0 30" GS-21 UNDYED

## (undated) DEVICE — ELCTR BOVIE PENCIL HANDPIECE

## (undated) DEVICE — DRAPE 1/2 SHEET 40X57"

## (undated) DEVICE — SYR LUER LOK 10CC

## (undated) DEVICE — BLADE SCALPEL SAFETYLOCK #11

## (undated) DEVICE — SUT BIOSYN 4-0 18" P-12

## (undated) DEVICE — STAPLER SKIN VISI-STAT 35 WIDE

## (undated) DEVICE — LAP PAD 18 X 18"

## (undated) DEVICE — DRAPE TOWEL BLUE 17" X 24"

## (undated) DEVICE — PLASTIC SOLUTION BOWL 160Z

## (undated) DEVICE — SUT STAINLESS STEEL 5 18" SCC

## (undated) DEVICE — DRAPE 3/4 SHEET W REINFORCEMENT 56X77"

## (undated) DEVICE — SUT SOFSILK 0 30" V-20

## (undated) DEVICE — DRAPE HAND 77" X 146"

## (undated) DEVICE — GLV 8 PROTEXIS (WHITE)

## (undated) DEVICE — SOL IRR POUR NS 0.9% 1000ML

## (undated) DEVICE — SUT DOUBLE 6 WIRE STERNAL

## (undated) DEVICE — BLADE SCALPEL SAFETYLOCK #15

## (undated) DEVICE — PLV/PSP-ESU FORCEFX F8J7721A: Type: DURABLE MEDICAL EQUIPMENT

## (undated) DEVICE — CANISTER INDIGO

## (undated) DEVICE — VALVE 2 MNFLD NAMIC RT

## (undated) DEVICE — KIT LEAD ACCESSORY

## (undated) DEVICE — TUBING BRAT 2 SUCTION ASSEMBLY TWIST LOCK

## (undated) DEVICE — SUT MONOSOF 2-0 18" C-15

## (undated) DEVICE — SUT BLUNT SZ 5

## (undated) DEVICE — SUT SURGICAL STEEL 6 30" BP-1

## (undated) DEVICE — GLV 7 PROTEXIS (WHITE)

## (undated) DEVICE — SUCTION YANKAUER NO CONTROL VENT

## (undated) DEVICE — PLV-SCD MACHINE: Type: DURABLE MEDICAL EQUIPMENT

## (undated) DEVICE — DRAPE INSTRUMENT POUCH 6.75" X 11"

## (undated) DEVICE — VENODYNE/SCD SLEEVE CALF LARGE

## (undated) DEVICE — NDL HYPO REGULAR BEVEL 22G X 1.5" (TURQUOISE)

## (undated) DEVICE — SUT SOFSILK 2-0 18" TIES

## (undated) DEVICE — GLV 8.5 PROTEXIS (WHITE)

## (undated) DEVICE — SUT SOFSILK 3-0 12X18" TIES

## (undated) DEVICE — SUT SOFSILK 0 18" TIES

## (undated) DEVICE — SOL NORMOSOL-R PH7.4 1000ML

## (undated) DEVICE — SUT PLEDGET PRE PUNCH 4.8 X 9.5 X 1.5 MM

## (undated) DEVICE — ELCTR BOVIE TIP BLADE INSULATED 2.75" EDGE

## (undated) DEVICE — DRSG DERMABOND 0.7ML

## (undated) DEVICE — DRAPE C ARM UNIVERSAL

## (undated) DEVICE — DRSG TEGADERM 4X4.75"

## (undated) DEVICE — VISITEC 4X4

## (undated) DEVICE — SUT SOFSILK 2-0 BLACK 12X18 PRE-CUT

## (undated) DEVICE — PREP CHLORAPREP HI-LITE ORANGE 26ML

## (undated) DEVICE — SOL IRR POUR H2O 250ML

## (undated) DEVICE — FOLEY TRAY 16FR 5CC LF LUBRISIL ADVANCE TEMP CLOSED

## (undated) DEVICE — SLV STER PROGRAM WAND

## (undated) DEVICE — BLADE SCALPEL SAFETYLOCK #10

## (undated) DEVICE — SYR ASEPTO

## (undated) DEVICE — SUT SOFSILK 2-0 30" V-20

## (undated) DEVICE — SOL IRR POUR NS 0.9% 500ML

## (undated) DEVICE — CONTAINER SPECIMEN 4OZ

## (undated) DEVICE — KIT MEDTRONIC WRENCH CARD LEAD DISP

## (undated) DEVICE — GLV 6.5 PROTEXIS (WHITE)

## (undated) DEVICE — STRYKER INTERPULSE HANDPIECE W IRR SUCTION TUBE

## (undated) DEVICE — DRAPE MAYO STAND 30"

## (undated) DEVICE — TOURNIQUET CUFF 18" DUAL PORT SINGLE BLADDER W PLC  (BLACK)

## (undated) DEVICE — GLV 7.5 PROTEXIS (WHITE)

## (undated) DEVICE — SOL IRR POUR H2O 1000ML

## (undated) DEVICE — WARMING BLANKET LOWER ADULT

## (undated) DEVICE — DEFIBRILLATOR PAD PRE-CONNECT ADULT/CHILD

## (undated) DEVICE — STEALTH CLAMP INSERT FIBRA/FIBRA 60MM

## (undated) DEVICE — WARMING BLANKET DUO-THERM HYPER/HYPOTHERM ADULT

## (undated) DEVICE — DRAPE IOBAN 23" X 23"

## (undated) DEVICE — SUT ETHIBOND 0 44" EN3

## (undated) DEVICE — SUT POLYSORB 0 36" GS-25 UNDYED

## (undated) DEVICE — WARMING BLANKET FULL UNDERBODY

## (undated) DEVICE — VENODYNE/SCD SLEEVE CALF MEDIUM

## (undated) DEVICE — DRSG OPSITE 13.75 X 4"

## (undated) DEVICE — DRSG DERMABOND PRINEO 60CM

## (undated) DEVICE — SPECIMEN CONTAINER 100ML

## (undated) DEVICE — SUT PROLENE 5-0 36" C-1

## (undated) DEVICE — ONCORE 26 INSUFLATION DEVICE

## (undated) DEVICE — POSITIONER FOAM EGG CRATE ULNAR 2PCS (PINK)

## (undated) DEVICE — SPIKE INJ SITE

## (undated) DEVICE — MEDICATION LABELS W MARKER

## (undated) DEVICE — DRSG XEROFORM 1 X 8"

## (undated) DEVICE — DILATOR VESSEL 22FR

## (undated) DEVICE — SAW BLADE MICROAIRE STERNUM 1X34X9.4MM

## (undated) DEVICE — SYR CCS CORONARY CONTROL INJECT10N 10CC

## (undated) DEVICE — SPIKE MINI STER

## (undated) DEVICE — PACK UNIVERSAL CARDIAC

## (undated) DEVICE — DRSG CURITY GAUZE SPONGE 4 X 4" 12-PLY

## (undated) DEVICE — NDL HYPO SAFE 25G X 5/8" (ORANGE)

## (undated) DEVICE — STEALTH CLAMP INSERT FIBRA/FIBRA 90MM